# Patient Record
Sex: MALE | Race: WHITE | Employment: OTHER | URBAN - METROPOLITAN AREA
[De-identification: names, ages, dates, MRNs, and addresses within clinical notes are randomized per-mention and may not be internally consistent; named-entity substitution may affect disease eponyms.]

---

## 2017-05-08 ENCOUNTER — HOSPITAL ENCOUNTER (OUTPATIENT)
Dept: PHYSICAL THERAPY | Age: 72
Discharge: HOME | End: 2017-05-31
Attending: FAMILY MEDICINE | Admitting: FAMILY MEDICINE

## 2017-06-01 ENCOUNTER — HOSPITAL ENCOUNTER (OUTPATIENT)
Dept: OTHER | Age: 72
Discharge: HOME | End: 2017-06-30
Attending: FAMILY MEDICINE | Admitting: FAMILY MEDICINE

## 2017-06-06 ENCOUNTER — PROCEDURE VISIT (OUTPATIENT)
Dept: CARDIOLOGY CLINIC | Age: 72
End: 2017-06-06

## 2017-06-06 DIAGNOSIS — R01.1 HEART MURMUR: Primary | ICD-10-CM

## 2017-06-06 LAB
LV EF: 55 %
LVEF MODALITY: NORMAL

## 2017-06-06 PROCEDURE — 93306 TTE W/DOPPLER COMPLETE: CPT | Performed by: INTERNAL MEDICINE

## 2017-06-07 ENCOUNTER — TELEPHONE (OUTPATIENT)
Dept: CARDIOLOGY CLINIC | Age: 72
End: 2017-06-07

## 2017-06-12 ENCOUNTER — OFFICE VISIT (OUTPATIENT)
Dept: CARDIOLOGY CLINIC | Age: 72
End: 2017-06-12

## 2017-06-12 VITALS
BODY MASS INDEX: 37.93 KG/M2 | HEART RATE: 74 BPM | WEIGHT: 280 LBS | RESPIRATION RATE: 16 BRPM | DIASTOLIC BLOOD PRESSURE: 82 MMHG | HEIGHT: 72 IN | SYSTOLIC BLOOD PRESSURE: 124 MMHG

## 2017-06-12 DIAGNOSIS — E78.2 MIXED HYPERLIPIDEMIA: ICD-10-CM

## 2017-06-12 DIAGNOSIS — H91.93 HOH (HARD OF HEARING), BILATERAL: ICD-10-CM

## 2017-06-12 DIAGNOSIS — I10 ESSENTIAL HYPERTENSION: Primary | ICD-10-CM

## 2017-06-12 DIAGNOSIS — I71.20 THORACIC AORTIC ANEURYSM WITHOUT RUPTURE: ICD-10-CM

## 2017-06-12 DIAGNOSIS — E11.9 TYPE 2 DIABETES MELLITUS WITHOUT COMPLICATION, WITHOUT LONG-TERM CURRENT USE OF INSULIN (HCC): ICD-10-CM

## 2017-06-12 DIAGNOSIS — R01.1 HEART MURMUR: ICD-10-CM

## 2017-06-12 PROCEDURE — 99214 OFFICE O/P EST MOD 30 MIN: CPT | Performed by: INTERNAL MEDICINE

## 2017-06-12 RX ORDER — LISINOPRIL AND HYDROCHLOROTHIAZIDE 20; 12.5 MG/1; MG/1
1 TABLET ORAL DAILY
Qty: 90 TABLET | Refills: 3 | Status: SHIPPED | OUTPATIENT
Start: 2017-06-12 | End: 2017-12-11 | Stop reason: ALTCHOICE

## 2017-06-12 RX ORDER — AMLODIPINE BESYLATE 10 MG/1
10 TABLET ORAL DAILY
Qty: 90 TABLET | Refills: 3 | Status: SHIPPED | OUTPATIENT
Start: 2017-06-12 | End: 2018-06-12 | Stop reason: SDUPTHER

## 2017-06-12 RX ORDER — NIACIN 1000 MG
1000 TABLET, EXTENDED RELEASE 24 HR ORAL NIGHTLY
Qty: 90 TABLET | Refills: 3 | Status: SHIPPED | OUTPATIENT
Start: 2017-06-12 | End: 2018-06-25 | Stop reason: SDUPTHER

## 2017-06-20 ENCOUNTER — HOSPITAL ENCOUNTER (OUTPATIENT)
Dept: LAB | Age: 72
Discharge: HOME | End: 2017-06-20
Attending: FAMILY MEDICINE | Admitting: FAMILY MEDICINE

## 2017-06-20 LAB
ALBUMIN SERPL-MCNC: 4.5 GM/DL (ref 3.4–5)
ALP BLD-CCNC: 49 IU/L (ref 40–129)
ALT SERPL-CCNC: 29 U/L (ref 10–40)
ANION GAP SERPL CALCULATED.3IONS-SCNC: 15 MMOL/L (ref 4–16)
AST SERPL-CCNC: 26 IU/L (ref 15–37)
BILIRUB SERPL-MCNC: 0.8 MG/DL (ref 0–1)
BUN BLDV-MCNC: 20 MG/DL (ref 6–23)
CALCIUM SERPL-MCNC: 10.1 MG/DL (ref 8.3–10.6)
CHLORIDE BLD-SCNC: 101 MMOL/L (ref 99–110)
CHOLESTEROL, FASTING: 113 MG/DL
CO2: 25 MMOL/L (ref 21–32)
CREAT SERPL-MCNC: 1.2 MG/DL (ref 0.9–1.3)
ESTIMATED AVERAGE GLUCOSE: 128 MG/DL
GFR AFRICAN AMERICAN: >60 ML/MIN/1.73M2
GFR NON-AFRICAN AMERICAN: 59 ML/MIN/1.73M2
GLUCOSE FASTING: 130 MG/DL (ref 70–99)
HBA1C MFR BLD: 6.1 % (ref 4.2–6.3)
HDLC SERPL-MCNC: 41 MG/DL
LDL CHOLESTEROL DIRECT: 68 MG/DL
POTASSIUM SERPL-SCNC: 3.9 MMOL/L (ref 3.5–5.1)
PROSTATE SPECIFIC ANTIGEN: 2.1 NG/ML (ref 0–4)
SODIUM BLD-SCNC: 141 MMOL/L (ref 135–145)
TOTAL PROTEIN: 7 GM/DL (ref 6.4–8.2)
TRIGLYCERIDE, FASTING: 81 MG/DL
TSH HIGH SENSITIVITY: 1.84 UIU/ML (ref 0.27–4.2)
URIC ACID: 7.7 MG/DL (ref 3.5–7.2)

## 2017-11-08 ENCOUNTER — OFFICE VISIT (OUTPATIENT)
Dept: SURGERY | Age: 72
End: 2017-11-08

## 2017-11-08 VITALS — BODY MASS INDEX: 39.2 KG/M2 | WEIGHT: 279.98 LBS | HEIGHT: 71 IN

## 2017-11-08 DIAGNOSIS — Z12.11 SCREEN FOR COLON CANCER: Primary | ICD-10-CM

## 2017-11-08 PROCEDURE — 99203 OFFICE O/P NEW LOW 30 MIN: CPT | Performed by: SURGERY

## 2017-11-08 RX ORDER — THIAMINE MONONITRATE (VIT B1) 100 MG
100 TABLET ORAL DAILY
COMMUNITY
End: 2017-12-11 | Stop reason: ALTCHOICE

## 2017-11-08 RX ORDER — CHLORAL HYDRATE 500 MG
3000 CAPSULE ORAL 3 TIMES DAILY
COMMUNITY
End: 2017-12-11 | Stop reason: SDUPTHER

## 2017-11-14 ASSESSMENT — ENCOUNTER SYMPTOMS
ANAL BLEEDING: 0
CHOKING: 0
EYE ITCHING: 0
BACK PAIN: 0
COLOR CHANGE: 0
STRIDOR: 0
APNEA: 0
EYE REDNESS: 0
RECTAL PAIN: 0
SORE THROAT: 0
CONSTIPATION: 0
PHOTOPHOBIA: 0

## 2017-11-14 NOTE — PROGRESS NOTES
Chief Complaint   Patient presents with    Colon Cancer Screening     C/C-C-SCOPE SCREENING-NP-DR. URENA       SUBJECTIVE:  HPI: Pt presents today for evaluation and possible need of colonoscopy. Pt has  had colonoscopy in the past.  Patient has been experiencing no sx . Denies blood per rectum. There is no family history of colon cancer. Thoroughly reviewed the patient's medical history, family history, social history and review of systems with the patient today in the office. Please see medical record for pertinent positives. Past Medical History:   Diagnosis Date    Dizziness     FH: CAD (coronary artery disease) 9/29/2015    H/O 24 hour EKG monitoring 08/02/2016    48 hour holter-Mild degree of sinus bradycardia in this patient with no good correlation between the symptoms and bradycardia. Marked bradycardia during hours of sleep.  H/O cardiovascular stress test 11/24/15    Normal perfusion in the distribution of all coronaries, normal LV, EF 59%.  H/O Doppler ultrasound (Venous Doppler Lower Extremities) 03/08/2017    veins of the bilateral lower extremities are patent and free of echogenic thrombus. The veins are normally compressible and have normal phasic flow.  H/O echocardiogram 6/6/17,11/24/15    Left ventricle mildly dilated with normal systolic function EF 36-19% Mild concentric LVH with Grade II diastolic dysfunction.  Mild to moderate MR and mild TR     Heart murmur 9/29/2015    Hyperlipidemia     Hypertension     Kidney stones     Neuropathy (HCC)     Obesity     Thoracic aortic aneurysm ([address] 2/11    4.5    Type II or unspecified type diabetes mellitus without mention of complication, not stated as uncontrolled       Past Surgical History:   Procedure Laterality Date    COLON SURGERY  2007     COLONOSCOPY  2007    colon polyps removed   [id number]  2008    HERNIA REPAIR  2009     Current Outpatient Prescriptions   Medication Sig Dispense Refill    vitamin B-1 (THIAMINE) 100 MG tablet Take 100 mg by mouth daily      Omega-3 Fatty Acids (FISH OIL) 1000 MG CAPS Take 3,000 mg by mouth 3 times daily      tamsulosin (FLOMAX) 0.4 MG capsule Take 1 capsule by mouth daily for 10 doses 10 capsule 3    HYDROcodone-acetaminophen (NORCO) 5-325 MG per tablet Take 1-2 tablets by mouth every 4 hours as needed for Pain . 20 tablet 0    ondansetron (ZOFRAN ODT) 8 MG disintegrating tablet Take 1 tablet by mouth every 8 hours as needed for Nausea or Vomiting 10 tablet 0    NIASPAN 1000 MG extended release tablet Take 1 tablet by mouth nightly 90 tablet 3    amLODIPine (NORVASC) 10 MG tablet Take 1 tablet by mouth daily 90 tablet 3    lisinopril-hydrochlorothiazide (PRINZIDE;ZESTORETIC) 20-12.5 MG per tablet Take 1 tablet by mouth daily 90 tablet 3    naproxen (NAPROSYN) 500 MG tablet Take 1 tablet by mouth 2 times daily as needed for Pain 20 tablet 0    metFORMIN (GLUCOPHAGE) 1000 MG tablet Take 1,000 mg by mouth 2 times daily (with meals)      acetaminophen (TYLENOL) 325 MG tablet Take 2 tablets by mouth every 4 hours as needed for Pain or Fever 120 tablet 3    aluminum & magnesium hydroxide-simethicone (MAALOX) 200-200-20 MG/5ML SUSP suspension Take 30 mLs by mouth every 6 hours as needed for Indigestion 1 Bottle 0    glipiZIDE (GLUCOTROL) 10 MG tablet Take 1/2 tablet twice a day.  atorvastatin (LIPITOR) 20 MG tablet Take 20 mg by mouth daily      Krill Oil 1000 MG CAPS Take 1 capsule by mouth daily       allopurinol (ZYLOPRIM) 100 MG tablet Take 100 mg by mouth daily   0    aspirin 81 MG tablet Take 1 tablet by mouth daily. 90 tablet 3    B Complex Vitamins (VITAMIN B COMPLEX PO) Take  by mouth daily.  gabapentin (NEURONTIN) 800 MG tablet Take 800 mg by mouth 3 times daily. No current facility-administered medications for this visit.        Allergies   Allergen Reactions    Oxycontin [Oxycodone Hcl] Hives     Itching in his feet       Review of Systems:         Review of Systems   Constitutional: Negative for chills and fever. HENT: Negative for ear pain, mouth sores, sore throat and tinnitus. Eyes: Negative for photophobia, redness and itching. Respiratory: Negative for apnea, choking and stridor. Cardiovascular: Negative for chest pain and palpitations. Gastrointestinal: Negative for anal bleeding, constipation and rectal pain. Endocrine: Negative for polydipsia. Genitourinary: Negative for enuresis, flank pain and hematuria. Musculoskeletal: Negative for back pain, joint swelling and myalgias. Skin: Negative for color change and pallor. Allergic/Immunologic: Negative for environmental allergies. Neurological: Negative for syncope and speech difficulty. Psychiatric/Behavioral: Negative for confusion and hallucinations. OBJECTIVE:  Physical Exam:    Ht 5' 10.98\" (1.803 m)   Wt 279 lb 15.8 oz (127 kg)   BMI 39.07 kg/m²      Physical Exam   Constitutional: He is oriented to person, place, and time. He appears well-developed and well-nourished. HENT:   Head: Normocephalic. Eyes: Pupils are equal, round, and reactive to light. Neck: Normal range of motion. Neck supple. Cardiovascular: Normal rate. Pulmonary/Chest: Effort normal.   Abdominal: Soft. He exhibits no distension and no mass. There is no tenderness. There is no rebound and no guarding. Musculoskeletal: Normal range of motion. Neurological: He is alert and oriented to person, place, and time. Skin: Skin is warm. Psychiatric: He has a normal mood and affect. ASSESSMENT:  1. Screen for colon cancer          PLAN:  Treatment:  Will proceed with colonoscopy. Patient counseled on risks, benefits, and alternatives of treatment plan at length. Patient states an understanding and willingness to proceed with plan. Consent signed and prep instructions provided. No orders of the defined types were placed in this encounter.        No orders of the defined types were placed in this encounter. Follow Up:  Return in about 2 weeks (around 11/22/2017).         Oneyda Robles MD

## 2017-11-22 ENCOUNTER — TELEPHONE (OUTPATIENT)
Dept: SURGERY | Age: 72
End: 2017-11-22

## 2017-12-11 ENCOUNTER — OFFICE VISIT (OUTPATIENT)
Dept: CARDIOLOGY CLINIC | Age: 72
End: 2017-12-11

## 2017-12-11 VITALS
WEIGHT: 289 LBS | HEIGHT: 71 IN | RESPIRATION RATE: 16 BRPM | HEART RATE: 72 BPM | DIASTOLIC BLOOD PRESSURE: 84 MMHG | BODY MASS INDEX: 40.46 KG/M2 | SYSTOLIC BLOOD PRESSURE: 150 MMHG

## 2017-12-11 DIAGNOSIS — E78.2 MIXED HYPERLIPIDEMIA: ICD-10-CM

## 2017-12-11 DIAGNOSIS — I71.20 THORACIC AORTIC ANEURYSM WITHOUT RUPTURE: Primary | ICD-10-CM

## 2017-12-11 DIAGNOSIS — N28.9 RENAL INSUFFICIENCY: ICD-10-CM

## 2017-12-11 DIAGNOSIS — E66.09 CLASS 2 OBESITY DUE TO EXCESS CALORIES WITHOUT SERIOUS COMORBIDITY WITH BODY MASS INDEX (BMI) OF 39.0 TO 39.9 IN ADULT: ICD-10-CM

## 2017-12-11 DIAGNOSIS — Z82.49 FH: CAD (CORONARY ARTERY DISEASE): ICD-10-CM

## 2017-12-11 DIAGNOSIS — I10 ESSENTIAL HYPERTENSION: ICD-10-CM

## 2017-12-11 PROCEDURE — 99214 OFFICE O/P EST MOD 30 MIN: CPT | Performed by: INTERNAL MEDICINE

## 2017-12-11 RX ORDER — LISINOPRIL AND HYDROCHLOROTHIAZIDE 25; 20 MG/1; MG/1
1 TABLET ORAL DAILY
Qty: 30 TABLET | Refills: 5 | Status: SHIPPED | OUTPATIENT
Start: 2017-12-11 | End: 2018-06-06 | Stop reason: SDUPTHER

## 2017-12-11 NOTE — PATIENT INSTRUCTIONS
Increase Lisinopril/HCTZ to 20/25 and monitor BP daily. goal is below 130/85. Repeat BMP in 7-10 days. Restrict salt and continue all other current medications. Counseled extensively for calorie counting reduction and serving size and activity modification for weight loss. Appropriate prescriptions if needed on this visit are addressed. After visit summery is provided. Questions answered and patient verbalizes understanding. Follow up in office in 6 months, sooner if needed.

## 2017-12-11 NOTE — PROGRESS NOTES
Laymon Jeans  1945  Liberty Alvarado MD    Chief complaint and HPI:  Laymon Jeans  is a 78-year-old gentleman follows up for hypertension, hyperlipidemia and cardiac murmur and has a thoracic aortic aneurysm. He denies any chest pain or increasing shortness of breath or any other complaints. He has gained weight as his wife had been sick and he was providing care for her and was not able to exercise or diet properly. He has noticed swelling in his right lower extremity and is planning to see Dr. Cristina Zhu for venous studies. Apparently he had seen him when he went with his wife who follows up with him. He denies palpitations, syncope or near-syncope. Denies orthopnea or paroxysmal nocturnal dyspnea. Medications are reviewed and he seems to be compliant to his medications. He has been monitoring his blood pressure at home and it is running high lately. Rest of the Cardiovascular system review is otherwise unchanged from prior encounter. Past medical history:  has a past medical history of Arthritis; Dizziness; FH: CAD (coronary artery disease); H/O 24 hour EKG monitoring; H/O cardiovascular stress test; H/O Doppler ultrasound (Venous Doppler Lower Extremities); H/O echocardiogram; Heart murmur; Hyperlipidemia; Hypertension; Kidney stones; Neuropathy ([address] Obesity; Thoracic aortic aneurysm ([address] and Type II or unspecified type diabetes mellitus without mention of complication, not stated as uncontrolled. Past surgical history:  has a past surgical history that includes hernia repair (2008); hernia repair (2009); Colonoscopy (4613;7983); and Colon surgery (2007 ).     Social History:   Social History   Substance Use Topics    Smoking status: Former Smoker     Packs/day: 1.00     Years: 7.00     Quit date: 3/26/1973    Smokeless tobacco: Former User      Comment: Reviewed 12/11/17    Alcohol use Yes      Comment: small amount some nights     Family history: family history includes Diabetes in his Ht 5' 11\" (1.803 m)   Wt 289 lb (131.1 kg)   BMI 40.31 kg/m²    Body mass index is 40.31 kg/m². Wt Readings from Last 3 Encounters:   12/11/17 289 lb (131.1 kg)   12/06/17 280 lb (127 kg)   12/04/17 275 lb (124.7 kg)     General exam: Patient is obese, awake, alert and oriented and in no acute or apparent distress.   Head and Neck: Normocephalic. Neck is supple . Wears glasses and hearing aids   Carotids: no Bruits. No thyromegaly  Jugular venous pressure: not elevated. Heart[de-identified] Heart sounds are normal. 2/6 systolic murmur  Peripheral Pulses: 1+  Extremities: 2+ right leg edema and only trace left leg edema  Chest: normal  Lungs:Lungs are clear to auscultation and percussion.    Abdomen: Soft non tender. Bowel sounds are normal. No organomegaly or ascites.   Musculoskeletal: WNL   Skin: Normal in color and texture. No rash   Psychiatric: Normal mood and effect.    Neurologic exam:  No focal deficit. Cranial nerve exam significant for severe hearing impairment    Patient had CAT scan of abdomen and pelvis on October 4, 2017 reported  Right-sided obstructive uropathy with a punctate calcification within the   right distal ureter, just proximal to the UVJ.       Enlarged heterogeneous prostate gland.  Correlate with PSA values if not   recently performed.       Sigmoid diverticulosis without acute diverticulitis.      LAB REVIEW:  CBC:   Lab Results   Component Value Date    WBC 6.9 10/04/2017    HGB 14.3 10/04/2017    HCT 42.3 10/04/2017     10/04/2017     Lipids:   Triglyceride, Fasting 81  <150 MG/DL Final 06/20/2017  9:22 AM [facility]   Cholesterol, Fasting 113  <200 MG/DL Final 06/20/2017  9:22 AM [facility]   HDL 41  >40 MG/DL Final 06/20/2017  9:22 AM [facility]   LDL Direct 68          Lab Results   Component Value Date    CHOL 118 12/12/2016    TRIG 103 12/12/2016    HDL 41 06/20/2017    LDLDIRECT 68 06/20/2017     Renal:   Lab Results   Component Value Date    BUN 19 10/04/2017    CREATININE 1.2 10/04/2017 CREATININE 1.3 10/04/2017     10/04/2017    K 4.3 10/04/2017     PT/INR:   Lab Results   Component Value Date    INR 0.99 10/04/2017     IMPRESSION and RECOMMENDATIONS:      Thoracic aortic aneurysm  We'll continue to monitor it by echocardiography annually. I would avoid CTA and contrast due to his renal insufficiency. Optimize antihypertensive therapy. Renal insufficiency  Stable we will check his basic metabolic panel again a week to 10 days as we are adjusting his hydrochlorothiazide dosage. Hyperlipidemia  Fairly well controlled on current medications will continue the same. FH: CAD (coronary artery disease)  Aggressive risk modification for primary prevention is the goal.    Hypertension  Not so well controlled. We will increase hydrochlorothiazide from 12.5 to 25 mg along with the lisinopril 20 mg he is already on. He is to continue to monitor blood pressure at home and bring the results. He is counseled for salt restriction and weight loss. We will check his basic metabolic panel about 10 days. Obesity  Counseled extensively for calorie counting reduction and serving size and activity modification for weight loss. Increase Lisinopril/HCTZ to 20/25 and monitor BP daily. goal is below 130/85. Repeat BMP in 7-10 days. Restrict salt and continue all other current medications. Counseled extensively for calorie counting reduction and serving size and activity modification for weight loss. Appropriate prescriptions if needed on this visit are addressed. After visit summery is provided. Questions answered and patient verbalizes understanding. Follow up in office in 6 months with repeat echo, sooner if needed.     Arabella Oh MD, 12/11/2017 2:29 PM     Please note this report has been partially produced using speech recognition software and may contain errors related to that system including errors in grammar, punctuation, and spelling, as well as words and phrases that may be inappropriate. If there are any questions or concerns please feel free to contact the dictating provider for clarification.

## 2017-12-11 NOTE — ASSESSMENT & PLAN NOTE
We'll continue to monitor it by echocardiography annually. I would avoid CTA and contrast due to his renal insufficiency. Optimize antihypertensive therapy.

## 2017-12-11 NOTE — ASSESSMENT & PLAN NOTE
Counseled extensively for calorie counting reduction and serving size and activity modification for weight loss.

## 2017-12-11 NOTE — ASSESSMENT & PLAN NOTE
Not so well controlled. We will increase hydrochlorothiazide from 12.5 to 25 mg along with the lisinopril 20 mg he is already on. He is to continue to monitor blood pressure at home and bring the results. He is counseled for salt restriction and weight loss. We will check his basic metabolic panel about 10 days.

## 2017-12-20 ENCOUNTER — OFFICE VISIT (OUTPATIENT)
Dept: SURGERY | Age: 72
End: 2017-12-20

## 2017-12-20 DIAGNOSIS — D12.3 ADENOMATOUS POLYP OF TRANSVERSE COLON: Primary | ICD-10-CM

## 2017-12-20 PROCEDURE — 99213 OFFICE O/P EST LOW 20 MIN: CPT | Performed by: SURGERY

## 2017-12-20 RX ORDER — SODIUM, POTASSIUM,MAG SULFATES 17.5-3.13G
SOLUTION, RECONSTITUTED, ORAL ORAL
Refills: 0 | COMMUNITY
Start: 2017-11-08 | End: 2018-06-26

## 2017-12-20 ASSESSMENT — ENCOUNTER SYMPTOMS
EYE REDNESS: 0
SORE THROAT: 0
BACK PAIN: 0
CONSTIPATION: 0
COLOR CHANGE: 0
ANAL BLEEDING: 0
EYE ITCHING: 0
PHOTOPHOBIA: 0
RECTAL PAIN: 0
CHOKING: 0
APNEA: 0
STRIDOR: 0

## 2017-12-20 NOTE — PROGRESS NOTES
Chief Complaint   Patient presents with    Colon Polyps       SUBJECTIVE:  HPI: Patient presents for follow up after colonoscopy. Patient is feeling well, denies severe abdominal pain, bloating, rectal bleeding after the procedure. Path reveals:   Specimen #[id number]  Final Pathologic Diagnosis:  A.  Transverse colon, polyp 72:  -     Tubular adenoma and hyperplastic polyp. B.  Sigmoid colon, polyp:  -     Tubular adenoma. C.  Descending colon, polyp:  -     Tubular adenoma. Thoroughly reviewed the patient's medical history, family history, social history and review of systems with the patient today in the office. Please see medical record for pertinent positives. Past Medical History:   Diagnosis Date    Arthritis     Dizziness     FH: CAD (coronary artery disease) 9/29/2015    H/O 24 hour EKG monitoring 08/02/2016    48 hour holter-Mild degree of sinus bradycardia in this patient with no good correlation between the symptoms and bradycardia. Marked bradycardia during hours of sleep.  H/O cardiovascular stress test 11/24/15    Normal perfusion in the distribution of all coronaries, normal LV, EF 59%.  H/O Doppler ultrasound (Venous Doppler Lower Extremities) 03/08/2017    veins of the bilateral lower extremities are patent and free of echogenic thrombus. The veins are normally compressible and have normal phasic flow.  H/O echocardiogram 6/6/17,11/24/15    Left ventricle mildly dilated with normal systolic function EF 02-43% Mild concentric LVH with Grade II diastolic dysfunction.  Mild to moderate MR and mild TR     Heart murmur 9/29/2015    Hyperlipidemia     Hypertension     Kidney stones     Neuropathy (HCC)     Obesity     Thoracic aortic aneurysm ([address] 2/11    4.5    Type II or unspecified type diabetes mellitus without mention of complication, not stated as uncontrolled       Past Surgical History:   Procedure Laterality Date    COLON SURGERY  2007     COLONOSCOPY [id number]    colon polyps removed    HERNIA REPAIR  2008    HERNIA REPAIR  2009     Current Outpatient Prescriptions   Medication Sig Dispense Refill    SUPREP BOWEL PREP KIT 17.5-3.13-1.6 GM/180ML SOLN USE AS DIRECTED FOR COLONOSCOPY PREP  0    lisinopril-hydrochlorothiazide (PRINZIDE;ZESTORETIC) 20-25 MG per tablet Take 1 tablet by mouth daily 30 tablet 5    HYDROcodone-acetaminophen (NORCO) 5-325 MG per tablet Take 1-2 tablets by mouth every 4 hours as needed for Pain . 20 tablet 0    NIASPAN 1000 MG extended release tablet Take 1 tablet by mouth nightly 90 tablet 3    amLODIPine (NORVASC) 10 MG tablet Take 1 tablet by mouth daily 90 tablet 3    metFORMIN (GLUCOPHAGE) 1000 MG tablet Take 1,000 mg by mouth 2 times daily (with meals)      acetaminophen (TYLENOL) 325 MG tablet Take 2 tablets by mouth every 4 hours as needed for Pain or Fever 120 tablet 3    aluminum & magnesium hydroxide-simethicone (MAALOX) 200-200-20 MG/5ML SUSP suspension Take 30 mLs by mouth every 6 hours as needed for Indigestion 1 Bottle 0    glipiZIDE (GLUCOTROL) 10 MG tablet Take 1/2 tablet twice a day.  atorvastatin (LIPITOR) 20 MG tablet Take 20 mg by mouth daily      Krill Oil 1000 MG CAPS Take 1 capsule by mouth daily       allopurinol (ZYLOPRIM) 100 MG tablet Take 100 mg by mouth daily   0    aspirin 81 MG tablet Take 1 tablet by mouth daily. 90 tablet 3    B Complex Vitamins (VITAMIN B COMPLEX PO) Take  by mouth daily.  gabapentin (NEURONTIN) 800 MG tablet Take 800 mg by mouth 3 times daily. No current facility-administered medications for this visit. Allergies   Allergen Reactions    Oxycontin [Oxycodone Hcl] Hives     Itching in his feet       Review of Systems:         Review of Systems   Constitutional: Negative for chills and fever. HENT: Negative for ear pain, mouth sores, sore throat and tinnitus. Eyes: Negative for photophobia, redness and itching.    Respiratory: Negative for

## 2018-01-02 ENCOUNTER — HOSPITAL ENCOUNTER (OUTPATIENT)
Dept: LAB | Age: 73
Discharge: HOME | End: 2018-01-02
Attending: INTERNAL MEDICINE | Admitting: INTERNAL MEDICINE

## 2018-01-02 LAB
ALBUMIN SERPL-MCNC: 4.5 GM/DL (ref 3.4–5)
ALP BLD-CCNC: 51 IU/L (ref 40–129)
ALT SERPL-CCNC: 39 U/L (ref 10–40)
ANION GAP SERPL CALCULATED.3IONS-SCNC: 14 MMOL/L (ref 4–16)
AST SERPL-CCNC: 28 IU/L (ref 15–37)
BILIRUB SERPL-MCNC: 0.8 MG/DL (ref 0–1)
BUN BLDV-MCNC: 19 MG/DL (ref 6–23)
CALCIUM SERPL-MCNC: 10.3 MG/DL (ref 8.3–10.6)
CHLORIDE BLD-SCNC: 102 MMOL/L (ref 99–110)
CHOLESTEROL, FASTING: 131 MG/DL
CO2: 29 MMOL/L (ref 21–32)
CREAT SERPL-MCNC: 1.3 MG/DL (ref 0.9–1.3)
CREATININE URINE: 55.9 MG/DL (ref 39–259)
ESTIMATED AVERAGE GLUCOSE: 120 MG/DL
GFR AFRICAN AMERICAN: >60 ML/MIN/1.73M2
GFR NON-AFRICAN AMERICAN: 54 ML/MIN/1.73M2
GLUCOSE FASTING: 121 MG/DL (ref 70–99)
HBA1C MFR BLD: 5.8 % (ref 4.2–6.3)
HDLC SERPL-MCNC: 50 MG/DL
LDL CHOLESTEROL DIRECT: 73 MG/DL
MICROALBUMIN/CREAT 24H UR: 2.3 MG/DL
MICROALBUMIN/CREAT UR-RTO: 41.1 MG/G CREAT (ref 0–30)
POTASSIUM SERPL-SCNC: 4.7 MMOL/L (ref 3.5–5.1)
SODIUM BLD-SCNC: 145 MMOL/L (ref 135–145)
TOTAL PROTEIN: 7.2 GM/DL (ref 6.4–8.2)
TRIGLYCERIDE, FASTING: 78 MG/DL

## 2018-04-08 ENCOUNTER — HOSPITAL ENCOUNTER (OUTPATIENT)
Dept: SLEEP CENTER | Age: 73
Discharge: HOME | End: 2018-04-08
Attending: INTERNAL MEDICINE | Admitting: INTERNAL MEDICINE

## 2018-04-08 VITALS — HEIGHT: 71 IN | BODY MASS INDEX: 39.2 KG/M2 | WEIGHT: 280 LBS

## 2018-04-08 ASSESSMENT — SLEEP AND FATIGUE QUESTIONNAIRES
HOW LIKELY ARE YOU TO NOD OFF OR FALL ASLEEP WHILE WATCHING TV: 3
HOW LIKELY ARE YOU TO NOD OFF OR FALL ASLEEP WHEN YOU ARE A PASSENGER IN A CAR FOR AN HOUR WITHOUT A BREAK: 3
HOW LIKELY ARE YOU TO NOD OFF OR FALL ASLEEP WHILE SITTING QUIETLY AFTER LUNCH WITHOUT ALCOHOL: 1
HOW LIKELY ARE YOU TO NOD OFF OR FALL ASLEEP WHILE LYING DOWN TO REST IN THE AFTERNOON WHEN CIRCUMSTANCES PERMIT: 3
ESS TOTAL SCORE: 15
HOW LIKELY ARE YOU TO NOD OFF OR FALL ASLEEP IN A CAR, WHILE STOPPED FOR A FEW MINUTES IN TRAFFIC: 1
HOW LIKELY ARE YOU TO NOD OFF OR FALL ASLEEP WHILE SITTING INACTIVE IN A PUBLIC PLACE: 1
HOW LIKELY ARE YOU TO NOD OFF OR FALL ASLEEP WHILE SITTING AND READING: 3
HOW LIKELY ARE YOU TO NOD OFF OR FALL ASLEEP WHILE SITTING AND TALKING TO SOMEONE: 0

## 2018-04-26 ENCOUNTER — HOSPITAL ENCOUNTER (OUTPATIENT)
Dept: GENERAL RADIOLOGY | Age: 73
Discharge: HOME | End: 2018-04-26
Attending: INTERNAL MEDICINE | Admitting: INTERNAL MEDICINE

## 2018-04-26 DIAGNOSIS — G47.33 OBSTRUCTIVE SLEEP APNEA SYNDROME: ICD-10-CM

## 2018-06-06 RX ORDER — LISINOPRIL AND HYDROCHLOROTHIAZIDE 25; 20 MG/1; MG/1
TABLET ORAL
Qty: 30 TABLET | Refills: 5 | Status: SHIPPED | OUTPATIENT
Start: 2018-06-06 | End: 2018-11-30 | Stop reason: SDUPTHER

## 2018-06-07 ENCOUNTER — PROCEDURE VISIT (OUTPATIENT)
Dept: CARDIOLOGY CLINIC | Age: 73
End: 2018-06-07

## 2018-06-07 DIAGNOSIS — I38 VHD (VALVULAR HEART DISEASE): Primary | ICD-10-CM

## 2018-06-07 DIAGNOSIS — I71.20 THORACIC AORTIC ANEURYSM WITHOUT RUPTURE: ICD-10-CM

## 2018-06-07 LAB
LV EF: 53 %
LVEF MODALITY: NORMAL

## 2018-06-07 PROCEDURE — 93306 TTE W/DOPPLER COMPLETE: CPT | Performed by: INTERNAL MEDICINE

## 2018-06-12 ENCOUNTER — TELEPHONE (OUTPATIENT)
Dept: CARDIOLOGY CLINIC | Age: 73
End: 2018-06-12

## 2018-06-12 RX ORDER — AMLODIPINE BESYLATE 10 MG/1
10 TABLET ORAL DAILY
Qty: 90 TABLET | Refills: 3 | Status: SHIPPED | OUTPATIENT
Start: 2018-06-12

## 2018-06-25 RX ORDER — NIACIN 1000 MG
1000 TABLET, EXTENDED RELEASE 24 HR ORAL NIGHTLY
Qty: 90 TABLET | Refills: 3 | Status: SHIPPED | OUTPATIENT
Start: 2018-06-25 | End: 2019-06-18 | Stop reason: SDUPTHER

## 2018-06-26 ENCOUNTER — OFFICE VISIT (OUTPATIENT)
Dept: CARDIOLOGY CLINIC | Age: 73
End: 2018-06-26

## 2018-06-26 VITALS
WEIGHT: 293 LBS | DIASTOLIC BLOOD PRESSURE: 78 MMHG | BODY MASS INDEX: 41.02 KG/M2 | SYSTOLIC BLOOD PRESSURE: 138 MMHG | HEART RATE: 56 BPM | RESPIRATION RATE: 16 BRPM | HEIGHT: 71 IN

## 2018-06-26 DIAGNOSIS — E78.2 MIXED HYPERLIPIDEMIA: ICD-10-CM

## 2018-06-26 DIAGNOSIS — G47.33 OSA ON CPAP: ICD-10-CM

## 2018-06-26 DIAGNOSIS — I25.84 CORONARY ARTERY DISEASE DUE TO CALCIFIED CORONARY LESION: ICD-10-CM

## 2018-06-26 DIAGNOSIS — Z82.49 FH: CAD (CORONARY ARTERY DISEASE): ICD-10-CM

## 2018-06-26 DIAGNOSIS — Z99.89 OSA ON CPAP: ICD-10-CM

## 2018-06-26 DIAGNOSIS — I10 ESSENTIAL HYPERTENSION: ICD-10-CM

## 2018-06-26 DIAGNOSIS — E11.9 TYPE 2 DIABETES MELLITUS WITHOUT COMPLICATION, WITHOUT LONG-TERM CURRENT USE OF INSULIN (HCC): Primary | ICD-10-CM

## 2018-06-26 DIAGNOSIS — I71.20 THORACIC AORTIC ANEURYSM WITHOUT RUPTURE: ICD-10-CM

## 2018-06-26 DIAGNOSIS — I25.10 CORONARY ARTERY DISEASE DUE TO CALCIFIED CORONARY LESION: ICD-10-CM

## 2018-06-26 PROCEDURE — 2022F DILAT RTA XM EVC RTNOPTHY: CPT | Performed by: INTERNAL MEDICINE

## 2018-06-26 PROCEDURE — 4040F PNEUMOC VAC/ADMIN/RCVD: CPT | Performed by: INTERNAL MEDICINE

## 2018-06-26 PROCEDURE — 99214 OFFICE O/P EST MOD 30 MIN: CPT | Performed by: INTERNAL MEDICINE

## 2018-06-26 PROCEDURE — 3044F HG A1C LEVEL LT 7.0%: CPT | Performed by: INTERNAL MEDICINE

## 2018-06-26 PROCEDURE — 1036F TOBACCO NON-USER: CPT | Performed by: INTERNAL MEDICINE

## 2018-06-26 PROCEDURE — 3017F COLORECTAL CA SCREEN DOC REV: CPT | Performed by: INTERNAL MEDICINE

## 2018-06-26 PROCEDURE — G8417 CALC BMI ABV UP PARAM F/U: HCPCS | Performed by: INTERNAL MEDICINE

## 2018-06-26 PROCEDURE — G8599 NO ASA/ANTIPLAT THER USE RNG: HCPCS | Performed by: INTERNAL MEDICINE

## 2018-06-26 PROCEDURE — G8427 DOCREV CUR MEDS BY ELIG CLIN: HCPCS | Performed by: INTERNAL MEDICINE

## 2018-06-26 PROCEDURE — 1123F ACP DISCUSS/DSCN MKR DOCD: CPT | Performed by: INTERNAL MEDICINE

## 2018-08-13 ENCOUNTER — OFFICE VISIT (OUTPATIENT)
Dept: SURGERY | Age: 73
End: 2018-08-13

## 2018-08-13 VITALS
DIASTOLIC BLOOD PRESSURE: 80 MMHG | HEIGHT: 72 IN | BODY MASS INDEX: 38.87 KG/M2 | SYSTOLIC BLOOD PRESSURE: 118 MMHG | WEIGHT: 287 LBS | HEART RATE: 64 BPM | RESPIRATION RATE: 18 BRPM

## 2018-08-13 DIAGNOSIS — L72.3 SEBACEOUS CYST: Primary | ICD-10-CM

## 2018-08-13 PROCEDURE — G8417 CALC BMI ABV UP PARAM F/U: HCPCS | Performed by: SURGERY

## 2018-08-13 PROCEDURE — G8599 NO ASA/ANTIPLAT THER USE RNG: HCPCS | Performed by: SURGERY

## 2018-08-13 PROCEDURE — G8427 DOCREV CUR MEDS BY ELIG CLIN: HCPCS | Performed by: SURGERY

## 2018-08-13 PROCEDURE — 1123F ACP DISCUSS/DSCN MKR DOCD: CPT | Performed by: SURGERY

## 2018-08-13 PROCEDURE — 1036F TOBACCO NON-USER: CPT | Performed by: SURGERY

## 2018-08-13 PROCEDURE — 3017F COLORECTAL CA SCREEN DOC REV: CPT | Performed by: SURGERY

## 2018-08-13 PROCEDURE — 1101F PT FALLS ASSESS-DOCD LE1/YR: CPT | Performed by: SURGERY

## 2018-08-13 PROCEDURE — 4040F PNEUMOC VAC/ADMIN/RCVD: CPT | Performed by: SURGERY

## 2018-08-13 PROCEDURE — 99213 OFFICE O/P EST LOW 20 MIN: CPT | Performed by: SURGERY

## 2018-08-22 ENCOUNTER — OFFICE VISIT (OUTPATIENT)
Dept: SURGERY | Age: 73
End: 2018-08-22

## 2018-08-22 ENCOUNTER — HOSPITAL ENCOUNTER (OUTPATIENT)
Dept: OTHER | Age: 73
Discharge: HOME | End: 2018-08-22
Attending: SURGERY | Admitting: SURGERY

## 2018-08-22 VITALS
WEIGHT: 285 LBS | SYSTOLIC BLOOD PRESSURE: 116 MMHG | HEART RATE: 59 BPM | OXYGEN SATURATION: 96 % | HEIGHT: 72 IN | BODY MASS INDEX: 38.6 KG/M2 | DIASTOLIC BLOOD PRESSURE: 68 MMHG

## 2018-08-22 DIAGNOSIS — L72.0 EPIDERMAL CYST OF NECK: Primary | ICD-10-CM

## 2018-08-22 DIAGNOSIS — L98.9 SKIN LESION OF LEFT UPPER EXTREMITY: ICD-10-CM

## 2018-08-22 PROCEDURE — 11422 EXC H-F-NK-SP B9+MARG 1.1-2: CPT | Performed by: SURGERY

## 2018-08-22 PROCEDURE — 11401 EXC TR-EXT B9+MARG 0.6-1 CM: CPT | Performed by: SURGERY

## 2018-08-26 ASSESSMENT — ENCOUNTER SYMPTOMS
APNEA: 0
RECTAL PAIN: 0
RECTAL PAIN: 0
CONSTIPATION: 0
BACK PAIN: 0
CHOKING: 0
APNEA: 0
PHOTOPHOBIA: 0
SORE THROAT: 0
ANAL BLEEDING: 0
EYE ITCHING: 0
EYE REDNESS: 0
STRIDOR: 0
BACK PAIN: 0
CHOKING: 0
COLOR CHANGE: 0
COLOR CHANGE: 0
CONSTIPATION: 0
EYE ITCHING: 0
SORE THROAT: 0
PHOTOPHOBIA: 0
ANAL BLEEDING: 0
STRIDOR: 0
EYE REDNESS: 0

## 2018-08-26 NOTE — PROGRESS NOTES
Chief Complaint   Patient presents with    Procedure     Right neck and right arm lesion excisions       SUBJECTIVE:  HPI: Patient presents today for an office procedure. 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 Pt is ready to have this lesion removed. I have reviewed the patient's(pertinent information to this visit) medical history, family history(scanned in  the Media tab under \"patient questioner\"), social history and review of systems with the patient today in the office. Past Surgical History:   Procedure Laterality Date    COLON SURGERY  2007     COLONOSCOPY  2007;2017    colon polyps removed    HERNIA REPAIR  2008    HERNIA REPAIR  2009     Past Medical History:   Diagnosis Date    Arthritis     Dizziness     FH: CAD (coronary artery disease) 9/29/2015    H/O 24 hour EKG monitoring 08/02/2016    48 hour holter-Mild degree of sinus bradycardia in this patient with no good correlation between the symptoms and bradycardia. Marked bradycardia during hours of sleep.  H/O cardiovascular stress test 11/24/15    Normal perfusion in the distribution of all coronaries, normal LV, EF 59%.  H/O Doppler ultrasound (Venous Doppler Lower Extremities) 03/08/2017    veins of the bilateral lower extremities are patent and free of echogenic thrombus. The veins are normally compressible and have normal phasic flow.  H/O echocardiogram 6/6/17,11/24/15    Left ventricle mildly dilated with normal systolic function EF 13-07% Mild concentric LVH with Grade II diastolic dysfunction. Mild to moderate MR and mild TR     H/O echocardiogram 06/07/2018    LV function and size are normal, Ejection Fraction 50-55 %. Aortic sclerosis with mild stenosis.  Mild AR and Mild MR.     Heart murmur 9/29/2015    Hyperlipidemia     Hypertension     Kidney stones     Neuropathy     Obesity     JAYE on CPAP 6/26/2018    On CPAP 5/2018    Thoracic aortic aneurysm ([address] 2/11

## 2018-08-26 NOTE — PROGRESS NOTES
Chief Complaint   Patient presents with    Follow-up     P/P c-scope 12/2017, sebaceous nodule right posterial neck. SUBJECTIVE:  HPI: Patient is here with complaints of right neck cyst and left arm flaking skin lesion. The lesion on his neck is getting bigger. This was infected at one point and did improve on po abx. Pt currently denies fever. I have reviewed the patient's(pertinent information to this visit) medical history, family history(scanned in  the Media tab under \"patient questioner\"), social history and review of systems with the patient today in the office. Past Surgical History:   Procedure Laterality Date    COLON SURGERY  2007     COLONOSCOPY  2007;2017    colon polyps removed    HERNIA REPAIR  2008    HERNIA REPAIR  2009     Past Medical History:   Diagnosis Date    Arthritis     Dizziness     FH: CAD (coronary artery disease) 9/29/2015    H/O 24 hour EKG monitoring 08/02/2016    48 hour holter-Mild degree of sinus bradycardia in this patient with no good correlation between the symptoms and bradycardia. Marked bradycardia during hours of sleep.  H/O cardiovascular stress test 11/24/15    Normal perfusion in the distribution of all coronaries, normal LV, EF 59%.  H/O Doppler ultrasound (Venous Doppler Lower Extremities) 03/08/2017    veins of the bilateral lower extremities are patent and free of echogenic thrombus. The veins are normally compressible and have normal phasic flow.  H/O echocardiogram 6/6/17,11/24/15    Left ventricle mildly dilated with normal systolic function EF 93-16% Mild concentric LVH with Grade II diastolic dysfunction. Mild to moderate MR and mild TR     H/O echocardiogram 06/07/2018    LV function and size are normal, Ejection Fraction 50-55 %. Aortic sclerosis with mild stenosis.  Mild AR and Mild MR.     Heart murmur 9/29/2015    Hyperlipidemia     Hypertension     Kidney stones     Neuropathy     Obesity     JAYE on CPAP tablet Take 800 mg by mouth 3 times daily. No current facility-administered medications for this visit. Allergies   Allergen Reactions    Oxycontin [Oxycodone Hcl] Hives     Itching in his feet       Review of Systems:         Review of Systems   Constitutional: Negative for chills and fever. HENT: Negative for ear pain, mouth sores, sore throat and tinnitus. Eyes: Negative for photophobia, redness and itching. Respiratory: Negative for apnea, choking and stridor. Cardiovascular: Negative for chest pain and palpitations. Gastrointestinal: Negative for anal bleeding, constipation and rectal pain. Endocrine: Negative for polydipsia. Genitourinary: Negative for enuresis, flank pain and hematuria. Musculoskeletal: Negative for back pain, joint swelling and myalgias. Skin: Negative for color change and pallor. Right neck cyst and left arm skin lesion    Allergic/Immunologic: Negative for environmental allergies. Neurological: Negative for syncope and speech difficulty. Psychiatric/Behavioral: Negative for confusion and hallucinations. OBJECTIVE:  Physical Exam:    /80 (Site: Left Arm, Position: Sitting, Cuff Size: Large Adult)   Pulse 64   Resp 18   Ht 6' (1.829 m)   Wt 287 lb (130.2 kg)   BMI 38.92 kg/m²      Physical Exam   Constitutional: He is oriented to person, place, and time. He appears well-developed and well-nourished. HENT:   Head: Normocephalic. Eyes: Pupils are equal, round, and reactive to light. Neck: Normal range of motion. Neck supple. Cardiovascular: Normal rate. Pulmonary/Chest: Effort normal.   Abdominal: Soft. He exhibits no distension and no mass. There is no tenderness. There is no rebound and no guarding. Musculoskeletal: Normal range of motion. Arms:  Neurological: He is alert and oriented to person, place, and time. Skin: Skin is warm. Psychiatric: He has a normal mood and affect. ASSESSMENT:  1.

## 2018-09-05 ENCOUNTER — OFFICE VISIT (OUTPATIENT)
Dept: SURGERY | Age: 73
End: 2018-09-05

## 2018-09-05 VITALS
WEIGHT: 285 LBS | SYSTOLIC BLOOD PRESSURE: 102 MMHG | DIASTOLIC BLOOD PRESSURE: 68 MMHG | HEART RATE: 52 BPM | BODY MASS INDEX: 38.6 KG/M2 | HEIGHT: 72 IN

## 2018-09-05 DIAGNOSIS — L72.3 SEBACEOUS CYST: Primary | ICD-10-CM

## 2018-09-05 PROCEDURE — 99024 POSTOP FOLLOW-UP VISIT: CPT | Performed by: SURGERY

## 2018-09-05 NOTE — PROGRESS NOTES
Chief Complaint   Patient presents with    Follow-up     Post OV--Proc Left Arm and right neck Cyst Excisions, 8/22/18         SUBJECTIVE:  Patient here for post op visit. Pain is minimal.  Wounds: min bruising and no discharge. Past Surgical History:   Procedure Laterality Date    COLON SURGERY  2007     COLONOSCOPY  2007;2017    colon polyps removed    HERNIA REPAIR  2008    HERNIA REPAIR  2009     Past Medical History:   Diagnosis Date    Arthritis     Dizziness     FH: CAD (coronary artery disease) 9/29/2015    H/O 24 hour EKG monitoring 08/02/2016    48 hour holter-Mild degree of sinus bradycardia in this patient with no good correlation between the symptoms and bradycardia. Marked bradycardia during hours of sleep.  H/O cardiovascular stress test 11/24/15    Normal perfusion in the distribution of all coronaries, normal LV, EF 59%.  H/O Doppler ultrasound (Venous Doppler Lower Extremities) 03/08/2017    veins of the bilateral lower extremities are patent and free of echogenic thrombus. The veins are normally compressible and have normal phasic flow.  H/O echocardiogram 6/6/17,11/24/15    Left ventricle mildly dilated with normal systolic function EF 29-70% Mild concentric LVH with Grade II diastolic dysfunction. Mild to moderate MR and mild TR     H/O echocardiogram 06/07/2018    LV function and size are normal, Ejection Fraction 50-55 %. Aortic sclerosis with mild stenosis.  Mild AR and Mild MR.     Heart murmur 9/29/2015    Hyperlipidemia     Hypertension     Kidney stones     Neuropathy     Obesity     JAYE on CPAP 6/26/2018    On CPAP 5/2018    Thoracic aortic aneurysm (HCC) 2/11    4.5    Type II or unspecified type diabetes mellitus without mention of complication, not stated as uncontrolled      Family History   Problem Relation Age of Onset    Diabetes Mother     Heart Disease Father     Heart Disease Brother     High Blood Pressure Brother      Social History

## 2018-10-18 ENCOUNTER — HOSPITAL ENCOUNTER (OUTPATIENT)
Age: 73
Discharge: HOME | End: 2018-10-18
Payer: MEDICARE

## 2018-10-18 LAB
ALBUMIN SERPL-MCNC: 4.5 GM/DL (ref 3.4–5)
ALP BLD-CCNC: 47 IU/L (ref 40–129)
ALT SERPL-CCNC: 41 U/L (ref 10–40)
ANION GAP SERPL CALCULATED.3IONS-SCNC: 9 MMOL/L (ref 4–16)
AST SERPL-CCNC: 36 IU/L (ref 15–37)
BILIRUB SERPL-MCNC: 0.8 MG/DL (ref 0–1)
BUN BLDV-MCNC: 21 MG/DL (ref 6–23)
CALCIUM SERPL-MCNC: 10.2 MG/DL (ref 8.3–10.6)
CHLORIDE BLD-SCNC: 104 MMOL/L (ref 99–110)
CO2: 30 MMOL/L (ref 21–32)
CREAT SERPL-MCNC: 1.5 MG/DL (ref 0.9–1.3)
ESTIMATED AVERAGE GLUCOSE: 108 MG/DL
GFR AFRICAN AMERICAN: 55 ML/MIN/1.73M2
GFR NON-AFRICAN AMERICAN: 46 ML/MIN/1.73M2
GLUCOSE FASTING: 124 MG/DL (ref 70–99)
HBA1C MFR BLD: 5.4 % (ref 4.2–6.3)
POTASSIUM SERPL-SCNC: 4.2 MMOL/L (ref 3.5–5.1)
SODIUM BLD-SCNC: 143 MMOL/L (ref 135–145)
TOTAL PROTEIN: 7 GM/DL (ref 6.4–8.2)
TSH HIGH SENSITIVITY: 2.12 UIU/ML (ref 0.27–4.2)

## 2018-10-18 PROCEDURE — 83036 HEMOGLOBIN GLYCOSYLATED A1C: CPT

## 2018-10-18 PROCEDURE — 36415 COLL VENOUS BLD VENIPUNCTURE: CPT

## 2018-10-18 PROCEDURE — 84443 ASSAY THYROID STIM HORMONE: CPT

## 2018-10-18 PROCEDURE — 80061 LIPID PANEL: CPT

## 2018-10-18 PROCEDURE — G0103 PSA SCREENING: HCPCS

## 2018-10-18 PROCEDURE — 80053 COMPREHEN METABOLIC PANEL: CPT

## 2018-10-19 LAB
CHOLESTEROL, FASTING: 115 MG/DL
HDLC SERPL-MCNC: 42 MG/DL
LDL CHOLESTEROL DIRECT: 63 MG/DL
PROSTATE SPECIFIC ANTIGEN: 2.57 NG/ML (ref 0–4)
TRIGLYCERIDE, FASTING: 72 MG/DL

## 2018-11-06 PROBLEM — E11.9 DM (DIABETES MELLITUS) (HCC): Status: ACTIVE | Noted: 2018-11-06

## 2018-11-06 PROBLEM — N18.31 CHRONIC KIDNEY DISEASE (CKD) STAGE G3A/A1, MODERATELY DECREASED GLOMERULAR FILTRATION RATE (GFR) BETWEEN 45-59 ML/MIN/1.73 SQUARE METER AND ALBUMINURIA CREATININE RATIO LESS THAN 30 MG/G (HCC): Status: ACTIVE | Noted: 2018-11-06

## 2018-11-06 PROBLEM — N17.9 ACUTE KIDNEY FAILURE (HCC): Status: ACTIVE | Noted: 2018-11-06

## 2018-12-02 RX ORDER — LISINOPRIL AND HYDROCHLOROTHIAZIDE 25; 20 MG/1; MG/1
TABLET ORAL
Qty: 30 TABLET | Refills: 6 | Status: SHIPPED | OUTPATIENT
Start: 2018-12-02 | End: 2019-05-26 | Stop reason: SDUPTHER

## 2018-12-04 ENCOUNTER — HOSPITAL ENCOUNTER (OUTPATIENT)
Age: 73
Discharge: HOME | End: 2018-12-04
Payer: MEDICARE

## 2018-12-04 DIAGNOSIS — I10 ESSENTIAL HYPERTENSION: ICD-10-CM

## 2018-12-04 DIAGNOSIS — N28.9 RENAL INSUFFICIENCY: ICD-10-CM

## 2018-12-04 DIAGNOSIS — N17.9 ACUTE RENAL FAILURE, UNSPECIFIED ACUTE RENAL FAILURE TYPE (HCC): ICD-10-CM

## 2018-12-04 LAB
ANION GAP SERPL CALCULATED.3IONS-SCNC: 12 MMOL/L (ref 4–16)
BACTERIA: ABNORMAL /HPF
BILIRUBIN URINE: NEGATIVE MG/DL
BLOOD, URINE: NEGATIVE
BUN BLDV-MCNC: 27 MG/DL (ref 6–23)
CALCIUM SERPL-MCNC: 9.5 MG/DL (ref 8.3–10.6)
CAST TYPE: ABNORMAL /HPF
CHLORIDE BLD-SCNC: 104 MMOL/L (ref 99–110)
CLARITY: CLEAR
CO2: 27 MMOL/L (ref 21–32)
COLOR: YELLOW
CREAT SERPL-MCNC: 1.6 MG/DL (ref 0.9–1.3)
CRYSTAL TYPE: ABNORMAL /HPF
EPITHELIAL CELLS, UA: ABNORMAL /HPF
GFR AFRICAN AMERICAN: 51 ML/MIN/1.73M2
GFR NON-AFRICAN AMERICAN: 42 ML/MIN/1.73M2
GLUCOSE BLD-MCNC: 95 MG/DL (ref 70–99)
GLUCOSE, URINE: NEGATIVE MG/DL
KETONES, URINE: NEGATIVE MG/DL
LEUKOCYTE ESTERASE, URINE: NEGATIVE
MAGNESIUM: 2.1 MG/DL (ref 1.8–2.4)
MUCUS: NEGATIVE HPF
NITRITE URINE, QUANTITATIVE: NEGATIVE
PH, URINE: 6 (ref 5–8)
PHOSPHORUS: 4.1 MG/DL (ref 2.5–4.9)
POTASSIUM SERPL-SCNC: 4.2 MMOL/L (ref 3.5–5.1)
PROTEIN UA: NEGATIVE MG/DL
RBC URINE: ABNORMAL /HPF (ref 0–3)
SODIUM BLD-SCNC: 143 MMOL/L (ref 135–145)
SPECIFIC GRAVITY UA: 1.02 (ref 1–1.03)
UROBILINOGEN, URINE: 0.2 MG/DL (ref 0.2–1)
VITAMIN D 25-HYDROXY: 28.09 NG/ML
VOLUME, (UVOL): 12 ML (ref 10–12)
WBC UA: ABNORMAL /HPF (ref 0–2)

## 2018-12-04 PROCEDURE — 36415 COLL VENOUS BLD VENIPUNCTURE: CPT

## 2018-12-04 PROCEDURE — 84100 ASSAY OF PHOSPHORUS: CPT

## 2018-12-04 PROCEDURE — 82306 VITAMIN D 25 HYDROXY: CPT

## 2018-12-04 PROCEDURE — 83735 ASSAY OF MAGNESIUM: CPT

## 2018-12-04 PROCEDURE — 81001 URINALYSIS AUTO W/SCOPE: CPT

## 2018-12-04 PROCEDURE — 80048 BASIC METABOLIC PNL TOTAL CA: CPT

## 2019-01-29 ENCOUNTER — OFFICE VISIT (OUTPATIENT)
Dept: CARDIOLOGY CLINIC | Age: 74
End: 2019-01-29
Payer: MEDICARE

## 2019-01-29 VITALS
WEIGHT: 295 LBS | HEART RATE: 91 BPM | DIASTOLIC BLOOD PRESSURE: 88 MMHG | BODY MASS INDEX: 40.01 KG/M2 | SYSTOLIC BLOOD PRESSURE: 138 MMHG | RESPIRATION RATE: 16 BRPM

## 2019-01-29 DIAGNOSIS — I25.10 CORONARY ARTERY DISEASE DUE TO CALCIFIED CORONARY LESION: ICD-10-CM

## 2019-01-29 DIAGNOSIS — I48.19 PERSISTENT ATRIAL FIBRILLATION (HCC): ICD-10-CM

## 2019-01-29 DIAGNOSIS — G47.33 OSA ON CPAP: ICD-10-CM

## 2019-01-29 DIAGNOSIS — Z99.89 OSA ON CPAP: ICD-10-CM

## 2019-01-29 DIAGNOSIS — N18.30 STAGE 3 CHRONIC KIDNEY DISEASE (HCC): ICD-10-CM

## 2019-01-29 DIAGNOSIS — E11.9 TYPE 2 DIABETES MELLITUS WITHOUT COMPLICATION, WITHOUT LONG-TERM CURRENT USE OF INSULIN (HCC): ICD-10-CM

## 2019-01-29 DIAGNOSIS — I25.84 CORONARY ARTERY DISEASE DUE TO CALCIFIED CORONARY LESION: ICD-10-CM

## 2019-01-29 DIAGNOSIS — E78.2 MIXED HYPERLIPIDEMIA: ICD-10-CM

## 2019-01-29 DIAGNOSIS — I71.20 THORACIC AORTIC ANEURYSM WITHOUT RUPTURE: ICD-10-CM

## 2019-01-29 DIAGNOSIS — I49.9 IRREGULAR HEART BEAT: Primary | ICD-10-CM

## 2019-01-29 DIAGNOSIS — I10 ESSENTIAL HYPERTENSION: ICD-10-CM

## 2019-01-29 PROCEDURE — 1036F TOBACCO NON-USER: CPT | Performed by: INTERNAL MEDICINE

## 2019-01-29 PROCEDURE — 1101F PT FALLS ASSESS-DOCD LE1/YR: CPT | Performed by: INTERNAL MEDICINE

## 2019-01-29 PROCEDURE — 4040F PNEUMOC VAC/ADMIN/RCVD: CPT | Performed by: INTERNAL MEDICINE

## 2019-01-29 PROCEDURE — G8427 DOCREV CUR MEDS BY ELIG CLIN: HCPCS | Performed by: INTERNAL MEDICINE

## 2019-01-29 PROCEDURE — 99215 OFFICE O/P EST HI 40 MIN: CPT | Performed by: INTERNAL MEDICINE

## 2019-01-29 PROCEDURE — G8598 ASA/ANTIPLAT THER USED: HCPCS | Performed by: INTERNAL MEDICINE

## 2019-01-29 PROCEDURE — G8484 FLU IMMUNIZE NO ADMIN: HCPCS | Performed by: INTERNAL MEDICINE

## 2019-01-29 PROCEDURE — 1123F ACP DISCUSS/DSCN MKR DOCD: CPT | Performed by: INTERNAL MEDICINE

## 2019-01-29 PROCEDURE — G8417 CALC BMI ABV UP PARAM F/U: HCPCS | Performed by: INTERNAL MEDICINE

## 2019-01-29 PROCEDURE — 2022F DILAT RTA XM EVC RTNOPTHY: CPT | Performed by: INTERNAL MEDICINE

## 2019-01-29 PROCEDURE — 93000 ELECTROCARDIOGRAM COMPLETE: CPT | Performed by: INTERNAL MEDICINE

## 2019-01-29 PROCEDURE — 3046F HEMOGLOBIN A1C LEVEL >9.0%: CPT | Performed by: INTERNAL MEDICINE

## 2019-01-29 PROCEDURE — 3017F COLORECTAL CA SCREEN DOC REV: CPT | Performed by: INTERNAL MEDICINE

## 2019-02-26 ENCOUNTER — PROCEDURE VISIT (OUTPATIENT)
Dept: CARDIOLOGY CLINIC | Age: 74
End: 2019-02-26
Payer: MEDICARE

## 2019-02-26 ENCOUNTER — HOSPITAL ENCOUNTER (OUTPATIENT)
Age: 74
Discharge: HOME | End: 2019-02-26
Payer: MEDICARE

## 2019-02-26 ENCOUNTER — NURSE ONLY (OUTPATIENT)
Dept: CARDIOLOGY CLINIC | Age: 74
End: 2019-02-26
Payer: MEDICARE

## 2019-02-26 DIAGNOSIS — I48.19 PERSISTENT ATRIAL FIBRILLATION (HCC): Primary | ICD-10-CM

## 2019-02-26 DIAGNOSIS — I49.9 IRREGULAR HEART BEAT: Primary | ICD-10-CM

## 2019-02-26 DIAGNOSIS — I48.19 PERSISTENT ATRIAL FIBRILLATION (HCC): ICD-10-CM

## 2019-02-26 DIAGNOSIS — N18.31 CHRONIC KIDNEY DISEASE (CKD) STAGE G3A/A1, MODERATELY DECREASED GLOMERULAR FILTRATION RATE (GFR) BETWEEN 45-59 ML/MIN/1.73 SQUARE METER AND ALBUMINURIA CREATININE RATIO LESS THAN 30 MG/G (HCC): ICD-10-CM

## 2019-02-26 LAB
ANION GAP SERPL CALCULATED.3IONS-SCNC: 14 MMOL/L (ref 4–16)
BUN BLDV-MCNC: 21 MG/DL (ref 6–23)
CALCIUM SERPL-MCNC: 9.9 MG/DL (ref 8.3–10.6)
CHLORIDE BLD-SCNC: 102 MMOL/L (ref 99–110)
CO2: 27 MMOL/L (ref 21–32)
CREAT SERPL-MCNC: 1.6 MG/DL (ref 0.9–1.3)
GFR AFRICAN AMERICAN: 51 ML/MIN/1.73M2
GFR NON-AFRICAN AMERICAN: 42 ML/MIN/1.73M2
GLUCOSE BLD-MCNC: 157 MG/DL (ref 70–99)
LV EF: 58 %
LVEF MODALITY: NORMAL
MAGNESIUM: 1.7 MG/DL (ref 1.8–2.4)
PHOSPHORUS: 2.7 MG/DL (ref 2.5–4.9)
POTASSIUM SERPL-SCNC: 4.5 MMOL/L (ref 3.5–5.1)
SODIUM BLD-SCNC: 143 MMOL/L (ref 135–145)

## 2019-02-26 PROCEDURE — 93225 XTRNL ECG REC<48 HRS REC: CPT | Performed by: INTERNAL MEDICINE

## 2019-02-26 PROCEDURE — 93306 TTE W/DOPPLER COMPLETE: CPT | Performed by: INTERNAL MEDICINE

## 2019-02-26 PROCEDURE — 36415 COLL VENOUS BLD VENIPUNCTURE: CPT

## 2019-02-26 PROCEDURE — 84100 ASSAY OF PHOSPHORUS: CPT

## 2019-02-26 PROCEDURE — 80048 BASIC METABOLIC PNL TOTAL CA: CPT

## 2019-02-26 PROCEDURE — 83735 ASSAY OF MAGNESIUM: CPT

## 2019-02-27 ENCOUNTER — TELEPHONE (OUTPATIENT)
Dept: CARDIOLOGY CLINIC | Age: 74
End: 2019-02-27

## 2019-03-04 PROCEDURE — 93227 XTRNL ECG REC<48 HR R&I: CPT | Performed by: INTERNAL MEDICINE

## 2019-03-05 ENCOUNTER — OFFICE VISIT (OUTPATIENT)
Dept: CARDIOLOGY CLINIC | Age: 74
End: 2019-03-05
Payer: MEDICARE

## 2019-03-05 VITALS
BODY MASS INDEX: 41.35 KG/M2 | HEIGHT: 71 IN | HEART RATE: 88 BPM | DIASTOLIC BLOOD PRESSURE: 84 MMHG | SYSTOLIC BLOOD PRESSURE: 134 MMHG | WEIGHT: 295.4 LBS

## 2019-03-05 DIAGNOSIS — I10 ESSENTIAL HYPERTENSION: ICD-10-CM

## 2019-03-05 DIAGNOSIS — E78.2 MIXED HYPERLIPIDEMIA: ICD-10-CM

## 2019-03-05 DIAGNOSIS — I48.19 PERSISTENT ATRIAL FIBRILLATION (HCC): ICD-10-CM

## 2019-03-05 DIAGNOSIS — E11.9 TYPE 2 DIABETES MELLITUS WITHOUT COMPLICATION, WITHOUT LONG-TERM CURRENT USE OF INSULIN (HCC): Primary | ICD-10-CM

## 2019-03-05 DIAGNOSIS — R01.1 HEART MURMUR: ICD-10-CM

## 2019-03-05 DIAGNOSIS — Z99.89 OSA ON CPAP: ICD-10-CM

## 2019-03-05 DIAGNOSIS — I71.20 THORACIC AORTIC ANEURYSM WITHOUT RUPTURE: ICD-10-CM

## 2019-03-05 DIAGNOSIS — G47.33 OSA ON CPAP: ICD-10-CM

## 2019-03-05 PROCEDURE — 3017F COLORECTAL CA SCREEN DOC REV: CPT | Performed by: INTERNAL MEDICINE

## 2019-03-05 PROCEDURE — G8484 FLU IMMUNIZE NO ADMIN: HCPCS | Performed by: INTERNAL MEDICINE

## 2019-03-05 PROCEDURE — 1123F ACP DISCUSS/DSCN MKR DOCD: CPT | Performed by: INTERNAL MEDICINE

## 2019-03-05 PROCEDURE — 4040F PNEUMOC VAC/ADMIN/RCVD: CPT | Performed by: INTERNAL MEDICINE

## 2019-03-05 PROCEDURE — 1036F TOBACCO NON-USER: CPT | Performed by: INTERNAL MEDICINE

## 2019-03-05 PROCEDURE — G8598 ASA/ANTIPLAT THER USED: HCPCS | Performed by: INTERNAL MEDICINE

## 2019-03-05 PROCEDURE — 3046F HEMOGLOBIN A1C LEVEL >9.0%: CPT | Performed by: INTERNAL MEDICINE

## 2019-03-05 PROCEDURE — G8427 DOCREV CUR MEDS BY ELIG CLIN: HCPCS | Performed by: INTERNAL MEDICINE

## 2019-03-05 PROCEDURE — 1101F PT FALLS ASSESS-DOCD LE1/YR: CPT | Performed by: INTERNAL MEDICINE

## 2019-03-05 PROCEDURE — 2022F DILAT RTA XM EVC RTNOPTHY: CPT | Performed by: INTERNAL MEDICINE

## 2019-03-05 PROCEDURE — 99214 OFFICE O/P EST MOD 30 MIN: CPT | Performed by: INTERNAL MEDICINE

## 2019-03-05 PROCEDURE — G8417 CALC BMI ABV UP PARAM F/U: HCPCS | Performed by: INTERNAL MEDICINE

## 2019-03-13 ENCOUNTER — INITIAL CONSULT (OUTPATIENT)
Dept: CARDIOLOGY CLINIC | Age: 74
End: 2019-03-13
Payer: MEDICARE

## 2019-03-13 ENCOUNTER — TELEPHONE (OUTPATIENT)
Dept: CARDIOLOGY CLINIC | Age: 74
End: 2019-03-13

## 2019-03-13 VITALS
BODY MASS INDEX: 41.72 KG/M2 | WEIGHT: 298 LBS | HEART RATE: 114 BPM | OXYGEN SATURATION: 96 % | SYSTOLIC BLOOD PRESSURE: 130 MMHG | RESPIRATION RATE: 16 BRPM | DIASTOLIC BLOOD PRESSURE: 88 MMHG | TEMPERATURE: 97.7 F | HEIGHT: 71 IN

## 2019-03-13 DIAGNOSIS — I48.19 PERSISTENT ATRIAL FIBRILLATION (HCC): Primary | ICD-10-CM

## 2019-03-13 DIAGNOSIS — R53.83 FATIGUE, UNSPECIFIED TYPE: ICD-10-CM

## 2019-03-13 DIAGNOSIS — I48.91 ATRIAL FIBRILLATION, NEW ONSET (HCC): ICD-10-CM

## 2019-03-13 PROCEDURE — G8598 ASA/ANTIPLAT THER USED: HCPCS | Performed by: INTERNAL MEDICINE

## 2019-03-13 PROCEDURE — 93000 ELECTROCARDIOGRAM COMPLETE: CPT | Performed by: INTERNAL MEDICINE

## 2019-03-13 PROCEDURE — 3017F COLORECTAL CA SCREEN DOC REV: CPT | Performed by: INTERNAL MEDICINE

## 2019-03-13 PROCEDURE — G8484 FLU IMMUNIZE NO ADMIN: HCPCS | Performed by: INTERNAL MEDICINE

## 2019-03-13 PROCEDURE — G8417 CALC BMI ABV UP PARAM F/U: HCPCS | Performed by: INTERNAL MEDICINE

## 2019-03-13 PROCEDURE — 1036F TOBACCO NON-USER: CPT | Performed by: INTERNAL MEDICINE

## 2019-03-13 PROCEDURE — 4040F PNEUMOC VAC/ADMIN/RCVD: CPT | Performed by: INTERNAL MEDICINE

## 2019-03-13 PROCEDURE — G8427 DOCREV CUR MEDS BY ELIG CLIN: HCPCS | Performed by: INTERNAL MEDICINE

## 2019-03-13 PROCEDURE — 99204 OFFICE O/P NEW MOD 45 MIN: CPT | Performed by: INTERNAL MEDICINE

## 2019-03-13 PROCEDURE — 1123F ACP DISCUSS/DSCN MKR DOCD: CPT | Performed by: INTERNAL MEDICINE

## 2019-03-25 ENCOUNTER — HOSPITAL ENCOUNTER (OUTPATIENT)
Age: 74
Discharge: HOME | End: 2019-03-25
Payer: MEDICARE

## 2019-03-25 ENCOUNTER — TELEPHONE (OUTPATIENT)
Dept: CARDIOLOGY CLINIC | Age: 74
End: 2019-03-25

## 2019-03-25 DIAGNOSIS — N17.9 ACUTE RENAL FAILURE, UNSPECIFIED ACUTE RENAL FAILURE TYPE (HCC): ICD-10-CM

## 2019-03-25 LAB
ANION GAP SERPL CALCULATED.3IONS-SCNC: 12 MMOL/L (ref 4–16)
BUN BLDV-MCNC: 19 MG/DL (ref 6–23)
CALCIUM SERPL-MCNC: 9.9 MG/DL (ref 8.3–10.6)
CHLORIDE BLD-SCNC: 102 MMOL/L (ref 99–110)
CO2: 30 MMOL/L (ref 21–32)
CREAT SERPL-MCNC: 1.6 MG/DL (ref 0.9–1.3)
GFR AFRICAN AMERICAN: 51 ML/MIN/1.73M2
GFR NON-AFRICAN AMERICAN: 42 ML/MIN/1.73M2
GLUCOSE BLD-MCNC: 126 MG/DL (ref 70–99)
POTASSIUM SERPL-SCNC: 4.4 MMOL/L (ref 3.5–5.1)
SODIUM BLD-SCNC: 144 MMOL/L (ref 135–145)

## 2019-03-25 PROCEDURE — 36415 COLL VENOUS BLD VENIPUNCTURE: CPT

## 2019-03-25 PROCEDURE — 84100 ASSAY OF PHOSPHORUS: CPT

## 2019-03-25 PROCEDURE — 83735 ASSAY OF MAGNESIUM: CPT

## 2019-03-25 PROCEDURE — 80048 BASIC METABOLIC PNL TOTAL CA: CPT

## 2019-03-28 LAB
MAGNESIUM: 1.9 MG/DL (ref 1.8–2.4)
PHOSPHORUS: 3.6 MG/DL (ref 2.5–4.9)

## 2019-03-31 ASSESSMENT — ENCOUNTER SYMPTOMS
CHEST TIGHTNESS: 0
BLOOD IN STOOL: 0
WHEEZING: 0
DIARRHEA: 0
SHORTNESS OF BREATH: 1
EYE PAIN: 0
PHOTOPHOBIA: 0
COUGH: 0
BACK PAIN: 0
NAUSEA: 0
COLOR CHANGE: 0
ABDOMINAL PAIN: 0
VOMITING: 0
CONSTIPATION: 0

## 2019-04-03 PROBLEM — N18.32 CHRONIC KIDNEY DISEASE (CKD) STAGE G3B/A1, MODERATELY DECREASED GLOMERULAR FILTRATION RATE (GFR) BETWEEN 30-44 ML/MIN/1.73 SQUARE METER AND ALBUMINURIA CREATININE RATIO LESS THAN 30 MG/G (HCC): Status: ACTIVE | Noted: 2019-04-03

## 2019-04-03 PROBLEM — I48.91 ATRIAL FIBRILLATION (HCC): Status: ACTIVE | Noted: 2019-04-03

## 2019-04-03 PROBLEM — E83.42 HYPOMAGNESEMIA: Status: ACTIVE | Noted: 2019-04-03

## 2019-04-04 ENCOUNTER — TELEPHONE (OUTPATIENT)
Dept: CARDIOLOGY CLINIC | Age: 74
End: 2019-04-04

## 2019-04-04 NOTE — LETTER
Chemical cardioversion: The chemical procedure is most often done in a hospital. In most cases, the medicine is put into your arm through a tube called an IV. But you may get medicines to take by mouth. You may feel a quick sting or pinch when the IV starts. The procedure usually takes about 30 to 60 minutes for procedure. Transesophageal Echocardiogram  What is a transesophageal echocardiogram?  A transesophageal echocardiogram is a test to help your doctor look at the inside of your heart. A small device called a transducer directs sound waves toward your heart. The sound waves make a picture of the heart's valves and chambers. Your doctor may do this test to look for certain types of heart disease. Or it may be done to see how disease is affecting your heart. You will be given medicine to make you sleepy and comfortable during the test. The doctor puts a small, flexible tube into your throat and guides it to the esophagus. This is the tube that connects your mouth to your stomach. The doctor will ask you to swallow as the tube goes down. The transducer is at the tip of the tube. It gets close to your heart to make clear pictures. The doctor will look at the ultrasound pictures on a screen. You will not be able to eat or drink until the numbness from the throat spray wears off. Your throat may be sore for a few days after the test.  Follow-up care is a key part of your treatment and safety. Be sure to make and go to all appointments, and call your doctor if you are having problems. It's also a good idea to know your test results and keep a list of the medicines you take. [address]/CARDIOLOGY        Patient Name: Tej Reyes   : 1945   MRN# [id number]    Transesophageal Echocardiogram with Cardioversion    Day of Procedure Cath Lab Holding Area Preop Orders:    ? YOU HAVE MY PERMISSION TO TALK TO THE PATIENT-PLEASE    ?  Anesthesia ? SALAZAR with Anesthesia    ? IV peripheral saline lock  (preferred left arm). ? STAT LABS ON ARRIVAL: BMP, MAG, PHOS, CBC, PT INR, PTT    ? If taking Coumadin, PT INR  STAT on arrival day of procedure. ? 12 lead EKG STAT on arrival day of procedure. ? Diabetic patients >> Accu check Blood sugar check. ? Document home medications in EPIC and include date and time of last dose.    ? NPO.    ? Notify Dr. Cristopher Zambrano of abnormal lab results. ? Chest Prep> Clip hair anterior chest and posterior back. ? Physician Signature:_______________________Date:___________Time:____________                              [facility]) Informed Consent for Anesthesia/Sedation, Surgery, Invasive Procedures, and other High-risk Interventions and Medication use     *This consent is applicable for 30 days following patient signature*    Procedure(s)   IKareem authorize, Dr. Chandan Samuel   and the associate(s) or assistant(s) of his/her choice, to perform the following procedure(s): Transesophageal Echocardiogram with Cardioversion    I know that unexpected conditions may require additional or different procedures than those above. I authorize the above named practitioner(s) perform these as necessary and desirable. This is based on the practitioners professional judgment. The above named practitioner has discussed the above procedure(s) with me, including:  ? Potential benefits, including likelihood of success of the procedure(s) goals  ? Risks  ? Side effects, risk of death, and risk of infection  ? Any potential problems that might occur during recuperation or healing post-procedure  ? Reasonable alternatives  ? Risks of NOT performing the procedure(s)    I acknowledge that no warranty or guarantee has been made to the results the procedure(s).      I consent to the above named practitioner(s) providing additional services to me as deemed reasonable and necessary, including but not limited to: and/or other life saving measures, if I have a cardiac or respiratory arrest.    ___ I WANT to keep my DNR in effect during my procedure(s) and immediate post-operative recovery period through Phase 2 recovery. (Complete separate refusal form)     This form has been fully explained to me. I understand its contents. Patients Signature: ___________________________Date: ________  Time: ________    If patient unable to sign, has engaged the [address] [place] [address], is a minor, or has a court-appointed Guardian:  [facility] Representative Name (Print):  ____________________________________      Relationship ([place] one):    Guardian   Parent    Spouse    HCPOA   Child   Sibling  Next-of-Kin Friend    Patients Representative Signature: _______________________________________              Date: ______________  Time: __________    An  was used.  name/ID: _________________________________      [facility] Witness________________________  Date: ________   Time: _________    Physician/Practitioner _______________________  Date: ________   Time: _________           Revision 2017      [facility]     Dr. Elvia Murrell     PROCEDURE TO SCHEDULE:    Transesophageal Echocardiogram with Cardioversion    Patient Name: Mary Beth Arrieta   : 1945   MRN# [id number]    Home Phone Number: [phone]   Weight:    Wt Readings from Last 3 Encounters:   19 295 lb (133.8 kg)   19 298 lb (135.2 kg)   19 295 lb 6.4 oz (134 kg)        Insurance: Payor: Veronika Prabhakar / Plan: [facility] [employer] / Product Type: *No Product type* /     Date of Procedure: 19 Time: 5942 Arrival Time: 1030    Diagnosis:  Atrial Fibrillation  Allergies:    Allergies   Allergen Reactions    Oxycontin [Oxycodone Hcl] Hives     Itching in his feet          1) Call [facility] scheduling ([phone]) or [address] [facility]    PHONE OR   INSTANT MESSAGE  2) PREAUTHORIZATION NUMBER:   Spoke to: From date:    expiration date:         Antonieta Currie

## 2019-04-04 NOTE — TELEPHONE ENCOUNTER
Returned call to patients wife advised her that Pamella Albrecht wants to wait until patient is done with cataract surgeries. She states that patient is still having surgery but there was some changes. Rt eye is still 4/12 and follow up 4/19 but Lt eye will now be 5/10 and follow up 5/17. Patient is very SOB and wants it before second eye. Advised her that we will discuss with Pamella Albrecht and will go ahead and schedule procedure but if he wants to wait I will call her back tomorrow. She voiced understanding.

## 2019-04-05 ENCOUNTER — TELEPHONE (OUTPATIENT)
Dept: CARDIOLOGY CLINIC | Age: 74
End: 2019-04-05

## 2019-04-05 NOTE — TELEPHONE ENCOUNTER
Returned call to patients wife and advised her that Bony Hueymark would like to wait until procedure for patients eye are completed. So after his follow up on May 17th call us and we will get him rescheduled.  She voiced understanding

## 2019-04-11 ENCOUNTER — ANESTHESIA EVENT (OUTPATIENT)
Dept: OPERATING ROOM | Age: 74
End: 2019-04-11
Payer: MEDICARE

## 2019-04-11 NOTE — ANESTHESIA PRE PROCEDURE
Department of Anesthesiology  Preprocedure Note       Name:  Marah Clark   Age:  74 y.o.  :  1945                                          MRN:  [id number]         Date:  2019      Surgeon: Ardeen Crigler):  Oneyda Mathew MD    Procedure: EYE CATARACT EMULSIFICATION IOL IMPLANT (Right Eye)    Medications prior to admission:   Prior to Admission medications    Medication Sig Start Date End Date Taking? Authorizing Provider   magnesium oxide (MAG-OX) 400 (240 Mg) MG tablet TAKE 1 TABLET BY MOUTH EVERY DAY 19  Yes Giovanni Soto MD   apixaban (ELIQUIS) 5 MG TABS tablet Take 1 tablet by mouth 2 times daily 19  Yes Rolando Moe MD   lisinopril-hydrochlorothiazide (PRINZIDE;ZESTORETIC) 20-25 MG per tablet TAKE 1 TABLET BY MOUTH EVERY DAY 18  Yes Rolando Moe MD   SUPER B COMPLEX/C PO Take 1 capsule by mouth daily   Yes Historical Provider, MD   vitamin B-1 (THIAMINE) 100 MG tablet Take 100 mg by mouth daily   Yes Historical Provider, MD   NIASPAN 1000 MG extended release tablet Take 1 tablet by mouth nightly 18  Yes Rolando Moe MD   amLODIPine (NORVASC) 10 MG tablet Take 1 tablet by mouth daily 18  Yes Moses Ayala MD   fluticasone (FLONASE) 50 MCG/ACT nasal spray 1 spray by Nasal route daily 18  Yes David Maza MD   metFORMIN (GLUCOPHAGE) 1000 MG tablet Take 1,000 mg by mouth 2 times daily (with meals)   Yes Historical Provider, MD   glipiZIDE (GLUCOTROL) 10 MG tablet Take 1/2 tablet twice a day. Yes Historical Provider, MD   atorvastatin (LIPITOR) 20 MG tablet Take 20 mg by mouth daily   Yes Historical Provider, MD   Sha [employer]ist Oil 1000 MG CAPS Take 1 capsule by mouth daily    Yes Historical Provider, MD   allopurinol (ZYLOPRIM) 100 MG tablet Take 100 mg by mouth daily  1/13/15  Yes Historical Provider, MD   B Complex Vitamins (VITAMIN B COMPLEX PO) Take  by mouth daily.      Yes Historical Provider, MD   gabapentin (NEURONTIN) 800 MG tablet Take 800 mg by mouth 3 times daily. Yes Historical Provider, MD       Current medications:    No current facility-administered medications for this encounter. Current Outpatient Medications   Medication Sig Dispense Refill    magnesium oxide (MAG-OX) 400 (240 Mg) MG tablet TAKE 1 TABLET BY MOUTH EVERY DAY 30 tablet 5    apixaban (ELIQUIS) 5 MG TABS tablet Take 1 tablet by mouth 2 times daily 180 tablet 3    lisinopril-hydrochlorothiazide (PRINZIDE;ZESTORETIC) 20-25 MG per tablet TAKE 1 TABLET BY MOUTH EVERY DAY 30 tablet 6    SUPER B COMPLEX/C PO Take 1 capsule by mouth daily      vitamin B-1 (THIAMINE) 100 MG tablet Take 100 mg by mouth daily      NIASPAN 1000 MG extended release tablet Take 1 tablet by mouth nightly 90 tablet 3    amLODIPine (NORVASC) 10 MG tablet Take 1 tablet by mouth daily 90 tablet 3    fluticasone (FLONASE) 50 MCG/ACT nasal spray 1 spray by Nasal route daily 1 Bottle 3    metFORMIN (GLUCOPHAGE) 1000 MG tablet Take 1,000 mg by mouth 2 times daily (with meals)      glipiZIDE (GLUCOTROL) 10 MG tablet Take 1/2 tablet twice a day.  atorvastatin (LIPITOR) 20 MG tablet Take 20 mg by mouth daily      Krill Oil 1000 MG CAPS Take 1 capsule by mouth daily       allopurinol (ZYLOPRIM) 100 MG tablet Take 100 mg by mouth daily   0    B Complex Vitamins (VITAMIN B COMPLEX PO) Take  by mouth daily.  gabapentin (NEURONTIN) 800 MG tablet Take 800 mg by mouth 3 times daily. Allergies:     Allergies   Allergen Reactions    Oxycontin [Oxycodone Hcl] Hives     Itching in his feet       Problem List:    Patient Active Problem List   Diagnosis Code    Anemia D64.9    Colon polyps K63.5    Type 2 diabetes mellitus without complication ([facility] [facility] K91.4    Hypertension I10    Hyperlipidemia E78.5    Obesity E66.9    Vertigo R42    Renal insufficiency N28.9    FH: CAD (coronary artery disease) Z82.49    Heart murmur R01.1    Bradycardia R00.1    Coronary artery disease due to calcified coronary lesion I25.10, I25.84    Platinum (hard of hearing) H91.90    History of gout Z87.39    Adenomatous polyp of transverse colon D12.3    JAYE on CPAP G47.33, Z99.89    Acute kidney failure (HCC) N17.9    Stage 3 chronic kidney disease (HCC) N18.3    DM (diabetes mellitus) ([facility] [address] E11.9    Thoracic aortic aneurysm (HCC) I71.2    Persistent atrial fibrillation (HCC) I48.1    Chronic kidney disease (CKD) stage G3b/A1, moderately decreased glomerular filtration rate (GFR) between 30-44 mL/min/1.73 square meter and albuminuria creatinine ratio less than 30 mg/g (HCC) N18.3    Atrial fibrillation (HCC) I48.91    Hypomagnesemia E83.42       Past Medical History:        Diagnosis Date    Arthritis     Dizziness     FH: CAD (coronary artery disease) 9/29/2015    H/O 24 hour EKG monitoring 02/26/19,08/02/2016    Conclusion: Atrial fibrillation with  fairly well-controlled ventricular rate response without evidence of any significant tachy or alon arrhythmias.  H/O cardiovascular stress test 11/24/15    Normal perfusion in the distribution of all coronaries, normal LV, EF 59%.  H/O Doppler ultrasound (Venous Doppler Lower Extremities) 03/08/2017    veins of the bilateral lower extremities are patent and free of echogenic thrombus. The veins are normally compressible and have normal phasic flow.  H/O echocardiogram 6/6/17,11/24/15    Left ventricle mildly dilated with normal systolic function EF 56-52% Mild concentric LVH with Grade II diastolic dysfunction. Mild to moderate MR and mild TR     H/O echocardiogram 06/07/2018; 2/26/2019    EF 55-60%. Mild concentric left ventricular hypertrophy. The left atrium is moderately dilated. Dilatation of the aortic root measuring 4.0 cm. Mild AR. Moderate pulmonary HTN.      Heart murmur 9/29/2015    Hyperlipidemia     Hypertension     Kidney stones     Neuropathy     Obesity     JAYE on CPAP 6/26/2018    On CPAP 5/2018    Persistent atrial fibrillation ([facility] 2019    New onset 2019    Thoracic aortic aneurysm ([facility]     4.5    Type II or unspecified type diabetes mellitus without mention of complication, not stated as uncontrolled        Past Surgical History:        Procedure Laterality Date    COLON SURGERY  2007     COLONOSCOPY  ;    colon polyps removed   [phone]      HERNIA REPAIR         Social History:    Social History     Tobacco Use    Smoking status: Former Smoker     Packs/day: 1.00     Years: 7.00     Pack years: 7.00     Last attempt to quit: 3/26/1973     Years since quittin.0    Smokeless tobacco: Former User    Tobacco comment: Reviewed 17   Substance Use Topics    Alcohol use: Yes     Comment: small amount some nights                                Counseling given: Not Answered  Comment: Reviewed 17      Vital Signs (Current):   Vitals:    19 1201   Weight: 285 lb (129.3 kg)   Height: 5' 11\" (1.803 m)                                              BP Readings from Last 3 Encounters:   19 120/64   19 130/88   19 134/84       NPO Status:                                                                                 BMI:   Wt Readings from Last 3 Encounters:   19 295 lb (133.8 kg)   19 298 lb (135.2 kg)   19 295 lb 6.4 oz (134 kg)     Body mass index is 39.75 kg/m².     CBC:   Lab Results   Component Value Date    WBC 6.2 2018    RBC 4.10 2018    HGB 13.7 2018    HCT 40.3 2018    MCV 98.3 2018    RDW 13.2 2018     2018       CMP:   Lab Results   Component Value Date     2019    K 4.4 2019     2019    CO2 30 2019    BUN 19 2019    CREATININE 1.6 2019    GFRAA 51 2019    LABGLOM 42 2019    GLUCOSE 126 2019    PROT 7.0 10/18/2018    PROT 7.0 07/10/2012    CALCIUM 9.9 2019    BILITOT 0.8 10/18/2018    ALKPHOS 47 10/18/2018 AST 36 10/18/2018    ALT 41 10/18/2018       POC Tests: No results for input(s): POCGLU, POCNA, POCK, POCCL, POCBUN, POCHEMO, POCHCT in the last 72 hours. Coags:   Lab Results   Component Value Date    PROTIME 11.3 10/04/2017    INR 0.99 10/04/2017    APTT 25.2 10/04/2017       HCG (If Applicable): No results found for: PREGTESTUR, PREGSERUM, HCG, HCGQUANT     ABGs: No results found for: PHART, PO2ART, HCW6GHK, WKD9MNT, BEART, S8PVICUQ     Type & Screen (If Applicable):  No results found for: LABABO, LABRH    Anesthesia Evaluation   no history of anesthetic complications:   Airway: Mallampati: III  TM distance: >3 FB   Neck ROM: full  Mouth opening: > = 3 FB Dental:          Pulmonary:   (+) sleep apnea: on CPAP,                            ROS comment: Former Smoker - 7 pack years  Quit Smokin73    Pulmonology visit:  Plan:  1. Overall the patient is doing well. 2. RTO 3 mths. 3. Mucinex 600 mg bid. Best Alvarado MD  2019  1:44 PM         Cardiovascular:  Exercise tolerance: poor (<4 METS),   (+) hypertension:, valvular problems/murmurs: AS, CAD:, dysrhythmias: atrial fibrillation, hyperlipidemia         Beta Blocker:  Not on Beta Blocker      ROS comment: Echo 2019:  Summary   Left ventricular systolic function is normal with an ejection fraction of   55-60%.  Mild concentric left ventricular hypertrophy.   The left atrium is moderately dilated.   Dilatation of the aortic root measuring 4.0 cm.   Dilatation of the ascending aorta measuring 4.5 cm.   Mild aortic stenosis with mean gradient of 14 mmHg.   Mild aortic regurgitation is noted with a pressure half time of 550 msec.   Right ventricular systolic pressure of 52 mmHg consistent with moderate   pulmonary hypertension.   No evidence of pericardial effusion. normal stress test 2015:   Former Smoker - 7 pack years  Quit Smokin73      Thoracic aortic aneurysm    Saw cardiologist 2019     Neuro/Psych:

## 2019-04-12 ENCOUNTER — HOSPITAL ENCOUNTER (OUTPATIENT)
Age: 74
Setting detail: OUTPATIENT SURGERY
Discharge: HOME | End: 2019-04-12
Attending: OPHTHALMOLOGY | Admitting: OPHTHALMOLOGY
Payer: MEDICARE

## 2019-04-12 ENCOUNTER — ANESTHESIA (OUTPATIENT)
Dept: OPERATING ROOM | Age: 74
End: 2019-04-12
Payer: MEDICARE

## 2019-04-12 VITALS
DIASTOLIC BLOOD PRESSURE: 67 MMHG | SYSTOLIC BLOOD PRESSURE: 116 MMHG | OXYGEN SATURATION: 99 % | RESPIRATION RATE: 14 BRPM

## 2019-04-12 VITALS
OXYGEN SATURATION: 94 % | DIASTOLIC BLOOD PRESSURE: 92 MMHG | TEMPERATURE: 97.6 F | SYSTOLIC BLOOD PRESSURE: 124 MMHG | BODY MASS INDEX: 39.9 KG/M2 | RESPIRATION RATE: 16 BRPM | HEIGHT: 71 IN | WEIGHT: 285 LBS | HEART RATE: 84 BPM

## 2019-04-12 PROBLEM — H25.11 AGE-RELATED NUCLEAR CATARACT OF RIGHT EYE: Status: ACTIVE | Noted: 2019-04-12

## 2019-04-12 LAB — GLUCOSE BLD-MCNC: 103 MG/DL (ref 70–99)

## 2019-04-12 PROCEDURE — 2709999900 HC NON-CHARGEABLE SUPPLY: Performed by: OPHTHALMOLOGY

## 2019-04-12 PROCEDURE — 6370000000 HC RX 637 (ALT 250 FOR IP): Performed by: OPHTHALMOLOGY

## 2019-04-12 PROCEDURE — 82962 GLUCOSE BLOOD TEST: CPT

## 2019-04-12 PROCEDURE — V2632 POST CHMBR INTRAOCULAR LENS: HCPCS | Performed by: OPHTHALMOLOGY

## 2019-04-12 PROCEDURE — 3600000004 HC SURGERY LEVEL 4 BASE: Performed by: OPHTHALMOLOGY

## 2019-04-12 PROCEDURE — 7100000010 HC PHASE II RECOVERY - FIRST 15 MIN: Performed by: OPHTHALMOLOGY

## 2019-04-12 PROCEDURE — 6360000002 HC RX W HCPCS: Performed by: NURSE ANESTHETIST, CERTIFIED REGISTERED

## 2019-04-12 PROCEDURE — 3700000001 HC ADD 15 MINUTES (ANESTHESIA): Performed by: OPHTHALMOLOGY

## 2019-04-12 PROCEDURE — 6360000002 HC RX W HCPCS: Performed by: OPHTHALMOLOGY

## 2019-04-12 PROCEDURE — 3600000014 HC SURGERY LEVEL 4 ADDTL 15MIN: Performed by: OPHTHALMOLOGY

## 2019-04-12 PROCEDURE — 3700000000 HC ANESTHESIA ATTENDED CARE: Performed by: OPHTHALMOLOGY

## 2019-04-12 PROCEDURE — 7100000011 HC PHASE II RECOVERY - ADDTL 15 MIN: Performed by: OPHTHALMOLOGY

## 2019-04-12 PROCEDURE — 2580000003 HC RX 258: Performed by: OPHTHALMOLOGY

## 2019-04-12 PROCEDURE — 2500000003 HC RX 250 WO HCPCS: Performed by: OPHTHALMOLOGY

## 2019-04-12 DEVICE — AKREOS AO MICRO INCISION LENS +0.00D
Type: IMPLANTABLE DEVICE | Site: EYE | Status: FUNCTIONAL
Brand: AKREOS® AO IOL

## 2019-04-12 RX ORDER — TETRACAINE HYDROCHLORIDE 5 MG/ML
SOLUTION OPHTHALMIC
Status: COMPLETED | OUTPATIENT
Start: 2019-04-12 | End: 2019-04-12

## 2019-04-12 RX ORDER — PHENYLEPHRINE HCL 2.5 %
1 DROPS OPHTHALMIC (EYE) SEE ADMIN INSTRUCTIONS
Status: COMPLETED | OUTPATIENT
Start: 2019-04-12 | End: 2019-04-12

## 2019-04-12 RX ORDER — TROPICAMIDE 10 MG/ML
1 SOLUTION/ DROPS OPHTHALMIC SEE ADMIN INSTRUCTIONS
Status: COMPLETED | OUTPATIENT
Start: 2019-04-12 | End: 2019-04-12

## 2019-04-12 RX ORDER — CYCLOPENTOLATE HYDROCHLORIDE 10 MG/ML
1 SOLUTION/ DROPS OPHTHALMIC SEE ADMIN INSTRUCTIONS
Status: COMPLETED | OUTPATIENT
Start: 2019-04-12 | End: 2019-04-12

## 2019-04-12 RX ORDER — SODIUM CHLORIDE 0.9 % (FLUSH) 0.9 %
10 SYRINGE (ML) INJECTION SEE ADMIN INSTRUCTIONS
Status: DISCONTINUED | OUTPATIENT
Start: 2019-04-12 | End: 2019-04-12 | Stop reason: HOSPADM

## 2019-04-12 RX ORDER — LIDOCAINE HYDROCHLORIDE 20 MG/ML
INJECTION, SOLUTION EPIDURAL; INFILTRATION; INTRACAUDAL; PERINEURAL
Status: COMPLETED | OUTPATIENT
Start: 2019-04-12 | End: 2019-04-12

## 2019-04-12 RX ORDER — BETAMETHASONE SODIUM PHOSPHATE AND BETAMETHASONE ACETATE 3; 3 MG/ML; MG/ML
INJECTION, SUSPENSION INTRA-ARTICULAR; INTRALESIONAL; INTRAMUSCULAR; SOFT TISSUE
Status: COMPLETED | OUTPATIENT
Start: 2019-04-12 | End: 2019-04-12

## 2019-04-12 RX ORDER — PROPOFOL 10 MG/ML
INJECTION, EMULSION INTRAVENOUS PRN
Status: DISCONTINUED | OUTPATIENT
Start: 2019-04-12 | End: 2019-04-12 | Stop reason: SDUPTHER

## 2019-04-12 RX ORDER — CEFAZOLIN SODIUM 1 G/3ML
INJECTION, POWDER, FOR SOLUTION INTRAMUSCULAR; INTRAVENOUS
Status: COMPLETED | OUTPATIENT
Start: 2019-04-12 | End: 2019-04-12

## 2019-04-12 RX ORDER — SODIUM CHLORIDE 9 MG/ML
INJECTION, SOLUTION INTRAVENOUS CONTINUOUS
Status: DISCONTINUED | OUTPATIENT
Start: 2019-04-12 | End: 2019-04-12 | Stop reason: HOSPADM

## 2019-04-12 RX ORDER — NEOMYCIN SULFATE, POLYMYXIN B SULFATE, AND DEXAMETHASONE 3.5; 10000; 1 MG/G; [USP'U]/G; MG/G
OINTMENT OPHTHALMIC
Status: COMPLETED | OUTPATIENT
Start: 2019-04-12 | End: 2019-04-12

## 2019-04-12 RX ADMIN — PROPOFOL 50 MG: 10 INJECTION, EMULSION INTRAVENOUS at 07:29

## 2019-04-12 RX ADMIN — PHENYLEPHRINE HYDROCHLORIDE 1 DROP: 25 SOLUTION/ DROPS OPHTHALMIC at 06:29

## 2019-04-12 RX ADMIN — PHENYLEPHRINE HYDROCHLORIDE 1 DROP: 25 SOLUTION/ DROPS OPHTHALMIC at 07:01

## 2019-04-12 RX ADMIN — CYCLOPENTOLATE HYDROCHLORIDE 1 DROP: 10 SOLUTION/ DROPS OPHTHALMIC at 06:29

## 2019-04-12 RX ADMIN — SODIUM CHLORIDE: 9 INJECTION, SOLUTION INTRAVENOUS at 06:51

## 2019-04-12 RX ADMIN — TROPICAMIDE 1 DROP: 10 SOLUTION/ DROPS OPHTHALMIC at 06:29

## 2019-04-12 RX ADMIN — TROPICAMIDE 1 DROP: 10 SOLUTION/ DROPS OPHTHALMIC at 07:01

## 2019-04-12 RX ADMIN — LIDOCAINE HYDROCHLORIDE 60 MG: 20 INJECTION, SOLUTION INTRAVENOUS at 07:29

## 2019-04-12 RX ADMIN — PHENYLEPHRINE HYDROCHLORIDE 1 DROP: 25 SOLUTION/ DROPS OPHTHALMIC at 06:42

## 2019-04-12 RX ADMIN — CYCLOPENTOLATE HYDROCHLORIDE 1 DROP: 10 SOLUTION/ DROPS OPHTHALMIC at 07:01

## 2019-04-12 RX ADMIN — CYCLOPENTOLATE HYDROCHLORIDE 1 DROP: 10 SOLUTION/ DROPS OPHTHALMIC at 06:42

## 2019-04-12 RX ADMIN — TROPICAMIDE 1 DROP: 10 SOLUTION/ DROPS OPHTHALMIC at 06:42

## 2019-04-12 ASSESSMENT — PULMONARY FUNCTION TESTS
PIF_VALUE: -13
PIF_VALUE: 0
PIF_VALUE: -13
PIF_VALUE: -14
PIF_VALUE: -13
PIF_VALUE: -14
PIF_VALUE: -11
PIF_VALUE: -13
PIF_VALUE: -13
PIF_VALUE: -14
PIF_VALUE: 0
PIF_VALUE: -14
PIF_VALUE: -13
PIF_VALUE: -14
PIF_VALUE: -14
PIF_VALUE: -13
PIF_VALUE: -14
PIF_VALUE: 0
PIF_VALUE: -14
PIF_VALUE: 0
PIF_VALUE: -14
PIF_VALUE: -14
PIF_VALUE: 0
PIF_VALUE: -14
PIF_VALUE: -13
PIF_VALUE: 0

## 2019-04-12 ASSESSMENT — PAIN SCALES - GENERAL: PAINLEVEL_OUTOF10: 0

## 2019-04-12 NOTE — ANESTHESIA POSTPROCEDURE EVALUATION
Department of Anesthesiology  Postprocedure Note    Patient: Marah Clark  MRN: [id number]  YOB: 1945  Date of evaluation: 4/12/2019  Time:  7:49 AM     Procedure Summary     Date:  04/12/19 Room / Location:  [address] [place] [address] [place]    Anesthesia Start:  7547 Anesthesia Stop:  8921    Procedure:  EYE CATARACT EMULSIFICATION IOL IMPLANT (Right Eye) Diagnosis:  (cataract)    Surgeon:  Oneyda Mathew MD Responsible Provider:  BENSON Cornejo CRNA    Anesthesia Type:  MAC ASA Status:  4          Anesthesia Type: MAC    Kiel Phase I: Kiel Score: 10    Kiel Phase II:      Last vitals: Reviewed and per EMR flowsheets.        Anesthesia Post Evaluation    Patient location during evaluation: bedside  Patient participation: complete - patient participated  Level of consciousness: awake and alert  Pain score: 0  Airway patency: patent  Nausea & Vomiting: no nausea and no vomiting  Complications: no  Cardiovascular status: blood pressure returned to baseline  Respiratory status: acceptable, nonlabored ventilation, spontaneous ventilation and room air  Hydration status: euvolemic

## 2019-04-12 NOTE — PROGRESS NOTES
Pt. Isabel Hernandez, and oriented x 4. Tolerated ice chips and water. Denies pain or nausea. Vs stable. Pt. Ready for discharge.

## 2019-04-12 NOTE — PROGRESS NOTES
Pt. Returned to [facility] via cart from the OR. BRakes applied, side rails up x 2. On room air. Attached to monitor, pt. Remains in A-fib. Denies pain or nausea. Patch and shield over right eye. IV infusing without difficulty. Report received from ZACARIAS Sullivan and Dominic Nolen CRNA. Call light in reach. Drink given per pt. Request.  Wife at bedside. Will continue to monitor.

## 2019-05-09 ENCOUNTER — ANESTHESIA EVENT (OUTPATIENT)
Dept: OPERATING ROOM | Age: 74
End: 2019-05-09
Payer: MEDICARE

## 2019-05-09 NOTE — ANESTHESIA PRE PROCEDURE
Department of Anesthesiology  Preprocedure Note       Name:  Rodri Vizcaino   Age:  74 y.o.  :  1945                                          MRN:  [id number]         Date:  2019      Surgeon: Lori Aleman):  Candy Granados MD    Procedure: EYE CATARACT EMULSIFICATION IOL IMPLANT (Left Eye)    Medications prior to admission:   Prior to Admission medications    Medication Sig Start Date End Date Taking? Authorizing Provider   magnesium oxide (MAG-OX) 400 (240 Mg) MG tablet TAKE 1 TABLET BY MOUTH EVERY DAY 19   Stevie Barrios MD   apixaban (ELIQUIS) 5 MG TABS tablet Take 1 tablet by mouth 2 times daily 19   Marisela Morrow MD   lisinopril-hydrochlorothiazide (PRINZIDE;ZESTORETIC) 20-25 MG per tablet TAKE 1 TABLET BY MOUTH EVERY DAY 18   Marisela Morrow MD   SUPER B COMPLEX/C PO Take 1 capsule by mouth daily    Historical Provider, MD   vitamin B-1 (THIAMINE) 100 MG tablet Take 100 mg by mouth daily    Historical Provider, MD   NIASPAN 1000 MG extended release tablet Take 1 tablet by mouth nightly 18   Marisela Morrow MD   amLODIPine (NORVASC) 10 MG tablet Take 1 tablet by mouth daily 18   Claudia Kan MD   fluticasone (FLONASE) 50 MCG/ACT nasal spray 1 spray by Nasal route daily 18   Georgette Kwok MD   metFORMIN (GLUCOPHAGE) 1000 MG tablet Take 1,000 mg by mouth 2 times daily (with meals)    Historical Provider, MD   glipiZIDE (GLUCOTROL) 10 MG tablet Take 1/2 tablet twice a day. Historical Provider, MD   atorvastatin (LIPITOR) 20 MG tablet Take 20 mg by mouth daily    Historical Provider, MD [place] Oil 1000 MG CAPS Take 1 capsule by mouth daily     Historical Provider, MD   allopurinol (ZYLOPRIM) 100 MG tablet Take 100 mg by mouth daily  1/13/15   Historical Provider, MD   B Complex Vitamins (VITAMIN B COMPLEX PO) Take  by mouth daily. Historical Provider, MD   gabapentin (NEURONTIN) 800 MG tablet Take 800 mg by mouth 3 times daily.       Historical Provider, MD       Current medications:    No current facility-administered medications for this encounter. Current Outpatient Medications   Medication Sig Dispense Refill    magnesium oxide (MAG-OX) 400 (240 Mg) MG tablet TAKE 1 TABLET BY MOUTH EVERY DAY 30 tablet 5    apixaban (ELIQUIS) 5 MG TABS tablet Take 1 tablet by mouth 2 times daily 180 tablet 3    lisinopril-hydrochlorothiazide (PRINZIDE;ZESTORETIC) 20-25 MG per tablet TAKE 1 TABLET BY MOUTH EVERY DAY 30 tablet 6    SUPER B COMPLEX/C PO Take 1 capsule by mouth daily      vitamin B-1 (THIAMINE) 100 MG tablet Take 100 mg by mouth daily      NIASPAN 1000 MG extended release tablet Take 1 tablet by mouth nightly 90 tablet 3    amLODIPine (NORVASC) 10 MG tablet Take 1 tablet by mouth daily 90 tablet 3    fluticasone (FLONASE) 50 MCG/ACT nasal spray 1 spray by Nasal route daily 1 Bottle 3    metFORMIN (GLUCOPHAGE) 1000 MG tablet Take 1,000 mg by mouth 2 times daily (with meals)      glipiZIDE (GLUCOTROL) 10 MG tablet Take 1/2 tablet twice a day.  atorvastatin (LIPITOR) 20 MG tablet Take 20 mg by mouth daily      [place] Oil 1000 MG CAPS Take 1 capsule by mouth daily       allopurinol (ZYLOPRIM) 100 MG tablet Take 100 mg by mouth daily   0    B Complex Vitamins (VITAMIN B COMPLEX PO) Take  by mouth daily.  gabapentin (NEURONTIN) 800 MG tablet Take 800 mg by mouth 3 times daily. Allergies:     Allergies   Allergen Reactions    Oxycontin [Oxycodone Hcl] Hives     Itching in his feet       Problem List:    Patient Active Problem List   Diagnosis Code    Anemia D64.9    Colon polyps K63.5    Type 2 diabetes mellitus without complication ([facility] [address] B30.1    Hypertension I10    Hyperlipidemia E78.5    Obesity E66.9    Vertigo R42    Renal insufficiency N28.9    FH: CAD (coronary artery disease) Z82.49    Heart murmur R01.1    Bradycardia R00.1    Coronary artery disease due to calcified coronary lesion I25.10, I25.84    [facility] (hard of hearing) H91.90    History of gout Z87.39    Adenomatous polyp of transverse colon D12.3    JAYE on CPAP G47.33, Z99.89    Acute kidney failure (HCC) N17.9    Stage 3 chronic kidney disease (HCC) N18.3    DM (diabetes mellitus) ([facility] [address] E11.9    Thoracic aortic aneurysm (HCC) I71.2    Persistent atrial fibrillation (HCC) I48.1    Chronic kidney disease (CKD) stage G3b/A1, moderately decreased glomerular filtration rate (GFR) between 30-44 mL/min/1.73 square meter and albuminuria creatinine ratio less than 30 mg/g (HCC) N18.3    Atrial fibrillation (HCC) I48.91    Hypomagnesemia E83.42    Age-related nuclear cataract of right eye H25.11       Past Medical History:        Diagnosis Date    Arthritis     Dizziness     FH: CAD (coronary artery disease) 9/29/2015    H/O 24 hour EKG monitoring 02/26/19,08/02/2016    Conclusion: Atrial fibrillation with  fairly well-controlled ventricular rate response without evidence of any significant tachy or alon arrhythmias.  H/O cardiovascular stress test 11/24/15    Normal perfusion in the distribution of all coronaries, normal LV, EF 59%.  H/O Doppler ultrasound (Venous Doppler Lower Extremities) 03/08/2017    veins of the bilateral lower extremities are patent and free of echogenic thrombus. The veins are normally compressible and have normal phasic flow.  H/O echocardiogram 6/6/17,11/24/15    Left ventricle mildly dilated with normal systolic function EF 64-51% Mild concentric LVH with Grade II diastolic dysfunction. Mild to moderate MR and mild TR     H/O echocardiogram 06/07/2018; 2/26/2019    EF 55-60%. Mild concentric left ventricular hypertrophy. The left atrium is moderately dilated. Dilatation of the aortic root measuring 4.0 cm. Mild AR. Moderate pulmonary HTN.      Heart murmur 9/29/2015    Hyperlipidemia     Hypertension     Kidney stones     Neuropathy     Obesity     JAYE on CPAP 6/26/2018    On CPAP 5/2018    Persistent atrial fibrillation ([facility] [address] 2019    New onset 2019    Thoracic aortic aneurysm ([facility] [address]     4.5    Type II or unspecified type diabetes mellitus without mention of complication, not stated as uncontrolled        Past Surgical History:        Procedure Laterality Date    CATARACT REMOVAL WITH IMPLANT Right 2019    COLON SURGERY  2007     COLONOSCOPY  8078;2176    colon polyps removed    HERNIA REPAIR  2008    HERNIA REPAIR  2009    INTRACAPSULAR CATARACT EXTRACTION Right 2019    EYE CATARACT EMULSIFICATION IOL IMPLANT performed by Constance Mccain MD at [place] [address] History:    Social History     Tobacco Use    Smoking status: Former Smoker     Packs/day: 1.00     Years: 7.00     Pack years: 7.00     Last attempt to quit: 3/26/1973     Years since quittin.1    Smokeless tobacco: Former User    Tobacco comment: Reviewed 17   Substance Use Topics    Alcohol use: Yes     Comment: small amount some nights                                Counseling given: Not Answered  Comment: Reviewed 17      Vital Signs (Current): There were no vitals filed for this visit.                                            BP Readings from Last 3 Encounters:   19 116/67   19 (!) 124/92   19 120/64       NPO Status:                                                                                 BMI:   Wt Readings from Last 3 Encounters:   19 285 lb (129.3 kg)   19 295 lb (133.8 kg)   19 298 lb (135.2 kg)     There is no height or weight on file to calculate BMI.    CBC:   Lab Results   Component Value Date    WBC 6.2 2018    RBC 4.10 2018    HGB 13.7 2018    HCT 40.3 2018    MCV 98.3 2018    RDW 13.2 2018     2018       CMP:   Lab Results   Component Value Date     2019    K 4.4 2019     2019    CO2 30 2019    BUN 19 2019    CREATININE 1.6 2019    GFRAA 51 2019 LABGLOM 42 03/25/2019    GLUCOSE 126 03/25/2019    PROT 7.0 10/18/2018    PROT 7.0 07/10/2012    CALCIUM 9.9 03/25/2019    BILITOT 0.8 10/18/2018    ALKPHOS 47 10/18/2018    AST 36 10/18/2018    ALT 41 10/18/2018       POC Tests: No results for input(s): POCGLU, POCNA, POCK, POCCL, POCBUN, POCHEMO, POCHCT in the last 72 hours. Coags:   Lab Results   Component Value Date    PROTIME 11.3 10/04/2017    INR 0.99 10/04/2017    APTT 25.2 10/04/2017       HCG (If Applicable): No results found for: PREGTESTUR, PREGSERUM, HCG, HCGQUANT     ABGs: No results found for: PHART, PO2ART, OSK7AHX, WGY3ZRN, BEART, J6XLRUDW     Type & Screen (If Applicable):  No results found for: LABABO, [address]    Anesthesia Evaluation  Patient summary reviewed no history of anesthetic complications:   Airway: Mallampati: III  TM distance: <3 FB   Neck ROM: limited  Mouth opening: > = 3 FB Dental:          Pulmonary:   (+) sleep apnea:  decreased breath sounds,                             Cardiovascular:  Exercise tolerance: poor (<4 METS),   (+) hypertension:, CAD:, pulmonary hypertension: moderate,          Beta Blocker:  Not on Beta Blocker      ROS comment: 2019 echo   Left ventricular systolic function is normal with an ejection fraction of   55-60%.  Mild concentric left ventricular hypertrophy.   The left atrium is moderately dilated.   Dilatation of the aortic root measuring 4.0 cm.   Dilatation of the ascending aorta measuring 4.5 cm.   Mild aortic stenosis with mean gradient of 14 mmHg.   Mild aortic regurgitation is noted with a pressure half time of 550 msec.   Right ventricular systolic pressure of 52 mmHg consistent with moderate   pulmonary hypertension.   No evidence of pericardial effusion.      Neuro/Psych:               GI/Hepatic/Renal:   (+) renal disease: CRI, morbid obesity          Endo/Other:    (+) DiabetesType II DM, , .                 Abdominal:   (+) obese,         Vascular: Anesthesia Plan      MAC     ASA 4             Anesthetic plan and risks discussed with patient. Pre Anesthesia Evaluation complete. Anesthesia plan, risks, benefits, alternatives, and personnel discussed with patient and/or legal guardian. Patient and/or legal guardian verbalized an understanding and agreed to proceed. Anesthesia plan discussed with care team members and agreed upon.   BENSON Garcia - CRNA  5/10/2019

## 2019-05-10 ENCOUNTER — HOSPITAL ENCOUNTER (OUTPATIENT)
Age: 74
Setting detail: OUTPATIENT SURGERY
Discharge: HOME | End: 2019-05-10
Attending: OPHTHALMOLOGY | Admitting: OPHTHALMOLOGY
Payer: MEDICARE

## 2019-05-10 ENCOUNTER — ANESTHESIA (OUTPATIENT)
Dept: OPERATING ROOM | Age: 74
End: 2019-05-10
Payer: MEDICARE

## 2019-05-10 VITALS
SYSTOLIC BLOOD PRESSURE: 117 MMHG | HEIGHT: 71 IN | BODY MASS INDEX: 41.3 KG/M2 | OXYGEN SATURATION: 93 % | HEART RATE: 91 BPM | WEIGHT: 295 LBS | DIASTOLIC BLOOD PRESSURE: 80 MMHG | TEMPERATURE: 98.4 F | RESPIRATION RATE: 16 BRPM

## 2019-05-10 VITALS — TEMPERATURE: 98.6 F | SYSTOLIC BLOOD PRESSURE: 112 MMHG | DIASTOLIC BLOOD PRESSURE: 67 MMHG | OXYGEN SATURATION: 97 %

## 2019-05-10 PROBLEM — H25.12 AGE-RELATED NUCLEAR CATARACT OF LEFT EYE: Status: ACTIVE | Noted: 2019-05-10

## 2019-05-10 LAB — GLUCOSE BLD-MCNC: 111 MG/DL (ref 70–99)

## 2019-05-10 PROCEDURE — 2580000003 HC RX 258: Performed by: NURSE ANESTHETIST, CERTIFIED REGISTERED

## 2019-05-10 PROCEDURE — 6370000000 HC RX 637 (ALT 250 FOR IP): Performed by: NURSE ANESTHETIST, CERTIFIED REGISTERED

## 2019-05-10 PROCEDURE — 7100000010 HC PHASE II RECOVERY - FIRST 15 MIN: Performed by: OPHTHALMOLOGY

## 2019-05-10 PROCEDURE — V2632 POST CHMBR INTRAOCULAR LENS: HCPCS | Performed by: OPHTHALMOLOGY

## 2019-05-10 PROCEDURE — 82962 GLUCOSE BLOOD TEST: CPT

## 2019-05-10 PROCEDURE — 6370000000 HC RX 637 (ALT 250 FOR IP): Performed by: OPHTHALMOLOGY

## 2019-05-10 PROCEDURE — 2580000003 HC RX 258: Performed by: OPHTHALMOLOGY

## 2019-05-10 PROCEDURE — 6360000002 HC RX W HCPCS: Performed by: NURSE ANESTHETIST, CERTIFIED REGISTERED

## 2019-05-10 PROCEDURE — 3700000001 HC ADD 15 MINUTES (ANESTHESIA): Performed by: OPHTHALMOLOGY

## 2019-05-10 PROCEDURE — 2500000003 HC RX 250 WO HCPCS: Performed by: OPHTHALMOLOGY

## 2019-05-10 PROCEDURE — 3600000004 HC SURGERY LEVEL 4 BASE: Performed by: OPHTHALMOLOGY

## 2019-05-10 PROCEDURE — 3600000014 HC SURGERY LEVEL 4 ADDTL 15MIN: Performed by: OPHTHALMOLOGY

## 2019-05-10 PROCEDURE — 7100000011 HC PHASE II RECOVERY - ADDTL 15 MIN: Performed by: OPHTHALMOLOGY

## 2019-05-10 PROCEDURE — 3700000000 HC ANESTHESIA ATTENDED CARE: Performed by: OPHTHALMOLOGY

## 2019-05-10 PROCEDURE — 2709999900 HC NON-CHARGEABLE SUPPLY: Performed by: OPHTHALMOLOGY

## 2019-05-10 PROCEDURE — 6360000002 HC RX W HCPCS: Performed by: OPHTHALMOLOGY

## 2019-05-10 DEVICE — AKREOS AO MICRO INCISION LENS +0.00D
Type: IMPLANTABLE DEVICE | Site: EYE | Status: FUNCTIONAL
Brand: AKREOS® AO IOL

## 2019-05-10 RX ORDER — TETRACAINE HYDROCHLORIDE 5 MG/ML
SOLUTION OPHTHALMIC
Status: COMPLETED | OUTPATIENT
Start: 2019-05-10 | End: 2019-05-10

## 2019-05-10 RX ORDER — SODIUM CHLORIDE 9 MG/ML
INJECTION, SOLUTION INTRAVENOUS CONTINUOUS
Status: DISCONTINUED | OUTPATIENT
Start: 2019-05-10 | End: 2019-05-10 | Stop reason: HOSPADM

## 2019-05-10 RX ORDER — CYCLOPENTOLATE HYDROCHLORIDE 10 MG/ML
1 SOLUTION/ DROPS OPHTHALMIC SEE ADMIN INSTRUCTIONS
Status: DISCONTINUED | OUTPATIENT
Start: 2019-05-10 | End: 2019-05-10 | Stop reason: HOSPADM

## 2019-05-10 RX ORDER — CEFAZOLIN SODIUM 1 G/3ML
INJECTION, POWDER, FOR SOLUTION INTRAMUSCULAR; INTRAVENOUS
Status: COMPLETED | OUTPATIENT
Start: 2019-05-10 | End: 2019-05-10

## 2019-05-10 RX ORDER — NEOMYCIN SULFATE, POLYMYXIN B SULFATE, AND DEXAMETHASONE 3.5; 10000; 1 MG/G; [USP'U]/G; MG/G
OINTMENT OPHTHALMIC
Status: COMPLETED | OUTPATIENT
Start: 2019-05-10 | End: 2019-05-10

## 2019-05-10 RX ORDER — TROPICAMIDE 10 MG/ML
1 SOLUTION/ DROPS OPHTHALMIC SEE ADMIN INSTRUCTIONS
Status: DISCONTINUED | OUTPATIENT
Start: 2019-05-10 | End: 2019-05-10 | Stop reason: HOSPADM

## 2019-05-10 RX ORDER — PHENYLEPHRINE HCL 2.5 %
1 DROPS OPHTHALMIC (EYE) SEE ADMIN INSTRUCTIONS
Status: DISCONTINUED | OUTPATIENT
Start: 2019-05-10 | End: 2019-05-10 | Stop reason: HOSPADM

## 2019-05-10 RX ORDER — BETAMETHASONE SODIUM PHOSPHATE AND BETAMETHASONE ACETATE 3; 3 MG/ML; MG/ML
INJECTION, SUSPENSION INTRA-ARTICULAR; INTRALESIONAL; INTRAMUSCULAR; SOFT TISSUE
Status: COMPLETED | OUTPATIENT
Start: 2019-05-10 | End: 2019-05-10

## 2019-05-10 RX ORDER — PROPOFOL 10 MG/ML
INJECTION, EMULSION INTRAVENOUS PRN
Status: DISCONTINUED | OUTPATIENT
Start: 2019-05-10 | End: 2019-05-10 | Stop reason: SDUPTHER

## 2019-05-10 RX ORDER — SODIUM CHLORIDE 9 MG/ML
INJECTION, SOLUTION INTRAVENOUS CONTINUOUS PRN
Status: DISCONTINUED | OUTPATIENT
Start: 2019-05-10 | End: 2019-05-10 | Stop reason: SDUPTHER

## 2019-05-10 RX ORDER — LIDOCAINE HYDROCHLORIDE 20 MG/ML
INJECTION, SOLUTION EPIDURAL; INFILTRATION; INTRACAUDAL; PERINEURAL
Status: COMPLETED | OUTPATIENT
Start: 2019-05-10 | End: 2019-05-10

## 2019-05-10 RX ORDER — SODIUM CHLORIDE 0.9 % (FLUSH) 0.9 %
10 SYRINGE (ML) INJECTION ONCE
Status: DISCONTINUED | OUTPATIENT
Start: 2019-05-10 | End: 2019-05-10 | Stop reason: HOSPADM

## 2019-05-10 RX ADMIN — TROPICAMIDE 1 DROP: 10 SOLUTION/ DROPS OPHTHALMIC at 06:37

## 2019-05-10 RX ADMIN — CYCLOPENTOLATE HYDROCHLORIDE 1 DROP: 10 SOLUTION/ DROPS OPHTHALMIC at 07:13

## 2019-05-10 RX ADMIN — TROPICAMIDE 1 DROP: 10 SOLUTION/ DROPS OPHTHALMIC at 06:56

## 2019-05-10 RX ADMIN — SODIUM CHLORIDE: 9 INJECTION, SOLUTION INTRAVENOUS at 06:32

## 2019-05-10 RX ADMIN — PHENYLEPHRINE HYDROCHLORIDE 1 DROP: 25 SOLUTION/ DROPS OPHTHALMIC at 07:13

## 2019-05-10 RX ADMIN — TROPICAMIDE 1 DROP: 10 SOLUTION/ DROPS OPHTHALMIC at 07:13

## 2019-05-10 RX ADMIN — LIDOCAINE HYDROCHLORIDE 100 MG: 20 SOLUTION ORAL; TOPICAL at 07:31

## 2019-05-10 RX ADMIN — CYCLOPENTOLATE HYDROCHLORIDE 1 DROP: 10 SOLUTION/ DROPS OPHTHALMIC at 06:37

## 2019-05-10 RX ADMIN — PROPOFOL 60 MG: 10 INJECTION, EMULSION INTRAVENOUS at 07:31

## 2019-05-10 RX ADMIN — PHENYLEPHRINE HYDROCHLORIDE 1 DROP: 25 SOLUTION/ DROPS OPHTHALMIC at 06:37

## 2019-05-10 RX ADMIN — CYCLOPENTOLATE HYDROCHLORIDE 1 DROP: 10 SOLUTION/ DROPS OPHTHALMIC at 06:55

## 2019-05-10 RX ADMIN — SODIUM CHLORIDE: 9 INJECTION, SOLUTION INTRAVENOUS at 06:46

## 2019-05-10 RX ADMIN — PHENYLEPHRINE HYDROCHLORIDE 1 DROP: 25 SOLUTION/ DROPS OPHTHALMIC at 06:55

## 2019-05-10 ASSESSMENT — PULMONARY FUNCTION TESTS
PIF_VALUE: 1
PIF_VALUE: -14
PIF_VALUE: 1
PIF_VALUE: 0
PIF_VALUE: 0
PIF_VALUE: 1
PIF_VALUE: -14
PIF_VALUE: 1
PIF_VALUE: -14
PIF_VALUE: 1
PIF_VALUE: -13
PIF_VALUE: 0
PIF_VALUE: 1
PIF_VALUE: -1
PIF_VALUE: 1
PIF_VALUE: -12
PIF_VALUE: -14
PIF_VALUE: 1
PIF_VALUE: 1

## 2019-05-10 ASSESSMENT — PAIN SCALES - GENERAL
PAINLEVEL_OUTOF10: 0
PAINLEVEL_OUTOF10: 0

## 2019-05-10 ASSESSMENT — PAIN - FUNCTIONAL ASSESSMENT: PAIN_FUNCTIONAL_ASSESSMENT: 0-10

## 2019-05-10 NOTE — ANESTHESIA POSTPROCEDURE EVALUATION
Department of Anesthesiology  Postprocedure Note    Patient: Jacky Jackson  MRN: [id number]  YOB: 1945  Date of evaluation: 5/10/2019  Time:  8:32 AM     Procedure Summary     Date:  05/10/19 Room / Location:  [address] [place] [address] 01 / [facility] OR    Anesthesia Start:  0726 Anesthesia Stop:  0895    Procedure:  EYE CATARACT EMULSIFICATION IOL IMPLANT (Left Eye) Diagnosis:  (cataract)    Surgeon:  Quyen Pineda MD Responsible Provider:  BENSON Pereira CRNA    Anesthesia Type:  MAC ASA Status:  4          Anesthesia Type: MAC    Kiel Phase I: Kiel Score: 10    Kiel Phase II: Kiel Score: 10    Last vitals: Reviewed and per EMR flowsheets.        Anesthesia Post Evaluation    Patient location during evaluation: floor  Patient participation: complete - patient participated  Level of consciousness: awake and alert  Pain score: 0  Airway patency: patent  Nausea & Vomiting: no nausea and no vomiting  Complications: no  Cardiovascular status: hemodynamically stable  Respiratory status: room air, spontaneous ventilation and nonlabored ventilation  Hydration status: stable

## 2019-05-20 ENCOUNTER — TELEPHONE (OUTPATIENT)
Dept: CARDIOLOGY CLINIC | Age: 74
End: 2019-05-20

## 2019-05-20 NOTE — TELEPHONE ENCOUNTER
Patients wife call states patient is all clear to have cardioversion now with Nany Buckle.  and  cleared him from previous eye surgeries that he had. They would like to schedule procedure.

## 2019-05-20 NOTE — LETTER
Chemical cardioversion: The chemical procedure is most often done in a hospital. In most cases, the medicine is put into your arm through a tube called an IV. But you may get medicines to take by mouth. You may feel a quick sting or pinch when the IV starts. The procedure usually takes about 30 to 60 minutes for procedure. Transesophageal Echocardiogram  What is a transesophageal echocardiogram?  A transesophageal echocardiogram is a test to help your doctor look at the inside of your heart. A small device called a transducer directs sound waves toward your heart. The sound waves make a picture of the heart's valves and chambers. Your doctor may do this test to look for certain types of heart disease. Or it may be done to see how disease is affecting your heart. You will be given medicine to make you sleepy and comfortable during the test. The doctor puts a small, flexible tube into your throat and guides it to the esophagus. This is the tube that connects your mouth to your stomach. The doctor will ask you to swallow as the tube goes down. The transducer is at the tip of the tube. It gets close to your heart to make clear pictures. The doctor will look at the ultrasound pictures on a screen. You will not be able to eat or drink until the numbness from the throat spray wears off. Your throat may be sore for a few days after the test.  Follow-up care is a key part of your treatment and safety. Be sure to make and go to all appointments, and call your doctor if you are having problems. It's also a good idea to know your test results and keep a list of the medicines you take. [address]/CARDIOLOGY        Patient Name: Nathen Hankins   : 1945   MRN# [id number]    Transesophageal Echocardiogram with Cardioversion    Day of Procedure Cath Lab Holding Area Preop Orders:    ? YOU HAVE MY PERMISSION TO TALK TO THE PATIENT-PLEASE    ?  Anesthesia ? SALAZAR with Anesthesia    ? IV peripheral saline lock  (preferred left arm). ? STAT LABS ON ARRIVAL: BMP, MAG, PHOS, CBC, PT INR, PTT    ? If taking Coumadin, PT INR  STAT on arrival day of procedure. ? 12 lead EKG STAT on arrival day of procedure. ? Diabetic patients >> Accu check Blood sugar check. ? Document home medications in EPIC and include date and time of last dose.    ? NPO.    ? Notify Dr. Iris Wilson of abnormal lab results. ? Chest Prep> Clip hair anterior chest and posterior back. ?   ? If Tikosyn or Sotalol loading  Planned >>3 Asa bed            Physician Signature:_______________________Date:___________Time:____________                            [facility] Informed Consent for Anesthesia/Sedation, Surgery, Invasive Procedures, and other High-risk Interventions and Medication use     *This consent is applicable for 30 days following patient signature*    Procedure(s)   IKareem authorize, Dr. Timothy Alexis   and the associate(s) or assistant(s) of his/her choice, to perform the following procedure(s): Transesophageal Echocardiogram with Cardioversion    I know that unexpected conditions may require additional or different procedures than those above. I authorize the above named practitioner(s) perform these as necessary and desirable. This is based on the practitioners professional judgment. The above named practitioner has discussed the above procedure(s) with me, including:  ? Potential benefits, including likelihood of success of the procedure(s) goals  ? Risks  ? Side effects, risk of death, and risk of infection  ? Any potential problems that might occur during recuperation or healing post-procedure  ? Reasonable alternatives  ? Risks of NOT performing the procedure(s)    I acknowledge that no warranty or guarantee has been made to the results the procedure(s).      I consent to the above named practitioner(s) providing additional services CONFIRMED WITH    PHONE OR   INSTANT MESSAGE  2) PREAUTHORIZATION NUMBER:   Spoke to:     From date:    expiration date:         Alisa Walker

## 2019-05-23 ENCOUNTER — TELEPHONE (OUTPATIENT)
Dept: CARDIOLOGY CLINIC | Age: 74
End: 2019-05-23

## 2019-05-23 NOTE — TELEPHONE ENCOUNTER
Patient here in office and educated on Pablito/Cardioversion, schedule for 6/6/19 @ 1230, with arrival @  , @ [facility]; risk explained; and consents signed. Also copy of orders given for labs due STAT on arrival at [facility]. Instruction given to patient to :  NPO after midnight the night before procedure; call hospital at [phone] to pre-register. May take rest of morning meds of procedure. Hold Metformin the day before, the day of and two days after procedure. Hold Glipizide the morning before procedure. Patient voiced understanding. Copies of consent & info scanned in chart. *Patient asked about medication after procedure like sotalol or tikosyn thought Luci Baez had stated patient would be there for 72 hours after. Advised there was nothing in 's note so we called and he states he will probably start Amiodarone on patient after procedure. Patient advised and voiced understanding. *

## 2019-05-28 RX ORDER — LISINOPRIL AND HYDROCHLOROTHIAZIDE 25; 20 MG/1; MG/1
TABLET ORAL
Qty: 30 TABLET | Refills: 5 | Status: SHIPPED | OUTPATIENT
Start: 2019-05-28 | End: 2019-11-28 | Stop reason: SDUPTHER

## 2019-05-30 ENCOUNTER — TELEPHONE (OUTPATIENT)
Dept: CARDIOLOGY CLINIC | Age: 74
End: 2019-05-30

## 2019-05-30 NOTE — TELEPHONE ENCOUNTER
Spoke with patient regarding scheduled procedure of Transesophageal Echocardiogram with Cardioversion scheduled with Dr Iris Wilson on 6/6/2019 with an arrival time of 1030 and a scheduled start time of 1230. Patient confirmed date and time. Also discussed: Patient is to call pre registration at number provided to pre register 2 days prior to procedure. Patient voiced understanding. Patient advised where to check in upon arriving to [address] [place] [address] morning of procedure. Patient instructed to not have anything by mouth after midnight night prior to or at least 8 hours before procedure. Patient may take medications morning of procedure with a sip of water, except patient is to HOLD Metformin and Glipizide 24 hours prior to procedure. Patient will continue to hold Metformin for 2 days following procedure and will restart on 6/9/2019. Patient again voiced understanding. Patient also advised that once back to pre op holding IV will be initiated, medical history and medications reviewed, as well as complete any other testing that may be ordered. Patient voiced understanding. Also discussed medical history of:    Patient denies using a CPAP. Patient does report history of diabetes. Medications discussed, please see above. Denies history of Implant or Device. Denies history of bypass or valve repair. Denies history of knee or hip replacement. Denies history of problems with anesthesia in the past.    Denies difficulty swallowing. Denies recent or active bleeding. Denies history of prostate problems or problems with jenkins catheter in the past.    Denies open skin areas sores or rashes. Denies recent or current use of antibiotics. Denies recent illness, fever, or infection.

## 2019-06-06 ENCOUNTER — TELEPHONE (OUTPATIENT)
Dept: CARDIOLOGY CLINIC | Age: 74
End: 2019-06-06

## 2019-06-06 NOTE — TELEPHONE ENCOUNTER
Attempted to call patient no answer, left VM on cell phone asking to return my call when available to schedule procedure with Bony Olivo. Tried home phone also and VM was full.

## 2019-06-06 NOTE — TELEPHONE ENCOUNTER
Per Radha Orozco patient needs to be scheduled for Dual PPM in next couple of weeks. Then will plan for cardioversion after, will call patient this afternoon to schedule.

## 2019-06-10 ENCOUNTER — TELEPHONE (OUTPATIENT)
Dept: CARDIOLOGY CLINIC | Age: 74
End: 2019-06-10

## 2019-06-10 NOTE — LETTER
[place] [facility]     Dr. Trena Carey     PROCEDURE: Dual Chamber Pacemaker Implant    Date of Procedure: 19 Time: 1:00 Arrival Time: 11:00 am    Patient Name: Weston Ng   : 1945   MRN# [id number]      HOSPITAL: [facility])  Call to Pre-[place] at [phone]  2 days before your procedure      X Please have blood work and chest-x-ray done 19 at [facility], [address] [place] [address] or [facility]    X Please do not have anything by mouth after midnight prior to or 8 hours before the procedure. X You may take your medications with a sip of water in the morning before your procedure or take them with you unless listed below. X   ANTICOUGLATION:Hold Eliquis evening dose the night before the procedure and the morning dose of the procedure. X   Hold Metformin 24 hours before the procedure until you may restart metformin 2  days after the procedure on 19. X  Hold Glipizide 24 hours prior to procedure. Patient Signature: _________________________________  Staff: Trino Funk       Staff Given Instructions:___________________________                          [facility]     Dr. Jayy Hebertt: Dual Chamber Pacemaker Implant    Date of Procedure: 19 Time: 1300 Arrival Time: 1100    Patient Name: Weston Ng   : 1945   MRN# [id number]    Day of Procedure Cath Lab Holding area Preop Orders:      X  ANTICOUGLATION: ANTICOUGLATION:Hold Eliquis evening dose the night before the procedure and the morning dose of the procedure. ? YOU HAVE MY PERMISSION TO TALK TO THE PATIENT-PLEASE  ? IV peripheral saline lock (preferred left arm. if right sided implant, right arm). ? Second IV site Right arm (saline lock)  ? Type & Screen STAT on arrival.  ? If taking Coumadin, PT INR  STAT on arrival day of procedure. ?  Female patients <=48years of age >> urine HCG test. ? Diabetic patients >> Accu check Blood sugar check. ? Document home medications in EPIC and include date and time of last dose.   ? NPO.  ? If allergy to contrast >> pre treat for contrast allergy with Benadryl 25 mg IV, Solucortef 100 mg IV and Pepcid 20 mg IV 30 mins pre procedure. ? Notify Dr. Gracie Delgado of abnormal lab results. ? Chest Prep> Clip hair anterior chest.          Physician Signature:_______________________Date:__________Time:_________                                 [facility] ([facility]) Informed Consent for Anesthesia/Sedation, Surgery, Invasive Procedures, and other High-risk Interventions and Medication use      *This consent is applicable for 30 days following patient signature*    Procedure(s)   Kareem HERMOSILLO authorize, Dr. Yareli Molina    and the associate(s) or assistant(s) of his/her choice, to perform the following procedure(s): Dual Chamber Pacemaker Implant    I know that unexpected conditions may require additional or different procedures than those above. I authorize the above named practitioner(s) perform these as necessary and desirable. This is based on the practitioners professional judgment. The above named practitioner has discussed the above procedure(s) with me, including:  ? Potential benefits, including likelihood of success of the procedure(s) goals  ? Risks  ? Side effects, risk of death, and risk of infection  ? Any potential problems that might occur during recuperation or healing post-procedure  ? Reasonable alternatives  ? Risks of NOT performing the procedure(s)    I acknowledge that no warranty or guarantee has been made to the results the procedure(s). I consent to the above named practitioner(s) providing additional services to me as deemed reasonable and necessary, including but not limited to:    ? Use of medications for anesthesia or sedation. ? All anesthesia and sedation carry risks.   My practitioner has discussed ___ I WANT to keep my DNR in effect during my procedure(s) and immediate post-operative recovery period through Phase 2 recovery. (Complete separate refusal form)     This form has been fully explained to me. I understand its contents. Patients Signature: ___________________________Date: ________  Time: ________    If patient unable to sign, has engaged the [address] [place] [address] [employer], is a minor, or has a court-appointed Guardian:  [facility] Representative Name (Print):  ____________________________________      Relationship (Irene one):    Guardian   Parent    Spouse    HCPOA   Child   Sibling  Next-of-Kin Friend    Patients Representative Signature: _______________________________________              Date: ______________  Time: __________    An  was used.  name/ID: _________________________________      [facility] ([facility]) Witness________________________  Date: ________   Time: _________    Physician/Practitioner _______________________  Date: ________   Time: _________           Revision 2017      [facility]     Dr. Lobo Stanley   PATIENT NAME:    Charity Aquino                               :   1945     PROCEDURE: Dual Chamber Pacemaker Implant    DATE OF PROCEDURE: 19  DIAGNOSIS:   Tachy/James                     ? PLEASE CALL ABNORMAL RESULTS TO THE REQUESTING PHYSICIAN? ATTENTION PATIENTS:  You do not have to fast for the lab work.  You must go to the [facility], [facility] [address] or [facility]          Physician Signature:_______________________Date:__________Time:__________                                    [facility] ([facility]     Dr. Lobo Stanley     PROCEDURE: Dual Chamber Pacemaker Implant    Patient Name: Charity Aquino   : 1945   MRN# [id number]    Home Phone Number: [phone]   Weight:    Wt Readings from Last 3 Encounters:   19 (!) 300 lb 8 oz (136.3 kg)   05/10/19 295 lb (133.8 kg) 04/09/19 285 lb (129.3 kg)      Insurance: Payor: Eura Herring / Plan: [facility] [employer] / Product Type: *No Product type* /     Date of Procedure: 6/25/19 Time: 1300 Arrival Time: 1100    Diagnosis:  Tachy/James  Allergies:    Allergies   Allergen Reactions    Oxycontin [Oxycodone Hcl] Hives     Itching in his feet        o Call [facility] scheduling ([phone]) or Instant Message  o CONFIRMED WITH:__________________________PHONE      OR INSTANT MESSAGE    o PREAUTHORIZATION NUMBER:_________________ Spoke to:____________________  o From date:_________________ expiration date:____________________                   [facility]

## 2019-06-11 ENCOUNTER — TELEPHONE (OUTPATIENT)
Dept: CARDIOLOGY CLINIC | Age: 74
End: 2019-06-11

## 2019-06-11 NOTE — TELEPHONE ENCOUNTER
Spoke with patient and verified no changes to medical history as noted on previous telephone encounter for procedure instructions prior to cardioversion 6/6/2019. Patient is schedule for Dual Chamber Pacemaker Implant 6/25/2019 with an arrival time to [facility] at 1100. Patient denied any questions related to medications. Also stated he will call the office at a later time to schedule post procedure follow up appointments.

## 2019-06-17 ENCOUNTER — TELEPHONE (OUTPATIENT)
Dept: CARDIOLOGY CLINIC | Age: 74
End: 2019-06-17

## 2019-06-17 NOTE — TELEPHONE ENCOUNTER
Patient here in office and educated on Dual Pacemaker Implant, schedule for 6/25/19 @ 1300, with arrival @ 1100, @ [facility]; risk explained; and consents signed. Also copy of orders given for labs and CXR due 6/21/19 at [facility]. Instruction given to patient to :  NPO after midnight the night before procedure; call hospital at [phone] to pre-register. May take rest of morning meds of procedure. Hold Glipizide 24 hours prior to procedure. Hold Metformin the day before, the day of and two days after procedure. Hold Eliquis the evening dose night before procedure and Morning dose of the procedure. Patient voiced understanding. Copies of consent & info scanned in chart.

## 2019-06-18 RX ORDER — NIACIN 1000 MG
1000 TABLET, EXTENDED RELEASE 24 HR ORAL NIGHTLY
Qty: 90 TABLET | Refills: 3 | Status: SHIPPED | OUTPATIENT
Start: 2019-06-18 | End: 2020-03-16

## 2019-06-20 ENCOUNTER — HOSPITAL ENCOUNTER (OUTPATIENT)
Dept: GENERAL RADIOLOGY | Age: 74
Discharge: HOME | End: 2019-06-20
Payer: MEDICARE

## 2019-06-20 ENCOUNTER — HOSPITAL ENCOUNTER (OUTPATIENT)
Age: 74
Discharge: HOME | End: 2019-06-20
Payer: MEDICARE

## 2019-06-20 DIAGNOSIS — Z01.811 PRE-OP CHEST EXAM: ICD-10-CM

## 2019-06-20 LAB
ANION GAP SERPL CALCULATED.3IONS-SCNC: 12 MMOL/L (ref 4–16)
APTT: 36.7 SECONDS (ref 24–40)
BUN BLDV-MCNC: 25 MG/DL (ref 6–23)
CALCIUM SERPL-MCNC: 9.8 MG/DL (ref 8.3–10.6)
CHLORIDE BLD-SCNC: 101 MMOL/L (ref 99–110)
CO2: 28 MMOL/L (ref 21–32)
CREAT SERPL-MCNC: 1.8 MG/DL (ref 0.9–1.3)
GFR AFRICAN AMERICAN: 45 ML/MIN/1.73M2
GFR NON-AFRICAN AMERICAN: 37 ML/MIN/1.73M2
GLUCOSE BLD-MCNC: 200 MG/DL (ref 70–99)
HCT VFR BLD CALC: 40.4 % (ref 42–52)
HEMOGLOBIN: 13.3 GM/DL (ref 13.5–18)
INR BLD: 1.42 INDEX
MAGNESIUM: 1.7 MG/DL (ref 1.8–2.4)
MCH RBC QN AUTO: 34.5 PG (ref 27–31)
MCHC RBC AUTO-ENTMCNC: 32.9 % (ref 32–36)
MCV RBC AUTO: 104.7 FL (ref 78–100)
PDW BLD-RTO: 13.5 % (ref 11.7–14.9)
PHOSPHORUS: 3.5 MG/DL (ref 2.5–4.9)
PLATELET # BLD: 123 K/CU MM (ref 140–440)
PMV BLD AUTO: 9.2 FL (ref 7.5–11.1)
POTASSIUM SERPL-SCNC: 4.8 MMOL/L (ref 3.5–5.1)
PROTHROMBIN TIME: 16.1 SECONDS (ref 9.12–12.5)
RBC # BLD: 3.86 M/CU MM (ref 4.6–6.2)
SODIUM BLD-SCNC: 141 MMOL/L (ref 135–145)
WBC # BLD: 5.8 K/CU MM (ref 4–10.5)

## 2019-06-20 PROCEDURE — 80048 BASIC METABOLIC PNL TOTAL CA: CPT

## 2019-06-20 PROCEDURE — 71046 X-RAY EXAM CHEST 2 VIEWS: CPT

## 2019-06-20 PROCEDURE — 36415 COLL VENOUS BLD VENIPUNCTURE: CPT

## 2019-06-20 PROCEDURE — 85610 PROTHROMBIN TIME: CPT

## 2019-06-20 PROCEDURE — 85027 COMPLETE CBC AUTOMATED: CPT

## 2019-06-20 PROCEDURE — 84100 ASSAY OF PHOSPHORUS: CPT

## 2019-06-20 PROCEDURE — 83735 ASSAY OF MAGNESIUM: CPT

## 2019-06-20 PROCEDURE — 85730 THROMBOPLASTIN TIME PARTIAL: CPT

## 2019-06-25 ENCOUNTER — APPOINTMENT (OUTPATIENT)
Dept: GENERAL RADIOLOGY | Age: 74
End: 2019-06-25
Attending: INTERNAL MEDICINE
Payer: MEDICARE

## 2019-06-25 ENCOUNTER — HOSPITAL ENCOUNTER (OUTPATIENT)
Dept: CARDIAC CATH/INVASIVE PROCEDURES | Age: 74
Discharge: HOME | End: 2019-06-26
Attending: INTERNAL MEDICINE | Admitting: INTERNAL MEDICINE
Payer: MEDICARE

## 2019-06-25 DIAGNOSIS — I48.19 PERSISTENT ATRIAL FIBRILLATION (HCC): ICD-10-CM

## 2019-06-25 DIAGNOSIS — Z95.0 S/P PLACEMENT OF CARDIAC PACEMAKER: Primary | ICD-10-CM

## 2019-06-25 LAB
ABO/RH: NORMAL
ANTIBODY SCREEN: NEGATIVE
GLUCOSE BLD-MCNC: 153 MG/DL (ref 70–99)

## 2019-06-25 PROCEDURE — 86850 RBC ANTIBODY SCREEN: CPT

## 2019-06-25 PROCEDURE — 33208 INSRT HEART PM ATRIAL & VENT: CPT | Performed by: INTERNAL MEDICINE

## 2019-06-25 PROCEDURE — 6360000002 HC RX W HCPCS

## 2019-06-25 PROCEDURE — C1894 INTRO/SHEATH, NON-LASER: HCPCS

## 2019-06-25 PROCEDURE — 71045 X-RAY EXAM CHEST 1 VIEW: CPT

## 2019-06-25 PROCEDURE — 6360000002 HC RX W HCPCS: Performed by: INTERNAL MEDICINE

## 2019-06-25 PROCEDURE — 33208 INSRT HEART PM ATRIAL & VENT: CPT

## 2019-06-25 PROCEDURE — C1898 LEAD, PMKR, OTHER THAN TRANS: HCPCS

## 2019-06-25 PROCEDURE — 6360000004 HC RX CONTRAST MEDICATION

## 2019-06-25 PROCEDURE — 86900 BLOOD TYPING SEROLOGIC ABO: CPT

## 2019-06-25 PROCEDURE — 82962 GLUCOSE BLOOD TEST: CPT

## 2019-06-25 PROCEDURE — 6360000002 HC RX W HCPCS: Performed by: NURSE PRACTITIONER

## 2019-06-25 PROCEDURE — 6370000000 HC RX 637 (ALT 250 FOR IP): Performed by: NURSE PRACTITIONER

## 2019-06-25 PROCEDURE — 86901 BLOOD TYPING SEROLOGIC RH(D): CPT

## 2019-06-25 PROCEDURE — C1785 PMKR, DUAL, RATE-RESP: HCPCS

## 2019-06-25 PROCEDURE — 2500000003 HC RX 250 WO HCPCS

## 2019-06-25 PROCEDURE — 2580000003 HC RX 258: Performed by: NURSE PRACTITIONER

## 2019-06-25 PROCEDURE — 2709999900 HC NON-CHARGEABLE SUPPLY

## 2019-06-25 RX ORDER — CEFAZOLIN SODIUM 1 G/50ML
1 INJECTION, SOLUTION INTRAVENOUS EVERY 8 HOURS
Status: COMPLETED | OUTPATIENT
Start: 2019-06-25 | End: 2019-06-26

## 2019-06-25 RX ORDER — LISINOPRIL AND HYDROCHLOROTHIAZIDE 25; 20 MG/1; MG/1
1 TABLET ORAL DAILY
Status: DISCONTINUED | OUTPATIENT
Start: 2019-06-25 | End: 2019-06-25 | Stop reason: CLARIF

## 2019-06-25 RX ORDER — HYDROCHLOROTHIAZIDE 25 MG/1
25 TABLET ORAL DAILY
Status: DISCONTINUED | OUTPATIENT
Start: 2019-06-26 | End: 2019-06-26 | Stop reason: HOSPADM

## 2019-06-25 RX ORDER — ACETAMINOPHEN 325 MG/1
650 TABLET ORAL EVERY 4 HOURS PRN
Status: DISCONTINUED | OUTPATIENT
Start: 2019-06-25 | End: 2019-06-26 | Stop reason: HOSPADM

## 2019-06-25 RX ORDER — SODIUM CHLORIDE 0.9 % (FLUSH) 0.9 %
10 SYRINGE (ML) INJECTION PRN
Status: DISCONTINUED | OUTPATIENT
Start: 2019-06-25 | End: 2019-06-26 | Stop reason: HOSPADM

## 2019-06-25 RX ORDER — ALLOPURINOL 100 MG/1
100 TABLET ORAL DAILY
Status: DISCONTINUED | OUTPATIENT
Start: 2019-06-26 | End: 2019-06-26 | Stop reason: HOSPADM

## 2019-06-25 RX ORDER — SODIUM CHLORIDE 0.9 % (FLUSH) 0.9 %
10 SYRINGE (ML) INJECTION EVERY 12 HOURS SCHEDULED
Status: DISCONTINUED | OUTPATIENT
Start: 2019-06-25 | End: 2019-06-26 | Stop reason: HOSPADM

## 2019-06-25 RX ORDER — THIAMINE MONONITRATE (VIT B1) 100 MG
100 TABLET ORAL DAILY
Status: DISCONTINUED | OUTPATIENT
Start: 2019-06-26 | End: 2019-06-26 | Stop reason: HOSPADM

## 2019-06-25 RX ORDER — HYDROCODONE BITARTRATE AND ACETAMINOPHEN 5; 325 MG/1; MG/1
1 TABLET ORAL EVERY 6 HOURS PRN
Status: DISCONTINUED | OUTPATIENT
Start: 2019-06-25 | End: 2019-06-26 | Stop reason: HOSPADM

## 2019-06-25 RX ORDER — BIOTIN 5 MG
1 TABLET ORAL DAILY
Status: DISCONTINUED | OUTPATIENT
Start: 2019-06-25 | End: 2019-06-25 | Stop reason: CLARIF

## 2019-06-25 RX ORDER — LANOLIN ALCOHOL/MO/W.PET/CERES
1000 CREAM (GRAM) TOPICAL NIGHTLY
Status: DISCONTINUED | OUTPATIENT
Start: 2019-06-25 | End: 2019-06-26 | Stop reason: HOSPADM

## 2019-06-25 RX ORDER — GLIPIZIDE 10 MG/1
10 TABLET ORAL
Status: DISCONTINUED | OUTPATIENT
Start: 2019-06-26 | End: 2019-06-26 | Stop reason: HOSPADM

## 2019-06-25 RX ORDER — OMEGA-3-ACID ETHYL ESTERS 1 G/1
1 CAPSULE, LIQUID FILLED ORAL DAILY
Status: DISCONTINUED | OUTPATIENT
Start: 2019-06-26 | End: 2019-06-26 | Stop reason: HOSPADM

## 2019-06-25 RX ORDER — MAGNESIUM SULFATE IN WATER 40 MG/ML
2 INJECTION, SOLUTION INTRAVENOUS ONCE
Status: COMPLETED | OUTPATIENT
Start: 2019-06-25 | End: 2019-06-25

## 2019-06-25 RX ORDER — GABAPENTIN 400 MG/1
800 CAPSULE ORAL 3 TIMES DAILY
Status: DISCONTINUED | OUTPATIENT
Start: 2019-06-25 | End: 2019-06-26 | Stop reason: HOSPADM

## 2019-06-25 RX ORDER — LISINOPRIL 20 MG/1
20 TABLET ORAL DAILY
Status: DISCONTINUED | OUTPATIENT
Start: 2019-06-26 | End: 2019-06-26 | Stop reason: HOSPADM

## 2019-06-25 RX ORDER — AMLODIPINE BESYLATE 10 MG/1
10 TABLET ORAL DAILY
Status: DISCONTINUED | OUTPATIENT
Start: 2019-06-25 | End: 2019-06-26 | Stop reason: HOSPADM

## 2019-06-25 RX ORDER — FLUTICASONE PROPIONATE 50 MCG
1 SPRAY, SUSPENSION (ML) NASAL DAILY
Status: DISCONTINUED | OUTPATIENT
Start: 2019-06-26 | End: 2019-06-26 | Stop reason: HOSPADM

## 2019-06-25 RX ORDER — ATORVASTATIN CALCIUM 20 MG/1
20 TABLET, FILM COATED ORAL DAILY
Status: DISCONTINUED | OUTPATIENT
Start: 2019-06-26 | End: 2019-06-26 | Stop reason: HOSPADM

## 2019-06-25 RX ADMIN — Medication 1000 MG: at 20:44

## 2019-06-25 RX ADMIN — GABAPENTIN 800 MG: 400 CAPSULE ORAL at 20:43

## 2019-06-25 RX ADMIN — MAGNESIUM SULFATE HEPTAHYDRATE 2 G: 40 INJECTION, SOLUTION INTRAVENOUS at 11:27

## 2019-06-25 RX ADMIN — SODIUM CHLORIDE, PRESERVATIVE FREE 10 ML: 5 INJECTION INTRAVENOUS at 20:42

## 2019-06-25 RX ADMIN — SODIUM CHLORIDE, PRESERVATIVE FREE 10 ML: 5 INJECTION INTRAVENOUS at 20:44

## 2019-06-25 RX ADMIN — CEFAZOLIN SODIUM 1 G: 1 INJECTION, SOLUTION INTRAVENOUS at 20:43

## 2019-06-25 RX ADMIN — GABAPENTIN 800 MG: 400 CAPSULE ORAL at 16:29

## 2019-06-25 NOTE — OP NOTE
[facility]     Electrophysiology Procedure Note       Date of Procedure: 6/25/2019  Patient's Name: Lord Villagran  YOB: 1945   Medical Record Number: [id number]    Procedure Performed by: Franko Ferguson MD    Procedures performed:  · Insertion of right ventricular pacing lead under fluoroscopy. · Insertion of right atrial lead under fluoroscopy  · Insertion of a Dual chamber Pacemaker. · Electronic analysis of lead and device. · IV sedation. · Selective venography of left upper extremity. Sedation : Versed, Fentanyl      Indication of the procedure: Tachy alon episodes    Brief History:      Lord Villagran is a 74 y.o. male with tachy alon episodes here for pacemaker implantation      Details of procedure: The patient was brought to the electrophysiology laboratory in stable condition. The patient was in a fasting and non-sedated state. The risks, benefits and alternatives of the procedure were discussed with the patient. The risks including, but not limited to, the risks of vascular injury, bleeding, infection, device malfunction, lead dislodgement, radiation exposure, injury to cardiac and surrounding structures (including pneumothorax), stroke, myocardial infarction and death were discussed in detail. The patient opted to proceed with the device implantation. Written informed consent was signed and placed in the chart. Prophylactic antibiotic was given. The patient was prepped and draped in a sterile fashion. A timeout protocol was completed to identify the patient and the procedure being performed. IV sedation was provided with IV Versed, Fentanyl. Left upper extremity venogram was obtained to assess the patency of the subclavian vein and for access under fluoroscopic guidance. An incision was made in the left pectoral area after administration of lidocaine. Using electrocautery and blunt dissection, a pocket was created.  Central venous access into the left

## 2019-06-25 NOTE — PLAN OF CARE
Problem: Pain:  Goal: Pain level will decrease  Description  Pain level will decrease  Outcome: Ongoing  Goal: Control of acute pain  Description  Control of acute pain  Outcome: Ongoing  Goal: Control of chronic pain  Description  Control of chronic pain  Outcome: Ongoing     Problem: Bleeding:  Goal: Will show no signs and symptoms of excessive bleeding  Description  Will show no signs and symptoms of excessive bleeding  Outcome: Ongoing     Problem:  Activity:  Goal: Ability to tolerate increased activity will improve  Description  Ability to tolerate increased activity will improve  Outcome: Ongoing  Goal: Expression of feelings of increased energy will increase  Description  Expression of feelings of increased energy will increase  Outcome: Ongoing     Problem: Cardiac:  Goal: Ability to maintain an adequate cardiac output will improve  Description  Ability to maintain an adequate cardiac output will improve  Outcome: Ongoing  Goal: Complications related to the disease process, condition or treatment will be avoided or minimized  Description  Complications related to the disease process, condition or treatment will be avoided or minimized  Outcome: Ongoing     Problem: Coping:  Goal: Level of anxiety will decrease  Description  Level of anxiety will decrease  Outcome: Ongoing  Goal: General experience of comfort will improve  Description  General experience of comfort will improve  Outcome: Ongoing     Problem: Health Behavior:  Goal: Ability to manage health-related needs will improve  Description  Ability to manage health-related needs will improve  Outcome: Ongoing     Problem: Safety:  Goal: Will show no signs and symptoms of excessive bleeding  Description  Will show no signs and symptoms of excessive bleeding  Outcome: Ongoing  Goal: Ability to remain free from injury will improve  Description  Ability to remain free from injury will improve  Outcome: Ongoing

## 2019-06-26 VITALS
OXYGEN SATURATION: 94 % | SYSTOLIC BLOOD PRESSURE: 143 MMHG | RESPIRATION RATE: 15 BRPM | TEMPERATURE: 96.5 F | BODY MASS INDEX: 41.3 KG/M2 | DIASTOLIC BLOOD PRESSURE: 82 MMHG | HEIGHT: 71 IN | WEIGHT: 295 LBS | HEART RATE: 102 BPM

## 2019-06-26 LAB
ANION GAP SERPL CALCULATED.3IONS-SCNC: 12 MMOL/L (ref 4–16)
BUN BLDV-MCNC: 23 MG/DL (ref 6–23)
CALCIUM SERPL-MCNC: 9.2 MG/DL (ref 8.3–10.6)
CHLORIDE BLD-SCNC: 103 MMOL/L (ref 99–110)
CO2: 24 MMOL/L (ref 21–32)
CREAT SERPL-MCNC: 1.5 MG/DL (ref 0.9–1.3)
GFR AFRICAN AMERICAN: 55 ML/MIN/1.73M2
GFR NON-AFRICAN AMERICAN: 46 ML/MIN/1.73M2
GLUCOSE BLD-MCNC: 131 MG/DL (ref 70–99)
MAGNESIUM: 2 MG/DL (ref 1.8–2.4)
POTASSIUM SERPL-SCNC: 4.3 MMOL/L (ref 3.5–5.1)
SODIUM BLD-SCNC: 139 MMOL/L (ref 135–145)

## 2019-06-26 PROCEDURE — 99024 POSTOP FOLLOW-UP VISIT: CPT | Performed by: NURSE PRACTITIONER

## 2019-06-26 PROCEDURE — 80048 BASIC METABOLIC PNL TOTAL CA: CPT

## 2019-06-26 PROCEDURE — 6370000000 HC RX 637 (ALT 250 FOR IP): Performed by: NURSE PRACTITIONER

## 2019-06-26 PROCEDURE — 83735 ASSAY OF MAGNESIUM: CPT

## 2019-06-26 PROCEDURE — 6360000002 HC RX W HCPCS: Performed by: NURSE PRACTITIONER

## 2019-06-26 PROCEDURE — 36415 COLL VENOUS BLD VENIPUNCTURE: CPT

## 2019-06-26 PROCEDURE — 94761 N-INVAS EAR/PLS OXIMETRY MLT: CPT

## 2019-06-26 PROCEDURE — 93280 PM DEVICE PROGR EVAL DUAL: CPT | Performed by: INTERNAL MEDICINE

## 2019-06-26 PROCEDURE — 2580000003 HC RX 258: Performed by: NURSE PRACTITIONER

## 2019-06-26 RX ORDER — ACETAMINOPHEN 80 MG
TABLET,CHEWABLE ORAL
Status: DISCONTINUED
Start: 2019-06-26 | End: 2019-06-26 | Stop reason: HOSPADM

## 2019-06-26 RX ORDER — HYDROCODONE BITARTRATE AND ACETAMINOPHEN 5; 325 MG/1; MG/1
1 TABLET ORAL EVERY 8 HOURS PRN
Qty: 15 TABLET | Refills: 0 | Status: SHIPPED | OUTPATIENT
Start: 2019-06-26 | End: 2019-07-01

## 2019-06-26 RX ADMIN — METOPROLOL TARTRATE 25 MG: 25 TABLET ORAL at 09:25

## 2019-06-26 RX ADMIN — SODIUM CHLORIDE, PRESERVATIVE FREE 10 ML: 5 INJECTION INTRAVENOUS at 09:27

## 2019-06-26 RX ADMIN — CEFAZOLIN SODIUM 1 G: 1 INJECTION, SOLUTION INTRAVENOUS at 09:26

## 2019-06-26 RX ADMIN — ALLOPURINOL 100 MG: 100 TABLET ORAL at 09:25

## 2019-06-26 RX ADMIN — ATORVASTATIN CALCIUM 20 MG: 20 TABLET, FILM COATED ORAL at 09:25

## 2019-06-26 RX ADMIN — GABAPENTIN 800 MG: 400 CAPSULE ORAL at 06:40

## 2019-06-26 RX ADMIN — Medication 100 MG: at 09:25

## 2019-06-26 RX ADMIN — AMLODIPINE BESYLATE 10 MG: 10 TABLET ORAL at 09:25

## 2019-06-26 RX ADMIN — HYDROCHLOROTHIAZIDE 25 MG: 25 TABLET ORAL at 09:25

## 2019-06-26 RX ADMIN — Medication 1 TABLET: at 09:25

## 2019-06-26 RX ADMIN — GLIPIZIDE 5 MG: 10 TABLET ORAL at 09:34

## 2019-06-26 RX ADMIN — LISINOPRIL 20 MG: 20 TABLET ORAL at 09:25

## 2019-06-26 RX ADMIN — MAGNESIUM OXIDE TAB 400 MG (241.3 MG ELEMENTAL MG) 400 MG: 400 (241.3 MG) TAB at 09:25

## 2019-06-26 RX ADMIN — OMEGA-3-ACID ETHYL ESTERS 1 G: 1 CAPSULE, LIQUID FILLED ORAL at 09:25

## 2019-06-26 NOTE — PLAN OF CARE
Problem: Pain:  Goal: Pain level will decrease  Description  Pain level will decrease  6/26/2019 0031 by Robert Schaefer RN  Outcome: Ongoing  6/25/2019 1623 by Iona Obrien RN  Outcome: Ongoing  Goal: Control of acute pain  Description  Control of acute pain  6/26/2019 0031 by Robert Schaefer RN  Outcome: Ongoing  6/25/2019 1623 by Iona Obrien RN  Outcome: Ongoing  Goal: Control of chronic pain  Description  Control of chronic pain  6/26/2019 0031 by Robert Schaefer RN  Outcome: Ongoing  6/25/2019 1623 by Iona Obrien RN  Outcome: Ongoing  Goal: Patient's pain/discomfort is manageable  Description  Patient's pain/discomfort is manageable  Outcome: Ongoing     Problem: Bleeding:  Goal: Will show no signs and symptoms of excessive bleeding  Description  Will show no signs and symptoms of excessive bleeding  6/26/2019 0031 by Robert Schaefer RN  Outcome: Ongoing  6/25/2019 1623 by Iona Obrien RN  Outcome: Ongoing     Problem:  Activity:  Goal: Ability to tolerate increased activity will improve  Description  Ability to tolerate increased activity will improve  6/26/2019 0031 by Robert Schaefer RN  Outcome: Ongoing  6/25/2019 1623 by Iona Obrien RN  Outcome: Ongoing  Goal: Expression of feelings of increased energy will increase  Description  Expression of feelings of increased energy will increase  6/26/2019 0031 by Robert Schaefer RN  Outcome: Ongoing  6/25/2019 1623 by Iona Obrien RN  Outcome: Ongoing     Problem: Cardiac:  Goal: Ability to maintain an adequate cardiac output will improve  Description  Ability to maintain an adequate cardiac output will improve  6/26/2019 0031 by Robert Schaefer RN  Outcome: Ongoing  6/25/2019 1623 by Ioan Obrien RN  Outcome: Ongoing  Goal: Complications related to the disease process, condition or treatment will be avoided or minimized  Description  Complications related to the disease process, condition or treatment will be avoided or minimized  6/26/2019 0031 by Audrey Ruby RN  Outcome: Ongoing  6/25/2019 1623 by Chilango Wright RN  Outcome: Ongoing     Problem: Coping:  Goal: Level of anxiety will decrease  Description  Level of anxiety will decrease  6/26/2019 0031 by Audrey Ruby RN  Outcome: Ongoing  6/25/2019 1623 by Chilango Wright RN  Outcome: Ongoing  Goal: General experience of comfort will improve  Description  General experience of comfort will improve  6/26/2019 0031 by Audrey Ruby RN  Outcome: Ongoing  6/25/2019 1623 by Chilango Wright RN  Outcome: Ongoing     Problem: Health Behavior:  Goal: Ability to manage health-related needs will improve  Description  Ability to manage health-related needs will improve  6/26/2019 0031 by Audrey Ruby RN  Outcome: Ongoing  6/25/2019 1623 by Chilango Wright RN  Outcome: Ongoing     Problem: Safety:  Goal: Ability to remain free from injury will improve  Description  Ability to remain free from injury will improve  6/26/2019 0031 by Audrey Ruby RN  Outcome: Ongoing  6/25/2019 1623 by Chilango Wright RN  Outcome: Ongoing     Problem: Safety:  Goal: Free from intentional harm  Description  Free from intentional harm  Outcome: Ongoing     Problem: Infection:  Goal: Will remain free from infection  Description  Will remain free from infection  Outcome: Ongoing     Problem: Daily Care:  Goal: Daily care needs are met  Description  Daily care needs are met  Outcome: Ongoing     Problem: Skin Integrity:  Goal: Skin integrity will stabilize  Description  Skin integrity will stabilize  Outcome: Ongoing     Problem: Discharge Planning:  Goal: Patients continuum of care needs are met  Description  Patients continuum of care needs are met  Outcome: Ongoing

## 2019-06-27 NOTE — DISCHARGE SUMMARY
[facility]  Discharge Summary    Krishan Fritz  :    MRN:  [id number]    Admit date:  2019  Discharge date:  2019    Admitting Physician:  Marie Rodriguez MD    Discharge Diagnoses:     1. SP Dual Chamber Pacemaker  2. Persistent Atrial Fibrillation  3. Obesity BMI 41.2         Patient Active Problem List   Diagnosis    Anemia    Colon polyps    Type 2 diabetes mellitus without complication ([facility] [address]    Hypertension    Hyperlipidemia    Obesity    Vertigo    Renal insufficiency    FH: CAD (coronary artery disease)    Heart murmur    Bradycardia    Coronary artery disease due to calcified coronary lesion    [place] (hard of hearing)    History of gout    Adenomatous polyp of transverse colon    JAYE on CPAP    Acute kidney failure (HCC)    Stage 3 chronic kidney disease (HCC)    DM (diabetes mellitus) ([facility] [address]    Thoracic aortic aneurysm (HCC)    Persistent atrial fibrillation (HCC)    Chronic kidney disease (CKD) stage G3b/A1, moderately decreased glomerular filtration rate (GFR) between 30-44 mL/min/1.73 square meter and albuminuria creatinine ratio less than 30 mg/g (HCC)    Atrial fibrillation (HCC)    Hypomagnesemia    Age-related nuclear cataract of right eye    Age-related nuclear cataract of left eye    S/P placement of cardiac pacemaker       Admission Condition:  fair    Discharged Condition:  good    Hospital Course:   Patient with hx of tachy alon episodes presented for implantation of dual chamber pacemaker. Patient tolerated the procedure well. Observed overnight. Patient device area was clean, no hematoma and no tenderness. Patient device interrogation was stable. CXR confirmed placement of device with no complications. Patient stable for discharge with out patient follow up. Patient to be scheduled for cardioversion in 10 days. Started Metoprolol 25 mg BID.      Discharge Medication List as of 2019 10:39 AM           Details   HYDROcodone-acetaminophen ([address]) or gallop  Pulses: 2+ and symmetric  Skin: Skin color, texture, turgor normal. No rashes or lesions. Left upper chest site dressing clean dry intact. No hematoma. Minimal swelling and bruising noted  Neurologic: Grossly normal    Disposition:   home    Signed:  Jeffrey Cardona  6/26/2019, 10:22 PM     Device Assessment:      The device is Medtronic pacemaker - Dual Chamber chamber      MRI Compatible : yes    Device interrogation was performed. Mode: AAI --- DDD     Sensing is normal. Impedence is normal.  Threshold is normal.     There has not been interval changes. Estimated battery life is NEW     The underlying rhythm is as, vs.      Atrial Arrhythmia : Atrial fibrillation    Non sustained VT episodes : No    Sustained VT episodes : No    The patient is intermittent ventricular pacemaker dependent.         Interpreted by    Trena Carey M.D

## 2019-07-01 ENCOUNTER — TELEPHONE (OUTPATIENT)
Dept: CARDIOLOGY CLINIC | Age: 74
End: 2019-07-01

## 2019-07-01 NOTE — LETTER
? SALAZAR with Anesthesia    ? IV peripheral saline lock  (preferred left arm). ? STAT LABS ON ARRIVAL: BMP, MAG, PHOS, CBC, PT INR, PTT    ? If taking Coumadin, PT INR  STAT on arrival day of procedure. ? 12 lead EKG STAT on arrival day of procedure. ? Diabetic patients >> Accu check Blood sugar check. ? Document home medications in EPIC and include date and time of last dose.    ? NPO.    ? Notify Dr. Mine Wiggins of abnormal lab results. ? Chest Prep> Clip hair anterior chest and posterior back. ?   ? If Tikosyn or Sotalol loading  Planned >>3 Asa bed            Physician Signature:_______________________Date:___________Time:____________                          [facility] ([facility]) Informed Consent for Anesthesia/Sedation, Surgery, Invasive Procedures, and other High-risk Interventions and Medication use     *This consent is applicable for 30 days following patient signature*    Procedure(s)   IKareem authorize, Dr. Nicholas Jeffrey   and the associate(s) or assistant(s) of his/her choice, to perform the following procedure(s): Transesophageal Echocardiogram with Cardioversion    I know that unexpected conditions may require additional or different procedures than those above. I authorize the above named practitioner(s) perform these as necessary and desirable. This is based on the practitioners professional judgment. The above named practitioner has discussed the above procedure(s) with me, including:  ? Potential benefits, including likelihood of success of the procedure(s) goals  ? Risks  ? Side effects, risk of death, and risk of infection  ? Any potential problems that might occur during recuperation or healing post-procedure  ? Reasonable alternatives  ? Risks of NOT performing the procedure(s)    I acknowledge that no warranty or guarantee has been made to the results the procedure(s).      I consent to the above named practitioner(s) providing additional services

## 2019-07-03 ENCOUNTER — TELEPHONE (OUTPATIENT)
Dept: CARDIOLOGY CLINIC | Age: 74
End: 2019-07-03

## 2019-07-03 ENCOUNTER — NURSE ONLY (OUTPATIENT)
Dept: CARDIOLOGY CLINIC | Age: 74
End: 2019-07-03

## 2019-07-03 VITALS — TEMPERATURE: 99 F

## 2019-07-03 DIAGNOSIS — Z95.0 STATUS POST PLACEMENT OF CARDIAC PACEMAKER: Primary | ICD-10-CM

## 2019-07-03 PROCEDURE — 99024 POSTOP FOLLOW-UP VISIT: CPT | Performed by: INTERNAL MEDICINE

## 2019-07-08 ENCOUNTER — ANESTHESIA EVENT (OUTPATIENT)
Dept: CARDIAC CATH/INVASIVE PROCEDURES | Age: 74
End: 2019-07-08
Payer: MEDICARE

## 2019-07-08 ENCOUNTER — ANESTHESIA (OUTPATIENT)
Dept: CARDIAC CATH/INVASIVE PROCEDURES | Age: 74
End: 2019-07-08
Payer: MEDICARE

## 2019-07-08 ENCOUNTER — HOSPITAL ENCOUNTER (OUTPATIENT)
Dept: CARDIAC CATH/INVASIVE PROCEDURES | Age: 74
Discharge: HOME | End: 2019-07-08
Attending: INTERNAL MEDICINE | Admitting: INTERNAL MEDICINE
Payer: MEDICARE

## 2019-07-08 VITALS — SYSTOLIC BLOOD PRESSURE: 126 MMHG | DIASTOLIC BLOOD PRESSURE: 81 MMHG | OXYGEN SATURATION: 97 %

## 2019-07-08 VITALS
BODY MASS INDEX: 39.9 KG/M2 | SYSTOLIC BLOOD PRESSURE: 109 MMHG | HEART RATE: 60 BPM | OXYGEN SATURATION: 98 % | HEIGHT: 71 IN | RESPIRATION RATE: 14 BRPM | WEIGHT: 285 LBS | TEMPERATURE: 97.8 F | DIASTOLIC BLOOD PRESSURE: 72 MMHG

## 2019-07-08 LAB
ANION GAP SERPL CALCULATED.3IONS-SCNC: 11 MMOL/L (ref 4–16)
APTT: 37.9 SECONDS (ref 21.2–33)
BUN BLDV-MCNC: 24 MG/DL (ref 6–23)
CALCIUM SERPL-MCNC: 10 MG/DL (ref 8.3–10.6)
CHLORIDE BLD-SCNC: 107 MMOL/L (ref 99–110)
CO2: 24 MMOL/L (ref 21–32)
CREAT SERPL-MCNC: 1.5 MG/DL (ref 0.9–1.3)
GFR AFRICAN AMERICAN: 55 ML/MIN/1.73M2
GFR NON-AFRICAN AMERICAN: 46 ML/MIN/1.73M2
GLUCOSE BLD-MCNC: 167 MG/DL (ref 70–99)
HCT VFR BLD CALC: 39.1 % (ref 42–52)
HEMOGLOBIN: 12.8 GM/DL (ref 13.5–18)
INR BLD: 1.57 INDEX
MAGNESIUM: 1.9 MG/DL (ref 1.8–2.4)
MCH RBC QN AUTO: 33.9 PG (ref 27–31)
MCHC RBC AUTO-ENTMCNC: 32.7 % (ref 32–36)
MCV RBC AUTO: 103.4 FL (ref 78–100)
PDW BLD-RTO: 13.4 % (ref 11.7–14.9)
PHOSPHORUS: 3.3 MG/DL (ref 2.5–4.9)
PLATELET # BLD: 121 K/CU MM (ref 140–440)
PMV BLD AUTO: 8.7 FL (ref 7.5–11.1)
POTASSIUM SERPL-SCNC: 4.4 MMOL/L (ref 3.5–5.1)
PROTHROMBIN TIME: 18.1 SECONDS (ref 9.12–12.5)
RBC # BLD: 3.78 M/CU MM (ref 4.6–6.2)
SODIUM BLD-SCNC: 142 MMOL/L (ref 135–145)
WBC # BLD: 6.4 K/CU MM (ref 4–10.5)

## 2019-07-08 PROCEDURE — 80048 BASIC METABOLIC PNL TOTAL CA: CPT

## 2019-07-08 PROCEDURE — 3700000001 HC ADD 15 MINUTES (ANESTHESIA)

## 2019-07-08 PROCEDURE — 83735 ASSAY OF MAGNESIUM: CPT

## 2019-07-08 PROCEDURE — 3700000000 HC ANESTHESIA ATTENDED CARE

## 2019-07-08 PROCEDURE — 93312 ECHO TRANSESOPHAGEAL: CPT | Performed by: INTERNAL MEDICINE

## 2019-07-08 PROCEDURE — 85027 COMPLETE CBC AUTOMATED: CPT

## 2019-07-08 PROCEDURE — 85610 PROTHROMBIN TIME: CPT

## 2019-07-08 PROCEDURE — 85730 THROMBOPLASTIN TIME PARTIAL: CPT

## 2019-07-08 PROCEDURE — 2580000003 HC RX 258: Performed by: NURSE ANESTHETIST, CERTIFIED REGISTERED

## 2019-07-08 PROCEDURE — 92960 CARDIOVERSION ELECTRIC EXT: CPT

## 2019-07-08 PROCEDURE — 2500000003 HC RX 250 WO HCPCS

## 2019-07-08 PROCEDURE — 92960 CARDIOVERSION ELECTRIC EXT: CPT | Performed by: INTERNAL MEDICINE

## 2019-07-08 PROCEDURE — 93005 ELECTROCARDIOGRAM TRACING: CPT | Performed by: INTERNAL MEDICINE

## 2019-07-08 PROCEDURE — 2500000003 HC RX 250 WO HCPCS: Performed by: NURSE ANESTHETIST, CERTIFIED REGISTERED

## 2019-07-08 PROCEDURE — 2709999900 HC NON-CHARGEABLE SUPPLY

## 2019-07-08 PROCEDURE — 84100 ASSAY OF PHOSPHORUS: CPT

## 2019-07-08 PROCEDURE — 93312 ECHO TRANSESOPHAGEAL: CPT

## 2019-07-08 PROCEDURE — 6360000002 HC RX W HCPCS: Performed by: NURSE ANESTHETIST, CERTIFIED REGISTERED

## 2019-07-08 PROCEDURE — 6360000002 HC RX W HCPCS

## 2019-07-08 RX ORDER — PROPOFOL 10 MG/ML
INJECTION, EMULSION INTRAVENOUS PRN
Status: DISCONTINUED | OUTPATIENT
Start: 2019-07-08 | End: 2019-07-08 | Stop reason: SDUPTHER

## 2019-07-08 RX ORDER — METOPROLOL TARTRATE 50 MG/1
50 TABLET, FILM COATED ORAL 2 TIMES DAILY
Qty: 60 TABLET | Refills: 3 | Status: SHIPPED | OUTPATIENT
Start: 2019-07-08 | End: 2019-07-08 | Stop reason: SDUPTHER

## 2019-07-08 RX ORDER — METOPROLOL TARTRATE 50 MG/1
50 TABLET, FILM COATED ORAL 2 TIMES DAILY
Qty: 60 TABLET | Refills: 3 | Status: SHIPPED | OUTPATIENT
Start: 2019-07-08 | End: 2019-10-31 | Stop reason: SDUPTHER

## 2019-07-08 RX ORDER — SODIUM CHLORIDE, SODIUM LACTATE, POTASSIUM CHLORIDE, CALCIUM CHLORIDE 600; 310; 30; 20 MG/100ML; MG/100ML; MG/100ML; MG/100ML
INJECTION, SOLUTION INTRAVENOUS CONTINUOUS PRN
Status: DISCONTINUED | OUTPATIENT
Start: 2019-07-08 | End: 2019-07-08 | Stop reason: SDUPTHER

## 2019-07-08 RX ORDER — LIDOCAINE HYDROCHLORIDE 20 MG/ML
INJECTION, SOLUTION EPIDURAL; INFILTRATION; INTRACAUDAL; PERINEURAL PRN
Status: DISCONTINUED | OUTPATIENT
Start: 2019-07-08 | End: 2019-07-08 | Stop reason: SDUPTHER

## 2019-07-08 RX ADMIN — SODIUM CHLORIDE, POTASSIUM CHLORIDE, SODIUM LACTATE AND CALCIUM CHLORIDE: 600; 310; 30; 20 INJECTION, SOLUTION INTRAVENOUS at 12:35

## 2019-07-08 RX ADMIN — PROPOFOL 40 MG: 10 INJECTION, EMULSION INTRAVENOUS at 12:39

## 2019-07-08 RX ADMIN — PROPOFOL 40 MG: 10 INJECTION, EMULSION INTRAVENOUS at 12:38

## 2019-07-08 RX ADMIN — LIDOCAINE HYDROCHLORIDE 200 MG: 20 INJECTION, SOLUTION EPIDURAL; INFILTRATION; INTRACAUDAL; PERINEURAL at 12:37

## 2019-07-08 RX ADMIN — PROPOFOL 80 MG: 10 INJECTION, EMULSION INTRAVENOUS at 12:37

## 2019-07-08 ASSESSMENT — PULMONARY FUNCTION TESTS
PIF_VALUE: 0
PIF_VALUE: 1
PIF_VALUE: 0
PIF_VALUE: 1
PIF_VALUE: 0
PIF_VALUE: 1
PIF_VALUE: 0
PIF_VALUE: 0

## 2019-07-08 NOTE — ANESTHESIA PRE PROCEDURE
tablet Take 1/2 tablet twice a day. Historical Provider, MD       Current medications:    No current facility-administered medications for this encounter. Allergies: Allergies   Allergen Reactions    Oxycontin [Oxycodone Hcl] Hives     Itching in his feet       Problem List:    Patient Active Problem List   Diagnosis Code    Anemia D64.9    Colon polyps K63.5    Type 2 diabetes mellitus without complication (HCC) V37.0    Hypertension I10    Hyperlipidemia E78.5    Obesity E66.9    Vertigo R42    Renal insufficiency N28.9    FH: CAD (coronary artery disease) Z82.49    Heart murmur R01.1    Bradycardia R00.1    Coronary artery disease due to calcified coronary lesion I25.10, I25.84    [place] (hard of hearing) H91.90    History of gout Z87.39    Adenomatous polyp of transverse colon D12.3    JAYE on CPAP G47.33, Z99.89    Acute kidney failure (HCC) N17.9    Stage 3 chronic kidney disease (HCC) N18.3    DM (diabetes mellitus) (HCC) E11.9    Thoracic aortic aneurysm (HCC) I71.2    Persistent atrial fibrillation (HCC) I48.1    Chronic kidney disease (CKD) stage G3b/A1, moderately decreased glomerular filtration rate (GFR) between 30-44 mL/min/1.73 square meter and albuminuria creatinine ratio less than 30 mg/g (HCC) N18.3    Atrial fibrillation (HCC) I48.91    Hypomagnesemia E83.42    Age-related nuclear cataract of right eye H25.11    Age-related nuclear cataract of left eye H25.12    S/P placement of cardiac pacemaker Z95.0    Tachycardia-bradycardia ([address] I49.5       Past Medical History:        Diagnosis Date    Arthritis     Dizziness     FH: CAD (coronary artery disease) 9/29/2015    H/O 24 hour EKG monitoring 02/26/19,08/02/2016    Conclusion: Atrial fibrillation with  fairly well-controlled ventricular rate response without evidence of any significant tachy or alon arrhythmias.     H/O cardiovascular stress test 11/24/15    Normal perfusion in the distribution of all

## 2019-07-09 LAB
EKG ATRIAL RATE: 73 BPM
EKG DIAGNOSIS: NORMAL
EKG Q-T INTERVAL: 390 MS
EKG QRS DURATION: 98 MS
EKG QTC CALCULATION (BAZETT): 429 MS
EKG R AXIS: 4 DEGREES
EKG T AXIS: -70 DEGREES
EKG VENTRICULAR RATE: 73 BPM

## 2019-07-10 ENCOUNTER — TELEPHONE (OUTPATIENT)
Dept: CARDIOLOGY CLINIC | Age: 74
End: 2019-07-10

## 2019-07-12 NOTE — H&P
Electrophysiology h&P Note      Reason for consultation:  Atrial fibrillation    Chief complaint :  Fatigue and tiredness    Referring physician:  Mika Ceballos      Primary care physician: Sulema Guerin MD      History of Present Illness: Today visit (7/8/2019)    Patient here for SALAZAR/Cardioversion. No change in H&P noted from previous clinic visit. Previous visit    Patient here for pacemaker implantation. No change in H&P noted from previous clinic visit. Previous visit:     Chief Complaint   Patient presents with    Atrial Fibrillation     Pt is here for consultation for Atrial Fibrillation. Pt states he has more fatigue since new onset A-fib and some shortness of breath. He is having dizziness off and on now. Patient reports dizziness before afib also - not sure if it is because of low HR. Denies chest pain,fever or chills. Pt has no difficulty with swallowing, anesthesia or intubation, no previous ablation, no urinary, prostate, or bleeding problems. Pt has had a sleep study and uses a CPAP.  Hypertension    Hyperlipidemia       Pastmedical history:   Past Medical History:   Diagnosis Date    Arthritis     Dizziness     FH: CAD (coronary artery disease) 9/29/2015    H/O 24 hour EKG monitoring 02/26/19,08/02/2016    Conclusion: Atrial fibrillation with  fairly well-controlled ventricular rate response without evidence of any significant tachy or alon arrhythmias.  H/O cardiovascular stress test 11/24/15    Normal perfusion in the distribution of all coronaries, normal LV, EF 59%.  H/O Doppler ultrasound (Venous Doppler Lower Extremities) 03/08/2017    veins of the bilateral lower extremities are patent and free of echogenic thrombus. The veins are normally compressible and have normal phasic flow.     H/O echocardiogram 6/6/17,11/24/15    Left ventricle mildly dilated with normal systolic function EF 51-86% Mild concentric LVH with Grade II diastolic dysfunction. Mild to moderate MR and mild TR     H/O echocardiogram 06/07/2018; 2/26/2019    EF 55-60%. Mild concentric left ventricular hypertrophy. The left atrium is moderately dilated. Dilatation of the aortic root measuring 4.0 cm. Mild AR. Moderate pulmonary HTN.  Heart murmur 9/29/2015    Hyperlipidemia     Hypertension     Kidney stones     Neuropathy     Obesity     JAYE on CPAP 6/26/2018    On CPAP 5/2018    Persistent atrial fibrillation ([address] 1/29/2019    New onset 1/2019    Thoracic aortic aneurysm ([address] 2/11    4.5    Type II or unspecified type diabetes mellitus without mention of complication, not stated as uncontrolled        Surgical history :   Past Surgical History:   Procedure Laterality Date    CATARACT REMOVAL WITH IMPLANT Right 04/12/2019    CATARACT REMOVAL WITH IMPLANT Left 05/10/2019    COLON SURGERY  2007     COLONOSCOPY  [id number]    colon polyps removed    HERNIA REPAIR  2008    HERNIA REPAIR  2009    INTRACAPSULAR CATARACT EXTRACTION Right 4/12/2019    EYE CATARACT EMULSIFICATION IOL IMPLANT performed by Ana Laura Sandra MD at [address] [place] [address] Left 5/10/2019    EYE CATARACT EMULSIFICATION IOL IMPLANT performed by Ana Laura Sandra MD at [address] [place] [address] history:   Family History   Problem Relation Age of Onset    Diabetes Mother     Heart Disease Father     Heart Disease Brother     High Blood Pressure Brother        Social history :  reports that he quit smoking about 46 years ago. He has a 7.00 pack-year smoking history. He has quit using smokeless tobacco. He reports that he drinks alcohol. He reports that he does not use drugs. Allergies   Allergen Reactions    Oxycontin [Oxycodone Hcl] Hives     Itching in his feet       No current facility-administered medications on file prior to encounter.       Current Outpatient Medications on File Prior to Encounter   Medication Sig Dispense Refill    Results   Component Value Date    AST 36 10/18/2018    PROT 7.0 10/18/2018    BILITOT 0.8 10/18/2018    ALKPHOS 47 10/18/2018     TSH:  No results found for: TSH    EKGINTERPRETATION - EKG Interpretation:        IMPRESSION / RECOMMENDATIONS:         1. Persistent atrial fibrillation  2. HTN  3. HLD  4. JAYE  5. Obesity BMI 41  6. Tachy alon sp pacemaker    Patient here for SALAZAR/Cardioversion for symptomatic atrial fibrillation  Patient is sp pacemaker recently  Informed consent obtained        Thanks again for allowing me to participate in care of this patient. Please call me if you have any questions. With best regards.       Ricardo Paez MD

## 2019-07-15 ENCOUNTER — OFFICE VISIT (OUTPATIENT)
Dept: CARDIOLOGY CLINIC | Age: 74
End: 2019-07-15
Payer: MEDICARE

## 2019-07-15 VITALS
DIASTOLIC BLOOD PRESSURE: 80 MMHG | HEART RATE: 61 BPM | HEIGHT: 71 IN | WEIGHT: 291 LBS | BODY MASS INDEX: 40.74 KG/M2 | SYSTOLIC BLOOD PRESSURE: 132 MMHG

## 2019-07-15 DIAGNOSIS — Z86.79 HISTORY OF ATRIAL FIBRILLATION: Primary | ICD-10-CM

## 2019-07-15 PROCEDURE — 1123F ACP DISCUSS/DSCN MKR DOCD: CPT | Performed by: NURSE PRACTITIONER

## 2019-07-15 PROCEDURE — 1036F TOBACCO NON-USER: CPT | Performed by: NURSE PRACTITIONER

## 2019-07-15 PROCEDURE — G8598 ASA/ANTIPLAT THER USED: HCPCS | Performed by: NURSE PRACTITIONER

## 2019-07-15 PROCEDURE — 99212 OFFICE O/P EST SF 10 MIN: CPT | Performed by: NURSE PRACTITIONER

## 2019-07-15 PROCEDURE — 4040F PNEUMOC VAC/ADMIN/RCVD: CPT | Performed by: NURSE PRACTITIONER

## 2019-07-15 PROCEDURE — G8417 CALC BMI ABV UP PARAM F/U: HCPCS | Performed by: NURSE PRACTITIONER

## 2019-07-15 PROCEDURE — 3017F COLORECTAL CA SCREEN DOC REV: CPT | Performed by: NURSE PRACTITIONER

## 2019-07-15 PROCEDURE — 93000 ELECTROCARDIOGRAM COMPLETE: CPT | Performed by: NURSE PRACTITIONER

## 2019-07-15 PROCEDURE — G8427 DOCREV CUR MEDS BY ELIG CLIN: HCPCS | Performed by: NURSE PRACTITIONER

## 2019-07-15 NOTE — PROGRESS NOTES
patent and free of echogenic thrombus. The veins are normally compressible and have normal phasic flow.  H/O echocardiogram 6/6/17,11/24/15    Left ventricle mildly dilated with normal systolic function EF 02-62% Mild concentric LVH with Grade II diastolic dysfunction. Mild to moderate MR and mild TR     H/O echocardiogram 06/07/2018; 2/26/2019    EF 55-60%. Mild concentric left ventricular hypertrophy. The left atrium is moderately dilated. Dilatation of the aortic root measuring 4.0 cm. Mild AR. Moderate pulmonary HTN.  Heart murmur 9/29/2015    Hyperlipidemia     Hypertension     Kidney stones     Neuropathy     Obesity     JAYE on CPAP 6/26/2018    On CPAP 5/2018    Persistent atrial fibrillation ([address] 1/29/2019    New onset 1/2019    Thoracic aortic aneurysm ([address] 2/11    4.5    Type II or unspecified type diabetes mellitus without mention of complication, not stated as uncontrolled        Surgical history :   Past Surgical History:   Procedure Laterality Date    CATARACT REMOVAL WITH IMPLANT Right 04/12/2019    CATARACT REMOVAL WITH IMPLANT Left 05/10/2019    COLON SURGERY  2007     COLONOSCOPY  [id number]    colon polyps removed    HERNIA REPAIR  2008    HERNIA REPAIR  2009    INTRACAPSULAR CATARACT EXTRACTION Right 4/12/2019    EYE CATARACT EMULSIFICATION IOL IMPLANT performed by Rodri Waddell MD at [address] [place] [address] Left 5/10/2019    EYE CATARACT EMULSIFICATION IOL IMPLANT performed by Rodri Waddell MD at [address] [place] [address] history:   Family History   Problem Relation Age of Onset    Diabetes Mother     Heart Disease Father     Heart Disease Brother     High Blood Pressure Brother        Social history :  reports that he quit smoking about 46 years ago. He has a 7.00 pack-year smoking history. He has quit using smokeless tobacco. He reports that he drinks alcohol. He reports that he does not use drugs.     Allergies   Allergen Reactions    Oxycontin nausea and vomiting. Endocrine: Negative for cold intolerance and heat intolerance. Genitourinary: Negative for dysuria, flank pain and hematuria. Musculoskeletal: Positive for arthralgias. Negative for back pain, myalgias and neck stiffness. Skin: Negative for color change and rash. Allergic/Immunologic: Negative for food allergies. Neurological: Negative for dizziness, light-headedness, numbness and headaches. Hematological: Does not bruise/bleed easily. Psychiatric/Behavioral: Negative for agitation, behavioral problems and confusion. Physical Examination:    bp 125/82  HR88     Wt Readings from Last 3 Encounters:   05/23/19 (!) 300 lb 8 oz (136.3 kg)   05/10/19 295 lb (133.8 kg)   04/09/19 285 lb (129.3 kg)     There is no height or weight on file to calculate BMI. Physical Exam   Constitutional: He is oriented to person, place, and time. He appears well-developed and well-nourished. No distress. HENT:   Head: Normocephalic and atraumatic. Eyes: Pupils are equal, round, and reactive to light. Conjunctivae are normal.   Neck: Normal range of motion. No JVD present. Cardiovascular: Exam reveals no friction rub. No murmur heard. Regular rate and rhythm  Pulmonary/Chest: Effort normal and breath sounds normal. No respiratory distress. He has no wheezes. He has no rales. Abdominal: Soft. Bowel sounds are normal. He exhibits no distension. There is no tenderness. Musculoskeletal: He exhibits no edema or tenderness. Neurological: He is alert and oriented to person, place, and time. No cranial nerve deficit. Skin: Skin is warm and dry. left upper chest site well approximated. No redness or swelling noted. Psychiatric: He has a normal mood and affect.      CBC:   Lab Results   Component Value Date    WBC 6.2 03/16/2018    HGB 13.7 03/16/2018    HCT 40.3 03/16/2018     03/16/2018     Lipids:  Lab Results   Component Value Date    CHOL 118 12/12/2016    TRIG 103 12/12/2016    HDL 42 10/18/2018    LDLDIRECT 63 10/18/2018     PT/INR:   Lab Results   Component Value Date    INR 0.99 10/04/2017        BMP:    Lab Results   Component Value Date     03/25/2019    K 4.4 03/25/2019     03/25/2019    CO2 30 03/25/2019    BUN 19 03/25/2019     CMP:   Lab Results   Component Value Date    AST 36 10/18/2018    PROT 7.0 10/18/2018    BILITOT 0.8 10/18/2018    ALKPHOS 47 10/18/2018     TSH:  No results found for: TSH     EKG Interpretation:  SR      IMPRESSION / RECOMMENDATIONS:       S/P Cardioversion  S/P Dual Chamber Pacemaker  Tachy James Syndrome  Persistent atrial fibrillation  HTN  HLD  JAYE  Obesity BMI 41    Dual Chamber PPM interrogated  No afib noted since cardioversion  AP 98.3%  0.2%  No NSVT noted  Left upper chest site well approximated, no redness or swelling noted. Patient to continue lopressor 50 mg BID and eliquis 5mg BID  Discussed with patient regarding refraining from caffeine and alcohol  Patient voiced understanding      Thanks again for allowing me to participate in care of this patient. Please call me if you have any questions. With best regards.

## 2019-07-16 ENCOUNTER — TELEPHONE (OUTPATIENT)
Dept: CARDIOLOGY CLINIC | Age: 74
End: 2019-07-16

## 2019-07-22 ENCOUNTER — HOSPITAL ENCOUNTER (OUTPATIENT)
Age: 74
Discharge: HOME | End: 2019-07-22
Payer: MEDICARE

## 2019-07-22 LAB
ALBUMIN SERPL-MCNC: 4.4 GM/DL (ref 3.4–5)
ALP BLD-CCNC: 56 IU/L (ref 40–129)
ALT SERPL-CCNC: 40 U/L (ref 10–40)
ANION GAP SERPL CALCULATED.3IONS-SCNC: 7 MMOL/L (ref 4–16)
AST SERPL-CCNC: 45 IU/L (ref 15–37)
BILIRUB SERPL-MCNC: 0.9 MG/DL (ref 0–1)
BUN BLDV-MCNC: 17 MG/DL (ref 6–23)
CALCIUM SERPL-MCNC: 10.4 MG/DL (ref 8.3–10.6)
CHLORIDE BLD-SCNC: 103 MMOL/L (ref 99–110)
CHOLESTEROL, FASTING: 116 MG/DL
CO2: 33 MMOL/L (ref 21–32)
CREAT SERPL-MCNC: 1.5 MG/DL (ref 0.9–1.3)
ESTIMATED AVERAGE GLUCOSE: 117 MG/DL
GFR AFRICAN AMERICAN: 55 ML/MIN/1.73M2
GFR NON-AFRICAN AMERICAN: 46 ML/MIN/1.73M2
GLUCOSE FASTING: 117 MG/DL (ref 70–99)
HBA1C MFR BLD: 5.7 % (ref 4.2–6.3)
HDLC SERPL-MCNC: 42 MG/DL
LDL CHOLESTEROL DIRECT: 69 MG/DL
POTASSIUM SERPL-SCNC: 5 MMOL/L (ref 3.5–5.1)
SODIUM BLD-SCNC: 143 MMOL/L (ref 135–145)
TOTAL PROTEIN: 7.4 GM/DL (ref 6.4–8.2)
TRIGLYCERIDE, FASTING: 82 MG/DL

## 2019-07-22 PROCEDURE — 80053 COMPREHEN METABOLIC PANEL: CPT

## 2019-07-22 PROCEDURE — 36415 COLL VENOUS BLD VENIPUNCTURE: CPT

## 2019-07-22 PROCEDURE — 80061 LIPID PANEL: CPT

## 2019-07-22 PROCEDURE — 83036 HEMOGLOBIN GLYCOSYLATED A1C: CPT

## 2019-08-01 ENCOUNTER — TELEPHONE (OUTPATIENT)
Dept: CARDIOLOGY CLINIC | Age: 74
End: 2019-08-01

## 2019-08-01 NOTE — TELEPHONE ENCOUNTER
Patients wife called with concerns that patient states that he is not feeling well. He feels no different after having cardioversion or pacemaker implant. He did see Fausto Quinteros on 7/15 and pacer check was done, there is no report in the chart. Patient does not have carelink yet. Wife states that Tanvir Kendrick is something going on with her  but she just can't put her finger on it\". She feels that patient should see Dr. Delon Stone. Message to [facility] to schedule appointment ASAP.

## 2019-08-16 ENCOUNTER — OFFICE VISIT (OUTPATIENT)
Dept: CARDIOLOGY CLINIC | Age: 74
End: 2019-08-16
Payer: MEDICARE

## 2019-08-16 ENCOUNTER — TELEPHONE (OUTPATIENT)
Dept: CARDIOLOGY CLINIC | Age: 74
End: 2019-08-16

## 2019-08-16 VITALS
DIASTOLIC BLOOD PRESSURE: 80 MMHG | BODY MASS INDEX: 41.72 KG/M2 | SYSTOLIC BLOOD PRESSURE: 124 MMHG | WEIGHT: 298 LBS | HEART RATE: 64 BPM | HEIGHT: 71 IN

## 2019-08-16 DIAGNOSIS — Z95.0 STATUS POST PLACEMENT OF CARDIAC PACEMAKER: ICD-10-CM

## 2019-08-16 DIAGNOSIS — Z95.0 S/P PLACEMENT OF CARDIAC PACEMAKER: ICD-10-CM

## 2019-08-16 DIAGNOSIS — R07.2 PRECORDIAL PAIN: Primary | ICD-10-CM

## 2019-08-16 PROCEDURE — 93280 PM DEVICE PROGR EVAL DUAL: CPT | Performed by: INTERNAL MEDICINE

## 2019-08-16 PROCEDURE — 99024 POSTOP FOLLOW-UP VISIT: CPT | Performed by: INTERNAL MEDICINE

## 2019-08-16 ASSESSMENT — ENCOUNTER SYMPTOMS
COUGH: 0
CONSTIPATION: 0
PHOTOPHOBIA: 0
WHEEZING: 0
DIARRHEA: 0
EYE PAIN: 0
NAUSEA: 0
BLOOD IN STOOL: 0
ABDOMINAL PAIN: 0
SHORTNESS OF BREATH: 1
BACK PAIN: 0
VOMITING: 0
COLOR CHANGE: 0
CHEST TIGHTNESS: 0

## 2019-08-16 NOTE — PROGRESS NOTES
Electrophysiology Consult Note      Reason for consultation:  Atrial fibrillation    Chief complaint :  Fatigue and tiredness    Referring physician:  Ravindra Valladares      Primary care physician: Jessa Carmen MD      History of Present Illness: This visit (8/16/2019)      Chief Complaint   Patient presents with    Atrial Fibrillation     Pt is here f/u PPM/Cardiolversion. Pt states he has no energy. Chest pain in the center of his chest lasting a couple of minutes. Pt gets short of breath with with exertion. Pt does get edema in ankles and feet. Elevation helps. Previous visit:    Chief Complaint   Patient presents with    Atrial Fibrillation     Pt is here for consultation for Atrial Fibrillation. Pt states he has more fatigue since new onset A-fib and some shortness of breath. Denies chest pain,fever or chills. Pt has no difficulty with swallowing, anesthesia or intubation, no previous ablation, no urinary, prostate, or bleeding problems. Pt has had a sleep study and uses a CPAP.  Hypertension    Hyperlipidemia       Pastmedical history:   Past Medical History:   Diagnosis Date    Arthritis     Dizziness     FH: CAD (coronary artery disease) 9/29/2015    H/O 24 hour EKG monitoring 02/26/19,08/02/2016    Conclusion: Atrial fibrillation with  fairly well-controlled ventricular rate response without evidence of any significant tachy or alon arrhythmias.  H/O cardiovascular stress test 11/24/15    Normal perfusion in the distribution of all coronaries, normal LV, EF 59%.  H/O Doppler ultrasound (Venous Doppler Lower Extremities) 03/08/2017    veins of the bilateral lower extremities are patent and free of echogenic thrombus. The veins are normally compressible and have normal phasic flow.     H/O echocardiogram 6/6/17,11/24/15    Left ventricle mildly dilated with normal systolic function EF 45-32% Mild concentric LVH with Grade II diastolic (MAG-OX) 400 (241.3 Mg) MG TABS tablet TAKE 1 TABLET BY MOUTH EVERY DAY 90 tablet 1    metoprolol tartrate (LOPRESSOR) 50 MG tablet Take 1 tablet by mouth 2 times daily 60 tablet 3    fluticasone (FLONASE) 50 MCG/ACT nasal spray USE 1 SPRAY NASALLY DAILY 16 g 3    NIASPAN 1000 MG extended release tablet Take 1 tablet by mouth nightly 90 tablet 3    lisinopril-hydrochlorothiazide (PRINZIDE;ZESTORETIC) 20-25 MG per tablet TAKE 1 TABLET BY MOUTH EVERY DAY 30 tablet 5    apixaban (ELIQUIS) 5 MG TABS tablet Take 1 tablet by mouth 2 times daily 180 tablet 3    vitamin B-1 (THIAMINE) 100 MG tablet Take 100 mg by mouth daily      amLODIPine (NORVASC) 10 MG tablet Take 1 tablet by mouth daily 90 tablet 3    metFORMIN (GLUCOPHAGE) 1000 MG tablet Take 1,000 mg by mouth 2 times daily (with meals)      glipiZIDE (GLUCOTROL) 10 MG tablet Take 1/2 tablet twice a day.  atorvastatin (LIPITOR) 20 MG tablet Take 20 mg by mouth daily      Krill Oil 1000 MG CAPS Take 1 capsule by mouth daily       allopurinol (ZYLOPRIM) 100 MG tablet Take 100 mg by mouth daily   0    B Complex Vitamins (VITAMIN B COMPLEX PO) Take  by mouth daily.  gabapentin (NEURONTIN) 800 MG tablet Take 800 mg by mouth 3 times daily. No current facility-administered medications on file prior to visit. Review of Systems:   Review of Systems   Constitutional: Positive for fatigue. Negative for activity change, chills and fever. HENT: Negative for congestion, ear pain and tinnitus. Eyes: Negative for photophobia, pain and visual disturbance. Respiratory: Positive for shortness of breath. Negative for cough, chest tightness and wheezing. Cardiovascular: Positive for chest pain and leg swelling. Negative for palpitations. Gastrointestinal: Negative for abdominal pain, blood in stool, constipation, diarrhea, nausea and vomiting. Endocrine: Negative for cold intolerance and heat intolerance.    Genitourinary: Negative for dysuria, flank pain and hematuria. Musculoskeletal: Positive for arthralgias. Negative for back pain, myalgias and neck stiffness. Skin: Negative for color change and rash. Allergic/Immunologic: Negative for food allergies. Neurological: Negative for dizziness, light-headedness, numbness and headaches. Hematological: Does not bruise/bleed easily. Psychiatric/Behavioral: Negative for agitation, behavioral problems and confusion. Physical Examination:    /80   Pulse 64   Ht 5' 11\" (1.803 m)   Wt 298 lb (135.2 kg)   BMI 41.56 kg/m²    Wt Readings from Last 3 Encounters:   08/16/19 298 lb (135.2 kg)   07/15/19 291 lb (132 kg)   07/08/19 285 lb (129.3 kg)     Body mass index is 41.56 kg/m². Physical Exam   Constitutional: He is oriented to person, place, and time. He appears well-developed and well-nourished. No distress. HENT:   Head: Normocephalic and atraumatic. Eyes: Pupils are equal, round, and reactive to light. EOM are normal.   Neck: Normal range of motion. No JVD present. Cardiovascular: Normal rate and regular rhythm. Exam reveals no friction rub. No murmur heard. Pulmonary/Chest: Effort normal and breath sounds normal. No respiratory distress. He has no wheezes. He has no rales. Abdominal: Soft. Bowel sounds are normal. He exhibits no distension. There is no tenderness. Musculoskeletal: He exhibits no edema or tenderness. Neurological: He is alert and oriented to person, place, and time. No cranial nerve deficit. Skin: Skin is warm and dry. Psychiatric: He has a normal mood and affect.            CBC:   Lab Results   Component Value Date    WBC 6.4 07/08/2019    HGB 12.8 07/08/2019    HCT 39.1 07/08/2019     07/08/2019     Lipids:  Lab Results   Component Value Date    CHOL 118 12/12/2016    TRIG 103 12/12/2016    HDL 42 07/22/2019    LDLDIRECT 69 07/22/2019     PT/INR:   Lab Results   Component Value Date    INR 1.57 07/08/2019        BMP:    Lab Results   Component Value Date     07/22/2019    K 5.0 07/22/2019     07/22/2019    CO2 33 (H) 07/22/2019    BUN 17 07/22/2019     CMP:   Lab Results   Component Value Date    AST 45 (H) 07/22/2019    PROT 7.4 07/22/2019    BILITOT 0.9 07/22/2019    ALKPHOS 56 07/22/2019     TSH:  No results found for: TSH    EKGINTERPRETATION - EKG Interpretation:        IMPRESSION / RECOMMENDATIONS:     Chest pain and shortness of breath on exertion    1. Persistent atrial fibrillation  2. HTN  3. HLD  4. JAYE  5. Obesity BMI 41      Chest pain is substernal and more like cramp in chest for 2-3 minutes  Patient did not have any stress test since 4 years  Patient is not in atrial fibrillation and he is 99.8% atrial paced  Patient will need stress test  Given he is atrial pacemaker dependent - I dont think he will reach target HR with exertion and also he has knee issues  Recommend lexiscan - discussed with patient at times lexiscan can put platient in atrial fib but not much choice at this time  Echo pictures were never good so dobutamine stress echo may not have good images too and he has to go to hospital for that and also it may induce atrial fib too. Patient has no atrial fibrillation since cardioversion    Device Assessment:      The device is [employer] pacemaker - Dual Chamber chamber      MRI Compatible : yes    Device interrogation was performed. Mode: AAIR --- DDDR     Sensing is normal. Impedence is normal.  Threshold is normal.     There has not been interval changes. Estimated battery life is 12.1 years     The underlying rhythm is AP, VS.  99.8 % atrial paced; <0.1 % ventricular paced. Atrial Arrhythmia : No    Non sustained VT episodes : No    Sustained VT episodes : No    Patient activity reported 1.1 hrs/day    The patient is ATRIAL pacemaker dependent. Thanks again for allowing me to participate in care of this patient. Please call me if you have any questions.     With best

## 2019-08-22 ENCOUNTER — PROCEDURE VISIT (OUTPATIENT)
Dept: CARDIOLOGY CLINIC | Age: 74
End: 2019-08-22
Payer: MEDICARE

## 2019-08-22 DIAGNOSIS — R07.2 PRECORDIAL PAIN: ICD-10-CM

## 2019-08-22 LAB
LV EF: 53 %
LVEF MODALITY: NORMAL

## 2019-08-22 PROCEDURE — 93015 CV STRESS TEST SUPVJ I&R: CPT | Performed by: INTERNAL MEDICINE

## 2019-08-22 PROCEDURE — 78452 HT MUSCLE IMAGE SPECT MULT: CPT | Performed by: INTERNAL MEDICINE

## 2019-08-22 PROCEDURE — A9500 TC99M SESTAMIBI: HCPCS | Performed by: INTERNAL MEDICINE

## 2019-08-28 ENCOUNTER — TELEPHONE (OUTPATIENT)
Dept: CARDIOLOGY CLINIC | Age: 74
End: 2019-08-28

## 2019-08-28 NOTE — TELEPHONE ENCOUNTER
Normal perfusion study with normal distribution in all coronal, short, and    horizontal axis.    The observed defect is consistent with diaphragmatic attenuation.    Normal LV function. LVEF IS 53 %. Pt notified of Stress test results and f/u; pt voiced understanding.

## 2019-09-24 ENCOUNTER — HOSPITAL ENCOUNTER (OUTPATIENT)
Age: 74
Discharge: HOME | End: 2019-09-24
Payer: MEDICARE

## 2019-09-24 DIAGNOSIS — N18.32 CHRONIC KIDNEY DISEASE (CKD) STAGE G3B/A1, MODERATELY DECREASED GLOMERULAR FILTRATION RATE (GFR) BETWEEN 30-44 ML/MIN/1.73 SQUARE METER AND ALBUMINURIA CREATININE RATIO LESS THAN 30 MG/G (HCC): ICD-10-CM

## 2019-09-24 LAB
ANION GAP SERPL CALCULATED.3IONS-SCNC: 12 MMOL/L (ref 4–16)
BACTERIA: ABNORMAL /HPF
BILIRUBIN URINE: NEGATIVE MG/DL
BLOOD, URINE: NEGATIVE
BUN BLDV-MCNC: 27 MG/DL (ref 6–23)
CALCIUM SERPL-MCNC: 10.4 MG/DL (ref 8.3–10.6)
CAST TYPE: ABNORMAL /HPF
CHLORIDE BLD-SCNC: 103 MMOL/L (ref 99–110)
CLARITY: CLEAR
CO2: 28 MMOL/L (ref 21–32)
COLOR: YELLOW
CREAT SERPL-MCNC: 1.4 MG/DL (ref 0.9–1.3)
CREATININE URINE: 106.3 MG/DL (ref 39–259)
CRYSTAL TYPE: ABNORMAL /HPF
EPITHELIAL CELLS, UA: ABNORMAL /HPF
GFR AFRICAN AMERICAN: 60 ML/MIN/1.73M2
GFR NON-AFRICAN AMERICAN: 49 ML/MIN/1.73M2
GLUCOSE BLD-MCNC: 128 MG/DL (ref 70–99)
GLUCOSE, URINE: NEGATIVE MG/DL
KETONES, URINE: NEGATIVE MG/DL
LEUKOCYTE ESTERASE, URINE: NEGATIVE
MAGNESIUM: 1.9 MG/DL (ref 1.8–2.4)
MUCUS: NEGATIVE HPF
NITRITE URINE, QUANTITATIVE: NEGATIVE
PH, URINE: 5 (ref 5–8)
PHOSPHORUS: 4.3 MG/DL (ref 2.5–4.9)
POTASSIUM SERPL-SCNC: 4.8 MMOL/L (ref 3.5–5.1)
PROT/CREAT RATIO, UR: NORMAL
PROTEIN UA: NEGATIVE MG/DL
RBC URINE: ABNORMAL /HPF (ref 0–3)
SODIUM BLD-SCNC: 143 MMOL/L (ref 135–145)
SPECIFIC GRAVITY UA: 1.02 (ref 1–1.03)
URINE TOTAL PROTEIN: 10.9 MG/DL
UROBILINOGEN, URINE: 0.2 MG/DL (ref 0.2–1)
VOLUME, (UVOL): 12 ML (ref 10–12)
WBC UA: ABNORMAL /HPF (ref 0–2)

## 2019-09-24 PROCEDURE — 80048 BASIC METABOLIC PNL TOTAL CA: CPT

## 2019-09-24 PROCEDURE — 81001 URINALYSIS AUTO W/SCOPE: CPT

## 2019-09-24 PROCEDURE — 36415 COLL VENOUS BLD VENIPUNCTURE: CPT

## 2019-09-24 PROCEDURE — 83735 ASSAY OF MAGNESIUM: CPT

## 2019-09-24 PROCEDURE — 84156 ASSAY OF PROTEIN URINE: CPT

## 2019-09-24 PROCEDURE — 82570 ASSAY OF URINE CREATININE: CPT

## 2019-09-24 PROCEDURE — 84100 ASSAY OF PHOSPHORUS: CPT

## 2019-10-21 ENCOUNTER — OFFICE VISIT (OUTPATIENT)
Dept: CARDIOLOGY CLINIC | Age: 74
End: 2019-10-21
Payer: MEDICARE

## 2019-10-21 VITALS
SYSTOLIC BLOOD PRESSURE: 136 MMHG | BODY MASS INDEX: 42 KG/M2 | HEART RATE: 60 BPM | WEIGHT: 300 LBS | DIASTOLIC BLOOD PRESSURE: 80 MMHG | HEIGHT: 71 IN | RESPIRATION RATE: 16 BRPM

## 2019-10-21 DIAGNOSIS — E11.9 TYPE 2 DIABETES MELLITUS WITHOUT COMPLICATION, WITHOUT LONG-TERM CURRENT USE OF INSULIN (HCC): ICD-10-CM

## 2019-10-21 DIAGNOSIS — G47.33 OSA ON CPAP: ICD-10-CM

## 2019-10-21 DIAGNOSIS — Z86.79 HISTORY OF ATRIAL FIBRILLATION: ICD-10-CM

## 2019-10-21 DIAGNOSIS — Z99.89 OSA ON CPAP: ICD-10-CM

## 2019-10-21 DIAGNOSIS — Z95.0 S/P PLACEMENT OF CARDIAC PACEMAKER: ICD-10-CM

## 2019-10-21 DIAGNOSIS — E78.2 MIXED HYPERLIPIDEMIA: Primary | ICD-10-CM

## 2019-10-21 DIAGNOSIS — I10 ESSENTIAL HYPERTENSION: ICD-10-CM

## 2019-10-21 DIAGNOSIS — N18.32 CHRONIC KIDNEY DISEASE (CKD) STAGE G3B/A1, MODERATELY DECREASED GLOMERULAR FILTRATION RATE (GFR) BETWEEN 30-44 ML/MIN/1.73 SQUARE METER AND ALBUMINURIA CREATININE RATIO LESS THAN 30 MG/G (HCC): ICD-10-CM

## 2019-10-21 PROBLEM — I48.19 PERSISTENT ATRIAL FIBRILLATION (HCC): Status: RESOLVED | Noted: 2019-01-29 | Resolved: 2019-10-21

## 2019-10-21 PROCEDURE — 99214 OFFICE O/P EST MOD 30 MIN: CPT | Performed by: INTERNAL MEDICINE

## 2019-10-21 PROCEDURE — 1123F ACP DISCUSS/DSCN MKR DOCD: CPT | Performed by: INTERNAL MEDICINE

## 2019-10-21 PROCEDURE — G8598 ASA/ANTIPLAT THER USED: HCPCS | Performed by: INTERNAL MEDICINE

## 2019-10-21 PROCEDURE — 4040F PNEUMOC VAC/ADMIN/RCVD: CPT | Performed by: INTERNAL MEDICINE

## 2019-10-21 PROCEDURE — G8484 FLU IMMUNIZE NO ADMIN: HCPCS | Performed by: INTERNAL MEDICINE

## 2019-10-21 PROCEDURE — 93000 ELECTROCARDIOGRAM COMPLETE: CPT | Performed by: INTERNAL MEDICINE

## 2019-10-21 PROCEDURE — G8417 CALC BMI ABV UP PARAM F/U: HCPCS | Performed by: INTERNAL MEDICINE

## 2019-10-21 PROCEDURE — 1036F TOBACCO NON-USER: CPT | Performed by: INTERNAL MEDICINE

## 2019-10-21 PROCEDURE — G8427 DOCREV CUR MEDS BY ELIG CLIN: HCPCS | Performed by: INTERNAL MEDICINE

## 2019-10-26 ENCOUNTER — PROCEDURE VISIT (OUTPATIENT)
Dept: CARDIOLOGY CLINIC | Age: 74
End: 2019-10-26
Payer: MEDICARE

## 2019-10-26 DIAGNOSIS — I49.5 BRADY-TACHY SYNDROME (HCC): ICD-10-CM

## 2019-10-26 DIAGNOSIS — I49.5 SSS (SICK SINUS SYNDROME) (HCC): Primary | ICD-10-CM

## 2019-10-26 DIAGNOSIS — Z95.0 CARDIAC PACEMAKER IN SITU: ICD-10-CM

## 2019-10-26 PROCEDURE — 93296 REM INTERROG EVL PM/IDS: CPT | Performed by: INTERNAL MEDICINE

## 2019-10-26 PROCEDURE — 93294 REM INTERROG EVL PM/LDLS PM: CPT | Performed by: INTERNAL MEDICINE

## 2019-10-31 RX ORDER — METOPROLOL TARTRATE 50 MG/1
50 TABLET, FILM COATED ORAL 2 TIMES DAILY
Qty: 180 TABLET | Refills: 3 | Status: SHIPPED | OUTPATIENT
Start: 2019-10-31 | End: 2020-02-25 | Stop reason: SDUPTHER

## 2019-12-02 RX ORDER — LISINOPRIL AND HYDROCHLOROTHIAZIDE 25; 20 MG/1; MG/1
TABLET ORAL
Qty: 90 TABLET | Refills: 3 | Status: SHIPPED | OUTPATIENT
Start: 2019-12-02 | End: 2020-02-04 | Stop reason: ALTCHOICE

## 2019-12-09 ENCOUNTER — OFFICE VISIT (OUTPATIENT)
Dept: CARDIOLOGY CLINIC | Age: 74
End: 2019-12-09
Payer: MEDICARE

## 2019-12-09 VITALS
SYSTOLIC BLOOD PRESSURE: 134 MMHG | DIASTOLIC BLOOD PRESSURE: 80 MMHG | BODY MASS INDEX: 41.72 KG/M2 | HEIGHT: 71 IN | RESPIRATION RATE: 16 BRPM | HEART RATE: 67 BPM | WEIGHT: 298 LBS

## 2019-12-09 DIAGNOSIS — Z99.89 OSA ON CPAP: ICD-10-CM

## 2019-12-09 DIAGNOSIS — I10 ESSENTIAL HYPERTENSION: ICD-10-CM

## 2019-12-09 DIAGNOSIS — G47.33 OSA ON CPAP: ICD-10-CM

## 2019-12-09 DIAGNOSIS — Z86.79 HISTORY OF ATRIAL FIBRILLATION: ICD-10-CM

## 2019-12-09 DIAGNOSIS — M79.89 LEG SWELLING: ICD-10-CM

## 2019-12-09 DIAGNOSIS — R55 SYNCOPE AND COLLAPSE: ICD-10-CM

## 2019-12-09 DIAGNOSIS — E78.2 MIXED HYPERLIPIDEMIA: ICD-10-CM

## 2019-12-09 DIAGNOSIS — Z95.0 S/P PLACEMENT OF CARDIAC PACEMAKER: Primary | ICD-10-CM

## 2019-12-09 PROCEDURE — G8484 FLU IMMUNIZE NO ADMIN: HCPCS | Performed by: INTERNAL MEDICINE

## 2019-12-09 PROCEDURE — 1123F ACP DISCUSS/DSCN MKR DOCD: CPT | Performed by: INTERNAL MEDICINE

## 2019-12-09 PROCEDURE — G8427 DOCREV CUR MEDS BY ELIG CLIN: HCPCS | Performed by: INTERNAL MEDICINE

## 2019-12-09 PROCEDURE — G8417 CALC BMI ABV UP PARAM F/U: HCPCS | Performed by: INTERNAL MEDICINE

## 2019-12-09 PROCEDURE — 99214 OFFICE O/P EST MOD 30 MIN: CPT | Performed by: INTERNAL MEDICINE

## 2019-12-09 PROCEDURE — 4040F PNEUMOC VAC/ADMIN/RCVD: CPT | Performed by: INTERNAL MEDICINE

## 2019-12-09 PROCEDURE — G8598 ASA/ANTIPLAT THER USED: HCPCS | Performed by: INTERNAL MEDICINE

## 2019-12-09 PROCEDURE — 1036F TOBACCO NON-USER: CPT | Performed by: INTERNAL MEDICINE

## 2019-12-09 PROCEDURE — 93280 PM DEVICE PROGR EVAL DUAL: CPT | Performed by: INTERNAL MEDICINE

## 2019-12-09 RX ORDER — MECLIZINE HYDROCHLORIDE 25 MG/1
25 TABLET ORAL PRN
COMMUNITY

## 2020-01-02 ENCOUNTER — PROCEDURE VISIT (OUTPATIENT)
Dept: CARDIOLOGY CLINIC | Age: 75
End: 2020-01-02
Payer: MEDICARE

## 2020-01-02 PROCEDURE — 93970 EXTREMITY STUDY: CPT | Performed by: INTERNAL MEDICINE

## 2020-01-02 NOTE — PROGRESS NOTES
Bilateral lower ext venous ultrasound complete. Date: 2/26/2019    Patient Name: Cathi Nevarez          To Whom it may concern: This letter has been written at the patient's request. The above patient was seen at the [facility] for treatment of a medical condition.     This patient

## 2020-01-08 ENCOUNTER — TELEPHONE (OUTPATIENT)
Dept: CARDIOLOGY CLINIC | Age: 75
End: 2020-01-08

## 2020-01-08 NOTE — TELEPHONE ENCOUNTER
No evidence of DVT or SVT in the bilateral common femoral vein, femoral    vein, popliteal vein, greater saphenous vein or small saphenous vein.    No significant reflux noted in the veins of the right lower extremity.    Significant reflux noted in the Left CFV 3.0s;GSV (SFJ 1.4s).     Bilateral Calf and ankle edema noted.        Recommendations        COMPRESSION SOCKS FOR 6 WEEKS    SEE DR VALERO FOR ABLATION    LM for patient to contact office for message

## 2020-01-14 ENCOUNTER — HOSPITAL ENCOUNTER (OUTPATIENT)
Age: 75
Discharge: HOME | End: 2020-01-14
Payer: MEDICARE

## 2020-01-14 LAB
ALBUMIN SERPL-MCNC: 4.5 GM/DL (ref 3.4–5)
ALP BLD-CCNC: 53 IU/L (ref 40–129)
ALT SERPL-CCNC: 41 U/L (ref 10–40)
ANION GAP SERPL CALCULATED.3IONS-SCNC: 14 MMOL/L (ref 4–16)
AST SERPL-CCNC: 37 IU/L (ref 15–37)
BACTERIA: ABNORMAL /HPF
BILIRUB SERPL-MCNC: 0.8 MG/DL (ref 0–1)
BILIRUBIN URINE: NEGATIVE MG/DL
BLOOD, URINE: NEGATIVE
BUN BLDV-MCNC: 21 MG/DL (ref 6–23)
CALCIUM SERPL-MCNC: 10.2 MG/DL (ref 8.3–10.6)
CAST TYPE: ABNORMAL /HPF
CHLORIDE BLD-SCNC: 102 MMOL/L (ref 99–110)
CLARITY: CLEAR
CO2: 26 MMOL/L (ref 21–32)
COLOR: YELLOW
CREAT SERPL-MCNC: 1.5 MG/DL (ref 0.9–1.3)
CREATININE URINE: 118.6 MG/DL (ref 39–259)
CRYSTAL TYPE: ABNORMAL /HPF
EPITHELIAL CELLS, UA: ABNORMAL /HPF
ESTIMATED AVERAGE GLUCOSE: 111 MG/DL
GFR AFRICAN AMERICAN: 55 ML/MIN/1.73M2
GFR NON-AFRICAN AMERICAN: 46 ML/MIN/1.73M2
GLUCOSE FASTING: 123 MG/DL (ref 70–99)
GLUCOSE, URINE: NEGATIVE MG/DL
HBA1C MFR BLD: 5.5 % (ref 4.2–6.3)
KETONES, URINE: NEGATIVE MG/DL
LEUKOCYTE ESTERASE, URINE: NEGATIVE
MAGNESIUM: 1.8 MG/DL (ref 1.8–2.4)
MICROALBUMIN/CREAT 24H UR: <1.2 MG/DL
MICROALBUMIN/CREAT UR-RTO: NORMAL MG/G CREAT (ref 0–30)
MUCUS: ABNORMAL HPF
NITRITE URINE, QUANTITATIVE: NEGATIVE
PH, URINE: 6.5 (ref 5–8)
POTASSIUM SERPL-SCNC: 4.4 MMOL/L (ref 3.5–5.1)
PROSTATE SPECIFIC ANTIGEN: 2.61 NG/ML (ref 0–4)
PROTEIN UA: NEGATIVE MG/DL
RBC URINE: ABNORMAL /HPF (ref 0–3)
SODIUM BLD-SCNC: 142 MMOL/L (ref 135–145)
SPECIFIC GRAVITY UA: 1.01 (ref 1–1.03)
TOTAL PROTEIN: 7.5 GM/DL (ref 6.4–8.2)
UROBILINOGEN, URINE: 0.2 MG/DL (ref 0.2–1)
VOLUME, (UVOL): 12 ML (ref 10–12)
WBC UA: ABNORMAL /HPF (ref 0–2)

## 2020-01-14 PROCEDURE — G0103 PSA SCREENING: HCPCS

## 2020-01-14 PROCEDURE — 83735 ASSAY OF MAGNESIUM: CPT

## 2020-01-14 PROCEDURE — 36415 COLL VENOUS BLD VENIPUNCTURE: CPT

## 2020-01-14 PROCEDURE — 83036 HEMOGLOBIN GLYCOSYLATED A1C: CPT

## 2020-01-14 PROCEDURE — 81001 URINALYSIS AUTO W/SCOPE: CPT

## 2020-01-14 PROCEDURE — 80053 COMPREHEN METABOLIC PANEL: CPT

## 2020-01-14 PROCEDURE — 82043 UR ALBUMIN QUANTITATIVE: CPT

## 2020-01-14 PROCEDURE — 82570 ASSAY OF URINE CREATININE: CPT

## 2020-01-29 RX ORDER — APIXABAN 5 MG/1
TABLET, FILM COATED ORAL
Qty: 180 TABLET | Refills: 3 | Status: SHIPPED | OUTPATIENT
Start: 2020-01-29 | End: 2020-12-28

## 2020-01-31 ENCOUNTER — HOSPITAL ENCOUNTER (OUTPATIENT)
Dept: GENERAL RADIOLOGY | Age: 75
Discharge: HOME | End: 2020-01-31
Payer: MEDICARE

## 2020-01-31 ENCOUNTER — HOSPITAL ENCOUNTER (OUTPATIENT)
Age: 75
Discharge: HOME | End: 2020-01-31
Payer: MEDICARE

## 2020-01-31 PROCEDURE — 73560 X-RAY EXAM OF KNEE 1 OR 2: CPT

## 2020-02-01 ENCOUNTER — PROCEDURE VISIT (OUTPATIENT)
Dept: CARDIOLOGY CLINIC | Age: 75
End: 2020-02-01
Payer: MEDICARE

## 2020-02-01 PROCEDURE — 93294 REM INTERROG EVL PM/LDLS PM: CPT | Performed by: INTERNAL MEDICINE

## 2020-02-01 PROCEDURE — 93296 REM INTERROG EVL PM/IDS: CPT | Performed by: INTERNAL MEDICINE

## 2020-02-03 ENCOUNTER — TELEPHONE (OUTPATIENT)
Dept: CARDIOLOGY CLINIC | Age: 75
End: 2020-02-03

## 2020-02-03 NOTE — TELEPHONE ENCOUNTER
BRYNN Yepez MA spoke w/Phillip from [employer]; Jorge Ventura can be here tomorrow for patient appt at 1:40 pm.  Spoke w/patient's wife and advised we have patient scheduled for OV & pacer check w/[employer] rep tomorrow at 1:40 pm.  She verbalized understanding.

## 2020-02-03 NOTE — TELEPHONE ENCOUNTER
Called and spoke w/wife and patient. She states he passed out 1/27 during the night when he got up to get a drink of water and again during the day a couple of days later. She states he just blacks out suddenly and falls down. He notes dizziness at times when he first gets up. She states they have checked his blood sugar and also his HR and BP at the time of these episodes and everything is always normal.  He had his regular Care[facility] check done yesterday. Will let Dr. Summer Ramirez know and call patient back with recommendations.

## 2020-02-03 NOTE — TELEPHONE ENCOUNTER
I would like to bring him in the office to check for orthostatic changes and also have a detail capture threshold checks with [employer] rep as soon as possible.

## 2020-02-04 ENCOUNTER — OFFICE VISIT (OUTPATIENT)
Dept: CARDIOLOGY CLINIC | Age: 75
End: 2020-02-04
Payer: MEDICARE

## 2020-02-04 VITALS
WEIGHT: 297 LBS | SYSTOLIC BLOOD PRESSURE: 120 MMHG | HEIGHT: 71 IN | BODY MASS INDEX: 41.58 KG/M2 | DIASTOLIC BLOOD PRESSURE: 70 MMHG | HEART RATE: 72 BPM

## 2020-02-04 PROCEDURE — 99214 OFFICE O/P EST MOD 30 MIN: CPT | Performed by: INTERNAL MEDICINE

## 2020-02-04 PROCEDURE — G8427 DOCREV CUR MEDS BY ELIG CLIN: HCPCS | Performed by: INTERNAL MEDICINE

## 2020-02-04 PROCEDURE — G8417 CALC BMI ABV UP PARAM F/U: HCPCS | Performed by: INTERNAL MEDICINE

## 2020-02-04 PROCEDURE — 4040F PNEUMOC VAC/ADMIN/RCVD: CPT | Performed by: INTERNAL MEDICINE

## 2020-02-04 PROCEDURE — 1123F ACP DISCUSS/DSCN MKR DOCD: CPT | Performed by: INTERNAL MEDICINE

## 2020-02-04 PROCEDURE — 1036F TOBACCO NON-USER: CPT | Performed by: INTERNAL MEDICINE

## 2020-02-04 PROCEDURE — G8484 FLU IMMUNIZE NO ADMIN: HCPCS | Performed by: INTERNAL MEDICINE

## 2020-02-04 RX ORDER — LISINOPRIL AND HYDROCHLOROTHIAZIDE 20; 12.5 MG/1; MG/1
1 TABLET ORAL DAILY
Qty: 30 TABLET | Refills: 5 | Status: SHIPPED | OUTPATIENT
Start: 2020-02-04 | End: 2020-02-25 | Stop reason: SDUPTHER

## 2020-02-04 NOTE — PROGRESS NOTES
cataract extraction (Right, 2019); Cataract removal with implant (Left, 05/10/2019); Intracapsular cataract extraction (Left, 5/10/2019); Pacemaker insertion (Left, 2019); and Cardioversion (2019). Social History:   Social History     Tobacco Use    Smoking status: Former Smoker     Packs/day: 1.00     Years: 7.00     Pack years: 7.00     Last attempt to quit: 3/26/1973     Years since quittin.8    Smokeless tobacco: Former User   Substance Use Topics    Alcohol use: Yes     Comment: small amount some nights     Family history: family history includes Diabetes in his mother; Heart Disease in his brother and father; High Blood Pressure in his brother. ALLERGIES:  Oxycontin [oxycodone hcl]  Prior to Admission medications    Medication Sig Start Date End Date Taking? Authorizing Provider   lisinopril-hydrochlorothiazide (PRINZIDE;ZESTORETIC) 20-12.5 MG per tablet Take 1 tablet by mouth daily 20  Yes Viraj Franks MD   ELIQUIS 5 MG TABS tablet TAKE 1 TABLET TWICE A DAY 20  Yes Viraj Franks MD   metoprolol tartrate (LOPRESSOR) 50 MG tablet Take 1 tablet by mouth 2 times daily 10/31/19  Yes Viraj Franks MD   magnesium oxide (MAG-OX) 400 (241.3 Mg) MG TABS tablet TAKE 1 TABLET BY MOUTH EVERY DAY 19  Yes Nemesio Da Silva MD   fluticasone (FLONASE) 50 MCG/ACT nasal spray USE 1 SPRAY NASALLY DAILY 19  Yes Hakeem Snowden MD   NIASPAN 1000 MG extended release tablet Take 1 tablet by mouth nightly 19  Yes Viraj Franks MD   amLODIPine (NORVASC) 10 MG tablet Take 1 tablet by mouth daily 18  Yes Mita Amador MD   metFORMIN (GLUCOPHAGE) 1000 MG tablet Take 1,000 mg by mouth 2 times daily (with meals)   Yes Historical Provider, MD   glipiZIDE (GLUCOTROL) 10 MG tablet Take 1/2 tablet twice a day.    Yes Historical Provider, MD   atorvastatin (LIPITOR) 20 MG tablet Take 20 mg by mouth daily   Yes Historical Provider, MD [place] Amen Oil 1000 MG CAPS Take 1 capsule by mouth daily    Yes Historical Provider, MD   allopurinol (ZYLOPRIM) 100 MG tablet Take 100 mg by mouth daily  1/13/15  Yes Historical Provider, MD   gabapentin (NEURONTIN) 800 MG tablet Take 800 mg by mouth 3 times daily. Yes Historical Provider, MD   NONFORMULARY daily instaflex    Historical Provider, MD   meclizine (ANTIVERT) 25 MG tablet Take 25 mg by mouth as needed    Historical Provider, MD   Compression Stockings MISC by Does not apply route 20-30 mmg thigh high stockings wear daily when up and remove at night at bedtime. Patient not taking: Reported on 2/4/2020 12/9/19   Catherine Hunter MD   B Complex Vitamins (VITAMIN B COMPLEX PO) Take  by mouth daily. Historical Provider, MD     Vitals:    02/04/20 1353 02/04/20 1356 02/04/20 1358   BP: 120/78 120/70 120/70   Site: Left Upper Arm Left Upper Arm Left Upper Arm   Position: Sitting Supine Standing   Cuff Size: Large Adult Large Adult Large Adult   Pulse: 72     Weight: 297 lb (134.7 kg)     Height: 5' 11\" (1.803 m)        Body mass index is 41.42 kg/m². Wt Readings from Last 3 Encounters:   02/04/20 297 lb (134.7 kg)   12/09/19 298 lb (135.2 kg)   12/05/19 299 lb (135.6 kg)     Constitutional: Obese pleasant male in no apparent distress.   Eyes: glasses.  Pupils are equal.  ENT: unremarkable  NECK: No JVP or thyromegaly  Cardiovascular:  Auscultation: Irregular S1 and S2.  5/1 systolic ejection murmur noted in aortic area.  Carotids are negative for bruits.  Abdominal aorta is nonpalpable. no epigastric bruit noted. Peripheral pulses: nonpalpable due to swelling. Respiratory:  Respiratory effort is normal.  Breath sounds are clear to auscultation. Extremities: has bilateral 2+ pitting edema around the ankle and lower legs.    SKIN: Warm and well perfused, no pallor or cyanosis  Abdomen:  No masses or tenderness. No organomegaly noted. Neurologic:  Oriented to time, place, and person and non-anxious.  No focal neurological deficit noted.  Psychiatric: Normal mood and effect    Pacer analyzed and tested for capture thresholds in the atrium and ventricle. Patient has  stable underlying sinus bradycardia rate of 45 bpm.  Atrial capture thresholds are stable atrial lead impedance is stable and ventricular capture thresholds and ventricular lead impedance is stable. There is no evidence of over sensing. Atrial lead re- programmed from  sensing of 0.15 to 0.30. Other parameters are left unchanged. No arrhythmias are noted between the last interrogation on December 9 and 2 days interrogation. Remaining device longevity is 13 years. LAB REVIEW:  CBC:   Lab Results   Component Value Date    WBC 6.4 07/08/2019    HGB 12.8 07/08/2019    HCT 39.1 07/08/2019     07/08/2019     Lipids:   Lab Results   Component Value Date    CHOL 118 12/12/2016    TRIG 103 12/12/2016    HDL 42 07/22/2019    LDLDIRECT 69 07/22/2019     Renal:   Lab Results   Component Value Date    BUN 21 01/14/2020    CREATININE 1.5 01/14/2020     01/14/2020    K 4.4 01/14/2020     PT/INR:   Lab Results   Component Value Date    INR 1.57 07/08/2019     Lab Results   Component Value Date    LABA1C 5.5 01/14/2020       IMPRESSION and RECOMMENDATIONS:      Type 2 diabetes mellitus without complication (HCC)  Seems to be optimally controlled as indicated by recent hemoglobin A1c of 5.5. Continue follow-up with PCP. Hypertension  Well-controlled on current medications. Patient has no orthostatic hypotension on today's examination. We'll decrease lisinopril hydrochlorothiazide to 20-12.5 mg. Hyperlipidemia  Well-controlled with recent LDL of 69. Continue the same. JAYE on CPAP  Continue using CPAP regularly.     Chronic kidney disease (CKD) stage G3b/A1, moderately decreased glomerular filtration rate (GFR) between 30-44 mL/min/1.73 square meter and albuminuria creatinine ratio less than 30 mg/g (HCC)  Degrees hydrochlorothiazide to 12.5 and repeat BMP to see if this has any effect on his recurrent syncope. He is cautioned him to have more swelling in the legs for which he has been counseled to elevate his feet when he is resting and the used thigh-high stockings when up and around. S/P placement of cardiac pacemaker  Working well and had detail checking today without any evidence of pacemaker malfunction. Counseled to elevate feet when resting and when resting in bed at night with pillows underneath and use thigh high compression stockings in the morning and remove them at night. Patient verbalizes understanding. Questions answered. Decrease Lisinopril - HCTZ to 20-12.5 and repeat BMP in 4 weeks. Continue other current medications. Appropriate prescriptions if needed on this visit are addressed. After visit summery is provided. Questions answered and patient verbalizes understanding. Follow up in 1 months,  sooner if needed. Levar Perkins MD, 2/4/2020 2:58 PM     Please note this report has been partially produced using speech recognition software and may contain errors related to that system including errors in grammar, punctuation, and spelling, as well as words and phrases that may be inappropriate. If there are any questions or concerns please feel free to contact the dictating provider for clarification.

## 2020-02-04 NOTE — PATIENT INSTRUCTIONS
Counseled to elevate feet when resting and when resting in bed at night with pillows underneath and use thigh high compression stockings in the morning and remove them at night. Patient verbalizes understanding. Questions answered. Decrease Lisinopril - HCTZ to 20-12.5 and repeat BMP in 4 weeks. Continue other current medications. Appropriate prescriptions if needed on this visit are addressed. After visit summery is provided. Questions answered and patient verbalizes understanding. Follow up in 1 months,  sooner if needed.

## 2020-02-04 NOTE — ASSESSMENT & PLAN NOTE
Degrees hydrochlorothiazide to 12.5 and repeat BMP to see if this has any effect on his recurrent syncope. He is cautioned him to have more swelling in the legs for which he has been counseled to elevate his feet when he is resting and the used thigh-high stockings when up and around.

## 2020-02-14 ENCOUNTER — HOSPITAL ENCOUNTER (OUTPATIENT)
Dept: ULTRASOUND IMAGING | Age: 75
Discharge: HOME | End: 2020-02-14
Payer: MEDICARE

## 2020-02-14 PROCEDURE — 93880 EXTRACRANIAL BILAT STUDY: CPT

## 2020-02-25 RX ORDER — METOPROLOL TARTRATE 50 MG/1
50 TABLET, FILM COATED ORAL 2 TIMES DAILY
Qty: 180 TABLET | Refills: 3 | Status: SHIPPED | OUTPATIENT
Start: 2020-02-25 | End: 2020-12-21

## 2020-02-25 RX ORDER — LISINOPRIL AND HYDROCHLOROTHIAZIDE 20; 12.5 MG/1; MG/1
1 TABLET ORAL DAILY
Qty: 90 TABLET | Refills: 3 | Status: SHIPPED | OUTPATIENT
Start: 2020-02-25 | End: 2020-09-24

## 2020-02-25 RX ORDER — ATORVASTATIN CALCIUM 20 MG/1
20 TABLET, FILM COATED ORAL DAILY
Qty: 90 TABLET | Refills: 3 | Status: SHIPPED | OUTPATIENT
Start: 2020-02-25

## 2020-03-05 ENCOUNTER — INITIAL CONSULT (OUTPATIENT)
Dept: CARDIOLOGY CLINIC | Age: 75
End: 2020-03-05
Payer: MEDICARE

## 2020-03-05 ENCOUNTER — TELEPHONE (OUTPATIENT)
Dept: CARDIOLOGY CLINIC | Age: 75
End: 2020-03-05

## 2020-03-05 VITALS
HEIGHT: 72 IN | RESPIRATION RATE: 16 BRPM | WEIGHT: 295 LBS | BODY MASS INDEX: 39.96 KG/M2 | HEART RATE: 76 BPM | DIASTOLIC BLOOD PRESSURE: 80 MMHG | SYSTOLIC BLOOD PRESSURE: 140 MMHG

## 2020-03-05 PROBLEM — I83.893 VARICOSE VEINS OF BOTH LEGS WITH EDEMA: Status: ACTIVE | Noted: 2020-03-05

## 2020-03-05 PROCEDURE — 1036F TOBACCO NON-USER: CPT | Performed by: INTERNAL MEDICINE

## 2020-03-05 PROCEDURE — 1123F ACP DISCUSS/DSCN MKR DOCD: CPT | Performed by: INTERNAL MEDICINE

## 2020-03-05 PROCEDURE — 99214 OFFICE O/P EST MOD 30 MIN: CPT | Performed by: INTERNAL MEDICINE

## 2020-03-05 PROCEDURE — G8484 FLU IMMUNIZE NO ADMIN: HCPCS | Performed by: INTERNAL MEDICINE

## 2020-03-05 PROCEDURE — G8417 CALC BMI ABV UP PARAM F/U: HCPCS | Performed by: INTERNAL MEDICINE

## 2020-03-05 PROCEDURE — G8427 DOCREV CUR MEDS BY ELIG CLIN: HCPCS | Performed by: INTERNAL MEDICINE

## 2020-03-05 PROCEDURE — 4040F PNEUMOC VAC/ADMIN/RCVD: CPT | Performed by: INTERNAL MEDICINE

## 2020-03-05 NOTE — PROGRESS NOTES
CARDIAC CONSULT NOTE       Reason for consultation: Gerri Stanton is a 66 y.o. male who had concerns including Hypertension (Pt is here for consult for possible ablation. Pt states he has bilateral edema in legs and feet. Pt states he has episodes of dizziness and had 2 episodes of syncope, but testing has not shown any reason. Pt denies any other cardiac concerns. ); Hyperlipidemia; and Diabetes. .    Chief Complaint   Patient presents with    Hypertension     Pt is here for consult for possible ablation. Pt states he has bilateral edema in legs and feet. Pt states he has episodes of dizziness and had 2 episodes of syncope, but testing has not shown any reason. Pt denies any other cardiac concerns.  Hyperlipidemia    Diabetes       Referring physician:  Rivka Lagunas MD    Primary care physician:  Rivka Lagunas MD    History of Present Illness:   65 Y/O white male referred for evaluation & management of CVI. Patient C/O leg edema, skin discoloration& night time cramps. Had Venous US revealing significant Reflux of Left Leg veins. has a past medical history of Arthritis, Dizziness, FH: CAD (coronary artery disease), H/O 24 hour EKG monitoring, H/O cardiovascular stress test, H/O Doppler ultrasound (Venous Doppler Lower Extremities), H/O echocardiogram, H/O echocardiogram, H/O transesophageal echocardiography ([facility]) for monitoring, Heart murmur, Hyperlipidemia, Hypertension, Kidney stones, Leg swelling, Neuropathy, Obesity, JAYE on CPAP, Persistent atrial fibrillation, Syncope and collapse, Thoracic aortic aneurysm ([address] and Type II or unspecified type diabetes mellitus without mention of complication, not stated as uncontrolled. has a past surgical history that includes hernia repair (2008); hernia repair (2009); Colonoscopy ([id number]); Colon surgery (2007 );  Cataract removal with implant (Right, 04/12/2019); Intracapsular cataract extraction (Right, 4/12/2019); Cataract removal with implant (Left, 05/10/2019); Intracapsular cataract extraction (Left, 5/10/2019); Pacemaker insertion (Left, 06/25/2019); and Cardioversion (07/08/2019). reports that he quit smoking about 46 years ago. He has a 7.00 pack-year smoking history. He has quit using smokeless tobacco. He reports current alcohol use. He reports that he does not use drugs. As Reviewed family history includes Diabetes in his mother; Heart Disease in his brother and father; High Blood Pressure in his brother. Review of Systems: As in HPI. 1. Constitutional: No Fever or Weight Loss  2. Eyes: No Decreased Vision  3. ENT: No Headaches, Hearing Loss or Vertigo  4. Cardiovascular: No chest pain, dyspnea on exertion, palpitations or loss of consciousness  5. Respiratory: No cough or wheezing    6. Gastrointestinal: No abdominal pain, appetite loss, blood in stools, constipation, diarrhea or heartburn  7. Genitourinary: No dysuria, trouble voiding, or hematuria  8. Musculoskeletal:  No gait disturbance, weakness or joint complaints  9. Integumentary: No rash or pruritis  10. Neurological: No TIA or stroke symptoms  11. Psychiatric: No anxiety or depression  12. Endocrine: No malaise, fatigue or temperature intolerance  13. Hematologic/Lymphatic: No bleeding problems, blood clots or swollen lymph nodes  14. Allergic/Immunologic: No nasal congestion or hives    Physical Examination:    BP (!) 140/80   Pulse 76   Resp 16   Ht 6' (1.829 m)   Wt 295 lb (133.8 kg)   BMI 40.01 kg/m²    Wt Readings from Last 3 Encounters:   03/05/20 295 lb (133.8 kg)   02/04/20 297 lb (134.7 kg)   12/09/19 298 lb (135.2 kg)     Body mass index is 40.01 kg/m². General Appearance:  Non-obese/Well Nourished  1. Skin: It is warm & dry. No rashes noted. 2. Eyes: No conjunctival Pallor seen. No jaundice noted. 3. HEENT: atraumatic / normocephalic. EOMI.  Neck is supple there is no artery disease due to calcified coronary lesion     [place] (hard of hearing)     History of gout     Adenomatous polyp of transverse colon     JAYE on CPAP     Acute kidney failure (HCC)     Stage 3 chronic kidney disease (HCC)     DM (diabetes mellitus) ([facility] [address]     Thoracic aortic aneurysm (HCC)     Chronic kidney disease (CKD) stage G3b/A1, moderately decreased glomerular filtration rate (GFR) between 30-44 mL/min/1.73 square meter and albuminuria creatinine ratio less than 30 mg/g (HCC)     Atrial fibrillation (HCC)     Hypomagnesemia     Age-related nuclear cataract of right eye     Age-related nuclear cataract of left eye     S/P placement of cardiac pacemaker     Tachycardia-bradycardia ([facility] [address]     Leg swelling     Syncope and collapse     Varicose veins of both legs with edema      QUALITY MEASURES REVIEWED:  1.CAD:Patient is taking anti platelet agent:No  2. DYSLIPIDEMIA: Patient is on cholesterol lowering medication:Yes  3. Beta-Blocker therapy for CAD, if prior Myocardial Infarction:Yes  4. Atrial fibrillation & anticoagulation therapy Yes  5. Discussed weight management strategies. Recommendations:   Symptomatic C4 disease of both legs with Significant Reflux of left leg veins. Encourage patient to keep wearing compression stockings as much he can. Arrange Left GSV ablation. OV with me a month after ablation.        Duong Key MD, 3/5/2020 9:32 AM

## 2020-03-05 NOTE — PATIENT INSTRUCTIONS
Symptomatic C4 disease of both legs with Significant Reflux of left leg veins. Encourage patient to keep wearing compression stockings as much he can. Arrange Left GSV ablation. OV with me a month after ablation.

## 2020-03-05 NOTE — LETTER
[facility]  [address] [place] [id number]  Phone: [phone]  Fax: [phone]    Grisel Child MD        March 5, 2020     Joss Sharp,  [address] [facility] [address]    Patient: Beverley Veliz  MR Number: [id number]  YOB: 1945  Date of Visit: 3/5/2020    Dear Dr. Joss Sharp:    Thank you for the request for consultation for Varinder Frank to me for the evaluation of CVI. Below are the relevant portions of my assessment and plan of care. If you have questions, please do not hesitate to call me. I look forward to following Ray along with you.     Sincerely,        Grisel Child MD

## 2020-03-16 RX ORDER — NIACIN 1000 MG/1
TABLET, EXTENDED RELEASE ORAL
Qty: 90 TABLET | Refills: 3 | Status: SHIPPED | OUTPATIENT
Start: 2020-03-16 | End: 2022-03-30

## 2020-03-18 ENCOUNTER — TELEPHONE (OUTPATIENT)
Dept: CARDIOLOGY CLINIC | Age: 75
End: 2020-03-18

## 2020-03-18 NOTE — TELEPHONE ENCOUNTER
Called patient and spoke to wife to advise that as of right now, all Venous Ablations with Dr. Shantell Rosenberg are being rescheduled until after May 26 due to COVID-19. Patient will be called in the coming weeks to schedule once we know more. Wife voiced understanding.

## 2020-05-11 ENCOUNTER — PROCEDURE VISIT (OUTPATIENT)
Dept: CARDIOLOGY CLINIC | Age: 75
End: 2020-05-11
Payer: MEDICARE

## 2020-05-11 PROCEDURE — 93294 REM INTERROG EVL PM/LDLS PM: CPT | Performed by: INTERNAL MEDICINE

## 2020-05-11 PROCEDURE — 93296 REM INTERROG EVL PM/IDS: CPT | Performed by: INTERNAL MEDICINE

## 2020-06-01 ENCOUNTER — TELEMEDICINE (OUTPATIENT)
Dept: CARDIOLOGY CLINIC | Age: 75
End: 2020-06-01
Payer: MEDICARE

## 2020-06-01 VITALS
SYSTOLIC BLOOD PRESSURE: 125 MMHG | BODY MASS INDEX: 41.02 KG/M2 | DIASTOLIC BLOOD PRESSURE: 74 MMHG | HEIGHT: 71 IN | WEIGHT: 293 LBS | HEART RATE: 64 BPM

## 2020-06-01 PROBLEM — N18.30 STAGE 3 CHRONIC KIDNEY DISEASE (HCC): Status: RESOLVED | Noted: 2018-11-06 | Resolved: 2020-06-01

## 2020-06-01 PROBLEM — E11.9 DM (DIABETES MELLITUS) (HCC): Status: RESOLVED | Noted: 2018-11-06 | Resolved: 2020-06-01

## 2020-06-01 PROBLEM — R55 SYNCOPE AND COLLAPSE: Status: RESOLVED | Noted: 2019-12-09 | Resolved: 2020-06-01

## 2020-06-01 PROCEDURE — 4040F PNEUMOC VAC/ADMIN/RCVD: CPT | Performed by: INTERNAL MEDICINE

## 2020-06-01 PROCEDURE — 99214 OFFICE O/P EST MOD 30 MIN: CPT | Performed by: INTERNAL MEDICINE

## 2020-06-01 PROCEDURE — 1123F ACP DISCUSS/DSCN MKR DOCD: CPT | Performed by: INTERNAL MEDICINE

## 2020-06-01 PROCEDURE — G8417 CALC BMI ABV UP PARAM F/U: HCPCS | Performed by: INTERNAL MEDICINE

## 2020-06-01 PROCEDURE — G8427 DOCREV CUR MEDS BY ELIG CLIN: HCPCS | Performed by: INTERNAL MEDICINE

## 2020-06-01 PROCEDURE — 1036F TOBACCO NON-USER: CPT | Performed by: INTERNAL MEDICINE

## 2020-06-01 NOTE — PROGRESS NOTES
2020    TELEHEALTH EVALUATION -- Audio/Visual (During FMFSG-17 public health emergency)    HPI:   Chief Complaint   Patient presents with    Loss of Consciousness     VV pt denies any chest pain,SOB,some dizziness.  Hyperlipidemia    Hypertension     Ray CHICA Elizabeth (:  1945) has requested an audio/video evaluation for sick sinus syndrome status post permanent pacemaker implantation and has venous insufficiency with leg swelling and chronic obesity and obstructive sleep apnea hypertension hyperlipidemia and diabetes and diabetic nephropathy. Denies any new cardiac symptoms. He is having [facility] cardiac pacemaker monitoring regularly. He has been compliant to his medications. He has been compliant to CPAP therapy. Follows up with Dr. Amie Conti for nephrology. Prior to Visit Medications    Medication Sig Taking? Authorizing Provider   magnesium oxide (MAG-OX) 400 MG tablet TAKE 1 TABLET BY MOUTH EVERY DAY Yes Mauro Prado MD   niacin (NIASPAN) 1000 MG extended release tablet TAKE 1 TABLET NIGHTLY Yes Birdie Wise MD   lisinopril-hydroCHLOROthiazide (PRINZIDE;ZESTORETIC) 20-12.5 MG per tablet Take 1 tablet by mouth daily Yes Birdie Wise MD   metoprolol tartrate (LOPRESSOR) 50 MG tablet Take 1 tablet by mouth 2 times daily Yes Birdie Wise MD   atorvastatin (LIPITOR) 20 MG tablet Take 1 tablet by mouth daily Yes Birdie Wise MD   ELIQUIS 5 MG TABS tablet TAKE 1 TABLET TWICE A DAY Yes Birdie Wise MD   NONFORMULARY daily instaflex Yes Historical Provider, MD   meclizine (ANTIVERT) 25 MG tablet Take 25 mg by mouth as needed Yes Historical Provider, MD   Compression Stockings MISC by Does not apply route 20-30 mmg thigh high stockings wear daily when up and remove at night at bedtime.  Yes Birdie Wise MD   fluticasone (FLONASE) 50 MCG/ACT nasal spray USE 1 SPRAY NASALLY DAILY Yes Adele Mancera MD   amLODIPine (NORVASC) 10 MG tablet Take 1 tablet by mouth daily Yes

## 2020-06-01 NOTE — ASSESSMENT & PLAN NOTE
Needs to have follow-up with Dr. Deb Gaviria for venous ablation.   Procedure was canceled due to: Covid-19 pandemic

## 2020-06-01 NOTE — PATIENT INSTRUCTIONS
Continue current medications and have a follow-up with Dr. Tonya Murrieta for venous insufficiency. Multiple questions are answered and patient verbalized understanding. Follow up office visit in 6 months.

## 2020-06-11 ENCOUNTER — TELEPHONE (OUTPATIENT)
Dept: CARDIOLOGY CLINIC | Age: 75
End: 2020-06-11

## 2020-06-19 ENCOUNTER — TELEPHONE (OUTPATIENT)
Dept: CARDIOLOGY CLINIC | Age: 75
End: 2020-06-19

## 2020-06-19 NOTE — TELEPHONE ENCOUNTER
Pre- Instructions Venous ablation     Date of Procedure: 6/22/20 Time: 2 PM Arrival Time: 150 PM      · Please keep yourself hydrated for 24 hours before the procedure ( drink lots of water)  · Please wear loose comfortable clothing. · You will need someone with you to drive you home. · Please eat a light breakfast in the morning  · Please continue to take medications as needed. LM on  to confirm ablation for Monday. Review instructions.   Patient to call with any questions

## 2020-06-22 ENCOUNTER — PROCEDURE VISIT (OUTPATIENT)
Dept: CARDIOLOGY CLINIC | Age: 75
End: 2020-06-22
Payer: MEDICARE

## 2020-06-22 PROCEDURE — 36482 ENDOVEN THER CHEM ADHES 1ST: CPT | Performed by: INTERNAL MEDICINE

## 2020-06-22 NOTE — PROGRESS NOTES
VENOUS PRE-PROCEDURE H & P  6/22/2020    Subjective:  Do Boo is a 66 y.o. male    GENERAL - A-Ox3- In no respiratory distress  Heart - Regular rhythm, normal S1, S2, no gallops, no murmurs, no friction rubs  Chest - CTA & percussion    Vein Exam:     Right ext - varicosities, spider and reticular veins Yes, skin changes Yes, ulcers No, edema Yes    Left ext  - varicosities, spider and reticular veins Yes, skin changes Yes, ulcers No, edema Yes    Reflex Study:    No evidence of DVT or SVT in the bilateral common femoral vein, femoral    vein, popliteal vein, greater saphenous vein or small saphenous vein.    No significant reflux noted in the veins of the right lower extremity.    Significant reflux noted in the Left CFV 3.0s;GSV (SFJ 1.4s).  Bilateral Calf and ankle edema noted. Assessment:   Symptomatic C4 venous disease. Plan:   Ablation of Left GSV using Vena-seal technique.       Edd Baac MD, [facility]
compression, and 1 aliquot every 3cm distally with 30 sec of compression along the course of the vessel. Following the last injection and compression sequence, the catheter and introducer sheath were pulled out from the access site. Hemostasis was achieved with manual compression and an adhesive bandage was applied to the incision. Ultrasound confirmed complete coaptation and closure of the treated segments of the GSV, and the absence of any DVT at the saphenofemoral junction. Treatment dose was approximately 2.5 ml and the vein length treated was 68 cm. The drapes were removed and the patient cleaned and prepared for discharge. Post op ultrasound check is scheduled for   48- 72 hours and the patient was given written post-op instructions. Complications: none immediate    Blood Loss: minimal    Conclusion:   Successful Endovenous Ablation of the Great Saphenous Vein with VenaSeal Closure System of the  Left side.       Jon Cabrera MD, [facility]

## 2020-06-25 ENCOUNTER — PROCEDURE VISIT (OUTPATIENT)
Dept: CARDIOLOGY CLINIC | Age: 75
End: 2020-06-25
Payer: MEDICARE

## 2020-06-25 PROCEDURE — 93971 EXTREMITY STUDY: CPT | Performed by: INTERNAL MEDICINE

## 2020-08-03 ENCOUNTER — OFFICE VISIT (OUTPATIENT)
Dept: CARDIOLOGY CLINIC | Age: 75
End: 2020-08-03
Payer: MEDICARE

## 2020-08-03 ENCOUNTER — PROCEDURE VISIT (OUTPATIENT)
Dept: CARDIOLOGY CLINIC | Age: 75
End: 2020-08-03

## 2020-08-03 VITALS
WEIGHT: 282.6 LBS | BODY MASS INDEX: 39.56 KG/M2 | HEIGHT: 71 IN | HEART RATE: 64 BPM | DIASTOLIC BLOOD PRESSURE: 72 MMHG | SYSTOLIC BLOOD PRESSURE: 120 MMHG

## 2020-08-03 PROCEDURE — 1123F ACP DISCUSS/DSCN MKR DOCD: CPT | Performed by: INTERNAL MEDICINE

## 2020-08-03 PROCEDURE — 1036F TOBACCO NON-USER: CPT | Performed by: INTERNAL MEDICINE

## 2020-08-03 PROCEDURE — 4040F PNEUMOC VAC/ADMIN/RCVD: CPT | Performed by: INTERNAL MEDICINE

## 2020-08-03 PROCEDURE — G8427 DOCREV CUR MEDS BY ELIG CLIN: HCPCS | Performed by: INTERNAL MEDICINE

## 2020-08-03 PROCEDURE — G8417 CALC BMI ABV UP PARAM F/U: HCPCS | Performed by: INTERNAL MEDICINE

## 2020-08-03 PROCEDURE — 99214 OFFICE O/P EST MOD 30 MIN: CPT | Performed by: INTERNAL MEDICINE

## 2020-08-03 NOTE — PROGRESS NOTES
magnesium oxide (MAG-OX) 400 MG tablet TAKE 1 TABLET BY MOUTH EVERY DAY 90 tablet 1    niacin (NIASPAN) 1000 MG extended release tablet TAKE 1 TABLET NIGHTLY 90 tablet 3    lisinopril-hydroCHLOROthiazide (PRINZIDE;ZESTORETIC) 20-12.5 MG per tablet Take 1 tablet by mouth daily 90 tablet 3    metoprolol tartrate (LOPRESSOR) 50 MG tablet Take 1 tablet by mouth 2 times daily 180 tablet 3    atorvastatin (LIPITOR) 20 MG tablet Take 1 tablet by mouth daily 90 tablet 3    ELIQUIS 5 MG TABS tablet TAKE 1 TABLET TWICE A  tablet 3    NONFORMULARY daily instaflex      meclizine (ANTIVERT) 25 MG tablet Take 25 mg by mouth as needed      fluticasone (FLONASE) 50 MCG/ACT nasal spray USE 1 SPRAY NASALLY DAILY 16 g 3    amLODIPine (NORVASC) 10 MG tablet Take 1 tablet by mouth daily 90 tablet 3    metFORMIN (GLUCOPHAGE) 1000 MG tablet Take 1,000 mg by mouth 2 times daily (with meals)      glipiZIDE (GLUCOTROL) 10 MG tablet Take 1/2 tablet twice a day.  Krill Oil 1000 MG CAPS Take 1 capsule by mouth daily       allopurinol (ZYLOPRIM) 100 MG tablet Take 100 mg by mouth daily   0    B Complex Vitamins (VITAMIN B COMPLEX PO) Take  by mouth daily.  gabapentin (NEURONTIN) 800 MG tablet Take 800 mg by mouth 3 times daily. No current facility-administered medications for this visit. Allergies: Oxycontin [oxycodone hcl]  Past Medical History:   Diagnosis Date    Arthritis     Dizziness     FH: CAD (coronary artery disease) 9/29/2015    H/O 24 hour EKG monitoring 02/26/19,08/02/2016    Conclusion: Atrial fibrillation with  fairly well-controlled ventricular rate response without evidence of any significant tachy or alon arrhythmias.  H/O cardiovascular stress test 11/24/15; 8/22/2019    Normal perfusion in the distribution of all coronaries, normal LV, EF 53%.  H/O Doppler ultrasound (Venous Doppler Lower Extremities) 03/08/2017; 1/2/2020    No evidence of DVT or SVT in the bilaterally. No significant reflux noted in the veins of the right lower extremity. Significant reflux noted in the Left CFV. Bilateral Calf and ankle edema noted.  H/O echocardiogram 6/6/17,11/24/15    Left ventricle mildly dilated with normal systolic function EF 39-28% Mild concentric LVH with Grade II diastolic dysfunction. Mild to moderate MR and mild TR     H/O echocardiogram 06/07/2018; 2/26/2019    EF 55-60%. Mild concentric left ventricular hypertrophy. The left atrium is moderately dilated. Dilatation of the aortic root measuring 4.0 cm. Mild AR.  Moderate pulmonary HTN.     H/O transesophageal echocardiography (SALAZAR) for monitoring 07/08/2019    No CEE clot    Heart murmur 9/29/2015    Hyperlipidemia     Hypertension     Kidney stones     Leg swelling 12/9/2019    Bilateral leg swelling    Neuropathy     Obesity     JAYE on CPAP 6/26/2018    On CPAP 5/2018    Persistent atrial fibrillation 1/29/2019    New onset 1/2019    Syncope and collapse 12/9/2019    Thoracic aortic aneurysm ([address] 2/11    4.5    Type II or unspecified type diabetes mellitus without mention of complication, not stated as uncontrolled      Past Surgical History:   Procedure Laterality Date    CARDIOVERSION  07/08/2019    CATARACT REMOVAL WITH IMPLANT Right 04/12/2019    CATARACT REMOVAL WITH IMPLANT Left 05/10/2019    COLON SURGERY  2007     COLONOSCOPY  1050;4018    colon polyps removed    HERNIA REPAIR  2008    HERNIA REPAIR  2009    INTRACAPSULAR CATARACT EXTRACTION Right 4/12/2019    EYE CATARACT EMULSIFICATION IOL IMPLANT performed by Demetri Hubbard MD at [place] [id number] Left 5/10/2019    EYE CATARACT EMULSIFICATION IOL IMPLANT performed by Demetri Hubbard MD at [address] [place] [address] Left 06/25/2019    Medtronic dual permanent pacer-Val XT DR ENEIDA Vaughan       As reviewed   Family History   Problem Relation Age of Onset    Diabetes Mother     Heart Disease Father     Heart Disease Brother     High Blood Pressure Brother      Social History     Tobacco Use    Smoking status: Former Smoker     Packs/day: 1.00     Years: 7.00     Pack years: 7.00     Last attempt to quit: 3/26/1973     Years since quittin.3    Smokeless tobacco: Former User   Substance Use Topics    Alcohol use: Yes     Alcohol/week: 3.0 standard drinks     Types: 3 Shots of liquor per week     Comment: small amount some nights      Review of Systems:    Constitutional: Negative for diaphoresis and fatigue  Psychological:Negative for anxiety or depression  HENT: Negative for headaches, nasal congestion, sinus pain or vertigo  Eyes: Negative for visual disturbance. Endocrine: Negative for polydipsia/polyuria  Respiratory: Negative for shortness of breath  Cardiovascular: Negative for chest pain, dyspnea on exertion, claudication, edema, irregular heartbeat, murmur, palpitations or shortness of breath  Gastrointestinal: Negative for abdominal pain or heartburn  Genito-Urinary: Negative for urinary frequency/urgency  Musculoskeletal: Negative for muscle pain, muscular weakness, negative for pain in arm and leg or swelling in foot and leg  Neurological: Negative for dizziness, headaches, memory loss, numbness/tingling, visual changes, syncope  Dermatological: Negative for rash    Objective:  /72   Pulse 64   Ht 5' 11\" (1.803 m)   Wt 282 lb 9.6 oz (128.2 kg)   BMI 39.41 kg/m²   Wt Readings from Last 3 Encounters:   20 282 lb 9.6 oz (128.2 kg)   20 292 lb (132.5 kg)   20 299 lb (135.6 kg)     Body mass index is 39.41 kg/m². No flowsheet data found. Vitals:    20 1424   BP: 120/72   Pulse: 64   Weight: 282 lb 9.6 oz (128.2 kg)   Height: 5' 11\" (1.803 m)      GENERAL - Alert, oriented, pleasant, in no apparent distress. EYES: No jaundice, no conjunctival pallor. SKIN: It is warm & dry. No rashes. No Echhymosis    HEENT - No clinically significant abnormalities seen.   Neck - Supple. No jugular venous distention noted. No carotid bruits. Cardiovascular - Normal S1 and S2 without obvious murmur or gallop. Extremities - No cyanosis, clubbing, or significant edema. Pulmonary - No respiratory distress. No wheezes or rales. Abdomen - No masses, tenderness, or organomegaly. Musculoskeletal - No significant edema. No joint deformities. No muscle wasting. Neurologic - Cranial nerves II through XII are grossly intact. There were no gross focal neurologic abnormalities.     Lab Review   Lab Results   Component Value Date    TROPONINT <0.010 03/16/2018    TROPONINT <0.010 03/08/2017     BNP:    Lab Results   Component Value Date    BNP 9.9 02/27/2014     PT/INR:    Lab Results   Component Value Date    INR 1.57 07/08/2019    INR 1.42 06/20/2019     Lab Results   Component Value Date    LABA1C 5.5 01/14/2020    LABA1C 5.7 07/22/2019     Lab Results   Component Value Date    WBC 6.4 07/08/2019    WBC 5.8 06/20/2019    HCT 39.1 (L) 07/08/2019    HCT 40.4 (L) 06/20/2019    .4 (H) 07/08/2019    .7 (H) 06/20/2019     (L) 07/08/2019     (L) 06/20/2019     Lab Results   Component Value Date    CHOL 118 12/12/2016    CHOL 112 11/19/2015    TRIG 103 12/12/2016    TRIG 110 11/19/2015    HDL 42 07/22/2019    HDL 42 10/18/2018    LDLDIRECT 69 07/22/2019    LDLDIRECT 63 10/18/2018     Lab Results   Component Value Date    ALT 41 (H) 01/14/2020    ALT 40 07/22/2019    AST 37 01/14/2020    AST 45 (H) 07/22/2019     BMP:    Lab Results   Component Value Date     01/14/2020     09/24/2019    K 4.4 01/14/2020    K 4.8 09/24/2019     01/14/2020     09/24/2019    CO2 26 01/14/2020    CO2 28 09/24/2019    BUN 21 01/14/2020    BUN 27 09/24/2019    CREATININE 1.5 01/14/2020    CREATININE 1.4 09/24/2019     CMP:   Lab Results   Component Value Date     01/14/2020     09/24/2019    K 4.4 01/14/2020    K 4.8 09/24/2019     01/14/2020    CL 103 09/24/2019    CO2 26 01/14/2020    CO2 28 09/24/2019    BUN 21 01/14/2020    BUN 27 09/24/2019    PROT 7.5 01/14/2020    PROT 7.4 07/22/2019    PROT 7.0 07/10/2012    PROT 7.1 03/27/2012     TSH:    Lab Results   Component Value Date    [facility] 2.120 10/18/2018    TSH 1.840 06/20/2017       QUALITY MEASURES REVIEWED:  1.CAD:Patient is taking anti platelet agent:No  2. DYSLIPIDEMIA: Patient is on cholesterol lowering medication:Yes  3. Beta-Blocker therapy for CAD, if prior Myocardial Infarction:Yes  4. Counselled regarding smoking cessation. 5. Atrial fibrillation & anticoagulation therapy Yes  6. Discussed weight management strategies. Impression:    1. Varicose veins of both legs with edema    2. Type 2 diabetes mellitus without complication, without long-term current use of insulin ([facility]    3. Essential hypertension    4. Mixed hyperlipidemia    5. Class 2 obesity due to excess calories without serious comorbidity with body mass index (BMI) of 39.0 to 39.9 in adult    6. Coronary artery disease due to calcified coronary lesion    7. Thoracic aortic aneurysm without rupture ([facility]    8. Chronic kidney disease (CKD) stage G3b/A1, moderately decreased glomerular filtration rate (GFR) between 30-44 mL/min/1.73 square meter and albuminuria creatinine ratio less than 30 mg/g ([facility])    9. Atrial fibrillation, unspecified type ([facility]    10. S/P placement of cardiac pacemaker    11.  Leg swelling       Patient Active Problem List   Diagnosis Code    Anemia D64.9    Colon polyps K63.5    Type 2 diabetes mellitus without complication ([facility]) C94.3    Hypertension I10    Hyperlipidemia E78.5    Obesity E66.9    Vertigo R44    FH: CAD (coronary artery disease) Z82.49    Heart murmur R01.1    Bradycardia R00.1    Coronary artery disease due to calcified coronary lesion I25.10, I25.84    [place] (hard of hearing) H91.90    History of gout Z87.39    Adenomatous polyp of transverse colon D12.3    JAYE on CPAP G47.33, Z99.89    Acute kidney failure (HCC) N17.9    Thoracic aortic aneurysm (HCC) I71.2    Chronic kidney disease (CKD) stage G3b/A1, moderately decreased glomerular filtration rate (GFR) between 30-44 mL/min/1.73 square meter and albuminuria creatinine ratio less than 30 mg/g (HCC) N18.3    Atrial fibrillation (HCC) I48.91    Hypomagnesemia E83.42    Age-related nuclear cataract of right eye H25.11    Age-related nuclear cataract of left eye H25.12    S/P placement of cardiac pacemaker Z95.0    Tachycardia-bradycardia (HCC) I49.5    Leg swelling M79.89    Varicose veins of both legs with edema I83.893       Assessment & Plan:    CAD:None known. HTN:well controlled on current medical regimen, see list above. - changes in  treatment:   no   CARDIOMYOPATHY: None known   CONGESTIVE HEART FAILURE: NO KNOWN HISTORY.      VHD: No significant VHD noted  DYSLIPIDEMIA: Patient's profile is at / near Goal.yes,                                 HDL is low                                Tolerating current medical regimen wellyes,                                                             See most recent Lab values in Labs section above. Diabetes mellitis: .yes,                                      Managed by family MD                                     BS under good control yes,                                      Hgb A1c avilable yes,   ARRHYTHMIAS:  Known history                                Patient has H/O Atrial fibrillation                                He is rate controlled & on anticoagulation. OTHER RELEVANT DIAGNOSIS:as noted in patient's active problem list:  CVI: better after ablation of Left GSV. Still need to wear compression stocking as he still has deep vein Reflux. Obesity:  TESTS ORDERED: None this visit                                      All previously ordered tests reviewed. MEDICATIONS: CPM   Office f/u in six months with .  Device check per protocol. Primary/secondary prevention is the goal by aggressive risk modification, healthy and therapeutic life style changes for cardiovascular risk reduction. Various goals are discussed and multiple questions answered.

## 2020-08-03 NOTE — PATIENT INSTRUCTIONS
CAD:None known. HTN:well controlled on current medical regimen, see list above. - changes in  treatment:   no   CARDIOMYOPATHY: None known   CONGESTIVE HEART FAILURE: NO KNOWN HISTORY.      VHD: No significant VHD noted  DYSLIPIDEMIA: Patient's profile is at / near Goal.yes,                                 HDL is low                                Tolerating current medical regimen wellyes,                                                             See most recent Lab values in Labs section above. Diabetes mellitis: .yes,                                      Managed by family MD                                     BS under good control yes,                                      Hgb A1c avilable yes,   ARRHYTHMIAS:  Known history                                Patient has H/O Atrial fibrillation                                He is rate controlled & on anticoagulation. OTHER RELEVANT DIAGNOSIS:as noted in patient's active problem list:  CVI: better after ablation of Left GSV. Still need to wear compression stocking as he still has deep vein Reflux. Obesity:  TESTS ORDERED: None this visit                                      All previously ordered tests reviewed. MEDICATIONS: CPM   Office f/u in six months with . Device check per protocol. Primary/secondary prevention is the goal by aggressive risk modification, healthy and therapeutic life style changes for cardiovascular risk reduction. Various goals are discussed and multiple questions answered.

## 2020-08-04 ENCOUNTER — PATIENT MESSAGE (OUTPATIENT)
Dept: CARDIOLOGY CLINIC | Age: 75
End: 2020-08-04

## 2020-08-16 ENCOUNTER — PROCEDURE VISIT (OUTPATIENT)
Dept: CARDIOLOGY CLINIC | Age: 75
End: 2020-08-16
Payer: MEDICARE

## 2020-08-16 PROCEDURE — 93296 REM INTERROG EVL PM/IDS: CPT | Performed by: INTERNAL MEDICINE

## 2020-08-16 PROCEDURE — 93294 REM INTERROG EVL PM/LDLS PM: CPT | Performed by: INTERNAL MEDICINE

## 2020-08-18 ENCOUNTER — TELEPHONE (OUTPATIENT)
Dept: CARDIOLOGY CLINIC | Age: 75
End: 2020-08-18

## 2020-09-24 RX ORDER — LISINOPRIL AND HYDROCHLOROTHIAZIDE 20; 12.5 MG/1; MG/1
TABLET ORAL
Qty: 90 TABLET | Refills: 3 | Status: SHIPPED | OUTPATIENT
Start: 2020-09-24 | End: 2021-07-13

## 2020-09-24 NOTE — TELEPHONE ENCOUNTER
eRx to [facility], Kike Quinteros:  Lisinopril/HCTZ 20/12.5 mg qd #90 Rx3 pended and routed to Dr. Debbie Beckett for approval.

## 2020-11-25 ENCOUNTER — HOSPITAL ENCOUNTER (OUTPATIENT)
Age: 75
Discharge: HOME | End: 2020-11-25
Payer: MEDICARE

## 2020-11-25 LAB
ALBUMIN SERPL-MCNC: 4.6 GM/DL (ref 3.4–5)
ALP BLD-CCNC: 42 IU/L (ref 40–129)
ALT SERPL-CCNC: 23 U/L (ref 10–40)
ANION GAP SERPL CALCULATED.3IONS-SCNC: 8 MMOL/L (ref 4–16)
AST SERPL-CCNC: 29 IU/L (ref 15–37)
BACTERIA: ABNORMAL /HPF
BILIRUB SERPL-MCNC: 0.8 MG/DL (ref 0–1)
BILIRUBIN URINE: NEGATIVE MG/DL
BLOOD, URINE: NEGATIVE
BUN BLDV-MCNC: 30 MG/DL (ref 6–23)
CALCIUM SERPL-MCNC: 9.9 MG/DL (ref 8.3–10.6)
CAST TYPE: NEGATIVE /HPF
CHLORIDE BLD-SCNC: 103 MMOL/L (ref 99–110)
CHOLESTEROL, FASTING: 120 MG/DL
CLARITY: ABNORMAL
CO2: 31 MMOL/L (ref 21–32)
COLOR: YELLOW
CREAT SERPL-MCNC: 2.1 MG/DL (ref 0.9–1.3)
CREATININE URINE: 52.3 MG/DL (ref 39–259)
CRYSTAL TYPE: NEGATIVE /HPF
EPITHELIAL CELLS, UA: ABNORMAL /HPF
ESTIMATED AVERAGE GLUCOSE: 103 MG/DL
GFR AFRICAN AMERICAN: 37 ML/MIN/1.73M2
GFR NON-AFRICAN AMERICAN: 31 ML/MIN/1.73M2
GLUCOSE FASTING: 98 MG/DL (ref 70–99)
GLUCOSE, URINE: NEGATIVE MG/DL
HBA1C MFR BLD: 5.2 % (ref 4.2–6.3)
HDLC SERPL-MCNC: 51 MG/DL
KETONES, URINE: NEGATIVE MG/DL
LDL CHOLESTEROL DIRECT: 56 MG/DL
LEUKOCYTE ESTERASE, URINE: NEGATIVE
MAGNESIUM: 1.8 MG/DL (ref 1.8–2.4)
NITRITE URINE, QUANTITATIVE: NEGATIVE
PH, URINE: 6 (ref 5–8)
PHOSPHORUS: 3.3 MG/DL (ref 2.5–4.9)
POTASSIUM SERPL-SCNC: 3.8 MMOL/L (ref 3.5–5.1)
PROSTATE SPECIFIC ANTIGEN: 2.74 NG/ML (ref 0–4)
PROT/CREAT RATIO, UR: 0.1
PROTEIN UA: NEGATIVE MG/DL
RBC URINE: NEGATIVE /HPF (ref 0–3)
SODIUM BLD-SCNC: 142 MMOL/L (ref 135–145)
SPECIFIC GRAVITY UA: 1.01 (ref 1–1.03)
TOTAL PROTEIN: 7.2 GM/DL (ref 6.4–8.2)
TRIGLYCERIDE, FASTING: 120 MG/DL
TSH HIGH SENSITIVITY: 1.73 UIU/ML (ref 0.27–4.2)
URIC ACID: 7.8 MG/DL (ref 3.5–7.2)
URINE TOTAL PROTEIN: 4.1 MG/DL
UROBILINOGEN, URINE: 0.2 MG/DL (ref 0.2–1)
WBC UA: NEGATIVE /HPF (ref 0–2)

## 2020-11-25 PROCEDURE — 84156 ASSAY OF PROTEIN URINE: CPT

## 2020-11-25 PROCEDURE — 83735 ASSAY OF MAGNESIUM: CPT

## 2020-11-25 PROCEDURE — G0103 PSA SCREENING: HCPCS

## 2020-11-25 PROCEDURE — 81001 URINALYSIS AUTO W/SCOPE: CPT

## 2020-11-25 PROCEDURE — 84443 ASSAY THYROID STIM HORMONE: CPT

## 2020-11-25 PROCEDURE — 84550 ASSAY OF BLOOD/URIC ACID: CPT

## 2020-11-25 PROCEDURE — 82570 ASSAY OF URINE CREATININE: CPT

## 2020-11-25 PROCEDURE — 83036 HEMOGLOBIN GLYCOSYLATED A1C: CPT

## 2020-11-25 PROCEDURE — 80061 LIPID PANEL: CPT

## 2020-11-25 PROCEDURE — 84100 ASSAY OF PHOSPHORUS: CPT

## 2020-11-25 PROCEDURE — 36415 COLL VENOUS BLD VENIPUNCTURE: CPT

## 2020-11-25 PROCEDURE — 80053 COMPREHEN METABOLIC PANEL: CPT

## 2020-12-02 ENCOUNTER — PROCEDURE VISIT (OUTPATIENT)
Dept: CARDIOLOGY CLINIC | Age: 75
End: 2020-12-02
Payer: MEDICARE

## 2020-12-02 PROCEDURE — 93296 REM INTERROG EVL PM/IDS: CPT | Performed by: INTERNAL MEDICINE

## 2020-12-02 PROCEDURE — 93294 REM INTERROG EVL PM/LDLS PM: CPT | Performed by: INTERNAL MEDICINE

## 2020-12-21 ENCOUNTER — OFFICE VISIT (OUTPATIENT)
Dept: CARDIOLOGY CLINIC | Age: 75
End: 2020-12-21
Payer: MEDICARE

## 2020-12-21 VITALS
BODY MASS INDEX: 40.88 KG/M2 | SYSTOLIC BLOOD PRESSURE: 138 MMHG | HEIGHT: 71 IN | DIASTOLIC BLOOD PRESSURE: 78 MMHG | TEMPERATURE: 97 F | RESPIRATION RATE: 16 BRPM | HEART RATE: 76 BPM | WEIGHT: 292 LBS

## 2020-12-21 PROBLEM — Z86.79 H/O ATRIAL FIBRILLATION WITHOUT CURRENT MEDICATION: Status: ACTIVE | Noted: 2019-04-03

## 2020-12-21 PROCEDURE — 4040F PNEUMOC VAC/ADMIN/RCVD: CPT | Performed by: INTERNAL MEDICINE

## 2020-12-21 PROCEDURE — G8427 DOCREV CUR MEDS BY ELIG CLIN: HCPCS | Performed by: INTERNAL MEDICINE

## 2020-12-21 PROCEDURE — G8417 CALC BMI ABV UP PARAM F/U: HCPCS | Performed by: INTERNAL MEDICINE

## 2020-12-21 PROCEDURE — 1036F TOBACCO NON-USER: CPT | Performed by: INTERNAL MEDICINE

## 2020-12-21 PROCEDURE — 1123F ACP DISCUSS/DSCN MKR DOCD: CPT | Performed by: INTERNAL MEDICINE

## 2020-12-21 PROCEDURE — 99214 OFFICE O/P EST MOD 30 MIN: CPT | Performed by: INTERNAL MEDICINE

## 2020-12-21 PROCEDURE — G8482 FLU IMMUNIZE ORDER/ADMIN: HCPCS | Performed by: INTERNAL MEDICINE

## 2020-12-21 RX ORDER — METOPROLOL TARTRATE 50 MG/1
50 TABLET, FILM COATED ORAL 2 TIMES DAILY
Qty: 180 TABLET | Refills: 3 | Status: SHIPPED | OUTPATIENT
Start: 2020-12-21 | End: 2021-06-15 | Stop reason: SDUPTHER

## 2020-12-21 NOTE — ASSESSMENT & PLAN NOTE
Creatinine has increased to 2.1 from 1.5 in January this year. He has been taken off of diuretics since then. Follows up with nephrology.

## 2020-12-21 NOTE — PROGRESS NOTES
Dina Sanders  1945  Severino Garcias MD    Chief complaint and HPI:  Dina Sanders  is a 66 y.o. male following up on hypertension hyperlipidemia and permanent pacemaker. He reports tingling and numbness in the fingertips. Denies any chest pain or shortness of breath. Leg swelling is worse. He has been taken off of diuretic by nephrology. He does follow-up on his pacemaker by [facility]. He is been using CPAP regularly. He is not able to use compression stockings. He had ablation of the left lower extremity for venous insufficiency however he did not help him much. He has deep venous insufficiency and common femoral vein insufficiency also. Medications are reviewed. He is not a smoker. He is not very active. He is concerned about worsening tremors of his hands. Rest of the Cardiovascular system review is otherwise unchanged from prior encounter. Past medical history:  has a past medical history of Arthritis, Dizziness, FH: CAD (coronary artery disease), H/O 24 hour EKG monitoring, H/O atrial fibrillation without current medication, H/O cardiovascular stress test, H/O Doppler ultrasound (Venous Doppler Lower Extremities), H/O echocardiogram, H/O echocardiogram, H/O transesophageal echocardiography (SALAZAR) for monitoring, Heart murmur, Hx of Venous Doppler ultrasound, Hyperlipidemia, Hypertension, Kidney stones, Leg swelling, Neuropathy, Obesity, JAYE on CPAP, Persistent atrial fibrillation ([address] Syncope and collapse, Thoracic aortic aneurysm ([address] and Type II or unspecified type diabetes mellitus without mention of complication, not stated as uncontrolled. Past surgical history:  has a past surgical history that includes hernia repair (); hernia repair (); Colonoscopy (0114;9190); Colon surgery ( ); Cataract removal with implant (Right, 2019); Intracapsular cataract extraction (Right, 2019); Cataract removal with implant (Left, 05/10/2019); Intracapsular cataract extraction (Left, 5/10/2019); Pacemaker insertion (Left, 2019); and Cardioversion (2019). Social History:   Social History     Tobacco Use    Smoking status: Former Smoker     Packs/day: 1.00     Years: 7.00     Pack years: 7.00     Quit date: 3/26/1973     Years since quittin.7    Smokeless tobacco: Former User   Substance Use Topics    Alcohol use: Yes     Alcohol/week: 3.0 standard drinks     Types: 3 Shots of liquor per week     Comment: small amount some nights     Family history: family history includes Diabetes in his mother; Heart Disease in his brother and father; High Blood Pressure in his brother. ALLERGIES:  Oxycontin [oxycodone hcl]  Prior to Admission medications    Medication Sig Start Date End Date Taking? Authorizing Provider   Compression Stockings MISC by Does not apply route 20-30 mmg thigh high stockings wear daily when up and remove at night at bedtime.  20  Yes Erika Otero MD   magnesium oxide (MAG-OX) 400 MG tablet TAKE 1 TABLET BY MOUTH EVERY DAY 20  Yes Tamika Mares MD   lisinopril-hydroCHLOROthiazide (PRINZIDE;ZESTORETIC) 20-12.5 MG per tablet TAKE 1 TABLET BY MOUTH EVERY DAY 20  Yes Erika Otero MD   niacin (NIASPAN) 1000 MG extended release tablet TAKE 1 TABLET NIGHTLY 3/16/20  Yes Erika Otero MD   metoprolol tartrate (LOPRESSOR) 50 MG tablet Take 1 tablet by mouth 2 times daily 20  Yes Erika Otero MD   atorvastatin (LIPITOR) 20 MG tablet Take 1 tablet by mouth daily 20  Yes Erika Otero MD   ELIQUIS 5 MG TABS tablet TAKE 1 TABLET TWICE A DAY 20  Yes Erika Otero MD meclizine (ANTIVERT) 25 MG tablet Take 25 mg by mouth as needed   Yes Historical Provider, MD   fluticasone (FLONASE) 50 MCG/ACT nasal spray USE 1 SPRAY NASALLY DAILY 7/5/19  Yes Katlin Allen MD   amLODIPine (NORVASC) 10 MG tablet Take 1 tablet by mouth daily 6/12/18  Yes Yared Panchal MD   metFORMIN (GLUCOPHAGE) 1000 MG tablet Take 1,000 mg by mouth 2 times daily (with meals)   Yes Historical Provider, MD   glipiZIDE (GLUCOTROL) 10 MG tablet Take 1/2 tablet twice a day. Yes Historical Provider, MD   L[place]man Ber[place] Oil 1000 MG CAPS Take 1 capsule by mouth daily Pt takes 500 mg   Yes Historical Provider, MD   allopurinol (ZYLOPRIM) 100 MG tablet Take 100 mg by mouth daily  1/13/15  Yes Historical Provider, MD   B Complex Vitamins (VITAMIN B COMPLEX PO) Take  by mouth daily. Yes Historical Provider, MD   gabapentin (NEURONTIN) 800 MG tablet Take 800 mg by mouth 3 times daily. Yes Historical Provider, MD     Vitals:    12/21/20 1118 12/21/20 1128   BP: (!) 144/84 138/78   Site: Left Upper Arm    Position: Sitting    Cuff Size: Large Adult    Pulse: 76    Resp: 16    Temp: 97 °F (36.1 °C)    Weight: 292 lb (132.5 kg)    Height: 5' 11\" (1.803 m)       Body mass index is 40.73 kg/m². Wt Readings from Last 3 Encounters:   12/21/20 292 lb (132.5 kg)   12/09/20 290 lb 3.2 oz (131.6 kg)   08/03/20 282 lb 9.6 oz (128.2 kg)     Constitutional: Obese pleasant male in no apparent distress.   Eyes: glasses.  Pupils are equal.  ENT: Wearing a face mask  NECK: No JVP or thyromegaly  Cardiovascular:  Auscultation: Irregular S1 and S2.  9/4 systolic ejection murmur noted in aortic area.  Carotids are negative for bruits.  Abdominal aorta is nonpalpable. no epigastric bruit noted. Peripheral pulses: nonpalpable due to swelling. Respiratory:  Respiratory effort is normal.  Breath sounds are clear to auscultation. Extremities: has bilateral 2+ pitting edema in legs.  Fingertips are cold and noncyanotic. Has delayed capillary refills. Radials are 2+. SKIN: Warm and well perfused, no pallor or cyanosis  Abdomen:  No masses or tenderness. No organomegaly noted. Neurologic:  Oriented to time, place, and person and non-anxious. Has bilateral essential tremors of hands. Psychiatric: Normal mood and effect    Pacer analysis 12/2/2020 is reviewed is consistent with normal dual-chamber MRI safe Medtronic pacer function with stable leads and appropriate battery status for the age of the device. Remaining average battery life is 12 years. Device is 98% pacing in the atrium and sensing in the ventricle.     LAB REVIEW:  CBC:   Lab Results   Component Value Date    WBC 6.4 07/08/2019    HGB 12.8 07/08/2019    HCT 39.1 07/08/2019     07/08/2019     Lipids:   Lab Results   Component Value Date    CHOL 118 12/12/2016    CHOLFAST 120 11/25/2020    TRIG 103 12/12/2016    TRIGLYCFAST 120 11/25/2020    HDL 51 11/25/2020   Direct LDL was 56  Renal:   Lab Results   Component Value Date    BUN 30 11/25/2020    CREATININE 2.1 11/25/2020     11/25/2020    K 3.8 11/25/2020     PT/INR:   Lab Results   Component Value Date    INR 1.57 07/08/2019     Lab Results   Component Value Date    LABA1C 5.2 11/25/2020     IMPRESSION and RECOMMENDATIONS:      Type 2 diabetes mellitus without complication (HCC)  Hemoglobin A1c of 5.2 suggest diabetes is fairly well controlled. Slow capillary refill and tingling and numbness in fingertips suggest possible small vessel disease. Hypertension  Fairly well-controlled on current medications continue the same. Hyperlipidemia  Well-controlled on current medications. Recent LDL was 56. Continue the same. JAYE on CPAP  Continue with CPAP therapy and weight management.

## 2020-12-21 NOTE — PATIENT INSTRUCTIONS
Counseled to elevate feet when resting and when resting in bed at night with pillows underneath and use thigh high compression stockings in the morning and remove them at night. Patient verbalizes understanding. Questions answered. Continue current cardiovascular medications which have been reviewed and discussed individually with you. Appropriate prescriptions if needed on this visit are addressed. After visit summery is provided. Questions answered and patient verbalizes understanding. Follow up in 6 months,  sooner if needed.

## 2020-12-21 NOTE — ASSESSMENT & PLAN NOTE
Hemoglobin A1c of 5.2 suggest diabetes is fairly well controlled. Slow capillary refill and tingling and numbness in fingertips suggest possible small vessel disease.

## 2020-12-21 NOTE — ASSESSMENT & PLAN NOTE
Counseled to elevate feet when resting and when resting in bed at night with pillows underneath and use thigh high compression stockings in the morning and remove them at night. Patient verbalizes understanding. Questions answered.

## 2020-12-28 RX ORDER — APIXABAN 5 MG/1
TABLET, FILM COATED ORAL
Qty: 180 TABLET | Refills: 3 | Status: SHIPPED | OUTPATIENT
Start: 2020-12-28 | End: 2022-01-17 | Stop reason: CLARIF

## 2020-12-30 ENCOUNTER — PROCEDURE VISIT (OUTPATIENT)
Dept: CARDIOLOGY CLINIC | Age: 75
End: 2020-12-30
Payer: MEDICARE

## 2020-12-30 PROCEDURE — 93971 EXTREMITY STUDY: CPT | Performed by: INTERNAL MEDICINE

## 2021-03-01 ENCOUNTER — PROCEDURE VISIT (OUTPATIENT)
Dept: CARDIOLOGY CLINIC | Age: 76
End: 2021-03-01

## 2021-03-01 DIAGNOSIS — I49.5 SSS (SICK SINUS SYNDROME) (HCC): ICD-10-CM

## 2021-03-01 DIAGNOSIS — Z95.0 CARDIAC PACEMAKER IN SITU: ICD-10-CM

## 2021-03-01 DIAGNOSIS — I49.5 BRADY-TACHY SYNDROME (HCC): ICD-10-CM

## 2021-03-01 DIAGNOSIS — Z95.0 S/P PLACEMENT OF CARDIAC PACEMAKER: ICD-10-CM

## 2021-03-01 PROCEDURE — 93296 REM INTERROG EVL PM/IDS: CPT | Performed by: INTERNAL MEDICINE

## 2021-03-01 PROCEDURE — 93294 REM INTERROG EVL PM/LDLS PM: CPT | Performed by: INTERNAL MEDICINE

## 2021-06-07 ENCOUNTER — PROCEDURE VISIT (OUTPATIENT)
Dept: CARDIOLOGY CLINIC | Age: 76
End: 2021-06-07
Payer: MEDICARE

## 2021-06-07 DIAGNOSIS — Z95.0 CARDIAC PACEMAKER IN SITU: ICD-10-CM

## 2021-06-07 DIAGNOSIS — I49.5 SSS (SICK SINUS SYNDROME) (HCC): ICD-10-CM

## 2021-06-07 DIAGNOSIS — I49.5 BRADY-TACHY SYNDROME (HCC): ICD-10-CM

## 2021-06-07 DIAGNOSIS — Z95.0 S/P PLACEMENT OF CARDIAC PACEMAKER: ICD-10-CM

## 2021-06-07 PROCEDURE — 93296 REM INTERROG EVL PM/IDS: CPT | Performed by: INTERNAL MEDICINE

## 2021-06-07 PROCEDURE — 93294 REM INTERROG EVL PM/LDLS PM: CPT | Performed by: INTERNAL MEDICINE

## 2021-06-08 ENCOUNTER — HOSPITAL ENCOUNTER (OUTPATIENT)
Age: 76
Discharge: HOME | End: 2021-06-08
Payer: MEDICARE

## 2021-06-08 LAB
ALBUMIN SERPL-MCNC: 4 GM/DL (ref 3.4–5)
ALP BLD-CCNC: 53 IU/L (ref 40–129)
ALT SERPL-CCNC: 19 U/L (ref 10–40)
ANION GAP SERPL CALCULATED.3IONS-SCNC: 9 MMOL/L (ref 4–16)
AST SERPL-CCNC: 27 IU/L (ref 15–37)
BACTERIA: NEGATIVE /HPF
BILIRUB SERPL-MCNC: 0.7 MG/DL (ref 0–1)
BILIRUBIN URINE: NEGATIVE MG/DL
BLOOD, URINE: NEGATIVE
BUN BLDV-MCNC: 23 MG/DL (ref 6–23)
CALCIUM SERPL-MCNC: 9.7 MG/DL (ref 8.3–10.6)
CAST TYPE: NORMAL /HPF
CHLORIDE BLD-SCNC: 107 MMOL/L (ref 99–110)
CLARITY: CLEAR
CO2: 24 MMOL/L (ref 21–32)
COLOR: YELLOW
CREAT SERPL-MCNC: 1.9 MG/DL (ref 0.9–1.3)
CRYSTAL TYPE: NEGATIVE /HPF
EPITHELIAL CELLS, UA: 1 /HPF
GFR AFRICAN AMERICAN: 42 ML/MIN/1.73M2
GFR NON-AFRICAN AMERICAN: 35 ML/MIN/1.73M2
GLUCOSE FASTING: 62 MG/DL (ref 70–99)
GLUCOSE, URINE: NEGATIVE MG/DL
KETONES, URINE: NEGATIVE MG/DL
LEUKOCYTE ESTERASE, URINE: NEGATIVE
MAGNESIUM: 2 MG/DL (ref 1.8–2.4)
NITRITE URINE, QUANTITATIVE: NEGATIVE
PH, URINE: 6 (ref 5–8)
PHOSPHORUS: 3.3 MG/DL (ref 2.5–4.9)
POTASSIUM SERPL-SCNC: 4.4 MMOL/L (ref 3.5–5.1)
PROTEIN UA: NEGATIVE MG/DL
RBC URINE: NEGATIVE /HPF (ref 0–3)
SODIUM BLD-SCNC: 140 MMOL/L (ref 135–145)
SPECIFIC GRAVITY UA: 1.01 (ref 1–1.03)
TOTAL PROTEIN: 6.4 GM/DL (ref 6.4–8.2)
UROBILINOGEN, URINE: 0.2 MG/DL (ref 0.2–1)
WBC UA: NEGATIVE /HPF (ref 0–2)

## 2021-06-08 PROCEDURE — 81001 URINALYSIS AUTO W/SCOPE: CPT

## 2021-06-08 PROCEDURE — 83735 ASSAY OF MAGNESIUM: CPT

## 2021-06-08 PROCEDURE — 84100 ASSAY OF PHOSPHORUS: CPT

## 2021-06-08 PROCEDURE — 36415 COLL VENOUS BLD VENIPUNCTURE: CPT

## 2021-06-08 PROCEDURE — 80053 COMPREHEN METABOLIC PANEL: CPT

## 2021-06-15 RX ORDER — METOPROLOL TARTRATE 50 MG/1
50 TABLET, FILM COATED ORAL 2 TIMES DAILY
Qty: 180 TABLET | Refills: 3 | Status: SHIPPED | OUTPATIENT
Start: 2021-06-15 | End: 2022-01-05

## 2021-07-13 DIAGNOSIS — I10 ESSENTIAL HYPERTENSION: ICD-10-CM

## 2021-07-13 RX ORDER — LISINOPRIL AND HYDROCHLOROTHIAZIDE 20; 12.5 MG/1; MG/1
TABLET ORAL
Qty: 90 TABLET | Refills: 3 | Status: SHIPPED | OUTPATIENT
Start: 2021-07-13 | End: 2022-02-15 | Stop reason: ALTCHOICE

## 2021-07-26 ENCOUNTER — APPOINTMENT (OUTPATIENT)
Dept: CT IMAGING | Age: 76
End: 2021-07-26
Payer: MEDICARE

## 2021-07-26 ENCOUNTER — APPOINTMENT (OUTPATIENT)
Dept: ULTRASOUND IMAGING | Age: 76
End: 2021-07-26
Payer: MEDICARE

## 2021-07-26 ENCOUNTER — HOSPITAL ENCOUNTER (EMERGENCY)
Age: 76
Discharge: HOME | End: 2021-07-26
Attending: EMERGENCY MEDICINE
Payer: MEDICARE

## 2021-07-26 VITALS
HEART RATE: 65 BPM | OXYGEN SATURATION: 95 % | RESPIRATION RATE: 18 BRPM | TEMPERATURE: 98 F | HEIGHT: 71 IN | SYSTOLIC BLOOD PRESSURE: 125 MMHG | BODY MASS INDEX: 38.92 KG/M2 | DIASTOLIC BLOOD PRESSURE: 75 MMHG | WEIGHT: 278 LBS

## 2021-07-26 DIAGNOSIS — M70.21 OLECRANON BURSITIS OF RIGHT ELBOW: ICD-10-CM

## 2021-07-26 DIAGNOSIS — N18.9 CHRONIC KIDNEY DISEASE, UNSPECIFIED CKD STAGE: Primary | ICD-10-CM

## 2021-07-26 DIAGNOSIS — M79.89 SWELLING OF RIGHT UPPER EXTREMITY: ICD-10-CM

## 2021-07-26 LAB
ALBUMIN SERPL-MCNC: 3.7 GM/DL (ref 3.4–5)
ALP BLD-CCNC: 58 IU/L (ref 40–129)
ALT SERPL-CCNC: 48 U/L (ref 10–40)
ANION GAP SERPL CALCULATED.3IONS-SCNC: 13 MMOL/L (ref 4–16)
AST SERPL-CCNC: 65 IU/L (ref 15–37)
BASOPHILS ABSOLUTE: 0 K/CU MM
BASOPHILS RELATIVE PERCENT: 0.4 % (ref 0–1)
BILIRUB SERPL-MCNC: 0.9 MG/DL (ref 0–1)
BUN BLDV-MCNC: 25 MG/DL (ref 6–23)
CALCIUM SERPL-MCNC: 9.5 MG/DL (ref 8.3–10.6)
CHLORIDE BLD-SCNC: 102 MMOL/L (ref 99–110)
CO2: 22 MMOL/L (ref 21–32)
CREAT SERPL-MCNC: 1.9 MG/DL (ref 0.9–1.3)
DIFFERENTIAL TYPE: ABNORMAL
EOSINOPHILS ABSOLUTE: 0.1 K/CU MM
EOSINOPHILS RELATIVE PERCENT: 1 % (ref 0–3)
GFR AFRICAN AMERICAN: 42 ML/MIN/1.73M2
GFR NON-AFRICAN AMERICAN: 35 ML/MIN/1.73M2
GLUCOSE BLD-MCNC: 137 MG/DL (ref 70–99)
HCT VFR BLD CALC: 36.1 % (ref 42–52)
HEMOGLOBIN: 11.7 GM/DL (ref 13.5–18)
IMMATURE NEUTROPHIL %: 0.5 % (ref 0–0.43)
LACTATE: 2.1 MMOL/L (ref 0.4–2)
LACTATE: 2.8 MMOL/L (ref 0.4–2)
LYMPHOCYTES ABSOLUTE: 0.7 K/CU MM
LYMPHOCYTES RELATIVE PERCENT: 9.1 % (ref 24–44)
MCH RBC QN AUTO: 33.8 PG (ref 27–31)
MCHC RBC AUTO-ENTMCNC: 32.4 % (ref 32–36)
MCV RBC AUTO: 104.3 FL (ref 78–100)
MONOCYTES ABSOLUTE: 0.9 K/CU MM
MONOCYTES RELATIVE PERCENT: 10.6 % (ref 0–4)
PDW BLD-RTO: 13.9 % (ref 11.7–14.9)
PLATELET # BLD: 135 K/CU MM (ref 140–440)
PMV BLD AUTO: 10.3 FL (ref 7.5–11.1)
POTASSIUM SERPL-SCNC: 4.2 MMOL/L (ref 3.5–5.1)
RBC # BLD: 3.46 M/CU MM (ref 4.6–6.2)
SEGMENTED NEUTROPHILS ABSOLUTE COUNT: 6.3 K/CU MM
SEGMENTED NEUTROPHILS RELATIVE PERCENT: 78.4 % (ref 36–66)
SODIUM BLD-SCNC: 137 MMOL/L (ref 135–145)
TOTAL IMMATURE NEUTOROPHIL: 0.04 K/CU MM
TOTAL PROTEIN: 6.7 GM/DL (ref 6.4–8.2)
WBC # BLD: 8 K/CU MM (ref 4–10.5)

## 2021-07-26 PROCEDURE — 96375 TX/PRO/DX INJ NEW DRUG ADDON: CPT

## 2021-07-26 PROCEDURE — 85025 COMPLETE CBC W/AUTO DIFF WBC: CPT

## 2021-07-26 PROCEDURE — 73201 CT UPPER EXTREMITY W/DYE: CPT

## 2021-07-26 PROCEDURE — 6360000004 HC RX CONTRAST MEDICATION: Performed by: EMERGENCY MEDICINE

## 2021-07-26 PROCEDURE — 96366 THER/PROPH/DIAG IV INF ADDON: CPT

## 2021-07-26 PROCEDURE — 87040 BLOOD CULTURE FOR BACTERIA: CPT

## 2021-07-26 PROCEDURE — 80053 COMPREHEN METABOLIC PANEL: CPT

## 2021-07-26 PROCEDURE — 6370000000 HC RX 637 (ALT 250 FOR IP): Performed by: EMERGENCY MEDICINE

## 2021-07-26 PROCEDURE — 83605 ASSAY OF LACTIC ACID: CPT

## 2021-07-26 PROCEDURE — 96367 TX/PROPH/DG ADDL SEQ IV INF: CPT

## 2021-07-26 PROCEDURE — 6360000002 HC RX W HCPCS: Performed by: EMERGENCY MEDICINE

## 2021-07-26 PROCEDURE — 99285 EMERGENCY DEPT VISIT HI MDM: CPT

## 2021-07-26 PROCEDURE — 96365 THER/PROPH/DIAG IV INF INIT: CPT

## 2021-07-26 PROCEDURE — 2580000003 HC RX 258: Performed by: EMERGENCY MEDICINE

## 2021-07-26 PROCEDURE — 93971 EXTREMITY STUDY: CPT

## 2021-07-26 RX ORDER — COLCHICINE 0.6 MG/1
0.6 TABLET ORAL DAILY
Status: DISCONTINUED | OUTPATIENT
Start: 2021-07-26 | End: 2021-07-26 | Stop reason: HOSPADM

## 2021-07-26 RX ORDER — DEXAMETHASONE SODIUM PHOSPHATE 10 MG/ML
10 INJECTION, SOLUTION INTRAMUSCULAR; INTRAVENOUS ONCE
Status: COMPLETED | OUTPATIENT
Start: 2021-07-26 | End: 2021-07-26

## 2021-07-26 RX ORDER — SODIUM CHLORIDE 0.9 % (FLUSH) 0.9 %
5-40 SYRINGE (ML) INJECTION EVERY 12 HOURS SCHEDULED
Status: DISCONTINUED | OUTPATIENT
Start: 2021-07-26 | End: 2021-07-26 | Stop reason: HOSPADM

## 2021-07-26 RX ORDER — SODIUM CHLORIDE 0.9 % (FLUSH) 0.9 %
5-40 SYRINGE (ML) INJECTION PRN
Status: DISCONTINUED | OUTPATIENT
Start: 2021-07-26 | End: 2021-07-26 | Stop reason: HOSPADM

## 2021-07-26 RX ORDER — METHYLPREDNISOLONE 4 MG/1
TABLET ORAL
Qty: 1 KIT | Refills: 0 | Status: SHIPPED | OUTPATIENT
Start: 2021-07-26 | End: 2021-08-09 | Stop reason: ALTCHOICE

## 2021-07-26 RX ORDER — SODIUM CHLORIDE 9 MG/ML
25 INJECTION, SOLUTION INTRAVENOUS PRN
Status: DISCONTINUED | OUTPATIENT
Start: 2021-07-26 | End: 2021-07-26 | Stop reason: HOSPADM

## 2021-07-26 RX ORDER — 0.9 % SODIUM CHLORIDE 0.9 %
1000 INTRAVENOUS SOLUTION INTRAVENOUS ONCE
Status: COMPLETED | OUTPATIENT
Start: 2021-07-26 | End: 2021-07-26

## 2021-07-26 RX ADMIN — VANCOMYCIN HYDROCHLORIDE 3152.5 MG: 1 INJECTION, POWDER, LYOPHILIZED, FOR SOLUTION INTRAVENOUS at 15:58

## 2021-07-26 RX ADMIN — DEXAMETHASONE SODIUM PHOSPHATE 10 MG: 10 INJECTION, SOLUTION INTRAMUSCULAR; INTRAVENOUS at 16:25

## 2021-07-26 RX ADMIN — CEFEPIME HYDROCHLORIDE 2000 MG: 2 INJECTION, POWDER, FOR SOLUTION INTRAVENOUS at 15:22

## 2021-07-26 RX ADMIN — IOPAMIDOL 100 ML: 755 INJECTION, SOLUTION INTRAVENOUS at 15:23

## 2021-07-26 RX ADMIN — SODIUM CHLORIDE 1000 ML: 9 INJECTION, SOLUTION INTRAVENOUS at 16:25

## 2021-07-26 RX ADMIN — COLCHICINE 0.6 MG: 0.6 TABLET ORAL at 17:37

## 2021-07-26 ASSESSMENT — PAIN DESCRIPTION - LOCATION: LOCATION: ARM

## 2021-07-26 ASSESSMENT — PAIN DESCRIPTION - DESCRIPTORS: DESCRIPTORS: ACHING;THROBBING

## 2021-07-26 ASSESSMENT — PAIN SCALES - GENERAL
PAINLEVEL_OUTOF10: 6
PAINLEVEL_OUTOF10: 6

## 2021-07-26 ASSESSMENT — PAIN DESCRIPTION - ORIENTATION: ORIENTATION: RIGHT

## 2021-07-26 ASSESSMENT — PAIN DESCRIPTION - PAIN TYPE: TYPE: ACUTE PAIN

## 2021-07-26 NOTE — ED PROVIDER NOTES
Triage Chief Complaint:   Arm Swelling (right arm started on antibiotics by PCP )      [place]:  Anny Thomas is a 66 y.o. male that presents to the emergency department with swelling in his right upper arm. States it's been there for several days. .  Patient states he saw his PCP and was started on dicloxacillin three days ago. States swelling into right hand. Denies any injury. No fevers or chills. NO chest pain or SOB. No hx DVT or PE. He is on eliquis. Denies any open wounds or scrapes to skin he is aware of,. States it started in right elbow and extended down to hand over last few days. Able to flex and extend fully at elbow, swelling into right bursa. Past Medical History:   Diagnosis Date    Arthritis     Dizziness     FH: CAD (coronary artery disease) 9/29/2015    H/O 24 hour EKG monitoring 02/26/19,08/02/2016    Conclusion: Atrial fibrillation with  fairly well-controlled ventricular rate response without evidence of any significant tachy or alon arrhythmias.  H/O atrial fibrillation without current medication 4/3/2019    H/O cardiovascular stress test 11/24/15; 8/22/2019    Normal perfusion in the distribution of all coronaries, normal LV, EF 53%.  H/O Doppler ultrasound (Venous Doppler Lower Extremities) 03/08/2017; 1/2/2020    No evidence of DVT or SVT in the bilaterally. No significant reflux noted in the veins of the right lower extremity. Significant reflux noted in the Left CFV. Bilateral Calf and ankle edema noted.  H/O echocardiogram 6/6/17,11/24/15    Left ventricle mildly dilated with normal systolic function EF 66-45% Mild concentric LVH with Grade II diastolic dysfunction. Mild to moderate MR and mild TR     H/O echocardiogram 06/07/2018; 2/26/2019    EF 55-60%. Mild concentric left ventricular hypertrophy. The left atrium is moderately dilated. Dilatation of the aortic root measuring 4.0 cm. Mild AR.  Moderate pulmonary HTN.     H/O transesophageal echocardiography (SALAZAR) for monitoring 07/08/2019    No CEE clot    Heart murmur 9/29/2015    Hx of Venous Doppler ultrasound 08/03/2020    No evidence of DVT in the left CFV. The Left GSV is non-compressible with no evidence of flow just past the saphenofemoral junction to the distal calf.         Hyperlipidemia     Hypertension     Kidney stones     Leg swelling 12/9/2019    Bilateral leg swelling    Neuropathy     Obesity     JAYE on CPAP 6/26/2018    On CPAP 5/2018    Persistent atrial fibrillation ([address] 1/29/2019    New onset 1/2019    Syncope and collapse 12/9/2019    Thoracic aortic aneurysm ([address] 2/11    4.5    Type II or unspecified type diabetes mellitus without mention of complication, not stated as uncontrolled      Past Surgical History:   Procedure Laterality Date    CARDIOVERSION  07/08/2019    CATARACT REMOVAL WITH IMPLANT Right 04/12/2019    CATARACT REMOVAL WITH IMPLANT Left 05/10/2019    COLON SURGERY  2007     COLONOSCOPY  2900;[id number]    colon polyps removed    HERNIA REPAIR  2008    HERNIA REPAIR  2009    INTRACAPSULAR CATARACT EXTRACTION Right 4/12/2019    EYE CATARACT EMULSIFICATION IOL IMPLANT performed by Katy Rivera MD at [address] [place] [address] Left 5/10/2019    EYE CATARACT EMULSIFICATION IOL IMPLANT performed by Katy Rivera MD at [address] [facility] Left 06/25/2019    [employer] dual permanent pacer-Val XT DR ENEIDA Vaughan      Family History   Problem Relation Age of Onset    Diabetes Mother     Heart Disease Father     Heart Disease Brother     High Blood Pressure Brother      Social History     Socioeconomic History    Marital status:      Spouse name: Not on file    Number of children: 2    Years of education: Not on file    Highest education level: Not on file   Occupational History    Occupation: factory     Employer: [employer]     Comment: retired    Occupation: Farm   Tobacco Use    Smoking status: Former Smoker     Packs/day: 1.00     Years: 7.00     Pack years: 7.00     Quit date: 3/26/1973     Years since quittin.3    Smokeless tobacco: Former User   Vaping Use    Vaping Use: Never used   Substance and Sexual Activity    Alcohol use: Yes     Alcohol/week: 3.0 standard drinks     Types: 3 Shots of liquor per week     Comment: small amount some nights    Drug use: No    Sexual activity: Yes     Partners: Female     Comment:    Other Topics Concern    Not on file   Social History Narrative          Social Determinants of Health     Financial Resource Strain:     Difficulty of Paying Living Expenses:    Food Insecurity:     Worried About Running Out of Food in the Last Year:     [address] in the Last Year:    Transportation Needs:     Lack of Transportation (Medical):      Lack of Transportation (Non-Medical):    Physical Activity:     Days of Exercise per Week:     Minutes of Exercise per Session:    Stress:     Feeling of Stress :    Social Connections:     Frequency of Communication with Friends and Family:     Frequency of Social Gatherings with Friends and Family:     Attends Congregational Services:     Active Member of Clubs or Organizations:     Attends Club or Organization Meetings:     Marital Status:    Intimate Partner Violence:     Fear of Current or Ex-Partner:     Emotionally Abused:     Physically Abused:     Sexually Abused:      Current Facility-Administered Medications   Medication Dose Route Frequency Provider Last Rate Last Admin    sodium chloride flush 0.9 % injection 5-40 mL  5-40 mL Intravenous 2 times per day Elio Cazares MD        sodium chloride flush 0.9 % injection 5-40 mL  5-40 mL Intravenous PRN Elio Cazares MD        0.9 % sodium chloride infusion  25 mL Intravenous PRN Elio Cazares MD        colchicine (COLCRYS) tablet 0.6 mg  0.6 mg Oral Daily Elio Cazares MD   0.6 mg at 21 [id number]     Current Outpatient Medications   Medication Sig Dispense Refill    methylPREDNISolone (MEDROL DOSEPACK) 4 MG tablet Take by mouth. 1 kit 0    dicloxacillin (DYNAPEN) 500 MG capsule TAKE 1 CAPSULE BY MOUTH EVERY 6 HOURS UNTIL FINISHED      lisinopril-hydroCHLOROthiazide (PRINZIDE;ZESTORETIC) 20-12.5 MG per tablet TAKE 1 TABLET BY MOUTH EVERY DAY 90 tablet 3    fluticasone (FLONASE) 50 MCG/ACT nasal spray 1 spray by Each Nostril route daily 2 Bottle 1    metoprolol tartrate (LOPRESSOR) 50 MG tablet Take 1 tablet by mouth 2 times daily 180 tablet 3    ELIQUIS 5 MG TABS tablet TAKE 1 TABLET TWICE A  tablet 3    Compression Stockings MISC by Does not apply route 20-30 mmg thigh high stockings wear daily when up and remove at night at bedtime. 2 each 0    magnesium oxide (MAG-OX) 400 MG tablet TAKE 1 TABLET BY MOUTH EVERY DAY 90 tablet 3    niacin (NIASPAN) 1000 MG extended release tablet TAKE 1 TABLET NIGHTLY 90 tablet 3    atorvastatin (LIPITOR) 20 MG tablet Take 1 tablet by mouth daily 90 tablet 3    meclizine (ANTIVERT) 25 MG tablet Take 25 mg by mouth as needed      fluticasone (FLONASE) 50 MCG/ACT nasal spray USE 1 SPRAY NASALLY DAILY 16 g 3    amLODIPine (NORVASC) 10 MG tablet Take 1 tablet by mouth daily 90 tablet 3    metFORMIN (GLUCOPHAGE) 1000 MG tablet Take 1,000 mg by mouth 2 times daily (with meals)      Krill Oil 1000 MG CAPS Take 1 capsule by mouth daily Pt takes 500 mg      allopurinol (ZYLOPRIM) 100 MG tablet Take 100 mg by mouth daily   0    B Complex Vitamins (VITAMIN B COMPLEX PO) Take  by mouth daily.  gabapentin (NEURONTIN) 800 MG tablet Take 800 mg by mouth 3 times daily. Allergies   Allergen Reactions    Oxycontin [Oxycodone Hcl] Hives     Itching in his feet     Nursing Notes Reviewed    ROS:  At least 10 systems reviewed and otherwise negative except as in the [address]     Physical Exam:  ED Triage Vitals [07/26/21 1353]   Enc Vitals Group      /79      Pulse 81      Resp 18      Temp 98 °F (36.7 °C) Temp src       SpO2 95 %      Weight 278 lb (126.1 kg)      Height 5' 11\" (1.803 m)      Head Circumference       Peak Flow       Pain Score       Pain Loc       Pain Edu? Excl. in [address]? My pulse oximetry interpretation is which is within the normal range    GENERAL APPEARANCE: Awake and alert. Cooperative. No acute distress. HEAD: Normocephalic. Atraumatic. EYES: EOM's grossly intact. Sclera anicteric. ENT: Mucous membranes are moist. Tolerates saliva. No trismus. NECK: Supple. No meningismus. Trachea midline. HEART: RRR. Radial pulses 2+. LUNGS: Respirations unlabored. CTAB  ABDOMEN: Soft. Non-tender. No guarding or rebound. EXTREMITIES: Patient with swelling from right elbow bursa into her right hand. Warmth to touch. Able to flex and extend at elbow. Able to pronate and supinate. + sensation intact. + cap refill. SKIN: Warm and dry. NEUROLOGICAL: No gross facial drooping. Moves all 4 extremities spontaneously. PSYCHIATRIC: Normal mood.     I have reviewed and interpreted all of the currently available lab results from this visit (if applicable):  Results for orders placed or performed during the hospital encounter of 07/26/21   CBC with Auto Diff   Result Value Ref Range    WBC 8.0 4.0 - 10.5 K/CU MM    RBC 3.46 (L) 4.6 - 6.2 M/CU MM    Hemoglobin 11.7 (L) 13.5 - 18.0 GM/DL    Hematocrit 36.1 (L) 42 - 52 %    .3 (H) 78 - 100 FL    MCH 33.8 (H) 27 - 31 PG    MCHC 32.4 32.0 - 36.0 %    RDW 13.9 11.7 - 14.9 %    Platelets 849 (L) 791 - 440 K/CU MM    MPV 10.3 7.5 - 11.1 FL    Differential Type AUTOMATED DIFFERENTIAL     Segs Relative 78.4 (H) 36 - 66 %    Lymphocytes % 9.1 (L) 24 - 44 %    Monocytes % 10.6 (H) 0 - 4 %    Eosinophils % 1.0 0 - 3 %    Basophils % 0.4 0 - 1 %    Segs Absolute 6.3 K/CU MM    Lymphocytes Absolute 0.7 K/CU MM    Monocytes Absolute 0.9 K/CU MM    Eosinophils Absolute 0.1 K/CU MM    Basophils Absolute 0.0 K/CU MM    Immature Neutrophil % 0.5 (H) 0 - 0.43 % Total Immature Neutrophil 0.04 K/CU MM   CMP   Result Value Ref Range    Sodium 137 135 - 145 MMOL/L    Potassium 4.2 3.5 - 5.1 MMOL/L    Chloride 102 99 - 110 mMol/L    CO2 22 21 - 32 MMOL/L    BUN 25 (H) 6 - 23 MG/DL    CREATININE 1.9 (H) 0.9 - 1.3 MG/DL    Glucose 137 (H) 70 - 99 MG/DL    Calcium 9.5 8.3 - 10.6 MG/DL    Albumin 3.7 3.4 - 5.0 GM/DL    Total Protein 6.7 6.4 - 8.2 GM/DL    Total Bilirubin 0.9 0.0 - 1.0 MG/DL    ALT 48 (H) 10 - 40 U/L    AST 65 (H) 15 - 37 IU/L    Alkaline Phosphatase 58 40 - 129 IU/L    GFR Non- 35 (L) >60 mL/min/1.73m2    GFR  42 (L) >60 mL/min/1.73m2    Anion Gap 13 4 - 16   Lactate, Plasma   Result Value Ref Range    Lactate 2.8 (HH) 0.4 - 2.0 mMOL/L   Lactic Acid, Plasma   Result Value Ref Range    Lactate 2.1 (HH) 0.4 - 2.0 mMOL/L        Radiographs:  [] Radiologist's Wet Read Report Reviewed:      VL DUP UPPER EXTREMITY VENOUS RIGHT (Final result)  Result time 07/26/21 14:51:57  Final result by Sandie Juarez MD (07/26/21 14:51:57)                Impression:    No evidence of DVT. Narrative:    EXAMINATION:   DUPLEX ULTRASOUND OF THE RIGHT UPPER EXTREMITY FOR DVT, 7/26/2021 2:01 pm     TECHNIQUE:   Duplex ultrasound using B-mode/gray scaled imaging and Doppler spectral   analysis and color flow was obtained of the deep venous structures of the   upper extremity. COMPARISON:   None. HISTORY:   ORDERING SYSTEM PROVIDED HISTORY: swollen, warm, rule out dvt   TECHNOLOGIST PROVIDED HISTORY:   Reason for exam:->swollen, warm, rule out dvt   Reason for Exam: edema     FINDINGS:   There is normal flow and compressibility of the visualized venous structures. There is no evidence of echogenic thrombus. The veins demonstrate good   compressibility with normal color flow study and spectral analysis.                   CT HAND RIGHT W CONTRAST (Preliminary result)  Result time 07/26/21 15:55:46  Preliminary result by Becki Evans MD (07/26/21 15:55:46)                Impression:    Peripherally enhancing collection posterior to the olecranon, incompletely   imaged, containing calcifications.  Based on location, this is favored to   represent olecranon bursitis.  This is potentially chronic in nature given   the presence of calcifications.  This could be sterile or infected. Generalized subcutaneous edema about the visualized right upper extremity   compatible with cellulitis.  No other findings to suggest a drainable   abscess.  No soft tissue gas is seen. Moderate degenerative changes at the elbow joint.  Nonspecific elbow joint   effusion. Advanced degenerative changes at the wrist with dorsal subluxation/near   dislocation of the capitate in relation to the lunate.  Multiple lucencies in   the wrist favored to represent intraosseous cysts due to the presents of   advanced degenerative change.  Chronic erosions therefore felt to be less   likely. No acute bony abnormality identified. Narrative:    EXAMINATION:   CT OF THE RIGHT HAND WITH CONTRAST; CT OF THE FOREARM WITH CONTRAST; CT OF   THE RIGHT ELBOW WITH CONTRAST 7/26/2021 3:23 pm     TECHNIQUE:   CT of the right hand was performed with the administration of intravenous   contrast.  Multiplanar reformatted images are provided for review. Dose   modulation, iterative reconstruction, and/or weight based adjustment of the   mA/kV was utilized to reduce the radiation dose to as low as reasonably   achievable.; CT of the forearm was performed with the administration of   intravenous contrast.  Multiplanar reformatted images are provided for   review. Dose modulation, iterative reconstruction, and/or weight based   adjustment of the mA/kV was utilized to reduce the radiation dose to as low   as reasonably achievable.; CT of the right elbow was performed with the   administration of intravenous contrast.  Multiplanar reformatted images are   provided for review.  Dose modulation, iterative reconstruction, and/or weight   based adjustment of the mA/kV was utilized to reduce the radiation dose to as   low as reasonably achievable. COMPARISON:   Right hand radiographs 02/15/2012     HISTORY   ORDERING SYSTEM PROVIDED HISTORY: swelling, warmth   TECHNOLOGIST PROVIDED HISTORY:   Reason for exam:->swelling, warmth   Decision Support Exception - unselect if not a suspected or confirmed   emergency medical condition->Emergency Medical Condition (MA)     FINDINGS:   Images are acquired from the distal humerus to the distal hand. There is an elbow joint effusion.  Nonspecific osseous prominence at the   anterior and posterior aspect of the distal humerus at the level of the fat   pads.  No acute fracture is seen at the elbow.  No evidence of dislocation. Moderate degenerative changes seen at the elbow. No acute fracture or evidence of dislocation involving the forearm. No acute fracture is seen in the visualized portions of the hand.  There is   dorsal subluxation/near dislocation of the capitate in relation to the lunate   with advanced degenerative changes at the lunate capitate joint.  Severe   triscaphe joint osteoarthrosis.  Severe 1st CMC joint osteoarthrosis.  There   are multiple lucencies throughout the carpal bones as well as the distal ulna.      The soft tissues posterior to the olecranon have not been fully included in   the field of view.  There appears to be a peripherally enhancing collection   containing calcifications, suspicious for olecranon bursitis.  The presence   of calcifications would suggest this is a chronic finding.  Generalized   subcutaneous edema and nonencapsulated fluid is seen from the level of the   distal humerus to the hand compatible with cellulitis.  No well defined   peripherally enhancing fluid collection is seen to suggest a drainable   abscess.  No evidence of soft tissue gas.                 Preliminary result by [facility] in   the wrist favored to represent intraosseous cysts due to the presents of   advanced degenerative change.  Chronic erosions therefore felt to be less   likely. No acute bony abnormality identified.                     CT ELBOW RIGHT W CONTRAST (Preliminary result)  Result time 07/26/21 15:55:46  Preliminary result by Alisson Min MD (07/26/21 15:55:46)                Impression:    Peripherally enhancing collection posterior to the olecranon, incompletely   imaged, containing calcifications.  Based on location, this is favored to   represent olecranon bursitis.  This is potentially chronic in nature given   the presence of calcifications.  This could be sterile or infected. Generalized subcutaneous edema about the visualized right upper extremity   compatible with cellulitis.  No other findings to suggest a drainable   abscess.  No soft tissue gas is seen. Moderate degenerative changes at the elbow joint.  Nonspecific elbow joint   effusion. Advanced degenerative changes at the wrist with dorsal subluxation/near   dislocation of the capitate in relation to the lunate.  Multiple lucencies in   the wrist favored to represent intraosseous cysts due to the presents of   advanced degenerative change.  Chronic erosions therefore felt to be less   likely. No acute bony abnormality identified. Narrative:    EXAMINATION:   CT OF THE RIGHT HAND WITH CONTRAST; CT OF THE FOREARM WITH CONTRAST; CT OF   THE RIGHT ELBOW WITH CONTRAST 7/26/2021 3:23 pm     TECHNIQUE:   CT of the right hand was performed with the administration of intravenous   contrast.  Multiplanar reformatted images are provided for review.  Dose   modulation, iterative reconstruction, and/or weight based adjustment of the   mA/kV was utilized to reduce the radiation dose to as low as reasonably   achievable.; CT of the forearm was performed with the administration of   intravenous contrast.  Multiplanar reformatted images are provided for   review. Dose modulation, iterative reconstruction, and/or weight based   adjustment of the mA/kV was utilized to reduce the radiation dose to as low   as reasonably achievable.; CT of the right elbow was performed with the   administration of intravenous contrast.  Multiplanar reformatted images are   provided for review. Dose modulation, iterative reconstruction, and/or weight   based adjustment of the mA/kV was utilized to reduce the radiation dose to as   low as reasonably achievable. COMPARISON:   Right hand radiographs 02/15/2012     HISTORY   ORDERING SYSTEM PROVIDED HISTORY: swelling, warmth   TECHNOLOGIST PROVIDED HISTORY:   Reason for exam:->swelling, warmth   Decision Support Exception - unselect if not a suspected or confirmed   emergency medical condition->Emergency Medical Condition (MA)     FINDINGS:   Images are acquired from the distal humerus to the distal hand. There is an elbow joint effusion.  Nonspecific osseous prominence at the   anterior and posterior aspect of the distal humerus at the level of the fat   pads.  No acute fracture is seen at the elbow.  No evidence of dislocation. Moderate degenerative changes seen at the elbow. No acute fracture or evidence of dislocation involving the forearm. No acute fracture is seen in the visualized portions of the hand.  There is   dorsal subluxation/near dislocation of the capitate in relation to the lunate   with advanced degenerative changes at the lunate capitate joint.  Severe   triscaphe joint osteoarthrosis.  Severe 1st CMC joint osteoarthrosis.  There   are multiple lucencies throughout the carpal bones as well as the distal ulna.      The soft tissues posterior to the olecranon have not been fully included in   the field of view.  There appears to be a peripherally enhancing collection   containing calcifications, suspicious for olecranon bursitis.  The presence   of calcifications would suggest this is a chronic finding.  Generalized   subcutaneous edema and nonencapsulated fluid is seen from the level of the   distal humerus to the hand compatible with cellulitis.  No well defined   peripherally enhancing fluid collection is seen to suggest a drainable   abscess.  No evidence of soft tissue gas.                 Preliminary result by Dianna Morales MD (07/26/21 15:47:17)                Impression:    Peripherally enhancing collection posterior to the olecranon, incompletely   imaged, containing calcifications.  Based on location, this is favored to   represent olecranon bursitis.  This is potentially chronic in nature given   the presence of calcifications.  This could be sterile or infected. Generalized subcutaneous edema about the visualized right upper extremity   compatible with cellulitis.  No other findings to suggest a drainable   abscess.  No soft tissue gas is seen. Moderate degenerative changes at the elbow joint.  Nonspecific elbow joint   effusion. Advanced degenerative changes at the wrist with dorsal subluxation/near   dislocation of the capitate in relation to the lunate.  Multiple lucencies in   the wrist favored to represent intraosseous cysts due to the presents of   advanced degenerative change.  Chronic erosions therefore felt to be less   likely. No acute bony abnormality identified.                     VL DUP UPPER EXTREMITY VENOUS RIGHT (Final result)  Result time 07/26/21 14:51:57  Final result by Estrellita Schaefer MD (07/26/21 14:51:57)                Impression:    No evidence of DVT. Narrative:    EXAMINATION:   DUPLEX ULTRASOUND OF THE RIGHT UPPER EXTREMITY FOR DVT, 7/26/2021 2:01 pm     TECHNIQUE:   Duplex ultrasound using B-mode/gray scaled imaging and Doppler spectral   analysis and color flow was obtained of the deep venous structures of the   upper extremity. COMPARISON:   None.      HISTORY:   ORDERING SYSTEM PROVIDED HISTORY: swollen, warm, rule out dvt TECHNOLOGIST PROVIDED HISTORY:   Reason for exam:->swollen, warm, rule out dvt   Reason for Exam: edema     FINDINGS:   There is normal flow and compressibility of the visualized venous structures. There is no evidence of echogenic thrombus. The veins demonstrate good   compressibility with normal color flow study and spectral analysis. [] Discussed with Radiologist:     [] The following radiograph was interpreted by myself in the absence of a radiologist:     EKG: (All EKG's are interpreted by myself in the absence of a cardiologist)      MDM:  Patient normotensive. Afebrile. Heart rate in the 80s. Sats 95% on room air. Patient taking dicloxacillin q6hr for last three days. CBC shows normal white count of 8.0. hemoglobin 11.7. CMP shows a creatinine of 1.9. Normal CO2 of 22. Anion gap of 13. Lactic acid elevated at 2.8. Doppler negative for DVT, Patient started on cefepime and vancomycin IV. CT RUE shows peripherally enhancing collection posterior to the olecranon with calcifications does appear to be olecranon bursitis. Generalized subcutaneous edema about the visualized right upper extremity. No drainable abscess. Moderate DJD at elbow. DJD changes at the wrist intraosseous cysts in the wrist due to advanced DJD. Amanda Feliciano Spoke with Dr. Wally Witt and sent photos of patient's arm and discussed results. He does not feel this is a infection but more so olecranon bursitis with some secondary inflammation of arthritis. Asked me to give him steroids and colchicine. Did state patient is likely okay for outpatient follow-up with him with anti-inflammatories and steroids. Because of his elevated lactic acid I did discuss with patient I will give him Decadron IV, colchicine however I will continue the antibiotics I have started as well as IV fluids and repeat a lactic acid to make sure it is improving. Patient and wife are comfortable with this plan. Repeat lactic acid is improved to 2.1.   We will place an Ace wrap around hand to elbow. Recommended keeping elevated above his heart and propped up when he gets home. Might Ace wrap elbow and wrist.  Follow-up with Dr. Allen Masterson, call his office tomorrow. Has received steroids here in colchicine. Will discharge with Medrol. Follow-up Ortho. Return to the ED if high fevers, worsening pain. Clinical Impression:  1. Chronic kidney disease, unspecified CKD stage    2. Swelling of right upper extremity    3. Olecranon bursitis of right elbow        Disposition Vitals:  [unfilled], [unfilled], [unfilled], [unfilled]    Disposition referral (if applicable):  Christopher Ross DO  [address]  [phone]    Call         Disposition medications (if applicable):  New Prescriptions    METHYLPREDNISOLONE (MEDROL DOSEPACK) 4 MG TABLET    Take by mouth.          (Please note that portions of this note may have been completed with a voice recognition program. Efforts were made to edit the dictations but occasionally words are mis-transcribed.)    MD Chuck Mccarty MD  07/26/21 [id number]

## 2021-07-28 ENCOUNTER — OFFICE VISIT (OUTPATIENT)
Dept: ORTHOPEDIC SURGERY | Age: 76
End: 2021-07-28
Payer: MEDICARE

## 2021-07-28 VITALS
WEIGHT: 287.8 LBS | HEART RATE: 78 BPM | OXYGEN SATURATION: 97 % | HEIGHT: 71 IN | RESPIRATION RATE: 16 BRPM | BODY MASS INDEX: 40.29 KG/M2

## 2021-07-28 DIAGNOSIS — M70.21 OLECRANON BURSITIS OF RIGHT ELBOW: Primary | ICD-10-CM

## 2021-07-28 DIAGNOSIS — M15.4: ICD-10-CM

## 2021-07-28 PROCEDURE — 4040F PNEUMOC VAC/ADMIN/RCVD: CPT | Performed by: ORTHOPAEDIC SURGERY

## 2021-07-28 PROCEDURE — 1123F ACP DISCUSS/DSCN MKR DOCD: CPT | Performed by: ORTHOPAEDIC SURGERY

## 2021-07-28 PROCEDURE — G8417 CALC BMI ABV UP PARAM F/U: HCPCS | Performed by: ORTHOPAEDIC SURGERY

## 2021-07-28 PROCEDURE — G8428 CUR MEDS NOT DOCUMENT: HCPCS | Performed by: ORTHOPAEDIC SURGERY

## 2021-07-28 PROCEDURE — 1036F TOBACCO NON-USER: CPT | Performed by: ORTHOPAEDIC SURGERY

## 2021-07-28 PROCEDURE — 99203 OFFICE O/P NEW LOW 30 MIN: CPT | Performed by: ORTHOPAEDIC SURGERY

## 2021-07-28 ASSESSMENT — ENCOUNTER SYMPTOMS
SHORTNESS OF BREATH: 0
COLOR CHANGE: 0

## 2021-07-28 NOTE — PATIENT INSTRUCTIONS
Complete steroid as directed  Continue to rest and elevate as needed  Weightbearing and activities as tolerated  Apply Ace wrap as needed   Follow up as needed

## 2021-07-28 NOTE — PROGRESS NOTES
Subjective:      Patient ID: Reyes Flaming is a 66 y.o. male. Patient presents to the office as an ED follow up from 7/26/21 when patient presented to the ED with swelling and pain in his right upper arm with pain at the time radiating from the right elbow to the hand. He was given steroids and colchicine and placed in Ace wrap and advised to continue to elevate the extremity upon returning to home. He continues to take the prescribed steroids and has been continuing with the Ace wrap with elevation with continued constant, aching within the elbow and dorsal aspect of the right hand with swelling and pain improved. Pain is rated at a 4/10 with numbness and tingling of the hand which he contributes to his neuropathy. No previous injury or worsening of symptoms. CT HAND RIGHT WO CONTRAST    Impression  Peripherally enhancing collection posterior to the olecranon, incompletely  imaged, containing calcifications.  Based on location, this is favored to  represent olecranon bursitis.  This is potentially chronic in nature given  the presence of calcifications.  This could be sterile or infected.     Generalized subcutaneous edema about the visualized right upper extremity  compatible with cellulitis.  No other findings to suggest a drainable  abscess.  No soft tissue gas is seen.     Moderate degenerative changes at the elbow joint.  Nonspecific elbow joint  effusion.     Advanced degenerative changes at the wrist with dorsal subluxation/near  dislocation of the capitate in relation to the lunate.  Multiple lucencies in  the wrist favored to represent intraosseous cysts due to the presents of  advanced degenerative change.  Chronic erosions therefore felt to be less  likely.     No acute bony abnormality identified.       He comes in today for his first visit with me in regards to his right elbow and wrist pain and swelling.   He states that beginning about a week ago he had severe pain and swelling which originated in the right elbow and then progressed down into his right wrist and hand. Since he was seen in the ED he has been taking the Medrol Dosepak and his pain and swelling have significantly improved. Patient denies any new injury to the involved extremity/ joint, denies numbness or tingling in the involved extremity and denies fever or chills. He does see a nephrologist for his gout. Review of Systems   Constitutional: Negative for activity change, chills and fever. Respiratory: Negative for shortness of breath. Cardiovascular: Negative for chest pain. Musculoskeletal: Positive for arthralgias and joint swelling. Negative for gait problem and myalgias. Skin: Negative for color change, pallor, rash and wound. Neurological: Negative for weakness and numbness. Past Medical History:   Diagnosis Date    Arthritis     Dizziness     FH: CAD (coronary artery disease) 9/29/2015    H/O 24 hour EKG monitoring 02/26/19,08/02/2016    Conclusion: Atrial fibrillation with  fairly well-controlled ventricular rate response without evidence of any significant tachy or alon arrhythmias.  H/O atrial fibrillation without current medication 4/3/2019    H/O cardiovascular stress test 11/24/15; 8/22/2019    Normal perfusion in the distribution of all coronaries, normal LV, EF 53%.  H/O Doppler ultrasound (Venous Doppler Lower Extremities) 03/08/2017; 1/2/2020    No evidence of DVT or SVT in the bilaterally. No significant reflux noted in the veins of the right lower extremity. Significant reflux noted in the Left CFV. Bilateral Calf and ankle edema noted.  H/O echocardiogram 6/6/17,11/24/15    Left ventricle mildly dilated with normal systolic function EF 20-94% Mild concentric LVH with Grade II diastolic dysfunction. Mild to moderate MR and mild TR     H/O echocardiogram 06/07/2018; 2/26/2019    EF 55-60%. Mild concentric left ventricular hypertrophy. The left atrium is moderately dilated. Dilatation of the aortic root measuring 4.0 cm. Mild AR. Moderate pulmonary HTN.     H/O transesophageal echocardiography (SALAZAR) for monitoring 07/08/2019    No CEE clot    Heart murmur 9/29/2015    Hx of Venous Doppler ultrasound 08/03/2020    No evidence of DVT in the left CFV. The Left GSV is non-compressible with no evidence of flow just past the saphenofemoral junction to the distal calf.  Hyperlipidemia     Hypertension     Kidney stones     Leg swelling 12/9/2019    Bilateral leg swelling    Neuropathy     Obesity     JAYE on CPAP 6/26/2018    On CPAP 5/2018    Persistent atrial fibrillation ([address] 1/29/2019    New onset 1/2019    Syncope and collapse 12/9/2019    Thoracic aortic aneurysm ([address] 2/11    4.5    Type II or unspecified type diabetes mellitus without mention of complication, not stated as uncontrolled        Objective:   Physical Exam  Constitutional:       Appearance: He is well-developed. HENT:      Head: Normocephalic and atraumatic. Eyes:      Pupils: Pupils are equal, round, and reactive to light. Pulmonary:      Effort: Pulmonary effort is normal.   Musculoskeletal:         General: Swelling present. No deformity. Normal range of motion. Right elbow: Swelling present. No deformity, effusion or lacerations. Normal range of motion. Tenderness present in olecranon process. No radial head, medial epicondyle or lateral epicondyle tenderness. Left elbow: Normal. No swelling, deformity, effusion or lacerations. Normal range of motion. No tenderness. No radial head, medial epicondyle, lateral epicondyle or olecranon process tenderness. Right wrist: Swelling present. No deformity, effusion, lacerations, tenderness, bony tenderness or crepitus. Normal range of motion. Left wrist: Normal. No swelling, deformity, effusion, lacerations, tenderness, bony tenderness or crepitus. Normal range of motion. Cervical back: Normal range of motion.    Skin: General: Skin is warm and dry. Capillary Refill: Capillary refill takes less than 2 seconds. Coloration: Skin is not pale. Findings: No erythema or rash. Neurological:      Mental Status: He is alert and oriented to person, place, and time. Right elbow-Skin intact with no erythema, ecchymosis or lacerations present. Moderate olecranon bursitis  Full range of motion. Right wrist-Skin intact with no erythema, ecchymosis or lacerations present. Mild swelling  Flexion 75  Extension 75    Assessment:      Right wrist DJD, moderate to severe  Right elbow olecranon bursitis      Plan:      I discussed with him today his CT findings. I explained to him that he does have bursitis in the right elbow as well as arthritis in the right wrist.  I explained to him that this was likely a flareup of the arthritis or could be a flareup of the gout in both his elbow and wrist.  In either case given his improved symptoms we will continue with conservative treatment. I recommend that he continue with the Medrol Dosepak. If his symptoms persist he may need colchicine. I recommend that he follow-up with his urologist for further evaluation of his gout treatment which she is currently taking allopurinol. Continue weight-bearing as tolerated. Continue range of motion exercises as instructed. Ice and elevate as needed. Tylenol or Motrin for pain. Follow up as needed.         Anyi Guerra, DO

## 2021-07-31 LAB
CULTURE: NORMAL
CULTURE: NORMAL
Lab: NORMAL
Lab: NORMAL
SPECIMEN: NORMAL
SPECIMEN: NORMAL

## 2021-08-09 ENCOUNTER — OFFICE VISIT (OUTPATIENT)
Dept: CARDIOLOGY CLINIC | Age: 76
End: 2021-08-09
Payer: MEDICARE

## 2021-08-09 VITALS
WEIGHT: 273 LBS | DIASTOLIC BLOOD PRESSURE: 76 MMHG | SYSTOLIC BLOOD PRESSURE: 122 MMHG | HEART RATE: 76 BPM | HEIGHT: 71 IN | BODY MASS INDEX: 38.22 KG/M2

## 2021-08-09 DIAGNOSIS — E78.2 MIXED HYPERLIPIDEMIA: ICD-10-CM

## 2021-08-09 DIAGNOSIS — Z99.89 OSA ON CPAP: ICD-10-CM

## 2021-08-09 DIAGNOSIS — G47.33 OSA ON CPAP: ICD-10-CM

## 2021-08-09 DIAGNOSIS — E11.9 TYPE 2 DIABETES MELLITUS WITHOUT COMPLICATION, WITHOUT LONG-TERM CURRENT USE OF INSULIN (HCC): ICD-10-CM

## 2021-08-09 DIAGNOSIS — Z95.0 S/P PLACEMENT OF CARDIAC PACEMAKER: ICD-10-CM

## 2021-08-09 DIAGNOSIS — I48.19 PERSISTENT ATRIAL FIBRILLATION (HCC): Primary | ICD-10-CM

## 2021-08-09 DIAGNOSIS — I10 ESSENTIAL HYPERTENSION: ICD-10-CM

## 2021-08-09 PROCEDURE — G8417 CALC BMI ABV UP PARAM F/U: HCPCS | Performed by: INTERNAL MEDICINE

## 2021-08-09 PROCEDURE — 4040F PNEUMOC VAC/ADMIN/RCVD: CPT | Performed by: INTERNAL MEDICINE

## 2021-08-09 PROCEDURE — G8427 DOCREV CUR MEDS BY ELIG CLIN: HCPCS | Performed by: INTERNAL MEDICINE

## 2021-08-09 PROCEDURE — 1036F TOBACCO NON-USER: CPT | Performed by: INTERNAL MEDICINE

## 2021-08-09 PROCEDURE — 99214 OFFICE O/P EST MOD 30 MIN: CPT | Performed by: INTERNAL MEDICINE

## 2021-08-09 PROCEDURE — 1123F ACP DISCUSS/DSCN MKR DOCD: CPT | Performed by: INTERNAL MEDICINE

## 2021-08-09 NOTE — PATIENT INSTRUCTIONS
Continue anticoagulation with Eliquis. Continue CPAP therapy and lose weight. Continue current other  cardiovascular medications which have been reviewed and discussed individually with you. Continue device check as per care link schedule. Follow up with Dr Mar Gibson for low sugars and low hemoglobin A1c. counseled for fall precautions. Follow-up in 6 months, sooner if needed.

## 2021-08-09 NOTE — PROGRESS NOTES
Narayan Hernandez (:  1945) is a 66 y.o. male,     Chief Complaint   Patient presents with    Atrial Fibrillation     OV for 6 month check. Pt denies any chest pain, SOB,some dizziness but has vertigo, some swelling to ankles sometimes, no palpitations.  Coronary Artery Disease    Hypertension    Hyperlipidemia     Patient is here for follow up for hypertension hyperlipidemia and has permanent pacemaker implanted and has history of paroxysmal atrial fibrillation and chronic diabetes obesity and obstructive sleep apnea. He denies any cardiac symptoms today. He is fully vaccinated against COVID-19 infections. He also had shingles examination since last visit 6 months ago. He reports his sugars were running very low as low as 40 with symptoms and he stopped taking glipizide and they are doing better. He has not seen PCP since then. He walks with a cane to white only. Recently he was in the hospital for olecranon bursitis on  and treated with steroids. All his medications are reviewed. His recent pacemaker analysis is checked and he is noted to be in persistent atrial fibrillation since last office visit however he has been on Eliquis which she is tolerating it well denies any palpitations increasing dyspnea on exertion fatigue shortness of breath or any worsening in his chronic leg swelling. In fact his leg swelling is improved. He does not smoke. Allergies   Allergen Reactions    Oxycontin [Oxycodone Hcl] Hives     Itching in his feet     Prior to Admission medications    Medication Sig Start Date End Date Taking?  Authorizing Provider   lisinopril-hydroCHLOROthiazide (PRINZIDE;ZESTORETIC) 20-12.5 MG per tablet TAKE 1 TABLET BY MOUTH EVERY DAY 21  Yes Darren Pompa MD   fluticasone [facility]) 50 MCG/ACT nasal spray 1 spray by Each Nostril route daily 21  Yes Mirza Henderson MD   metoprolol tartrate (LOPRESSOR) 50 MG tablet Take 1 tablet by mouth 2 times daily 6/15/21 Yes Wilber Maloney MD   ELIQUIS 5 MG TABS tablet TAKE 1 TABLET TWICE A DAY 12/28/20  Yes Wilber Maloney MD   magnesium oxide (MAG-OX) 400 MG tablet TAKE 1 TABLET BY MOUTH EVERY DAY 9/24/20  Yes Sharri Jackson MD   niacin (NIASPAN) 1000 MG extended release tablet TAKE 1 TABLET NIGHTLY 3/16/20  Yes Wilber Maloney MD   atorvastatin (LIPITOR) 20 MG tablet Take 1 tablet by mouth daily 2/25/20  Yes Wilber Maloney MD   meclizine (ANTIVERT) 25 MG tablet Take 25 mg by mouth as needed   Yes Historical Provider, MD   fluticasone (FLONASE) 50 MCG/ACT nasal spray USE 1 SPRAY NASALLY DAILY 7/5/19  Yes Stephanie Meadows MD   amLODIPine (NORVASC) 10 MG tablet Take 1 tablet by mouth daily 6/12/18  Yes Cecilia Curiel MD   metFORMIN (GLUCOPHAGE) 1000 MG tablet Take 1,000 mg by mouth 2 times daily (with meals)   Yes Historical Provider, MD Monkbert Hartshorne Oil 1000 MG CAPS Take 1 capsule by mouth daily Pt takes 500 mg   Yes Historical Provider, MD   allopurinol (ZYLOPRIM) 100 MG tablet Take 100 mg by mouth daily  1/13/15  Yes Historical Provider, MD   B Complex Vitamins (VITAMIN B COMPLEX PO) Take  by mouth daily. Yes Historical Provider, MD   gabapentin (NEURONTIN) 800 MG tablet Take 800 mg by mouth 3 times daily. Yes Historical Provider, MD   Compression Stockings MISC by Does not apply route 20-30 mmg thigh high stockings wear daily when up and remove at night at bedtime. Patient not taking: Reported on 8/9/2021 12/21/20   Wilber Maloney MD     Past Medical History:   Diagnosis Date    Arthritis     Dizziness     FH: CAD (coronary artery disease) 9/29/2015    H/O 24 hour EKG monitoring 02/26/19,08/02/2016    Conclusion: Atrial fibrillation with  fairly well-controlled ventricular rate response without evidence of any significant tachy or alon arrhythmias.     H/O atrial fibrillation without current medication 4/3/2019    H/O cardiovascular stress test 11/24/15; 8/22/2019    Normal perfusion in the distribution of all coronaries, normal LV, EF 53%.  H/O Doppler ultrasound (Venous Doppler Lower Extremities) 03/08/2017; 1/2/2020    No evidence of DVT or SVT in the bilaterally. No significant reflux noted in the veins of the right lower extremity. Significant reflux noted in the Left CFV. Bilateral Calf and ankle edema noted.  H/O echocardiogram 6/6/17,11/24/15    Left ventricle mildly dilated with normal systolic function EF 16-87% Mild concentric LVH with Grade II diastolic dysfunction. Mild to moderate MR and mild TR     H/O echocardiogram 06/07/2018; 2/26/2019    EF 55-60%. Mild concentric left ventricular hypertrophy. The left atrium is moderately dilated. Dilatation of the aortic root measuring 4.0 cm. Mild AR. Moderate pulmonary HTN.     H/O transesophageal echocardiography (SALAZAR) for monitoring 07/08/2019    No CEE clot    Heart murmur 9/29/2015    Hx of Venous Doppler ultrasound 08/03/2020    No evidence of DVT in the left CFV. The Left GSV is non-compressible with no evidence of flow just past the saphenofemoral junction to the distal calf.  Hyperlipidemia     Hypertension     Kidney stones     Leg swelling 12/9/2019    Bilateral leg swelling    Neuropathy     Obesity     JAYE on CPAP 6/26/2018    On CPAP 5/2018    Persistent atrial fibrillation ([address] 1/29/2019    New onset 1/2019    Syncope and collapse 12/9/2019    Thoracic aortic aneurysm ([address] 2/11    4.5    Type II or unspecified type diabetes mellitus without mention of complication, not stated as uncontrolled       Vitals:    08/09/21 0847   BP: 122/76   Pulse: 76   Weight: 273 lb (123.8 kg)   Height: 5' 11\" (1.803 m)      Body mass index is 38.08 kg/m². Wt Readings from Last 3 Encounters:   08/09/21 273 lb (123.8 kg)   07/28/21 287 lb 12.8 oz (130.5 kg)   07/26/21 278 lb (126.1 kg)     Constitutional: Obese pleasant male in no apparent distress. lost 19 pounds since last visit 12/2020.   Eyes: glasses.  Pupils are equal.  ENT: Wearing a face mask  NECK: No JVP or thyromegaly  Cardiovascular:  Auscultation: Irregular S1 and S2.  5/2 systolic ejection murmur noted in aortic area.  Carotids are negative for bruits.  Abdominal aorta is nonpalpable. no epigastric bruit noted. Peripheral pulses: nonpalpable due to swelling. Respiratory:  Respiratory effort is normal. Breath sounds are clear to auscultation. Extremities: has bilateral 2+ pitting edema in legs. Radials are 2+. SKIN: Warm and well perfused, no pallor or cyanosis  Abdomen:  No masses or tenderness. No organomegaly noted. Neurologic:  Oriented to time, place, and person and non-anxious. Has bilateral essential tremors of hands. Walks with a ozuna. Psychiatric: Normal mood and effect    Pacer analysis 6/5/3031 is reviewed is consistent with normal dual-chamber MRI safe [employer] pacer function with stable leads and appropriate battery status for the age of the device. Remaining average battery life is 11.4 years. Device is programmed to DDDR mode lower rate of 60 bpm and 57% pacing in the ventricle.  Patient is in persistent atrial fibrillation on Eliquis for anticoagulation therapy.       Pertinent records reviewed and discussed with patient and results are as follow:    Lab Results   Component Value Date    WBC 8.0 07/26/2021    HGB 11.7 07/26/2021    HCT 36.1 07/26/2021     07/26/2021     Lab Results   Component Value Date    CHOL 118 12/12/2016    CHOLFAST 120 11/25/2020    TRIG 103 12/12/2016    TRIGLYCFAST 120 11/25/2020    HDL 51 11/25/2020    LDLDIRECT 56 11/25/2020     Lab Results   Component Value Date    BUN 25 07/26/2021    CREATININE 1.9 07/26/2021     07/26/2021    K 4.2 07/26/2021     Lab Results   Component Value Date    INR 1.57 07/08/2019     Lab Results   Component Value Date    LABA1C 5.2 11/25/2020     ASSESSMENT/PLAN:    1. Persistent atrial fibrillation  2. S/P placement of cardiac pacemaker 6/2019  3. JAYE on CPAP  4.  Mixed hyperlipidemia  5. Essential hypertension  6. Type 2 diabetes mellitus without complication, without long-term current use of insulin (HCC)    Continue anticoagulation with Eliquis. Continue CPAP therapy and lose weight. Continue current other  cardiovascular medications which have been reviewed and discussed individually with you. Continue device check as per care link schedule. Follow up with Dr Elissa Díaz for low sugars and low hemoglobin A1c. counseled for fall precautions. Follow-up in 6 months, sooner if needed. An electronic signature was used to authenticate this note.     --Machelle Vegas MD

## 2021-09-13 ENCOUNTER — PROCEDURE VISIT (OUTPATIENT)
Dept: CARDIOLOGY CLINIC | Age: 76
End: 2021-09-13
Payer: MEDICARE

## 2021-09-13 DIAGNOSIS — I49.5 BRADY-TACHY SYNDROME (HCC): ICD-10-CM

## 2021-09-13 DIAGNOSIS — Z95.0 CARDIAC PACEMAKER IN SITU: ICD-10-CM

## 2021-09-13 DIAGNOSIS — I49.5 SSS (SICK SINUS SYNDROME) (HCC): ICD-10-CM

## 2021-09-13 PROCEDURE — 93296 REM INTERROG EVL PM/IDS: CPT | Performed by: INTERNAL MEDICINE

## 2021-09-13 PROCEDURE — 93294 REM INTERROG EVL PM/LDLS PM: CPT | Performed by: INTERNAL MEDICINE

## 2021-09-23 NOTE — PROGRESS NOTES
Patient presents to the office as an ED follow up from 7/26/21 when patient presented to the ED with swelling and pain in his right upper arm with pain at the time radiating from the right elbow to the hand. He was given steroids and colchicine and placed in Ace wrap and advised to continue to elevate the extremity upon returning to home. He continues to take the prescribed steroids and has been continuing with the Ace wrap with elevation with continued constant, aching within the elbow and dorsal aspect of the right hand with swelling and pain improved. Pain is rated at a 4/10 with numbness and tingling of the hand which he contributes to his neuropathy. No previous injury or worsening of symptoms.        CT HAND RIGHT WO CONTRAST     Impression   Peripherally enhancing collection posterior to the olecranon, incompletely   imaged, containing calcifications.  Based on location, this is favored to   represent olecranon bursitis.  This is potentially chronic in nature given   the presence of calcifications.  This could be sterile or infected.       Generalized subcutaneous edema about the visualized right upper extremity   compatible with cellulitis.  No other findings to suggest a drainable   abscess.  No soft tissue gas is seen.       Moderate degenerative changes at the elbow joint.  Nonspecific elbow joint   effusion.       Advanced degenerative changes at the wrist with dorsal subluxation/near   dislocation of the capitate in relation to the lunate.  Multiple lucencies in   the wrist favored to represent intraosseous cysts due to the presents of   advanced degenerative change.  Chronic erosions therefore felt to be less   likely.       No acute bony abnormality identified.     Minocycline Pregnancy And Lactation Text: This medication is Pregnancy Category D and not consider safe during pregnancy. It is also excreted in breast milk. Include Pregnancy/Lactation Warning?: No Topical Retinoid counseling:  Patient advised to apply a pea-sized amount only at bedtime and wait 30 minutes after washing their face before applying.  If too drying, patient may add a non-comedogenic moisturizer. The patient verbalized understanding of the proper use and possible adverse effects of retinoids.  All of the patient's questions and concerns were addressed. Doxycycline Counseling:  Patient counseled regarding possible photosensitivity and increased risk for sunburn.  Patient instructed to avoid sunlight, if possible.  When exposed to sunlight, patients should wear protective clothing, sunglasses, and sunscreen.  The patient was instructed to call the office immediately if the following severe adverse effects occur:  hearing changes, easy bruising/bleeding, severe headache, or vision changes.  The patient verbalized understanding of the proper use and possible adverse effects of doxycycline.  All of the patient's questions and concerns were addressed. Birth Control Pills Counseling: Birth Control Pill Counseling: I discussed with the patient the potential side effects of OCPs including but not limited to increased risk of stroke, heart attack, thrombophlebitis, deep venous thrombosis, hepatic adenomas, breast changes, GI upset, headaches, and depression.  The patient verbalized understanding of the proper use and possible adverse effects of OCPs. All of the patient's questions and concerns were addressed. Detail Level: Zone Spironolactone Pregnancy And Lactation Text: This medication can cause feminization of the male fetus and should be avoided during pregnancy. The active metabolite is also found in breast milk. Bactrim Pregnancy And Lactation Text: This medication is Pregnancy Category D and is known to cause fetal risk.  It is also excreted in breast milk. Isotretinoin Counseling: Patient should get monthly blood tests, not donate blood, not drive at night if vision affected, not share medication, and not undergo elective surgery for 6 months after tx completed. Side effects reviewed, pt to contact office should one occur. Topical Clindamycin Counseling: Patient counseled that this medication may cause skin irritation or allergic reactions.  In the event of skin irritation, the patient was advised to reduce the amount of the drug applied or use it less frequently.   The patient verbalized understanding of the proper use and possible adverse effects of clindamycin.  All of the patient's questions and concerns were addressed. Topical Retinoid Pregnancy And Lactation Text: This medication is Pregnancy Category C. It is unknown if this medication is excreted in breast milk. Sarecycline Counseling: Patient advised regarding possible photosensitivity and discoloration of the teeth, skin, lips, tongue and gums.  Patient instructed to avoid sunlight, if possible.  When exposed to sunlight, patients should wear protective clothing, sunglasses, and sunscreen.  The patient was instructed to call the office immediately if the following severe adverse effects occur:  hearing changes, easy bruising/bleeding, severe headache, or vision changes.  The patient verbalized understanding of the proper use and possible adverse effects of sarecycline.  All of the patient's questions and concerns were addressed. Azithromycin Counseling:  I discussed with the patient the risks of azithromycin including but not limited to GI upset, allergic reaction, drug rash, diarrhea, and yeast infections. Birth Control Pills Pregnancy And Lactation Text: This medication should be avoided if pregnant and for the first 30 days post-partum. Isotretinoin Pregnancy And Lactation Text: This medication is Pregnancy Category X and is considered extremely dangerous during pregnancy. It is unknown if it is excreted in breast milk. Topical Clindamycin Pregnancy And Lactation Text: This medication is Pregnancy Category B and is considered safe during pregnancy. It is unknown if it is excreted in breast milk. Doxycycline Pregnancy And Lactation Text: This medication is Pregnancy Category D and not consider safe during pregnancy. It is also excreted in breast milk but is considered safe for shorter treatment courses. Tetracycline Counseling: Patient counseled regarding possible photosensitivity and increased risk for sunburn.  Patient instructed to avoid sunlight, if possible.  When exposed to sunlight, patients should wear protective clothing, sunglasses, and sunscreen.  The patient was instructed to call the office immediately if the following severe adverse effects occur:  hearing changes, easy bruising/bleeding, severe headache, or vision changes.  The patient verbalized understanding of the proper use and possible adverse effects of tetracycline.  All of the patient's questions and concerns were addressed. Patient understands to avoid pregnancy while on therapy due to potential birth defects. High Dose Vitamin A Pregnancy And Lactation Text: High dose vitamin A therapy is contraindicated during pregnancy and breast feeding. Patient Specific Counseling (Will Not Stick From Patient To Patient): Krishna Mtz PA-C counsels that the topical should help with acne and acne scarring. Krishna Mtz PA-C recommends oral antibiotic for the deeper acne bumps. Tazorac Counseling:  Patient advised that medication is irritating and drying.  Patient may need to apply sparingly and wash off after an hour before eventually leaving it on overnight.  The patient verbalized understanding of the proper use and possible adverse effects of tazorac.  All of the patient's questions and concerns were addressed. Topical Sulfur Applications Pregnancy And Lactation Text: This medication is Pregnancy Category C and has an unknown safety profile during pregnancy. It is unknown if this topical medication is excreted in breast milk. Topical Sulfur Applications Counseling: Topical Sulfur Counseling: Patient counseled that this medication may cause skin irritation or allergic reactions.  In the event of skin irritation, the patient was advised to reduce the amount of the drug applied or use it less frequently.   The patient verbalized understanding of the proper use and possible adverse effects of topical sulfur application.  All of the patient's questions and concerns were addressed. High Dose Vitamin A Counseling: Side effects reviewed, pt to contact office should one occur. Erythromycin Counseling:  I discussed with the patient the risks of erythromycin including but not limited to GI upset, allergic reaction, drug rash, diarrhea, increase in liver enzymes, and yeast infections. Azithromycin Pregnancy And Lactation Text: This medication is considered safe during pregnancy and is also secreted in breast milk. Tazorac Pregnancy And Lactation Text: This medication is not safe during pregnancy. It is unknown if this medication is excreted in breast milk. Erythromycin Pregnancy And Lactation Text: This medication is Pregnancy Category B and is considered safe during pregnancy. It is also excreted in breast milk. Minocycline Counseling: Patient advised regarding possible photosensitivity and discoloration of the teeth, skin, lips, tongue and gums.  Patient instructed to avoid sunlight, if possible.  When exposed to sunlight, patients should wear protective clothing, sunglasses, and sunscreen.  The patient was instructed to call the office immediately if the following severe adverse effects occur:  hearing changes, easy bruising/bleeding, severe headache, or vision changes.  The patient verbalized understanding of the proper use and possible adverse effects of minocycline.  All of the patient's questions and concerns were addressed. Dapsone Pregnancy And Lactation Text: This medication is Pregnancy Category C and is not considered safe during pregnancy or breast feeding. Benzoyl Peroxide Pregnancy And Lactation Text: This medication is Pregnancy Category C. It is unknown if benzoyl peroxide is excreted in breast milk. Benzoyl Peroxide Counseling: Patient counseled that medicine may cause skin irritation and bleach clothing.  In the event of skin irritation, the patient was advised to reduce the amount of the drug applied or use it less frequently.   The patient verbalized understanding of the proper use and possible adverse effects of benzoyl peroxide.  All of the patient's questions and concerns were addressed. Dapsone Counseling: I discussed with the patient the risks of dapsone including but not limited to hemolytic anemia, agranulocytosis, rashes, methemoglobinemia, kidney failure, peripheral neuropathy, headaches, GI upset, and liver toxicity.  Patients who start dapsone require monitoring including baseline LFTs and weekly CBCs for the first month, then every month thereafter.  The patient verbalized understanding of the proper use and possible adverse effects of dapsone.  All of the patient's questions and concerns were addressed. Bactrim Counseling:  I discussed with the patient the risks of sulfa antibiotics including but not limited to GI upset, allergic reaction, drug rash, diarrhea, dizziness, photosensitivity, and yeast infections.  Rarely, more serious reactions can occur including but not limited to aplastic anemia, agranulocytosis, methemoglobinemia, blood dyscrasias, liver or kidney failure, lung infiltrates or desquamative/blistering drug rashes. Spironolactone Counseling: Patient advised regarding risks of diarrhea, abdominal pain, hyperkalemia, birth defects (for female patients), liver toxicity and renal toxicity. The patient may need blood work to monitor liver and kidney function and potassium levels while on therapy. The patient verbalized understanding of the proper use and possible adverse effects of spironolactone.  All of the patient's questions and concerns were addressed.

## 2021-09-30 ENCOUNTER — HOSPITAL ENCOUNTER (OUTPATIENT)
Age: 76
Discharge: HOME | End: 2021-09-30
Payer: MEDICARE

## 2021-09-30 LAB
ALBUMIN SERPL-MCNC: 4.2 GM/DL (ref 3.4–5)
ALP BLD-CCNC: 53 IU/L (ref 40–129)
ALT SERPL-CCNC: 20 U/L (ref 10–40)
ANION GAP SERPL CALCULATED.3IONS-SCNC: 15 MMOL/L (ref 4–16)
AST SERPL-CCNC: 27 IU/L (ref 15–37)
BILIRUB SERPL-MCNC: 0.6 MG/DL (ref 0–1)
BUN BLDV-MCNC: 23 MG/DL (ref 6–23)
CALCIUM SERPL-MCNC: 10.2 MG/DL (ref 8.3–10.6)
CHLORIDE BLD-SCNC: 104 MMOL/L (ref 99–110)
CO2: 21 MMOL/L (ref 21–32)
CREAT SERPL-MCNC: 1.6 MG/DL (ref 0.9–1.3)
GFR AFRICAN AMERICAN: 51 ML/MIN/1.73M2
GFR NON-AFRICAN AMERICAN: 42 ML/MIN/1.73M2
GLUCOSE BLD-MCNC: 135 MG/DL (ref 70–99)
POTASSIUM SERPL-SCNC: 4.3 MMOL/L (ref 3.5–5.1)
SODIUM BLD-SCNC: 140 MMOL/L (ref 135–145)
TOTAL PROTEIN: 6.8 GM/DL (ref 6.4–8.2)

## 2021-09-30 PROCEDURE — 80053 COMPREHEN METABOLIC PANEL: CPT

## 2021-09-30 PROCEDURE — 36415 COLL VENOUS BLD VENIPUNCTURE: CPT

## 2021-10-12 ENCOUNTER — HOSPITAL ENCOUNTER (OUTPATIENT)
Age: 76
Discharge: HOME | End: 2021-10-12
Payer: MEDICARE

## 2021-10-12 LAB
CHOLESTEROL, FASTING: 108 MG/DL
ESTIMATED AVERAGE GLUCOSE: 105 MG/DL
HBA1C MFR BLD: 5.3 % (ref 4.2–6.3)
HDLC SERPL-MCNC: 50 MG/DL
LDL CHOLESTEROL DIRECT: 48 MG/DL
TRIGLYCERIDE, FASTING: 60 MG/DL

## 2021-10-12 PROCEDURE — 80061 LIPID PANEL: CPT

## 2021-10-12 PROCEDURE — 83036 HEMOGLOBIN GLYCOSYLATED A1C: CPT

## 2021-10-12 PROCEDURE — 36415 COLL VENOUS BLD VENIPUNCTURE: CPT

## 2021-10-28 ENCOUNTER — PROCEDURE VISIT (OUTPATIENT)
Dept: CARDIOLOGY CLINIC | Age: 76
End: 2021-10-28
Payer: MEDICARE

## 2021-10-28 DIAGNOSIS — I10 HYPERTENSION, UNSPECIFIED TYPE: ICD-10-CM

## 2021-10-28 DIAGNOSIS — I25.10 CORONARY ARTERY DISEASE DUE TO CALCIFIED CORONARY LESION: ICD-10-CM

## 2021-10-28 DIAGNOSIS — R01.1 HEART MURMUR: ICD-10-CM

## 2021-10-28 DIAGNOSIS — I25.84 CORONARY ARTERY DISEASE DUE TO CALCIFIED CORONARY LESION: ICD-10-CM

## 2021-10-28 LAB
LV EF: 58 %
LVEF MODALITY: NORMAL

## 2021-10-28 PROCEDURE — 93306 TTE W/DOPPLER COMPLETE: CPT | Performed by: INTERNAL MEDICINE

## 2021-12-15 ENCOUNTER — OFFICE VISIT (OUTPATIENT)
Dept: ORTHOPEDIC SURGERY | Age: 76
End: 2021-12-15
Payer: MEDICARE

## 2021-12-15 VITALS
WEIGHT: 285 LBS | HEIGHT: 71 IN | OXYGEN SATURATION: 98 % | HEART RATE: 61 BPM | RESPIRATION RATE: 19 BRPM | BODY MASS INDEX: 39.9 KG/M2

## 2021-12-15 DIAGNOSIS — M17.11 PRIMARY OSTEOARTHRITIS OF RIGHT KNEE: Primary | ICD-10-CM

## 2021-12-15 DIAGNOSIS — M17.12 PRIMARY OSTEOARTHRITIS OF LEFT KNEE: ICD-10-CM

## 2021-12-15 PROCEDURE — 1036F TOBACCO NON-USER: CPT | Performed by: ORTHOPAEDIC SURGERY

## 2021-12-15 PROCEDURE — 99213 OFFICE O/P EST LOW 20 MIN: CPT | Performed by: ORTHOPAEDIC SURGERY

## 2021-12-15 PROCEDURE — 4040F PNEUMOC VAC/ADMIN/RCVD: CPT | Performed by: ORTHOPAEDIC SURGERY

## 2021-12-15 PROCEDURE — G8417 CALC BMI ABV UP PARAM F/U: HCPCS | Performed by: ORTHOPAEDIC SURGERY

## 2021-12-15 PROCEDURE — G8427 DOCREV CUR MEDS BY ELIG CLIN: HCPCS | Performed by: ORTHOPAEDIC SURGERY

## 2021-12-15 PROCEDURE — 1123F ACP DISCUSS/DSCN MKR DOCD: CPT | Performed by: ORTHOPAEDIC SURGERY

## 2021-12-15 PROCEDURE — G8484 FLU IMMUNIZE NO ADMIN: HCPCS | Performed by: ORTHOPAEDIC SURGERY

## 2021-12-15 RX ORDER — ZOLPIDEM TARTRATE 10 MG/1
TABLET ORAL
COMMUNITY
Start: 2021-11-04 | End: 2022-01-17 | Stop reason: CLARIF

## 2021-12-15 ASSESSMENT — ENCOUNTER SYMPTOMS
BACK PAIN: 0
CHEST TIGHTNESS: 0
COLOR CHANGE: 0

## 2021-12-15 NOTE — PATIENT INSTRUCTIONS
Continue weight-bearing as tolerated. Continue range of motion exercises as instructed. Ice and elevate as needed. Tylenol or Motrin for pain. We will schedule surgery at soonest convenience.  If you have any questions regarding your surgery please call our office and ask to speak with Princess Leon [phone]

## 2021-12-15 NOTE — PROGRESS NOTES
Patient presents to the office with b/l knee pain. Pt states his right knee is worse than his left. Pt states he feels weakness and buckling when weightbearing often. 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 Pt states he feels the most pain when squatting and it is difficult for him to get up. Pt denies any injuries and use of medications or conservative measures.

## 2021-12-15 NOTE — PROGRESS NOTES
Subjective:      Patient ID: Dusty Kaminski is a 64 y.o. male. Patient presents to the office with b/l knee pain. Pt states his right knee is worse than his left. Pt states he feels weakness and buckling when weightbearing often. 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 Pt states he feels the most pain when squatting and it is difficult for him to get up. Pt denies any injuries and use of medications or conservative measures. He comes in today for his first visit with me in regards to his bilateral knee pain, right worse than left. He states that he has been dealing with deep, aching and grinding pain in both of his knees for many years. He states that now the pain is getting to the point that he is having difficulty with his daily activities. Patient denies any new injury to the involved extremity/ joint, denies numbness or tingling in the involved extremity and denies fever or chills. Review of Systems   Constitutional: Negative for activity change, chills and fever. Respiratory: Negative for chest tightness. Cardiovascular: Negative for chest pain. Musculoskeletal: Positive for arthralgias, gait problem, joint swelling and myalgias. Negative for back pain. Skin: Negative for color change, pallor, rash and wound. Neurological: Negative for weakness and numbness. Past Medical History:   Diagnosis Date    Arthritis     Dizziness     FH: CAD (coronary artery disease) 09/29/2015    H/O 24 hour EKG monitoring 02/26/19,08/02/2016    Conclusion: Atrial fibrillation with  fairly well-controlled ventricular rate response without evidence of any significant tachy or alon arrhythmias.  H/O atrial fibrillation without current medication 04/03/2019    H/O cardiovascular stress test 11/24/15; 8/22/2019    Normal perfusion in the distribution of all coronaries, normal LV, EF 53%.     H/O Doppler ultrasound (Venous Doppler Lower Extremities) 03/08/2017; 1/2/2020    No evidence of DVT or SVT in the bilaterally. No significant reflux noted in the veins of the right lower extremity. Significant reflux noted in the Left CFV. Bilateral Calf and ankle edema noted.  H/O echocardiogram 6/6/17,11/24/15    Left ventricle mildly dilated with normal systolic function EF 38-75% Mild concentric LVH with Grade II diastolic dysfunction. Mild to moderate MR and mild TR     H/O echocardiogram 06/07/2018; 2/26/2019    EF 55-60%. Mild concentric left ventricular hypertrophy. The left atrium is moderately dilated. Dilatation of the aortic root measuring 4.0 cm. Mild AR. Moderate pulmonary HTN.     H/O echocardiogram 10/28/2021    EF 55-60% Mild eccentric left ventricular hypertrophy. There is severe fibrocalcific sclerosis of the aortic valve with mean gradient across the aortic valve of 29 mmHg and calculated aortic valve areawas 1.2square centimeters, all suggesting moderate aortic stenosis. See complete result documentation under cardiology tab.  H/O transesophageal echocardiography ([facility]) for monitoring 07/08/2019    No CEE clot    Heart murmur 09/29/2015    Hx of Venous Doppler ultrasound 08/03/2020    No evidence of DVT in the left CFV. The Left GSV is non-compressible with no evidence of flow just past the saphenofemoral junction to the distal calf.  Hyperlipidemia     Hypertension     Kidney stones     Leg swelling 12/09/2019    Bilateral leg swelling    Neuropathy     Obesity     JAYE on CPAP 06/26/2018    On CPAP 5/2018    Persistent atrial fibrillation ([address] 01/29/2019    New onset 1/2019    Syncope and collapse 12/09/2019    Thoracic aortic aneurysm ([address] 02/2011    4.5    Type II or unspecified type diabetes mellitus without mention of complication, not stated as uncontrolled        Objective:   Physical Exam  Constitutional:       Appearance: He is well-developed. HENT:      Head: Normocephalic.    Eyes:      Pupils: Pupils are equal, round, and reactive to light. Pulmonary:      Effort: Pulmonary effort is normal.   Musculoskeletal:         General: Swelling and tenderness present. No deformity. Normal range of motion. Cervical back: Normal range of motion. Right hip: Normal.      Left hip: Normal.      Right knee: Swelling and bony tenderness present. No deformity, effusion, erythema, ecchymosis or lacerations. Normal range of motion. Tenderness present over the medial joint line, lateral joint line and patellar tendon. No MCL or LCL tenderness. No LCL laxity or MCL laxity. Normal alignment and normal patellar mobility. Left knee: Swelling and bony tenderness present. No deformity, effusion, erythema, ecchymosis or lacerations. Normal range of motion. Tenderness present over the medial joint line and lateral joint line. No MCL, LCL or patellar tendon tenderness. No LCL laxity or MCL laxity. Normal alignment and normal patellar mobility. Skin:     General: Skin is warm and dry. Capillary Refill: Capillary refill takes less than 2 seconds. Coloration: Skin is not pale. Findings: No erythema or rash. Neurological:      Mental Status: He is alert and oriented to person, place, and time. Right knee-Skin intact with no erythema, ecchymosis or lacerations present. 0-130    Left knee-Skin intact with no erythema, ecchymosis or lacerations present. 0-130    Bilateral lower extremity pitting edema, right worse than left    XR KNEE LEFT (3 VIEWS)    Result Date: 12/15/2021  XRAY X-ray 3 views of the left knee obtained and reviewed by me today in the office demonstrates age appropriate bone density throughout with severe degenerative changes with medial, lateral and patellofemoral joint space collapse, early osteophyte formation in all 3 compartments as well as posterior, normal tracking of the patella, no acute osseous abnormalities.  Impression: Severe degenerative changes of left knee as above with no acute process. XR KNEE RIGHT (3 VIEWS)    Result Date: 12/15/2021  XRAY X-ray 3 views of the right knee obtained and reviewed by me today in the office demonstrates age appropriate bone density throughout with severe degenerative changes with medial, lateral patellofemoral joint space collapse, moderate osteophyte formation in all 3 compartments as well as posterior, normal tracking of the patella, no acute osseous abnormalities. Impression: Severe degenerative changes of the right knee as above with no acute process. Assessment:      Right knee OA, severe  Left knee OA, severe      Plan:      I discussed with him today his x-ray findings. I explained to him that he does have severe arthritis in both of his knees. At this point given his persistent and worsening symptoms despite conservative treatment and with his x-ray findings I recommend surgical treatment beginning with his more symptomatic right knee first.  I had a lengthy discussion with him today in regards to right total knee replacement surgery. I explained risks, benefits, possible complications of the procedure and answered all questions for the patient. I explained postoperative rehabilitation protocol and expectations with the patient today. The patient understands and consents to the procedure. Patient will follow up with their primary care physician prior to surgical treatment for preoperative clearance. We will schedule surgery at soonest convenience. Continue weight-bearing as tolerated. Continue range of motion exercises as instructed. Ice and elevate as needed. Tylenol or Motrin for pain. Follow up in 3 weeks postop and once he is doing better we will discuss proceeding with surgical treatment for his left knee. He would like to have a surgery at [facility] and he would like to spend the night in the hospital after surgery.             Anyi [address], DO

## 2021-12-20 ENCOUNTER — PROCEDURE VISIT (OUTPATIENT)
Dept: CARDIOLOGY CLINIC | Age: 76
End: 2021-12-20
Payer: MEDICARE

## 2021-12-20 DIAGNOSIS — Z95.0 CARDIAC PACEMAKER IN SITU: Primary | ICD-10-CM

## 2021-12-20 PROCEDURE — 93296 REM INTERROG EVL PM/IDS: CPT | Performed by: INTERNAL MEDICINE

## 2021-12-20 PROCEDURE — 93294 REM INTERROG EVL PM/LDLS PM: CPT | Performed by: INTERNAL MEDICINE

## 2022-01-05 RX ORDER — METOPROLOL TARTRATE 50 MG/1
TABLET, FILM COATED ORAL
Qty: 180 TABLET | Refills: 3 | Status: SHIPPED | OUTPATIENT
Start: 2022-01-05

## 2022-01-12 DIAGNOSIS — M25.561 CHRONIC PAIN OF RIGHT KNEE: ICD-10-CM

## 2022-01-12 DIAGNOSIS — Z01.812 ENCOUNTER FOR PREOPERATIVE SCREENING LABORATORY TESTING FOR COVID-19 VIRUS: Primary | ICD-10-CM

## 2022-01-12 DIAGNOSIS — M17.11 PRIMARY OSTEOARTHRITIS OF RIGHT KNEE: ICD-10-CM

## 2022-01-12 DIAGNOSIS — Z20.822 ENCOUNTER FOR PREOPERATIVE SCREENING LABORATORY TESTING FOR COVID-19 VIRUS: Primary | ICD-10-CM

## 2022-01-12 DIAGNOSIS — G89.29 CHRONIC PAIN OF RIGHT KNEE: ICD-10-CM

## 2022-01-18 DIAGNOSIS — I48.19 PERSISTENT ATRIAL FIBRILLATION (HCC): ICD-10-CM

## 2022-01-19 RX ORDER — APIXABAN 5 MG/1
TABLET, FILM COATED ORAL
Qty: 180 TABLET | Refills: 3 | Status: SHIPPED | OUTPATIENT
Start: 2022-01-19

## 2022-01-22 ENCOUNTER — TELEPHONE (OUTPATIENT)
Dept: CARDIOLOGY CLINIC | Age: 77
End: 2022-01-22

## 2022-01-22 NOTE — TELEPHONE ENCOUNTER
[id number] Score for Atrial Fibrillation Stroke Risk   Risk   Factors  Component Value   C CHF No 0   H HTN Yes 1   A2 Age >= 76 Yes,  (72 y.o.) 2   D DM Yes 1   S2 Prior Stroke/TIA No 0   V Vascular Disease No 0   A Age 74-69 No,  (72 y.o.) 0   Sc Sex male 0    [id number]  Score  4   Score last updated 1/22/22 6:22 AM EST    Click here for a link to the UpToDate guideline \"Atrial Fibrillation: Anticoagulation therapy to prevent embolization    Disclaimer: Risk Score calculation is dependent on accuracy of patient problem list and past encounter diagnosis.

## 2022-01-25 ENCOUNTER — HOSPITAL ENCOUNTER (OUTPATIENT)
Age: 77
Setting detail: SPECIMEN
Discharge: HOME | End: 2022-01-25
Payer: MEDICARE

## 2022-01-25 ENCOUNTER — ANESTHESIA EVENT (OUTPATIENT)
Dept: OPERATING ROOM | Age: 77
End: 2022-01-25

## 2022-01-25 ENCOUNTER — HOSPITAL ENCOUNTER (OUTPATIENT)
Age: 77
Discharge: HOME | End: 2022-01-25
Payer: MEDICARE

## 2022-01-25 ENCOUNTER — HOSPITAL ENCOUNTER (OUTPATIENT)
Dept: GENERAL RADIOLOGY | Age: 77
Discharge: HOME | End: 2022-01-25
Payer: MEDICARE

## 2022-01-25 ENCOUNTER — HOSPITAL ENCOUNTER (OUTPATIENT)
Dept: PHYSICAL THERAPY | Age: 77
Setting detail: THERAPIES SERIES
Discharge: HOME | End: 2022-01-25
Payer: MEDICARE

## 2022-01-25 DIAGNOSIS — J30.9 ALLERGIC RHINITIS, UNSPECIFIED SEASONALITY, UNSPECIFIED TRIGGER: ICD-10-CM

## 2022-01-25 DIAGNOSIS — G47.33 OSA ON CPAP: ICD-10-CM

## 2022-01-25 DIAGNOSIS — Z99.89 OSA ON CPAP: ICD-10-CM

## 2022-01-25 PROCEDURE — 97110 THERAPEUTIC EXERCISES: CPT

## 2022-01-25 PROCEDURE — 71046 X-RAY EXAM CHEST 2 VIEWS: CPT

## 2022-01-25 PROCEDURE — U0005 INFEC AGEN DETEC AMPLI PROBE: HCPCS

## 2022-01-25 PROCEDURE — 97162 PT EVAL MOD COMPLEX 30 MIN: CPT

## 2022-01-25 PROCEDURE — U0003 INFECTIOUS AGENT DETECTION BY NUCLEIC ACID (DNA OR RNA); SEVERE ACUTE RESPIRATORY SYNDROME CORONAVIRUS 2 (SARS-COV-2) (CORONAVIRUS DISEASE [COVID-19]), AMPLIFIED PROBE TECHNIQUE, MAKING USE OF HIGH THROUGHPUT TECHNOLOGIES AS DESCRIBED BY CMS-2020-01-R: HCPCS

## 2022-01-25 ASSESSMENT — PAIN DESCRIPTION - LOCATION: LOCATION: KNEE

## 2022-01-25 ASSESSMENT — PAIN DESCRIPTION - PAIN TYPE: TYPE: CHRONIC PAIN

## 2022-01-25 ASSESSMENT — PAIN DESCRIPTION - ORIENTATION: ORIENTATION: RIGHT

## 2022-01-25 ASSESSMENT — PAIN SCALES - GENERAL: PAINLEVEL_OUTOF10: 7

## 2022-01-25 NOTE — PROGRESS NOTES
Physical Therapy  Initial Assessment  Date: 2022  Patient Name: Leni Cazares  MRN: [id number]  : 1945     Treatment Diagnosis: R knee pain    Restrictions  Position Activity Restriction  Other position/activity restrictions: none    Subjective   General  Chart Reviewed: Yes  Patient assessed for rehabilitation services?: Yes  Diagnosis: R knee OA  Follows Commands: Within Functional Limits  PT Visit Information  PT Insurance Information: [employer]  Subjective  Subjective: To have R TKA 22- no prior joint replacements; long hX of OA well over 50 years of knee pain- also has L knee OA  Pain Screening  Patient Currently in Pain: Yes  Pain Assessment  Pain Assessment: 0-10  Pain Level: 7  Pain Type: Chronic pain  Pain Location: Knee  Pain Orientation: Right  Vital Signs  Patient Currently in Pain: Yes    Vision/Hearing  Vision  Vision: Within Functional Limits  Hearing  Hearing: Within functional limits    Orientation  Orientation  Overall Orientation Status: Within Normal Limits    Social/Functional History  Social/Functional History  Lives With: Spouse  Home Layout: One level  Home Access: Stairs to enter without rails  Entrance Stairs - Number of Steps: 2  Bathroom Shower/Tub: Walk-in shower; Shower chair without back  Bathroom Toilet: Standard  Bathroom Equipment: Built-in shower seat  ADL Assistance: Independent  Homemaking Assistance: Independent  Homemaking Responsibilities: No  Ambulation Assistance: Independent (uses cane)  Transfer Assistance: Independent  Active : Yes  Mode of Transportation: Car  Occupation: Retired    Objective     Observation/Palpation  Posture: Fair  Observation: genu varum  Edema: B calve edema    AROM RLE (degrees)  RLE General AROM: knee FLEX to 105* EXT to -8*  AROM LLE (degrees)  LLE General AROM: FLEX to 115* EXT to -0*    Strength RLE  Strength RLE: WFL  R Knee Extension:  At least;4/5  Strength LLE  Strength LLE: [facility] Ambulation  Ambulation?: Yes  Ambulation 1  Device: Single point cane  Assistance: Modified Independent  Gait Deviations: Slow Vickie;Decreased step length                            Assessment   Conditions Requiring Skilled Therapeutic Intervention  Body structures, Functions, Activity limitations: Increased pain;Decreased ROM; Decreased functional mobility ; Decreased high-level IADLs  Assessment: LEFS 22/80  Treatment Diagnosis: R knee pain  Prognosis: Good  Decision Making: Medium Complexity  History: PF- R knee OA  Exam: LEFS/ROM/ strength  Clinical Presentation: changing chracteristics of function  Barriers to Learning: none  REQUIRES PT FOLLOW UP: Yes         Plan        G-Code       OutComes Score                                                  AM-PAC Score             Goals          Therapy Time   Individual Concurrent Group Co-treatment   Time In 0955         Time Out 1030         Minutes 35         Timed Code Treatment Minutes: 20 Minutes       CARMELO [place], PT

## 2022-01-25 NOTE — ANESTHESIA PRE PROCEDURE
Department of Anesthesiology  Preprocedure Note       Name:  Florencia Santillan   Age:  64 y.o.  :  1945                                          MRN:  [id number]         Date:  2022      Surgeon: Marcelle Weebr):  Margarette Snellen, DO    Procedure: RIGHT KNEE TOTAL ARTHROPLASTY (Right )    Medications prior to admission:   Prior to Admission medications    Medication Sig Start Date End Date Taking? Authorizing Provider   ELIQUIS 5 MG TABS tablet TAKE 1 TABLET TWICE A DAY 22   Jessica Rubi MD   Apixaban (ELIQUIS PO) Take by mouth    Historical Provider, MD   glipiZIDE (GLUCOTROL XL) 10 MG extended release tablet Take 10 mg by mouth daily    Historical Provider, MD   metoprolol tartrate (LOPRESSOR) 50 MG tablet TAKE 1 TABLET BY MOUTH TWICE A DAY 22   Jessica Rubi MD   magnesium oxide (MAG-OX) 400 MG tablet TAKE 1 TABLET BY MOUTH EVERY DAY 21   Ayad Arrieta MD   lisinopril-hydroCHLOROthiazide (PRINZIDE;ZESTORETIC) 20-12.5 MG per tablet TAKE 1 TABLET BY MOUTH EVERY DAY 21   Jessica Rubi MD   niacin (NIASPAN) 1000 MG extended release tablet TAKE 1 TABLET NIGHTLY 3/16/20   Jessica Rubi MD   atorvastatin (LIPITOR) 20 MG tablet Take 1 tablet by mouth daily 20   Jessica Rubi MD   meclizine (ANTIVERT) 25 MG tablet Take 25 mg by mouth as needed    Historical Provider, MD   amLODIPine (NORVASC) 10 MG tablet Take 1 tablet by mouth daily 18   Vivian Tai MD   metFORMIN (GLUCOPHAGE) 1000 MG tablet Take 1,000 mg by mouth 2 times daily (with meals)    Historical Provider, MD Cruz Lauth Oil 1000 MG CAPS Take 1 capsule by mouth daily Pt takes 500 mg    Historical Provider, MD   allopurinol (ZYLOPRIM) 100 MG tablet Take 100 mg by mouth daily  1/13/15   Historical Provider, MD   B Complex Vitamins (VITAMIN B COMPLEX PO) Take  by mouth daily. Historical Provider, MD   gabapentin (NEURONTIN) 800 MG tablet Take 800 mg by mouth 3 times daily.       Historical Provider, MD Current medications:    Current Outpatient Medications   Medication Sig Dispense Refill    ELIQUIS 5 MG TABS tablet TAKE 1 TABLET TWICE A  tablet 3    Apixaban (ELIQUIS PO) Take by mouth      glipiZIDE (GLUCOTROL XL) 10 MG extended release tablet Take 10 mg by mouth daily      metoprolol tartrate (LOPRESSOR) 50 MG tablet TAKE 1 TABLET BY MOUTH TWICE A  tablet 3    magnesium oxide (MAG-OX) 400 MG tablet TAKE 1 TABLET BY MOUTH EVERY DAY 90 tablet 3    lisinopril-hydroCHLOROthiazide (PRINZIDE;ZESTORETIC) 20-12.5 MG per tablet TAKE 1 TABLET BY MOUTH EVERY DAY 90 tablet 3    niacin (NIASPAN) 1000 MG extended release tablet TAKE 1 TABLET NIGHTLY 90 tablet 3    atorvastatin (LIPITOR) 20 MG tablet Take 1 tablet by mouth daily 90 tablet 3    meclizine (ANTIVERT) 25 MG tablet Take 25 mg by mouth as needed      amLODIPine (NORVASC) 10 MG tablet Take 1 tablet by mouth daily 90 tablet 3    metFORMIN (GLUCOPHAGE) 1000 MG tablet Take 1,000 mg by mouth 2 times daily (with meals)      Krill Oil 1000 MG CAPS Take 1 capsule by mouth daily Pt takes 500 mg      allopurinol (ZYLOPRIM) 100 MG tablet Take 100 mg by mouth daily   0    B Complex Vitamins (VITAMIN B COMPLEX PO) Take  by mouth daily.  gabapentin (NEURONTIN) 800 MG tablet Take 800 mg by mouth 3 times daily. No current facility-administered medications for this visit. Allergies:     Allergies   Allergen Reactions    Oxycontin [Oxycodone Hcl] Hives     Itching in his feet       Problem List:    Patient Active Problem List   Diagnosis Code    Anemia D64.9    Colon polyps K63.5    Type 2 diabetes mellitus without complication (HCC) L98.8    Hypertension I10    Hyperlipidemia E78.5    Obesity E66.9    Vertigo R44    FH: CAD (coronary artery disease) Z82.49    Heart murmur R01.1    Bradycardia R00.1    Coronary artery disease due to calcified coronary lesion I25.10, I25.84    [place] (hard of hearing) H91.90    History of gout Z87.39    Adenomatous polyp of transverse colon D12.3    JAYE on CPAP G47.33, Z99.89    Acute kidney failure (HCC) N17.9    Thoracic aortic aneurysm (HCC) I71.2    Persistent atrial fibrillation (HCC) I48.19    Chronic kidney disease (CKD) stage G3b/A1, moderately decreased glomerular filtration rate (GFR) between 30-44 mL/min/1.73 square meter and albuminuria creatinine ratio less than 30 mg/g (HCC) N18.32    H/O atrial fibrillation without current medication 1/2019 Z86.79    Hypomagnesemia E83.42    Age-related nuclear cataract of right eye H25.11    Age-related nuclear cataract of left eye H25.12    S/P placement of cardiac pacemaker 6/2019 Z95.0    Tachycardia-bradycardia ([address] I49.5    Leg swelling M79.89    Varicose veins of both legs with edema I83.893       Past Medical History:        Diagnosis Date    Arthritis     Dizziness     FH: CAD (coronary artery disease) 09/29/2015    H/O 24 hour EKG monitoring 02/26/19,08/02/2016    Conclusion: Atrial fibrillation with  fairly well-controlled ventricular rate response without evidence of any significant tachy or alon arrhythmias.  H/O atrial fibrillation without current medication 04/03/2019    H/O cardiovascular stress test 11/24/15; 8/22/2019    Normal perfusion in the distribution of all coronaries, normal LV, EF 53%.  H/O Doppler ultrasound (Venous Doppler Lower Extremities) 03/08/2017; 1/2/2020    No evidence of DVT or SVT in the bilaterally. No significant reflux noted in the veins of the right lower extremity. Significant reflux noted in the Left CFV. Bilateral Calf and ankle edema noted.  H/O echocardiogram 6/6/17,11/24/15    Left ventricle mildly dilated with normal systolic function EF 31-43% Mild concentric LVH with Grade II diastolic dysfunction. Mild to moderate MR and mild TR     H/O echocardiogram 06/07/2018; 2/26/2019    EF 55-60%. Mild concentric left ventricular hypertrophy.  The left atrium is moderately Social History     Tobacco Use    Smoking status: Former Smoker     Packs/day: 1.00     Years: 7.00     Pack years: 7.00     Quit date: 3/26/1973     Years since quittin.9    Smokeless tobacco: Former User   Substance Use Topics    Alcohol use: Yes     Alcohol/week: 3.0 standard drinks     Types: 3 Shots of liquor per week     Comment: small amount some nights                                Counseling given: Not Answered      Vital Signs (Current): There were no vitals filed for this visit. BP Readings from Last 3 Encounters:   10/22/21 130/75   21 122/76   21 125/75       NPO Status:                                                                                 BMI:   Wt Readings from Last 3 Encounters:   22 285 lb (129.3 kg)   22 285 lb (129.3 kg)   12/15/21 285 lb (129.3 kg)     There is no height or weight on file to calculate BMI.    CBC:   Lab Results   Component Value Date    WBC 8.0 2021    RBC 3.46 2021    HGB 11.7 2021    HCT 36.1 2021    .3 2021    RDW 13.9 2021     2021       CMP:   Lab Results   Component Value Date     2021    K 4.3 2021     2021    CO2 21 2021    BUN 23 2021    CREATININE 1.6 2021    GFRAA 51 2021    LABGLOM 42 2021    GLUCOSE 135 2021    PROT 6.8 2021    PROT 7.0 07/10/2012    CALCIUM 10.2 2021    BILITOT 0.6 2021    ALKPHOS 53 2021    AST 27 2021    ALT 20 2021       POC Tests: No results for input(s): POCGLU, POCNA, POCK, POCCL, POCBUN, POCHEMO, POCHCT in the last 72 hours.     Coags:   Lab Results   Component Value Date    PROTIME 18.1 2019    INR 1.57 2019    APTT 37.9 2019       HCG (If Applicable): No results found for: PREGTESTUR, PREGSERUM, HCG, HCGQUANT     ABGs: No results found for: PHART, PO2ART, XFT9EAV, ZGX8WMJ, BEART, C2PDSQNL     Type & Screen (If Applicable):  No results found for: [facility]    Anesthesia Evaluation  Patient summary reviewed no history of anesthetic complications:   Airway: Mallampati: IV  TM distance: <3 FB   Neck ROM: limited  Mouth opening: < 3 FB Dental:    (+) partials and lower dentures      Pulmonary:   (+) sleep apnea: on CPAP,                            ROS comment: 1. CXR. 2. Clear for surgery after CXR  3. RTO 3 mths. Tomas Bennett MD  1/24/2022  4:05 PM   Cardiovascular:  Exercise tolerance: poor (<4 METS),   (+) hypertension: moderate, valvular problems/murmurs: AS, pacemaker: pacemaker, CAD: no interval change, dysrhythmias: atrial fibrillation, hyperlipidemia      ECG reviewed      Echocardiogram reviewed    Cleared by cardiology     Beta Blocker:  Dose within 24 Hrs      ROS comment: Pacer analysis is reviewed is consistent with normal dual-chamber MRI safe Med[employer]onic pacer function with stable leads and appropriate battery status for the age of the device. Remaining average battery life is 10.8 years.  Device is programmed to DDDR mode lower rate of 60 bpm and 93% pacing in the ventricle.  Patient is in persistent atrial fibrillation on Eliquis for anticoagulation therapy.     Recommend continued every three month check and follow up office visit as scheduled.     Shara Claire MD, 12/20/2021 5:50 PM       Catina Lewis was evaluated from a cardiac standpoint for his surgery or procedure and based on hsi history, diagnosis, recent cardiac testing, he is considered a medium risk candidate for any maciej-operative cardiac complications.     Patients with known CAD with moderate or high risk should have surgical procedures done where they have access to invasive cardiology services if emergently needed.     Antiplatelet/anticoagulant therapy can be held prior to the surgery or procedure at the discretion of the surgeon to be resumed as soon as possible if held.     Please call with any further questions.     Respectfully,      Isael Alvarado MD, [facility]  MSK/bl     This cardiac clearance is good for 6 months from 1/27/2022 unless new cardiac symptoms arise.       Summary   Left ventricular function is normal, EF is estimated at 55-60%. Mild eccentric left ventricular hypertrophy. No evidence of diastolic dysfunction noted. Bi atrial enlargement noted. PPM wiring visualized within the right side of the heart. Mildly enlarged right ventricle cavity. There is severe fibrocalcific sclerosis of the aortic valve with mean   gradient across the aortic valve of 29 mmHg and calculated aortic valve area   was 1.2square centimeters, all suggesting moderate aortic stenosis. Moderate aortic insufficiency with PHT of 352 msec. Mitral annular calcification is present. Moderate mitral and tricuspid regurgitation is present. Moderate pulmonary hypertension with an RVSP of 59mmHg. Dilation of the aortic root (4.2)and the ascending aorta(4.6). No evidence of pericardial effusion. Compared to 2/26/2019 study mean aortic valve gradient incresed from 14 mm   hg to 29 mm hg and RVSP increased from 52 mm hg to 59 mm hg.      Signature      ------------------------------------------------------------------   Electronically signed by Marshal Kent MD   (Interpreting physician) on 10/29/2021 at 12:01 PM     Neuro/Psych:                ROS comment: Vertigo   Neuropathy  GI/Hepatic/Renal:   (+) renal disease: CRI, morbid obesity          Endo/Other:    (+) DiabetesType II DM, , blood dyscrasia: anticoagulation therapy, arthritis: OA., .                 Abdominal:             Vascular:   + PVD, aortic or cerebral, . ROS comment: Thoracic aaa 4.5 cm . Other Findings:             Anesthesia Plan      general, TIVA and regional     ASA 4     (Risks benefits of geta discussed  And block pt agrees to proceed)  Induction: intravenous.     MIPS: Postoperative opioids intended and Prophylactic antiemetics administered. Anesthetic plan and risks discussed with patient. Use of blood products discussed with patient whom consented to blood products. Plan discussed with CRNA.     Attending anesthesiologist reviewed and agrees with Pre Eval content          BENSON Solorio - CRNA   1/25/2022

## 2022-01-25 NOTE — PLAN OF CARE
Outpatient Physical Therapy           [place]           [] Phone: [phone]   Fax: [phone]  [place]           [x] Phone: [phone]   Fax: [phone]     To:  Dr. Joselo Varela    From: Sharri Wray PT,      Patient: Kirstin Chase       : 1945  Diagnosis: Diagnosis: R knee OA   Treatment Diagnosis: Treatment Diagnosis: R knee pain   Date: 2022    Physical Therapy Certification/Re-Certification Form    The following patient has been evaluated for physical therapy services and for therapy to continue, insurance requires physician review of the treatment plan initially and every 90 days. Please review the attached evaluation and/or summary of the patient's plan of care, and verify that you agree therapy should continue by signing the attached document and sending it back to our office.     Assessment:    Assessment: LEFS     Plan of Care/Treatment to date:  [x] Therapeutic Exercise  [] Modalities:  [] Therapeutic Activity     [] Ultrasound  [] Electrical Stimulation  [x] Gait Training      [] Cervical Traction [] Lumbar Traction  [] Neuromuscular Re-education    [] Cold/hotpack [] Iontophoresis   [x] Instruction in HEP      [] Vasopneumatic    [] Dry Needling  [] Manual Therapy               [] Aquatic Therapy       Other:          Frequency/Duration:  # Days per week: [x] 1 day # Weeks: [x] 1 week [] 5 weeks     [] 2 days   [] 2 weeks [] 6 weeks     [] 3 days   [] 3 weeks [] 7 weeks     [] 4 days   [] 4 weeks [] 8 weeks         [] 9 weeks [] 10 weeks         [] 11 weeks [] 12 weeks    Rehab Potential/Progress: [] Excellent [x] Good [] Fair  [] Poor     Goals:  Goals of PT met       Short term goal 1: Patient will verbalize  and demonstrate understanding of initial post op phase exercises  Short term goal 2 : Patient will demonstrate understanding of proper use of walker with WBAT on affected limb  Short term goal 3: Patient will demonstrate understanding of proper sequence for going up/down steps to limit stress on the affected limb  Short term goal 4: Patient will watch [facility] joint camp video         Electronically signed by:  Fercho Pool PT, , 1/25/2022, 5:05 PM        If you have any questions or concerns, please don't hesitate to call.   Thank you for your referral.      Physician Signature:________________________________Date:_________ TIME: _____  By signing above, therapists plan is approved by physician

## 2022-01-26 ENCOUNTER — TELEPHONE (OUTPATIENT)
Dept: CARDIOLOGY CLINIC | Age: 77
End: 2022-01-26

## 2022-01-26 ENCOUNTER — HOSPITAL ENCOUNTER (OUTPATIENT)
Age: 77
Discharge: HOME | End: 2022-01-26
Payer: MEDICARE

## 2022-01-26 LAB
ANION GAP SERPL CALCULATED.3IONS-SCNC: 14 MMOL/L (ref 4–16)
BACTERIA: NEGATIVE /HPF
BASOPHILS ABSOLUTE: 0 K/CU MM
BASOPHILS RELATIVE PERCENT: 0.4 % (ref 0–1)
BILIRUBIN URINE: NEGATIVE MG/DL
BLOOD, URINE: NEGATIVE
BUN BLDV-MCNC: 23 MG/DL (ref 6–23)
CALCIUM SERPL-MCNC: 9.4 MG/DL (ref 8.3–10.6)
CAST TYPE: NORMAL /HPF
CHLORIDE BLD-SCNC: 102 MMOL/L (ref 99–110)
CLARITY: CLEAR
CO2: 23 MMOL/L (ref 21–32)
COLOR: YELLOW
CREAT SERPL-MCNC: 1.5 MG/DL (ref 0.9–1.3)
CRYSTAL TYPE: NEGATIVE /HPF
DIFFERENTIAL TYPE: ABNORMAL
EKG DIAGNOSIS: NORMAL
EKG Q-T INTERVAL: 468 MS
EKG QRS DURATION: 196 MS
EKG QTC CALCULATION (BAZETT): 549 MS
EKG R AXIS: -69 DEGREES
EKG T AXIS: 101 DEGREES
EKG VENTRICULAR RATE: 83 BPM
EOSINOPHILS ABSOLUTE: 0.2 K/CU MM
EOSINOPHILS RELATIVE PERCENT: 2.9 % (ref 0–3)
EPITHELIAL CELLS, UA: NEGATIVE /HPF
GFR AFRICAN AMERICAN: 55 ML/MIN/1.73M2
GFR NON-AFRICAN AMERICAN: 45 ML/MIN/1.73M2
GLUCOSE FASTING: 112 MG/DL (ref 70–99)
GLUCOSE, URINE: NEGATIVE MG/DL
HCT VFR BLD CALC: 37 % (ref 42–52)
HEMOGLOBIN: 11.7 GM/DL (ref 13.5–18)
IMMATURE NEUTROPHIL %: 0.4 % (ref 0–0.43)
KETONES, URINE: NEGATIVE MG/DL
LEUKOCYTE ESTERASE, URINE: NEGATIVE
LYMPHOCYTES ABSOLUTE: 1.2 K/CU MM
LYMPHOCYTES RELATIVE PERCENT: 16.8 % (ref 24–44)
MCH RBC QN AUTO: 33.3 PG (ref 27–31)
MCHC RBC AUTO-ENTMCNC: 31.6 % (ref 32–36)
MCV RBC AUTO: 105.4 FL (ref 78–100)
MONOCYTES ABSOLUTE: 0.7 K/CU MM
MONOCYTES RELATIVE PERCENT: 10 % (ref 0–4)
NITRITE URINE, QUANTITATIVE: NEGATIVE
PDW BLD-RTO: 15 % (ref 11.7–14.9)
PH, URINE: 6 (ref 5–8)
PLATELET # BLD: 129 K/CU MM (ref 140–440)
PMV BLD AUTO: 9.5 FL (ref 7.5–11.1)
POTASSIUM SERPL-SCNC: 3.8 MMOL/L (ref 3.5–5.1)
PROTEIN UA: NEGATIVE MG/DL
RBC # BLD: 3.51 M/CU MM (ref 4.6–6.2)
RBC URINE: NORMAL /HPF (ref 0–3)
SARS-COV-2: NOT DETECTED
SEGMENTED NEUTROPHILS ABSOLUTE COUNT: 4.8 K/CU MM
SEGMENTED NEUTROPHILS RELATIVE PERCENT: 69.5 % (ref 36–66)
SODIUM BLD-SCNC: 139 MMOL/L (ref 135–145)
SOURCE: NORMAL
SPECIFIC GRAVITY UA: 1.01 (ref 1–1.03)
TOTAL IMMATURE NEUTOROPHIL: 0.03 K/CU MM
UROBILINOGEN, URINE: 0.2 MG/DL (ref 0.2–1)
WBC # BLD: 6.9 K/CU MM (ref 4–10.5)
WBC UA: NORMAL /HPF (ref 0–2)

## 2022-01-26 PROCEDURE — 85025 COMPLETE CBC W/AUTO DIFF WBC: CPT

## 2022-01-26 PROCEDURE — 87086 URINE CULTURE/COLONY COUNT: CPT

## 2022-01-26 PROCEDURE — 93010 ELECTROCARDIOGRAM REPORT: CPT | Performed by: INTERNAL MEDICINE

## 2022-01-26 PROCEDURE — 93005 ELECTROCARDIOGRAM TRACING: CPT | Performed by: FAMILY MEDICINE

## 2022-01-26 PROCEDURE — 80048 BASIC METABOLIC PNL TOTAL CA: CPT

## 2022-01-26 PROCEDURE — 36415 COLL VENOUS BLD VENIPUNCTURE: CPT

## 2022-01-26 PROCEDURE — 81001 URINALYSIS AUTO W/SCOPE: CPT

## 2022-01-26 NOTE — TELEPHONE ENCOUNTER
Pt's wife RADHA stating Dr. Kash Lyon office is waiting to receive cardiac clearance for pt's scheduled surgery of 2/1/2022. Please return pt's call to let pt know status of clearance.

## 2022-01-27 LAB
CULTURE: NORMAL
Lab: NORMAL
SPECIMEN: NORMAL

## 2022-01-31 DIAGNOSIS — G89.29 CHRONIC PAIN OF RIGHT KNEE: ICD-10-CM

## 2022-01-31 DIAGNOSIS — M25.561 CHRONIC PAIN OF RIGHT KNEE: ICD-10-CM

## 2022-01-31 DIAGNOSIS — M17.11 PRIMARY OSTEOARTHRITIS OF RIGHT KNEE: ICD-10-CM

## 2022-01-31 DIAGNOSIS — Z01.812 ENCOUNTER FOR PREOPERATIVE SCREENING LABORATORY TESTING FOR COVID-19 VIRUS: Primary | ICD-10-CM

## 2022-01-31 DIAGNOSIS — Z20.822 ENCOUNTER FOR PREOPERATIVE SCREENING LABORATORY TESTING FOR COVID-19 VIRUS: Primary | ICD-10-CM

## 2022-02-01 ENCOUNTER — ANESTHESIA (OUTPATIENT)
Dept: OPERATING ROOM | Age: 77
End: 2022-02-01

## 2022-02-07 NOTE — PROGRESS NOTES
2/7/22 - . LM concerning  surgery on 2/16/22 - have your covid-19 test performed within 2-7 days of your procedure, then quarantine yourself until after your procedure. Please call the PAT Nurse to review instructions.

## 2022-02-07 NOTE — PROGRESS NOTES
Instructions reviewed with wife Uriel Albarran    Surgery @ [facility] on 2/16/22, you will be called 2/15/22 with times               1. Do not eat or drink anything after midnight - unless instructed by your doctor prior to surgery. This includes                   no water, chewing gum or mints. 2. Follow your directions as prescribed by the doctor for your procedure and medications. Take Amlodipine, Metformin & Metoprolol in am with a sip. 3. Check with your Doctor regarding stopping vitamins, supplements, blood thinners (Plavix, Coumadin, Lovenox, Effient, Pradaxa, Xarelto, Fragmin or                   other blood thinners) and follow their instructions. Last dose Eliquis 2/13/22; stop all vitamins, Krill oil & supplements 2/9/22   4. Do not smoke, and do not drink any alcoholic beverages 24 hours prior to surgery. This includes NA Beer. 5. You may brush your teeth and gargle the morning of surgery. DO NOT SWALLOW WATER   6. You MUST make arrangements for a responsible adult to take you home after your surgery and be able to check on you every couple                   hours for the day. You will not be allowed to leave alone or drive yourself home. It is strongly suggested someone stay with you the first 24                   hrs. Your surgery will be cancelled if you do not have a ride home. 7. Please wear simple, loose fitting clothing to the hospital.  Mathieu Luciano not bring valuables (money, credit cards, checkbooks, etc.) Do not wear any                   makeup (including no eye makeup) or nail polish on your fingers or toes. 8. DO NOT wear any jewelry or piercings on day of surgery. All body piercing jewelry must be removed. 9. If you have dentures, they will be removed before going to the OR; we will provide you a container. If you wear contact lenses or glasses,                  they will be removed; please bring a case for them.            10. If you  have a Living Will and Durable Power of  for Healthcare, please bring in a copy. 11. Please bring picture ID,  insurance card, paperwork from the doctors office    (H & P, Consent, & card for implantable devices). 12. Take a shower the night before or morning of your procedure, do not apply any deodorant, lotion, oil or powder. 13. Wear a mask covering your nose & mouth when entering the hospital. Have your covid-19 test performed within 2-7 days of your                  Surgery-scheduled 2/9/22 in the office. Quarantine yourself after the test until after your surgery.

## 2022-02-09 ENCOUNTER — HOSPITAL ENCOUNTER (OUTPATIENT)
Age: 77
Setting detail: SPECIMEN
Discharge: HOME | End: 2022-02-09
Payer: MEDICARE

## 2022-02-09 LAB
SARS-COV-2: NOT DETECTED
SOURCE: NORMAL

## 2022-02-09 PROCEDURE — U0005 INFEC AGEN DETEC AMPLI PROBE: HCPCS

## 2022-02-09 PROCEDURE — U0003 INFECTIOUS AGENT DETECTION BY NUCLEIC ACID (DNA OR RNA); SEVERE ACUTE RESPIRATORY SYNDROME CORONAVIRUS 2 (SARS-COV-2) (CORONAVIRUS DISEASE [COVID-19]), AMPLIFIED PROBE TECHNIQUE, MAKING USE OF HIGH THROUGHPUT TECHNOLOGIES AS DESCRIBED BY CMS-2020-01-R: HCPCS

## 2022-02-14 DIAGNOSIS — M25.561 CHRONIC PAIN OF RIGHT KNEE: ICD-10-CM

## 2022-02-14 DIAGNOSIS — M17.11 PRIMARY OSTEOARTHRITIS OF RIGHT KNEE: Primary | ICD-10-CM

## 2022-02-14 DIAGNOSIS — G89.29 CHRONIC PAIN OF RIGHT KNEE: ICD-10-CM

## 2022-02-15 ENCOUNTER — OFFICE VISIT (OUTPATIENT)
Dept: CARDIOLOGY CLINIC | Age: 77
End: 2022-02-15
Payer: MEDICARE

## 2022-02-15 VITALS
RESPIRATION RATE: 16 BRPM | WEIGHT: 274 LBS | HEART RATE: 80 BPM | DIASTOLIC BLOOD PRESSURE: 86 MMHG | BODY MASS INDEX: 38.36 KG/M2 | SYSTOLIC BLOOD PRESSURE: 136 MMHG | HEIGHT: 71 IN

## 2022-02-15 DIAGNOSIS — N18.32 CHRONIC KIDNEY DISEASE (CKD) STAGE G3B/A1, MODERATELY DECREASED GLOMERULAR FILTRATION RATE (GFR) BETWEEN 30-44 ML/MIN/1.73 SQUARE METER AND ALBUMINURIA CREATININE RATIO LESS THAN 30 MG/G (HCC): ICD-10-CM

## 2022-02-15 DIAGNOSIS — I10 PRIMARY HYPERTENSION: ICD-10-CM

## 2022-02-15 DIAGNOSIS — E78.00 PURE HYPERCHOLESTEROLEMIA: ICD-10-CM

## 2022-02-15 DIAGNOSIS — G47.33 OSA ON CPAP: ICD-10-CM

## 2022-02-15 DIAGNOSIS — I35.0 NONRHEUMATIC AORTIC VALVE STENOSIS: Primary | ICD-10-CM

## 2022-02-15 DIAGNOSIS — E11.9 TYPE 2 DIABETES MELLITUS WITHOUT COMPLICATION, WITHOUT LONG-TERM CURRENT USE OF INSULIN (HCC): ICD-10-CM

## 2022-02-15 DIAGNOSIS — Z99.89 OSA ON CPAP: ICD-10-CM

## 2022-02-15 DIAGNOSIS — I48.19 PERSISTENT ATRIAL FIBRILLATION (HCC): ICD-10-CM

## 2022-02-15 DIAGNOSIS — Z95.0 S/P PLACEMENT OF CARDIAC PACEMAKER: ICD-10-CM

## 2022-02-15 PROCEDURE — 1036F TOBACCO NON-USER: CPT | Performed by: INTERNAL MEDICINE

## 2022-02-15 PROCEDURE — 1123F ACP DISCUSS/DSCN MKR DOCD: CPT | Performed by: INTERNAL MEDICINE

## 2022-02-15 PROCEDURE — G8484 FLU IMMUNIZE NO ADMIN: HCPCS | Performed by: INTERNAL MEDICINE

## 2022-02-15 PROCEDURE — 99214 OFFICE O/P EST MOD 30 MIN: CPT | Performed by: INTERNAL MEDICINE

## 2022-02-15 PROCEDURE — G8417 CALC BMI ABV UP PARAM F/U: HCPCS | Performed by: INTERNAL MEDICINE

## 2022-02-15 PROCEDURE — G8427 DOCREV CUR MEDS BY ELIG CLIN: HCPCS | Performed by: INTERNAL MEDICINE

## 2022-02-15 PROCEDURE — 4040F PNEUMOC VAC/ADMIN/RCVD: CPT | Performed by: INTERNAL MEDICINE

## 2022-02-15 RX ORDER — LISINOPRIL AND HYDROCHLOROTHIAZIDE 25; 20 MG/1; MG/1
1 TABLET ORAL DAILY
COMMUNITY
End: 2022-04-27 | Stop reason: SDUPTHER

## 2022-02-15 NOTE — ASSESSMENT & PLAN NOTE
Moderate aortic stenosis by echocardiogram last year. We will repeat echocardiogram prior to next office visit. Cardiac murmur has remained stable on auscultation.

## 2022-02-15 NOTE — PROGRESS NOTES
Angelina Hamilton (:  1945) is a 64 y.o. male,     Chief Complaint   Patient presents with    Atrial Fibrillation     Pt states he has bilateral edema. Pt denies any other cardiac concerns. Pt is non-smoker.  Hypertension    Hyperlipidemia     Patient is here for follow up for her heart disease and has diabetes and hypertension and hyperlipidemia and obesity and chronic kidney disease and obstructive sleep apnea and persistent atrial fibrillation status post permanent pacemaker implantation. Denies any cardiac symptoms today. He is scheduled to have right knee surgery tomorrow followed by left knee replacement. He has been feeling okay and has chronic leg swelling unable to use compression stockings but tries to keep his feet elevated when resting. Had left greater saphenous vein ablation done in  did not help him much. Venous insufficiency on the right side is more at the common femoral level. Medications are reviewed and he is compliant with his medications. Allergies   Allergen Reactions    Oxycontin [Oxycodone Hcl] Hives     Itching in his feet     Prior to Admission medications    Medication Sig Start Date End Date Taking?  Authorizing Provider   lisinopril-hydroCHLOROthiazide (PRINZIDE;ZESTORETIC) 20-25 MG per tablet Take 1 tablet by mouth daily   Yes Historical Provider, MD   ELIQUIS 5 MG TABS tablet TAKE 1 TABLET TWICE A DAY 22  Yes Fernando Adrian MD   Apixaban (ELIQUIS PO) Take 5 mg by mouth 2 times daily    Yes Historical Provider, MD   metoprolol tartrate (LOPRESSOR) 50 MG tablet TAKE 1 TABLET BY MOUTH TWICE A DAY 22  Yes Fernando Adrian MD   magnesium oxide (MAG-OX) 400 MG tablet TAKE 1 TABLET BY MOUTH EVERY DAY 21  Yes Ignacio Shone, MD   niacin (NIASPAN) 1000 MG extended release tablet TAKE 1 TABLET NIGHTLY 3/16/20  Yes Fernando Adrian MD   atorvastatin (LIPITOR) 20 MG tablet Take 1 tablet by mouth daily 20  Yes MD crissy Bowman (ANTIVERT) 25 MG tablet Take 25 mg by mouth as needed   Yes Historical Provider, MD   amLODIPine (NORVASC) 10 MG tablet Take 1 tablet by mouth daily 6/12/18  Yes Katy Moeller MD   metFORMIN (GLUCOPHAGE) 1000 MG tablet Take 1,000 mg by mouth 2 times daily (with meals)   Yes Historical Provider, MD HightowerLeob[employer] Ruslan Oil 1000 MG CAPS Take 1 capsule by mouth daily Pt takes 500 mg   Yes Historical Provider, MD   allopurinol (ZYLOPRIM) 100 MG tablet Take 100 mg by mouth daily  1/13/15  Yes Historical Provider, MD   B Complex Vitamins (VITAMIN B COMPLEX PO) Take  by mouth daily. Yes Historical Provider, MD   gabapentin (NEURONTIN) 800 MG tablet Take 800 mg by mouth 3 times daily. Yes Historical Provider, MD     Past Medical History:   Diagnosis Date    Arthritis     Dizziness     FH: CAD (coronary artery disease) 09/29/2015    H/O 24 hour EKG monitoring 02/26/19,08/02/2016    Conclusion: Atrial fibrillation with  fairly well-controlled ventricular rate response without evidence of any significant tachy or alon arrhythmias.  H/O atrial fibrillation without current medication 04/03/2019    H/O cardiovascular stress test 11/24/15; 8/22/2019    Normal perfusion in the distribution of all coronaries, normal LV, EF 53%.  H/O Doppler ultrasound (Venous Doppler Lower Extremities) 03/08/2017; 1/2/2020    No evidence of DVT or SVT in the bilaterally. No significant reflux noted in the veins of the right lower extremity. Significant reflux noted in the Left CFV. Bilateral Calf and ankle edema noted.  H/O echocardiogram 6/6/17,11/24/15    Left ventricle mildly dilated with normal systolic function EF 11-21% Mild concentric LVH with Grade II diastolic dysfunction. Mild to moderate MR and mild TR     H/O echocardiogram 06/07/2018; 2/26/2019    EF 55-60%. Mild concentric left ventricular hypertrophy. The left atrium is moderately dilated. Dilatation of the aortic root measuring 4.0 cm. Mild AR.  Moderate pulmonary HTN.     H/O echocardiogram 10/28/2021    EF 55-60% Mild eccentric left ventricular hypertrophy. There is severe fibrocalcific sclerosis of the aortic valve with mean gradient across the aortic valve of 29 mmHg and calculated aortic valve areawas 1.2square centimeters, all suggesting moderate aortic stenosis. See complete result documentation under cardiology tab.  H/O transesophageal echocardiography (SALAZAR) for monitoring 07/08/2019    No CEE clot    Heart murmur 09/29/2015    Hx of Venous Doppler ultrasound 08/03/2020    No evidence of DVT in the left CFV. The Left GSV is non-compressible with no evidence of flow just past the saphenofemoral junction to the distal calf.  Hyperlipidemia     Hypertension     Kidney stones     Leg swelling 12/09/2019    Bilateral leg swelling    Neuropathy     Nonrheumatic aortic valve stenosis 2/15/2022    Obesity     AJYE on CPAP 06/26/2018    On CPAP 5/2018    Persistent atrial fibrillation ([address] 01/29/2019    New onset 1/2019    Syncope and collapse 12/09/2019    Thoracic aortic aneurysm ([facility] 02/2011    4.5    Type II or unspecified type diabetes mellitus without mention of complication, not stated as uncontrolled       Vitals:    02/15/22 1438   BP: 136/86   Pulse: 80   Resp: 16   Weight: 274 lb (124.3 kg)   Height: 5' 11\" (1.803 m)      Body mass index is 38.22 kg/m². Wt Readings from Last 3 Encounters:   02/15/22 274 lb (124.3 kg)   01/24/22 285 lb (129.3 kg)   01/17/22 285 lb (129.3 kg)     Constitutional: Obese pleasant male in no apparent distress. lost 19 pounds since last visit 12/2020. Eyes: glasses.  Pupils are equal.  ENT: Wearing a face mask  NECK: No JVP or thyromegaly  Cardiovascular:  Auscultation: Irregular S1 and S2.  4/6 systolic ejection murmur noted in aortic area.  Carotids are negative for bruits.  Abdominal aorta is nonpalpable. no epigastric bruit noted. Peripheral pulses: nonpalpable due to swelling.   Respiratory:  Respiratory effort is normal. Breath sounds are clear to auscultation. Extremities: has bilateral 2+ pitting edema in legs.  Radials are 2+. SKIN: Warm and well perfused, no pallor or cyanosis  Abdomen:  No masses or tenderness. No organomegaly noted. Neurologic:  Oriented to time, place, and person and non-anxious.  Has bilateral essential tremors of hands. Walks with a ozuna. Psychiatric: Normal mood and effect    Pacer analysis 12/18/2021 is reviewed is consistent with normal dual-chamber MRI safe [employer] pacer function with stable leads and appropriate battery status for the age of the device. Remaining average battery life is 10.8 years. Device is programmed to DDDR mode lower rate of 60 bpm and 93% pacing in the ventricle.  Patient is in persistent atrial fibrillation on Eliquis for anticoagulation therapy.     Pertinent records reviewed and discussed with patient and results are as follow:    Lab Results   Component Value Date    WBC 6.9 01/26/2022    HGB 11.7 01/26/2022    HCT 37.0 01/26/2022     01/26/2022     Lab Results   Component Value Date    CHOL 118 12/12/2016    CHOLFAST 108 10/12/2021    TRIG 103 12/12/2016    TRIGLYCFAST 60 10/12/2021    HDL 50 10/12/2021    LDLDIRECT 48 10/12/2021     Lab Results   Component Value Date    BUN 23 01/26/2022    CREATININE 1.5 01/26/2022     01/26/2022    K 3.8 01/26/2022     Lab Results   Component Value Date    INR 1.57 07/08/2019     Lab Results   Component Value Date    LABA1C 5.3 10/12/2021     ASSESSMENT/PLAN:    1. Nonrheumatic aortic valve stenosis  Assessment & Plan: Moderate aortic stenosis by echocardiogram last year. We will repeat echocardiogram prior to next office visit. Cardiac murmur has remained stable on auscultation. Orders:  -     ECHO Complete 2D W Doppler W Color; Future  2. Primary hypertension  Assessment & Plan:  Fairly well-controlled on amlodipine and Prinzide. Continue the same.    3. Pure hypercholesterolemia  Assessment & Plan:  Well-controlled on Lipitor 20 mg daily. Last LDL was 48. Continue the same. 4. Persistent atrial fibrillation ([address]  Assessment & Plan:  Rate is well controlled and he is anticoagulated with Eliquis. Continue the same. 5. Type 2 diabetes mellitus without complication, without long-term current use of insulin ([facility])  Assessment & Plan:  Well-controlled with hemoglobin A1c reportedly 5.3.   6. JAYE on CPAP  Assessment & Plan:  Continue compliance to CPAP therapy and lose weight. 7. Chronic kidney disease (CKD) stage G3b/A1, moderately decreased glomerular filtration rate (GFR) between 30-44 mL/min/1.73 square meter and albuminuria creatinine ratio less than 30 mg/g ([facility])  Assessment & Plan:  Last creatinine was 1.5 and he follows up with nephrology. 8. S/P placement of cardiac pacemaker 6/2019  Assessment & Plan:  Working well with remaining device longevity of 10.8 years. Continue remote monitoring. Counseled to elevate feet when resting and when resting in bed at night with pillows underneath and use thigh high compression stockings in the morning and remove them at night. Patient verbalizes understanding. Questions answered. Counseled for calorie counting, reduction in serving size and exercise and lifestyle modification for weight loss. Continue device check as per care link schedule. Follow-up in 6 months with repeat echo, sooner if needed. An electronic signature was used to authenticate this note.     --Nima Manzo MD

## 2022-02-15 NOTE — PROGRESS NOTES
2/15/22 -  for patient: surgery @ [facility] on 2/16/22 @ 0800, arrival 0600. Please call with any questions.

## 2022-02-15 NOTE — PATIENT INSTRUCTIONS
Counseled to elevate feet when resting and when resting in bed at night with pillows underneath and use thigh high compression stockings in the morning and remove them at night. Patient verbalizes understanding. Questions answered. Counseled for calorie counting, reduction in serving size and exercise and lifestyle modification for weight loss. Continue device check as per care link schedule. Follow-up in 6 months with repeat echo, sooner if needed.

## 2022-02-16 ENCOUNTER — APPOINTMENT (OUTPATIENT)
Dept: GENERAL RADIOLOGY | Age: 77
End: 2022-02-16
Payer: MEDICARE

## 2022-02-16 ENCOUNTER — HOSPITAL ENCOUNTER (OUTPATIENT)
Age: 77
LOS: 1 days | Discharge: NURSING, REHAB OR LONG-TERM CARE | End: 2022-02-19
Attending: ORTHOPAEDIC SURGERY | Admitting: ORTHOPAEDIC SURGERY
Payer: MEDICARE

## 2022-02-16 VITALS
SYSTOLIC BLOOD PRESSURE: 128 MMHG | DIASTOLIC BLOOD PRESSURE: 109 MMHG | OXYGEN SATURATION: 82 % | RESPIRATION RATE: 16 BRPM | TEMPERATURE: 97.5 F

## 2022-02-16 DIAGNOSIS — Z01.818 PRE-OP TESTING: Primary | ICD-10-CM

## 2022-02-16 LAB
GLUCOSE BLD-MCNC: 106 MG/DL (ref 70–99)
GLUCOSE BLD-MCNC: 132 MG/DL (ref 70–99)
GLUCOSE BLD-MCNC: 186 MG/DL (ref 70–99)
GLUCOSE BLD-MCNC: 210 MG/DL (ref 70–99)

## 2022-02-16 PROCEDURE — 6370000000 HC RX 637 (ALT 250 FOR IP): Performed by: NURSE PRACTITIONER

## 2022-02-16 PROCEDURE — 2709999900 HC NON-CHARGEABLE SUPPLY: Performed by: ORTHOPAEDIC SURGERY

## 2022-02-16 PROCEDURE — 2780000010 HC IMPLANT OTHER: Performed by: ORTHOPAEDIC SURGERY

## 2022-02-16 PROCEDURE — 3600000005 HC SURGERY LEVEL 5 BASE: Performed by: ORTHOPAEDIC SURGERY

## 2022-02-16 PROCEDURE — 94761 N-INVAS EAR/PLS OXIMETRY MLT: CPT

## 2022-02-16 PROCEDURE — 6370000000 HC RX 637 (ALT 250 FOR IP): Performed by: ORTHOPAEDIC SURGERY

## 2022-02-16 PROCEDURE — 6360000002 HC RX W HCPCS: Performed by: ANESTHESIOLOGY

## 2022-02-16 PROCEDURE — 2580000003 HC RX 258: Performed by: ORTHOPAEDIC SURGERY

## 2022-02-16 PROCEDURE — 3600000015 HC SURGERY LEVEL 5 ADDTL 15MIN: Performed by: ORTHOPAEDIC SURGERY

## 2022-02-16 PROCEDURE — 6360000002 HC RX W HCPCS: Performed by: ORTHOPAEDIC SURGERY

## 2022-02-16 PROCEDURE — 6360000002 HC RX W HCPCS: Performed by: NURSE PRACTITIONER

## 2022-02-16 PROCEDURE — 97116 GAIT TRAINING THERAPY: CPT

## 2022-02-16 PROCEDURE — C1776 JOINT DEVICE (IMPLANTABLE): HCPCS | Performed by: ORTHOPAEDIC SURGERY

## 2022-02-16 PROCEDURE — 97162 PT EVAL MOD COMPLEX 30 MIN: CPT

## 2022-02-16 PROCEDURE — 7100000001 HC PACU RECOVERY - ADDTL 15 MIN: Performed by: ORTHOPAEDIC SURGERY

## 2022-02-16 PROCEDURE — 7100000000 HC PACU RECOVERY - FIRST 15 MIN: Performed by: ORTHOPAEDIC SURGERY

## 2022-02-16 PROCEDURE — 27447 TOTAL KNEE ARTHROPLASTY: CPT | Performed by: ORTHOPAEDIC SURGERY

## 2022-02-16 PROCEDURE — 97530 THERAPEUTIC ACTIVITIES: CPT

## 2022-02-16 PROCEDURE — 6360000002 HC RX W HCPCS: Performed by: NURSE ANESTHETIST, CERTIFIED REGISTERED

## 2022-02-16 PROCEDURE — 2500000003 HC RX 250 WO HCPCS: Performed by: NURSE ANESTHETIST, CERTIFIED REGISTERED

## 2022-02-16 PROCEDURE — 73560 X-RAY EXAM OF KNEE 1 OR 2: CPT

## 2022-02-16 PROCEDURE — C1713 ANCHOR/SCREW BN/BN,TIS/BN: HCPCS | Performed by: ORTHOPAEDIC SURGERY

## 2022-02-16 PROCEDURE — 82962 GLUCOSE BLOOD TEST: CPT

## 2022-02-16 PROCEDURE — 3700000000 HC ANESTHESIA ATTENDED CARE: Performed by: ORTHOPAEDIC SURGERY

## 2022-02-16 PROCEDURE — 3700000001 HC ADD 15 MINUTES (ANESTHESIA): Performed by: ORTHOPAEDIC SURGERY

## 2022-02-16 DEVICE — CEMENT BNE 40GM W/ GENT HI VISC RADPQ FOR REV SURG: Type: IMPLANTABLE DEVICE | Site: KNEE | Status: FUNCTIONAL

## 2022-02-16 RX ORDER — SODIUM CHLORIDE 0.9 % (FLUSH) 0.9 %
10 SYRINGE (ML) INJECTION PRN
Status: DISCONTINUED | OUTPATIENT
Start: 2022-02-16 | End: 2022-02-19 | Stop reason: HOSPADM

## 2022-02-16 RX ORDER — METOPROLOL TARTRATE 5 MG/5ML
INJECTION INTRAVENOUS PRN
Status: DISCONTINUED | OUTPATIENT
Start: 2022-02-16 | End: 2022-02-16 | Stop reason: SDUPTHER

## 2022-02-16 RX ORDER — TRANEXAMIC ACID 10 MG/ML
1000 INJECTION, SOLUTION INTRAVENOUS
Status: DISPENSED | OUTPATIENT
Start: 2022-02-16 | End: 2022-02-16

## 2022-02-16 RX ORDER — DIPHENHYDRAMINE HYDROCHLORIDE 50 MG/ML
25 INJECTION INTRAMUSCULAR; INTRAVENOUS EVERY 6 HOURS PRN
Status: DISCONTINUED | OUTPATIENT
Start: 2022-02-16 | End: 2022-02-19 | Stop reason: HOSPADM

## 2022-02-16 RX ORDER — FENTANYL CITRATE 50 UG/ML
INJECTION, SOLUTION INTRAMUSCULAR; INTRAVENOUS PRN
Status: DISCONTINUED | OUTPATIENT
Start: 2022-02-16 | End: 2022-02-16 | Stop reason: SDUPTHER

## 2022-02-16 RX ORDER — SODIUM CHLORIDE 9 MG/ML
25 INJECTION, SOLUTION INTRAVENOUS PRN
Status: DISCONTINUED | OUTPATIENT
Start: 2022-02-16 | End: 2022-02-16 | Stop reason: HOSPADM

## 2022-02-16 RX ORDER — DEXTROSE MONOHYDRATE 25 G/50ML
12.5 INJECTION, SOLUTION INTRAVENOUS PRN
Status: DISCONTINUED | OUTPATIENT
Start: 2022-02-16 | End: 2022-02-16 | Stop reason: SDUPTHER

## 2022-02-16 RX ORDER — ATORVASTATIN CALCIUM 20 MG/1
20 TABLET, FILM COATED ORAL DAILY
Status: DISCONTINUED | OUTPATIENT
Start: 2022-02-16 | End: 2022-02-19 | Stop reason: HOSPADM

## 2022-02-16 RX ORDER — PROMETHAZINE HYDROCHLORIDE 25 MG/ML
6.25 INJECTION, SOLUTION INTRAMUSCULAR; INTRAVENOUS
Status: DISCONTINUED | OUTPATIENT
Start: 2022-02-16 | End: 2022-02-16 | Stop reason: HOSPADM

## 2022-02-16 RX ORDER — HYDROCODONE BITARTRATE AND ACETAMINOPHEN 7.5; 325 MG/1; MG/1
2 TABLET ORAL EVERY 6 HOURS PRN
Status: DISCONTINUED | OUTPATIENT
Start: 2022-02-16 | End: 2022-02-19 | Stop reason: HOSPADM

## 2022-02-16 RX ORDER — LANOLIN ALCOHOL/MO/W.PET/CERES
1000 CREAM (GRAM) TOPICAL NIGHTLY
Status: DISCONTINUED | OUTPATIENT
Start: 2022-02-16 | End: 2022-02-19 | Stop reason: HOSPADM

## 2022-02-16 RX ORDER — KETOROLAC TROMETHAMINE 30 MG/ML
15 INJECTION, SOLUTION INTRAMUSCULAR; INTRAVENOUS EVERY 6 HOURS PRN
Status: DISCONTINUED | OUTPATIENT
Start: 2022-02-16 | End: 2022-02-19 | Stop reason: HOSPADM

## 2022-02-16 RX ORDER — HYDROCODONE BITARTRATE AND ACETAMINOPHEN 7.5; 325 MG/1; MG/1
1 TABLET ORAL EVERY 6 HOURS PRN
Status: DISCONTINUED | OUTPATIENT
Start: 2022-02-16 | End: 2022-02-19 | Stop reason: HOSPADM

## 2022-02-16 RX ORDER — ALLOPURINOL 100 MG/1
100 TABLET ORAL DAILY
Status: DISCONTINUED | OUTPATIENT
Start: 2022-02-16 | End: 2022-02-19 | Stop reason: HOSPADM

## 2022-02-16 RX ORDER — ROCURONIUM BROMIDE 10 MG/ML
INJECTION, SOLUTION INTRAVENOUS PRN
Status: DISCONTINUED | OUTPATIENT
Start: 2022-02-16 | End: 2022-02-16 | Stop reason: SDUPTHER

## 2022-02-16 RX ORDER — SODIUM CHLORIDE, SODIUM LACTATE, POTASSIUM CHLORIDE, CALCIUM CHLORIDE 600; 310; 30; 20 MG/100ML; MG/100ML; MG/100ML; MG/100ML
INJECTION, SOLUTION INTRAVENOUS CONTINUOUS
Status: DISCONTINUED | OUTPATIENT
Start: 2022-02-16 | End: 2022-02-16

## 2022-02-16 RX ORDER — HYDROCODONE BITARTRATE AND ACETAMINOPHEN 5; 325 MG/1; MG/1
1 TABLET ORAL PRN
Status: DISCONTINUED | OUTPATIENT
Start: 2022-02-16 | End: 2022-02-16 | Stop reason: HOSPADM

## 2022-02-16 RX ORDER — NICOTINE POLACRILEX 4 MG
15 LOZENGE BUCCAL PRN
Status: DISCONTINUED | OUTPATIENT
Start: 2022-02-16 | End: 2022-02-19 | Stop reason: HOSPADM

## 2022-02-16 RX ORDER — SODIUM CHLORIDE 0.9 % (FLUSH) 0.9 %
10 SYRINGE (ML) INJECTION PRN
Status: DISCONTINUED | OUTPATIENT
Start: 2022-02-16 | End: 2022-02-16 | Stop reason: HOSPADM

## 2022-02-16 RX ORDER — HYDRALAZINE HYDROCHLORIDE 20 MG/ML
5 INJECTION INTRAMUSCULAR; INTRAVENOUS EVERY 10 MIN PRN
Status: DISCONTINUED | OUTPATIENT
Start: 2022-02-16 | End: 2022-02-16 | Stop reason: HOSPADM

## 2022-02-16 RX ORDER — DEXAMETHASONE SODIUM PHOSPHATE 4 MG/ML
INJECTION, SOLUTION INTRA-ARTICULAR; INTRALESIONAL; INTRAMUSCULAR; INTRAVENOUS; SOFT TISSUE PRN
Status: DISCONTINUED | OUTPATIENT
Start: 2022-02-16 | End: 2022-02-16 | Stop reason: SDUPTHER

## 2022-02-16 RX ORDER — ONDANSETRON 2 MG/ML
INJECTION INTRAMUSCULAR; INTRAVENOUS PRN
Status: DISCONTINUED | OUTPATIENT
Start: 2022-02-16 | End: 2022-02-16 | Stop reason: SDUPTHER

## 2022-02-16 RX ORDER — MEPERIDINE HYDROCHLORIDE 25 MG/ML
12.5 INJECTION INTRAMUSCULAR; INTRAVENOUS; SUBCUTANEOUS EVERY 5 MIN PRN
Status: DISCONTINUED | OUTPATIENT
Start: 2022-02-16 | End: 2022-02-16 | Stop reason: HOSPADM

## 2022-02-16 RX ORDER — ONDANSETRON 4 MG/1
4 TABLET, ORALLY DISINTEGRATING ORAL EVERY 8 HOURS PRN
Status: DISCONTINUED | OUTPATIENT
Start: 2022-02-16 | End: 2022-02-19 | Stop reason: HOSPADM

## 2022-02-16 RX ORDER — LABETALOL HYDROCHLORIDE 5 MG/ML
5 INJECTION, SOLUTION INTRAVENOUS EVERY 10 MIN PRN
Status: DISCONTINUED | OUTPATIENT
Start: 2022-02-16 | End: 2022-02-16 | Stop reason: HOSPADM

## 2022-02-16 RX ORDER — ACETAMINOPHEN 500 MG
1000 TABLET ORAL ONCE
Status: COMPLETED | OUTPATIENT
Start: 2022-02-16 | End: 2022-02-16

## 2022-02-16 RX ORDER — ONDANSETRON 2 MG/ML
4 INJECTION INTRAMUSCULAR; INTRAVENOUS
Status: DISCONTINUED | OUTPATIENT
Start: 2022-02-16 | End: 2022-02-16 | Stop reason: HOSPADM

## 2022-02-16 RX ORDER — MIDAZOLAM HYDROCHLORIDE 1 MG/ML
INJECTION INTRAMUSCULAR; INTRAVENOUS PRN
Status: DISCONTINUED | OUTPATIENT
Start: 2022-02-16 | End: 2022-02-16 | Stop reason: SDUPTHER

## 2022-02-16 RX ORDER — SODIUM CHLORIDE, SODIUM LACTATE, POTASSIUM CHLORIDE, CALCIUM CHLORIDE 600; 310; 30; 20 MG/100ML; MG/100ML; MG/100ML; MG/100ML
INJECTION, SOLUTION INTRAVENOUS CONTINUOUS
Status: DISCONTINUED | OUTPATIENT
Start: 2022-02-16 | End: 2022-02-19 | Stop reason: HOSPADM

## 2022-02-16 RX ORDER — DIPHENHYDRAMINE HCL 25 MG
25 TABLET ORAL EVERY 6 HOURS PRN
Status: DISCONTINUED | OUTPATIENT
Start: 2022-02-16 | End: 2022-02-19 | Stop reason: HOSPADM

## 2022-02-16 RX ORDER — PHENYLEPHRINE HCL IN 0.9% NACL 1 MG/10 ML
SYRINGE (ML) INTRAVENOUS PRN
Status: DISCONTINUED | OUTPATIENT
Start: 2022-02-16 | End: 2022-02-16 | Stop reason: SDUPTHER

## 2022-02-16 RX ORDER — SODIUM CHLORIDE 0.9 % (FLUSH) 0.9 %
10 SYRINGE (ML) INJECTION EVERY 12 HOURS SCHEDULED
Status: DISCONTINUED | OUTPATIENT
Start: 2022-02-16 | End: 2022-02-19 | Stop reason: HOSPADM

## 2022-02-16 RX ORDER — ONDANSETRON 2 MG/ML
4 INJECTION INTRAMUSCULAR; INTRAVENOUS EVERY 6 HOURS PRN
Status: DISCONTINUED | OUTPATIENT
Start: 2022-02-16 | End: 2022-02-19 | Stop reason: HOSPADM

## 2022-02-16 RX ORDER — PROPOFOL 10 MG/ML
INJECTION, EMULSION INTRAVENOUS PRN
Status: DISCONTINUED | OUTPATIENT
Start: 2022-02-16 | End: 2022-02-16 | Stop reason: SDUPTHER

## 2022-02-16 RX ORDER — KETAMINE HCL 50MG/ML(1)
SYRINGE (ML) INTRAVENOUS PRN
Status: DISCONTINUED | OUTPATIENT
Start: 2022-02-16 | End: 2022-02-16 | Stop reason: SDUPTHER

## 2022-02-16 RX ORDER — SODIUM CHLORIDE 9 MG/ML
25 INJECTION, SOLUTION INTRAVENOUS PRN
Status: DISCONTINUED | OUTPATIENT
Start: 2022-02-16 | End: 2022-02-19 | Stop reason: HOSPADM

## 2022-02-16 RX ORDER — AMLODIPINE BESYLATE 10 MG/1
10 TABLET ORAL DAILY
Status: DISCONTINUED | OUTPATIENT
Start: 2022-02-16 | End: 2022-02-19 | Stop reason: HOSPADM

## 2022-02-16 RX ORDER — DEXTROSE MONOHYDRATE 50 MG/ML
100 INJECTION, SOLUTION INTRAVENOUS PRN
Status: DISCONTINUED | OUTPATIENT
Start: 2022-02-16 | End: 2022-02-19 | Stop reason: HOSPADM

## 2022-02-16 RX ORDER — DIPHENHYDRAMINE HYDROCHLORIDE 50 MG/ML
12.5 INJECTION INTRAMUSCULAR; INTRAVENOUS
Status: DISCONTINUED | OUTPATIENT
Start: 2022-02-16 | End: 2022-02-16 | Stop reason: HOSPADM

## 2022-02-16 RX ORDER — LIDOCAINE HYDROCHLORIDE 20 MG/ML
INJECTION, SOLUTION INTRAVENOUS PRN
Status: DISCONTINUED | OUTPATIENT
Start: 2022-02-16 | End: 2022-02-16 | Stop reason: SDUPTHER

## 2022-02-16 RX ORDER — ACETAMINOPHEN 325 MG/1
650 TABLET ORAL EVERY 6 HOURS
Status: DISCONTINUED | OUTPATIENT
Start: 2022-02-16 | End: 2022-02-19 | Stop reason: HOSPADM

## 2022-02-16 RX ORDER — LISINOPRIL AND HYDROCHLOROTHIAZIDE 12.5; 1 MG/1; MG/1
2 TABLET ORAL DAILY
Status: DISCONTINUED | OUTPATIENT
Start: 2022-02-16 | End: 2022-02-19 | Stop reason: HOSPADM

## 2022-02-16 RX ORDER — METOPROLOL TARTRATE 50 MG/1
50 TABLET, FILM COATED ORAL 2 TIMES DAILY
Status: DISCONTINUED | OUTPATIENT
Start: 2022-02-16 | End: 2022-02-19 | Stop reason: HOSPADM

## 2022-02-16 RX ORDER — SODIUM CHLORIDE 0.9 % (FLUSH) 0.9 %
10 SYRINGE (ML) INJECTION EVERY 12 HOURS SCHEDULED
Status: DISCONTINUED | OUTPATIENT
Start: 2022-02-16 | End: 2022-02-16 | Stop reason: HOSPADM

## 2022-02-16 RX ORDER — 0.9 % SODIUM CHLORIDE 0.9 %
500 INTRAVENOUS SOLUTION INTRAVENOUS
Status: DISCONTINUED | OUTPATIENT
Start: 2022-02-16 | End: 2022-02-16 | Stop reason: HOSPADM

## 2022-02-16 RX ORDER — GABAPENTIN 400 MG/1
800 CAPSULE ORAL 3 TIMES DAILY
Status: DISCONTINUED | OUTPATIENT
Start: 2022-02-16 | End: 2022-02-19 | Stop reason: HOSPADM

## 2022-02-16 RX ORDER — FENTANYL CITRATE 50 UG/ML
50 INJECTION, SOLUTION INTRAMUSCULAR; INTRAVENOUS EVERY 5 MIN PRN
Status: DISCONTINUED | OUTPATIENT
Start: 2022-02-16 | End: 2022-02-16 | Stop reason: HOSPADM

## 2022-02-16 RX ORDER — HYDROMORPHONE HCL 110MG/55ML
0.5 PATIENT CONTROLLED ANALGESIA SYRINGE INTRAVENOUS EVERY 5 MIN PRN
Status: DISCONTINUED | OUTPATIENT
Start: 2022-02-16 | End: 2022-02-16 | Stop reason: HOSPADM

## 2022-02-16 RX ORDER — HYDROCODONE BITARTRATE AND ACETAMINOPHEN 5; 325 MG/1; MG/1
2 TABLET ORAL EVERY 6 HOURS PRN
Status: DISCONTINUED | OUTPATIENT
Start: 2022-02-16 | End: 2022-02-16 | Stop reason: HOSPADM

## 2022-02-16 RX ORDER — PANTOPRAZOLE SODIUM 40 MG/1
40 TABLET, DELAYED RELEASE ORAL
Status: DISCONTINUED | OUTPATIENT
Start: 2022-02-17 | End: 2022-02-19 | Stop reason: HOSPADM

## 2022-02-16 RX ADMIN — ACETAMINOPHEN 650 MG: 325 TABLET ORAL at 16:41

## 2022-02-16 RX ADMIN — HYDROMORPHONE HYDROCHLORIDE 0.5 MG: 2 INJECTION INTRAMUSCULAR; INTRAVENOUS; SUBCUTANEOUS at 11:32

## 2022-02-16 RX ADMIN — ACETAMINOPHEN 1000 MG: 500 TABLET ORAL at 07:10

## 2022-02-16 RX ADMIN — SODIUM CHLORIDE, POTASSIUM CHLORIDE, SODIUM LACTATE AND CALCIUM CHLORIDE: 600; 310; 30; 20 INJECTION, SOLUTION INTRAVENOUS at 07:11

## 2022-02-16 RX ADMIN — FENTANYL CITRATE 50 MCG: 50 INJECTION, SOLUTION INTRAMUSCULAR; INTRAVENOUS at 10:29

## 2022-02-16 RX ADMIN — Medication 100 MCG: at 08:36

## 2022-02-16 RX ADMIN — ROCURONIUM BROMIDE 50 MG: 50 INJECTION, SOLUTION INTRAVENOUS at 08:15

## 2022-02-16 RX ADMIN — GABAPENTIN 800 MG: 400 CAPSULE ORAL at 20:06

## 2022-02-16 RX ADMIN — AMLODIPINE BESYLATE 10 MG: 10 TABLET ORAL at 16:41

## 2022-02-16 RX ADMIN — Medication 25 MG: at 08:21

## 2022-02-16 RX ADMIN — CEFAZOLIN SODIUM 3000 MG: 1 INJECTION, POWDER, FOR SOLUTION INTRAMUSCULAR; INTRAVENOUS at 08:17

## 2022-02-16 RX ADMIN — ONDANSETRON 4 MG: 2 INJECTION INTRAMUSCULAR; INTRAVENOUS at 08:21

## 2022-02-16 RX ADMIN — ASPIRIN 325 MG: 325 TABLET, COATED ORAL at 20:03

## 2022-02-16 RX ADMIN — SUGAMMADEX 50 MG: 100 INJECTION, SOLUTION INTRAVENOUS at 09:42

## 2022-02-16 RX ADMIN — DEXAMETHASONE SODIUM PHOSPHATE 4 MG: 4 INJECTION, SOLUTION INTRAMUSCULAR; INTRAVENOUS at 08:21

## 2022-02-16 RX ADMIN — METOPROLOL TARTRATE 50 MG: 50 TABLET, FILM COATED ORAL at 20:06

## 2022-02-16 RX ADMIN — METOPROLOL TARTRATE 1 MG: 5 INJECTION, SOLUTION INTRAVENOUS at 08:54

## 2022-02-16 RX ADMIN — HYDROCODONE BITARTRATE AND ACETAMINOPHEN 1 TABLET: 7.5; 325 TABLET ORAL at 16:42

## 2022-02-16 RX ADMIN — LISINOPRIL AND HYDROCHLOROTHIAZIDE 2 TABLET: 12.5; 1 TABLET ORAL at 17:33

## 2022-02-16 RX ADMIN — METOPROLOL TARTRATE 1 MG: 5 INJECTION, SOLUTION INTRAVENOUS at 09:17

## 2022-02-16 RX ADMIN — LIDOCAINE HYDROCHLORIDE 50 MG: 20 INJECTION, SOLUTION INTRAVENOUS at 08:14

## 2022-02-16 RX ADMIN — ACETAMINOPHEN 650 MG: 325 TABLET ORAL at 20:03

## 2022-02-16 RX ADMIN — ENOXAPARIN SODIUM 40 MG: 100 INJECTION SUBCUTANEOUS at 16:42

## 2022-02-16 RX ADMIN — Medication 100 MCG: at 09:33

## 2022-02-16 RX ADMIN — FENTANYL CITRATE 50 MCG: 50 INJECTION, SOLUTION INTRAMUSCULAR; INTRAVENOUS at 10:37

## 2022-02-16 RX ADMIN — METOPROLOL TARTRATE 1 MG: 5 INJECTION, SOLUTION INTRAVENOUS at 08:23

## 2022-02-16 RX ADMIN — SODIUM CHLORIDE, POTASSIUM CHLORIDE, SODIUM LACTATE AND CALCIUM CHLORIDE: 600; 310; 30; 20 INJECTION, SOLUTION INTRAVENOUS at 11:09

## 2022-02-16 RX ADMIN — CEFAZOLIN SODIUM 3000 MG: 1 INJECTION, POWDER, FOR SOLUTION INTRAMUSCULAR; INTRAVENOUS at 16:44

## 2022-02-16 RX ADMIN — Medication 100 MCG: at 08:41

## 2022-02-16 RX ADMIN — GABAPENTIN 800 MG: 400 CAPSULE ORAL at 16:42

## 2022-02-16 RX ADMIN — ATORVASTATIN CALCIUM 20 MG: 20 TABLET, FILM COATED ORAL at 16:41

## 2022-02-16 RX ADMIN — METOPROLOL TARTRATE 1 MG: 5 INJECTION, SOLUTION INTRAVENOUS at 09:27

## 2022-02-16 RX ADMIN — MIDAZOLAM 1 MG: 1 INJECTION INTRAMUSCULAR; INTRAVENOUS at 08:14

## 2022-02-16 RX ADMIN — MIDAZOLAM 1 MG: 1 INJECTION INTRAMUSCULAR; INTRAVENOUS at 07:45

## 2022-02-16 RX ADMIN — HYDROMORPHONE HYDROCHLORIDE 0.5 MG: 2 INJECTION INTRAMUSCULAR; INTRAVENOUS; SUBCUTANEOUS at 11:13

## 2022-02-16 RX ADMIN — FENTANYL CITRATE 100 MCG: 50 INJECTION, SOLUTION INTRAMUSCULAR; INTRAVENOUS at 08:14

## 2022-02-16 RX ADMIN — SODIUM CHLORIDE, POTASSIUM CHLORIDE, SODIUM LACTATE AND CALCIUM CHLORIDE: 600; 310; 30; 20 INJECTION, SOLUTION INTRAVENOUS at 21:42

## 2022-02-16 RX ADMIN — METOPROLOL TARTRATE 1 MG: 5 INJECTION, SOLUTION INTRAVENOUS at 08:29

## 2022-02-16 RX ADMIN — Medication 1000 MG: at 20:13

## 2022-02-16 RX ADMIN — PROPOFOL 110 MG: 10 INJECTION, EMULSION INTRAVENOUS at 08:14

## 2022-02-16 RX ADMIN — INSULIN LISPRO 6 UNITS: 100 INJECTION, SOLUTION INTRAVENOUS; SUBCUTANEOUS at 17:34

## 2022-02-16 RX ADMIN — ALLOPURINOL 100 MG: 100 TABLET ORAL at 16:42

## 2022-02-16 RX ADMIN — Medication 25 MG: at 08:15

## 2022-02-16 RX ADMIN — CEFAZOLIN SODIUM 3000 MG: 1 INJECTION, POWDER, FOR SOLUTION INTRAMUSCULAR; INTRAVENOUS at 23:36

## 2022-02-16 ASSESSMENT — PULMONARY FUNCTION TESTS
PIF_VALUE: 20
PIF_VALUE: 1
PIF_VALUE: 20
PIF_VALUE: 20
PIF_VALUE: 21
PIF_VALUE: 21
PIF_VALUE: 20
PIF_VALUE: 20
PIF_VALUE: 1
PIF_VALUE: 21
PIF_VALUE: 20
PIF_VALUE: 20
PIF_VALUE: 21
PIF_VALUE: 21
PIF_VALUE: 20
PIF_VALUE: 20
PIF_VALUE: 21
PIF_VALUE: 20
PIF_VALUE: 19
PIF_VALUE: 20
PIF_VALUE: 20
PIF_VALUE: 21
PIF_VALUE: 20
PIF_VALUE: 20
PIF_VALUE: 21
PIF_VALUE: 20
PIF_VALUE: 10
PIF_VALUE: 21
PIF_VALUE: 20
PIF_VALUE: 21
PIF_VALUE: 21
PIF_VALUE: 20
PIF_VALUE: 21
PIF_VALUE: 30
PIF_VALUE: 20
PIF_VALUE: 19
PIF_VALUE: 20
PIF_VALUE: 21
PIF_VALUE: 20
PIF_VALUE: 20
PIF_VALUE: 41
PIF_VALUE: 20
PIF_VALUE: 21
PIF_VALUE: 1
PIF_VALUE: 21
PIF_VALUE: 21
PIF_VALUE: 22
PIF_VALUE: 21
PIF_VALUE: 21
PIF_VALUE: 19
PIF_VALUE: 20
PIF_VALUE: 21
PIF_VALUE: 3
PIF_VALUE: 20
PIF_VALUE: 21
PIF_VALUE: 21
PIF_VALUE: 0
PIF_VALUE: 20
PIF_VALUE: 20
PIF_VALUE: 21
PIF_VALUE: 4
PIF_VALUE: 19
PIF_VALUE: 20
PIF_VALUE: 21
PIF_VALUE: 20
PIF_VALUE: 21
PIF_VALUE: 21
PIF_VALUE: 20
PIF_VALUE: 0
PIF_VALUE: 20
PIF_VALUE: 20
PIF_VALUE: 21
PIF_VALUE: 20
PIF_VALUE: 22
PIF_VALUE: 20
PIF_VALUE: 20
PIF_VALUE: 21
PIF_VALUE: 0
PIF_VALUE: 21
PIF_VALUE: 20
PIF_VALUE: 21
PIF_VALUE: 20
PIF_VALUE: 21
PIF_VALUE: 21
PIF_VALUE: 20
PIF_VALUE: 21
PIF_VALUE: 21
PIF_VALUE: 20
PIF_VALUE: 21
PIF_VALUE: 19
PIF_VALUE: 22
PIF_VALUE: 20
PIF_VALUE: 21
PIF_VALUE: 4
PIF_VALUE: 21
PIF_VALUE: 4
PIF_VALUE: 21
PIF_VALUE: 20
PIF_VALUE: 1
PIF_VALUE: 20

## 2022-02-16 ASSESSMENT — ENCOUNTER SYMPTOMS
VOMITING: 0
GASTROINTESTINAL NEGATIVE: 1
ALLERGIC/IMMUNOLOGIC NEGATIVE: 1
NAUSEA: 0
CHEST TIGHTNESS: 0
EYES NEGATIVE: 1
SHORTNESS OF BREATH: 0
RESPIRATORY NEGATIVE: 1

## 2022-02-16 ASSESSMENT — PAIN DESCRIPTION - ONSET
ONSET: AWAKENED FROM SLEEP
ONSET: ON-GOING
ONSET: AWAKENED FROM SLEEP

## 2022-02-16 ASSESSMENT — PAIN DESCRIPTION - PROGRESSION
CLINICAL_PROGRESSION: GRADUALLY IMPROVING

## 2022-02-16 ASSESSMENT — PAIN DESCRIPTION - LOCATION
LOCATION: KNEE

## 2022-02-16 ASSESSMENT — PAIN SCALES - GENERAL
PAINLEVEL_OUTOF10: 8
PAINLEVEL_OUTOF10: 5
PAINLEVEL_OUTOF10: 6
PAINLEVEL_OUTOF10: 10
PAINLEVEL_OUTOF10: 0
PAINLEVEL_OUTOF10: 7
PAINLEVEL_OUTOF10: 10
PAINLEVEL_OUTOF10: 7
PAINLEVEL_OUTOF10: 7
PAINLEVEL_OUTOF10: 10
PAINLEVEL_OUTOF10: 7
PAINLEVEL_OUTOF10: 7

## 2022-02-16 ASSESSMENT — PAIN DESCRIPTION - PAIN TYPE
TYPE: SURGICAL PAIN

## 2022-02-16 ASSESSMENT — PAIN DESCRIPTION - FREQUENCY
FREQUENCY: INTERMITTENT
FREQUENCY: CONTINUOUS

## 2022-02-16 ASSESSMENT — PAIN DESCRIPTION - ORIENTATION
ORIENTATION: RIGHT

## 2022-02-16 ASSESSMENT — PAIN DESCRIPTION - DESCRIPTORS
DESCRIPTORS: DISCOMFORT
DESCRIPTORS: DISCOMFORT;STABBING
DESCRIPTORS: DISCOMFORT
DESCRIPTORS: DISCOMFORT;ACHING

## 2022-02-16 ASSESSMENT — PAIN - FUNCTIONAL ASSESSMENT: PAIN_FUNCTIONAL_ASSESSMENT: 0-10

## 2022-02-16 NOTE — PROGRESS NOTES
21  Arrived to pacu, monitors placed and alarms on. Report received from [address] [place] [address]. Patient awake and alert, complaint of pain 11/10.    1029 Medicated for pain  1035 Tolerating Ice chips, denies any nausea. 1037 Medicated for pain. 1055 Xray completed at bedside.

## 2022-02-16 NOTE — PROGRESS NOTES
1113 Patient states he feels some relief but pain is still 8/10, \"it was a 15/10\". [facility] complete at this time. Patient awaiting room assignment. 1200 Updated wife on status. Patient assigned [phone], awaiting to transfer when room is cleaned. 1200 Denies any complaints, resting with eyes closed.    1350 Report called to

## 2022-02-16 NOTE — OP NOTE
DATE OF PROCEDURE:  2/16/2022     PREOPERATIVE DIAGNOSIS:  Right knee DJD. POSTOPERATIVE DIAGNOSIS:  Right knee DJD. PROCEDURE:  Right total knee arthroplasty using [employer]  MC knee with size 10 femoral component, size G tibial  component, size 35 patellar component and size 12 tibial poly component  with antibiotic-impregnated cement. SURGEON:  Vianca Menendez DO    ANESTHESIA:  General with regional block. ESTIMATED BLOOD LOSS:  150 mL. TOTAL TOURNIQUET TIME:  42 minutes. FLUIDS:  700 mL of crystalloids. INDICATIONS FOR PROCEDURE:  The patient is a 41-year-old male with  long-standing history of right knee pain. For this, he underwent  conservative treatment with no relief of his symptoms. X-rays revealed  severe arthritis in the right knee. Given his persistent symptoms  despite conservative treatment and with his x-ray findings, I  recommended surgical treatment. I explained the risks, benefits and  possible complications of the procedure to the patient and after  answering all of his questions, he consented to undergo the above  procedure. REPORT OF PROCEDURE:  The patient was seen and evaluated in the  preoperative holding area where the right lower extremity was signed in  his presence. At this point, care of the patient was turned over to  anesthesia team who performed a regional block to the right lower  extremity. He was then transported back to the operative suite. Spinal  anesthesia was performed and once adequate anesthesia was obtained, the  right lower extremity was prepped and draped in usual sterile fashion. Preoperative antibiotics were administered. At this point, a time-out was performed and all in attendance were  in agreement. I exsanguinated the right lower extremity with the use of an Esmarch and  tourniquet was inflated to 250 mmHg.   I then made a standard anterior  midline incision overlying the right knee and carried sharp dissection  down into the knee joint through a medial parapatellar incision. I then  debrided a significant amount of intra-articular fat pad and  inflammatory synovium. I then elevated the soft tissue off the proximal  and medial tibia with the use of Bovie. I then everted the patella and  flexed the knee at 90 degrees. I then debrided soft tissue around the  patella. I then used a saw to perform a freehand cut of the patella and  the bone fragment was then elevated and discarded. I then aligned a  sizing guide and found this to be a size 35 patella. I then drilled the  three drill holes. I then aligned a size 35 patella trial component,  which fit nicely on the patella. Additional osteophytes were then  debrided around the patellar component. The trial component was then  removed. I then turned my attention for preparation of the distal femur. I  placed retractors along the medial and lateral aspect of the distal  femur to protect the soft tissue. I then inserted the intramedullary  drill into the femoral canal.  I then inserted the intramedullary distal  femoral cutting guide, which was malleted in place and pinned in place. I then used a saw to perform the distal femoral cut resecting 10 mm of  distal femoral bone. The bone fragments were then discarded. The  distal femoral cutting guide was then removed. I then aligned the  sizing guide over the distal femur and found this to be a size 10 distal  femoral component. I then drilled guide holes through the sizing guide,  which was then removed. I then inserted the size 10 distal femoral  cutting block, which was malleted in place and pinned in place. I then  used a saw to perform the anterior, posterior and chamfer cuts and the  bone fragments were then elevated and discarded. The distal femoral  cutting guide was then removed. I then debrided the ACL and PCL out of  the femoral notch.     I then turned my attention for preparation of the proximal tibia. I  placed a retractor along the posterior tibia, translating it anteriorly  as well as retractors along the medial and lateral aspect of the  proximal tibia to protect the soft tissue. I then removed the remnant  of the medial and lateral meniscus ensuring to protect the popliteal  tendon and medial collateral ligament. Once I had adequate exposure of  the proximal tibia, I aligned the proximal tibial cutting guide and once  the appropriate position was obtained, it was pinned in place. I then  used a saw to perform the proximal tibial cut and the bone fragment was  then elevated and discarded. I then placed a lamina  in the  lateral compartment of the knee and used a curved osteotome and curved  curette to remove moderate-sized osteophytes off the posterior aspect of  the medial femoral condyle. I then placed a lamina  in the  medial compartment of the knee and used a curved osteotome and curved  curette to remove small osteophytes off the posterior aspect of the  lateral femoral condyle. I then translated the proximal tibia  anteriorly again. I then aligned a size G tibial base plate and once  the appropriate position was obtained, it was pinned in place. I then  inserted a size 10 distal femoral trial component, which was malleted in  place until it was firmly seated. I then inserted a trial size 12 tibia poly component and a  size 35 patellar component. With all trial components in place, I ran  the knee through full range of motion and found there to be excellent  tracking of the patella with no laxity to varus or valgus stressing. These were then selected for the final implant components. The trial components were then removed leaving the tibial base plate in  place. I then drilled and punched the keel for the proximal tibia and  the tibial base plate was then removed.   The  bone ends were then irrigated with pulse lavage using sterile normal  saline with gentamicin, the bone ends were then dried. I then placed  the antibiotic-impregnated cement on the tibia as well as on the tibial  component. The final size G tibial component was then malleted in place  and excess cement was debrided. I then placed the  antibiotic-impregnated cement on the femoral component as well as on the  distal femur. The final size 10 distal femoral component was then  malleted in place and excess cement was debrided. I then inserted the  trial size 12 tibial poly component and the knee was then held in  extension and excess cement was debrided. I then everted the patella,  which was irrigated with pulse lavage and then dried and then placed  antibiotic-impregnated cement on the patella and the final size 35  patellar component was then clamped in place and excess cement was  debrided. The tourniquet was then deflated for a total of 42 minutes  and adequate hemostasis was maintained. Once the cement was allowed to  harden at the completion, the tibial poly trial component was then  removed. The joint was then irrigated further with pulse lavage. I  then inserted the final size 12 MC tibial poly component, which was locked  in place. With all final components in place, I ran the knee through a  full range of motion and found there to be excellent tracking of the  patella with no laxity to varus or valgus stressing. The joint was then irrigated further with pulse lavage. I then re-approximated the medial  parapatellar incision using #5 Ethibond suture and #1 Vicryl suture in  figure-of-eight fashion. I then closed subcutaneous tissue using 2-0  Vicryl suture followed by skin closure with stratafix and prineo. Adequate  hemostasis was maintained at all times. I then applied a sterile soft  dressing to the right lower extremity. The patient was then awakened  from anesthesia and transported to PACU in stable condition.   He  appeared to have tolerated the procedure well.    PROGNOSIS:  At this point, he will be admitted to the hospital for  postoperative pain control, rehabilitation and medical monitoring. Hospitalist will be consulted for medical management and physical  therapy will be consulted for gait training and he could be  weightbearing as tolerated on the right leg. He will receive antibiotic  prophylaxis and DVT prophylaxis during his hospitalization. Following  his discharge, I will see him back in the office in 3 weeks and will continue to monitor his progress in the outpatient setting for resolution of his symptoms.         Anyi 97, DO

## 2022-02-16 NOTE — H&P
Subjective:      Patient ID: Julita Cason is a 64 y.o. male.     Patient presents to the office with b/l knee pain. Pt states his right knee is worse than his left. Pt states he feels weakness and buckling when weightbearing often. 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 Pt states he feels the most pain when squatting and it is difficult for him to get up. Pt denies any injuries and use of medications or conservative measures.      He comes in today for his first visit with me in regards to his bilateral knee pain, right worse than left. He states that he has been dealing with deep, aching and grinding pain in both of his knees for many years. He states that now the pain is getting to the point that he is having difficulty with his daily activities. Patient denies any new injury to the involved extremity/ joint, denies numbness or tingling in the involved extremity and denies fever or chills.           Review of Systems   Constitutional: Negative for activity change, chills and fever. Respiratory: Negative for chest tightness. Cardiovascular: Negative for chest pain. Musculoskeletal: Positive for arthralgias, gait problem, joint swelling and myalgias. Negative for back pain. Skin: Negative for color change, pallor, rash and wound. Neurological: Negative for weakness and numbness.         Past Medical History        Past Medical History:   Diagnosis Date    Arthritis      Dizziness      FH: CAD (coronary artery disease) 09/29/2015    H/O 24 hour EKG monitoring 02/26/19,08/02/2016     Conclusion: Atrial fibrillation with  fairly well-controlled ventricular rate response without evidence of any significant tachy or alon arrhythmias.  H/O atrial fibrillation without current medication 04/03/2019    H/O cardiovascular stress test 11/24/15; 8/22/2019     Normal perfusion in the distribution of all coronaries, normal LV, EF 53%.     H/O Doppler ultrasound (Venous Doppler Lower Extremities) 03/08/2017; 1/2/2020     No evidence of DVT or SVT in the bilaterally. No significant reflux noted in the veins of the right lower extremity. Significant reflux noted in the Left CFV. Bilateral Calf and ankle edema noted.  H/O echocardiogram 6/6/17,11/24/15     Left ventricle mildly dilated with normal systolic function EF 05-40% Mild concentric LVH with Grade II diastolic dysfunction. Mild to moderate MR and mild TR     H/O echocardiogram 06/07/2018; 2/26/2019     EF 55-60%. Mild concentric left ventricular hypertrophy. The left atrium is moderately dilated. Dilatation of the aortic root measuring 4.0 cm. Mild AR. Moderate pulmonary HTN.     H/O echocardiogram 10/28/2021     EF 55-60% Mild eccentric left ventricular hypertrophy. There is severe fibrocalcific sclerosis of the aortic valve with mean gradient across the aortic valve of 29 mmHg and calculated aortic valve areawas 1.2square centimeters, all suggesting moderate aortic stenosis. See complete result documentation under cardiology tab.  H/O transesophageal echocardiography ([facility]AR) for monitoring 07/08/2019     No CEE clot    Heart murmur 09/29/2015    Hx of Venous Doppler ultrasound 08/03/2020     No evidence of DVT in the left CFV. The Left GSV is non-compressible with no evidence of flow just past the saphenofemoral junction to the distal calf.  Hyperlipidemia      Hypertension      Kidney stones      Leg swelling 12/09/2019     Bilateral leg swelling    Neuropathy      Obesity      JAYE on CPAP 06/26/2018     On CPAP 5/2018    Persistent atrial fibrillation ([address] 01/29/2019     New onset 1/2019    Syncope and collapse 12/09/2019    Thoracic aortic aneurysm ([address] 02/2011     4.5    Type II or unspecified type diabetes mellitus without mention of complication, not stated as uncontrolled              No current facility-administered medications on file prior to encounter.      Current Outpatient Medications on File Prior to Encounter   Medication Sig Dispense Refill    metoprolol tartrate (LOPRESSOR) 50 MG tablet TAKE 1 TABLET BY MOUTH TWICE A  tablet 3    magnesium oxide (MAG-OX) 400 MG tablet TAKE 1 TABLET BY MOUTH EVERY DAY 90 tablet 3    niacin (NIASPAN) 1000 MG extended release tablet TAKE 1 TABLET NIGHTLY 90 tablet 3    atorvastatin (LIPITOR) 20 MG tablet Take 1 tablet by mouth daily 90 tablet 3    meclizine (ANTIVERT) 25 MG tablet Take 25 mg by mouth as needed      amLODIPine (NORVASC) 10 MG tablet Take 1 tablet by mouth daily 90 tablet 3    metFORMIN (GLUCOPHAGE) 1000 MG tablet Take 1,000 mg by mouth 2 times daily (with meals)      Krill Oil 1000 MG CAPS Take 1 capsule by mouth daily Pt takes 500 mg      allopurinol (ZYLOPRIM) 100 MG tablet Take 100 mg by mouth daily   0    B Complex Vitamins (VITAMIN B COMPLEX PO) Take  by mouth daily.  gabapentin (NEURONTIN) 800 MG tablet Take 800 mg by mouth 3 times daily.          Allergies   Allergen Reactions    Oxycontin [Oxycodone Hcl] Hives     Itching in his feet     Past Surgical History:   Procedure Laterality Date    CARDIOVERSION  2019    CATARACT REMOVAL WITH IMPLANT Right 2019    CATARACT REMOVAL WITH IMPLANT Left 05/10/2019    COLON SURGERY  2007     COLONOSCOPY  [id number]    colon polyps removed    HERNIA REPAIR      HERNIA REPAIR      INTRACAPSULAR CATARACT EXTRACTION Right 2019    EYE CATARACT EMULSIFICATION IOL IMPLANT performed by Nicole Lofton MD at [address] [place] [address] Left 5/10/2019    EYE CATARACT EMULSIFICATION IOL IMPLANT performed by Nicole Lofton MD at [address] [place] [address] Left 2019    [employer] dual permanent pacer-Val XT DR ENEIDA Vaughan      Social History     Tobacco Use    Smoking status: Former Smoker     Packs/day: 1.00     Years: 7.00     Pack years: 7.00     Quit date: 3/26/1973     Years since quittin.9    Smokeless tobacco: Former User   Vaping Use    Vaping Use: Never used   Substance Use Topics    Alcohol use: Yes     Alcohol/week: 3.0 standard drinks     Types: 3 Shots of liquor per week     Comment: small amount some nights    Drug use: No     Family History   Problem Relation Age of Onset    Diabetes Mother     Heart Disease Father     Heart Disease Brother     High Blood Pressure Brother        Objective:   Physical Exam  Constitutional:       Appearance: He is well-developed. HENT:      Head: Normocephalic. Eyes:      Pupils: Pupils are equal, round, and reactive to light. Pulmonary:      Effort: Pulmonary effort is normal.   Musculoskeletal:         General: Swelling and tenderness present. No deformity. Normal range of motion. Cervical back: Normal range of motion. Right hip: Normal.      Left hip: Normal.      Right knee: Swelling and bony tenderness present. No deformity, effusion, erythema, ecchymosis or lacerations. Normal range of motion. Tenderness present over the medial joint line, lateral joint line and patellar tendon. No MCL or LCL tenderness. No LCL laxity or MCL laxity. Normal alignment and normal patellar mobility. Left knee: Swelling and bony tenderness present. No deformity, effusion, erythema, ecchymosis or lacerations. Normal range of motion. Tenderness present over the medial joint line and lateral joint line. No MCL, LCL or patellar tendon tenderness. No LCL laxity or MCL laxity. Normal alignment and normal patellar mobility. Skin:     General: Skin is warm and dry. Capillary Refill: Capillary refill takes less than 2 seconds. Coloration: Skin is not pale. Findings: No erythema or rash. Neurological:      Mental Status: He is alert and oriented to person, place, and time. Right knee-Skin intact with no erythema, ecchymosis or lacerations present.   0-130     Left knee-Skin intact with no erythema, ecchymosis or lacerations present. 0-130     Bilateral lower extremity pitting edema, right worse than left     XR KNEE LEFT (3 VIEWS)     Result Date: 12/15/2021  XRAY X-ray 3 views of the left knee obtained and reviewed by me today in the office demonstrates age appropriate bone density throughout with severe degenerative changes with medial, lateral and patellofemoral joint space collapse, early osteophyte formation in all 3 compartments as well as posterior, normal tracking of the patella, no acute osseous abnormalities. Impression: Severe degenerative changes of left knee as above with no acute process.      XR KNEE RIGHT (3 VIEWS)     Result Date: 12/15/2021  XRAY X-ray 3 views of the right knee obtained and reviewed by me today in the office demonstrates age appropriate bone density throughout with severe degenerative changes with medial, lateral patellofemoral joint space collapse, moderate osteophyte formation in all 3 compartments as well as posterior, normal tracking of the patella, no acute osseous abnormalities. Impression: Severe degenerative changes of the right knee as above with no acute process.       BP (!) 156/83   Pulse 86   Temp 97.2 °F (36.2 °C) (Temporal)   Resp 16   Ht 5' 11\" (1.803 m)   Wt 274 lb (124.3 kg)   SpO2 97%   BMI 38.22 kg/m²     Assessment:   Right knee OA, severe  Left knee OA, severe                Plan:   I discussed with him today his x-ray findings. I explained to him that he does have severe arthritis in both of his knees. At this point given his persistent and worsening symptoms despite conservative treatment and with his x-ray findings I recommend surgical treatment beginning with his more symptomatic right knee first.  I had a lengthy discussion with him today in regards to right total knee replacement surgery. I explained risks, benefits, possible complications of the procedure and answered all questions for the patient.     I explained postoperative rehabilitation protocol and expectations with the patient today. The patient understands and consents to the procedure. Patient will follow up with their primary care physician prior to surgical treatment for preoperative clearance. We will schedule surgery at soonest convenience. Continue weight-bearing as tolerated. Continue range of motion exercises as instructed. Ice and elevate as needed. Tylenol or Motrin for pain. Follow up in 3 weeks postop and once he is doing better we will discuss proceeding with surgical treatment for his left knee. He would like to have a surgery at [facility] and he would like to spend the night in the hospital after surgery.      The patient was counseled at length about the risks of maximino Covid-19 during their perioperative period and any recovery window from their procedure. The patient was made aware that maximino Covid-19  may worsen their prognosis for recovering from their procedure  and lend to a higher morbidity and/or mortality risk. All material risks, benefits, and reasonable alternatives including postponing the procedure were discussed. The patient does wish to proceed with the procedure at this time.       Pt seen and examined, No change in H+P.                     GIOVANI HOOPER,

## 2022-02-16 NOTE — PLAN OF CARE
Problem: Falls - Risk of:  Goal: Will remain free from falls  Description: Will remain free from falls  Outcome: Ongoing  Goal: Absence of physical injury  Description: Absence of physical injury  Outcome: Ongoing     Problem: Pain:  Goal: Pain level will decrease  Description: Pain level will decrease  Outcome: Ongoing  Goal: Control of acute pain  Description: Control of acute pain  Outcome: Ongoing  Goal: Control of chronic pain  Description: Control of chronic pain  Outcome: Ongoing     Problem: Discharge Planning:  Goal: Discharged to appropriate level of care  Description: Discharged to appropriate level of care  Outcome: Ongoing     Problem: Mobility - Impaired:  Goal: Mobility will improve  Description: Mobility will improve  Outcome: Ongoing     Problem: Infection - Surgical Site:  Goal: Will show no infection signs and symptoms  Description: Will show no infection signs and symptoms  Outcome: Ongoing     Problem: Pain - Acute:  Goal: Pain level will decrease  Description: Pain level will decrease  Outcome: Ongoing

## 2022-02-16 NOTE — CONSULTS
V2.0  [facility] Consult Note      Name:  Kassie Talbto /Age/Sex:   (72 y.o. male)   MRN & CSN:  [id number] & [id number] Encounter Date/Time: 2022 3:55 PM EST   Location:  [facility]4/1104-A PCP: Fox Solis MD     Attending:Bennie Scott DO  Consulting Provider: Roberto Clements, APRN - [id number] [facility] Day: 1    Assessment and Recommendations   Kassie Talbot is a 64 y.o. male with pmh of Arthritis, CAD,  Afib, [id number] presents status post right knee arthroplasty. Hospitalist team is consulted for postoperative medical management. Recommendations:    1. Right knee degenerative joint disease status post arthroplasty   -Postoperative day 0   -Status post right knee arthroplasty per Dr. Christen Kay 2022   -As needed narcotics, antiemetics   -DVT prophylaxis, postoperative management per orthopedic service  2. Coronary artery disease   -Resume home Lipitor, Lopressor  3, Persistent atrial fibrillation   -Patient stopped Eliquis on  for knee surgery. Continue to hold pending okay from orthopedics to resume.   -Lopressor as noted above  4. Essential hypertension   -Resume home Norvasc, lisinopril, hydrochlorothiazide  5. Non-insulin-dependent Type 2 Diabetes Mellitus    Start sliding scale with hypoglycemia protocol   -Hold home oral antihyperglycemics, check hemoglobin A1c      Diet ADULT DIET; Regular   DVT Prophylaxis [x] Lovenox, []  Heparin, [] SCDs, [] Ambulation,  [] Eliquis, [] Xarelto   Code Status Prior   Surrogate Decision Maker/ POA     Hi  History Obtained From:    patient    History of Present Illness:     Chief Complaint:  Kassie Talbot is a 64 y.o. male who is seen today by the hospitalist team at the request of Dr Charli Mcdaniel, with a Chief Complaint of bilateral knee pain. Patient is seen status post right knee arthroplasty per Dr. Christen Kay.   He is receiving pain meds postop and denies any complaints for me on exam.  He tells me the plan is to have his left knee done after his right knee is done. Review of Systems:    Review of Systems   Constitutional: Negative for fatigue. HENT: Negative. Eyes: Negative. Respiratory: Negative. Negative for chest tightness and shortness of breath. Cardiovascular: Negative for chest pain and leg swelling. Gastrointestinal: Negative. Negative for nausea and vomiting. Endocrine: Negative for cold intolerance and heat intolerance. Genitourinary: Negative for dysuria and hematuria. Musculoskeletal: Positive for myalgias. Skin: Negative. Allergic/Immunologic: Negative. Neurological: Negative for dizziness and light-headedness. Hematological: Does not bruise/bleed easily. Psychiatric/Behavioral: Negative for agitation. The patient is not nervous/anxious. Objective: Intake/Output Summary (Last 24 hours) at 2/16/2022 1555  Last data filed at 2/16/2022 1539  Gross per 24 hour   Intake 2100 ml   Output 1000 ml   Net 1100 ml      Vitals:   Vitals:    02/16/22 1130 02/16/22 1145 02/16/22 1200 02/16/22 1432   BP: (!) 147/69 132/82 127/84 (!) 148/100   Pulse: 99 79 77 81   Resp: 18 18 18 18   Temp:    97.5 °F (36.4 °C)   TempSrc:    Oral   SpO2: 93% 92% 94% 98%   Weight:       Height:           Medications Prior to Admission     Prior to Admission medications    Medication Sig Start Date End Date Taking?  Authorizing Provider   lisinopril-hydroCHLOROthiazide (PRINZIDE;ZESTORETIC) 20-25 MG per tablet Take 1 tablet by mouth daily   Yes Historical Provider, MD   metoprolol tartrate (LOPRESSOR) 50 MG tablet TAKE 1 TABLET BY MOUTH TWICE A DAY 1/5/22  Yes Pat Anderson MD   magnesium oxide (MAG-OX) 400 MG tablet TAKE 1 TABLET BY MOUTH EVERY DAY 9/21/21  Yes Kike Monroe MD   niacin (NIASPAN) 1000 MG extended release tablet TAKE 1 TABLET NIGHTLY 3/16/20  Yes Pat Anderson MD   atorvastatin (LIPITOR) 20 MG tablet Take 1 tablet by mouth daily 2/25/20  Yes Pat Anderson MD   meclizine (ANTIVERT) 25 MG tablet Take 25 mg by mouth as needed   Yes Historical Provider, MD   amLODIPine (NORVASC) 10 MG tablet Take 1 tablet by mouth daily 6/12/18  Yes Myesha Alvarado MD   Corine Dearth Oil 1000 MG CAPS Take 1 capsule by mouth daily Pt takes 500 mg   Yes Historical Provider, MD   allopurinol (ZYLOPRIM) 100 MG tablet Take 100 mg by mouth daily  1/13/15  Yes Historical Provider, MD   B Complex Vitamins (VITAMIN B COMPLEX PO) Take  by mouth daily. Yes Historical Provider, MD   gabapentin (NEURONTIN) 800 MG tablet Take 800 mg by mouth 3 times daily. Yes Historical Provider, MD   ELIQUIS 5 MG TABS tablet TAKE 1 TABLET TWICE A DAY 1/19/22   Ami MD Seun   Apixaban (ELIQUIS PO) Take 5 mg by mouth 2 times daily     Historical Provider, MD   metFORMIN (GLUCOPHAGE) 1000 MG tablet Take 1,000 mg by mouth 2 times daily (with meals)    Historical Provider, MD       Physical Exam: Need 8 Elements   General: NAD  Eyes: EOMI  ENT: neck supple  Cardiovascular: Regular rate. Respiratory: Clear to auscultation  Gastrointestinal: Soft, non tender  Genitourinary: no suprapubic tenderness  Musculoskeletal: No edema  Skin: warm, dry  Neuro: Alert. Psych: Mood appropriate. Past Medical History:   PMHx   Past Medical History:   Diagnosis Date    Arthritis     Dizziness     FH: CAD (coronary artery disease) 09/29/2015    H/O 24 hour EKG monitoring 02/26/19,08/02/2016    Conclusion: Atrial fibrillation with  fairly well-controlled ventricular rate response without evidence of any significant tachy or alon arrhythmias.  H/O atrial fibrillation without current medication 04/03/2019    H/O cardiovascular stress test 11/24/15; 8/22/2019    Normal perfusion in the distribution of all coronaries, normal LV, EF 53%.  H/O Doppler ultrasound (Venous Doppler Lower Extremities) 03/08/2017; 1/2/2020    No evidence of DVT or SVT in the bilaterally. No significant reflux noted in the veins of the right lower extremity.  Significant reflux noted in the Left CFV. Bilateral Calf and ankle edema noted.  H/O echocardiogram 6/6/17,11/24/15    Left ventricle mildly dilated with normal systolic function EF 44-52% Mild concentric LVH with Grade II diastolic dysfunction. Mild to moderate MR and mild TR     H/O echocardiogram 06/07/2018; 2/26/2019    EF 55-60%. Mild concentric left ventricular hypertrophy. The left atrium is moderately dilated. Dilatation of the aortic root measuring 4.0 cm. Mild AR. Moderate pulmonary HTN.     H/O echocardiogram 10/28/2021    EF 55-60% Mild eccentric left ventricular hypertrophy. There is severe fibrocalcific sclerosis of the aortic valve with mean gradient across the aortic valve of 29 mmHg and calculated aortic valve areawas 1.2square centimeters, all suggesting moderate aortic stenosis. See complete result documentation under cardiology tab.  H/O transesophageal echocardiography (SALAZAR) for monitoring 07/08/2019    No CEE clot    Heart murmur 09/29/2015    Hx of Venous Doppler ultrasound 08/03/2020    No evidence of DVT in the left CFV. The Left GSV is non-compressible with no evidence of flow just past the saphenofemoral junction to the distal calf.  Hyperlipidemia     Hypertension     Kidney stones     Leg swelling 12/09/2019    Bilateral leg swelling    Neuropathy     Nonrheumatic aortic valve stenosis 2/15/2022    Obesity     JAYE on CPAP 06/26/2018    On CPAP 5/2018    Persistent atrial fibrillation ([address] 01/29/2019    New onset 1/2019    Syncope and collapse 12/09/2019    Thoracic aortic aneurysm ([address] 02/2011    4.5    Type II or unspecified type diabetes mellitus without mention of complication, not stated as uncontrolled      PSHX:  has a past surgical history that includes hernia repair (2008); hernia repair (2009); Colonoscopy (6188;0793); Colon surgery (2007 ); Cataract removal with implant (Right, 04/12/2019); Intracapsular cataract extraction (Right, 4/12/2019);  Cataract removal with implant (Left, 05/10/2019); Intracapsular cataract extraction (Left, 5/10/2019); Pacemaker insertion (Left, 2019); and Cardioversion (2019). Allergies: Allergies   Allergen Reactions    Oxycontin [Oxycodone Hcl] Hives     Itching in his feet     Fam HX: family history includes Diabetes in his mother; Heart Disease in his brother and father; High Blood Pressure in his brother. Soc HX:   Social History     Socioeconomic History    Marital status:      Spouse name: None    Number of children: 2    Years of education: None    Highest education level: None   Occupational History    Occupation: [employer]     Employer: [employer]     Comment: retired    Occupation: Farm   Tobacco Use    Smoking status: Former Smoker     Packs/day: 1.00     Years: 7.00     Pack years: 7.00     Quit date: 3/26/1973     Years since quittin.9    Smokeless tobacco: Former User   Vaping Use    Vaping Use: Never used   Substance and Sexual Activity    Alcohol use: Yes     Alcohol/week: 3.0 standard drinks     Types: 3 Shots of liquor per week     Comment: small amount some nights    Drug use: No    Sexual activity: Yes     Partners: Female     Comment:    Other Topics Concern    None   Social History Narrative          Social Determinants of Health     Financial Resource Strain:     Difficulty of Paying Living Expenses: Not on file   Food Insecurity:     Worried About Running Out of Food in the Last Year: Not on file    Jaxson of Food in the Last Year: Not on file   Transportation Needs:     Lack of Transportation (Medical): Not on file    Lack of Transportation (Non-Medical):  Not on file   Physical Activity:     Days of Exercise per Week: Not on file    Minutes of Exercise per Session: Not on file   Stress:     Feeling of Stress : Not on file   Social Connections:     Frequency of Communication with Friends and Family: Not on file    Frequency of Social Gatherings with Friends and Family: Not on file    Attends [employer] Services: Not on file    Active Member of Clubs or Organizations: Not on file    Attends Club or Organization Meetings: Not on file    Marital Status: Not on file   Intimate Partner Violence:     Fear of Current or Ex-Partner: Not on file    Emotionally Abused: Not on file    Physically Abused: Not on file    Sexually Abused: Not on file   Housing Stability:     Unable to Pay for Housing in the Last Year: Not on file    Number of [place] in the Last Year: Not on file    Unstable Housing in the Last Year: Not on file       Medications:   Medications:    tranexamic acid-NaCl  1,000 mg IntraVENous On Call to OR    sodium chloride flush  10 mL IntraVENous 2 times per day    acetaminophen  650 mg Oral Q6H    ceFAZolin (ANCEF) IVPB  3,000 mg IntraVENous Q8H    aspirin  325 mg Oral BID      Infusions:    lactated ringers 100 mL/hr at 02/16/22 1109    sodium chloride       PRN Meds: sodium chloride flush, 10 mL, PRN  sodium chloride, 25 mL, PRN  diphenhydrAMINE, 25 mg, Q6H PRN   Or  diphenhydrAMINE, 25 mg, Q6H PRN  ondansetron, 4 mg, Q8H PRN   Or  ondansetron, 4 mg, Q6H PRN  ketorolac, 15 mg, Q6H PRN  HYDROcodone-acetaminophen, 1 tablet, Q6H PRN  HYDROcodone-acetaminophen, 2 tablet, Q6H PRN        Labs and Imaging   XR KNEE RIGHT (1-2 VIEWS)    Result Date: 2/16/2022  EXAMINATION: 2 XRAY VIEWS OF THE RIGHT KNEE 2/16/2022 10:03 am COMPARISON: 12/15/2021 HISTORY: ORDERING SYSTEM PROVIDED HISTORY: S/P R TKA TECHNOLOGIST PROVIDED HISTORY: Of operative side while in recovery room. Reason for exam:->S/P R TKA Postop, right knee arthroplasty FINDINGS: Right knee arthroplasty appears normal in position and securely engaged. No acute periprosthetic fracture. No evidence of erosion or abnormal periprosthetic lucency. Status post right knee arthroplasty, without evidence of acute postoperative complication.        CBC: No results for input(s): WBC, HGB, PLT in the last 72 hours. BMP:  No results for input(s): NA, K, CL, CO2, BUN, CREATININE, GLUCOSE in the last 72 hours. Hepatic: No results for input(s): AST, ALT, ALB, BILITOT, ALKPHOS in the last 72 hours. Lipids:   Lab Results   Component Value Date    CHOL 118 12/12/2016    HDL 50 10/12/2021    TRIG 103 12/12/2016     Hemoglobin A1C:   Lab Results   Component Value Date    LABA1C 5.3 10/12/2021     TSH: No results found for: TSH  Troponin:   Lab Results   Component Value Date    TROPONINT <0.010 03/16/2018    TROPONINT <0.010 03/08/2017    TROPONINT <0.010 07/16/2016     Lactic Acid: No results for input(s): LACTA in the last 72 hours. BNP: No results for input(s): PROBNP in the last 72 hours.   UA:  Lab Results   Component Value Date    NITRU NEGATIVE 01/26/2022    COLORU YELLOW 01/26/2022    WBCUA 0 TO 2/HPF 01/26/2022    RBCUA 0 TO 2/HPF 01/26/2022    MUCUS 1+ 01/14/2020    BACTERIA NEGATIVE 01/26/2022    CLARITYU CLEAR 01/26/2022    SPECGRAV 1.015 01/26/2022    LEUKOCYTESUR NEGATIVE 01/26/2022    UROBILINOGEN 0.2 01/26/2022    BILIRUBINUR NEGATIVE 01/26/2022    BLOODU NEGATIVE 01/26/2022    KETUA NEGATIVE 01/26/2022     Urine Cultures: No results found for: LABURIN  Blood Cultures: No results found for: BC  No results found for: BLOODCULT2  Organism: No results found for: Robert    Personally reviewed Lab Studies, Imaging, and discussed with Dr Micheal Cox    Electronically signed by BENSON Campbell - CNP on 2/16/2022 at 3:55 PM

## 2022-02-16 NOTE — PROGRESS NOTES
4 Eyes Skin Assessment     NAME:  Blank Galarza  YOB: 1945  MEDICAL RECORD NUMBER:  [id number]    The patient is being assess for  Admission    I agree that 2 RN's have performed a thorough Head to Toe Skin Assessment on the patient. ALL assessment sites listed below have been assessed. Areas assessed by both nurses:    Head, Face, Ears, Shoulders, Back, Chest, Arms, Elbows, Hands, Sacrum. Buttock, Coccyx, Ischium and Legs. Feet and Heels        Does the Patient have a Wound? Yes wound(s) were present on assessment.  LDA wound assessment was Initiated and completed        Carlos Prevention initiated:  Yes   Wound Care Orders initiated:  Yes    Pressure Injury (Stage 3,4, Unstageable, DTI, NWPT, and Complex wounds) if present place consult order under [de-identified] No    New and Established Ostomies if present place consult order under : No      Nurse 1 eSignature: Electronically signed by  Dejah Leslie RN on 2/16/22 at 5:27 PM EST    **SHARE this note so that the co-signing nurse is able to place an eSignature**    Nurse 2 eSignature: {Esignature:[id number]}

## 2022-02-16 NOTE — BRIEF OP NOTE
Brief Postoperative Note      Patient: Yelena Barbosa  YOB: 1945  MRN: [id number]    Date of Procedure: 2/16/2022    Pre-Op Diagnosis: Primary osteoarthritis of right knee [M17.11] Right knee pain, unspecified chronicity [M25.561]    Post-Op Diagnosis: Same       Procedure(s):  RIGHT KNEE TOTAL ARTHROPLASTY    Surgeon(s):  Shy Orellana DO    Assistant:  * No surgical staff found *    Anesthesia: General    Estimated Blood Loss (mL): 158 mL    Complications: None    Specimens:   * No specimens in log *    Implants:  Implant Name Type Inv.  Item Serial No.  Lot No. LRB No. Used Action   CEMENT BNE 40GM W/ GENT HI VISC RADPQ FOR REV SURG - [id number]  CEMENT BNE 40GM W/ GENT HI VISC RADPQ FOR REV SURG  [employer]- [id number] Right 1 Implanted   CEMENT BNE 40GM W/ GENT HI VISC RADPQ FOR REV SURG - [id number]  CEMENT BNE 40GM W/ GENT HI VISC RADPQ FOR REV SURG  [employer]- [id number] Right 1 Implanted   COMPONENT PAT [id number] THK9MM KNEE POLY SUMIT CONVENTIONAL - [id number]  COMPONENT PAT [id number] THK9MM KNEE POLY SUMIT CONVENTIONAL  GALILEO [facility]- [id number] Right 1 Implanted   PSN TIB STM 5 DEG SZ G R - [id number]  PSN TIB STM 5 DEG SZ G R  [employer]- [id number] Right 1 Implanted   COMPONENT FEM SZ 11 STD R KNEE CO CHROME CRUCE RET SUMIT PERSO - [id number]  COMPONENT FEM SZ 11 STD R KNEE CO CHROME CRUCE RET SUMIT PERSO  [employer]- [id number] Right 1 Implanted   PSN MC VE ASF R 12MM 8-11 GH - [id number]  PSN MC VE ASF R 12MM 8-11 GH  [employer]- [id number] Right 1 Implanted         Drains: * No LDAs found *    Findings: R knee OA    Electronically signed by Anyi Guerra DO on 2/16/2022 at 9:34 AM

## 2022-02-16 NOTE — CONSULTS
[facility] PHYSICAL THERAPY EVALUATION  Danielle Lozano, 1945, 1104/1104-A, 2/16/2022    History  Kluti [place]:  The encounter diagnosis was Pre-op testing. Patient  has a past medical history of Arthritis, Dizziness, FH: CAD (coronary artery disease), H/O 24 hour EKG monitoring, H/O atrial fibrillation without current medication, H/O cardiovascular stress test, H/O Doppler ultrasound (Venous Doppler Lower Extremities), H/O echocardiogram, H/O echocardiogram, H/O echocardiogram, H/O transesophageal echocardiography (SALAZAR) for monitoring, Heart murmur, Hx of Venous Doppler ultrasound, Hyperlipidemia, Hypertension, Kidney stones, Leg swelling, Neuropathy, Nonrheumatic aortic valve stenosis, Obesity, JAYE on CPAP, Persistent atrial fibrillation ([address] Syncope and collapse, Thoracic aortic aneurysm ([address] and Type II or unspecified type diabetes mellitus without mention of complication, not stated as uncontrolled. Patient  has a past surgical history that includes hernia repair (2008); hernia repair (2009); Colonoscopy (3028;8006); Colon surgery (2007 ); Cataract removal with implant (Right, 04/12/2019); Intracapsular cataract extraction (Right, 4/12/2019); Cataract removal with implant (Left, 05/10/2019); Intracapsular cataract extraction (Left, 5/10/2019); Pacemaker insertion (Left, 06/25/2019); and Cardioversion (07/08/2019). Subjective:  Patient states:  \"I knew it was going to hurt but wow! \"    Pain:  8/10 pain in R knee at sx site.     Communication with other providers:  Handoff to RN  Restrictions: WBAT R LE, general precautions, fall risk    Home Setup/Prior level of function  Social/Functional History  Lives With: Spouse  Type of Home: House  Home Layout: One level  Home Access: Stairs to enter without rails  Entrance Stairs - Number of Steps: 2  Bathroom Shower/Tub:  (walk-in tub)  Bathroom Toilet: Handicap height  Bathroom Equipment: Grab bars in shower,Built-in shower seat  Home Equipment: Cane,4 wheeled walker,Standard walker (mod I with SPC)  ADL Assistance: Independent  Homemaking Assistance: Independent  Homemaking Responsibilities: Yes  Ambulation Assistance: Independent  Transfer Assistance: Independent  Active : Yes    Examination of body systems (includes body structures/functions, activity/participation limitations):  · Observation:  Pt supine in bed with wife and RN in room upon arrival and agreeable to therapy  · Vision:  [facility]  · Hearing:  [facility]  · Cardiopulmonary:  No O2 needs  · Cognition: WFL, see OT/SLP note for further evaluation. Musculoskeletal  · ROM R -20-85 sitting EOB  · Strength R LE: 3/5 L LE: 5/5, moderate impairments in function and endurance. · Neuro:  Neuropathy in bilateral feet and hands      Mobility:  · Rolling L/R:  Min A with cues for sequencing throughout  · Supine to sit:  Mod A with assist at R LE and trunk. Cues for sequencing throughout  · Transfers: Pt completed STS to/from EOB x2 trials mod A with cues for sequencing and hand placement throughout  · Sitting balance:  good. · Standing balance:  Fair, pt stood at RW ~2 minutes each bout CGA. · Gait: Pt ambulated 5' forward and backward with RW min A with cues for sequencing. Pt ambulated with decreased chrissy, forward flexed posture, and decreased stance phase on R LE.     [facility] 6 Clicks Inpatient Mobility:  AM-PAC Inpatient Mobility Raw Score : 13    Safety: patient left supine in bed (denied chair), call light within reach, RN notified, gait belt used. Assessment:  Pt is a 64 y.o. male admitted to the hospital s/p R TKA. Pt is typically mod I for all transfers and gait with a SPC. Pt is currently performing bed mobility mod A, transferring mod A, and ambulating 5' with RW min A. Pt is presenting with decreased endurance, impaired balance, impaired gait, impaired bed mobility, impaired transfers, impaired ROM, and decreased strength.  Pt would benefit from continued acute care PT as well as ARU placement upon discharge to continue to address impairments. Complexity: moderate    Prognosis: Good, no significant barriers to participation at this time.      Plan Times per week: BID/week     Equipment: TBD at next level of care; if goes home, will need bariatric RW    Goals:  Short term goals  Time Frame for Short term goals: 1 week  Short term goal 1: Pt to complete all bed mobility mod I  Short term goal 2: Pt to complete all STS transfers to/from bed, commode, and chair min A  Short term goal 3: Pt to ambulate 48' with LRAD min A  Short term goal 4: Pt to ascend/descend 2 stairs min A       Treatment plan:  Bed mobility, transfers, balance, gait, TA, TX    Recommendations for NURSING mobility: STS mod A with gait belt and RW    Time:   Time in: 1447  Time out: 1528  Timed treatment minutes: 30  Total time: 41    Electronically signed by:    Mouna Garcia, PT  2/16/2022, 4:59 PM

## 2022-02-17 ENCOUNTER — APPOINTMENT (OUTPATIENT)
Dept: ULTRASOUND IMAGING | Age: 77
End: 2022-02-17
Payer: MEDICARE

## 2022-02-17 LAB
ALBUMIN SERPL-MCNC: 3.5 GM/DL (ref 3.4–5)
ALP BLD-CCNC: 45 IU/L (ref 40–128)
ALT SERPL-CCNC: 12 U/L (ref 10–40)
ANION GAP SERPL CALCULATED.3IONS-SCNC: 11 MMOL/L (ref 4–16)
AST SERPL-CCNC: 18 IU/L (ref 15–37)
BASOPHILS ABSOLUTE: 0 K/CU MM
BASOPHILS RELATIVE PERCENT: 0.1 % (ref 0–1)
BILIRUB SERPL-MCNC: 0.5 MG/DL (ref 0–1)
BUN BLDV-MCNC: 18 MG/DL (ref 6–23)
CALCIUM SERPL-MCNC: 8.7 MG/DL (ref 8.3–10.6)
CHLORIDE BLD-SCNC: 102 MMOL/L (ref 99–110)
CO2: 22 MMOL/L (ref 21–32)
CREAT SERPL-MCNC: 1.6 MG/DL (ref 0.9–1.3)
DIFFERENTIAL TYPE: ABNORMAL
EOSINOPHILS ABSOLUTE: 0 K/CU MM
EOSINOPHILS RELATIVE PERCENT: 0 % (ref 0–3)
ESTIMATED AVERAGE GLUCOSE: 103 MG/DL
GFR AFRICAN AMERICAN: 51 ML/MIN/1.73M2
GFR NON-AFRICAN AMERICAN: 42 ML/MIN/1.73M2
GLUCOSE BLD-MCNC: 139 MG/DL (ref 70–99)
GLUCOSE BLD-MCNC: 139 MG/DL (ref 70–99)
GLUCOSE BLD-MCNC: 146 MG/DL (ref 70–99)
GLUCOSE BLD-MCNC: 154 MG/DL (ref 70–99)
GLUCOSE BLD-MCNC: 166 MG/DL (ref 70–99)
HBA1C MFR BLD: 5.2 % (ref 4.2–6.3)
HCT VFR BLD CALC: 28.5 % (ref 42–52)
HEMOGLOBIN: 9.2 GM/DL (ref 13.5–18)
IMMATURE NEUTROPHIL %: 0.3 % (ref 0–0.43)
LYMPHOCYTES ABSOLUTE: 0.6 K/CU MM
LYMPHOCYTES RELATIVE PERCENT: 7.1 % (ref 24–44)
MCH RBC QN AUTO: 34.2 PG (ref 27–31)
MCHC RBC AUTO-ENTMCNC: 32.3 % (ref 32–36)
MCV RBC AUTO: 105.9 FL (ref 78–100)
MONOCYTES ABSOLUTE: 0.8 K/CU MM
MONOCYTES RELATIVE PERCENT: 8.7 % (ref 0–4)
NUCLEATED RBC %: 0 %
PDW BLD-RTO: 14.4 % (ref 11.7–14.9)
PLATELET # BLD: 106 K/CU MM (ref 140–440)
PMV BLD AUTO: 9.4 FL (ref 7.5–11.1)
POTASSIUM SERPL-SCNC: 4 MMOL/L (ref 3.5–5.1)
RBC # BLD: 2.69 M/CU MM (ref 4.6–6.2)
SEGMENTED NEUTROPHILS ABSOLUTE COUNT: 7.2 K/CU MM
SEGMENTED NEUTROPHILS RELATIVE PERCENT: 83.8 % (ref 36–66)
SODIUM BLD-SCNC: 135 MMOL/L (ref 135–145)
TOTAL IMMATURE NEUTOROPHIL: 0.03 K/CU MM
TOTAL NUCLEATED RBC: 0 K/CU MM
TOTAL PROTEIN: 5.2 GM/DL (ref 6.4–8.2)
WBC # BLD: 8.6 K/CU MM (ref 4–10.5)

## 2022-02-17 PROCEDURE — 97530 THERAPEUTIC ACTIVITIES: CPT

## 2022-02-17 PROCEDURE — 94761 N-INVAS EAR/PLS OXIMETRY MLT: CPT

## 2022-02-17 PROCEDURE — 6360000002 HC RX W HCPCS: Performed by: ORTHOPAEDIC SURGERY

## 2022-02-17 PROCEDURE — 97116 GAIT TRAINING THERAPY: CPT

## 2022-02-17 PROCEDURE — 6370000000 HC RX 637 (ALT 250 FOR IP): Performed by: ORTHOPAEDIC SURGERY

## 2022-02-17 PROCEDURE — 80053 COMPREHEN METABOLIC PANEL: CPT

## 2022-02-17 PROCEDURE — 93971 EXTREMITY STUDY: CPT

## 2022-02-17 PROCEDURE — 97166 OT EVAL MOD COMPLEX 45 MIN: CPT

## 2022-02-17 PROCEDURE — 36415 COLL VENOUS BLD VENIPUNCTURE: CPT

## 2022-02-17 PROCEDURE — 2580000003 HC RX 258: Performed by: ORTHOPAEDIC SURGERY

## 2022-02-17 PROCEDURE — 6360000002 HC RX W HCPCS: Performed by: NURSE PRACTITIONER

## 2022-02-17 PROCEDURE — 82962 GLUCOSE BLOOD TEST: CPT

## 2022-02-17 PROCEDURE — 83036 HEMOGLOBIN GLYCOSYLATED A1C: CPT

## 2022-02-17 PROCEDURE — 6370000000 HC RX 637 (ALT 250 FOR IP): Performed by: NURSE PRACTITIONER

## 2022-02-17 PROCEDURE — 97110 THERAPEUTIC EXERCISES: CPT

## 2022-02-17 PROCEDURE — 85025 COMPLETE CBC W/AUTO DIFF WBC: CPT

## 2022-02-17 RX ADMIN — ALLOPURINOL 100 MG: 100 TABLET ORAL at 09:34

## 2022-02-17 RX ADMIN — GABAPENTIN 800 MG: 400 CAPSULE ORAL at 21:16

## 2022-02-17 RX ADMIN — SODIUM CHLORIDE, PRESERVATIVE FREE 10 ML: 5 INJECTION INTRAVENOUS at 09:37

## 2022-02-17 RX ADMIN — PANTOPRAZOLE SODIUM 40 MG: 40 TABLET, DELAYED RELEASE ORAL at 05:05

## 2022-02-17 RX ADMIN — ACETAMINOPHEN 650 MG: 325 TABLET ORAL at 02:11

## 2022-02-17 RX ADMIN — ACETAMINOPHEN 650 MG: 325 TABLET ORAL at 09:34

## 2022-02-17 RX ADMIN — HYDROCODONE BITARTRATE AND ACETAMINOPHEN 2 TABLET: 7.5; 325 TABLET ORAL at 02:11

## 2022-02-17 RX ADMIN — ACETAMINOPHEN 650 MG: 325 TABLET ORAL at 21:15

## 2022-02-17 RX ADMIN — Medication 1000 MG: at 21:16

## 2022-02-17 RX ADMIN — GABAPENTIN 800 MG: 400 CAPSULE ORAL at 09:34

## 2022-02-17 RX ADMIN — ASPIRIN 325 MG: 325 TABLET, COATED ORAL at 21:15

## 2022-02-17 RX ADMIN — HYDROCODONE BITARTRATE AND ACETAMINOPHEN 1 TABLET: 7.5; 325 TABLET ORAL at 13:59

## 2022-02-17 RX ADMIN — METOPROLOL TARTRATE 50 MG: 50 TABLET, FILM COATED ORAL at 09:34

## 2022-02-17 RX ADMIN — INSULIN LISPRO 6 UNITS: 100 INJECTION, SOLUTION INTRAVENOUS; SUBCUTANEOUS at 09:36

## 2022-02-17 RX ADMIN — ASPIRIN 325 MG: 325 TABLET, COATED ORAL at 09:34

## 2022-02-17 RX ADMIN — LISINOPRIL AND HYDROCHLOROTHIAZIDE 2 TABLET: 12.5; 1 TABLET ORAL at 09:40

## 2022-02-17 RX ADMIN — INSULIN LISPRO 6 UNITS: 100 INJECTION, SOLUTION INTRAVENOUS; SUBCUTANEOUS at 11:37

## 2022-02-17 RX ADMIN — ENOXAPARIN SODIUM 40 MG: 100 INJECTION SUBCUTANEOUS at 09:34

## 2022-02-17 RX ADMIN — SODIUM CHLORIDE, PRESERVATIVE FREE 10 ML: 5 INJECTION INTRAVENOUS at 21:16

## 2022-02-17 RX ADMIN — GABAPENTIN 800 MG: 400 CAPSULE ORAL at 13:59

## 2022-02-17 RX ADMIN — METOPROLOL TARTRATE 50 MG: 50 TABLET, FILM COATED ORAL at 21:16

## 2022-02-17 RX ADMIN — AMLODIPINE BESYLATE 10 MG: 10 TABLET ORAL at 09:34

## 2022-02-17 RX ADMIN — ATORVASTATIN CALCIUM 20 MG: 20 TABLET, FILM COATED ORAL at 09:34

## 2022-02-17 RX ADMIN — KETOROLAC TROMETHAMINE 15 MG: 30 INJECTION, SOLUTION INTRAMUSCULAR at 15:20

## 2022-02-17 ASSESSMENT — PAIN SCALES - GENERAL
PAINLEVEL_OUTOF10: 5
PAINLEVEL_OUTOF10: 0
PAINLEVEL_OUTOF10: 4
PAINLEVEL_OUTOF10: 5
PAINLEVEL_OUTOF10: 3
PAINLEVEL_OUTOF10: 8
PAINLEVEL_OUTOF10: 0
PAINLEVEL_OUTOF10: 6
PAINLEVEL_OUTOF10: 9
PAINLEVEL_OUTOF10: 0
PAINLEVEL_OUTOF10: 9

## 2022-02-17 NOTE — CARE COORDINATION
CM spoke with the patient on the room phone to initiate discharge planning. Patient lives in a single story home outside of [place] [facility] has insurance with Rx coverage & PCP, stated that he was independent with ADL's prior to admission, and is able to drive. Patient stated that he used a cane at home (has a walker available for use also) and does not require home oxygen but does wear CPAP at bedtime. Patient stated that he plans to return home upon discharge and would like [facility] for 1-2 weeks and then plans to transition to outpatient physical therapy at [address] [place] [address]. CM notified the patient that PT recommended placement in the ARU at [facility], patient stated that he would rather return home upon discharge. Patient stated that he has a family friend involved with a [employer] agency out of [place] and he would like a referral made to them but he is unable to remember the name of the agency. Patient stated that his wife would know the name of the agency, he stated that she will be visiting him today around noon and she would be asleep this morning until around 9 AM.        9:50 AM  CM spoke with the patient's wife Eder Alvarez on the phone regarding the patient's desire to return home and his inability to remember the name of the [employer] agency. Eder Alvarez advised that the name of the [employer] agency is [facility]. CM advised Eder Alvarez that PT has recommended placement in the ARU which she was unaware of. Eder Alvarez said she was at the bedside when the patient tried to ambulate and she is concerned about her ability to manage the patient at home. Eder Alvarez advised that she understands the patient would rather return home but she will talk to him today and advise him that the both of them would be better served by a stay in the ARU and then transitioning to outpatient physical therapy in [place].   Eder Alvarez feels that part of the reason the patient wants to return home is because he does not want her to have to drive to [place] to visit him. RUBEN spoke with Lina Baca regarding the swing bed program at [facility] but Linavashti Baca stated that she has personal experience with the swing bed program and would prefer the ARU because the therapy will be more intense. Linavashti Baca stated that she will be at the patient's bedside today around 12 PM or shortly after. CM left a voicemail for admissions at the ARU notifying them of the referral.      10:23 AM  CM contacted by [facility] with the ARU stating that the patient does not meet [facility] Medicare's guidelines for admission. 10:42 AM  RUBEN spoke with the patient's wife Lina Baca to notify her that the ARU is not an option. CM discussed other options and Lina Baca feels that a referral to the swing bed program is the next best option. CM notified the swing bed coordinator of referral.  CM updated the whiteboard requesting OT evaluation.

## 2022-02-17 NOTE — PROGRESS NOTES
Occupational [address] [place] [address] CARE OCCUPATIONAL THERAPY EVALUATION    Ludy Galeana, 1945, 1104/1104-A, 2/17/2022    Discharge Recommendation: Inpatient Rehabilitation ([id number] preferred)    History:  [place]:  The encounter diagnosis was Pre-op testing. Subjective:  Patient states: \"My wife doesn't really have the strength to be lifting me at home. \"  Pain: Pt reported 5/10 surgical pain in Rt knee  Communication with other providers: NEIDA Arciniega  Restrictions: General Precautions, Fall Risk, WBAT Rt LE, Bed/chair alarm    Home Setup/Prior level of function:  Social/Functional History  Lives With: Spouse (unable to physically assist pt)  Type of Home: House  Home Layout: One level  Home Access: Stairs to enter without rails  Entrance Stairs - Number of Steps: 2  Bathroom Shower/Tub: Walk-in tub  Bathroom Toilet: Handicap height  Bathroom Equipment: Grab bars in shower,Built-in shower seat  Home Equipment: [address] wheeled walker,Standard walker,Sock aid,Long-handled shoehorn,Reacher,Leg   ADL Assistance: Independent  Homemaking Assistance: Independent  Homemaking Responsibilities: Yes  Ambulation Assistance: Independent (mod I with cane)  Transfer Assistance: Independent  Active : Yes  Occupation: Retired  Type of occupation: [employer]    Examination:  · Observation: Supine in bed upon arrival. Pleasant and agreeable to evaluation.   · Vision: [facility]  · Hearing: WFL  · Vitals: Stable vitals throughout session    Body Systems and functions:  · ROM: WNL all joints in BL UEs  · Strength: 4+/5 MMT all major muscle groups BL UEs  · Sensation: WFL (denies numbness/tingling)  · Tone: Normal  · Coordination: WNL  · Perception: WNL    Activities of Daily Living (ADLs):  · Feeding: Independent  · Grooming: CGA (able to complete in standing at sink)  · UB bathing: SBA   · LB bathing: Max A (reaching distal LEs, bottom, posterior thighs)  · UB dressing: SBA (donning clean robe and balance. Activities performed today included bed mobility training, UB/LB dressing tasks, transfer training, household mobility with RW, education on role of OT, POC, WBAT status Rt LE, importance of EOB/OOB activity, d/c planning and recommendations      Safety Measures: Gait belt used, Left in Chair, Alarm in place    Assessment:  Pt is a 64year old male with a past medical history of Arthritis, Dizziness, FH: CAD (coronary artery disease), H/O 24 hour EKG monitoring, H/O atrial fibrillation without current medication, H/O cardiovascular stress test, H/O Doppler ultrasound (Venous Doppler Lower Extremities), H/O echocardiogram, H/O echocardiogram, H/O echocardiogram, H/O transesophageal echocardiography (SALAZAR) for monitoring, Heart murmur, Hx of Venous Doppler ultrasound, Hyperlipidemia, Hypertension, Kidney stones, Leg swelling, Neuropathy, Nonrheumatic aortic valve stenosis, Obesity, JAYE on CPAP, Persistent atrial fibrillation ([address] Syncope and collapse, Thoracic aortic aneurysm ([address] and Type II or unspecified type diabetes mellitus without mention of complication, not stated as uncontrolled. Pt admitted with Rt knee DJD and underwent elective Rt TKA on 2-16. Pt lives at home with his wife and at baseline is independent/mod I with ADLs, IADLs, ambulates mod I with a cane, and drives. Pt currently presents with the above impairments, and is not safe for immediate return home. Recommend continued OT services in [facility] Bed at discharge. Complexity: Moderate  Prognosis: Good  Plan: 3x/week      Goals:  1. Pt will complete all aspects of bed mobility for EOB/OOB ADLs with supervision/HOB flat  2. Pt will complete UB/LB bathing min A with setup using long handled sponge PRN  3. Pt will complete all aspects of LB dressing mod A with setup using AE PRN  4. Pt will complete all functional transfers to and from bed, chair, toilet, shower chair min A/good safety awareness  5.  Pt will ambulate HH

## 2022-02-17 NOTE — PROGRESS NOTES
Physical Therapy    Physical Therapy Treatment Note  Name: Susie Hudson MRN: [id number] :   1945   Date:  2022   Admission Date: 2022 Room:  [address] [place], [place] [address]   Restrictions/Precautions:        WBAT R LE, general precautions, fall risk  Communication with other providers:  RN approves tx session. Co-tx with MARIO ALBERTO West for pt safety    Subjective:  Patient states:  Agreeable to tx session;   Pain:   Location, Type, Intensity (0/10 to 10/10): Has pain; does not rate    Objective:    Observation:  Pt in bed upon arrival.    Treatment, including education/measures:  Transfers:  Rolling: SBA  Supine to sit :SBA  Scooting :SBA  Increased time/effort for bed mobility   Sit to stand :modA from EOB; maxA from chair   Stand to sit :modA due to poor eccentric control    Gait:  Pt amb with RW for 50 ft with CG/Xin; pt amb with decreased chrissy and step length bilat. Pt tends to place walker to far in front of him; able to partially correct with VC's. Pt needed VC's for pathway, walker placement     Safety  Patient left safely in the chair, with call light/phone in reach with alarm applied. Gait belt and mask were used for transfers and gait.     Assessment / Impression:       Patient's tolerance of treatment:  good   Adverse Reaction: none  Significant change in status and impact:  none  Barriers to improvement:  Strength, endurance, pain    Plan for Next Session:    Will cont to work towards pt's goals per his tolerance    Time in:  9850 1142  Time out:  1105  Timed treatment minutes:  31  Total treatment time:  31    Previously filed items:  Social/Functional History  Lives With: Spouse  Type of Home: House  Home Layout: One level  Home Access: Stairs to enter without rails  Entrance Stairs - Number of Steps: 2  Bathroom Shower/Tub:  (walk-in tub)  Bathroom Toilet: Handicap height  Bathroom Equipment: Grab bars in shower,Built-in shower seat  Home Equipment: [address] wheeled walker,Standard walker,Sock aid,Long-handled shoehorn,Reacher,Leg   ADL Assistance: Independent  Homemaking Assistance: Independent  Homemaking Responsibilities: Yes  Ambulation Assistance: Independent  Transfer Assistance: Independent  Active : Yes  Occupation: Retired  Type of occupation: [employer]  Short term goals  Time Frame for Short term goals: 1 week  Short term goal 1: Pt to complete all bed mobility mod I  Short term goal 2: Pt to complete all STS transfers to/from bed, commode, and chair min A  Short term goal 3: Pt to ambulate 48' with LRAD min A  Short term goal 4: Pt to ascend/descend 2 stairs min A       Electronically signed by:    Jamar Leach PTA  2/17/2022, 11:39 AM

## 2022-02-17 NOTE — PROGRESS NOTES
junction to the distal calf.  Hyperlipidemia     Hypertension     Kidney stones     Leg swelling 12/09/2019    Bilateral leg swelling    Neuropathy     Nonrheumatic aortic valve stenosis 2/15/2022    Obesity     JAYE on CPAP 06/26/2018    On CPAP 5/2018    Persistent atrial fibrillation ([facility] 01/29/2019    New onset 1/2019    Syncope and collapse 12/09/2019    Thoracic aortic aneurysm ([facility] 02/2011    4.5    Type II or unspecified type diabetes mellitus without mention of complication, not stated as uncontrolled      No past surgical history pertinent negatives on file. Scheduled Meds:   sodium chloride flush  10 mL IntraVENous 2 times per day    acetaminophen  650 mg Oral Q6H    aspirin  325 mg Oral BID    amLODIPine  10 mg Oral Daily    metoprolol tartrate  50 mg Oral BID    niacin  1,000 mg Oral Nightly    lisinopril-hydroCHLOROthiazide  2 tablet Oral Daily    gabapentin  800 mg Oral TID    atorvastatin  20 mg Oral Daily    allopurinol  100 mg Oral Daily    insulin lispro  0.05 Units/kg SubCUTAneous TID WC    insulin lispro  0-6 Units SubCUTAneous TID WC    insulin lispro  0-3 Units SubCUTAneous Nightly    enoxaparin  40 mg SubCUTAneous Daily    pantoprazole  40 mg Oral QAM AC     Continuous Infusions:   lactated ringers 100 mL/hr at 02/16/22 2335    sodium chloride      dextrose       PRN Meds:sodium chloride flush, sodium chloride, diphenhydrAMINE **OR** diphenhydrAMINE, ondansetron **OR** ondansetron, ketorolac, HYDROcodone-acetaminophen, HYDROcodone-acetaminophen, glucose, glucagon (rDNA), dextrose, dextrose bolus (hypoglycemia) **OR** dextrose bolus (hypoglycemia)    Allergies   Allergen Reactions    Oxycontin [Oxycodone Hcl] Hives     Itching in his feet     Active Problems:    * No active hospital problems. *  Resolved Problems:    * No resolved hospital problems. *    Blood pressure (!) 144/80, pulse 69, temperature 97.3 °F (36.3 °C), temperature source Axillary, resp. rate 16, height 5' 11\" (1.803 m), weight 274 lb (124.3 kg), SpO2 96 %. Subjective   Patient seen and examined, resting in bed comfortably, pain controlled, no new complaints. Up walking with PT today, doing well, still having difficulty doing some stairs. Objective   RLE - Dressing removed, incision clean, dry, intact, no calf TTP, compartments soft, NVID    Assessment & Plan  POD #1 R TKA, Doing well postoperatively    Continue weight bearing as tolerated. Continue range of motion exercises  Pain control  DVT prophylaxis  Continue PT/OT  Discharge planning possibly for home today if he does well with PT, otherwise discharge home tomorrow.     Damián Garza, DO  2/17/2022

## 2022-02-17 NOTE — CARE COORDINATION
Received referral for ARU. Reviewed patients clinicals and PT/OT notes. Patients primary insurance is [facility] Medicare. Per [facility] Medicare guidelines patient does not meet ARU criteria d/t lack of medical complexity. Notified Mary Maloney CM of above information.

## 2022-02-17 NOTE — PROGRESS NOTES
Hospitalist Progress Note      Name:  Leni Cazares /Age/Sex: [birth year]  (72 y.o. male)   MRN & CSN:  [id number] & [id number] Admission Date/Time: 2022  5:59 AM   [facility] PCP: Kyra Martinez MD         Hospital Day: 2    Assessment and Plan:   Leni Cazares is a 64 y.o. male with pmh of Arthritis, CAD,  Afib, [id number] presents status post right knee arthroplasty. Hospitalist team is consulted for postoperative medical management. 1.  Right knee degenerative joint disease status post arthroplasty              -Postoperative day 1              -Status post right knee arthroplasty per Dr. Angel Peterson 2022              -As needed narcotics, antiemetics              -DVT prophylaxis, postoperative management per orthopedic service   -Follow-up with orthopedics as planned   -PT eval.  Recommended ARU possible consideration for swing bed at [facility]  2. Coronary artery disease              -Resume home Lipitor, Lopressor  3, Persistent atrial fibrillation              -Patient stopped Eliquis on  for knee surgery. Continue to hold pending okay from orthopedics to resume.              -Lopressor as noted above  4. Essential hypertension              -Resume home Norvasc, lisinopril, hydrochlorothiazide  5. Non-insulin-dependent Type 2 Diabetes Mellitus last A1C 5.3 - 10/2021              -Start sliding scale with hypoglycemia protocol              -Hold home oral antihyperglycemics   - check hemoglobin A1c   - ADA diet      Diet: ADULT DIET; Regular  DVT prophylaxis: lovenox  GI prophylaxis: PPI  CODE STATUS:Full    Treatment and discharge plan discussed with patient/family. Patient/family voiced understanding. This patient was seen examined and treatment plan discussed with/supervising physician. History of Present Illness:     Chief Complaint: Status post right knee arthroplasty    Subjective  Patient is new to me this morning.   He was examined at bedside no acute distress noted. He is resting comfortably. Patient states he was 9 out of bed today. States he was up yesterday, states he had difficulty trying to ambulate yesterday. PT initial evaluation recommended ARU and he initially opted to go home with home health care but cannot remember the name of the company. Patient was agreeable today for ARU assessment to see if he qualified. Patient states his wife has health issues and not sure she would be much assistant with him. He did state they have a daughter that was going to come and stay with them for a few days. Patient states his pain has been managed. Denies any chest pain, shortness of breath or cough. Denies any nausea vomiting or abdominal pain. Patient denies any new concerns this morning. States has been tolerating p.o. intake. If he does not qualify for acute rehab he is agreeable to go home with home health care services. ROS reviewed negative, unless as noted above    Objective: Intake/Output Summary (Last 24 hours) at 2/17/2022 0730  Last data filed at 2/16/2022 2151  Gross per 24 hour   Intake 2100 ml   Output 1300 ml   Net 800 ml      Vitals:   Vitals:    02/17/22 0330   BP: 127/77   Pulse: 72   Resp: 16   Temp: 98 °F (36.7 °C)   SpO2: 97%     Physical Exam:   Physical Exam  Constitutional:       Appearance: He is obese. HENT:      Mouth/Throat:      Mouth: Mucous membranes are moist.   Eyes:      Extraocular Movements: Extraocular movements intact. Conjunctiva/sclera: Conjunctivae normal.   Cardiovascular:      Rate and Rhythm: Normal rate and regular rhythm. Pulses: Normal pulses. Heart sounds: Normal heart sounds. Pulmonary:      Effort: Pulmonary effort is normal.      Breath sounds: Normal breath sounds. Abdominal:      Palpations: Abdomen is soft. Musculoskeletal:      Right lower leg: Edema present. Left lower leg: Edema present. Comments: Ace wrap and dressing to right lower extremity.   Cap refill present. Skin:     General: Skin is warm and dry. Capillary Refill: Capillary refill takes less than 2 seconds. Neurological:      General: No focal deficit present. Mental Status: He is alert and oriented to person, place, and time. Psychiatric:         Mood and Affect: Mood normal.         Medications:   Medications:    sodium chloride flush  10 mL IntraVENous 2 times per day    acetaminophen  650 mg Oral Q6H    aspirin  325 mg Oral BID    amLODIPine  10 mg Oral Daily    metoprolol tartrate  50 mg Oral BID    niacin  1,000 mg Oral Nightly    lisinopril-hydroCHLOROthiazide  2 tablet Oral Daily    gabapentin  800 mg Oral TID    atorvastatin  20 mg Oral Daily    allopurinol  100 mg Oral Daily    insulin lispro  0.05 Units/kg SubCUTAneous TID WC    insulin lispro  0-6 Units SubCUTAneous TID WC    insulin lispro  0-3 Units SubCUTAneous Nightly    enoxaparin  40 mg SubCUTAneous Daily    pantoprazole  40 mg Oral QAM AC      Infusions:    lactated ringers 100 mL/hr at 02/16/22 2335    sodium chloride      dextrose       PRN Meds: sodium chloride flush, 10 mL, PRN  sodium chloride, 25 mL, PRN  diphenhydrAMINE, 25 mg, Q6H PRN   Or  diphenhydrAMINE, 25 mg, Q6H PRN  ondansetron, 4 mg, Q8H PRN   Or  ondansetron, 4 mg, Q6H PRN  ketorolac, 15 mg, Q6H PRN  HYDROcodone-acetaminophen, 1 tablet, Q6H PRN  HYDROcodone-acetaminophen, 2 tablet, Q6H PRN  glucose, 15 g, PRN  glucagon (rDNA), 1 mg, PRN  dextrose, 100 mL/hr, PRN  dextrose bolus (hypoglycemia), 125 mL, PRN   Or  dextrose bolus (hypoglycemia), 250 mL, PRN        Recent Labs     02/17/22  0621   WBC 8.6   HGB 9.2*   HCT 28.5*   *      No results for input(s): NA, K, CL, CO2, PHOS, BUN, CREATININE, CA in the last 72 hours. No results for input(s): AST, ALT, ALB, BILIDIR, BILITOT, ALKPHOS in the last 72 hours. No results for input(s): INR in the last 72 hours.   No results for input(s): CKTOTAL, CKMB, CKMBINDEX, TROPONINT in the last 72 hours.     Imaging reviewed      Electronically signed by BENSON Ariza CNP on 2/17/2022 at 7:30 AM

## 2022-02-17 NOTE — PROGRESS NOTES
Physical Therapy    Physical Therapy Treatment Note  Name: Kirstin Chase MRN: [id number] :   1945   Date:  2022   Admission Date: 2022 Room:  [address] [place], [place] [address]   Restrictions/Precautions:        WBAT R LE, general precautions, fall risk  Communication with other providers:  ZACARIAS Garcia contacted about possible DVT site on distal, posterior RLE. She administers medication and handoff     Subjective:  Patient states:  Agreeable to tx session;   Pain:   Location, Type, Intensity (0/10 to 10/10):  7/10 pain that progresses to 8/10 pain with ambulation    Objective:    Observation:  Pt in recliner upon arrival. Homans performed on distal Rt and LT calf, patient states its significantly more painful on Rt. Rt distal lower extremely also warm, swollen and red. RN endorses and states she will notify the doctor. Treatment, including education/measures:  Given booklet on Total Knee and educated on low level exercises  AP x10 Jazzy  Quad sets x10 jazzy  Heel slides x5 with strap assist on Rt    Transfers:  Sit to stand :modA from recliner to RW  Stand to sit :modA due to poor eccentric control to recliner    Gait:  Pt amb with RW for 30 ft with CG/Xin; pt amb with decreased chrissy and step length bilat. Needs intermittent standing rest breaks during ambulation d/t fatigue and pain. Pt tends to place walker to far in front of him; able to partially correct with VC's. Pt needed VC's for pathway, walker placement     Safety  Patient left safely in the chair, with call light/phone in reach with alarm applied. Gait belt and mask were used for transfers and gait. Assessment / Impression:  Patient more painful this afternoon and demo's little carryover for walker safety from AM session. Will follow with Physician about possible DVT on distal right lower extremity.       Patient's tolerance of treatment:  fair   Adverse Reaction: none  Significant change in status and impact:  none  Barriers to improvement:  Strength, endurance, pain    Plan for Next Session:    Will cont to work towards pt's goals per his tolerance    Time in:  1330  Time out:  1402  Timed treatment minutes:  28  Total treatment time:  28    Previously filed items:  Social/Functional History  Lives With: Spouse  Type of Home: House  Home Layout: One level  Home Access: Stairs to enter without rails  Entrance Stairs - Number of Steps: 2  Bathroom Shower/Tub:  (walk-in tub)  Bathroom Toilet: Handicap height  Bathroom Equipment: Grab bars in shower,Built-in [address]: Cane,4 wheeled walker,Standard walker,Sock aid,Long-handled shoehorn,Reacher,Leg   ADL Assistance: Independent  Homemaking Assistance: Independent  Homemaking Responsibilities: Yes  Ambulation Assistance: Independent  Transfer Assistance: Independent  Active : Yes  Occupation: Retired  Type of occupation: [employer]  Short term goals  Time Frame for Short term goals: 1 week  Short term goal 1: Pt to complete all bed mobility mod I  Short term goal 2: Pt to complete all STS transfers to/from bed, commode, and chair min A  Short term goal 3: Pt to ambulate 48' with LRAD min A  Short term goal 4: Pt to ascend/descend 2 stairs min A       Electronically signed by:     Phyllis El PTA  2/17/2022, 2:05 PM

## 2022-02-18 LAB
ANION GAP SERPL CALCULATED.3IONS-SCNC: 12 MMOL/L (ref 4–16)
BASOPHILS ABSOLUTE: 0 K/CU MM
BASOPHILS RELATIVE PERCENT: 0.4 % (ref 0–1)
BUN BLDV-MCNC: 28 MG/DL (ref 6–23)
CALCIUM SERPL-MCNC: 8.6 MG/DL (ref 8.3–10.6)
CHLORIDE BLD-SCNC: 102 MMOL/L (ref 99–110)
CO2: 23 MMOL/L (ref 21–32)
CREAT SERPL-MCNC: 2 MG/DL (ref 0.9–1.3)
DIFFERENTIAL TYPE: ABNORMAL
EOSINOPHILS ABSOLUTE: 0.1 K/CU MM
EOSINOPHILS RELATIVE PERCENT: 2.2 % (ref 0–3)
GFR AFRICAN AMERICAN: 39 ML/MIN/1.73M2
GFR NON-AFRICAN AMERICAN: 32 ML/MIN/1.73M2
GLUCOSE BLD-MCNC: 130 MG/DL (ref 70–99)
GLUCOSE BLD-MCNC: 136 MG/DL (ref 70–99)
GLUCOSE BLD-MCNC: 143 MG/DL (ref 70–99)
GLUCOSE BLD-MCNC: 166 MG/DL (ref 70–99)
GLUCOSE BLD-MCNC: 197 MG/DL (ref 70–99)
HCT VFR BLD CALC: 27 % (ref 42–52)
HEMOGLOBIN: 8.3 GM/DL (ref 13.5–18)
IMMATURE NEUTROPHIL %: 0.4 % (ref 0–0.43)
LYMPHOCYTES ABSOLUTE: 0.7 K/CU MM
LYMPHOCYTES RELATIVE PERCENT: 11.6 % (ref 24–44)
MCH RBC QN AUTO: 33.9 PG (ref 27–31)
MCHC RBC AUTO-ENTMCNC: 30.7 % (ref 32–36)
MCV RBC AUTO: 110.2 FL (ref 78–100)
MONOCYTES ABSOLUTE: 0.6 K/CU MM
MONOCYTES RELATIVE PERCENT: 9.9 % (ref 0–4)
NUCLEATED RBC %: 0 %
PDW BLD-RTO: 14.5 % (ref 11.7–14.9)
PLATELET # BLD: 92 K/CU MM (ref 140–440)
PMV BLD AUTO: 9.7 FL (ref 7.5–11.1)
POTASSIUM SERPL-SCNC: 4.2 MMOL/L (ref 3.5–5.1)
RBC # BLD: 2.45 M/CU MM (ref 4.6–6.2)
SEGMENTED NEUTROPHILS ABSOLUTE COUNT: 4.2 K/CU MM
SEGMENTED NEUTROPHILS RELATIVE PERCENT: 75.5 % (ref 36–66)
SODIUM BLD-SCNC: 137 MMOL/L (ref 135–145)
TOTAL IMMATURE NEUTOROPHIL: 0.02 K/CU MM
TOTAL NUCLEATED RBC: 0 K/CU MM
WBC # BLD: 5.6 K/CU MM (ref 4–10.5)

## 2022-02-18 PROCEDURE — 97530 THERAPEUTIC ACTIVITIES: CPT

## 2022-02-18 PROCEDURE — 97535 SELF CARE MNGMENT TRAINING: CPT

## 2022-02-18 PROCEDURE — 85025 COMPLETE CBC W/AUTO DIFF WBC: CPT

## 2022-02-18 PROCEDURE — 94664 DEMO&/EVAL PT USE INHALER: CPT

## 2022-02-18 PROCEDURE — 6370000000 HC RX 637 (ALT 250 FOR IP): Performed by: ORTHOPAEDIC SURGERY

## 2022-02-18 PROCEDURE — 94761 N-INVAS EAR/PLS OXIMETRY MLT: CPT

## 2022-02-18 PROCEDURE — 94150 VITAL CAPACITY TEST: CPT

## 2022-02-18 PROCEDURE — 97116 GAIT TRAINING THERAPY: CPT

## 2022-02-18 PROCEDURE — 36415 COLL VENOUS BLD VENIPUNCTURE: CPT

## 2022-02-18 PROCEDURE — 6370000000 HC RX 637 (ALT 250 FOR IP): Performed by: NURSE PRACTITIONER

## 2022-02-18 PROCEDURE — 2580000003 HC RX 258: Performed by: ORTHOPAEDIC SURGERY

## 2022-02-18 PROCEDURE — 97110 THERAPEUTIC EXERCISES: CPT

## 2022-02-18 PROCEDURE — 80048 BASIC METABOLIC PNL TOTAL CA: CPT

## 2022-02-18 PROCEDURE — 82962 GLUCOSE BLOOD TEST: CPT

## 2022-02-18 RX ORDER — POLYETHYLENE GLYCOL 3350 17 G/17G
17 POWDER, FOR SOLUTION ORAL DAILY PRN
Status: DISCONTINUED | OUTPATIENT
Start: 2022-02-18 | End: 2022-02-19 | Stop reason: HOSPADM

## 2022-02-18 RX ADMIN — GABAPENTIN 800 MG: 400 CAPSULE ORAL at 20:20

## 2022-02-18 RX ADMIN — ALLOPURINOL 100 MG: 100 TABLET ORAL at 09:03

## 2022-02-18 RX ADMIN — SODIUM CHLORIDE, PRESERVATIVE FREE 10 ML: 5 INJECTION INTRAVENOUS at 09:13

## 2022-02-18 RX ADMIN — GABAPENTIN 800 MG: 400 CAPSULE ORAL at 09:03

## 2022-02-18 RX ADMIN — Medication 1000 MG: at 20:23

## 2022-02-18 RX ADMIN — POLYETHYLENE GLYCOL 3350 17 G: 17 POWDER, FOR SOLUTION ORAL at 16:55

## 2022-02-18 RX ADMIN — PANTOPRAZOLE SODIUM 40 MG: 40 TABLET, DELAYED RELEASE ORAL at 05:58

## 2022-02-18 RX ADMIN — GABAPENTIN 800 MG: 400 CAPSULE ORAL at 14:53

## 2022-02-18 RX ADMIN — SODIUM CHLORIDE, POTASSIUM CHLORIDE, SODIUM LACTATE AND CALCIUM CHLORIDE: 600; 310; 30; 20 INJECTION, SOLUTION INTRAVENOUS at 20:33

## 2022-02-18 RX ADMIN — LISINOPRIL AND HYDROCHLOROTHIAZIDE 2 TABLET: 12.5; 1 TABLET ORAL at 09:07

## 2022-02-18 RX ADMIN — ACETAMINOPHEN 650 MG: 325 TABLET ORAL at 20:20

## 2022-02-18 RX ADMIN — ACETAMINOPHEN 650 MG: 325 TABLET ORAL at 14:53

## 2022-02-18 RX ADMIN — ASPIRIN 325 MG: 325 TABLET, COATED ORAL at 09:03

## 2022-02-18 RX ADMIN — INSULIN LISPRO 6 UNITS: 100 INJECTION, SOLUTION INTRAVENOUS; SUBCUTANEOUS at 12:08

## 2022-02-18 RX ADMIN — APIXABAN 5 MG: 5 TABLET, FILM COATED ORAL at 20:20

## 2022-02-18 RX ADMIN — SODIUM CHLORIDE, PRESERVATIVE FREE 10 ML: 5 INJECTION INTRAVENOUS at 20:24

## 2022-02-18 RX ADMIN — ACETAMINOPHEN 650 MG: 325 TABLET ORAL at 03:53

## 2022-02-18 RX ADMIN — ACETAMINOPHEN 650 MG: 325 TABLET ORAL at 09:04

## 2022-02-18 RX ADMIN — ASPIRIN 325 MG: 325 TABLET, COATED ORAL at 20:20

## 2022-02-18 RX ADMIN — METOPROLOL TARTRATE 50 MG: 50 TABLET, FILM COATED ORAL at 20:23

## 2022-02-18 RX ADMIN — ATORVASTATIN CALCIUM 20 MG: 20 TABLET, FILM COATED ORAL at 09:03

## 2022-02-18 ASSESSMENT — PAIN DESCRIPTION - LOCATION: LOCATION: KNEE

## 2022-02-18 ASSESSMENT — PAIN SCALES - GENERAL
PAINLEVEL_OUTOF10: 5
PAINLEVEL_OUTOF10: 6
PAINLEVEL_OUTOF10: 6
PAINLEVEL_OUTOF10: 4

## 2022-02-18 ASSESSMENT — PAIN DESCRIPTION - FREQUENCY: FREQUENCY: INTERMITTENT

## 2022-02-18 ASSESSMENT — PAIN DESCRIPTION - ORIENTATION: ORIENTATION: RIGHT

## 2022-02-18 ASSESSMENT — PAIN DESCRIPTION - PROGRESSION
CLINICAL_PROGRESSION: GRADUALLY IMPROVING

## 2022-02-18 ASSESSMENT — PAIN DESCRIPTION - ONSET: ONSET: ON-GOING

## 2022-02-18 ASSESSMENT — PAIN DESCRIPTION - PAIN TYPE: TYPE: SURGICAL PAIN

## 2022-02-18 ASSESSMENT — PAIN DESCRIPTION - DESCRIPTORS: DESCRIPTORS: DISCOMFORT

## 2022-02-18 ASSESSMENT — PAIN - FUNCTIONAL ASSESSMENT: PAIN_FUNCTIONAL_ASSESSMENT: ACTIVITIES ARE NOT PREVENTED

## 2022-02-18 NOTE — PROGRESS NOTES
right knee arthroplasty. Subjective  Patient seen at bedside this morning. No acute distress noted. Patient states right leg pain is better, vascular ultrasound was negative for DVT. Dressing was removed by surgery team noted to have surgical incision right knee clean and intact. Patient states he has been up with PT yesterday and felt it went well. He states today the plan is to attempt stairs. As he has 2 stairs at home. Patient denies any new concerns. Denies any chest pain or shortness of breath. Denies any nausea, vomiting abdominal pain. States has been tolerating p.o. intake without concerns. ROS reviewed negative, unless as noted above    Objective:   No intake or output data in the 24 hours ending 02/18/22 1121   Vitals:   Vitals:    02/18/22 0900   BP: (!) 101/54   Pulse: 63   Resp: 18   Temp: 97.5 °F (36.4 °C)   SpO2: 94%     Physical Exam:   Physical Exam  Constitutional:       Appearance: He is obese. HENT:      Mouth/Throat:      Mouth: Mucous membranes are moist.   Eyes:      Extraocular Movements: Extraocular movements intact. Conjunctiva/sclera: Conjunctivae normal.   Cardiovascular:      Rate and Rhythm: Normal rate and regular rhythm. Pulses: Normal pulses. Heart sounds: Normal heart sounds. Pulmonary:      Effort: Pulmonary effort is normal.      Breath sounds: Normal breath sounds. Abdominal:      General: Abdomen is flat. Palpations: Abdomen is soft. Musculoskeletal:         General: Tenderness present. Comments: Tenderness right medial knee with light touch. Skin:     General: Skin is warm and dry. Capillary Refill: Capillary refill takes less than 2 seconds. Comments: Surgical incision right knee, well approximated, no drainage noted   Neurological:      General: No focal deficit present. Mental Status: He is alert and oriented to person, place, and time.          Medications:   Medications:    sodium chloride flush  10 mL IntraVENous 2 times per day    acetaminophen  650 mg Oral Q6H    aspirin  325 mg Oral BID    amLODIPine  10 mg Oral Daily    metoprolol tartrate  50 mg Oral BID    niacin  1,000 mg Oral Nightly    lisinopril-hydroCHLOROthiazide  2 tablet Oral Daily    gabapentin  800 mg Oral TID    atorvastatin  20 mg Oral Daily    allopurinol  100 mg Oral Daily    insulin lispro  0.05 Units/kg SubCUTAneous TID WC    insulin lispro  0-6 Units SubCUTAneous TID WC    insulin lispro  0-3 Units SubCUTAneous Nightly    enoxaparin  40 mg SubCUTAneous Daily    pantoprazole  40 mg Oral QAM AC      Infusions:    lactated ringers 100 mL/hr at 02/16/22 2335    sodium chloride      dextrose       PRN Meds: sodium chloride flush, 10 mL, PRN  sodium chloride, 25 mL, PRN  diphenhydrAMINE, 25 mg, Q6H PRN   Or  diphenhydrAMINE, 25 mg, Q6H PRN  ondansetron, 4 mg, Q8H PRN   Or  ondansetron, 4 mg, Q6H PRN  ketorolac, 15 mg, Q6H PRN  HYDROcodone-acetaminophen, 1 tablet, Q6H PRN  HYDROcodone-acetaminophen, 2 tablet, Q6H PRN  glucose, 15 g, PRN  glucagon (rDNA), 1 mg, PRN  dextrose, 100 mL/hr, PRN  dextrose bolus (hypoglycemia), 125 mL, PRN   Or  dextrose bolus (hypoglycemia), 250 mL, PRN        Recent Labs     02/17/22  0621   WBC 8.6   HGB 9.2*   HCT 28.5*   *      Recent Labs     02/17/22  0621      K 4.0      CO2 22   BUN 18   CREATININE 1.6*     Recent Labs     02/17/22  0621   AST 18   ALT 12   BILITOT 0.5   ALKPHOS 45     No results for input(s): INR in the last 72 hours. No results for input(s): CKTOTAL, CKMB, CKMBINDEX, TROPONINT in the last 72 hours. Imaging reviewed-2/18  Vascular ultrasound-no evidence of DVT in the right lower extremity.     Electronically signed by BENSON Covarrubias CNP on 2/18/2022 at 11:21 AM

## 2022-02-18 NOTE — PROGRESS NOTES
Physical Therapy    Physical Therapy Treatment Note  Name: Marco Ramirze MRN: [id number] :   1945   Date:  2022   Admission Date: 2022 Room:  [address] [place] [address]   Restrictions/Precautions:            WBAT R LE, general precautions, fall risk  Communication with other providers:  Nurse gave tylenol during tx session. Aj Arroyo asked to join tx for short time to educate pt on edema in lower legs and feet. Subjective:  Patient states:  Pt agreeable to tx   Pain:   Location, Type, Intensity (0/10 to 10/10):  Rates pain 6  Objective:    Observation:  Alert and oriented  Treatment, including education/measures:  Sit to sup min assist with leg  and increased effort. Sit<=>stand from chair and bed with sba and cues and did much better with transfers this pm  amb with rw 100' sba  Ex in sitting and sup with written copy  Trunk stretches with deep breathing  10 reps aps  10 reps quad sets  10 reps heel slide with leg   10 reps saqs with leg  assist  10 reps laqs  Safety  Patient left safely in the bed, with call light/phone in reach with alarm applied. Gait belt and mask were used for transfers and gait. Assessment / Impression:       Patient's tolerance of treatment:  good  Adverse Reaction: na  Significant change in status and impact:  na  Barriers to improvement:  Strength and safety  Plan for Next Session:    Cont.  POC  Time in:  1420  Time out:  1515  Timed treatment minutes:  55  Total treatment time:  54    Previously filed items:  Social/Functional History  Lives With: Spouse  Type of Home: House  Home Layout: One level  Home Access: Stairs to enter without rails  Entrance Stairs - Number of Steps: 2  Bathroom Shower/Tub:  (walk-in tub)  Bathroom Toilet: Handicap height  Bathroom Equipment: Grab bars in shower,Built-in shower seat  Home Equipment: Cane,4 wheeled walker,Standard walker,Sock aid,Long-handled shoehorn,Reacher,Leg   ADL Assistance: Independent  Homemaking Assistance: Independent  Homemaking Responsibilities: Yes  Ambulation Assistance: Independent  Transfer Assistance: Independent  Active : Yes  Occupation: Retired  Type of occupation: [employer]  Short term goals  Time Frame for Short term goals: 1 week  Short term goal 1: Pt to complete all bed mobility mod I  Short term goal 2: Pt to complete all STS transfers to/from bed, commode, and chair min A  Short term goal 3: Pt to ambulate 48' with LRAD min A  Short term goal 4: Pt to ascend/descend 2 stairs min A       Electronically signed by:    Karthik Flanagan PTA  2/18/2022, 11:49 AM

## 2022-02-18 NOTE — PROGRESS NOTES
Physical Therapy    Physical Therapy Treatment Note  Name: Paul Kan MRN: [id number] :   1945   Date:  2022   Admission Date: 2022 Room:  [address] [place], [place] [address]   Restrictions/Precautions:        WBAT R LE, general precautions, fall risk  Communication with other providers: per nurse negative for DVT and  ok to tx and will check on polar pk     Co-tx with SHAH for first part of tx session. Refer to OT note for ADL  Subjective:  Patient states:  Pt agreeable to tx  Pain:   Location, Type, Intensity (0/10 to 10/10):  7 with avtivity. Pt reports getting pain meds 1.5 hrs ago  Objective:    Observation:  Alert and oriented. Pt's right foot is edematous. Treatment, including education/measures:  Sup to sitting min assist to move right leg out of bed  Sit<=>stand cga and cues for safety to reach back and to push up from bed and armrest. Pt tends to plop down in chair but did much better with cues to sit down slowly. amb cga with rw 20' x 1, 100' x 1 but needing followed with chair for safety and did need to sit before making  it back to room. Pt reports 1 platform steps and then 3 steps with no rails to enter house. Up/down 3 small(4\") steps with bilateral rails min assist and cues. Progressed to 2  8' steps with bilateral rails mod assist and very unsafe with poor eccentric control needing mod assist coming down steps. Pt was shown how to do curb step with walker but feel pt is not safe to do them at this time. Ex in sitting and long sitting:  Trunk stretches with deep breathing  10 reps aps  10 reps quad sets with tactile and verbal cues  10 reps laqs with leg  assist  Safety  Patient left safely in the chair, with call light/phone in reach with alarm applied. Gait belt and mask were used for transfers and gait.   Assessment / Impression:       Patient's tolerance of treatment:  good  Adverse Reaction: na  Significant change in status and impact:  na  Barriers to improvement:  Pain, strength, and safety  Plan for Next Session:    Cont.  POC  Time in:  1035  Time out:  1130  Timed treatment minutes:  55  Total treatment time:  54    Previously filed items:  Social/Functional History  Lives With: Spouse  Type of Home: House  Home Layout: One level  Home Access: Stairs to enter without rails  Entrance Stairs - Number of Steps: 2  Bathroom Shower/Tub:  (walk-in tub)  Bathroom Toilet: Handicap height  Bathroom Equipment: Grab bars in shower,Built-in shower seat  Home Equipment: Cane,4 wheeled walker,Standard walker,Sock aid,Long-handled shoehorn,Reacher,Leg   ADL Assistance: Independent  Homemaking Assistance: Independent  Homemaking Responsibilities: Yes  Ambulation Assistance: Independent  Transfer Assistance: Independent  Active : Yes  Occupation: Retired  Type of occupation: [employer]  Short term goals  Time Frame for Short term goals: 1 week  Short term goal 1: Pt to complete all bed mobility mod I  Short term goal 2: Pt to complete all STS transfers to/from bed, commode, and chair min A  Short term goal 3: Pt to ambulate 48' with LRAD min A  Short term goal 4: Pt to ascend/descend 2 stairs min A       Electronically signed by:    Karthik Flanagan PTA  2/18/2022, 8:12 AM

## 2022-02-18 NOTE — PROGRESS NOTES
Yelena Barbosa is a 64 y.o. male patient. 1. Pre-op testing      Past Medical History:   Diagnosis Date    Arthritis     Dizziness     FH: CAD (coronary artery disease) 09/29/2015    H/O 24 hour EKG monitoring 02/26/19,08/02/2016    Conclusion: Atrial fibrillation with  fairly well-controlled ventricular rate response without evidence of any significant tachy or alon arrhythmias.  H/O atrial fibrillation without current medication 04/03/2019    H/O cardiovascular stress test 11/24/15; 8/22/2019    Normal perfusion in the distribution of all coronaries, normal LV, EF 53%.  H/O Doppler ultrasound (Venous Doppler Lower Extremities) 03/08/2017; 1/2/2020    No evidence of DVT or SVT in the bilaterally. No significant reflux noted in the veins of the right lower extremity. Significant reflux noted in the Left CFV. Bilateral Calf and ankle edema noted.  H/O echocardiogram 6/6/17,11/24/15    Left ventricle mildly dilated with normal systolic function EF 82-35% Mild concentric LVH with Grade II diastolic dysfunction. Mild to moderate MR and mild TR     H/O echocardiogram 06/07/2018; 2/26/2019    EF 55-60%. Mild concentric left ventricular hypertrophy. The left atrium is moderately dilated. Dilatation of the aortic root measuring 4.0 cm. Mild AR. Moderate pulmonary HTN.     H/O echocardiogram 10/28/2021    EF 55-60% Mild eccentric left ventricular hypertrophy. There is severe fibrocalcific sclerosis of the aortic valve with mean gradient across the aortic valve of 29 mmHg and calculated aortic valve areawas 1.2square centimeters, all suggesting moderate aortic stenosis. See complete result documentation under cardiology tab.  H/O transesophageal echocardiography (SALAZAR) for monitoring 07/08/2019    No CEE clot    Heart murmur 09/29/2015    Hx of Venous Doppler ultrasound 08/03/2020    No evidence of DVT in the left CFV.   The Left GSV is non-compressible with no evidence of flow just past the saphenofemoral junction to the distal calf.  Hyperlipidemia     Hypertension     Kidney stones     Leg swelling 12/09/2019    Bilateral leg swelling    Neuropathy     Nonrheumatic aortic valve stenosis 2/15/2022    Obesity     JAYE on CPAP 06/26/2018    On CPAP 5/2018    Persistent atrial fibrillation ([facility] [address] 01/29/2019    New onset 1/2019    Syncope and collapse 12/09/2019    Thoracic aortic aneurysm ([facility] [address] 02/2011    4.5    Type II or unspecified type diabetes mellitus without mention of complication, not stated as uncontrolled      No past surgical history pertinent negatives on file. Scheduled Meds:   apixaban  5 mg Oral BID    sodium chloride flush  10 mL IntraVENous 2 times per day    acetaminophen  650 mg Oral Q6H    aspirin  325 mg Oral BID    amLODIPine  10 mg Oral Daily    metoprolol tartrate  50 mg Oral BID    niacin  1,000 mg Oral Nightly    lisinopril-hydroCHLOROthiazide  2 tablet Oral Daily    gabapentin  800 mg Oral TID    atorvastatin  20 mg Oral Daily    allopurinol  100 mg Oral Daily    insulin lispro  0.05 Units/kg SubCUTAneous TID WC    insulin lispro  0-6 Units SubCUTAneous TID WC    insulin lispro  0-3 Units SubCUTAneous Nightly    pantoprazole  40 mg Oral QAM AC     Continuous Infusions:   lactated ringers 100 mL/hr at 02/16/22 2335    sodium chloride      dextrose       PRN Meds:sodium chloride flush, sodium chloride, diphenhydrAMINE **OR** diphenhydrAMINE, ondansetron **OR** ondansetron, ketorolac, HYDROcodone-acetaminophen, HYDROcodone-acetaminophen, glucose, glucagon (rDNA), dextrose, dextrose bolus (hypoglycemia) **OR** dextrose bolus (hypoglycemia)    Allergies   Allergen Reactions    Oxycontin [Oxycodone Hcl] Hives     Itching in his feet     Active Problems:    * No active hospital problems. *  Resolved Problems:    * No resolved hospital problems. *    Blood pressure (!) 103/59, pulse 67, temperature 97.9 °F (36.6 °C), temperature source Oral, resp.  rate 18, height 5' 11\" (1.803 m), weight 274 lb (124.3 kg), SpO2 95 %. Subjective   Patient seen and examined, resting in bed comfortably, pain controlled, no new complaints. Continues to have difficulty with stairs due to weakness. Physical therapy recommending rehab placement. Objective:  Vital signs (most recent): Blood pressure (!) 103/59, pulse 67, temperature 97.9 °F (36.6 °C), temperature source Oral, resp. rate 18, height 5' 11\" (1.803 m), weight 274 lb (124.3 kg), SpO2 95 %. RLE - Dressing removed, incision clean, mild bloody drainage, intact, no calf TTP, compartments soft, NVID    Assessment & Plan  POD #2 R TKA, Doing well postoperatively    Continue weight bearing as tolerated.   Continue range of motion exercises  Pain control  DVT prophylaxis  Continue PT/OT  Discharge planning to swing bed once approved, ARU not approved, awaiting swing bed approval.    Anyi 97, DO  2/18/2022

## 2022-02-18 NOTE — PROGRESS NOTES
Patient instructed and educated on [employer]. Patient able to do 1000 ml. Vital capacity. Patient's goal is 3000ml.  Electronically signed by Joel Huitron RCP on 2/18/2022 at 12:41 PM

## 2022-02-18 NOTE — PROGRESS NOTES
Occupational Therapy  . Occupational Therapy Treatment Note      Name: Alban Morley MRN: [id number] :   1945   Date:  2022   Admission Date: 2022 Room:  [address] [place] [address]A     Primary Problem:   The encounter diagnosis was Pre-op testing    Restrictions/Precautions:    General Precautions, Fall Risk, WBAT Rt LE    Communication with other providers:  .Per chart review and Nurse Rawlins, patient is appropriate for therapeutic intervention. PTA Lydia German. Subjective:  Patient states:  Pt agreeable to Tx session. \"The swelling in my (right) foot is better today than yesterday. I can see that I'm not quite ready to go straight home. \"  Pain: 7/10, RLE (location, type, intensity)    Objective:    Observation:  Pt A&O to self / situation, received in semi-fowlers. R foot edema noted. Objective Measures:  N/A    Treatment, including education:  Therapeutic Activity Training:   Therapeutic activity training was instructed today. Cues were given for safety, sequence, UE/LE placement, awareness, and balance. Activities performed today included bed mobility training, sup-sit and functional tasks performed in unsupported sitting. Supine to sit: Min A for RLE for support and cues for sequencing to move leg over EOB  Scooting: Intermittent CGA for balance + cues for safe body positioning as patient scooted to EOB, required increased time / effort. Pt SBA seated EOB for multiple reps for B ankle pumps to increase strength / facilitate circulation in preparation for activity. Self Care Training:   Cues were given for safety, sequence, UE/LE placement, visual cues, and balance. Activities performed today included dressing tasks c use of AE. UB Dressing: Min A for touching assist to back of gown during doffing / donning. LB Dressing: Min A to don  sock over R foot which was too edematous for pt to successfully utilize sock aid.  Pt SBA seated EOB + occasional cues for technique to don L  sock c sock aid. .All therapeutic intervention performed c emphasis on dynamic balance / standing tolerance to inc strength, endurance and act tolerance for inc Indep c ADL tasks, func transfers / mobility. Safety  Patient safely seated EOB c PTA for continued therapeutic intervention at end of session, with call light/phone in reach, and nursing aware. Gait belt was used for func transfers / mobility. Assessment / Impression:    Patient's tolerance of treatment: Well  Adverse Reaction: None  Significant change in status and impact:  None  Barriers to improvement: Edematous R foot, pain, endurance      Plan for Next Session:    Continue per OT POC per patient's tolerance c plan to address functional transfers / mobility during ADL tasks. Time in:  1035  Time out:  1100  Timed treatment minutes:  25  Total treatment time:  25      Electronically signed by:    BRAD Fowler  2/18/2022, 10:33 AM      Goals:  1. Pt will complete all aspects of bed mobility for EOB/OOB ADLs with supervision/HOB flat  2. Pt will complete UB/LB bathing min A with setup using long handled sponge PRN  3. Pt will complete all aspects of LB dressing mod A with setup using AE PRN  4. Pt will complete all functional transfers to and from bed, chair, toilet, shower chair min A/good safety awareness  5. Pt will ambulate HH distance to bathroom for toileting SBA with RW  6. Pt will complete all aspects of toileting task mod A   7. Pt will complete oral hygiene/grooming routine in standing at sink SBA with setup  8.  Pt will complete ther ex/ther act with focus on UE strengthening and functional activity tolerance in standing >10 minutes

## 2022-02-18 NOTE — CARE COORDINATION
Message to [facility] her that I have taken over as pt's CM today and to please keep me updated on referral status.

## 2022-02-19 ENCOUNTER — HOSPITAL ENCOUNTER (INPATIENT)
Age: 77
LOS: 10 days | Discharge: HOME HEALTH | DRG: 560 | End: 2022-03-01
Attending: INTERNAL MEDICINE | Admitting: INTERNAL MEDICINE
Payer: MEDICARE

## 2022-02-19 VITALS
TEMPERATURE: 96.8 F | OXYGEN SATURATION: 99 % | WEIGHT: 274 LBS | HEART RATE: 68 BPM | SYSTOLIC BLOOD PRESSURE: 120 MMHG | RESPIRATION RATE: 16 BRPM | DIASTOLIC BLOOD PRESSURE: 78 MMHG | HEIGHT: 71 IN | BODY MASS INDEX: 38.36 KG/M2

## 2022-02-19 PROBLEM — R53.81 PHYSICAL DEBILITY: Status: ACTIVE | Noted: 2022-02-19

## 2022-02-19 LAB
GLUCOSE BLD-MCNC: 131 MG/DL (ref 70–99)
GLUCOSE BLD-MCNC: 133 MG/DL (ref 70–99)
GLUCOSE BLD-MCNC: 149 MG/DL (ref 70–99)
GLUCOSE BLD-MCNC: 216 MG/DL (ref 70–99)
GLUCOSE BLD-MCNC: 227 MG/DL (ref 70–99)
SARS-COV-2, NAAT: NOT DETECTED
SOURCE: NORMAL

## 2022-02-19 PROCEDURE — 82962 GLUCOSE BLOOD TEST: CPT

## 2022-02-19 PROCEDURE — 6360000002 HC RX W HCPCS

## 2022-02-19 PROCEDURE — 1200000002 HC SEMI PRIVATE SWING BED

## 2022-02-19 PROCEDURE — 87635 SARS-COV-2 COVID-19 AMP PRB: CPT

## 2022-02-19 PROCEDURE — 6370000000 HC RX 637 (ALT 250 FOR IP): Performed by: ORTHOPAEDIC SURGERY

## 2022-02-19 PROCEDURE — 97116 GAIT TRAINING THERAPY: CPT

## 2022-02-19 PROCEDURE — 97530 THERAPEUTIC ACTIVITIES: CPT

## 2022-02-19 PROCEDURE — 94761 N-INVAS EAR/PLS OXIMETRY MLT: CPT

## 2022-02-19 PROCEDURE — 6370000000 HC RX 637 (ALT 250 FOR IP): Performed by: NURSE PRACTITIONER

## 2022-02-19 PROCEDURE — 2580000003 HC RX 258: Performed by: ORTHOPAEDIC SURGERY

## 2022-02-19 RX ORDER — METOPROLOL TARTRATE 50 MG/1
50 TABLET, FILM COATED ORAL 2 TIMES DAILY
Status: CANCELLED | OUTPATIENT
Start: 2022-02-19

## 2022-02-19 RX ORDER — DEXTROSE MONOHYDRATE 50 MG/ML
100 INJECTION, SOLUTION INTRAVENOUS PRN
Status: DISCONTINUED | OUTPATIENT
Start: 2022-02-19 | End: 2022-02-19

## 2022-02-19 RX ORDER — SODIUM CHLORIDE 0.9 % (FLUSH) 0.9 %
10 SYRINGE (ML) INJECTION EVERY 12 HOURS SCHEDULED
Status: CANCELLED | OUTPATIENT
Start: 2022-02-19

## 2022-02-19 RX ORDER — POLYETHYLENE GLYCOL 3350 17 G/17G
17 POWDER, FOR SOLUTION ORAL DAILY PRN
Status: DISCONTINUED | OUTPATIENT
Start: 2022-02-19 | End: 2022-03-01 | Stop reason: HOSPADM

## 2022-02-19 RX ORDER — ALLOPURINOL 100 MG/1
100 TABLET ORAL DAILY
Status: CANCELLED | OUTPATIENT
Start: 2022-02-19

## 2022-02-19 RX ORDER — DIPHENHYDRAMINE HCL 25 MG
25 CAPSULE ORAL EVERY 6 HOURS PRN
Status: DISCONTINUED | OUTPATIENT
Start: 2022-02-19 | End: 2022-03-01 | Stop reason: HOSPADM

## 2022-02-19 RX ORDER — GABAPENTIN 400 MG/1
800 CAPSULE ORAL 3 TIMES DAILY
Status: DISCONTINUED | OUTPATIENT
Start: 2022-02-19 | End: 2022-03-01 | Stop reason: HOSPADM

## 2022-02-19 RX ORDER — LANOLIN ALCOHOL/MO/W.PET/CERES
1000 CREAM (GRAM) TOPICAL NIGHTLY
Status: CANCELLED | OUTPATIENT
Start: 2022-02-19

## 2022-02-19 RX ORDER — LISINOPRIL AND HYDROCHLOROTHIAZIDE 12.5; 1 MG/1; MG/1
2 TABLET ORAL DAILY
Status: CANCELLED | OUTPATIENT
Start: 2022-02-19

## 2022-02-19 RX ORDER — NICOTINE POLACRILEX 4 MG
15 LOZENGE BUCCAL PRN
Status: DISCONTINUED | OUTPATIENT
Start: 2022-02-19 | End: 2022-03-01 | Stop reason: HOSPADM

## 2022-02-19 RX ORDER — SODIUM CHLORIDE 0.9 % (FLUSH) 0.9 %
10 SYRINGE (ML) INJECTION EVERY 12 HOURS SCHEDULED
Status: DISCONTINUED | OUTPATIENT
Start: 2022-02-19 | End: 2022-02-19

## 2022-02-19 RX ORDER — ONDANSETRON 4 MG/1
4 TABLET, ORALLY DISINTEGRATING ORAL EVERY 8 HOURS PRN
Status: CANCELLED | OUTPATIENT
Start: 2022-02-19

## 2022-02-19 RX ORDER — ONDANSETRON 2 MG/ML
4 INJECTION INTRAMUSCULAR; INTRAVENOUS EVERY 6 HOURS PRN
Status: DISCONTINUED | OUTPATIENT
Start: 2022-02-19 | End: 2022-02-19

## 2022-02-19 RX ORDER — PANTOPRAZOLE SODIUM 40 MG/1
40 TABLET, DELAYED RELEASE ORAL
Status: DISCONTINUED | OUTPATIENT
Start: 2022-02-20 | End: 2022-03-01 | Stop reason: HOSPADM

## 2022-02-19 RX ORDER — ATORVASTATIN CALCIUM 20 MG/1
20 TABLET, FILM COATED ORAL DAILY
Status: CANCELLED | OUTPATIENT
Start: 2022-02-19

## 2022-02-19 RX ORDER — HYDROCODONE BITARTRATE AND ACETAMINOPHEN 7.5; 325 MG/1; MG/1
2 TABLET ORAL EVERY 6 HOURS PRN
Status: CANCELLED | OUTPATIENT
Start: 2022-02-19

## 2022-02-19 RX ORDER — SODIUM CHLORIDE, SODIUM LACTATE, POTASSIUM CHLORIDE, CALCIUM CHLORIDE 600; 310; 30; 20 MG/100ML; MG/100ML; MG/100ML; MG/100ML
INJECTION, SOLUTION INTRAVENOUS CONTINUOUS
Status: DISCONTINUED | OUTPATIENT
Start: 2022-02-19 | End: 2022-02-19

## 2022-02-19 RX ORDER — HYDROCODONE BITARTRATE AND ACETAMINOPHEN 7.5; 325 MG/1; MG/1
1 TABLET ORAL EVERY 6 HOURS PRN
Status: DISCONTINUED | OUTPATIENT
Start: 2022-02-19 | End: 2022-03-01 | Stop reason: HOSPADM

## 2022-02-19 RX ORDER — DIPHENHYDRAMINE HYDROCHLORIDE 50 MG/ML
25 INJECTION INTRAMUSCULAR; INTRAVENOUS EVERY 6 HOURS PRN
Status: CANCELLED | OUTPATIENT
Start: 2022-02-19

## 2022-02-19 RX ORDER — KETOROLAC TROMETHAMINE 15 MG/ML
15 INJECTION, SOLUTION INTRAMUSCULAR; INTRAVENOUS EVERY 6 HOURS PRN
Status: ACTIVE | OUTPATIENT
Start: 2022-02-19 | End: 2022-02-21

## 2022-02-19 RX ORDER — SODIUM CHLORIDE, SODIUM LACTATE, POTASSIUM CHLORIDE, CALCIUM CHLORIDE 600; 310; 30; 20 MG/100ML; MG/100ML; MG/100ML; MG/100ML
INJECTION, SOLUTION INTRAVENOUS CONTINUOUS
Status: CANCELLED | OUTPATIENT
Start: 2022-02-19

## 2022-02-19 RX ORDER — PANTOPRAZOLE SODIUM 40 MG/1
40 TABLET, DELAYED RELEASE ORAL
Status: CANCELLED | OUTPATIENT
Start: 2022-02-20

## 2022-02-19 RX ORDER — METOPROLOL TARTRATE 50 MG/1
50 TABLET, FILM COATED ORAL 2 TIMES DAILY
Status: DISCONTINUED | OUTPATIENT
Start: 2022-02-19 | End: 2022-03-01 | Stop reason: HOSPADM

## 2022-02-19 RX ORDER — DIPHENHYDRAMINE HCL 25 MG
25 TABLET ORAL EVERY 6 HOURS PRN
Status: CANCELLED | OUTPATIENT
Start: 2022-02-19

## 2022-02-19 RX ORDER — ONDANSETRON 4 MG/1
4 TABLET, ORALLY DISINTEGRATING ORAL EVERY 8 HOURS PRN
Status: DISCONTINUED | OUTPATIENT
Start: 2022-02-19 | End: 2022-02-19

## 2022-02-19 RX ORDER — HYDROCODONE BITARTRATE AND ACETAMINOPHEN 7.5; 325 MG/1; MG/1
1 TABLET ORAL EVERY 6 HOURS PRN
Status: CANCELLED | OUTPATIENT
Start: 2022-02-19

## 2022-02-19 RX ORDER — DIPHENHYDRAMINE HYDROCHLORIDE 50 MG/ML
25 INJECTION INTRAMUSCULAR; INTRAVENOUS EVERY 6 HOURS PRN
Status: DISCONTINUED | OUTPATIENT
Start: 2022-02-19 | End: 2022-03-01 | Stop reason: HOSPADM

## 2022-02-19 RX ORDER — SODIUM CHLORIDE 0.9 % (FLUSH) 0.9 %
10 SYRINGE (ML) INJECTION PRN
Status: CANCELLED | OUTPATIENT
Start: 2022-02-19

## 2022-02-19 RX ORDER — HYDROCHLOROTHIAZIDE 25 MG/1
25 TABLET ORAL DAILY
Status: DISCONTINUED | OUTPATIENT
Start: 2022-02-19 | End: 2022-03-01 | Stop reason: HOSPADM

## 2022-02-19 RX ORDER — GABAPENTIN 400 MG/1
800 CAPSULE ORAL 3 TIMES DAILY
Status: CANCELLED | OUTPATIENT
Start: 2022-02-19

## 2022-02-19 RX ORDER — HYDROCODONE BITARTRATE AND ACETAMINOPHEN 7.5; 325 MG/1; MG/1
2 TABLET ORAL EVERY 6 HOURS PRN
Status: DISCONTINUED | OUTPATIENT
Start: 2022-02-19 | End: 2022-03-01 | Stop reason: HOSPADM

## 2022-02-19 RX ORDER — AMLODIPINE BESYLATE 10 MG/1
10 TABLET ORAL DAILY
Status: CANCELLED | OUTPATIENT
Start: 2022-02-19

## 2022-02-19 RX ORDER — SODIUM CHLORIDE 0.9 % (FLUSH) 0.9 %
10 SYRINGE (ML) INJECTION PRN
Status: DISCONTINUED | OUTPATIENT
Start: 2022-02-19 | End: 2022-02-19

## 2022-02-19 RX ORDER — LANOLIN ALCOHOL/MO/W.PET/CERES
1000 CREAM (GRAM) TOPICAL NIGHTLY
Status: DISCONTINUED | OUTPATIENT
Start: 2022-02-19 | End: 2022-02-20 | Stop reason: CLARIF

## 2022-02-19 RX ORDER — ONDANSETRON 2 MG/ML
4 INJECTION INTRAMUSCULAR; INTRAVENOUS EVERY 6 HOURS PRN
Status: CANCELLED | OUTPATIENT
Start: 2022-02-19

## 2022-02-19 RX ORDER — ATORVASTATIN CALCIUM 20 MG/1
20 TABLET, FILM COATED ORAL DAILY
Status: DISCONTINUED | OUTPATIENT
Start: 2022-02-19 | End: 2022-02-20

## 2022-02-19 RX ORDER — POLYETHYLENE GLYCOL 3350 17 G/17G
17 POWDER, FOR SOLUTION ORAL DAILY PRN
Status: CANCELLED | OUTPATIENT
Start: 2022-02-19

## 2022-02-19 RX ORDER — AMLODIPINE BESYLATE 10 MG/1
10 TABLET ORAL DAILY
Status: DISCONTINUED | OUTPATIENT
Start: 2022-02-19 | End: 2022-03-01 | Stop reason: HOSPADM

## 2022-02-19 RX ORDER — ALLOPURINOL 100 MG/1
100 TABLET ORAL DAILY
Status: DISCONTINUED | OUTPATIENT
Start: 2022-02-19 | End: 2022-03-01 | Stop reason: HOSPADM

## 2022-02-19 RX ORDER — KETOROLAC TROMETHAMINE 30 MG/ML
15 INJECTION, SOLUTION INTRAMUSCULAR; INTRAVENOUS EVERY 6 HOURS PRN
Status: CANCELLED | OUTPATIENT
Start: 2022-02-19 | End: 2022-02-21

## 2022-02-19 RX ORDER — LISINOPRIL 20 MG/1
20 TABLET ORAL DAILY
Status: DISCONTINUED | OUTPATIENT
Start: 2022-02-19 | End: 2022-03-01 | Stop reason: HOSPADM

## 2022-02-19 RX ORDER — ACETAMINOPHEN 325 MG/1
650 TABLET ORAL EVERY 6 HOURS
Status: DISCONTINUED | OUTPATIENT
Start: 2022-02-19 | End: 2022-03-01 | Stop reason: HOSPADM

## 2022-02-19 RX ORDER — DEXTROSE MONOHYDRATE 50 MG/ML
100 INJECTION, SOLUTION INTRAVENOUS PRN
Status: CANCELLED | OUTPATIENT
Start: 2022-02-19

## 2022-02-19 RX ORDER — NICOTINE POLACRILEX 4 MG
15 LOZENGE BUCCAL PRN
Status: CANCELLED | OUTPATIENT
Start: 2022-02-19

## 2022-02-19 RX ORDER — LISINOPRIL AND HYDROCHLOROTHIAZIDE 12.5; 1 MG/1; MG/1
2 TABLET ORAL DAILY
Status: DISCONTINUED | OUTPATIENT
Start: 2022-02-19 | End: 2022-02-19 | Stop reason: CLARIF

## 2022-02-19 RX ORDER — ACETAMINOPHEN 325 MG/1
650 TABLET ORAL EVERY 6 HOURS
Status: CANCELLED | OUTPATIENT
Start: 2022-02-19

## 2022-02-19 RX ADMIN — GABAPENTIN 800 MG: 400 CAPSULE ORAL at 20:33

## 2022-02-19 RX ADMIN — GABAPENTIN 800 MG: 400 CAPSULE ORAL at 14:13

## 2022-02-19 RX ADMIN — METOPROLOL TARTRATE 50 MG: 50 TABLET, FILM COATED ORAL at 09:58

## 2022-02-19 RX ADMIN — ATORVASTATIN CALCIUM 20 MG: 20 TABLET, FILM COATED ORAL at 10:05

## 2022-02-19 RX ADMIN — METOPROLOL TARTRATE 50 MG: 50 TABLET, FILM COATED ORAL at 20:33

## 2022-02-19 RX ADMIN — ASPIRIN 325 MG: 325 TABLET, COATED ORAL at 09:58

## 2022-02-19 RX ADMIN — INSULIN LISPRO 6 UNITS: 100 INJECTION, SOLUTION INTRAVENOUS; SUBCUTANEOUS at 11:20

## 2022-02-19 RX ADMIN — ACETAMINOPHEN 650 MG: 325 TABLET ORAL at 14:13

## 2022-02-19 RX ADMIN — ACETAMINOPHEN 650 MG: 325 TABLET ORAL at 09:57

## 2022-02-19 RX ADMIN — ALLOPURINOL 100 MG: 100 TABLET ORAL at 10:05

## 2022-02-19 RX ADMIN — AMLODIPINE BESYLATE 10 MG: 10 TABLET ORAL at 10:05

## 2022-02-19 RX ADMIN — LISINOPRIL AND HYDROCHLOROTHIAZIDE 2 TABLET: 12.5; 1 TABLET ORAL at 10:18

## 2022-02-19 RX ADMIN — INSULIN LISPRO 1 UNITS: 100 INJECTION, SOLUTION INTRAVENOUS; SUBCUTANEOUS at 20:33

## 2022-02-19 RX ADMIN — ACETAMINOPHEN 650 MG: 325 TABLET ORAL at 20:32

## 2022-02-19 RX ADMIN — APIXABAN 5 MG: 5 TABLET, FILM COATED ORAL at 09:58

## 2022-02-19 RX ADMIN — APIXABAN 5 MG: 5 TABLET, FILM COATED ORAL at 20:32

## 2022-02-19 RX ADMIN — PANTOPRAZOLE SODIUM 40 MG: 40 TABLET, DELAYED RELEASE ORAL at 06:10

## 2022-02-19 RX ADMIN — SODIUM CHLORIDE, PRESERVATIVE FREE 10 ML: 5 INJECTION INTRAVENOUS at 10:13

## 2022-02-19 RX ADMIN — GABAPENTIN 800 MG: 400 CAPSULE ORAL at 09:56

## 2022-02-19 ASSESSMENT — PAIN DESCRIPTION - PROGRESSION
CLINICAL_PROGRESSION: GRADUALLY WORSENING
CLINICAL_PROGRESSION: GRADUALLY IMPROVING

## 2022-02-19 ASSESSMENT — PAIN SCALES - GENERAL
PAINLEVEL_OUTOF10: 5
PAINLEVEL_OUTOF10: 4
PAINLEVEL_OUTOF10: 8
PAINLEVEL_OUTOF10: 0
PAINLEVEL_OUTOF10: 2
PAINLEVEL_OUTOF10: 5

## 2022-02-19 ASSESSMENT — PAIN DESCRIPTION - FREQUENCY: FREQUENCY: INTERMITTENT

## 2022-02-19 ASSESSMENT — PAIN DESCRIPTION - DESCRIPTORS: DESCRIPTORS: ACHING;DISCOMFORT

## 2022-02-19 ASSESSMENT — PAIN DESCRIPTION - PAIN TYPE: TYPE: SURGICAL PAIN

## 2022-02-19 ASSESSMENT — PAIN DESCRIPTION - ORIENTATION: ORIENTATION: RIGHT

## 2022-02-19 ASSESSMENT — PAIN DESCRIPTION - LOCATION: LOCATION: KNEE

## 2022-02-19 ASSESSMENT — PAIN - FUNCTIONAL ASSESSMENT: PAIN_FUNCTIONAL_ASSESSMENT: PREVENTS OR INTERFERES SOME ACTIVE ACTIVITIES AND ADLS

## 2022-02-19 ASSESSMENT — PAIN DESCRIPTION - ONSET: ONSET: PROGRESSIVE

## 2022-02-19 NOTE — PROGRESS NOTES
Ty Hastings is a 64 y.o. male patient. 1. Pre-op testing      Past Medical History:   Diagnosis Date    Arthritis     Dizziness     FH: CAD (coronary artery disease) 09/29/2015    H/O 24 hour EKG monitoring 02/26/19,08/02/2016    Conclusion: Atrial fibrillation with  fairly well-controlled ventricular rate response without evidence of any significant tachy or alon arrhythmias.  H/O atrial fibrillation without current medication 04/03/2019    H/O cardiovascular stress test 11/24/15; 8/22/2019    Normal perfusion in the distribution of all coronaries, normal LV, EF 53%.  H/O Doppler ultrasound (Venous Doppler Lower Extremities) 03/08/2017; 1/2/2020    No evidence of DVT or SVT in the bilaterally. No significant reflux noted in the veins of the right lower extremity. Significant reflux noted in the Left CFV. Bilateral Calf and ankle edema noted.  H/O echocardiogram 6/6/17,11/24/15    Left ventricle mildly dilated with normal systolic function EF 89-29% Mild concentric LVH with Grade II diastolic dysfunction. Mild to moderate MR and mild TR     H/O echocardiogram 06/07/2018; 2/26/2019    EF 55-60%. Mild concentric left ventricular hypertrophy. The left atrium is moderately dilated. Dilatation of the aortic root measuring 4.0 cm. Mild AR. Moderate pulmonary HTN.     H/O echocardiogram 10/28/2021    EF 55-60% Mild eccentric left ventricular hypertrophy. There is severe fibrocalcific sclerosis of the aortic valve with mean gradient across the aortic valve of 29 mmHg and calculated aortic valve areawas 1.2square centimeters, all suggesting moderate aortic stenosis. See complete result documentation under cardiology tab.  H/O transesophageal echocardiography (SALAZAR) for monitoring 07/08/2019    No CEE clot    Heart murmur 09/29/2015    Hx of Venous Doppler ultrasound 08/03/2020    No evidence of DVT in the left CFV.   The Left GSV is non-compressible with no evidence of flow just past the saphenofemoral junction to the distal calf.  Hyperlipidemia     Hypertension     Kidney stones     Leg swelling 12/09/2019    Bilateral leg swelling    Neuropathy     Nonrheumatic aortic valve stenosis 2/15/2022    Obesity     JAYE on CPAP 06/26/2018    On CPAP 5/2018    Persistent atrial fibrillation ([facility] [address] 01/29/2019    New onset 1/2019    Syncope and collapse 12/09/2019    Thoracic aortic aneurysm ([facility] [address] 02/2011    4.5    Type II or unspecified type diabetes mellitus without mention of complication, not stated as uncontrolled      No past surgical history pertinent negatives on file. Scheduled Meds:   apixaban  5 mg Oral BID    sodium chloride flush  10 mL IntraVENous 2 times per day    acetaminophen  650 mg Oral Q6H    aspirin  325 mg Oral BID    amLODIPine  10 mg Oral Daily    metoprolol tartrate  50 mg Oral BID    niacin  1,000 mg Oral Nightly    lisinopril-hydroCHLOROthiazide  2 tablet Oral Daily    gabapentin  800 mg Oral TID    atorvastatin  20 mg Oral Daily    allopurinol  100 mg Oral Daily    insulin lispro  0.05 Units/kg SubCUTAneous TID WC    insulin lispro  0-6 Units SubCUTAneous TID WC    insulin lispro  0-3 Units SubCUTAneous Nightly    pantoprazole  40 mg Oral QAM AC     Continuous Infusions:   lactated ringers 100 mL/hr at 02/18/22 2033    sodium chloride      dextrose       PRN Meds:polyethylene glycol, sodium chloride flush, sodium chloride, diphenhydrAMINE **OR** diphenhydrAMINE, ondansetron **OR** ondansetron, ketorolac, HYDROcodone-acetaminophen, HYDROcodone-acetaminophen, glucose, glucagon (rDNA), dextrose, dextrose bolus (hypoglycemia) **OR** dextrose bolus (hypoglycemia)    Allergies   Allergen Reactions    Oxycontin [Oxycodone Hcl] Hives     Itching in his feet     Active Problems:    * No active hospital problems. *  Resolved Problems:    * No resolved hospital problems. *    Blood pressure 111/63, pulse 65, temperature 97.9 °F (36.6 °C), temperature source Oral, resp. rate 16, height 5' 11\" (1.803 m), weight 274 lb (124.3 kg), SpO2 98 %. Subjective   Patient seen and examined, resting in bed comfortably, pain controlled, no new complaints. Up walking with PT currently, doing very well. Objective:  Vital signs (most recent): Blood pressure 111/63, pulse 65, temperature 97.9 °F (36.6 °C), temperature source Oral, resp. rate 16, height 5' 11\" (1.803 m), weight 274 lb (124.3 kg), SpO2 98 %. RLE - Dressing clean, mild bloody drainage, intact, no calf TTP, compartments soft, NVID    Assessment & Plan  POD #3 R TKA, Doing well postoperatively    Continue weight bearing as tolerated. Continue range of motion exercises  Pain control  DVT prophylaxis  Continue PT/OT  Discharge to swing bed today.     Anyi 97, DO  2/19/2022

## 2022-02-19 NOTE — DISCHARGE INSTR - COC
[employer] dual permanent pacer-[place] XT DR ENEIDA Vaughan        Immunization History:   Immunization History   Administered Date(s) Administered    COVID-19, Pfizer Purple top, DILUTE for use, 12+ yrs, 30mcg/0.3mL dose 01/23/2021, 02/17/2021, 11/03/2021    Influenza A (K9G7-56) Vaccine PF IM 12/19/2009    Influenza, High Dose (Fluzone 65 yrs and older) 12/21/2017, 11/08/2018, 11/09/2020    Pneumococcal Conjugate 13-valent (Nhehorf42) 02/22/2018    Pneumococcal Polysaccharide (Abdxfzekb15) 11/13/2019    Zoster Recombinant (Shingrix) 02/10/2020, 08/03/2021       Active Problems:  Patient Active Problem List   Diagnosis Code    Anemia D64.9    Colon polyps K63.5    Type 2 diabetes mellitus without complication (HCC) E93.4    Hypertension I10    Hyperlipidemia E78.5    Obesity E66.9    Vertigo R42    FH: CAD (coronary artery disease) Z82.49    Heart murmur R01.1    Bradycardia R00.1    Coronary artery disease due to calcified coronary lesion I25.10, I25.84    [facility] (hard of hearing) H91.90    History of gout Z87.39    Adenomatous polyp of transverse colon D12.3    JAYE on CPAP G47.33, Z99.89    Acute kidney failure (HCC) N17.9    Persistent atrial fibrillation (HCC) I48.19    Chronic kidney disease (CKD) stage G3b/A1, moderately decreased glomerular filtration rate (GFR) between 30-44 mL/min/1.73 square meter and albuminuria creatinine ratio less than 30 mg/g (HCC) N18.32    H/O atrial fibrillation without current medication 1/2019 Z86.79    Hypomagnesemia E83.42    Age-related nuclear cataract of right eye H25.11    Age-related nuclear cataract of left eye H25.12    S/P placement of cardiac pacemaker 6/2019 Z95.0    Tachycardia-bradycardia ([address] I49.5    Leg swelling M79.89    Varicose veins of both legs with edema I83.893    Nonrheumatic aortic valve stenosis I35.0       Isolation/Infection:   Isolation            No Isolation          Patient Infection Status       Infection Onset Added Last Indicated Last Indicated By Review Planned Expiration Resolved Resolved By    None active    Resolved    COVID-19 (Rule Out) 02/09/22 02/09/22 02/09/22 COVID-19 (Ordered)   02/09/22 Rule-Out Test Resulted    COVID-19 (Rule Out) 01/25/22 01/25/22 01/25/22 COVID-19 (Ordered)   01/26/22 Rule-Out Test Resulted            Nurse Assessment:  Last Vital Signs: /70   Pulse 62   Temp 98.6 °F (37 °C) (Oral)   Resp 15   Ht 5' 11\" (1.803 m)   Wt 274 lb (124.3 kg)   SpO2 98%   BMI 38.22 kg/m²     Last documented pain score (0-10 scale): Pain Level: 6  Last Weight:   Wt Readings from Last 1 Encounters:   02/16/22 274 lb (124.3 kg)     Mental Status:  oriented and alert    IV Access:  - None    Nursing Mobility/ADLs:  Walking   Assisted  Transfer  Assisted  Bathing  Independent  Dressing  Assisted  Toileting  Assisted  Feeding  Independent  Med Admin  Independent  Med Delivery   whole    Wound Care Documentation and Therapy:        Elimination:  Continence: Bowel: Yes  Bladder: Yes  Urinary Catheter: None   Colostomy/Ileostomy/Ileal Conduit: No       Date of Last BM: 2/17/2022    Intake/Output Summary (Last 24 hours) at 2/19/2022 0994  Last data filed at 2/19/2022 0612  Gross per 24 hour   Intake --   Output 300 ml   Net -300 ml     I/O last 3 completed shifts:  In: -   Out: 300 [Urine:300]    Safety Concerns: At Risk for Falls    Impairments/Disabilities:      None    Nutrition Therapy:  Current Nutrition Therapy:   - Oral Diet:  General and Carb Control 3 carbs/meal (1500kcals/day)    Routes of Feeding: Oral  Liquids: No Restrictions  Daily Fluid Restriction: no  Last Modified Barium Swallow with Video (Video Swallowing Test): not done    Treatments at the Time of Hospital Discharge:   Respiratory Treatments:   Oxygen Therapy:  is not on home oxygen therapy.   Ventilator:    - No ventilator support    Rehab Therapies: Physical Therapy and Occupational Therapy  Weight Bearing Status/Restrictions: No weight bearing restirctions  Other Medical Equipment (for information only, NOT a DME order):  walker  Other Treatments: ***    Patient's personal belongings (please select all that are sent with patient):  None    RN SIGNATURE:  Electronically signed by Danilo Guan LPN on 6/38/53 at 80:30 PM EST      Prognosis: {Prognosis:2530013493}    Condition at Discharge: Darleen8 Reshma Meléndez Patient Condition:[id number]}    Rehab Potential (if transferring to Rehab): {Prognosis:5775543337}    Recommended Labs or Other Treatments After Discharge: ***    Physician Certification: I certify the above information and transfer of Leland Duran  is necessary for the continuing treatment of the diagnosis listed and that he requires {Admit to Appropriate Level of Care:19948} for {GREATER/LESS:200868178} 30 days.      Update Admission H&P: {CHP DME Changes in VYUDY:334023129}    PHYSICIAN SIGNATURE:  {Esignature:568578507}

## 2022-02-19 NOTE — PLAN OF CARE
Problem: Falls - Risk of:  Goal: Will remain free from falls  Description: Will remain free from falls  Outcome: Ongoing  Goal: Absence of physical injury  Description: Absence of physical injury  Outcome: Ongoing     Problem: Discharge Planning:  Goal: Discharged to appropriate level of care  Description: Discharged to appropriate level of care  Outcome: Ongoing     Problem: Mobility - Impaired:  Goal: Mobility will improve  Description: Mobility will improve  Outcome: Ongoing     Problem: Infection - Surgical Site:  Goal: Will show no infection signs and symptoms  Description: Will show no infection signs and symptoms  Outcome: Ongoing     Problem: Pain - Acute:  Goal: Pain level will decrease  Description: Pain level will decrease  Outcome: Ongoing

## 2022-02-19 NOTE — CARE COORDINATION
SALEEMW received phone call from [address] [place] [address] stating pt approved for admission today; precert expires Sunday. LSW sent [employer] msg notifying Dr. Rob Medrano. *Pt will need rapid covid completed with neg result prior to dc. LSW following.   Electronically signed by REBEL Flowers on 2/19/2022 at 8:36 AM

## 2022-02-19 NOTE — DISCHARGE SUMMARY
ADMIT DATE: 2/16/2022    DISCHARGE DATE: 2/19/2022    PROVIDER:     Garrett Ospina DO                      ADMISSION DIAGNOSIS:  Right knee DJD. DISCHARGE DIAGNOSIS:  Right knee DJD. PROCEDURE:  Right total knee arthroplasty on 2/16/2022. HOSPITAL COURSE:  The patient is a 64year-old male with a  longstanding history of right knee pain. For this, he was brought to  [facility] on 2/16/2022 where he underwent right   total knee arthroplasty. He was admitted postoperatively  for pain control, rehabilitation, and medical monitoring. Hospitalist  was consulted for medical management. Physical Therapy was consulted  for gait training. He did receive antibiotic prophylaxis and DVT  prophylaxis during his hospitalization. Throughout his hospital  course, clinically, he remained stable with no apparent medical  complications. He was progressing well with physical therapy and his  pain was well controlled with oral medications.  was  consulted for discharge planning. Given that clinically he was  stable, he was discharged to swing bed on 2/19/2022. DISCHARGE MEDICATIONS:    1. Norco 5 mg one p.o. q.6 h. p.r.n. Pain. 2.  Resume Eliquis  3. Other medications per the [facility] DISCHARGE INSTRUCTIONS:    He is to have physical therapy for gait training and range of motion, and can be weightbearing as tolerated on the right leg. His incision is to be kept clean, dry, and intact at  all times. He is to ice and elevate the right lower extremity for pain and swelling. He is to contact my office if he develops increased pain, swelling, numbness, tingling, redness, fevers, or chills. He is to follow up in my office in 3 weeks at his prescheduled appointment.            Anyi Guerra DO

## 2022-02-19 NOTE — PROGRESS NOTES
Pt arrived to unit via [employer] at 1515. Pt able to ambulate to bed with front wheeled walker w/o difficulty. 2 person skin assessment completed by this RN and BRENDA Arciniega. Pt alert and oriented.

## 2022-02-19 NOTE — PROGRESS NOTES
Hospitalist Progress Note      Name:  Gracia Klein /Age/Sex:   (64 y.o. male)   MRN & CSN:  [id number] & [id number] Admission Date/Time: 2022  5:59 AM   [facility]  [facility]- PCP: Demetri Aburto MD         Hospital Day: 4    Assessment and Plan:     Syl Gomez a 64 y. o. male with pmh of Arthritis, CAD,  Afib, P1GQfnf presents status post right knee arthroplasty.  Hospitalist team is consulted for postoperative medical management.        1.  Right knee degenerative joint disease status post arthroplasty              -Postoperative day 2              -Status post right knee arthroplasty per Dr. Yeni Aviles              -As needed narcotics, antiemetics              -DVT prophylaxis, postoperative management per orthopedic service              -DAHIANA-ZA with orthopedics as planned              -XL OT eval-              -Approved for swing bed at [facility]   2.  Coronary artery disease              -Resume home Lipitor, Lopressor  3, Persistent atrial fibrillation              -WZAPDDK stopped Eliquis on  for knee surgery. Patient was placed on Lovenox after surgery. Resumed Eliquis today-              -Lopressor as noted above  4.  Essential hypertension              -Resume home Norvasc, lisinopril, hydrochlorothiazide  5.  Non-insulin-dependent Type 2 Diabetes Mellitus last A1C 5.3 - 10/2021              -Start sliding scale with hypoglycemia protocol              -Hold home oral antihyperglycemics              - check hemoglobin A1c              - ADA diet        Diet: ADULT DIET; Regular  DVT prophylaxis: Eliquis twice daily  GI prophylaxis: PPI  CODE STATUS:Full    Treatment and discharge plan discussed with patient/family. Patient/family voiced understanding. This patient was seen examined and treatment plan discussed with/supervising physician.     History of Present Illness:     Chief Complaint: S/P right knee arthroplasty    Subjective  Patient examined at bedside this morning. Patient states he rested well overnight and is eating his meals. Patient reports good pain control of right knee. States feet swelling has improved since elevation. Patient is aware of waiting on approval of swing bed at [facility]. Patient denies any chest pain, palpitations shortness of breath or abdominal pain. He is concerned about constipation and states he has not had a bowel movement in 2 days states he took MiraLAX last night. Discussed with him to keep legs elevated when resting in bed also asked sitting in chair to recline. Try to avoid feet being dependent while sitting. Suture line intact. Minimal dry serosanguineous appearing fluid on cotton distal to suture line noted. ROS reviewed negative, unless as noted above    Objective: Intake/Output Summary (Last 24 hours) at 2/19/2022 0954  Last data filed at 2/19/2022 0920  Gross per 24 hour   Intake --   Output 700 ml   Net -700 ml      Vitals:   Vitals:    02/19/22 0845   BP: 110/62   Pulse: 62   Resp: 16   Temp: 97.9 °F (36.6 °C)   SpO2:      Physical Exam:   Physical Exam  Constitutional:       Appearance: He is obese. HENT:      Mouth/Throat:      Mouth: Mucous membranes are moist.   Eyes:      Extraocular Movements: Extraocular movements intact. Conjunctiva/sclera: Conjunctivae normal.   Cardiovascular:      Rate and Rhythm: Normal rate and regular rhythm. Pulses: Normal pulses. Heart sounds: Normal heart sounds. Pulmonary:      Effort: Pulmonary effort is normal.      Breath sounds: Normal breath sounds. Abdominal:      Palpations: Abdomen is soft. Musculoskeletal:      Right lower leg: Edema present. Comments: RLE ,pedal edema improving, non pitting. Skin:     Capillary Refill: Capillary refill takes less than 2 seconds. Neurological:      General: No focal deficit present. Mental Status: He is alert and oriented to person, place, and time.    Psychiatric:         Mood and Affect: Mood normal.         Medications:   Medications:    apixaban  5 mg Oral BID    sodium chloride flush  10 mL IntraVENous 2 times per day    acetaminophen  650 mg Oral Q6H    aspirin  325 mg Oral BID    amLODIPine  10 mg Oral Daily    metoprolol tartrate  50 mg Oral BID    niacin  1,000 mg Oral Nightly    lisinopril-hydroCHLOROthiazide  2 tablet Oral Daily    gabapentin  800 mg Oral TID    atorvastatin  20 mg Oral Daily    allopurinol  100 mg Oral Daily    insulin lispro  0.05 Units/kg SubCUTAneous TID WC    insulin lispro  0-6 Units SubCUTAneous TID WC    insulin lispro  0-3 Units SubCUTAneous Nightly    pantoprazole  40 mg Oral QAM AC      Infusions:    lactated ringers 100 mL/hr at 02/18/22 2033    sodium chloride      dextrose       PRN Meds: polyethylene glycol, 17 g, Daily PRN  sodium chloride flush, 10 mL, PRN  sodium chloride, 25 mL, PRN  diphenhydrAMINE, 25 mg, Q6H PRN   Or  diphenhydrAMINE, 25 mg, Q6H PRN  ondansetron, 4 mg, Q8H PRN   Or  ondansetron, 4 mg, Q6H PRN  ketorolac, 15 mg, Q6H PRN  HYDROcodone-acetaminophen, 1 tablet, Q6H PRN  HYDROcodone-acetaminophen, 2 tablet, Q6H PRN  glucose, 15 g, PRN  glucagon (rDNA), 1 mg, PRN  dextrose, 100 mL/hr, PRN  dextrose bolus (hypoglycemia), 125 mL, PRN   Or  dextrose bolus (hypoglycemia), 250 mL, PRN        Recent Labs     02/17/22 0621 02/18/22  1733   WBC 8.6 5.6   HGB 9.2* 8.3*   HCT 28.5* 27.0*   * 92*      Recent Labs     02/17/22 0621 02/18/22  1733    137   K 4.0 4.2    102   CO2 22 23   BUN 18 28*   CREATININE 1.6* 2.0*     Recent Labs     02/17/22 0621   AST 18   ALT 12   BILITOT 0.5   ALKPHOS 45     No results for input(s): INR in the last 72 hours. No results for input(s): CKTOTAL, CKMB, CKMBINDEX, TROPONINT in the last 72 hours.      Imaging reviewed      Electronically signed by BENSON Kulkarni CNP on 2/19/2022 at 9:54 AM

## 2022-02-19 NOTE — PROGRESS NOTES
Physical Therapy    Physical Therapy Treatment Note  Name: Paul Kan MRN: [id number] :   1945   Date:  2022   Admission Date: 2022 Room:  [address], [place] [address]A   Restrictions/Precautions:        WBAT R LE, general precautions, fall risk  Communication with other providers:  RN approves tx session. Subjective:  Patient states:  Agreeable to tx session; \"So you'll see me again this afternoon? \"  Pain:   Location, Type, Intensity (0/10 to 10/10):  4/10    Objective:    Observation:  Pt in bed with RN student present giving medication upon arrival.    Treatment, including education/measures:  Transfers:  Supine to sit :Xin for RLE negotiation   Scooting :SBA  Sit to stand :pt first attempt to stand from EOB (in lowest position) failed despite modA from therapist. Raised bed height and pt able to complete stand with Xin  Stand to sit :modA due to poor eccentric control because of pain with RLE flexion    Gait:  Pt amb with RW for 100 ft with CGA; pt amb with very decreased chrissy and step length bilat with antalgic gait pattern; noted heavy use of bilat UE on walker. Pt also tended to amb with walker too far; able to correct with VC's  Pt needed VC's for pathway, walker safety    Safety  Patient left safely in the chair, with call light/phone in reach with alarm applied. Gait belt and mask were used for transfers and gait.     Assessment / Impression:       Patient's tolerance of treatment:  Good    Adverse Reaction: none  Significant change in status and impact:  none  Barriers to improvement:  Strength, endurance     Plan for Next Session:    Will cont to work towards pt's goals per his tolerance    Time in:  1003  Time out:  1041  Timed treatment minutes:  38  Total treatment time:  45    Previously filed items:  Social/Functional History  Lives With: Spouse  Type of Home: House  Home Layout: One level  Home Access: Stairs to enter without rails  Entrance Stairs - Number of Steps: 2  Bathroom Shower/Tub: (walk-in tub)  Bathroom Toilet: Handicap height  Bathroom Equipment: Grab bars in shower,Built-in shower seat  Home Equipment: Cane,4 wheeled walker,Standard walker,Sock aid,Long-handled shoehorn,Reacher,Leg   ADL Assistance: Independent  Homemaking Assistance: Independent  Homemaking Responsibilities: Yes  Ambulation Assistance: Independent  Transfer Assistance: Independent  Active : Yes  Occupation: Retired  Type of occupation: [employer]  Short term goals  Time Frame for Short term goals: 1 week  Short term goal 1: Pt to complete all bed mobility mod I  Short term goal 2: Pt to complete all STS transfers to/from bed, commode, and chair min A  Short term goal 3: Pt to ambulate 48' with LRAD min A  Short term goal 4: Pt to ascend/descend 2 stairs min A       Electronically signed by:    Lizbet Peters PTA  2/19/2022, 10:44 AM

## 2022-02-20 LAB
GLUCOSE BLD-MCNC: 125 MG/DL (ref 70–99)
GLUCOSE BLD-MCNC: 136 MG/DL (ref 70–99)
GLUCOSE BLD-MCNC: 142 MG/DL (ref 70–99)
GLUCOSE BLD-MCNC: 201 MG/DL (ref 70–99)

## 2022-02-20 PROCEDURE — 1200000002 HC SEMI PRIVATE SWING BED

## 2022-02-20 PROCEDURE — 82962 GLUCOSE BLOOD TEST: CPT

## 2022-02-20 PROCEDURE — 6370000000 HC RX 637 (ALT 250 FOR IP): Performed by: ORTHOPAEDIC SURGERY

## 2022-02-20 RX ORDER — NIACIN 500 MG/1
1000 TABLET, EXTENDED RELEASE ORAL NIGHTLY
Status: DISCONTINUED | OUTPATIENT
Start: 2022-02-20 | End: 2022-03-01 | Stop reason: HOSPADM

## 2022-02-20 RX ORDER — ATORVASTATIN CALCIUM 20 MG/1
20 TABLET, FILM COATED ORAL NIGHTLY
Status: DISCONTINUED | OUTPATIENT
Start: 2022-02-20 | End: 2022-03-01 | Stop reason: HOSPADM

## 2022-02-20 RX ADMIN — NIACIN 1000 MG: 500 TABLET, FILM COATED, EXTENDED RELEASE ORAL at 20:35

## 2022-02-20 RX ADMIN — INSULIN LISPRO 2 UNITS: 100 INJECTION, SOLUTION INTRAVENOUS; SUBCUTANEOUS at 17:38

## 2022-02-20 RX ADMIN — GABAPENTIN 800 MG: 400 CAPSULE ORAL at 14:25

## 2022-02-20 RX ADMIN — INSULIN LISPRO 1 UNITS: 100 INJECTION, SOLUTION INTRAVENOUS; SUBCUTANEOUS at 08:48

## 2022-02-20 RX ADMIN — GABAPENTIN 800 MG: 400 CAPSULE ORAL at 20:35

## 2022-02-20 RX ADMIN — AMLODIPINE BESYLATE 10 MG: 10 TABLET ORAL at 08:45

## 2022-02-20 RX ADMIN — LISINOPRIL 20 MG: 20 TABLET ORAL at 08:45

## 2022-02-20 RX ADMIN — PANTOPRAZOLE SODIUM 40 MG: 40 TABLET, DELAYED RELEASE ORAL at 08:45

## 2022-02-20 RX ADMIN — METOPROLOL TARTRATE 50 MG: 50 TABLET, FILM COATED ORAL at 20:34

## 2022-02-20 RX ADMIN — HYDROCHLOROTHIAZIDE 25 MG: 25 TABLET ORAL at 08:45

## 2022-02-20 RX ADMIN — ALLOPURINOL 100 MG: 100 TABLET ORAL at 08:45

## 2022-02-20 RX ADMIN — ACETAMINOPHEN 650 MG: 325 TABLET ORAL at 14:25

## 2022-02-20 RX ADMIN — APIXABAN 5 MG: 5 TABLET, FILM COATED ORAL at 20:35

## 2022-02-20 RX ADMIN — METOPROLOL TARTRATE 50 MG: 50 TABLET, FILM COATED ORAL at 08:45

## 2022-02-20 RX ADMIN — ATORVASTATIN CALCIUM 20 MG: 20 TABLET, FILM COATED ORAL at 20:35

## 2022-02-20 RX ADMIN — ACETAMINOPHEN 650 MG: 325 TABLET ORAL at 20:34

## 2022-02-20 RX ADMIN — ACETAMINOPHEN 650 MG: 325 TABLET ORAL at 08:44

## 2022-02-20 RX ADMIN — GABAPENTIN 800 MG: 400 CAPSULE ORAL at 08:45

## 2022-02-20 RX ADMIN — APIXABAN 5 MG: 5 TABLET, FILM COATED ORAL at 08:45

## 2022-02-20 ASSESSMENT — PAIN DESCRIPTION - LOCATION
LOCATION: KNEE
LOCATION: KNEE

## 2022-02-20 ASSESSMENT — PAIN DESCRIPTION - FREQUENCY
FREQUENCY: INTERMITTENT
FREQUENCY: INTERMITTENT

## 2022-02-20 ASSESSMENT — PAIN - FUNCTIONAL ASSESSMENT
PAIN_FUNCTIONAL_ASSESSMENT: PREVENTS OR INTERFERES SOME ACTIVE ACTIVITIES AND ADLS
PAIN_FUNCTIONAL_ASSESSMENT: PREVENTS OR INTERFERES SOME ACTIVE ACTIVITIES AND ADLS

## 2022-02-20 ASSESSMENT — PAIN SCALES - GENERAL
PAINLEVEL_OUTOF10: 0
PAINLEVEL_OUTOF10: 4
PAINLEVEL_OUTOF10: 5
PAINLEVEL_OUTOF10: 1
PAINLEVEL_OUTOF10: 0
PAINLEVEL_OUTOF10: 0
PAINLEVEL_OUTOF10: 4

## 2022-02-20 ASSESSMENT — PAIN DESCRIPTION - DESCRIPTORS
DESCRIPTORS: ACHING;DISCOMFORT
DESCRIPTORS: ACHING;BURNING

## 2022-02-20 ASSESSMENT — PAIN DESCRIPTION - ORIENTATION
ORIENTATION: RIGHT
ORIENTATION: RIGHT

## 2022-02-20 ASSESSMENT — PAIN DESCRIPTION - PAIN TYPE
TYPE: SURGICAL PAIN
TYPE: SURGICAL PAIN

## 2022-02-20 ASSESSMENT — PAIN DESCRIPTION - ONSET
ONSET: GRADUAL
ONSET: GRADUAL

## 2022-02-20 ASSESSMENT — PAIN DESCRIPTION - PROGRESSION
CLINICAL_PROGRESSION: GRADUALLY IMPROVING
CLINICAL_PROGRESSION: GRADUALLY IMPROVING

## 2022-02-20 NOTE — H&P
History and Physical      Name:  Glenna Ardon /Age/Sex:   (72 y.o. male)   MRN & CSN:  [id number] & [id number] Admission Date/Time: 2022  3:20 PM   Location:  012 PCP: Ivone Butt MD       Hospital Day: 2    Assessment and Plan: This is a 66-year-old male with a past medical history of coronary artery disease, persistent atrial fibrillation, essential hypertension, type 2 diabetes, CKD stage IIIb, JAYE on CPAP, venous insufficiency, hyperlipidemia, primary osteoarthritis of the right knee status post right knee total arthroplasty on  that initially presented to [place] [id number] for elective orthopedic surgery. Physical debility  -Continue working physical therapy daily  -Hopeful discharge home after swing bed program    Primary arthritis of the right knee  -Status post elective right total knee arthroplasty on   -Continue pain control  -Is on Eliquis for persistent atrial fibrillation so will continue for DVT prophylaxis as well    Persistent atrial fibrillation  -Is status post pacer in the past  -Continue home metoprolol and Eliquis    Type 2 diabetes  -Continue sliding scale as well as scheduled Humalog    Essential hypertension  Continue lisinopril and hydrochlorothiazide    JAYE  -Continue CPAP at night    CKD stage IIIb  -Prior to discharge creatinine was stable  -Continue to monitor    History of aortic stenosis  History of pulmonary hypertension  History of mitral and tricuspid regurgitation  -Per last echocardiogram in 2021  -We'll need continued follow-up with cardiology as an outpatient    History of coronary artery disease  History of hyperlipidemia  -Continue aspirin and statin    History of venous insufficiency  -Status post greater saphenous vein ablation in the past  -We'll continue to monitor encourage compression stockings as needed      Diet ADULT DIET;  Regular; 4 carb choices (60 gm/meal)   DVT Prophylaxis [] Lovenox, []  Heparin, [] SCDs, [] Ambulation   GI Prophylaxis [] PPI,  [] H2 Blocker,  [] Carafate,  [] Diet/Tube Feeds   Code Status Prior   Disposition Patient requires continued admission due to   MDM [] Low, [] Moderate,[]  High  Patient's risk as above due to      History of Present Illness: This is a 58-year-old male with a past medical history of coronary artery disease, persistent atrial fibrillation, essential hypertension, type 2 diabetes, primary osteoarthritis of the right knee status post right knee total arthroplasty on 2/16 that initially presented to [facility] for elective orthopedic surgery. Patient presented to the swing bed program from [facility] after undergoing an elective right knee total arthroplasty in 2/16. Patient's hospital stay was uncomplicated and was seen by physical therapy who recommended the patient to the swing bed program in [place]. When seen at bedside this morning patient was doing very well no fevers chills nausea vomiting chest pain. States that his right knee is recovering nicely and denies any significant pain from the surgical site. No drainage through his Ace wrap. Overall states he is doing very well and is eager to work with physical therapy and would like to go home after the swing bed program.  Ten point ROS reviewed negative, unless as noted above. Objective: Intake/Output Summary (Last 24 hours) at 2/20/2022 0833  Last data filed at 2/20/2022 0359  Gross per 24 hour   Intake 560 ml   Output 550 ml   Net 10 ml      Vitals:   Vitals:    02/20/22 0805   BP: 131/72   Pulse: 59   Resp: 16   Temp: 96.9 °F (36.1 °C)   SpO2: 97%     Physical Exam:   GEN Awake male, sitting upright in bed in no apparent distress. Appears given age. EYES Pupils are equally round. No scleral erythema, discharge, or conjunctivitis. HENT Mucous membranes are moist. Oral pharynx without exudates, no evidence of thrush. NECK Supple, no apparent thyromegaly or masses.   RESP Clear to auscultation, no wheezes, rales or rhonchi. Symmetric chest movement while on room air. CARDIO/VASC S1/S2 auscultated. Regular rate without appreciable murmurs, rubs, or gallops. GI Abdomen is soft without significant tenderness, masses, or guarding   No costovertebral angle tenderness  HEME/LYMPH No petechiae or ecchymoses. MSK No gross joint deformities. SKIN Normal coloration, warm, dry; right knee with an Ace wrap clean dry and intact  NEURO Cranial nerves appear grossly intact, normal speech  PSYCH Awake, alert, oriented x 4. Affect appropriate. Past Medical History:      Past Medical History:   Diagnosis Date    Arthritis     Dizziness     FH: CAD (coronary artery disease) 09/29/2015    H/O 24 hour EKG monitoring 02/26/19,08/02/2016    Conclusion: Atrial fibrillation with  fairly well-controlled ventricular rate response without evidence of any significant tachy or alon arrhythmias.  H/O atrial fibrillation without current medication 04/03/2019    H/O cardiovascular stress test 11/24/15; 8/22/2019    Normal perfusion in the distribution of all coronaries, normal LV, EF 53%.  H/O Doppler ultrasound (Venous Doppler Lower Extremities) 03/08/2017; 1/2/2020    No evidence of DVT or SVT in the bilaterally. No significant reflux noted in the veins of the right lower extremity. Significant reflux noted in the Left CFV. Bilateral Calf and ankle edema noted.  H/O echocardiogram 6/6/17,11/24/15    Left ventricle mildly dilated with normal systolic function EF 94-91% Mild concentric LVH with Grade II diastolic dysfunction. Mild to moderate MR and mild TR     H/O echocardiogram 06/07/2018; 2/26/2019    EF 55-60%. Mild concentric left ventricular hypertrophy. The left atrium is moderately dilated. Dilatation of the aortic root measuring 4.0 cm. Mild AR. Moderate pulmonary HTN.     H/O echocardiogram 10/28/2021    EF 55-60% Mild eccentric left ventricular hypertrophy.   There is severe fibrocalcific sclerosis of the aortic valve with mean gradient across the aortic valve of 29 mmHg and calculated aortic valve areawas 1.2square centimeters, all suggesting moderate aortic stenosis. See complete result documentation under cardiology tab.  H/O transesophageal echocardiography (SALAZAR) for monitoring 07/08/2019    No CEE clot    Heart murmur 09/29/2015    Hx of Venous Doppler ultrasound 08/03/2020    No evidence of DVT in the left CFV. The Left GSV is non-compressible with no evidence of flow just past the saphenofemoral junction to the distal calf.  Hyperlipidemia     Hypertension     Kidney stones     Leg swelling 12/09/2019    Bilateral leg swelling    Neuropathy     Nonrheumatic aortic valve stenosis 2/15/2022    Obesity     JAYE on CPAP 06/26/2018    On CPAP 5/2018    Persistent atrial fibrillation ([address] 01/29/2019    New onset 1/2019    Syncope and collapse 12/09/2019    Thoracic aortic aneurysm ([facility] [address] 02/2011    4.5    Type II or unspecified type diabetes mellitus without mention of complication, not stated as uncontrolled      PSHX:  has a past surgical history that includes hernia repair (2008); hernia repair (2009); Colonoscopy (4082;3461); Colon surgery (2007 ); Cataract removal with implant (Right, 04/12/2019); Intracapsular cataract extraction (Right, 4/12/2019); Cataract removal with implant (Left, 05/10/2019); Intracapsular cataract extraction (Left, 5/10/2019); Pacemaker insertion (Left, 06/25/2019); and Cardioversion (07/08/2019). Allergies: Allergies   Allergen Reactions    Oxycontin [Oxycodone Hcl] Hives     Itching in his feet       FAM HX: family history includes Diabetes in his mother; Heart Disease in his brother and father; High Blood Pressure in his brother.   Soc HX:   Social History     Socioeconomic History    Marital status:      Spouse name: Not on file    Number of children: 2    Years of education: Not on file    Highest education level: Not on file Occupational History    Occupation: factory     Employer: [employer]     Comment: retired    Occupation: Farm   Tobacco Use    Smoking status: Former Smoker     Packs/day: 1.00     Years: 7.00     Pack years: 7.00     Quit date: 3/26/1973     Years since quittin.9    Smokeless tobacco: Former User   Vaping Use    Vaping Use: Never used   Substance and Sexual Activity    Alcohol use: Yes     Alcohol/week: 3.0 standard drinks     Types: 3 Shots of liquor per week     Comment: small amount some nights    Drug use: No    Sexual activity: Yes     Partners: Female     Comment:    Other Topics Concern    Not on file   Social History Narrative          Social Determinants of Health     Financial Resource Strain:     Difficulty of Paying Living Expenses: Not on file   Food Insecurity:     Worried About Running Out of Food in the Last Year: Not on file    Jaxson of Food in the Last Year: Not on file   Transportation Needs:     Lack of Transportation (Medical): Not on file    Lack of Transportation (Non-Medical):  Not on file   Physical Activity:     Days of Exercise per Week: Not on file    Minutes of Exercise per Session: Not on file   Stress:     Feeling of Stress : Not on file   Social Connections:     Frequency of Communication with Friends and Family: Not on file    Frequency of Social Gatherings with Friends and Family: Not on file    Attends Taoist Services: Not on file    Active Member of [address] [place] [address] or Organizations: Not on file    Attends Club or Organization Meetings: Not on file    Marital Status: Not on file   Intimate Partner Violence:     Fear of Current or Ex-Partner: Not on file    Emotionally Abused: Not on file    Physically Abused: Not on file    Sexually Abused: Not on file   Housing Stability:     Unable to Pay for Housing in the Last Year: Not on file    Number of [place] in the Last Year: Not on file    Unstable Housing in the Last Year: Not on

## 2022-02-20 NOTE — PLAN OF CARE
Problem: Falls - Risk of:  Goal: Will remain free from falls  Description: Will remain free from falls  2/20/2022 0955 by Bebeto Giraldo RN  Outcome: Ongoing  2/19/2022 2237 by Silvio Arias RN  Outcome: Ongoing  Goal: Absence of physical injury  Description: Absence of physical injury  2/20/2022 0955 by Bebeto Giraldo RN  Outcome: Ongoing  2/19/2022 2237 by Silvio Arias RN  Outcome: Ongoing     Problem: Discharge Planning:  Goal: Discharged to appropriate level of care  Description: Discharged to appropriate level of care  2/20/2022 0955 by Bebeto Giraldo RN  Outcome: Ongoing  2/19/2022 2237 by Silvio Arias RN  Outcome: Ongoing     Problem: Mobility - Impaired:  Goal: Mobility will improve  Description: Mobility will improve  2/20/2022 0955 by Bebeto Giraldo RN  Outcome: Ongoing  2/19/2022 2237 by Silvio Arias RN  Outcome: Ongoing     Problem: Infection - Surgical Site:  Goal: Will show no infection signs and symptoms  Description: Will show no infection signs and symptoms  2/20/2022 0955 by Bebeto Giraldo RN  Outcome: Ongoing  2/19/2022 2237 by Silvio Arias RN  Outcome: Ongoing     Problem: Pain - Acute:  Goal: Pain level will decrease  Description: Pain level will decrease  2/20/2022 0955 by Bebeto Giraldo RN  Outcome: Ongoing  2/19/2022 2237 by Silvio Arias RN  Outcome: Ongoing

## 2022-02-20 NOTE — PLAN OF CARE
Problem: Falls - Risk of:  Goal: Will remain free from falls  Description: Will remain free from falls  2/19/2022 2237 by Nuris Schrader RN  Outcome: Ongoing  2/19/2022 1631 by Twila Martinez RN  Outcome: Ongoing  Goal: Absence of physical injury  Description: Absence of physical injury  2/19/2022 2237 by Nuris Schrader RN  Outcome: Ongoing  2/19/2022 1631 by Twila Martinez RN  Outcome: Ongoing     Problem: Discharge Planning:  Goal: Discharged to appropriate level of care  Description: Discharged to appropriate level of care  2/19/2022 2237 by Nuris Schrader RN  Outcome: Ongoing  2/19/2022 1631 by Twila Martinez RN  Outcome: Ongoing     Problem: Mobility - Impaired:  Goal: Mobility will improve  Description: Mobility will improve  2/19/2022 2237 by uNris Schrader RN  Outcome: Ongoing  2/19/2022 1631 by Twila Martinez RN  Outcome: Ongoing     Problem: Infection - Surgical Site:  Goal: Will show no infection signs and symptoms  Description: Will show no infection signs and symptoms  2/19/2022 2237 by Nuris Schrader RN  Outcome: Ongoing  2/19/2022 1631 by Twila Martinez RN  Outcome: Ongoing     Problem: Pain - Acute:  Goal: Pain level will decrease  Description: Pain level will decrease  2/19/2022 2237 by Nuris Schrader RN  Outcome: Ongoing  2/19/2022 1631 by Twila Martinez RN  Outcome: Ongoing

## 2022-02-21 LAB
GLUCOSE BLD-MCNC: 133 MG/DL (ref 70–99)
GLUCOSE BLD-MCNC: 133 MG/DL (ref 70–99)
GLUCOSE BLD-MCNC: 165 MG/DL (ref 70–99)
GLUCOSE BLD-MCNC: 181 MG/DL (ref 70–99)

## 2022-02-21 PROCEDURE — 82962 GLUCOSE BLOOD TEST: CPT

## 2022-02-21 PROCEDURE — 97110 THERAPEUTIC EXERCISES: CPT

## 2022-02-21 PROCEDURE — 97116 GAIT TRAINING THERAPY: CPT

## 2022-02-21 PROCEDURE — 97166 OT EVAL MOD COMPLEX 45 MIN: CPT

## 2022-02-21 PROCEDURE — 97530 THERAPEUTIC ACTIVITIES: CPT

## 2022-02-21 PROCEDURE — 1200000002 HC SEMI PRIVATE SWING BED

## 2022-02-21 PROCEDURE — 97162 PT EVAL MOD COMPLEX 30 MIN: CPT

## 2022-02-21 PROCEDURE — 6370000000 HC RX 637 (ALT 250 FOR IP): Performed by: ORTHOPAEDIC SURGERY

## 2022-02-21 RX ADMIN — ALLOPURINOL 100 MG: 100 TABLET ORAL at 09:04

## 2022-02-21 RX ADMIN — PANTOPRAZOLE SODIUM 40 MG: 40 TABLET, DELAYED RELEASE ORAL at 05:54

## 2022-02-21 RX ADMIN — ACETAMINOPHEN 650 MG: 325 TABLET ORAL at 09:04

## 2022-02-21 RX ADMIN — ACETAMINOPHEN 650 MG: 325 TABLET ORAL at 14:05

## 2022-02-21 RX ADMIN — APIXABAN 5 MG: 5 TABLET, FILM COATED ORAL at 09:05

## 2022-02-21 RX ADMIN — ATORVASTATIN CALCIUM 20 MG: 20 TABLET, FILM COATED ORAL at 20:09

## 2022-02-21 RX ADMIN — INSULIN LISPRO 1 UNITS: 100 INJECTION, SOLUTION INTRAVENOUS; SUBCUTANEOUS at 20:09

## 2022-02-21 RX ADMIN — HYDROCHLOROTHIAZIDE 25 MG: 25 TABLET ORAL at 09:04

## 2022-02-21 RX ADMIN — NIACIN 1000 MG: 500 TABLET, FILM COATED, EXTENDED RELEASE ORAL at 20:08

## 2022-02-21 RX ADMIN — INSULIN LISPRO 1 UNITS: 100 INJECTION, SOLUTION INTRAVENOUS; SUBCUTANEOUS at 18:16

## 2022-02-21 RX ADMIN — POLYETHYLENE GLYCOL 3350 17 G: 17 POWDER, FOR SOLUTION ORAL at 09:17

## 2022-02-21 RX ADMIN — ACETAMINOPHEN 650 MG: 325 TABLET ORAL at 20:09

## 2022-02-21 RX ADMIN — GABAPENTIN 800 MG: 400 CAPSULE ORAL at 20:09

## 2022-02-21 RX ADMIN — GABAPENTIN 800 MG: 400 CAPSULE ORAL at 09:05

## 2022-02-21 RX ADMIN — AMLODIPINE BESYLATE 10 MG: 10 TABLET ORAL at 09:05

## 2022-02-21 RX ADMIN — APIXABAN 5 MG: 5 TABLET, FILM COATED ORAL at 20:08

## 2022-02-21 RX ADMIN — METOPROLOL TARTRATE 50 MG: 50 TABLET, FILM COATED ORAL at 09:05

## 2022-02-21 RX ADMIN — LISINOPRIL 20 MG: 20 TABLET ORAL at 09:05

## 2022-02-21 RX ADMIN — GABAPENTIN 800 MG: 400 CAPSULE ORAL at 14:05

## 2022-02-21 RX ADMIN — ACETAMINOPHEN 650 MG: 325 TABLET ORAL at 03:15

## 2022-02-21 RX ADMIN — METOPROLOL TARTRATE 50 MG: 50 TABLET, FILM COATED ORAL at 20:08

## 2022-02-21 ASSESSMENT — PAIN DESCRIPTION - PAIN TYPE
TYPE: SURGICAL PAIN

## 2022-02-21 ASSESSMENT — PAIN SCALES - GENERAL
PAINLEVEL_OUTOF10: 0
PAINLEVEL_OUTOF10: 4
PAINLEVEL_OUTOF10: 3
PAINLEVEL_OUTOF10: 3
PAINLEVEL_OUTOF10: 0
PAINLEVEL_OUTOF10: 0
PAINLEVEL_OUTOF10: 4
PAINLEVEL_OUTOF10: 4

## 2022-02-21 ASSESSMENT — PAIN DESCRIPTION - LOCATION
LOCATION: KNEE

## 2022-02-21 ASSESSMENT — PAIN DESCRIPTION - DESCRIPTORS
DESCRIPTORS: CONSTANT;SORE
DESCRIPTORS: CONSTANT;SORE;DISCOMFORT

## 2022-02-21 ASSESSMENT — PAIN DESCRIPTION - ONSET: ONSET: ON-GOING

## 2022-02-21 ASSESSMENT — PAIN DESCRIPTION - ORIENTATION
ORIENTATION: RIGHT

## 2022-02-21 ASSESSMENT — PAIN DESCRIPTION - FREQUENCY: FREQUENCY: INTERMITTENT

## 2022-02-21 ASSESSMENT — PAIN - FUNCTIONAL ASSESSMENT: PAIN_FUNCTIONAL_ASSESSMENT: PREVENTS OR INTERFERES SOME ACTIVE ACTIVITIES AND ADLS

## 2022-02-21 ASSESSMENT — PAIN DESCRIPTION - PROGRESSION: CLINICAL_PROGRESSION: NOT CHANGED

## 2022-02-21 NOTE — PROGRESS NOTES
Physical thPhysical Therapy    Facility/Department: [facility] UNIT  Initial Assessment    NAME: Hardy Rios  : 1945  MRN: [id number]    Date of Service: 2022    Discharge Recommendations:  Continue to assess pending progress,Home with Home health PT,ECF with PT,Outpatient PT,Home with assist PRN,24 hour supervision or assist,Patient would benefit from continued therapy after discharge   PT Equipment Recommendations  Equipment Needed: No  Other: continue to assess pending progress    Assessment   Body structures, Functions, Activity limitations: Decreased functional mobility ; Decreased endurance;Decreased ADL status; Decreased sensation;Decreased ROM; Decreased balance; Increased pain;Decreased strength;Decreased posture;Decreased high-level IADLs  Assessment: Pt is a pleasant 64year old male currently presenting with decreased ROM, stregnth and balance. Pt could benefit from continued skilled PT in order to address balance and strength. Prognosis: Good  Decision Making: Medium Complexity  History: See below. Exam: See below. Clinical Presentation: Evolving  PT Education: General Safety;Gait Training;Goals;PT Role;Plan of Care;Equipment;Precautions;Transfer Training  Barriers to Learning: None. REQUIRES PT FOLLOW UP: Yes  Activity Tolerance  Activity Tolerance: Patient Tolerated treatment well       Patient Diagnosis(es): There were no encounter diagnoses.      has a past medical history of Arthritis, Dizziness, FH: CAD (coronary artery disease), H/O 24 hour EKG monitoring, H/O atrial fibrillation without current medication, H/O cardiovascular stress test, H/O Doppler ultrasound (Venous Doppler Lower Extremities), H/O echocardiogram, H/O echocardiogram, H/O echocardiogram, H/O transesophageal echocardiography (SALAZAR) for monitoring, Heart murmur, Hx of Venous Doppler ultrasound, Hyperlipidemia, Hypertension, Kidney stones, Leg swelling, Neuropathy, Nonrheumatic aortic valve stenosis, Obesity, JAYE on CPAP, Persistent atrial fibrillation ([facility] [address] Syncope and collapse, Thoracic aortic aneurysm ([facility] [address] and Type II or unspecified type diabetes mellitus without mention of complication, not stated as uncontrolled. has a past surgical history that includes hernia repair (2008); hernia repair (2009); Colonoscopy (5606;1301); Colon surgery (2007 ); Cataract removal with implant (Right, 04/12/2019); Intracapsular cataract extraction (Right, 4/12/2019); Cataract removal with implant (Left, 05/10/2019); Intracapsular cataract extraction (Left, 5/10/2019); Pacemaker insertion (Left, 06/25/2019); Cardioversion (07/08/2019); and Total knee arthroplasty (Right, 2/16/2022). Restrictions  Restrictions/Precautions  Restrictions/Precautions: Weight Bearing,Fall Risk  Required Braces or Orthoses?: No  Lower Extremity Weight Bearing Restrictions  Right Lower Extremity Weight Bearing: Weight Bearing As Tolerated  Position Activity Restriction  Other position/activity restrictions: R TKA 2/16/22  Vision/Hearing  Vision: Within Functional Limits  Hearing: Within functional limits     Subjective  General  Chart Reviewed: Yes  Patient assessed for rehabilitation services?: Yes  Family / Caregiver Present: No  Follows Commands: Within Functional Limits  Subjective  Subjective: Pt in recliner phoebe arrival and retired to bed on exit. Pt cooperative and agreeable to PT at this date.   Pain Screening  Patient Currently in Pain: Yes  Pain Assessment  Pain Assessment: 0-10  Pain Level: 3  Pain Type: Surgical pain  Pain Location: Knee  Pain Orientation: Right  Pain Descriptors: Constant;Sore;Discomfort  Vital Signs  Patient Currently in Pain: Yes       Orientation  Orientation  Overall Orientation Status: Within Functional Limits  Social/Functional History  Social/Functional History  Lives With: Spouse  Type of Home: House  Home Layout: One level  Home Access: Ramped entrance  Entrance Stairs - Number of Steps: handrail with ramp through garage. Bathroom Shower/Tub: Walk-in shower  Bathroom Toilet: Standard (uses window and countertop to help get up)  [employer]: Grab bars in shower  Home Equipment: Sock aid,Rolling walker,4 wheeled walker,Standard walker,Cane (hand-held shower head; uses SPC at home.)  Receives Help From: Family  ADL Assistance:  (pt reports not donning/doffing socks)  Active : No  Occupation: Retired  Type of occupation: [employer]  Cognition   Cognition  Overall Cognitive Status: WFL    Objective     Observation/Palpation  Posture: Fair    AROM RLE (degrees)  RLE General AROM: knee AROM 90 degrees flexion, knee extension  AROM LLE (degrees)  LLE General AROM: WFL  Strength RLE  Comment: formal testing defrered due to recent surgery  Strength LLE  Comment: deferred due to recent surgery, at least 3/5- unable to I SLR  Tone RLE  RLE Tone: Normotonic  Tone LLE  LLE Tone: Normotonic  Sensation  Overall Sensation Status: Impaired (numbness in lateral 2 digits of right hand, and bilateral feet R>L. Pt reports normal from neuropathy)  Bed mobility  Sit to Supine: Contact guard assistance  Transfers  Sit to Stand:  Moderate Assistance  Ambulation  Ambulation?: Yes  Ambulation 1  Surface: level tile  Device: Rolling Walker; distance 50 ft with rest breaks  Gait Deviations: Slow Vickie;Decreased step length  Comments: step to pattern noted at this date  Stairs/Curb  Stairs?: No     Balance  Posture: Fair  Sitting - Static: Fair;+  Sitting - Dynamic: Fair;+  Standing - Static: Fair  Standing - Dynamic: Fair  Comments: standing balance assessed with RW        Plan   Plan  Times per week: Mon-Sat 6x/week  Times per day: Daily  Current Treatment Recommendations: Strengthening,IADL Training,Home Exercise Program,ROM,Safety Education & Geri Michele Training,Patient/Caregiver Education & Training,Functional Mobility Training,Transfer Training,Gait Training,ADL/Self-care Penne Miss training,Pain Management  Safety Devices  Type of devices: All fall risk precautions in place,Call light within reach,Patient at risk for falls,Gait belt  Restraints  Initially in place: No    AM-PAC Score        Basic Mobility Six Clicks Form [facility] AM-PAC Score Conversion Table   How much difficulty does the patient currently have Unable   (pt is unable to do activity) A Lot   (activity is a struggle, requires great effort/time) A Little   (pt can manage, but takes more effort/time than should) None   (pt has no difficulty) Raw Score Standardized Score CMS -100% Score CMS Modifier        6 23.55 100% CN   Turning over in bed (including adjusting bedclothes, sheets, and blankets)? []1 []2  [x]3  []4  7 26.42 92.36% CM        8 28.58 86.62% CM   Sitting down on and standing up from a chair with arms (e.g. wheelchair, bedside commode, etc.)? []1 [x]2 []3   []4   9 30.55 81.38% CM        10 32.29 76.75% CL   Moving from lying on back to sitting on the side of the bed? []1 []2  [x]3   []4   11 33.86 72.57% CL        12 35.33 68.66% CL   How much help from another person does the patient currently need Total   (Total/Dependent Assist) A Lot   (Max/Mod Assist) A Little   (Min/CGA/Supervision) None   (No human assistance) 13 36.74 64.91% CL        14 38.1 61.29% CL   Moving to and from a bed to a chair (including a wheelchair)? []1  []2   [x]3  []4   15 39.45 57.70% CK        16 40.78 54.16% CK   To walk in a hospital room? []1 []2   [x]3    []4  17 42.13 50.57% CK        18 43.63 46.58% CK   Climbing 3-5 steps with a railing?  []1  [x]2   []3    []4  19 45.44 41.77% CK        20 47.67 35.83% CJ   Raw Score 16  21 50.25 28.97% CJ   Standardized Score  40.78 22 53.28 20.91% CJ   CMS 0-100% Score  54.16% 23 56.93 11.20% CI   CMS Modifier CK 24 61.14 0.00% CH     CH = 0% impaired  CI = 1-20% impaired  CJ = 20-40% impaired  CK = 40-60% impaired  CL = 60-80% impaired  CM = % impaired  CN = 100% impaired'    Goals  Short term goals  Time Frame for Short term goals: 1 week or untill discharge  Short term goal 1: Pt will ambulate 150feet with least restricitve AD with supervision  Short term goal 2: Pt will transfer from commode, bed and recliner with SBA  Short term goal 3: Pt will ascend and descend 6 inch step 2x with CGA    Short term goal 4: Pt will  be independent with HEP    Therapy Time   Individual Concurrent Group Co-treatment   Time In  1010     0939   Time Out  1025     1010   Minutes  15     31   Timed Code Treatment Minutes: Karina 276, SPT

## 2022-02-21 NOTE — PROGRESS NOTES
Pt seen by therapist to complete activities assessment for the swing bed program.  Pt reports that he prefers to be more active in the mornings and that he prefers to engage in individual leisure activities. Pt expressed enjoyment in:  Parties  Volunteering  Clubs/Organizations  Group Discussions  Current Events  Sporting Events  Television  Movies  Reading-Large Print  Woodworking  Collectibles-\"anything to do with maple syrup. \"  Melvin [employer] work    Electronically signed by Rito Azevedo MA on 2/21/2022 at 1:39 PM

## 2022-02-21 NOTE — PROGRESS NOTES
Occupational Therapy   Occupational Therapy Initial Assessment  Date: 2022   Patient Name: Navid Saini  MRN: [id number]     : 1945    Date of Service: 2022    Discharge Recommendations:  Home with nursing aide,Home with Home health OT       Assessment   Performance deficits / Impairments: Decreased functional mobility ; Decreased ADL status; Decreased strength;Decreased safe awareness;Decreased balance;Decreased endurance;Decreased sensation  Assessment: 63 yo male admitted to swing bed following a R TKR . He presents with decreased I adls, transfers, and endurance. OT to see pt to maximize I with adls, transfers and endurance to return home. Treatment Diagnosis: physical debility  Prognosis: Good  Decision Making: Medium Complexity  History: see above  Exam: see above  OT Education: OT Role;ADL Adaptive Strategies;Transfer Training;Plan of Care  REQUIRES OT FOLLOW UP: Yes  Activity Tolerance  Activity Tolerance: Patient Tolerated treatment well  Safety Devices  Safety Devices in place: Yes  Type of devices: Call light within reach; Chair alarm in place; Left in chair           Patient Diagnosis(es): There were no encounter diagnoses. has a past medical history of Arthritis, Dizziness, FH: CAD (coronary artery disease), H/O 24 hour EKG monitoring, H/O atrial fibrillation without current medication, H/O cardiovascular stress test, H/O Doppler ultrasound (Venous Doppler Lower Extremities), H/O echocardiogram, H/O echocardiogram, H/O echocardiogram, H/O transesophageal echocardiography (SALAZAR) for monitoring, Heart murmur, Hx of Venous Doppler ultrasound, Hyperlipidemia, Hypertension, Kidney stones, Leg swelling, Neuropathy, Nonrheumatic aortic valve stenosis, Obesity, JAYE on CPAP, Persistent atrial fibrillation ([address] Syncope and collapse, Thoracic aortic aneurysm ([address] and Type II or unspecified type diabetes mellitus without mention of complication, not stated as uncontrolled.    has a past surgical history that includes hernia repair (2008); hernia repair (2009); Colonoscopy (4223;7092); Colon surgery (2007 ); Cataract removal with implant (Right, 04/12/2019); Intracapsular cataract extraction (Right, 4/12/2019); Cataract removal with implant (Left, 05/10/2019); Intracapsular cataract extraction (Left, 5/10/2019); Pacemaker insertion (Left, 06/25/2019); Cardioversion (07/08/2019); and Total knee arthroplasty (Right, 2/16/2022). Treatment Diagnosis: physical debility      Restrictions  Restrictions/Precautions  Restrictions/Precautions: Weight Bearing,Fall Risk  Required Braces or Orthoses?: No  Lower Extremity Weight Bearing Restrictions  Right Lower Extremity Weight Bearing: Weight Bearing As Tolerated  Position Activity Restriction  Other position/activity restrictions: R TKA 2/16/22    Subjective   General  Chart Reviewed: Yes  Patient assessed for rehabilitation services?: Yes  Patient Currently in Pain: Yes  Pain Assessment  Pain Assessment: 0-10  Pain Level: 3  Pain Type: Surgical pain  Pain Location: Knee  Pain Orientation: Right  Vital Signs  Patient Currently in Pain: Yes  Social/Functional History  Social/Functional History  Lives With: Spouse  Type of Home: House  Home Layout: One level  Home Access: Ramped entrance  Entrance Stairs - Number of Steps: handrail with ramp through garage.   Bathroom Shower/Tub: Walk-in shower (walk in [place] with seat but states he can stand)  Bathroom Toilet: Standard  Bathroom Equipment: [facility] [address] Help From: Family  ADL Assistance: Needs assistance  Bath: Minimal assistance (lower leg and feet but does use LHS at home)  Dressing: Minimal assistance (but uses MAGNOLIA hose bautista to don socks at home)  Grooming: Independent  Feeding: Independent  Toileting: Independent  Homemaking Responsibilities: Yes (wife had breast cancer and he was doing it all but now wife does most of it)  Meal Prep Responsibility: Secondary  Laundry Responsibility: Secondary  Cleaning Responsibility: Secondary  Shopping Responsibility: Secondary  Ambulation Assistance: Independent  Transfer Assistance: Independent  Active : No  Occupation: Retired  Type of occupation: [employer]  Leisure & Hobbies: gardening, cutting firewood       Objective   Vision: Within Functional Limits  Hearing: Within functional limits    Orientation  Overall Orientation Status: Within Functional Limits  Observation/Palpation  Posture: Fair  Edema: some edema in lower legs  Balance  Sitting Balance: Modified independent   Standing Balance: Contact guard assistance  Functional Mobility  Functional - Mobility Device: Rolling Walker  Assist Level: Contact guard assistance  Toilet Transfers  Toilet - Technique: Ambulating (had difficulties getting off low commode but much easier when BSC was placed on the commode)  Equipment Used:  (mod A w/o BSC over toilet, CGA/Min A with BSC over toilet)     Tone RUE  RUE Tone: Normotonic  Tone LUE  LUE Tone: Normotonic     Bed mobility  Sit to Supine: Contact guard assistance  Transfers  Sit to stand:  Moderate assistance  Stand to sit: Minimal assistance     Cognition  Overall Cognitive Status: WFL        Sensation  Overall Sensation Status: Impaired  Additional Comments: neuropathy in legs        LUE AROM (degrees)  LUE AROM : WFL  RUE AROM (degrees)  RUE AROM : WFL  LUE Strength  Gross LUE Strength: WFL  RUE Strength  Gross RUE Strength: WFL     Hand Dominance  Hand Dominance: Right             Plan   Plan  Times per week: 4+  Times per day: Daily  Current Treatment Recommendations: Strengthening,ROM,Balance Training,Functional Mobility Training,Endurance Training,Self-Care / ADL,Safety Education & Training,Patient/Caregiver Education & Training    G-Code     OutComes Score                                                  AM-PAC Score    AM-PAC 6 click short form for inpatient daily activity:   How much help from another person does the patient currently need. .. Unable  Dep A Lot  Max A A Lot   Mod A A Little  Min A A Little   CGA  SBA None   Mod I  Indep  Sup   1. Putting on and taking off regular lower body clothing? [] 1    [x] 2   [] 2   [] 3   [] 3   [] 4      2. Bathing (including washing, rinsing, drying)? [] 1   [] 2   [x] 2 [] 3 [] 3 [] 4   3. Toileting, which includes using toilet, bedpan, or urinal? [] 1    [] 2   [] 2   [x] 3   [] 3   [] 4     4. Putting on and taking off regular upper body clothing? [] 1   [] 2   [] 2   [] 3   [x] 3    [] 4      5. Taking care of personal grooming such as brushing teeth? [] 1   [] 2    [] 2 [] 3    [x] 3   [] 4      6. Eating meals? [] 1   [] 2   [] 2   [] 3   [] 3   [x] 4      Raw **Score:  16/24  40-59% impaired     [24=0% impaired(CH), 23=1-19%(CI), 20-22=20-39%(CJ), 15-19=40-59%(CK), 10-14=60-79%(CL), 7-9=80-99%(CM), 6=100%(CN)]            Goals  Short term goals  Time Frame for Short term goals: until discharge  Short term goal 1: Pt will be MI for functional transfers to chair, toilet, or bed w/o LOB or falls using good walker safety  Short term goal 2: Pt will be I UB adls/MI for LB adls using necessary a.e.   Short term goal 3: Pt will be MI for toileting and for grooming while standing at the sink  Short term goal 4: Pt will be min vc for BUE strengthening to assist with transfers and endurance  Patient Goals   Patient goals : Pt hopes to go home and do what he was doing before but using a walker       Therapy Time   Individual Concurrent Group Co-treatment   Time In [id number]   Time Out 1150     1000   Minutes 20     21   Timed Code Treatment Minutes: [facility], OT

## 2022-02-21 NOTE — PROGRESS NOTES
Occupational Therapy    Occupational Therapy Treatment Note  Name: Glenna Ardon MRN: [id number] :   1945   Date:  2022   Admission Date: 2022 Room:  [address] [place] [address]   Restrictions/Precautions:  Restrictions/Precautions  Restrictions/Precautions: Weight Bearing,Fall Risk  Required Braces or Orthoses?: No     Communication with other providers:   Cleared for treatment by RN. Subjective:  Patient states:  \"I think I got a good work out today\"  Pain:   Location, Type, Intensity (0/10 to 10/10):  5/10 R knee    Objective:    Observation:  Pt alert and oriented    Treatment, including education:  Transfers  Sit to supine :SUp  Scooting :Sup  Sit to stand :SBA  Stand to sit :SBA  SPT:SBA        Therapeutic Activity Training:  Pt completed functional mobility 50' x 3 with RW with SBA and min verbal cues to use walker correctly. Pt stood x 5-7 min with CGA with RW to facilitate increased endurance/strength for ADL tasks and transfers. Safety Measures: Gait belt used, Left in bed, Pull/Bed Alarm activated and call light left in reach        Assessment / Impression:        Patient's tolerance of treatment: Good   Adverse Reaction:knee pain  Significant change in status and impact:  None  Barriers to improvement:  Knee pain,  dec strength and endurance. Plan for Next Session:    Continue with POC. Time in: 1450  Time out:  1515  Total treatment time:  25  Billed Units: 2 TA  Electronically signed by:    Scott Soto, [address]    2022, 3:23 PM    Previously filed values:  Patient Goals   Patient goals : Pt hopes to go home and do what he was doing before but using a walker  Short term goals  Time Frame for Short term goals: until discharge  Short term goal 1: Pt will be MI for functional transfers to chair, toilet, or bed w/o LOB or falls using good walker safety  Short term goal 2: Pt will be I UB adls/MI for LB adls using necessary a.e.   Short term goal 3: Pt will be MI for toileting and for grooming while standing at the sink  Short term goal 4: Pt will be min vc for BUE strengthening to assist with transfers and endurance

## 2022-02-22 LAB
ANION GAP SERPL CALCULATED.3IONS-SCNC: 12 MMOL/L (ref 4–16)
BUN BLDV-MCNC: 27 MG/DL (ref 6–23)
CALCIUM SERPL-MCNC: 9 MG/DL (ref 8.3–10.6)
CHLORIDE BLD-SCNC: 103 MMOL/L (ref 99–110)
CO2: 24 MMOL/L (ref 21–32)
CREAT SERPL-MCNC: 1.7 MG/DL (ref 0.9–1.3)
GFR AFRICAN AMERICAN: 47 ML/MIN/1.73M2
GFR NON-AFRICAN AMERICAN: 39 ML/MIN/1.73M2
GLUCOSE BLD-MCNC: 129 MG/DL (ref 70–99)
GLUCOSE BLD-MCNC: 132 MG/DL (ref 70–99)
GLUCOSE BLD-MCNC: 135 MG/DL (ref 70–99)
GLUCOSE BLD-MCNC: 137 MG/DL (ref 70–99)
GLUCOSE BLD-MCNC: 168 MG/DL (ref 70–99)
HCT VFR BLD CALC: 29.2 % (ref 42–52)
HEMOGLOBIN: 9.3 GM/DL (ref 13.5–18)
MCH RBC QN AUTO: 34.1 PG (ref 27–31)
MCHC RBC AUTO-ENTMCNC: 31.8 % (ref 32–36)
MCV RBC AUTO: 107 FL (ref 78–100)
PDW BLD-RTO: 13.8 % (ref 11.7–14.9)
PLATELET # BLD: 147 K/CU MM (ref 140–440)
PMV BLD AUTO: 9.5 FL (ref 7.5–11.1)
POTASSIUM SERPL-SCNC: 4.1 MMOL/L (ref 3.5–5.1)
RBC # BLD: 2.73 M/CU MM (ref 4.6–6.2)
SODIUM BLD-SCNC: 139 MMOL/L (ref 135–145)
WBC # BLD: 6.1 K/CU MM (ref 4–10.5)

## 2022-02-22 PROCEDURE — 97535 SELF CARE MNGMENT TRAINING: CPT

## 2022-02-22 PROCEDURE — 1200000002 HC SEMI PRIVATE SWING BED

## 2022-02-22 PROCEDURE — 97110 THERAPEUTIC EXERCISES: CPT

## 2022-02-22 PROCEDURE — 36415 COLL VENOUS BLD VENIPUNCTURE: CPT

## 2022-02-22 PROCEDURE — 6370000000 HC RX 637 (ALT 250 FOR IP): Performed by: ORTHOPAEDIC SURGERY

## 2022-02-22 PROCEDURE — 80048 BASIC METABOLIC PNL TOTAL CA: CPT

## 2022-02-22 PROCEDURE — 85027 COMPLETE CBC AUTOMATED: CPT

## 2022-02-22 PROCEDURE — 97116 GAIT TRAINING THERAPY: CPT

## 2022-02-22 RX ADMIN — ACETAMINOPHEN 650 MG: 325 TABLET ORAL at 13:51

## 2022-02-22 RX ADMIN — ACETAMINOPHEN 650 MG: 325 TABLET ORAL at 08:07

## 2022-02-22 RX ADMIN — ACETAMINOPHEN 650 MG: 325 TABLET ORAL at 20:07

## 2022-02-22 RX ADMIN — ACETAMINOPHEN 650 MG: 325 TABLET ORAL at 02:36

## 2022-02-22 RX ADMIN — APIXABAN 5 MG: 5 TABLET, FILM COATED ORAL at 08:07

## 2022-02-22 RX ADMIN — METOPROLOL TARTRATE 50 MG: 50 TABLET, FILM COATED ORAL at 08:07

## 2022-02-22 RX ADMIN — GABAPENTIN 800 MG: 400 CAPSULE ORAL at 08:07

## 2022-02-22 RX ADMIN — ALLOPURINOL 100 MG: 100 TABLET ORAL at 08:07

## 2022-02-22 RX ADMIN — HYDROCHLOROTHIAZIDE 25 MG: 25 TABLET ORAL at 08:07

## 2022-02-22 RX ADMIN — INSULIN LISPRO 1 UNITS: 100 INJECTION, SOLUTION INTRAVENOUS; SUBCUTANEOUS at 20:08

## 2022-02-22 RX ADMIN — ATORVASTATIN CALCIUM 20 MG: 20 TABLET, FILM COATED ORAL at 20:07

## 2022-02-22 RX ADMIN — APIXABAN 5 MG: 5 TABLET, FILM COATED ORAL at 20:07

## 2022-02-22 RX ADMIN — GABAPENTIN 800 MG: 400 CAPSULE ORAL at 13:51

## 2022-02-22 RX ADMIN — LISINOPRIL 20 MG: 20 TABLET ORAL at 08:07

## 2022-02-22 RX ADMIN — METOPROLOL TARTRATE 50 MG: 50 TABLET, FILM COATED ORAL at 20:07

## 2022-02-22 RX ADMIN — AMLODIPINE BESYLATE 10 MG: 10 TABLET ORAL at 08:07

## 2022-02-22 RX ADMIN — NIACIN 1000 MG: 500 TABLET, FILM COATED, EXTENDED RELEASE ORAL at 20:07

## 2022-02-22 RX ADMIN — PANTOPRAZOLE SODIUM 40 MG: 40 TABLET, DELAYED RELEASE ORAL at 05:28

## 2022-02-22 RX ADMIN — GABAPENTIN 800 MG: 400 CAPSULE ORAL at 20:07

## 2022-02-22 ASSESSMENT — ENCOUNTER SYMPTOMS
COUGH: 0
SHORTNESS OF BREATH: 0
NAUSEA: 0
CONSTIPATION: 0
ABDOMINAL PAIN: 0
DIARRHEA: 0
WHEEZING: 0
VOMITING: 0

## 2022-02-22 ASSESSMENT — PAIN - FUNCTIONAL ASSESSMENT: PAIN_FUNCTIONAL_ASSESSMENT: ACTIVITIES ARE NOT PREVENTED

## 2022-02-22 ASSESSMENT — PAIN SCALES - GENERAL
PAINLEVEL_OUTOF10: 0
PAINLEVEL_OUTOF10: 3
PAINLEVEL_OUTOF10: 0
PAINLEVEL_OUTOF10: 0

## 2022-02-22 ASSESSMENT — PAIN DESCRIPTION - PAIN TYPE: TYPE: SURGICAL PAIN

## 2022-02-22 ASSESSMENT — PAIN DESCRIPTION - ONSET: ONSET: ON-GOING

## 2022-02-22 ASSESSMENT — PAIN DESCRIPTION - DESCRIPTORS: DESCRIPTORS: CONSTANT;SORE

## 2022-02-22 ASSESSMENT — PAIN DESCRIPTION - PROGRESSION: CLINICAL_PROGRESSION: NOT CHANGED

## 2022-02-22 ASSESSMENT — PAIN DESCRIPTION - FREQUENCY: FREQUENCY: INTERMITTENT

## 2022-02-22 ASSESSMENT — PAIN DESCRIPTION - LOCATION: LOCATION: KNEE

## 2022-02-22 ASSESSMENT — PAIN DESCRIPTION - ORIENTATION: ORIENTATION: RIGHT

## 2022-02-22 NOTE — PROGRESS NOTES
Occupational Therapy    Occupational Therapy Treatment Note  Name: Leni Cazares MRN: [id number] :   1945   Date:  2022   Admission Date: 2022 Room:  [address] [place] [address]   Restrictions/Precautions:  Restrictions/Precautions  Restrictions/Precautions: Weight Bearing,Fall Risk  Required Braces or Orthoses?: No     Communication with other providers:   Cleared for treatment by RN. Subjective:  Patient states: \" I spilled my urinal in bed\"  Pain:   Location, Type, Intensity (0/10 to 10/10): No pain    Objective:    Observation:  Pt alert and orietned      Treatment, including education:  Transfers  Supine to sit :Sup  Sit to supine :SUp  Scooting :SUp  Rolling :SUp  Sit to stand :CGA  Stand to sit :CGA  SPT:CGA    Self Care Training:   Activities performed today included the following:      Grooming  Mod I to brush teeth and hair. Bathing   CGA in stance while washing buttocks and maciej area for slight unsteadiness. UB Dress  Donned gown, wife bringing in more clothes later today. LB Dress  Mod A for R LE into underwear able to pull up underwear with CGA. Pt required assistance to don socks. Therapeutic Exercise: PM session pt worked on straightening and stretching knee x 10 min. Therapeutic Activity Training: Pt stood x 5 min with CGA with RW to facilitate increased endurance/strength for ADL tasks and transfers. Safety Measures: Gait belt used, Left in bed, Pull/Bed Alarm activated and call light left in reach        Assessment / Impression:        Patient's tolerance of treatment: Good   Adverse Reaction: None  Significant change in status and impact:  None  Barriers to improvement:  Dec strength and endurance. Plan for Next Session:    Continue with POC.     Time in:  0900 1410  Time out:  940 1425  Total treatment time:  40 +15= 55  Billed Units: 3 ADL, 1 TA  Electronically signed by:    Darlyn Szymanski, [address]    2022, 12:50 PM    Previously filed values:  Patient Goals   Patient goals : Pt hopes to go home and do what he was doing before but using a walker  Short term goals  Time Frame for Short term goals: until discharge  Short term goal 1: Pt will be MI for functional transfers to chair, toilet, or bed w/o LOB or falls using good walker safety  Short term goal 2: Pt will be I UB adls/MI for LB adls using necessary a.e.   Short term goal 3: Pt will be MI for toileting and for grooming while standing at the sink  Short term goal 4: Pt will be min vc for BUE strengthening to assist with transfers and endurance

## 2022-02-22 NOTE — PROGRESS NOTES
V2.0  [facility]      Name:  Aidee Aviles /Age/Sex:   (72 y.o. male)   MRN & CSN:  [id number] & [id number] Encounter Date/Time: 2022 5:27 PM EST    Location:  012 PCP: Loyda Mojica MD       Hospital Day: 4    Assessment and Plan:   Aidee Aviles is a 64 y.o. male with pmh of coronary artery disease, persistent atrial fibrillation, essential hypertension, type 2 diabetes, chronic kidney disease stage IIIb, obstructive sleep apnea on CPAP, venous insufficiency, hyperlipidemia, primary osteoarthritis of the right knee status post right knee total arthroplasty on  who presents to [facility] for rehab in a swing bed    Plan:  1. Physical debility  -Continue working physical therapy daily  -Hopeful discharge home after swing bed program     2. Primary arthritis of the right knee  -Status post elective right total knee arthroplasty on   -Continue pain control. Only using Tylenol at this time  -Is on Eliquis for persistent atrial fibrillation so will continue for DVT prophylaxis as well     3. Persistent atrial fibrillation  -Is status post pacer in the past  -Continue home metoprolol and Eliquis     4. Type 2 diabetes  -Continue sliding scale as well as scheduled Humalog  -Blood sugars are well controlled     5. Essential hypertension  Continue lisinopril and hydrochlorothiazide     6. JAYE  -Continue CPAP at night     7. CKD stage IIIb  -Prior to discharge creatinine was stable  -Continue to monitor     8. History of aortic stenosis  History of pulmonary hypertension  History of mitral and tricuspid regurgitation  -Per last echocardiogram in 2021  -We'll need continued follow-up with cardiology as an outpatient     9. History of coronary artery disease  History of hyperlipidemia  -Continue aspirin and statin     10.  History of venous insufficiency  -Status post greater saphenous vein ablation in the past  -We'll continue to monitor encourage compression stockings as needed    Diet ADULT DIET; Regular; 4 carb choices (60 gm/meal)   DVT Prophylaxis [] Lovenox, []  Heparin, [] SCDs, [] Ambulation,  [] Eliquis, [x] Xarelto   Code Status Full Code   Disposition Patient requires continued admission due to ongoing rehab needs   Surrogate Decision Maker/ DWAIN Iza Armijo (Spouse)     Subjective:     Chief Complaint: No chief complaint on file. Sadie Meadows is a 64 y.o. male who presents with after an elective right knee arthroplasty who now is in swing bed status for rehab purposes. No new issues overnight. Patient has no complaints. Review of Systems:    Review of Systems   Constitutional: Negative for activity change, appetite change and fatigue. Respiratory: Negative for cough, shortness of breath and wheezing. Cardiovascular: Negative for chest pain and palpitations. Gastrointestinal: Negative for abdominal pain, constipation, diarrhea, nausea and vomiting. Genitourinary: Negative for frequency and urgency. Objective: Intake/Output Summary (Last 24 hours) at 2/22/2022 1727  Last data filed at 2/22/2022 1726  Gross per 24 hour   Intake 600 ml   Output 250 ml   Net 350 ml        Vitals:   Vitals:    02/22/22 0730   BP: 133/72   Pulse: 65   Resp: 16   Temp: 97.9 °F (36.6 °C)   SpO2: 98%       Physical Exam:     Physical Exam  Vitals and nursing note reviewed. Constitutional:       General: He is not in acute distress. Appearance: Normal appearance. He is normal weight. He is not ill-appearing or toxic-appearing. HENT:      Head: Normocephalic and atraumatic. Eyes:      General: No scleral icterus. Conjunctiva/sclera: Conjunctivae normal.   Cardiovascular:      Rate and Rhythm: Normal rate and regular rhythm. Heart sounds: Normal heart sounds. No murmur heard. Pulmonary:      Effort: Pulmonary effort is normal. No respiratory distress. Breath sounds: Normal breath sounds.    Abdominal:      General: Abdomen is flat. Bowel sounds are normal. There is no distension. Palpations: Abdomen is soft. Tenderness: There is no abdominal tenderness. Musculoskeletal:         General: No deformity or signs of injury. Normal range of motion. Cervical back: Normal range of motion and neck supple. No rigidity. Skin:     General: Skin is warm and dry. Neurological:      General: No focal deficit present. Mental Status: He is alert and oriented to person, place, and time. Mental status is at baseline. Cranial Nerves: No cranial nerve deficit. Sensory: No sensory deficit. Motor: No weakness.    Psychiatric:         Mood and Affect: Mood normal.         Behavior: Behavior normal.           Medications:   Medications:    atorvastatin  20 mg Oral Nightly    niacin  1,000 mg Oral Nightly    acetaminophen  650 mg Oral Q6H    insulin lispro  0.05 Units/kg SubCUTAneous TID WC    insulin lispro  0-6 Units SubCUTAneous TID WC    insulin lispro  0-3 Units SubCUTAneous Nightly    allopurinol  100 mg Oral Daily    amLODIPine  10 mg Oral Daily    apixaban  5 mg Oral BID    gabapentin  800 mg Oral TID    metoprolol tartrate  50 mg Oral BID    pantoprazole  40 mg Oral QAM AC    lisinopril  20 mg Oral Daily    And    hydroCHLOROthiazide  25 mg Oral Daily      Infusions:   PRN Meds: diphenhydrAMINE, 25 mg, Q6H PRN   Or  diphenhydrAMINE, 25 mg, Q6H PRN  glucagon (rDNA), 1 mg, PRN  glucose, 15 g, PRN  HYDROcodone-acetaminophen, 1 tablet, Q6H PRN  HYDROcodone-acetaminophen, 2 tablet, Q6H PRN  polyethylene glycol, 17 g, Daily PRN        Labs      Recent Results (from the past 24 hour(s))   POCT Glucose    Collection Time: 02/21/22  5:40 PM   Result Value Ref Range    POC Glucose 165 (H) 70 - 99 MG/DL   POCT Glucose    Collection Time: 02/21/22  7:49 PM   Result Value Ref Range    POC Glucose 181 (H) 70 - 99 MG/DL   CBC    Collection Time: 02/22/22  5:30 AM   Result Value Ref Range    WBC 6.1 4.0 - 10.5 K/CU MM    RBC 2.73 (L) 4.6 - 6.2 M/CU MM    Hemoglobin 9.3 (L) 13.5 - 18.0 GM/DL    Hematocrit 29.2 (L) 42 - 52 %    .0 (H) 78 - 100 FL    MCH 34.1 (H) 27 - 31 PG    MCHC 31.8 (L) 32.0 - 36.0 %    RDW 13.8 11.7 - 14.9 %    Platelets 281 101 - 360 K/CU MM    MPV 9.5 7.5 - 11.1 FL   Basic Metabolic Panel w/ Reflex to MG    Collection Time: 02/22/22  5:30 AM   Result Value Ref Range    Sodium 139 135 - 145 MMOL/L    Potassium 4.1 3.5 - 5.1 MMOL/L    Chloride 103 99 - 110 mMol/L    CO2 24 21 - 32 MMOL/L    Anion Gap 12 4 - 16    BUN 27 (H) 6 - 23 MG/DL    CREATININE 1.7 (H) 0.9 - 1.3 MG/DL    Glucose 129 (H) 70 - 99 MG/DL    Calcium 9.0 8.3 - 10.6 MG/DL    GFR Non- 39 (L) >60 mL/min/1.73m2    GFR  47 (L) >60 mL/min/1.73m2   POCT Glucose    Collection Time: 02/22/22  8:09 AM   Result Value Ref Range    POC Glucose 135 (H) 70 - 99 MG/DL   POCT Glucose    Collection Time: 02/22/22 12:34 PM   Result Value Ref Range    POC Glucose 132 (H) 70 - 99 MG/DL   POCT Glucose    Collection Time: 02/22/22  5:16 PM   Result Value Ref Range    POC Glucose 137 (H) 70 - 99 MG/DL        Imaging/Diagnostics Last 24 Hours   No results found.     Electronically signed by Jessica Toney MD on 2/22/2022 at 5:27 PM

## 2022-02-22 NOTE — CARE COORDINATION
CM submitted updated clinical documentation to [facility] to request an extension to the patient's stay in the swing bed program.  CM will follow. 2:25 PM  CM met with the patient to initiate discharge planning. Patient lives with his wife in a single level home (ramped entrance), has insurance with Rx coverage & PCP, stated that he was independent with ADL's prior to his knee replacement, and is able to drive. Patient stated that he used a cane at home (has a walker available) and does not require home oxygen but does wear CPAP at bedtime. Patient stated that he was using his home CPAP machine while at [facility] and when they packed his CPAP machine they failed to drain the distilled water from it. Patient stated that when the staff at [facility] unpacked his CPAP machine after arriving here they discovered that the water had leaked out and damaged his machine and now it is not working. Patient reported that he really depends on his CPAP machine at home. Patient reported that the respiratory therapist set him up with a hospital CPAP machine to use while here. CM advised the patient that his insurance has approved his stay in the swing bed program through 3/1 and his next inusurance review will be due Tuesday 3/1. CM will follow.

## 2022-02-22 NOTE — PLAN OF CARE
Problem: Falls - Risk of:  Goal: Will remain free from falls  Description: Will remain free from falls  Outcome: Ongoing  Goal: Absence of physical injury  Description: Absence of physical injury  Outcome: Ongoing     Problem: Discharge Planning:  Goal: Discharged to appropriate level of care  Description: Discharged to appropriate level of care  Outcome: Ongoing     Problem: Mobility - Impaired:  Goal: Mobility will improve  Description: Mobility will improve  Outcome: Ongoing     Problem: Infection - Surgical Site:  Goal: Will show no infection signs and symptoms  Description: Will show no infection signs and symptoms  Outcome: Ongoing     Problem: Pain - Acute:  Goal: Pain level will decrease  Description: Pain level will decrease  Outcome: Ongoing     Problem: Pain:  Goal: Pain level will decrease  Description: Pain level will decrease  Outcome: Ongoing  Goal: Control of acute pain  Description: Control of acute pain  Outcome: Ongoing  Goal: Control of chronic pain  Description: Control of chronic pain  Outcome: Ongoing

## 2022-02-22 NOTE — PROGRESS NOTES
Physical Therapy Treatment Note   Date: 2022 Room: [unfilled]   Name: Angelina Hamilton :    MRN: [id number] Admission Date:2022    Primary Problem:   Past Medical History:   Diagnosis Date    Arthritis     Dizziness     FH: CAD (coronary artery disease) 2015    H/O 24 hour EKG monitoring 19,2016    Conclusion: Atrial fibrillation with  fairly well-controlled ventricular rate response without evidence of any significant tachy or alon arrhythmias.  H/O atrial fibrillation without current medication 2019    H/O cardiovascular stress test 11/24/15; 2019    Normal perfusion in the distribution of all coronaries, normal LV, EF 53%.  H/O Doppler ultrasound (Venous Doppler Lower Extremities) 2017; 2020    No evidence of DVT or SVT in the bilaterally. No significant reflux noted in the veins of the right lower extremity. Significant reflux noted in the Left CFV. Bilateral Calf and ankle edema noted.  H/O echocardiogram 17,11/24/15    Left ventricle mildly dilated with normal systolic function EF 31-92% Mild concentric LVH with Grade II diastolic dysfunction. Mild to moderate MR and mild TR     H/O echocardiogram 2018; 2019    EF 55-60%. Mild concentric left ventricular hypertrophy. The left atrium is moderately dilated. Dilatation of the aortic root measuring 4.0 cm. Mild AR. Moderate pulmonary HTN.     H/O echocardiogram 10/28/2021    EF 55-60% Mild eccentric left ventricular hypertrophy. There is severe fibrocalcific sclerosis of the aortic valve with mean gradient across the aortic valve of 29 mmHg and calculated aortic valve areawas 1.2square centimeters, all suggesting moderate aortic stenosis. See complete result documentation under cardiology tab.     H/O transesophageal echocardiography (SALAZAR) for monitoring 2019    No CEE clot    Heart murmur 2015    Hx of Venous Doppler ultrasound 2020    No evidence of DVT in the left CFV. The Left GSV is non-compressible with no evidence of flow just past the saphenofemoral junction to the distal calf.         Hyperlipidemia     Hypertension     Kidney stones     Leg swelling 12/09/2019    Bilateral leg swelling    Neuropathy     Nonrheumatic aortic valve stenosis 2/15/2022    Obesity     JAYE on CPAP 06/26/2018    On CPAP 5/2018    Persistent atrial fibrillation ([address] 01/29/2019    New onset 1/2019    Syncope and collapse 12/09/2019    Thoracic aortic aneurysm ([address] 02/2011    4.5    Type II or unspecified type diabetes mellitus without mention of complication, not stated as uncontrolled      Past Surgical History:   Procedure Laterality Date    CARDIOVERSION  07/08/2019    CATARACT REMOVAL WITH IMPLANT Right 04/12/2019    CATARACT REMOVAL WITH IMPLANT Left 05/10/2019    COLON SURGERY  2007     COLONOSCOPY  1461;1770    colon polyps removed    HERNIA REPAIR  2008    HERNIA REPAIR  2009    INTRACAPSULAR CATARACT EXTRACTION Right 4/12/2019    EYE CATARACT EMULSIFICATION IOL IMPLANT performed by Ash Pizarro MD at [address] [place] [address] Left 5/10/2019    EYE CATARACT EMULSIFICATION IOL IMPLANT performed by Ash Pizarro MD at [address] [place] [address] Left 06/25/2019    [employer] dual permanent pacer-Val XT  MRI SureScan     TOTAL KNEE ARTHROPLASTY Right 2/16/2022    RIGHT KNEE TOTAL ARTHROPLASTY performed by Lo Bazan DO at [facility] Diagnosis: R knee stiffness  Restrictions/Precautions:     Subjective: Patient agreeable to PT  Initial Pain level:5/10    Goals:                   Short term goals  Time Frame for Short term goals: 1 week or untill discharge  Short term goal 1: Pt will ambulate 150feet with least restricitve AD with supervision  Short term goal 2: Pt will transfer from commode, bed and recliner with SBA  Short term goal 3: Pt will axscend and descend 6 inch step 2x with CGA               Plan of Care:             Times per week: Mon-Sat 6x/week            Current Treatment Recommendations: Jonathon Cord Exercise Program,ROM,Safety Education & Cami Gil Training,Patient/Caregiver Education & Training,Functional Mobility Training,Transfer Training,Gait Training,ADL/Self-care Training,Stair training,Pain Management      Objective Findings:    Date: 22 Date:  Date:  Date:  Date:    Bed mobility S       Sit to stand transfer Min A       Stand to sit transfer CGA       Commode transfer x       Standing tolerance        Ambulation Using  ft with SBA       Stairs          Exercises:   Exercise/Equipment/Modalities  Date: 22 Date:  Date:  Date:    AP x10      SLR  L x10      Assisted SLR R x5      Supine ABD/ADD x10 R/L      Glute/ quad set  x10 R/l      Heel slides X 5 R      SAQ Small roll R x10      Seated heel slides R x10      LAQ   R x5                Modality/intervention used:   [x ] Therapeutic Exercise   [ ] Modalities:   [x ] Therapeutic Activity     [ ] Ultrasound   [ ] Elec Stim   [ ]x Gait Training     [ ] Cervical Traction  [ ] Lumbar Traction   [ ] Neuromuscular Re-education   [ ] Cold/hotpack  [ ] Iontophoresis   [ ] Instruction in HEP     [ ] Joseluis Allison   [ ] Manual Therapy   [ ] Aquatic Therapy     Other Therapeutic Activities:    Home Exercise Program/Education provided to patient:     Manual Treatments:     Communication with other providers:      Adverse reactions to treatment:     Treatment/Activity Tolerance:   [ ] Patient limited by fatigue [ ] Patient limited by pain [ ] Patient limited by other medical complications [ x] Patient tolerated therapy well  [ ] Other:     Post Tx Pain Ratin /10     Safety Precautions:   [ ] left in bed  [ ] left in chair [ ] call light within reach  [ ] bed alarm on  [ ] personal alarm on  [ ] other staff present:    Plan:   [x ] Continue per plan of care [ ] Maurice Gomez current plan   [ ] Plan of care initiated [ ] Hold pending MD visit [ ] Discharge     Plan for Next Session:     Time In: 5077  Time Out: 1100  Total Treatment Minutes: 25  Billed Units: 1 GT 1 TE    Signed: Lulu Lei PT, 2/22/2022, 11:45 AM

## 2022-02-23 LAB
ANION GAP SERPL CALCULATED.3IONS-SCNC: 9 MMOL/L (ref 4–16)
BUN BLDV-MCNC: 26 MG/DL (ref 6–23)
CALCIUM SERPL-MCNC: 9.1 MG/DL (ref 8.3–10.6)
CHLORIDE BLD-SCNC: 103 MMOL/L (ref 99–110)
CO2: 25 MMOL/L (ref 21–32)
CREAT SERPL-MCNC: 1.8 MG/DL (ref 0.9–1.3)
GFR AFRICAN AMERICAN: 44 ML/MIN/1.73M2
GFR NON-AFRICAN AMERICAN: 37 ML/MIN/1.73M2
GLUCOSE BLD-MCNC: 126 MG/DL (ref 70–99)
GLUCOSE BLD-MCNC: 131 MG/DL (ref 70–99)
GLUCOSE BLD-MCNC: 137 MG/DL (ref 70–99)
GLUCOSE BLD-MCNC: 142 MG/DL (ref 70–99)
GLUCOSE BLD-MCNC: 154 MG/DL (ref 70–99)
POTASSIUM SERPL-SCNC: 4.1 MMOL/L (ref 3.5–5.1)
SODIUM BLD-SCNC: 137 MMOL/L (ref 135–145)

## 2022-02-23 PROCEDURE — 80048 BASIC METABOLIC PNL TOTAL CA: CPT

## 2022-02-23 PROCEDURE — 97110 THERAPEUTIC EXERCISES: CPT

## 2022-02-23 PROCEDURE — 6370000000 HC RX 637 (ALT 250 FOR IP): Performed by: ORTHOPAEDIC SURGERY

## 2022-02-23 PROCEDURE — 94761 N-INVAS EAR/PLS OXIMETRY MLT: CPT

## 2022-02-23 PROCEDURE — 1200000002 HC SEMI PRIVATE SWING BED

## 2022-02-23 PROCEDURE — 97530 THERAPEUTIC ACTIVITIES: CPT

## 2022-02-23 PROCEDURE — 97116 GAIT TRAINING THERAPY: CPT

## 2022-02-23 PROCEDURE — 36415 COLL VENOUS BLD VENIPUNCTURE: CPT

## 2022-02-23 PROCEDURE — 97535 SELF CARE MNGMENT TRAINING: CPT

## 2022-02-23 PROCEDURE — 82962 GLUCOSE BLOOD TEST: CPT

## 2022-02-23 RX ADMIN — ATORVASTATIN CALCIUM 20 MG: 20 TABLET, FILM COATED ORAL at 21:46

## 2022-02-23 RX ADMIN — ACETAMINOPHEN 650 MG: 325 TABLET ORAL at 15:00

## 2022-02-23 RX ADMIN — AMLODIPINE BESYLATE 10 MG: 10 TABLET ORAL at 09:10

## 2022-02-23 RX ADMIN — INSULIN LISPRO 1 UNITS: 100 INJECTION, SOLUTION INTRAVENOUS; SUBCUTANEOUS at 21:46

## 2022-02-23 RX ADMIN — ALLOPURINOL 100 MG: 100 TABLET ORAL at 09:10

## 2022-02-23 RX ADMIN — INSULIN LISPRO 1 UNITS: 100 INJECTION, SOLUTION INTRAVENOUS; SUBCUTANEOUS at 12:39

## 2022-02-23 RX ADMIN — HYDROCHLOROTHIAZIDE 25 MG: 25 TABLET ORAL at 09:10

## 2022-02-23 RX ADMIN — METOPROLOL TARTRATE 50 MG: 50 TABLET, FILM COATED ORAL at 21:47

## 2022-02-23 RX ADMIN — LISINOPRIL 20 MG: 20 TABLET ORAL at 09:10

## 2022-02-23 RX ADMIN — METOPROLOL TARTRATE 50 MG: 50 TABLET, FILM COATED ORAL at 09:10

## 2022-02-23 RX ADMIN — HYDROCODONE BITARTRATE AND ACETAMINOPHEN 1 TABLET: 7.5; 325 TABLET ORAL at 15:00

## 2022-02-23 RX ADMIN — ACETAMINOPHEN 650 MG: 325 TABLET ORAL at 21:47

## 2022-02-23 RX ADMIN — APIXABAN 5 MG: 5 TABLET, FILM COATED ORAL at 09:10

## 2022-02-23 RX ADMIN — NIACIN 1000 MG: 500 TABLET, FILM COATED, EXTENDED RELEASE ORAL at 21:46

## 2022-02-23 RX ADMIN — GABAPENTIN 800 MG: 400 CAPSULE ORAL at 09:11

## 2022-02-23 RX ADMIN — PANTOPRAZOLE SODIUM 40 MG: 40 TABLET, DELAYED RELEASE ORAL at 06:16

## 2022-02-23 RX ADMIN — GABAPENTIN 800 MG: 400 CAPSULE ORAL at 21:46

## 2022-02-23 RX ADMIN — ACETAMINOPHEN 650 MG: 325 TABLET ORAL at 09:10

## 2022-02-23 RX ADMIN — GABAPENTIN 800 MG: 400 CAPSULE ORAL at 15:01

## 2022-02-23 RX ADMIN — APIXABAN 5 MG: 5 TABLET, FILM COATED ORAL at 21:47

## 2022-02-23 ASSESSMENT — PAIN SCALES - GENERAL
PAINLEVEL_OUTOF10: 0
PAINLEVEL_OUTOF10: 6
PAINLEVEL_OUTOF10: 0
PAINLEVEL_OUTOF10: 5
PAINLEVEL_OUTOF10: 0
PAINLEVEL_OUTOF10: 0
PAINLEVEL_OUTOF10: 4
PAINLEVEL_OUTOF10: 0
PAINLEVEL_OUTOF10: 0

## 2022-02-23 ASSESSMENT — PAIN DESCRIPTION - FREQUENCY: FREQUENCY: INTERMITTENT

## 2022-02-23 ASSESSMENT — PAIN DESCRIPTION - ONSET: ONSET: ON-GOING

## 2022-02-23 ASSESSMENT — PAIN DESCRIPTION - ORIENTATION: ORIENTATION: RIGHT

## 2022-02-23 ASSESSMENT — PAIN DESCRIPTION - DESCRIPTORS: DESCRIPTORS: CONSTANT;SORE

## 2022-02-23 ASSESSMENT — PAIN - FUNCTIONAL ASSESSMENT: PAIN_FUNCTIONAL_ASSESSMENT: ACTIVITIES ARE NOT PREVENTED

## 2022-02-23 ASSESSMENT — PAIN DESCRIPTION - PAIN TYPE: TYPE: SURGICAL PAIN

## 2022-02-23 ASSESSMENT — PAIN DESCRIPTION - PROGRESSION: CLINICAL_PROGRESSION: NOT CHANGED

## 2022-02-23 ASSESSMENT — PAIN DESCRIPTION - LOCATION: LOCATION: KNEE

## 2022-02-23 NOTE — PLAN OF CARE
Patient pain under control most of shift, but patient states during ambulation and while working with therapy that pain increases suddenly. Patient given pain medication as needed for pain relief.   Problem: Pain - Acute:  Goal: Pain level will decrease  Description: Pain level will decrease  Outcome: Ongoing

## 2022-02-23 NOTE — PROGRESS NOTES
Pt agreed to participate in leisure activity; [employer]. Pt requested to participate independently and therapist agreed. Informed pt's RN and requested on pt is done with the items to leave them at nursing desk for therapist to  tomorrow.      Electronically signed by LAURA Lee MA on 2/23/2022 at 4:05 PM

## 2022-02-23 NOTE — PROGRESS NOTES
Physical Therapy Treatment Note   Date: 2022 Room: [unfilled]   Name: Leni Cazares : [id number]   MRN: [id number] Admission Date:2022    Primary Problem:   Past Medical History:   Diagnosis Date    Arthritis     Dizziness     FH: CAD (coronary artery disease) 2015    H/O 24 hour EKG monitoring 19,2016    Conclusion: Atrial fibrillation with  fairly well-controlled ventricular rate response without evidence of any significant tachy or alon arrhythmias.  H/O atrial fibrillation without current medication 2019    H/O cardiovascular stress test 11/24/15; 2019    Normal perfusion in the distribution of all coronaries, normal LV, EF 53%.  H/O Doppler ultrasound (Venous Doppler Lower Extremities) 2017; 2020    No evidence of DVT or SVT in the bilaterally. No significant reflux noted in the veins of the right lower extremity. Significant reflux noted in the Left CFV. Bilateral Calf and ankle edema noted.  H/O echocardiogram 17,11/24/15    Left ventricle mildly dilated with normal systolic function EF 84-24% Mild concentric LVH with Grade II diastolic dysfunction. Mild to moderate MR and mild TR     H/O echocardiogram 2018; 2019    EF 55-60%. Mild concentric left ventricular hypertrophy. The left atrium is moderately dilated. Dilatation of the aortic root measuring 4.0 cm. Mild AR. Moderate pulmonary HTN.     H/O echocardiogram 10/28/2021    EF 55-60% Mild eccentric left ventricular hypertrophy. There is severe fibrocalcific sclerosis of the aortic valve with mean gradient across the aortic valve of 29 mmHg and calculated aortic valve areawas 1.2square centimeters, all suggesting moderate aortic stenosis. See complete result documentation under cardiology tab.     H/O transesophageal echocardiography (SALAZAR) for monitoring 2019    No CEE clot    Heart murmur 2015    Hx of Venous Doppler ultrasound 2020    No evidence of DVT in but we can try more\"    Initial Pain level:8/10    Goals:                   Short term goals  Time Frame for Short term goals: 1 week or untill discharge  Short term goal 1: Pt will ambulate 150feet with least restricitve AD with supervision  Short term goal 2: Pt will transfer from commode, bed and recliner with SBA  Short term goal 3: Pt will axscend and descend 6 inch step 2x with CGA               Plan of Care:             Times per week: Mon-Sat 6x/week            Current Treatment Recommendations: Strengthening,IADL Training,Home Exercise Program,ROM,Safety Education & Burnetta Brawn Training,Patient/Caregiver Education & Training,Functional Mobility Training,Transfer Training,Gait Training,ADL/Self-care Training,Stair training,Pain Management      Objective Findings:  Seated knee flex 91 deg. AROM  knee ext after prop -9 deg       Date: 2/22/22 Date:   2-23-22 Date:  2-23-22 pm session Date:  Date:    Bed mobility S  Sup>sit sba with bed rail. Sit >sup min RLE     Sit to stand transfer Min A MIN, struggles from low seat Cg from bed     Stand to sit transfer CGA Min to control decent Cg from bed     Commode transfer x x x     Standing tolerance  x      Ambulation Using  ft with SBA Gait training with RW 45+132 ft limited by pain. Gait training with RW x ft with cg     Stairs   x       Exercises:   Exercise/Equipment/Modalities  Date: 2/22/22 Date:   2-23-22 Date: 2-23-22 pm session Date:    AP x10 X10, cues to hold 3-5 sec     SLR  L x10 AAROM RLE x 10     Assisted SLR R x5      Supine ABD/ADD x10 R/L AAROM x 10 RLE, AROM LLE. Glute/ quad set  x10 R/l x10 BLE     Heel slides X 5 R Reclined x 10     SAQ Small roll R x10      Seated heel slides R x10      LAQ   R x5 x10     Seated knee flex  x10      Knee ext prop   Under heel x 3 mins. Knee ext with quad set -9 deg.       Modality/intervention used:   [x ] Therapeutic Exercise   [ ] Modalities:   [x ] Therapeutic Activity     [ ] Ultrasound   [ ] Elec Stim   [x ]x Gait Training     [ ] Cervical Traction  [ ] Lumbar Traction   [ ] Neuromuscular Re-education   [ ] Cold/hotpack  [ ] Iontophoresis   [ ] Instruction in HEP     [ ] Vasopneumatic   [ ] Manual Therapy   [ ] Aquatic Therapy     Other Therapeutic Activities: positioned rolled blanket along lateral aspect of RLE for positioning. Educated pt and wife not to put pillow under knee but can under lower leg or heel for stretch.      Home Exercise Program/Education provided to patient: cont above 2-3 x day    Manual Treatments: x    Communication with other providers: nurse cleared pt for therapy    Adverse reactions to treatment: pain increased today    Treatment/Activity Tolerance:   [ ] Patient limited by fatigue [ ] Patient limited by pain [ ] Patient limited by other medical complications [ x] Patient tolerated therapy well  [ ] Other:     Post Tx Pain Rating :8 /10     Safety Precautions:   [ ] left in bed  [x ] left in chair [x ] call light within reach  [ ] bed alarm on  [x ] personal alarm on  [ ] other staff present:    Plan:   [x ] Continue per plan of care [ ] Maurice Gomez current plan   [ ] Plan of care initiated [ ] Hold pending MD visit [ ] Discharge     Plan for Next Session:     Time In: 1102 +1508  Time Out: 1200 +1538  Total Treatment Minutes: 58 +30  Billed Units: 4/58=2 te, 1 gt, 1 ta    Signed: Fuentes Humphreys PTA, 2/23/2022, 11:02 AM

## 2022-02-23 NOTE — PROGRESS NOTES
Occupational Therapy Treatment Note  Name: Javier Espino MRN: [id number] :   1945   Date:  2022   Admission Date: 2022 Room:  [address] [place] [address]   Restrictions/Precautions:  Restrictions/Precautions  Restrictions/Precautions: Weight Bearing,Fall Risk  Required Braces or Orthoses?: No     Communication with other providers:   Cleared for treatment by RN. Subjective:  Patient states:  \"I have no clothes until my wife gets here\"  Pain:   Location, Type, Intensity (0/10 to 10/10): No pain    Objective:    Observation: Pt alert and oriented    Treatment, including education:  Transfers  Supine to sit :Sup  Sit to supine :Sup  Scooting :Sup  Rolling :Sup  Sit to stand :CGA  Stand to sit :CGA  SPT:CGA      Self Care Training:   Activities performed today included the following:    Grooming  Mod I to brush teeth and hair. Pt washed hair with no rinse shower cap. Bathing   CGA to wash buttocks and maciej care in stance at EOB with RW.    UB Dress  DONNED GOWN    LB Dress  Max A to don socks      Therapeutic Activity Training: Pt stood x 4 min x 2 with CGA with RW to facilitate increased endurance/strength for ADL tasks and transfers. Safety Measures: Gait belt used, Left in recliner, Pull/Bed Alarm activated and call light left in reach        Assessment / Impression:        Patient's tolerance of treatment: Good   Adverse Reaction: None  Significant change in status and impact:  None  Barriers to improvement:  Dec strength and endurance    Plan for Next Session:    Continue with POC.     Time in:  1000  Time out:  1055  Total treatment time:  55  Billed Units: 3 ADL, 1 TA  Electronically signed by:    Maira Domingo, [address]    2022, 10:16 AM    Previously filed values:  Patient Goals   Patient goals : Pt hopes to go home and do what he was doing before but using a walker  Short term goals  Time Frame for Short term goals: until discharge  Short term goal 1: Pt will be MI for functional transfers to chair, toilet, or bed w/o LOB or falls using good walker safety  Short term goal 2: Pt will be I UB adls/MI for LB adls using necessary a.e.   Short term goal 3: Pt will be MI for toileting and for grooming while standing at the sink  Short term goal 4: Pt will be min vc for BUE strengthening to assist with transfers and endurance

## 2022-02-23 NOTE — PROGRESS NOTES
Attempted 1:1 session. Pt declined.      Electronically signed by Zoë Anne MA on 2/23/2022 at 4:04 PM

## 2022-02-23 NOTE — PATIENT CARE CONFERENCE
SWING BED WEEKLY TEAM SHEET      Kareem Rey   2/23/2022             WEEK# 1    PHYSICAL THERAPY       Ambulation: Distance:207 ft      Device:   RW  Assist:CGA      Wt bearing:  WBAT R LE    W/C skills:  Propulsion:  np     W/C parts management:     Stairs:  Amount/size:   np    Assist:        Device:      Pain:     Transfers:   Sit to stand: CGA   Stand to sit:     CGA          Bed Mobility:  Rolls right:     Rolls left:     Positioning:     Supine to sit:  SBA     Sit to supine: Min A         Strength/ROM:I SLR R  LE; 90* R knee FLEX/-9* knee EXT     Balance:     Static sitting    [] P  [] F-   [] F    [] F+    [x] G               Dynamic Sitting           [] P  [] F-   [] F    [] F+    [x] G                     Static standing            [] P  [] F-   [] F    [x] F+    [] G   [x]  With  [] Without device Dynamic standing       [] P  [] F-   [] F    [x] F+    [] G   [x]  With  [] Without device  (P= poor   F= fair   G= good)    Issues to be resolved before discharge     Goals of previous week  [x] 1st team   [] met   [] partially met    [] not met                                          Why the goals were not met     Goals:   Short term goal 1: Pt will ambulate 150feet with least restricitve AD with supervision  Short term goal 2: Pt will transfer from commode, bed and recliner with SBA  Short term goal 3: Pt will axscend and descend 6 inch step 2x with CGA  Updated Goals:        Physical Therapy Signature:  Nicolas Nine, PT

## 2022-02-23 NOTE — PLAN OF CARE
Problem: Falls - Risk of:  Goal: Will remain free from falls  Description: Will remain free from falls  2/22/2022 2059 by Juan Flores LPN  Outcome: Ongoing  2/22/2022 0908 by Zaheer Holcomb RN  Outcome: Ongoing  Goal: Absence of physical injury  Description: Absence of physical injury  2/22/2022 2059 by Juan Flores LPN  Outcome: Ongoing  2/22/2022 0908 by Zaheer Holcomb RN  Outcome: Ongoing     Problem: Discharge Planning:  Goal: Discharged to appropriate level of care  Description: Discharged to appropriate level of care  2/22/2022 2059 by Juan Flores LPN  Outcome: Ongoing  2/22/2022 0908 by Zaheer Holcomb RN  Outcome: Ongoing     Problem: Mobility - Impaired:  Goal: Mobility will improve  Description: Mobility will improve  2/22/2022 2059 by Juan Flores LPN  Outcome: Ongoing  2/22/2022 0908 by Zaheer Holcomb RN  Outcome: Ongoing     Problem: Infection - Surgical Site:  Goal: Will show no infection signs and symptoms  Description: Will show no infection signs and symptoms  2/22/2022 2059 by Juan Flores LPN  Outcome: Ongoing  2/22/2022 0908 by Zaheer Holcomb RN  Outcome: Ongoing     Problem: Pain - Acute:  Goal: Pain level will decrease  Description: Pain level will decrease  2/22/2022 2059 by uJan Flores LPN  Outcome: Ongoing  2/22/2022 0908 by Zaheer Holcomb RN  Outcome: Ongoing     Problem: Pain:  Goal: Pain level will decrease  Description: Pain level will decrease  2/22/2022 2059 by Juan Flores LPN  Outcome: Ongoing  2/22/2022 0908 by Zaheer Holcomb RN  Outcome: Ongoing  Goal: Control of acute pain  Description: Control of acute pain  2/22/2022 2059 by Juan Flores LPN  Outcome: Ongoing  2/22/2022 0908 by Zaheer Holcomb RN  Outcome: Ongoing  Goal: Control of chronic pain  Description: Control of chronic pain  2/22/2022 2059 by Juan Flores LPN  Outcome: Ongoing  2/22/2022 0908 by Zaheer Holcomb RN  Outcome: Ongoing

## 2022-02-23 NOTE — CARE COORDINATION
SWING BED WEEKLY TEAM SHEET     Kareem Sher   2/23/2022 WEEK # 1    Care Management    Issues to be resolved before discharge:  Increased strength, balance, and mobility.      Family Education: Patient/staff to update family     Discharge Plan: Home   Patient/Family Adjustment: N/A     Goals of previous week:   [] 1st team   [] met   [] partially met    [x] not met             Why goals were not met: Continued weakness     Skilled Level of Care Remains   [x] yes   [] no    Estimated length of stay: Next insurance review due 3/1  Patient/Family Concerns/input: None at this time    Patient/Family  Signature _________________  2/23/2022       Care Management Signature:  Alicia Dodson RN

## 2022-02-24 LAB
ANION GAP SERPL CALCULATED.3IONS-SCNC: 7 MMOL/L (ref 4–16)
BUN BLDV-MCNC: 26 MG/DL (ref 6–23)
CALCIUM SERPL-MCNC: 9 MG/DL (ref 8.3–10.6)
CHLORIDE BLD-SCNC: 105 MMOL/L (ref 99–110)
CO2: 26 MMOL/L (ref 21–32)
CREAT SERPL-MCNC: 1.9 MG/DL (ref 0.9–1.3)
GFR AFRICAN AMERICAN: 42 ML/MIN/1.73M2
GFR NON-AFRICAN AMERICAN: 34 ML/MIN/1.73M2
GLUCOSE BLD-MCNC: 123 MG/DL (ref 70–99)
GLUCOSE BLD-MCNC: 143 MG/DL (ref 70–99)
GLUCOSE BLD-MCNC: 177 MG/DL (ref 70–99)
GLUCOSE BLD-MCNC: 183 MG/DL (ref 70–99)
GLUCOSE BLD-MCNC: 196 MG/DL (ref 70–99)
POTASSIUM SERPL-SCNC: 4.3 MMOL/L (ref 3.5–5.1)
SODIUM BLD-SCNC: 138 MMOL/L (ref 135–145)

## 2022-02-24 PROCEDURE — 97110 THERAPEUTIC EXERCISES: CPT

## 2022-02-24 PROCEDURE — 97530 THERAPEUTIC ACTIVITIES: CPT

## 2022-02-24 PROCEDURE — 80048 BASIC METABOLIC PNL TOTAL CA: CPT

## 2022-02-24 PROCEDURE — 6370000000 HC RX 637 (ALT 250 FOR IP): Performed by: ORTHOPAEDIC SURGERY

## 2022-02-24 PROCEDURE — 82962 GLUCOSE BLOOD TEST: CPT

## 2022-02-24 PROCEDURE — 97116 GAIT TRAINING THERAPY: CPT

## 2022-02-24 PROCEDURE — 36415 COLL VENOUS BLD VENIPUNCTURE: CPT

## 2022-02-24 PROCEDURE — 1200000002 HC SEMI PRIVATE SWING BED

## 2022-02-24 RX ADMIN — POLYETHYLENE GLYCOL 3350 17 G: 17 POWDER, FOR SOLUTION ORAL at 08:41

## 2022-02-24 RX ADMIN — INSULIN LISPRO 1 UNITS: 100 INJECTION, SOLUTION INTRAVENOUS; SUBCUTANEOUS at 08:43

## 2022-02-24 RX ADMIN — HYDROCHLOROTHIAZIDE 25 MG: 25 TABLET ORAL at 08:42

## 2022-02-24 RX ADMIN — ATORVASTATIN CALCIUM 20 MG: 20 TABLET, FILM COATED ORAL at 20:10

## 2022-02-24 RX ADMIN — APIXABAN 5 MG: 5 TABLET, FILM COATED ORAL at 20:10

## 2022-02-24 RX ADMIN — PANTOPRAZOLE SODIUM 40 MG: 40 TABLET, DELAYED RELEASE ORAL at 06:46

## 2022-02-24 RX ADMIN — METOPROLOL TARTRATE 50 MG: 50 TABLET, FILM COATED ORAL at 20:09

## 2022-02-24 RX ADMIN — LISINOPRIL 20 MG: 20 TABLET ORAL at 08:42

## 2022-02-24 RX ADMIN — ACETAMINOPHEN 650 MG: 325 TABLET ORAL at 08:42

## 2022-02-24 RX ADMIN — AMLODIPINE BESYLATE 10 MG: 10 TABLET ORAL at 08:42

## 2022-02-24 RX ADMIN — APIXABAN 5 MG: 5 TABLET, FILM COATED ORAL at 08:42

## 2022-02-24 RX ADMIN — GABAPENTIN 800 MG: 400 CAPSULE ORAL at 15:02

## 2022-02-24 RX ADMIN — ACETAMINOPHEN 650 MG: 325 TABLET ORAL at 15:02

## 2022-02-24 RX ADMIN — ALLOPURINOL 100 MG: 100 TABLET ORAL at 08:42

## 2022-02-24 RX ADMIN — INSULIN LISPRO 1 UNITS: 100 INJECTION, SOLUTION INTRAVENOUS; SUBCUTANEOUS at 20:29

## 2022-02-24 RX ADMIN — GABAPENTIN 800 MG: 400 CAPSULE ORAL at 20:09

## 2022-02-24 RX ADMIN — METOPROLOL TARTRATE 50 MG: 50 TABLET, FILM COATED ORAL at 08:42

## 2022-02-24 RX ADMIN — NIACIN 1000 MG: 500 TABLET, FILM COATED, EXTENDED RELEASE ORAL at 20:09

## 2022-02-24 RX ADMIN — INSULIN LISPRO 1 UNITS: 100 INJECTION, SOLUTION INTRAVENOUS; SUBCUTANEOUS at 12:11

## 2022-02-24 RX ADMIN — INSULIN LISPRO 1 UNITS: 100 INJECTION, SOLUTION INTRAVENOUS; SUBCUTANEOUS at 17:34

## 2022-02-24 RX ADMIN — GABAPENTIN 800 MG: 400 CAPSULE ORAL at 08:47

## 2022-02-24 RX ADMIN — ACETAMINOPHEN 650 MG: 325 TABLET ORAL at 20:10

## 2022-02-24 ASSESSMENT — PAIN SCALES - GENERAL
PAINLEVEL_OUTOF10: 2
PAINLEVEL_OUTOF10: 5
PAINLEVEL_OUTOF10: 0
PAINLEVEL_OUTOF10: 0

## 2022-02-24 NOTE — PROGRESS NOTES
Hospitalist Progress Note      Name:  Glenna Ardon /Age/Sex: [id number]  (72 y.o. male)   MRN & CSN:  [id number] & [id number] Admission Date/Time: 2022  3:20 PM   Location:   PCP: Ivone Butt MD         Hospital Day: 6    Assessment and Plan:     Hospital course:   Glenna Ardon is a 64 y.o. male presented to [facility] with primary osteoarthritis of the right knee status post right knee total arthroplasty on . Pt was later sent to [facility] for a swing bed ( PT/OT) on .      Hospital problems       Physical debility  -doing well with PT/OT   -Hopeful discharge home after swing bed program       Primary osteo-arthritis of the right knee  -Status post elective right total knee arthroplasty on --> POD 6  -Continue pain control.   Only using Tylenol at this time  -Is on Eliquis for persistent atrial fibrillation so will continue for DVT prophylaxis as well       Anemia   - with an MCV that is >100  - in 2022 the patient hemoglobin was 11  - now his hemoglobin is 8-9   -  Not sure if this is also acute blood loss anemia post surgery?   - recheck and will also order b12 and folate       History of atrial fibrillation  -Is status post pacer in the past  - rate controlled   -Continue home metoprolol and Eliquis       Type 2 diabetes  -chronic   - Continue sliding scale as well as scheduled Humalog  -Blood sugars are well controlled        Essential hypertension  - chronic   - Continue lisinopril and hydrochlorothiazide        JAYE  -chronic   - Continue CPAP at night        CKD stage IIIb  -cr is trending upwards on    - the patient is on lisinopril and HCTZ  - need to recheck labs to assess if these should be held for a day       History of Valvular disease   - noted to have a history aortic stenosis,  pulmonary hypertension  History of mitral and tricuspid regurgitation  -Per last echocardiogram in 2021  -We'll need continued follow-up with cardiology as an outpatient       History of coronary artery disease  - with a history of HLD as well  -will need to obtain more history regarding the CAD  Continue aspirin and statin     Gout? ?   - pt reports he was never diagnosed with gout   But was placed on allopurinol  - for will continue since he is chronically on this. History of venous insufficiency  -Status post greater saphenous vein ablation in the past  -We'll continue to monitor encourage compression stockings as needed       History of Present Illness:     Chief Complaint:     Reports he is doing well   No cp no sob no  or gi complaints        Ten point ROS reviewed negative, unless as noted above    Objective: Intake/Output Summary (Last 24 hours) at 2/24/2022 1746  Last data filed at 2/24/2022 0855  Gross per 24 hour   Intake 480 ml   Output 500 ml   Net -20 ml      Vitals:   Vitals:    02/24/22 0722   BP: 123/74   Pulse: 68   Resp: 19   Temp: 98.2 °F (36.8 °C)   SpO2: 98%     Physical Exam:   GEN Awake male, sitting upright in bed in no apparent distress. Appears given age. EYES  No scleral erythema, discharge, or conjunctivitis. HENT Mucous membranes are moist. Oral pharynx without exudates,     NECK Supple, no apparent thyromegaly or masses. RESP Clear to auscultation, no wheezes, rales or rhonchi. Symmetric chest movement while on room air. CARDIO/VASC S1/S2 auscultated. Regular rate without appreciable murmurs, rubs, or gallops. No JVD or carotid bruits. Peripheral pulses equal bilaterally and palpable. No peripheral edema. GI Abdomen is soft without significant tenderness, masses, or guarding. Bowel sounds are normoactive. Rectal exam deferred.  No costovertebral angle tenderness. Normal appearing external genitalia. Prince catheter is not present. HEME/LYMPH No palpable cervical lymphadenopathy and no hepatosplenomegaly. No petechiae or ecchymoses. MSK No gross joint deformities. RLE is swollen more than the LLE.  ROM is intact     SKIN Normal coloration, warm, dry. NEURO Cranial nerves appear grossly intact, normal speech, no lateralizing weakness. PSYCH Awake, alert, oriented x 4. Affect appropriate.     Medications:   Medications:    atorvastatin  20 mg Oral Nightly    niacin  1,000 mg Oral Nightly    acetaminophen  650 mg Oral Q6H    insulin lispro  0.05 Units/kg SubCUTAneous TID WC    insulin lispro  0-6 Units SubCUTAneous TID WC    insulin lispro  0-3 Units SubCUTAneous Nightly    allopurinol  100 mg Oral Daily    amLODIPine  10 mg Oral Daily    apixaban  5 mg Oral BID    gabapentin  800 mg Oral TID    metoprolol tartrate  50 mg Oral BID    pantoprazole  40 mg Oral QAM AC    lisinopril  20 mg Oral Daily    And    hydroCHLOROthiazide  25 mg Oral Daily      Infusions:   PRN Meds: diphenhydrAMINE, 25 mg, Q6H PRN   Or  diphenhydrAMINE, 25 mg, Q6H PRN  glucagon (rDNA), 1 mg, PRN  glucose, 15 g, PRN  HYDROcodone-acetaminophen, 1 tablet, Q6H PRN  HYDROcodone-acetaminophen, 2 tablet, Q6H PRN  polyethylene glycol, 17 g, Daily PRN          Electronically signed by Flores Mendez MD on 2/24/2022 at 5:46 PM

## 2022-02-24 NOTE — PROGRESS NOTES
out:  1130  Total treatment time: Nara 90, 2 TE  Electronically signed by:    Abel Grubbs, [address]    2/24/2022, 10:58 AM    Previously filed values:  Patient Goals   Patient goals : Pt hopes to go home and do what he was doing before but using a walker  Short term goals  Time Frame for Short term goals: until discharge  Short term goal 1: Pt will be MI for functional transfers to chair, toilet, or bed w/o LOB or falls using good walker safety  Short term goal 2: Pt will be I UB adls/MI for LB adls using necessary a.e.   Short term goal 3: Pt will be MI for toileting and for grooming while standing at the sink  Short term goal 4: Pt will be min vc for BUE strengthening to assist with transfers and endurance

## 2022-02-24 NOTE — PROGRESS NOTES
Physical Therapy Treatment Note   Date: 2022 Room: [unfilled]   Name: Leopoldo Esparza :    MRN: [id number] Admission Date:2022    Primary Problem:   Past Medical History:   Diagnosis Date    Arthritis     Dizziness     FH: CAD (coronary artery disease) 2015    H/O 24 hour EKG monitoring 19,2016    Conclusion: Atrial fibrillation with  fairly well-controlled ventricular rate response without evidence of any significant tachy or alon arrhythmias.  H/O atrial fibrillation without current medication 2019    H/O cardiovascular stress test 11/24/15; 2019    Normal perfusion in the distribution of all coronaries, normal LV, EF 53%.  H/O Doppler ultrasound (Venous Doppler Lower Extremities) 2017; 2020    No evidence of DVT or SVT in the bilaterally. No significant reflux noted in the veins of the right lower extremity. Significant reflux noted in the Left CFV. Bilateral Calf and ankle edema noted.  H/O echocardiogram 17,11/24/15    Left ventricle mildly dilated with normal systolic function EF 71-12% Mild concentric LVH with Grade II diastolic dysfunction. Mild to moderate MR and mild TR     H/O echocardiogram 2018; 2019    EF 55-60%. Mild concentric left ventricular hypertrophy. The left atrium is moderately dilated. Dilatation of the aortic root measuring 4.0 cm. Mild AR. Moderate pulmonary HTN.     H/O echocardiogram 10/28/2021    EF 55-60% Mild eccentric left ventricular hypertrophy. There is severe fibrocalcific sclerosis of the aortic valve with mean gradient across the aortic valve of 29 mmHg and calculated aortic valve areawas 1.2square centimeters, all suggesting moderate aortic stenosis. See complete result documentation under cardiology tab.     H/O transesophageal echocardiography (SALAZAR) for monitoring 2019    No CEE clot    Heart murmur 2015    Hx of Venous Doppler ultrasound 2020    No evidence of DVT in 1 week or untill discharge  Short term goal 1: Pt will ambulate 150feet with least restricitve AD with supervision  Short term goal 2: Pt will transfer from commode, bed and recliner with SBA  Short term goal 3: Pt will axscend and descend 6 inch step 2x with CGA               Plan of Care:             Times per week: Mon-Sat 6x/week            Current Treatment Recommendations: Strengthening,IADL Training,Home Exercise Program,ROM,Safety Education & Monna Fennel Training,Patient/Caregiver Education & Training,Functional Mobility Training,Transfer Training,Gait Training,ADL/Self-care Training,Stair training,Pain Management      Objective Findings:  Supine knee flex 99 deg. AROM  knee ext after prop -7 deg       Date: 2/22/22 Date:   2-23-22 Date:  2-23-22 pm session Date: 2-24-22 Date:    Bed mobility S  Sup>sit sba with bed rail. Sit >sup min RLE MOD IND sup<>sit    Sit to stand transfer Min A MIN, struggles from low seat Cg from bed cg    Stand to sit transfer CGA Min to control decent Cg from bed Cg to control decent     Commode transfer x x x x    Standing tolerance  x      Ambulation Using  ft with SBA Gait training with RW 45+132 ft limited by pain. Gait training with RW x ft with cg Gait training with RW x 12+08+27  Ft reciprocal pattern. with cg, rest break due to pain and to correct posture. Cues to decrease lean on walker as able. Stairs   x Up /down 4 in step in p// bars x 2 trails, cg and vc for tech. Exercises:   Exercise/Equipment/Modalities  Date: 2/22/22 Date:   2-23-22 Date: 2-23-22 pm session Date: 2-24-22   AP x10 X10, cues to hold 3-5 sec     SLR  L x10 AAROM RLE x 10     Assisted SLR R x5   x10   Supine ABD/ADD x10 R/L AAROM x 10 RLE, AROM LLE.      Glute/ quad set  x10 R/l x10 BLE  x10   Heel slides X 5 R Reclined x 10  x10   SAQ Small roll R x10      Seated heel slides R x10   x15   LAQ   R x5 x10  x15   Seated knee flex  x10   x15   Knee ext prop   Under heel x 3 mins. Knee ext with quad set -9 deg. Roll under heel x 2 mins -7 deg     Modality/intervention used:   [x ] Therapeutic Exercise   [ ] Modalities:   [x ] Therapeutic Activity     [ ] Ultrasound   [ ] Elec Stim   [x ]x Gait Training     [ ] Cervical Traction  [ ] Lumbar Traction   [ ] Neuromuscular Re-education   [ ] Cold/hotpack  [ ] Iontophoresis   [ ] Instruction in HEP     [ ] Vasopneumatic   [ ] Manual Therapy   [ ] Aquatic Therapy     Other Therapeutic Activities: positioned rolled blanket along lateral aspect of RLE for positioning. Home Exercise Program/Education provided to patient: cont above 2-3 x day    Manual Treatments: x    Communication with other providers: nurse cleared pt for therapy    Adverse reactions to treatment limited by pain but motivated.   Treatment/Activity Tolerance:   [ ] Patient limited by fatigue [ x] Patient limited by pain [ ] Patient limited by other medical complications [ x] Patient tolerated therapy well  [ ] Other:     Post Tx Pain Rating :8 /10     Safety Precautions:   [x ] left in bed  [ ] left in chair [x ] call light within reach  [x ] bed alarm on  [x ] personal alarm on  [ ] other staff present:    Plan:   [x ] Continue per plan of care [ ] Edin Constant current plan   [ ] Plan of care initiated [ ] Hold pending MD visit [ ] Discharge     Plan for Next Session:     Time In: 80  Time Out: 1000  Total Treatment Minutes: 68  Billed Units:5-68=2 te, 2 gt, 1 ta    SignedCollette Joy, PTA, [id number] 2/24/2022, 8:55 AM

## 2022-02-25 LAB
ALBUMIN SERPL-MCNC: 3.3 GM/DL (ref 3.4–5)
ALP BLD-CCNC: 47 IU/L (ref 40–129)
ALT SERPL-CCNC: 25 U/L (ref 10–40)
ANION GAP SERPL CALCULATED.3IONS-SCNC: 12 MMOL/L (ref 4–16)
AST SERPL-CCNC: 24 IU/L (ref 15–37)
BASOPHILS ABSOLUTE: 0 K/CU MM
BASOPHILS RELATIVE PERCENT: 0.5 % (ref 0–1)
BILIRUB SERPL-MCNC: 0.7 MG/DL (ref 0–1)
BUN BLDV-MCNC: 31 MG/DL (ref 6–23)
CALCIUM SERPL-MCNC: 8.6 MG/DL (ref 8.3–10.6)
CHLORIDE BLD-SCNC: 108 MMOL/L (ref 99–110)
CO2: 22 MMOL/L (ref 21–32)
CREAT SERPL-MCNC: 1.9 MG/DL (ref 0.9–1.3)
DIFFERENTIAL TYPE: ABNORMAL
EOSINOPHILS ABSOLUTE: 0.3 K/CU MM
EOSINOPHILS RELATIVE PERCENT: 5.2 % (ref 0–3)
FOLATE: 17.7 NG/ML (ref 3.1–17.5)
GFR AFRICAN AMERICAN: 42 ML/MIN/1.73M2
GFR NON-AFRICAN AMERICAN: 34 ML/MIN/1.73M2
GLUCOSE BLD-MCNC: 116 MG/DL (ref 70–99)
GLUCOSE BLD-MCNC: 126 MG/DL (ref 70–99)
GLUCOSE BLD-MCNC: 130 MG/DL (ref 70–99)
GLUCOSE BLD-MCNC: 152 MG/DL (ref 70–99)
GLUCOSE BLD-MCNC: 160 MG/DL (ref 70–99)
HCT VFR BLD CALC: 28.9 % (ref 42–52)
HEMOGLOBIN: 9 GM/DL (ref 13.5–18)
IMMATURE NEUTROPHIL %: 0.7 % (ref 0–0.43)
LYMPHOCYTES ABSOLUTE: 1.3 K/CU MM
LYMPHOCYTES RELATIVE PERCENT: 20.9 % (ref 24–44)
MCH RBC QN AUTO: 33.2 PG (ref 27–31)
MCHC RBC AUTO-ENTMCNC: 31.1 % (ref 32–36)
MCV RBC AUTO: 106.6 FL (ref 78–100)
MONOCYTES ABSOLUTE: 0.6 K/CU MM
MONOCYTES RELATIVE PERCENT: 10 % (ref 0–4)
PDW BLD-RTO: 14 % (ref 11.7–14.9)
PLATELET # BLD: 170 K/CU MM (ref 140–440)
PMV BLD AUTO: 9.1 FL (ref 7.5–11.1)
POTASSIUM SERPL-SCNC: 4 MMOL/L (ref 3.5–5.1)
RBC # BLD: 2.71 M/CU MM (ref 4.6–6.2)
SEGMENTED NEUTROPHILS ABSOLUTE COUNT: 3.8 K/CU MM
SEGMENTED NEUTROPHILS RELATIVE PERCENT: 62.7 % (ref 36–66)
SODIUM BLD-SCNC: 142 MMOL/L (ref 135–145)
TOTAL IMMATURE NEUTOROPHIL: 0.04 K/CU MM
TOTAL PROTEIN: 5.2 GM/DL (ref 6.4–8.2)
VITAMIN B-12: 462.5 PG/ML (ref 211–911)
WBC # BLD: 6.1 K/CU MM (ref 4–10.5)

## 2022-02-25 PROCEDURE — 82962 GLUCOSE BLOOD TEST: CPT

## 2022-02-25 PROCEDURE — 6370000000 HC RX 637 (ALT 250 FOR IP): Performed by: ORTHOPAEDIC SURGERY

## 2022-02-25 PROCEDURE — 1200000002 HC SEMI PRIVATE SWING BED

## 2022-02-25 PROCEDURE — 97535 SELF CARE MNGMENT TRAINING: CPT

## 2022-02-25 PROCEDURE — 97110 THERAPEUTIC EXERCISES: CPT

## 2022-02-25 PROCEDURE — 97530 THERAPEUTIC ACTIVITIES: CPT

## 2022-02-25 PROCEDURE — 94761 N-INVAS EAR/PLS OXIMETRY MLT: CPT

## 2022-02-25 PROCEDURE — 82746 ASSAY OF FOLIC ACID SERUM: CPT

## 2022-02-25 PROCEDURE — 82607 VITAMIN B-12: CPT

## 2022-02-25 PROCEDURE — 36415 COLL VENOUS BLD VENIPUNCTURE: CPT

## 2022-02-25 PROCEDURE — 80053 COMPREHEN METABOLIC PANEL: CPT

## 2022-02-25 PROCEDURE — 85025 COMPLETE CBC W/AUTO DIFF WBC: CPT

## 2022-02-25 RX ADMIN — APIXABAN 5 MG: 5 TABLET, FILM COATED ORAL at 20:54

## 2022-02-25 RX ADMIN — METOPROLOL TARTRATE 50 MG: 50 TABLET, FILM COATED ORAL at 08:57

## 2022-02-25 RX ADMIN — ATORVASTATIN CALCIUM 20 MG: 20 TABLET, FILM COATED ORAL at 20:54

## 2022-02-25 RX ADMIN — GABAPENTIN 800 MG: 400 CAPSULE ORAL at 20:54

## 2022-02-25 RX ADMIN — LISINOPRIL 20 MG: 20 TABLET ORAL at 08:57

## 2022-02-25 RX ADMIN — ACETAMINOPHEN 650 MG: 325 TABLET ORAL at 14:33

## 2022-02-25 RX ADMIN — PANTOPRAZOLE SODIUM 40 MG: 40 TABLET, DELAYED RELEASE ORAL at 06:16

## 2022-02-25 RX ADMIN — INSULIN LISPRO 1 UNITS: 100 INJECTION, SOLUTION INTRAVENOUS; SUBCUTANEOUS at 12:59

## 2022-02-25 RX ADMIN — GABAPENTIN 800 MG: 400 CAPSULE ORAL at 08:57

## 2022-02-25 RX ADMIN — HYDROCHLOROTHIAZIDE 25 MG: 25 TABLET ORAL at 08:56

## 2022-02-25 RX ADMIN — INSULIN LISPRO 1 UNITS: 100 INJECTION, SOLUTION INTRAVENOUS; SUBCUTANEOUS at 20:55

## 2022-02-25 RX ADMIN — ACETAMINOPHEN 650 MG: 325 TABLET ORAL at 08:56

## 2022-02-25 RX ADMIN — AMLODIPINE BESYLATE 10 MG: 10 TABLET ORAL at 08:57

## 2022-02-25 RX ADMIN — ACETAMINOPHEN 650 MG: 325 TABLET ORAL at 02:53

## 2022-02-25 RX ADMIN — NIACIN 1000 MG: 500 TABLET, FILM COATED, EXTENDED RELEASE ORAL at 20:54

## 2022-02-25 RX ADMIN — ALLOPURINOL 100 MG: 100 TABLET ORAL at 08:57

## 2022-02-25 RX ADMIN — APIXABAN 5 MG: 5 TABLET, FILM COATED ORAL at 08:56

## 2022-02-25 RX ADMIN — ACETAMINOPHEN 650 MG: 325 TABLET ORAL at 20:54

## 2022-02-25 RX ADMIN — GABAPENTIN 800 MG: 400 CAPSULE ORAL at 14:33

## 2022-02-25 ASSESSMENT — PAIN SCALES - GENERAL
PAINLEVEL_OUTOF10: 0
PAINLEVEL_OUTOF10: 3
PAINLEVEL_OUTOF10: 4

## 2022-02-25 ASSESSMENT — PAIN DESCRIPTION - FREQUENCY: FREQUENCY: CONTINUOUS

## 2022-02-25 ASSESSMENT — PAIN DESCRIPTION - ORIENTATION: ORIENTATION: RIGHT

## 2022-02-25 ASSESSMENT — PAIN DESCRIPTION - PROGRESSION: CLINICAL_PROGRESSION: NOT CHANGED

## 2022-02-25 ASSESSMENT — PAIN DESCRIPTION - PAIN TYPE: TYPE: ACUTE PAIN

## 2022-02-25 ASSESSMENT — PAIN DESCRIPTION - DESCRIPTORS: DESCRIPTORS: ACHING

## 2022-02-25 ASSESSMENT — PAIN DESCRIPTION - ONSET: ONSET: ON-GOING

## 2022-02-25 ASSESSMENT — PAIN - FUNCTIONAL ASSESSMENT: PAIN_FUNCTIONAL_ASSESSMENT: ACTIVITIES ARE NOT PREVENTED

## 2022-02-25 ASSESSMENT — PAIN DESCRIPTION - LOCATION: LOCATION: KNEE

## 2022-02-25 NOTE — FLOWSHEET NOTE
[id number]:  Patient ambulated 48' with FWW. Required CGA and demonstrated step through gait pattern.     Billed 1 unit TA  Devyn Rodriguez, [place]

## 2022-02-25 NOTE — PROGRESS NOTES
Occupational Therapy    Occupational Therapy Treatment Note  Name: Marco Ramirez MRN: [id number] :   1945   Date:  2022   Admission Date: 2022 Room:  [address] [place] [address]   Restrictions/Precautions:  Restrictions/Precautions  Restrictions/Precautions: Weight Bearing,Fall Risk  Required Braces or Orthoses?: No     Communication with other providers:   Cleared for treatment by RN. Subjective:  Patient states:  \"I feel much better after the shower\"  Pain:   Location, Type, Intensity (0/10 to 10/10): No pain noted    Objective:    Observation:  Pt alert and oriented. Treatment, including education:  Transfers    Sit to stand :CGA  Stand to sit :[address] Training:   Activities performed today included the following:      Grooming Mod I to brush hair    Bathing   CGA pt stood in shower to wash buttocks and maciej area using handrails    UB Dress  Donned gown    LB Dress  Mod A to thread underwear and pants but able to pull up clothes using RW for balance. Therapeutic Activity Training: Pt completed functional mobility with RW with CGA x 50' x 2. Safety Measures: Gait belt used, up in recliner, Pull/Bed Alarm activated and call light left in reach        Assessment / Impression:        Patient's tolerance of treatment: Good   Adverse Reaction: None  Significant change in status and impact:  None  Barriers to improvement:  Dec strength and endurance. Plan for Next Session:    Continue with POC.     Time in:  1020  Time out:  1105  Total treatment time:  45  Billed Units: 3 ADL  Electronically signed by:    Braden Veliz, [address]    2022, 11:05 AM    Previously filed values:  Patient Goals   Patient goals : Pt hopes to go home and do what he was doing before but using a walker  Short term goals  Time Frame for Short term goals: until discharge  Short term goal 1: Pt will be MI for functional transfers to chair, toilet, or bed w/o LOB or falls using good walker safety  Short term goal 2: Pt will be I UB adls/MI for LB adls using necessary a.e.   Short term goal 3: Pt will be MI for toileting and for grooming while standing at the sink  Short term goal 4: Pt will be min vc for BUE strengthening to assist with transfers and endurance

## 2022-02-25 NOTE — PROGRESS NOTES
Physical Therapy Treatment Note   Date: 2022 Room: [unfilled]   Name: Dean Quintero : [id number]   MRN: [id number] Admission Date:2022    Primary Problem:   Past Medical History:   Diagnosis Date    Arthritis     Dizziness     FH: CAD (coronary artery disease) 2015    H/O 24 hour EKG monitoring 19,2016    Conclusion: Atrial fibrillation with  fairly well-controlled ventricular rate response without evidence of any significant tachy or alon arrhythmias.  H/O atrial fibrillation without current medication 2019    H/O cardiovascular stress test 11/24/15; 2019    Normal perfusion in the distribution of all coronaries, normal LV, EF 53%.  H/O Doppler ultrasound (Venous Doppler Lower Extremities) 2017; 2020    No evidence of DVT or SVT in the bilaterally. No significant reflux noted in the veins of the right lower extremity. Significant reflux noted in the Left CFV. Bilateral Calf and ankle edema noted.  H/O echocardiogram 17,11/24/15    Left ventricle mildly dilated with normal systolic function EF 85-21% Mild concentric LVH with Grade II diastolic dysfunction. Mild to moderate MR and mild TR     H/O echocardiogram 2018; 2019    EF 55-60%. Mild concentric left ventricular hypertrophy. The left atrium is moderately dilated. Dilatation of the aortic root measuring 4.0 cm. Mild AR. Moderate pulmonary HTN.     H/O echocardiogram 10/28/2021    EF 55-60% Mild eccentric left ventricular hypertrophy. There is severe fibrocalcific sclerosis of the aortic valve with mean gradient across the aortic valve of 29 mmHg and calculated aortic valve areawas 1.2square centimeters, all suggesting moderate aortic stenosis. See complete result documentation under cardiology tab.     H/O transesophageal echocardiography (SALAZAR) for monitoring 2019    No CEE clot    Heart murmur 2015    Hx of Venous Doppler ultrasound 2020    No evidence of DVT in the left CFV. The Left GSV is non-compressible with no evidence of flow just past the saphenofemoral junction to the distal calf.  Hyperlipidemia     Hypertension     Kidney stones     Leg swelling 12/09/2019    Bilateral leg swelling    Neuropathy     Nonrheumatic aortic valve stenosis 2/15/2022    Obesity     JAYE on CPAP 06/26/2018    On CPAP 5/2018    Persistent atrial fibrillation ([address] 01/29/2019    New onset 1/2019    Syncope and collapse 12/09/2019    Thoracic aortic aneurysm ([address] 02/2011    4.5    Type II or unspecified type diabetes mellitus without mention of complication, not stated as uncontrolled      Past Surgical History:   Procedure Laterality Date    CARDIOVERSION  07/08/2019    CATARACT REMOVAL WITH IMPLANT Right 04/12/2019    CATARACT REMOVAL WITH IMPLANT Left 05/10/2019    COLON SURGERY  2007     COLONOSCOPY  [id number];[id number]    colon polyps removed    HERNIA REPAIR  2008    HERNIA REPAIR  2009    INTRACAPSULAR CATARACT EXTRACTION Right 4/12/2019    EYE CATARACT EMULSIFICATION IOL IMPLANT performed by Niesha Castellanos MD at [facility] Left 5/10/2019    EYE CATARACT EMULSIFICATION IOL IMPLANT performed by Niesha Castellanos MD at [facility] Left 06/25/2019    [employer] dual permanent pacer-Val XT DR MONIQUE [employer]     TOTAL KNEE ARTHROPLASTY Right 2/16/2022    RIGHT KNEE TOTAL ARTHROPLASTY performed by Freya Delcid DO at [facility] OR     Rehabilitation Diagnosis: R knee stiffness,Pt is a pleasant 64year old male currently presenting with decreased ROM, stregnth and balance. Pt could benefit from continued skilled PT in order to address balance and strength. Total knee arthroplasty (Right, 2/16/2022). Restrictions/Precautions: Weight Bearing As Tolerated    Subjective: Pt supine in bed upon arrival and seated in recliner on exit. Pt agreeable to PT at this date.      Initial Pain level:   3/10    Goals: Short term goals  Time Frame for Short term goals: 1 week or untill discharge  Short term goal 1: Pt will ambulate 150feet with least restricitve AD with supervision  Short term goal 2: Pt will transfer from commode, bed and recliner with SBA  Short term goal 3: Pt will axscend and descend 6 inch step 2x with CGA               Plan of Care:             Times per week: Mon-Sat 6x/week            Current Treatment Recommendations: Strengthening,IADL Training,Home Exercise Program,ROM,Safety Education & Rexanne Favor Training,Patient/Caregiver Education & Training,Functional Mobility Training,Transfer Training,Gait Training,ADL/Self-care Training,Stair training,Pain Management      Objective Findings:  Supine knee flex 99 deg. AROM  knee ext after prop -7 deg       Date: 2/22/22 Date:   2-23-22 Date:  2-23-22 pm session Date: 2-24-22 Date: 2?25/22    Bed mobility S  Sup>sit sba with bed rail. Sit >sup min RLE MOD IND sup<>sit MOD I   Sit to stand transfer Min A MIN, struggles from low seat Cg from bed cg CGA    Stand to sit transfer CGA Min to control decent Cg from bed Cg to control decent  CGA; decreased control to sit   Commode transfer x x x x x   Standing tolerance  x   Pt demonstrates good standing tolerance at this date. Ambulation Using  ft with SBA Gait training with RW 45+132 ft limited by pain. Gait training with RW x ft with cg Gait training with RW x 12+47+48  Ft reciprocal pattern. with cg, rest break due to pain and to correct posture. Cues to decrease lean on walker as able. Gait with RW x 60' + 61' + 48'. Pt demonstrates reciprocal gait with 3 rest breaks to correct posture and balance. Stairs   x Up /down 4 in step in p// bars x 2 trails, cg and vc for tech.  Not performed this AM.      Exercises:   Exercise/Equipment/Modalities  Date: 2/22/22 Date:   2-23-22 Date: 2-23-22 pm session Date: 2-24-22 Date 2/25/22   AP x10 X10, cues to hold 3-5 sec      SLR  L x10 AAROM RLE x 10      Assisted SLR R x5   x10    Supine ABD/ADD x10 R/L AAROM x 10 RLE, AROM LLE. Glute/ quad set  x10 R/l x10 BLE  x10 x10 3\"   Heel slides X 5 R Reclined x 10  x10 x10   SAQ Small roll R x10    x10 5\"   Seated heel slides R x10   x15 x10 5\"   LAQ   R x5 x10  x15 x10 5\"   Seated knee flex  x10   x15 x10   Knee ext prop   Under heel x 3 mins. Knee ext with quad set -9 deg. Roll under heel x 2 mins -7 deg    Seated marching      x10     Modality/intervention used:   [x ] Therapeutic Exercise   [ ] Modalities:   [x ] Therapeutic Activity     [ ] Ultrasound   [ ] Elec Stim   [x ]x Gait Training     [ ] Cervical Traction  [ ] Lumbar Traction   [ ] Neuromuscular Re-education   [ ] Cold/hotpack  [ ] Iontophoresis   [ ] Instruction in HEP     [ ] Vasopneumatic   [ ] Manual Therapy   [ ] Aquatic Therapy     Other Therapeutic Activities:     Home Exercise Program/Education provided to patient: cont above 2-3 x day    Manual Treatments: x    Communication with other providers: nurse cleared pt for therapy    Adverse reactions to treatment limited by pain but motivated.   Treatment/Activity Tolerance:   [ ] Patient limited by fatigue [ x] Patient limited by pain [ ] Patient limited by other medical complications [ x] Patient tolerated therapy well  [ ] Other:     Post Tx Pain Rating :8 /10     Safety Precautions:   [x ] left in bed  [ ] left in chair [x ] call light within reach  [x ] bed alarm on  [x ] personal alarm on  [ ] other staff present:    Plan:   [x ] Continue per plan of care [ ] Bety Bhakta current plan   [ ] Plan of care initiated [ ] Hold pending MD visit [ ] Discharge     Plan for Next Session:     Time In: 8:55  Time Out: 9:25   Total Treatment Minutes: 30  Billed Units:2 =1 te, 1 ta    Signed: TAWANDA Knight 2/25/2022, 9:04 AM

## 2022-02-26 LAB
ANION GAP SERPL CALCULATED.3IONS-SCNC: 12 MMOL/L (ref 4–16)
BUN BLDV-MCNC: 29 MG/DL (ref 6–23)
CALCIUM SERPL-MCNC: 9.1 MG/DL (ref 8.3–10.6)
CHLORIDE BLD-SCNC: 105 MMOL/L (ref 99–110)
CO2: 22 MMOL/L (ref 21–32)
CREAT SERPL-MCNC: 1.8 MG/DL (ref 0.9–1.3)
GFR AFRICAN AMERICAN: 44 ML/MIN/1.73M2
GFR NON-AFRICAN AMERICAN: 37 ML/MIN/1.73M2
GLUCOSE BLD-MCNC: 118 MG/DL (ref 70–99)
GLUCOSE BLD-MCNC: 121 MG/DL (ref 70–99)
GLUCOSE BLD-MCNC: 166 MG/DL (ref 70–99)
GLUCOSE BLD-MCNC: 176 MG/DL (ref 70–99)
GLUCOSE BLD-MCNC: 179 MG/DL (ref 70–99)
POTASSIUM SERPL-SCNC: 4 MMOL/L (ref 3.5–5.1)
SODIUM BLD-SCNC: 139 MMOL/L (ref 135–145)

## 2022-02-26 PROCEDURE — 83550 IRON BINDING TEST: CPT

## 2022-02-26 PROCEDURE — 36415 COLL VENOUS BLD VENIPUNCTURE: CPT

## 2022-02-26 PROCEDURE — 80048 BASIC METABOLIC PNL TOTAL CA: CPT

## 2022-02-26 PROCEDURE — 83540 ASSAY OF IRON: CPT

## 2022-02-26 PROCEDURE — 82962 GLUCOSE BLOOD TEST: CPT

## 2022-02-26 PROCEDURE — 97530 THERAPEUTIC ACTIVITIES: CPT

## 2022-02-26 PROCEDURE — 6370000000 HC RX 637 (ALT 250 FOR IP): Performed by: ORTHOPAEDIC SURGERY

## 2022-02-26 PROCEDURE — 1200000002 HC SEMI PRIVATE SWING BED

## 2022-02-26 RX ADMIN — LISINOPRIL 20 MG: 20 TABLET ORAL at 09:24

## 2022-02-26 RX ADMIN — ACETAMINOPHEN 650 MG: 325 TABLET ORAL at 20:47

## 2022-02-26 RX ADMIN — INSULIN LISPRO 1 UNITS: 100 INJECTION, SOLUTION INTRAVENOUS; SUBCUTANEOUS at 13:27

## 2022-02-26 RX ADMIN — GABAPENTIN 800 MG: 400 CAPSULE ORAL at 14:46

## 2022-02-26 RX ADMIN — PANTOPRAZOLE SODIUM 40 MG: 40 TABLET, DELAYED RELEASE ORAL at 05:55

## 2022-02-26 RX ADMIN — ACETAMINOPHEN 650 MG: 325 TABLET ORAL at 09:24

## 2022-02-26 RX ADMIN — METOPROLOL TARTRATE 50 MG: 50 TABLET, FILM COATED ORAL at 09:24

## 2022-02-26 RX ADMIN — ATORVASTATIN CALCIUM 20 MG: 20 TABLET, FILM COATED ORAL at 20:47

## 2022-02-26 RX ADMIN — ALLOPURINOL 100 MG: 100 TABLET ORAL at 09:23

## 2022-02-26 RX ADMIN — INSULIN LISPRO 1 UNITS: 100 INJECTION, SOLUTION INTRAVENOUS; SUBCUTANEOUS at 20:53

## 2022-02-26 RX ADMIN — APIXABAN 5 MG: 5 TABLET, FILM COATED ORAL at 09:23

## 2022-02-26 RX ADMIN — NIACIN 1000 MG: 500 TABLET, FILM COATED, EXTENDED RELEASE ORAL at 20:47

## 2022-02-26 RX ADMIN — ACETAMINOPHEN 650 MG: 325 TABLET ORAL at 14:46

## 2022-02-26 RX ADMIN — INSULIN LISPRO 1 UNITS: 100 INJECTION, SOLUTION INTRAVENOUS; SUBCUTANEOUS at 18:11

## 2022-02-26 RX ADMIN — APIXABAN 5 MG: 5 TABLET, FILM COATED ORAL at 20:47

## 2022-02-26 RX ADMIN — GABAPENTIN 800 MG: 400 CAPSULE ORAL at 20:47

## 2022-02-26 RX ADMIN — HYDROCHLOROTHIAZIDE 25 MG: 25 TABLET ORAL at 09:23

## 2022-02-26 RX ADMIN — AMLODIPINE BESYLATE 10 MG: 10 TABLET ORAL at 09:23

## 2022-02-26 RX ADMIN — GABAPENTIN 800 MG: 400 CAPSULE ORAL at 09:23

## 2022-02-26 ASSESSMENT — PAIN SCALES - GENERAL
PAINLEVEL_OUTOF10: 3

## 2022-02-26 ASSESSMENT — PAIN DESCRIPTION - PROGRESSION: CLINICAL_PROGRESSION: NOT CHANGED

## 2022-02-26 ASSESSMENT — PAIN DESCRIPTION - ONSET: ONSET: ON-GOING

## 2022-02-26 ASSESSMENT — PAIN DESCRIPTION - PAIN TYPE: TYPE: SURGICAL PAIN

## 2022-02-26 ASSESSMENT — PAIN DESCRIPTION - LOCATION: LOCATION: KNEE

## 2022-02-26 ASSESSMENT — PAIN DESCRIPTION - DESCRIPTORS: DESCRIPTORS: ACHING;DISCOMFORT

## 2022-02-26 ASSESSMENT — PAIN DESCRIPTION - ORIENTATION: ORIENTATION: RIGHT

## 2022-02-26 ASSESSMENT — PAIN DESCRIPTION - FREQUENCY: FREQUENCY: CONTINUOUS

## 2022-02-26 ASSESSMENT — PAIN - FUNCTIONAL ASSESSMENT: PAIN_FUNCTIONAL_ASSESSMENT: ACTIVITIES ARE NOT PREVENTED

## 2022-02-26 NOTE — PROGRESS NOTES
Occupational Therapy    Occupational Therapy Treatment Note  Name: Shun Chavez MRN: [id number] :   1945   Date:  2022   Admission Date: 2022 Room:  [address] [place] [address]   Restrictions/Precautions:  Restrictions/Precautions  Restrictions/Precautions: Weight Bearing,Fall Risk  Required Braces or Orthoses?: No     Communication with other providers:   Cleared for treatment by RN. Subjective:  Patient states:  \"I am ready to get up! \"  Pain:   Location, Type, Intensity (0/10 to 10/10): No pain noted    Objective:    Observation:  Pt alert and oriented. Treatment, including education:  Therapeutic Activity Training:   Therapeutic activity training was instructed today. Cues were given for safety, sequence, UE/LE placement, awareness, and balance. Activities performed today included bed mobility training, sup-sit, sit-stand. Transfers  Sit to stand :CGA from bed and chair. Stand to sit :CGA to bed and to chair   Functional mobility: pt performs functional mobility in caballero with SBA. Pt then stands and completes functional reaching and dynamic standing balance ax in parallel bars with SBA. Pt wears 2# weights on each arm and reaches in all planes while standing for ~5minutes. He holds on to bar with one hand at all times. Pt then requires seated rest break prior to functional mobility back to room. Safety Measures: Gait belt used, up at EOB, Pull/Bed Alarm activated and call light left in reach        Assessment / Impression:        Patient's tolerance of treatment: Good   Adverse Reaction: None  Significant change in status and impact:  None  Barriers to improvement:  Dec strength and endurance. Plan for Next Session:    Continue with POC.     Time in:  1217  Time out:  1234  Total treatment time:  17  Billed Units: 1 TA  Electronically signed by:    SHARDA Nixon/LUZ MARINA [id number]    2022, 1:19 PM    Previously filed values:  Patient Goals   Patient goals : Pt hopes to go home and do what he was doing before but using a walker  Short term goals  Time Frame for Short term goals: until discharge  Short term goal 1: Pt will be MI for functional transfers to chair, toilet, or bed w/o LOB or falls using good walker safety  Short term goal 2: Pt will be I UB adls/MI for LB adls using necessary a.e.   Short term goal 3: Pt will be MI for toileting and for grooming while standing at the sink  Short term goal 4: Pt will be min vc for BUE strengthening to assist with transfers and endurance

## 2022-02-26 NOTE — PROGRESS NOTES
V2.0  [facility] [place] Hospitalist Progress Note      Name:  Roberto Carlos García /Age/Sex:   (72 y.o. male)   MRN & CSN:  [id number] & [id number] Encounter Date/Time: 2022 3:14 PM EST    Location:   PCP: Jocelin Larose MD       Hospital Day: 8    Assessment and Plan:     Hospital course:   Keith Wyatt a 64 y. o. male with a history of primary OA of the right knee underwent  A  right knee total arthroplasty on .  Pt was later admitted  to [facility] for a swing bed ( PT/OT) on .      Hospital problems         Physical debility  -doing well with PT/OT post surgery   -spoke with PT/OT --> anticipating discharge home soon           Primary osteo-arthritis of the right knee  -Status post elective right total knee arthroplasty on --> POD 8  -Continue pain control.  Only using Tylenol at this time  -Is on Eliquis for persistent atrial fibrillation so will continue for DVT prophylaxis as well       Anemia   - with an MCV that is >100  - in 2022 the patient hemoglobin was 11  - now his hemoglobin is 8-9   -  Not sure if this is also acute blood loss anemia post surgery?   - checked a  b12 and folate since his MCV is high --> both normal  - no signs of bleeding--> checking an iron panel         History of atrial fibrillation  -Is status post pacer in the past  - rate controlled   -Continue home metoprolol and Eliquis        Type 2 diabetes  -chronic   - Continue sliding scale as well as scheduled Humalog  -Blood sugars are well controlled         Essential hypertension  - chronic   - Continue lisinopril and hydrochlorothiazide         JAYE  -chronic   - Continue CPAP at night         CKD stage IIIb  -cr is trending upwards on    - the patient is on lisinopril and HCTZ but it appears his baseline for the past  Year has been 1.6-2  - his cr today is 1.8   - will continue to monitor while on lisinopril and HCTZ; check on          History of Valvular disease   - noted to have a history aortic stenosis,  pulmonary hypertension  history of mitral and tricuspid regurgitation  -As Per last echocardiogram in October 2021  -Will need to follow-up with cardiology as an outpatient        History of coronary artery disease  - with a history of HLD as well  -will need to obtain more history regarding the CAD  Continue aspirin and statin       Gout? ?   - pt reports he was never diagnosed with gout   but was placed on allopurinol?  - for will continue since he is chronically on this. - no signs of gout flare up         History of venous insufficiency  -Status post greater saphenous vein ablation in the past  -Will continue to monitor encourage compression stockings as needed       Diet ADULT DIET; Regular; 4 carb choices (60 gm/meal)   DVT Prophylaxis [] Lovenox, []  Heparin, [] SCDs, [] Ambulation,  [x] Eliquis, [] Xarelto   Code Status Full Code   Disposition Swing bed likely dc home soon    Surrogate Decision Maker/ POA His wife      Subjective:     Chief Complaint:   Pt reports he is doing well   No cp no sob no gu or gi complaints          Review of Systems:    Review of Systems   All other systems reviewed and are negative. Objective: Intake/Output Summary (Last 24 hours) at 2/26/2022 1514  Last data filed at 2/26/2022 1328  Gross per 24 hour   Intake 960 ml   Output 850 ml   Net 110 ml        Vitals:   Vitals:    02/26/22 0732   BP: 129/69   Pulse: 66   Resp: 16   Temp: 98.2 °F (36.8 °C)   SpO2: 97%       Physical Exam:     GEN    Awake male, sitting in chair  in no apparent distress. Appears given age.     EYES    No scleral erythema, discharge, or conjunctivitis.     HENT  Mucous membranes are moist.      NECK  Supple, no apparent thyromegaly or masses.     RESP  Clear to auscultation, no wheezes, rales or rhonchi. Symmetric chest movement while on room air.     CARDIO/VASC           S1/S2 auscultated. Regular rate without appreciable murmurs, rubs, or gallops. No JVD or carotid bruits. Peripheral pulses equal bilaterally and palpable. trace edema in the LE      GI        Abdomen is soft without significant tenderness, masses, or guarding. Bowel sounds are normoactive. Rectal exam deferred.             No costovertebral angle tenderness. Normal appearing external genitalia. Prince catheter is not present.     HEME/LYMPH            No palpable cervical lymphadenopathy and no hepatosplenomegaly. No petechiae or ecchymoses.     MSK    No gross joint deformities. right knee staples are intact. No erythema around the knee. No tenderness. ROM is intact.      SKIN    Normal coloration, warm, dry.      NEURO           Cranial nerves appear grossly intact, normal speech, no lateralizing weakness.     PSYCH            Awake, alert, oriented x 4. Affect appropriate.     Medications:   Medications:    atorvastatin  20 mg Oral Nightly    niacin  1,000 mg Oral Nightly    acetaminophen  650 mg Oral Q6H    insulin lispro  0.05 Units/kg SubCUTAneous TID WC    insulin lispro  0-6 Units SubCUTAneous TID WC    insulin lispro  0-3 Units SubCUTAneous Nightly    allopurinol  100 mg Oral Daily    amLODIPine  10 mg Oral Daily    apixaban  5 mg Oral BID    gabapentin  800 mg Oral TID    metoprolol tartrate  50 mg Oral BID    pantoprazole  40 mg Oral QAM AC    lisinopril  20 mg Oral Daily    And    hydroCHLOROthiazide  25 mg Oral Daily      Infusions:   PRN Meds: diphenhydrAMINE, 25 mg, Q6H PRN   Or  diphenhydrAMINE, 25 mg, Q6H PRN  glucagon (rDNA), 1 mg, PRN  glucose, 15 g, PRN  HYDROcodone-acetaminophen, 1 tablet, Q6H PRN  HYDROcodone-acetaminophen, 2 tablet, Q6H PRN  polyethylene glycol, 17 g, Daily PRN        Labs      Recent Results (from the past 24 hour(s))   POCT Glucose    Collection Time: 02/25/22  5:14 PM   Result Value Ref Range    POC Glucose 116 (H) 70 - 99 MG/DL   POCT Glucose    Collection Time: 02/25/22  7:15 PM   Result Value Ref Range    POC Glucose 160 (H) 70 - 99 MG/DL   Basic Metabolic Panel w/ Reflex to MG    Collection Time: 02/26/22  5:15 AM   Result Value Ref Range    Sodium 139 135 - 145 MMOL/L    Potassium 4.0 3.5 - 5.1 MMOL/L    Chloride 105 99 - 110 mMol/L    CO2 22 21 - 32 MMOL/L    Anion Gap 12 4 - 16    BUN 29 (H) 6 - 23 MG/DL    CREATININE 1.8 (H) 0.9 - 1.3 MG/DL    Glucose 118 (H) 70 - 99 MG/DL    Calcium 9.1 8.3 - 10.6 MG/DL    GFR Non- 37 (L) >60 mL/min/1.73m2    GFR  44 (L) >60 mL/min/1.73m2   POCT Glucose    Collection Time: 02/26/22  8:04 AM   Result Value Ref Range    POC Glucose 121 (H) 70 - 99 MG/DL   POCT Glucose    Collection Time: 02/26/22 11:57 AM   Result Value Ref Range    POC Glucose 166 (H) 70 - 99 MG/DL        Imaging/Diagnostics Last 24 Hours   No results found.     Electronically signed by Will Prince MD on 2/26/2022 at 3:14 PM

## 2022-02-26 NOTE — PROGRESS NOTES
Comprehensive Nutrition Assessment    Type and Reason for Visit:  Initial (LOS)    Nutrition Recommendations/Plan:   Continue current diet as ordered  Please document all po intake  Will monitor po intake, weight trends, poc    Nutrition Assessment:  Pt admitted for debility, PMH: DM, Afib, HTN, CAD, Pt currently on 4 carb choice diet consuming % of most meals documented in the past 72 hr, non significant wt loss noted, will continue to follow at low nutrition risk    Estimated Daily Nutrient Needs:  Energy (kcal):  6019-7435; Weight Used for Energy Requirements:  Current     Protein (g):  78-91; Weight Used for Protein Requirements:  Ideal          Nutrition Related Findings:  Labs; POC glucose 130, 152, 116, 160, Hemoglobin A1c 5.2 (2/17/22), 3+ pitting edema     Wounds:  Surgical Incision       Current Nutrition Therapies:    ADULT DIET; Regular; 4 carb choices (60 gm/meal)    Anthropometric Measures:  · Height: 5' 10.98\" (180.3 cm)  · Current Body Weight: 265 lb 14 oz (120.6 kg)   · Usual Body Weight: 285 lb 4.4 oz (129.4 kg) (10/22/21 per chart review)     · Ideal Body Weight: 172 lbs; % Ideal Body Weight 154.6 %   · BMI: 37.1  · BMI Categories: Obese Class 2 (BMI 35.0 -39.9)       Nutrition Diagnosis:   No nutrition diagnosis at this time     Nutrition Interventions:   Food and/or Nutrient Delivery:  Continue Current Diet  Nutrition Education/Counseling:  No recommendation at this time   Coordination of Nutrition Care:  Continue to monitor while inpatient  Nutrition Monitoring and Evaluation:   Behavioral-Environmental Outcomes:  None Identified   Food/Nutrient Intake Outcomes:  Food and Nutrient Intake  Physical Signs/Symptoms Outcomes:  Biochemical Data,GI Status,Weight,Skin,Fluid Status or Edema,Hemodynamic Status     Discharge Planning:     Too soon to determine     Electronically signed by Tae Izaguirre, MS, RD, LD on 2/25/22 at 10:18 PM EST    Contact: [phone]

## 2022-02-27 LAB
ANION GAP SERPL CALCULATED.3IONS-SCNC: 10 MMOL/L (ref 4–16)
BUN BLDV-MCNC: 27 MG/DL (ref 6–23)
CALCIUM SERPL-MCNC: 9.3 MG/DL (ref 8.3–10.6)
CHLORIDE BLD-SCNC: 104 MMOL/L (ref 99–110)
CO2: 24 MMOL/L (ref 21–32)
CREAT SERPL-MCNC: 1.8 MG/DL (ref 0.9–1.3)
GFR AFRICAN AMERICAN: 44 ML/MIN/1.73M2
GFR NON-AFRICAN AMERICAN: 37 ML/MIN/1.73M2
GLUCOSE BLD-MCNC: 135 MG/DL (ref 70–99)
GLUCOSE BLD-MCNC: 145 MG/DL (ref 70–99)
GLUCOSE BLD-MCNC: 147 MG/DL (ref 70–99)
GLUCOSE BLD-MCNC: 152 MG/DL (ref 70–99)
GLUCOSE BLD-MCNC: 202 MG/DL (ref 70–99)
IRON: 59 UG/DL (ref 59–158)
PCT TRANSFERRIN: 20 % (ref 10–44)
POTASSIUM SERPL-SCNC: 4 MMOL/L (ref 3.5–5.1)
SODIUM BLD-SCNC: 138 MMOL/L (ref 135–145)
TOTAL IRON BINDING CAPACITY: 291 UG/DL (ref 250–450)
UNSATURATED IRON BINDING CAPACITY: 232 UG/DL (ref 110–370)

## 2022-02-27 PROCEDURE — 1200000002 HC SEMI PRIVATE SWING BED

## 2022-02-27 PROCEDURE — 6370000000 HC RX 637 (ALT 250 FOR IP): Performed by: ORTHOPAEDIC SURGERY

## 2022-02-27 PROCEDURE — 80048 BASIC METABOLIC PNL TOTAL CA: CPT

## 2022-02-27 PROCEDURE — 82962 GLUCOSE BLOOD TEST: CPT

## 2022-02-27 RX ADMIN — INSULIN LISPRO 1 UNITS: 100 INJECTION, SOLUTION INTRAVENOUS; SUBCUTANEOUS at 17:57

## 2022-02-27 RX ADMIN — NIACIN 1000 MG: 500 TABLET, FILM COATED, EXTENDED RELEASE ORAL at 20:41

## 2022-02-27 RX ADMIN — INSULIN LISPRO 2 UNITS: 100 INJECTION, SOLUTION INTRAVENOUS; SUBCUTANEOUS at 13:02

## 2022-02-27 RX ADMIN — PANTOPRAZOLE SODIUM 40 MG: 40 TABLET, DELAYED RELEASE ORAL at 05:54

## 2022-02-27 RX ADMIN — ACETAMINOPHEN 650 MG: 325 TABLET ORAL at 09:48

## 2022-02-27 RX ADMIN — GABAPENTIN 800 MG: 400 CAPSULE ORAL at 15:05

## 2022-02-27 RX ADMIN — LISINOPRIL 20 MG: 20 TABLET ORAL at 09:47

## 2022-02-27 RX ADMIN — APIXABAN 5 MG: 5 TABLET, FILM COATED ORAL at 20:41

## 2022-02-27 RX ADMIN — ATORVASTATIN CALCIUM 20 MG: 20 TABLET, FILM COATED ORAL at 20:41

## 2022-02-27 RX ADMIN — GABAPENTIN 800 MG: 400 CAPSULE ORAL at 20:41

## 2022-02-27 RX ADMIN — HYDROCHLOROTHIAZIDE 25 MG: 25 TABLET ORAL at 09:47

## 2022-02-27 RX ADMIN — METOPROLOL TARTRATE 50 MG: 50 TABLET, FILM COATED ORAL at 09:47

## 2022-02-27 RX ADMIN — ACETAMINOPHEN 650 MG: 325 TABLET ORAL at 20:41

## 2022-02-27 RX ADMIN — METOPROLOL TARTRATE 50 MG: 50 TABLET, FILM COATED ORAL at 20:41

## 2022-02-27 RX ADMIN — ALLOPURINOL 100 MG: 100 TABLET ORAL at 09:48

## 2022-02-27 RX ADMIN — AMLODIPINE BESYLATE 10 MG: 10 TABLET ORAL at 09:48

## 2022-02-27 RX ADMIN — ACETAMINOPHEN 650 MG: 325 TABLET ORAL at 15:04

## 2022-02-27 RX ADMIN — INSULIN LISPRO 1 UNITS: 100 INJECTION, SOLUTION INTRAVENOUS; SUBCUTANEOUS at 20:40

## 2022-02-27 RX ADMIN — APIXABAN 5 MG: 5 TABLET, FILM COATED ORAL at 09:47

## 2022-02-27 RX ADMIN — GABAPENTIN 800 MG: 400 CAPSULE ORAL at 09:47

## 2022-02-27 ASSESSMENT — PAIN DESCRIPTION - LOCATION: LOCATION: KNEE

## 2022-02-27 ASSESSMENT — PAIN - FUNCTIONAL ASSESSMENT: PAIN_FUNCTIONAL_ASSESSMENT: PREVENTS OR INTERFERES SOME ACTIVE ACTIVITIES AND ADLS

## 2022-02-27 ASSESSMENT — PAIN SCALES - GENERAL
PAINLEVEL_OUTOF10: 0
PAINLEVEL_OUTOF10: 1
PAINLEVEL_OUTOF10: 3
PAINLEVEL_OUTOF10: 3
PAINLEVEL_OUTOF10: 0

## 2022-02-27 ASSESSMENT — PAIN DESCRIPTION - ONSET: ONSET: GRADUAL

## 2022-02-27 ASSESSMENT — PAIN DESCRIPTION - PAIN TYPE: TYPE: SURGICAL PAIN

## 2022-02-27 ASSESSMENT — PAIN DESCRIPTION - FREQUENCY: FREQUENCY: INTERMITTENT

## 2022-02-27 ASSESSMENT — PAIN DESCRIPTION - ORIENTATION: ORIENTATION: RIGHT

## 2022-02-27 ASSESSMENT — PAIN DESCRIPTION - DESCRIPTORS: DESCRIPTORS: ACHING

## 2022-02-27 ASSESSMENT — PAIN DESCRIPTION - PROGRESSION: CLINICAL_PROGRESSION: GRADUALLY WORSENING

## 2022-02-28 LAB
ANION GAP SERPL CALCULATED.3IONS-SCNC: 9 MMOL/L (ref 4–16)
BASOPHILS ABSOLUTE: 0.1 K/CU MM
BASOPHILS RELATIVE PERCENT: 0.7 % (ref 0–1)
BUN BLDV-MCNC: 27 MG/DL (ref 6–23)
CALCIUM SERPL-MCNC: 9.2 MG/DL (ref 8.3–10.6)
CHLORIDE BLD-SCNC: 105 MMOL/L (ref 99–110)
CO2: 24 MMOL/L (ref 21–32)
CREAT SERPL-MCNC: 1.9 MG/DL (ref 0.9–1.3)
DIFFERENTIAL TYPE: ABNORMAL
EOSINOPHILS ABSOLUTE: 0.4 K/CU MM
EOSINOPHILS RELATIVE PERCENT: 5.8 % (ref 0–3)
GFR AFRICAN AMERICAN: 42 ML/MIN/1.73M2
GFR NON-AFRICAN AMERICAN: 34 ML/MIN/1.73M2
GLUCOSE BLD-MCNC: 136 MG/DL (ref 70–99)
GLUCOSE BLD-MCNC: 150 MG/DL (ref 70–99)
GLUCOSE BLD-MCNC: 152 MG/DL (ref 70–99)
GLUCOSE BLD-MCNC: 188 MG/DL (ref 70–99)
GLUCOSE BLD-MCNC: 222 MG/DL (ref 70–99)
HCT VFR BLD CALC: 30.2 % (ref 42–52)
HEMOGLOBIN: 9.4 GM/DL (ref 13.5–18)
IMMATURE NEUTROPHIL %: 0.6 % (ref 0–0.43)
LYMPHOCYTES ABSOLUTE: 1.3 K/CU MM
LYMPHOCYTES RELATIVE PERCENT: 17.3 % (ref 24–44)
MCH RBC QN AUTO: 33.3 PG (ref 27–31)
MCHC RBC AUTO-ENTMCNC: 31.1 % (ref 32–36)
MCV RBC AUTO: 107.1 FL (ref 78–100)
MONOCYTES ABSOLUTE: 0.6 K/CU MM
MONOCYTES RELATIVE PERCENT: 8.9 % (ref 0–4)
PDW BLD-RTO: 13.9 % (ref 11.7–14.9)
PLATELET # BLD: 168 K/CU MM (ref 140–440)
PMV BLD AUTO: 9 FL (ref 7.5–11.1)
POTASSIUM SERPL-SCNC: 4.5 MMOL/L (ref 3.5–5.1)
RBC # BLD: 2.82 M/CU MM (ref 4.6–6.2)
SEGMENTED NEUTROPHILS ABSOLUTE COUNT: 4.8 K/CU MM
SEGMENTED NEUTROPHILS RELATIVE PERCENT: 66.7 % (ref 36–66)
SODIUM BLD-SCNC: 138 MMOL/L (ref 135–145)
TOTAL IMMATURE NEUTOROPHIL: 0.04 K/CU MM
WBC # BLD: 7.2 K/CU MM (ref 4–10.5)

## 2022-02-28 PROCEDURE — 80048 BASIC METABOLIC PNL TOTAL CA: CPT

## 2022-02-28 PROCEDURE — 6370000000 HC RX 637 (ALT 250 FOR IP): Performed by: ORTHOPAEDIC SURGERY

## 2022-02-28 PROCEDURE — 85025 COMPLETE CBC W/AUTO DIFF WBC: CPT

## 2022-02-28 PROCEDURE — 82962 GLUCOSE BLOOD TEST: CPT

## 2022-02-28 PROCEDURE — 36415 COLL VENOUS BLD VENIPUNCTURE: CPT

## 2022-02-28 PROCEDURE — 97530 THERAPEUTIC ACTIVITIES: CPT

## 2022-02-28 PROCEDURE — 97110 THERAPEUTIC EXERCISES: CPT

## 2022-02-28 PROCEDURE — 1200000002 HC SEMI PRIVATE SWING BED

## 2022-02-28 RX ADMIN — GABAPENTIN 800 MG: 400 CAPSULE ORAL at 20:00

## 2022-02-28 RX ADMIN — ATORVASTATIN CALCIUM 20 MG: 20 TABLET, FILM COATED ORAL at 20:00

## 2022-02-28 RX ADMIN — GABAPENTIN 800 MG: 400 CAPSULE ORAL at 08:37

## 2022-02-28 RX ADMIN — PANTOPRAZOLE SODIUM 40 MG: 40 TABLET, DELAYED RELEASE ORAL at 05:38

## 2022-02-28 RX ADMIN — INSULIN LISPRO 1 UNITS: 100 INJECTION, SOLUTION INTRAVENOUS; SUBCUTANEOUS at 19:59

## 2022-02-28 RX ADMIN — APIXABAN 5 MG: 5 TABLET, FILM COATED ORAL at 20:00

## 2022-02-28 RX ADMIN — INSULIN LISPRO 1 UNITS: 100 INJECTION, SOLUTION INTRAVENOUS; SUBCUTANEOUS at 12:43

## 2022-02-28 RX ADMIN — AMLODIPINE BESYLATE 10 MG: 10 TABLET ORAL at 08:37

## 2022-02-28 RX ADMIN — ACETAMINOPHEN 650 MG: 325 TABLET ORAL at 20:00

## 2022-02-28 RX ADMIN — INSULIN LISPRO 1 UNITS: 100 INJECTION, SOLUTION INTRAVENOUS; SUBCUTANEOUS at 08:38

## 2022-02-28 RX ADMIN — METOPROLOL TARTRATE 50 MG: 50 TABLET, FILM COATED ORAL at 20:01

## 2022-02-28 RX ADMIN — GABAPENTIN 800 MG: 400 CAPSULE ORAL at 14:35

## 2022-02-28 RX ADMIN — HYDROCHLOROTHIAZIDE 25 MG: 25 TABLET ORAL at 08:37

## 2022-02-28 RX ADMIN — ALLOPURINOL 100 MG: 100 TABLET ORAL at 08:37

## 2022-02-28 RX ADMIN — ACETAMINOPHEN 650 MG: 325 TABLET ORAL at 14:35

## 2022-02-28 RX ADMIN — INSULIN LISPRO 1 UNITS: 100 INJECTION, SOLUTION INTRAVENOUS; SUBCUTANEOUS at 17:19

## 2022-02-28 RX ADMIN — METOPROLOL TARTRATE 50 MG: 50 TABLET, FILM COATED ORAL at 08:37

## 2022-02-28 RX ADMIN — ACETAMINOPHEN 650 MG: 325 TABLET ORAL at 08:36

## 2022-02-28 RX ADMIN — NIACIN 1000 MG: 500 TABLET, FILM COATED, EXTENDED RELEASE ORAL at 20:01

## 2022-02-28 RX ADMIN — LISINOPRIL 20 MG: 20 TABLET ORAL at 08:37

## 2022-02-28 RX ADMIN — APIXABAN 5 MG: 5 TABLET, FILM COATED ORAL at 08:37

## 2022-02-28 ASSESSMENT — PAIN SCALES - GENERAL
PAINLEVEL_OUTOF10: 0
PAINLEVEL_OUTOF10: 3
PAINLEVEL_OUTOF10: 0
PAINLEVEL_OUTOF10: 4
PAINLEVEL_OUTOF10: 0

## 2022-02-28 ASSESSMENT — PAIN DESCRIPTION - FREQUENCY: FREQUENCY: INTERMITTENT

## 2022-02-28 ASSESSMENT — PAIN DESCRIPTION - DESCRIPTORS: DESCRIPTORS: ACHING;DISCOMFORT

## 2022-02-28 ASSESSMENT — PAIN - FUNCTIONAL ASSESSMENT: PAIN_FUNCTIONAL_ASSESSMENT: PREVENTS OR INTERFERES SOME ACTIVE ACTIVITIES AND ADLS

## 2022-02-28 ASSESSMENT — PAIN DESCRIPTION - LOCATION: LOCATION: KNEE

## 2022-02-28 ASSESSMENT — PAIN DESCRIPTION - ONSET: ONSET: GRADUAL

## 2022-02-28 ASSESSMENT — PAIN DESCRIPTION - PROGRESSION: CLINICAL_PROGRESSION: GRADUALLY WORSENING

## 2022-02-28 ASSESSMENT — PAIN DESCRIPTION - ORIENTATION: ORIENTATION: RIGHT

## 2022-02-28 ASSESSMENT — PAIN DESCRIPTION - PAIN TYPE: TYPE: CHRONIC PAIN

## 2022-02-28 NOTE — CARE COORDINATION
[facility] due to being confined to one level of the home, and is physically incapable of utilizing regular toilet facilities. Current body weight is Weight: 260 lb (117.9 kg).

## 2022-02-28 NOTE — CARE COORDINATION
CM met with the patient for follow-up discussion regarding discharge planning. Patient stated that he would like to return home tomorrow (3/1/22). Patient would like a referral to [facility] ([phone]) to continue PT/OT at home and then possibly transition to outpatient therapy. Patient stated that he has a walker available at home but would also like a bedside commode with arms. 9:10 AM  CM faxed the DME order for a bedside commode to [facility] to initiate the referral.  CM will follow. 9:23 AM  CM faxed the [facility] referral to [facility] ([phone]) to initiate referral.  CM will follow. 10:30 AM  CM received call from CM DME stating that the patient's [facility] will be delivered to the bedside tomorrow morning (3/1). CM will follow.

## 2022-02-28 NOTE — PROGRESS NOTES
Occupational Therapy Treatment Note  Name: Paul Kan MRN: [id number] :   1945   Date:  2022   Admission Date: 2022 Room:  [address] [place] [address]   Restrictions/Precautions:  Restrictions/Precautions  Restrictions/Precautions: Weight Bearing,Fall Risk  Required Braces or Orthoses?: No     Communication with other providers:   Cleared for treatment by RN. Subjective:  Patient states:  \"I'm leaving tomorrow\"  Pain:   Location, Type, Intensity (0/10 to 10/10): No pain    Objective:    Observation: Pt alert and oriented    Treatment, including education:  Transfers  Supine to sit :Sup  Sit to supine :Sup  Scooting :Sup  Rolling :Sup  Sit to stand :CGA  Stand to sit :CGA  SPT:CGA      Therapeutic Exercise:  PM session: Cues were given for technique, safety, recruitment, and rationale. Cues were verbal and/or tactile. Pt completed Nustep x 11 min on level 3 x 400 steps with 36 steps per min average        Therapeutic Activity Training: Pt stood x 4 min x 2 with CGA with RW to facilitate increased endurance/strength for ADL tasks and transfers. Pt completed functional mobility with RW x 90' x 1, 183 with SBA. PM session: Pt completed functional mobility with RW x 93' and 60' with CGA using RW. Safety Measures: Gait belt used, Left in bed, Pull/Bed Alarm activated and call light left in reach        Assessment / Impression:        Patient's tolerance of treatment: Good   Adverse Reaction: None  Significant change in status and impact:  None  Barriers to improvement:  Dec strength and endurance    Plan for Next Session:    Continue with POC.     Time in:  5327 4275  Time out:  2531 1505   Total treatment time: 20 + 20=40  Billed Units:2TA, 1 TE  Electronically signed by:    Shakir Álvarez, [address]    2022, 3:28 PM    Previously filed values:  Patient Goals   Patient goals : Pt hopes to go home and do what he was doing before but using a walker  Short term goals  Time Frame for Short term goals: until discharge  Short term goal 1: Pt will be MI for functional transfers to chair, toilet, or bed w/o LOB or falls using good walker safety  Short term goal 2: Pt will be I UB adls/MI for LB adls using necessary a.e.   Short term goal 3: Pt will be MI for toileting and for grooming while standing at the sink  Short term goal 4: Pt will be min vc for BUE strengthening to assist with transfers and endurance

## 2022-02-28 NOTE — PROGRESS NOTES
the left CFV. The Left GSV is non-compressible with no evidence of flow just past the saphenofemoral junction to the distal calf.  Hyperlipidemia     Hypertension     Kidney stones     Leg swelling 12/09/2019    Bilateral leg swelling    Neuropathy     Nonrheumatic aortic valve stenosis 2/15/2022    Obesity     JAYE on CPAP 06/26/2018    On CPAP 5/2018    Persistent atrial fibrillation ([address] 01/29/2019    New onset 1/2019    Syncope and collapse 12/09/2019    Thoracic aortic aneurysm ([address] 02/2011    4.5    Type II or unspecified type diabetes mellitus without mention of complication, not stated as uncontrolled      Past Surgical History:   Procedure Laterality Date    CARDIOVERSION  07/08/2019    CATARACT REMOVAL WITH IMPLANT Right 04/12/2019    CATARACT REMOVAL WITH IMPLANT Left 05/10/2019    COLON SURGERY  2007     COLONOSCOPY  7791;2678    colon polyps removed    HERNIA REPAIR  2008    HERNIA REPAIR  2009    INTRACAPSULAR CATARACT EXTRACTION Right 4/12/2019    EYE CATARACT EMULSIFICATION IOL IMPLANT performed by Antione Pena MD at [address] [place] [address] Left 5/10/2019    EYE CATARACT EMULSIFICATION IOL IMPLANT performed by Antione Pena MD at [facility] 63 Left 06/25/2019    [employer] dual permanent pacer-Livingston Wheeler XT DR MONIQUE SureScan     TOTAL KNEE ARTHROPLASTY Right 2/16/2022    RIGHT KNEE TOTAL ARTHROPLASTY performed by Maria De Jesus Dye DO at [address] [place] [address] OR     Rehabilitation Diagnosis: R knee stiffness,Pt is a pleasant 64year old male currently presenting with decreased ROM, stregnth and balance. Pt could benefit from continued skilled PT in order to address balance and strength. Total knee arthroplasty (Right, 2/16/2022). Restrictions/Precautions: Weight Bearing As Tolerated    Subjective: Patient sitting up in recliner and agreeable to therapy.     Initial Pain level:   3/10    Goals:                   Short term goals  Time Frame for Short term goals: 1 week or untill discharge  Short term goal 1: Pt will ambulate 150feet with least restricitve AD with supervision  Short term goal 2: Pt will transfer from commode, bed and recliner with SBA  Short term goal 3: Pt will axscend and descend 6 inch step 2x with CGA               Plan of Care:             Times per week: Mon-Sat 6x/week            Current Treatment Recommendations: Strengthening,IADL Training,Home Exercise Program,ROM,Safety Education & Rexanne Favor Training,Patient/Caregiver Education & Training,Functional Mobility Training,Transfer Training,Gait Training,ADL/Self-care Training,Stair training,Pain Management      Objective Findings:  Supine knee flex 99 deg. AROM  knee ext after prop -7 deg       Date: 2/22/22 Date:   2-23-22 Date:  2-23-22 pm session Date: 2-24-22 Date: 2?25/22 2/28/2022   Bed mobility S  Sup>sit sba with bed rail. Sit >sup min RLE MOD IND sup<>sit MOD I x   Sit to stand transfer Min A MIN, struggles from low seat Cg from bed cg CGA  CGA   Stand to sit transfer CGA Min to control decent Cg from bed Cg to control decent  CGA; decreased control to sit CGA with decreased control to sit   Commode transfer x x x x x x   Standing tolerance  x   Pt demonstrates good standing tolerance at this date. For ambulation and step activity   Ambulation Using  ft with SBA Gait training with RW 45+132 ft limited by pain. Gait training with RW x ft with cg Gait training with RW x 13+31+02  Ft reciprocal pattern. with cg, rest break due to pain and to correct posture. Cues to decrease lean on walker as able. Gait with RW x 60' + 61' + 48'. Pt demonstrates reciprocal gait with 3 rest breaks to correct posture and balance. Gait with RW 90'+ 183' with RW. CGA   Stairs   x Up /down 4 in step in p// bars x 2 trails, cg and vc for tech.  Not performed this AM.  Step up onto 6\" step with B UE in // bars     Exercises:   Exercise/Equipment/Modalities  Date: 2/22/22 Date:   2-23-22 Date: 2-23-22 pm session Date: 2-24-22 Date 2/25/22   AP x10 X10, cues to hold 3-5 sec      SLR  L x10 AAROM RLE x 10      Assisted SLR R x5   x10    Supine ABD/ADD x10 R/L AAROM x 10 RLE, AROM LLE. Glute/ quad set  x10 R/l x10 BLE  x10 x10 3\"   Heel slides X 5 R Reclined x 10  x10 x10   SAQ Small roll R x10    x10 5\"   Seated heel slides R x10   x15 x10 5\"   LAQ   R x5 x10  x15 x10 5\"   Seated knee flex  x10   x15 x10   Knee ext prop   Under heel x 3 mins. Knee ext with quad set -9 deg. Roll under heel x 2 mins -7 deg    Seated marching      x10     Modality/intervention used:   [x ] Therapeutic Exercise   [ ] Modalities:   [x ] Therapeutic Activity     [ ] Ultrasound   [ ] Elec Stim   [x ]x Gait Training     [ ] Cervical Traction  [ ] Lumbar Traction   [ ] Neuromuscular Re-education   [ ] Cold/hotpack  [ ] Iontophoresis   [ ] Instruction in HEP     [ ] Vasopneumatic   [ ] Manual Therapy   [ ] Aquatic Therapy     Other Therapeutic Activities:     Home Exercise Program/Education provided to patient: cont above 2-3 x day    Manual Treatments: x    Communication with other providers: nurse cleared pt for therapy    Adverse reactions to treatment limited by pain but motivated.   Treatment/Activity Tolerance:   [ ] Patient limited by fatigue [ x] Patient limited by pain [ ] Patient limited by other medical complications [ x] Patient tolerated therapy well  [ ] Other:     Post Tx Pain Rating :8 /10     Safety Precautions:   [ ] left in bed  [x ] left in chair [x ] call light within reach  [ ] bed alarm on  [x ] personal alarm on  [ ] other staff present:    Plan:   [x ] Continue per plan of care [ ] Alex Ontiveros current plan   [ ] Plan of care initiated [ ] Hold pending MD visit [ ] Discharge     Plan for Next Session:     Time In: 1145  Time Out: 1205  Total Treatment Minutes: 20  Billed Units: 1 ta    Signed: Esperanza Arizmendi PTA 2/28/2022, 2:05 PM

## 2022-02-28 NOTE — PROGRESS NOTES
Kirstin Chase was assessed on the above date and was found to have medical conditions  that included status-post right total knee arthroplasty (2/16/22) and postoperative weakness and/or pain restricting activity requiring commode chair with fixed arms.     Current patient weight: Weight: 260 lb (117.9 kg)   Current patient height: Height: 5' 10.98\" (180.3 cm)   Diagnosis: Status-post right total knee arthroplasty   Duration: Tia Yin PA-C  Hospitalist  2/28/2022, 6:22 PM

## 2022-02-28 NOTE — PROGRESS NOTES
[facility] Hospitalist Progress Note      Name:  Aidee Aviles /Age/Sex:   (72 y.o. male)   MRN & CSN:  [id number] & [id number] Encounter Date/Time: 2022 3:14 PM EST    Location:   PCP: Loyda Mojica MD       Hospital Day: 10    Assessment and Plan:     Hospital course:   Burke Nunez a 64 y. o. male with a history of primary OA of the right knee underwent a right knee total arthroplasty on .  Pt was later admitted to [facility] for a swing bed (PT/OT) on .      Hospital problems     Physical debility  -doing well with PT/OT post surgery   -Home healthcare referral made by CM as well as bedside commode   -Anticipated DC in AM on 3/1       Primary osteo-arthritis of the right knee s/p elective right total knee arthroplasty   -Surgery on   -Continue pain control.  Only using Tylenol at this time   -Is on Eliquis for persistent atrial fibrillation so will continue for DVT prophylaxis as well       Macrocytic anemia   - in 2022 the patient hemoglobin was 11, dropped to 8-9 but remaining stable without signs of post surgical bleeding  - now his hemoglobin is 8-9   - B12/folate normal, iron panel normal        History of atrial fibrillation  -Is status post pacemaker in the past  - rate controlled   -Continue home metoprolol and Eliquis        Type 2 diabetes  -chronic   -Continue sliding scale as well as scheduled Humalog  -Blood sugars are well controlled         Essential hypertension  - chronic   - Continue lisinopril and hydrochlorothiazide         JAYE  -chronic   - Continue CPAP at night         CKD stage IIIb  -cr is trending upwards on    - the patient is on lisinopril and HCTZ but it appears his baseline for the past  Year has been 1.6-2  - his cr today is 1.8   - will continue to monitor while on lisinopril and HCTZ; check on          History of Valvular disease   - noted to have a history aortic stenosis,  pulmonary hypertension  history of mitral and tricuspid sounds are normoactive. Rectal exam deferred.             No costovertebral angle tenderness. Normal appearing external genitalia. Prince catheter is not present.     HEME/LYMPH            No palpable cervical lymphadenopathy and no hepatosplenomegaly. No petechiae or ecchymoses.     MSK    No gross joint deformities. right knee staples are intact. No erythema around the knee. No tenderness. ROM is intact.      SKIN    Normal coloration, warm, dry.      NEURO           Cranial nerves appear grossly intact, normal speech, no lateralizing weakness.     PSYCH            Awake, alert, oriented x 4. Affect appropriate.     Medications:   Medications:    atorvastatin  20 mg Oral Nightly    niacin  1,000 mg Oral Nightly    acetaminophen  650 mg Oral Q6H    insulin lispro  0.05 Units/kg SubCUTAneous TID WC    insulin lispro  0-6 Units SubCUTAneous TID WC    insulin lispro  0-3 Units SubCUTAneous Nightly    allopurinol  100 mg Oral Daily    amLODIPine  10 mg Oral Daily    apixaban  5 mg Oral BID    gabapentin  800 mg Oral TID    metoprolol tartrate  50 mg Oral BID    pantoprazole  40 mg Oral QAM AC    lisinopril  20 mg Oral Daily    And    hydroCHLOROthiazide  25 mg Oral Daily      Infusions:   PRN Meds: diphenhydrAMINE, 25 mg, Q6H PRN   Or  diphenhydrAMINE, 25 mg, Q6H PRN  glucagon (rDNA), 1 mg, PRN  glucose, 15 g, PRN  HYDROcodone-acetaminophen, 1 tablet, Q6H PRN  HYDROcodone-acetaminophen, 2 tablet, Q6H PRN  polyethylene glycol, 17 g, Daily PRN        Labs      Recent Results (from the past 24 hour(s))   POCT Glucose    Collection Time: 02/27/22 12:06 PM   Result Value Ref Range    POC Glucose 202 (H) 70 - 99 MG/DL   POCT Glucose    Collection Time: 02/27/22  5:01 PM   Result Value Ref Range    POC Glucose 145 (H) 70 - 99 MG/DL   POCT Glucose    Collection Time: 02/27/22  7:11 PM   Result Value Ref Range    POC Glucose 152 (H) 70 - 99 MG/DL   Basic Metabolic Panel w/ Reflex to MG    Collection Time: 02/28/22 5:35 AM   Result Value Ref Range    Sodium 138 135 - 145 MMOL/L    Potassium 4.5 3.5 - 5.1 MMOL/L    Chloride 105 99 - 110 mMol/L    CO2 24 21 - 32 MMOL/L    Anion Gap 9 4 - 16    BUN 27 (H) 6 - 23 MG/DL    CREATININE 1.9 (H) 0.9 - 1.3 MG/DL    Glucose 136 (H) 70 - 99 MG/DL    Calcium 9.2 8.3 - 10.6 MG/DL    GFR Non- 34 (L) >60 mL/min/1.73m2    GFR  42 (L) >60 mL/min/1.73m2   CBC with Auto Differential    Collection Time: 02/28/22  5:35 AM   Result Value Ref Range    WBC 7.2 4.0 - 10.5 K/CU MM    RBC 2.82 (L) 4.6 - 6.2 M/CU MM    Hemoglobin 9.4 (L) 13.5 - 18.0 GM/DL    Hematocrit 30.2 (L) 42 - 52 %    .1 (H) 78 - 100 FL    MCH 33.3 (H) 27 - 31 PG    MCHC 31.1 (L) 32.0 - 36.0 %    RDW 13.9 11.7 - 14.9 %    Platelets 247 564 - 248 K/CU MM    MPV 9.0 7.5 - 11.1 FL    Differential Type AUTOMATED DIFFERENTIAL     Segs Relative 66.7 (H) 36 - 66 %    Lymphocytes % 17.3 (L) 24 - 44 %    Monocytes % 8.9 (H) 0 - 4 %    Eosinophils % 5.8 (H) 0 - 3 %    Basophils % 0.7 0 - 1 %    Segs Absolute 4.8 K/CU MM    Lymphocytes Absolute 1.3 K/CU MM    Monocytes Absolute 0.6 K/CU MM    Eosinophils Absolute 0.4 K/CU MM    Basophils Absolute 0.1 K/CU MM    Immature Neutrophil % 0.6 (H) 0 - 0.43 %    Total Immature Neutrophil 0.04 K/CU MM   POCT Glucose    Collection Time: 02/28/22  7:45 AM   Result Value Ref Range    POC Glucose 150 (H) 70 - 99 MG/DL        Imaging/Diagnostics Last 24 Hours   No results found.     Carlene Spears PA-C  Hospitalist  2/28/2022, 8:22 AM

## 2022-03-01 VITALS
DIASTOLIC BLOOD PRESSURE: 71 MMHG | SYSTOLIC BLOOD PRESSURE: 112 MMHG | WEIGHT: 260 LBS | HEART RATE: 61 BPM | BODY MASS INDEX: 36.4 KG/M2 | TEMPERATURE: 96.4 F | HEIGHT: 71 IN | RESPIRATION RATE: 16 BRPM | OXYGEN SATURATION: 96 %

## 2022-03-01 LAB
ANION GAP SERPL CALCULATED.3IONS-SCNC: 9 MMOL/L (ref 4–16)
BUN BLDV-MCNC: 31 MG/DL (ref 6–23)
CALCIUM SERPL-MCNC: 9.2 MG/DL (ref 8.3–10.6)
CHLORIDE BLD-SCNC: 107 MMOL/L (ref 99–110)
CO2: 22 MMOL/L (ref 21–32)
CREAT SERPL-MCNC: 1.9 MG/DL (ref 0.9–1.3)
GFR AFRICAN AMERICAN: 42 ML/MIN/1.73M2
GFR NON-AFRICAN AMERICAN: 34 ML/MIN/1.73M2
GLUCOSE BLD-MCNC: 147 MG/DL (ref 70–99)
GLUCOSE BLD-MCNC: 155 MG/DL (ref 70–99)
POTASSIUM SERPL-SCNC: 4.6 MMOL/L (ref 3.5–5.1)
SODIUM BLD-SCNC: 138 MMOL/L (ref 135–145)

## 2022-03-01 PROCEDURE — 36415 COLL VENOUS BLD VENIPUNCTURE: CPT

## 2022-03-01 PROCEDURE — 97530 THERAPEUTIC ACTIVITIES: CPT

## 2022-03-01 PROCEDURE — 82962 GLUCOSE BLOOD TEST: CPT

## 2022-03-01 PROCEDURE — 97535 SELF CARE MNGMENT TRAINING: CPT

## 2022-03-01 PROCEDURE — 80048 BASIC METABOLIC PNL TOTAL CA: CPT

## 2022-03-01 PROCEDURE — 97110 THERAPEUTIC EXERCISES: CPT

## 2022-03-01 PROCEDURE — 6370000000 HC RX 637 (ALT 250 FOR IP): Performed by: ORTHOPAEDIC SURGERY

## 2022-03-01 RX ADMIN — APIXABAN 5 MG: 5 TABLET, FILM COATED ORAL at 08:58

## 2022-03-01 RX ADMIN — ALLOPURINOL 100 MG: 100 TABLET ORAL at 08:58

## 2022-03-01 RX ADMIN — HYDROCHLOROTHIAZIDE 25 MG: 25 TABLET ORAL at 08:57

## 2022-03-01 RX ADMIN — PANTOPRAZOLE SODIUM 40 MG: 40 TABLET, DELAYED RELEASE ORAL at 05:58

## 2022-03-01 RX ADMIN — LISINOPRIL 20 MG: 20 TABLET ORAL at 08:58

## 2022-03-01 RX ADMIN — GABAPENTIN 800 MG: 400 CAPSULE ORAL at 08:57

## 2022-03-01 RX ADMIN — METOPROLOL TARTRATE 50 MG: 50 TABLET, FILM COATED ORAL at 08:57

## 2022-03-01 RX ADMIN — AMLODIPINE BESYLATE 10 MG: 10 TABLET ORAL at 08:58

## 2022-03-01 RX ADMIN — ACETAMINOPHEN 650 MG: 325 TABLET ORAL at 08:57

## 2022-03-01 RX ADMIN — INSULIN LISPRO 1 UNITS: 100 INJECTION, SOLUTION INTRAVENOUS; SUBCUTANEOUS at 09:00

## 2022-03-01 ASSESSMENT — PAIN SCALES - GENERAL
PAINLEVEL_OUTOF10: 0
PAINLEVEL_OUTOF10: 8
PAINLEVEL_OUTOF10: 0

## 2022-03-01 ASSESSMENT — PAIN DESCRIPTION - DESCRIPTORS: DESCRIPTORS: ACHING;DISCOMFORT

## 2022-03-01 ASSESSMENT — PAIN DESCRIPTION - LOCATION: LOCATION: KNEE

## 2022-03-01 ASSESSMENT — PAIN DESCRIPTION - ORIENTATION: ORIENTATION: RIGHT

## 2022-03-01 ASSESSMENT — PAIN DESCRIPTION - PAIN TYPE: TYPE: CHRONIC PAIN

## 2022-03-01 NOTE — PROGRESS NOTES
Occupational Therapy Treatment Note  Name: Sha Ragsdale MRN: [id number] :   1945   Date:  3/1/2022   Admission Date: 2022 Room:  [address] [place] [address]   Restrictions/Precautions:  Restrictions/Precautions  Restrictions/Precautions: Weight Bearing,Fall Risk  Required Braces or Orthoses?: No     Communication with other providers:   Cleared for treatment by RN. Subjective:  Patient states:  \"I'm happy to go home today\"  Pain:   Location, Type, Intensity (0/10 to 10/10): No pain    Objective:    Observation: Pt alert and oriented    Treatment, including education:  Transfers  Supine to sit :Sup  Sit to supine :Sup  Scooting :Sup  Rolling :Sup  Sit to stand :CGA  Stand to sit :CGA  SPT:CGA      Self Care Training:   Activities performed today included the following:    Grooming  Mod I to brush teeth and hair. Bathing   CGA to wash buttocks and maciej care in stance at EOB with RW.    UB Dress  DONNED GOWN    LB Dress Sup to don pants and underwear and donned shoes. Therapeutic Activity Training: Pt stood x 5 min x 1 with CGA with RW to facilitate increased endurance/strength for ADL tasks and transfers. Safety Measures: Gait belt used, Left in recliner, Pull/Bed Alarm activated and call light left in reach        Assessment / Impression:        Patient's tolerance of treatment: Good   Adverse Reaction: None  Significant change in status and impact:  None  Barriers to improvement:  Dec strength and endurance    Plan for Next Session:    Continue with POC.     Time in:  1000  Time out:  1040  Total treatment time:  40  Billed Units:1 TA, 2 ADL   Electronically signed by:    Maisha Manrique, [address]    3/1/2022, 8:23 AM    Previously filed values:  Patient Goals   Patient goals : Pt hopes to go home and do what he was doing before but using a walker  Short term goals  Time Frame for Short term goals: until discharge  Short term goal 1: Pt will be MI for functional transfers to chair, toilet, or bed w/o LOB or falls using good walker safety  Short term goal 2: Pt will be I UB adls/MI for LB adls using necessary a.e.   Short term goal 3: Pt will be MI for toileting and for grooming while standing at the sink  Short term goal 4: Pt will be min vc for BUE strengthening to assist with transfers and endurance

## 2022-03-01 NOTE — PROGRESS NOTES
Pt participated actively with pet therapy dog during 1:1 session. Pt expressed enjoyment in the activity.     Electronically signed by LAURA Martinez MA on 3/1/2022 at 3:52 PM

## 2022-03-01 NOTE — CARE COORDINATION
RUBEN met with the patient to notify him that a [facility] would be delivered to his room this morning and the referral to [facility] has been completed. Patient voiced that his wife will provide transportation home today. 2:38 PM  CM faxed the discharge summary and the patient's discharge instructions to [facility]. RUBEN also called [facility] to notify them of the patient's discharge.

## 2022-03-01 NOTE — PLAN OF CARE
Problem: Falls - Risk of:  Goal: Will remain free from falls  Description: Will remain free from falls  2/28/2022 2058 by Billy Quintero RN  Outcome: Ongoing  2/28/2022 0918 by Aubrie Branham RN  Outcome: Ongoing  Note: Pt at risk for falls. Bed is low. Bed alarm is activated. Call light and assistive devices within reach.    Goal: Absence of physical injury  Description: Absence of physical injury  2/28/2022 2058 by Billy Quintero RN  Outcome: Ongoing  2/28/2022 0918 by Aubrie Branham RN  Outcome: Ongoing     Problem: Discharge Planning:  Goal: Discharged to appropriate level of care  Description: Discharged to appropriate level of care  2/28/2022 2058 by Billy Quintero RN  Outcome: Ongoing  2/28/2022 0918 by Aubrie Branham RN  Outcome: Ongoing     Problem: Mobility - Impaired:  Goal: Mobility will improve  Description: Mobility will improve  2/28/2022 2058 by Billy Quintero RN  Outcome: Ongoing  2/28/2022 0918 by Aubrie Branham RN  Outcome: Ongoing     Problem: Infection - Surgical Site:  Goal: Will show no infection signs and symptoms  Description: Will show no infection signs and symptoms  2/28/2022 2058 by Billy Quintero RN  Outcome: Ongoing  2/28/2022 0918 by Aubrie Branham RN  Outcome: Ongoing     Problem: Pain - Acute:  Goal: Pain level will decrease  Description: Pain level will decrease  2/28/2022 2058 by Billy Quintero RN  Outcome: Ongoing  2/28/2022 0918 by Aburie Branham RN  Outcome: Ongoing     Problem: Pain:  Goal: Pain level will decrease  Description: Pain level will decrease  2/28/2022 2058 by Billy Quintero RN  Outcome: Ongoing  2/28/2022 0918 by Aubrie Branham RN  Outcome: Ongoing  Goal: Control of acute pain  Description: Control of acute pain  2/28/2022 2058 by Billy Quintero RN  Outcome: Ongoing  2/28/2022 0918 by Aubrie Branham RN  Outcome: Ongoing  Goal: Control of chronic pain  Description: Control of chronic pain  2/28/2022 2058 by Nolan Camp Fuentes Cho RN  Outcome: Ongoing  2/28/2022 0918 by Isabel Szymanski RN  Outcome: Ongoing

## 2022-03-01 NOTE — DISCHARGE SUMMARY
stenosis,  pulmonary hypertension  history of mitral and tricuspid regurgitation per last echocardiogram in October 2021  -Will need to follow-up with cardiology as an outpatient        History of coronary artery disease  HLD  Continue aspirin and statin        Gout?  - pt reports he was never diagnosed with gout but was placed on allopurinol?  - for will continue since he is chronically on this  - no signs of gout flare up         History of venous insufficiency  -Status post greater saphenous vein ablation in the past  -Will continue to monitor encourage compression stockings as needed       The patient expressed appropriate understanding of and agreement with the discharge recommendations, medications, and plan.      Consults this admission:  [address]    Discharge Instruction:     Primary care physician:  within 2 weeks    Diet:  diabetic diet and low fat, low cholesterol diet   Activity: activity as tolerated  Disposition: Discharged to:   [x]Home, [x][facility], []SNF, []Acute Rehab, []Hospice   Condition on discharge: Stable    Discharge Medications:        Medication List      CONTINUE taking these medications    allopurinol 100 MG tablet  Commonly known as: ZYLOPRIM     amLODIPine 10 MG tablet  Commonly known as: NORVASC  Take 1 tablet by mouth daily     atorvastatin 20 MG tablet  Commonly known as: LIPITOR  Take 1 tablet by mouth daily     Eliquis 5 MG Tabs tablet  Generic drug: apixaban  TAKE 1 TABLET TWICE A DAY     gabapentin 800 MG tablet  Commonly known as: NEURONTIN     Krill Oil 500 MG Caps     lisinopril-hydroCHLOROthiazide 20-25 MG per tablet  Commonly known as: PRINZIDE;ZESTORETIC     magnesium oxide 400 MG tablet  Commonly known as: MAG-OX  TAKE 1 TABLET BY MOUTH EVERY DAY     meclizine 25 MG tablet  Commonly known as: ANTIVERT     metFORMIN 1000 MG tablet  Commonly known as: GLUCOPHAGE     metoprolol tartrate 50 MG tablet  Commonly known as: LOPRESSOR  TAKE 1 TABLET BY MOUTH TWICE A DAY     niacin 1000 MG extended release tablet  Commonly known as: NIASPAN  TAKE 1 TABLET NIGHTLY        ASK your doctor about these medications    VITAMIN B COMPLEX PO            Objective Findings at Discharge:   /71   Pulse 61   Temp 96.4 °F (35.8 °C) (Infrared)   Resp 16   Ht 5' 10.98\" (1.803 m)   Wt 260 lb (117.9 kg)   SpO2 96%   BMI 36.28 kg/m²            PHYSICAL EXAM   GEN Awake male, sitting upright in bed in no apparent distress. Appears given age. EYES Pupils are equally round. No scleral erythema, discharge, or conjunctivitis. HENT Mucous membranes are moist. Oral pharynx without exudates, no evidence of thrush. NECK Supple, no apparent thyromegaly or masses. RESP Clear to auscultation, no wheezes, rales or rhonchi. Symmetric chest movement while on room air. CARDIO/VASC S1/S2 auscultated. Regular rate without appreciable murmurs, rubs, or gallops. No JVD or carotid bruits. Peripheral pulses equal bilaterally and palpable. No peripheral edema. GI Abdomen is soft without significant tenderness, masses, or guarding. Bowel sounds are normoactive. Rectal exam deferred.  No costovertebral angle tenderness. Normal appearing external genitalia. Prince catheter is not present. HEME/LYMPH No palpable cervical lymphadenopathy and no hepatosplenomegaly. No petechiae or ecchymoses. MSK No gross joint deformities. SKIN Normal coloration, warm, dry. NEURO Cranial nerves appear grossly intact, normal speech, no lateralizing weakness. PSYCH Awake, alert, oriented x 4. Affect appropriate.     BMP/CBC  Recent Labs     02/27/22  0625 02/28/22  0535 03/01/22  0530    138 138   K 4.0 4.5 4.6    105 107   CO2 24 24 22   BUN 27* 27* 31*   CREATININE 1.8* 1.9* 1.9*   WBC  --  7.2  --    HCT  --  30.2*  --    PLT  --  168  --          Discharge Time of 35 minutes    Electronically signed by Manual Andrew, APRN - NP on 3/1/2022 at 9:53 AM

## 2022-03-01 NOTE — PROGRESS NOTES
Physical Therapy Treatment Note   Date: 3/1/2022 Room: [unfilled]   Name: Ludy Galeana :    MRN: [id number] Admission Date:2022    Primary Problem:   Past Medical History:   Diagnosis Date    Arthritis     Dizziness     FH: CAD (coronary artery disease) 2015    H/O 24 hour EKG monitoring 19,2016    Conclusion: Atrial fibrillation with  fairly well-controlled ventricular rate response without evidence of any significant tachy or alon arrhythmias.  H/O atrial fibrillation without current medication 2019    H/O cardiovascular stress test 11/24/15; 2019    Normal perfusion in the distribution of all coronaries, normal LV, EF 53%.  H/O Doppler ultrasound (Venous Doppler Lower Extremities) 2017; 2020    No evidence of DVT or SVT in the bilaterally. No significant reflux noted in the veins of the right lower extremity. Significant reflux noted in the Left CFV. Bilateral Calf and ankle edema noted.  H/O echocardiogram 17,11/24/15    Left ventricle mildly dilated with normal systolic function EF 22-20% Mild concentric LVH with Grade II diastolic dysfunction. Mild to moderate MR and mild TR     H/O echocardiogram 2018; 2019    EF 55-60%. Mild concentric left ventricular hypertrophy. The left atrium is moderately dilated. Dilatation of the aortic root measuring 4.0 cm. Mild AR. Moderate pulmonary HTN.     H/O echocardiogram 10/28/2021    EF 55-60% Mild eccentric left ventricular hypertrophy. There is severe fibrocalcific sclerosis of the aortic valve with mean gradient across the aortic valve of 29 mmHg and calculated aortic valve areawas 1.2square centimeters, all suggesting moderate aortic stenosis. See complete result documentation under cardiology tab.     H/O transesophageal echocardiography (SALAZAR) for monitoring 2019    No CEE clot    Heart murmur 2015    Hx of Venous Doppler ultrasound 2020    No evidence of DVT in the left CFV. The Left GSV is non-compressible with no evidence of flow just past the saphenofemoral junction to the distal calf.  Hyperlipidemia     Hypertension     Kidney stones     Leg swelling 12/09/2019    Bilateral leg swelling    Neuropathy     Nonrheumatic aortic valve stenosis 2/15/2022    Obesity     JAYE on CPAP 06/26/2018    On CPAP 5/2018    Persistent atrial fibrillation ([address] 01/29/2019    New onset 1/2019    Syncope and collapse 12/09/2019    Thoracic aortic aneurysm ([address] 02/2011    4.5    Type II or unspecified type diabetes mellitus without mention of complication, not stated as uncontrolled      Past Surgical History:   Procedure Laterality Date    CARDIOVERSION  07/08/2019    CATARACT REMOVAL WITH IMPLANT Right 04/12/2019    CATARACT REMOVAL WITH IMPLANT Left 05/10/2019    COLON SURGERY  2007     COLONOSCOPY  [id number]    colon polyps removed    HERNIA REPAIR  2008    HERNIA REPAIR  2009    INTRACAPSULAR CATARACT EXTRACTION Right 4/12/2019    EYE CATARACT EMULSIFICATION IOL IMPLANT performed by Sofi Thompson MD at [address] [place] [address] Left 5/10/2019    EYE CATARACT EMULSIFICATION IOL IMPLANT performed by Sofi Thompson MD at [address] [place] [address] Left 06/25/2019    [employer] dual permanent pacer-[place] XT  MRI SureScan     TOTAL KNEE ARTHROPLASTY Right 2/16/2022    RIGHT KNEE TOTAL ARTHROPLASTY performed by Jackie Meyer DO at [facility] OR     Rehabilitation Diagnosis: R knee stiffness,Pt is a pleasant 64year old male currently presenting with decreased ROM, stregnth and balance. Pt could benefit from continued skilled PT in order to address balance and strength. Total knee arthroplasty (Right, 2/16/2022). Restrictions/Precautions: Weight Bearing As Tolerated    Subjective: Patient finishing with OT upon therapist arrival.  Agreeable therapy.   Initial Pain level:   3/10    Goals:                   Short term goals  Time Frame for 3/1/2022   AP x10 X10, cues to hold 3-5 sec    Nustep Lev 3 x 10 min  375 steps   SLR  L x10 AAROM RLE x 10       Assisted SLR R x5   x10     Supine ABD/ADD x10 R/L AAROM x 10 RLE, AROM LLE. Glute/ quad set  x10 R/l x10 BLE  x10 x10 3\"    Heel slides X 5 R Reclined x 10  x10 x10    SAQ Small roll R x10    x10 5\"    Seated heel slides R x10   x15 x10 5\"    LAQ   R x5 x10  x15 x10 5\"    Seated knee flex  x10   x15 x10    Knee ext prop   Under heel x 3 mins. Knee ext with quad set -9 deg. Roll under heel x 2 mins -7 deg     Seated marching      x10      Modality/intervention used:   [x ] Therapeutic Exercise   [ ] Modalities:   [x ] Therapeutic Activity     [ ] Ultrasound   [ ] Elec Stim   [x ]x Gait Training     [ ] Cervical Traction  [ ] Lumbar Traction   [ ] Neuromuscular Re-education   [ ] Cold/hotpack  [ ] Iontophoresis   [ ] Instruction in HEP     [ ] Vasopneumatic   [ ] Manual Therapy   [ ] Aquatic Therapy     Other Therapeutic Activities:     Home Exercise Program/Education provided to patient: cont above 2-3 x day    Manual Treatments: x    Communication with other providers: nurse cleared pt for therapy    Adverse reactions to treatment limited by pain but motivated.   Treatment/Activity Tolerance:   [ ] Patient limited by fatigue [ x] Patient limited by pain [ ] Patient limited by other medical complications [ x] Patient tolerated therapy well  [ ] Other:     Post Tx Pain Rating :8 /10     Safety Precautions:   [ ] left in bed  [x ] left in chair [x ] call light within reach  [ ] bed alarm on  [x ] personal alarm on  [ ] other staff present:    Plan:   [x ] Continue per plan of care [ ] De Patricia current plan   [ ] Plan of care initiated [ ] Hold pending MD visit [ ] Discharge     Plan for Next Session:     Time In: 7662  Time Out: 0630  Total Treatment Minutes: 23  Billed Units: 1 TA, 1 TE    Signed: Eunice Brooks, PTA 3/1/2022, 8:34 AM

## 2022-03-16 ENCOUNTER — OFFICE VISIT (OUTPATIENT)
Dept: ORTHOPEDIC SURGERY | Age: 77
End: 2022-03-16

## 2022-03-16 VITALS
RESPIRATION RATE: 15 BRPM | WEIGHT: 260 LBS | HEIGHT: 71 IN | BODY MASS INDEX: 36.4 KG/M2 | HEART RATE: 89 BPM | OXYGEN SATURATION: 99 %

## 2022-03-16 DIAGNOSIS — Z96.651 STATUS POST RIGHT KNEE REPLACEMENT: Primary | ICD-10-CM

## 2022-03-16 PROCEDURE — 99024 POSTOP FOLLOW-UP VISIT: CPT | Performed by: ORTHOPAEDIC SURGERY

## 2022-03-16 NOTE — PATIENT INSTRUCTIONS
Continue weight-bearing as tolerated. Continue range of motion exercises as instructed. Ice and elevate as needed. Tylenol or Motrin for pain.   Follow up in 3 weeks

## 2022-03-16 NOTE — PROGRESS NOTES
Patient returns to the office today for s/p check of the right TKA DOS 2/1/22. Pt states pain today is a 3/10 Pt states he has been working with therapy which is going well. Pt states he has been taking tylneol as needed. He does use ice as needed also. Pt states he does have some swelling in the right lower leg. Pt states he is a little sore in the calf and ankle area.

## 2022-03-17 ASSESSMENT — ENCOUNTER SYMPTOMS
COLOR CHANGE: 0
BACK PAIN: 0
CHEST TIGHTNESS: 0

## 2022-03-17 NOTE — PROGRESS NOTES
Subjective:      Patient ID: Angelina Hamilton is a 64 y.o. male. Patient returns to the office today for s/p check of the right TKA DOS 2/1/22. Pt states pain today is a 3/10 Pt states he has been working with therapy which is going well. Pt states he has been taking tylneol as needed. He does use ice as needed also. Pt states he does have some swelling in the right lower leg. Pt states he is a little sore in the calf and ankle area. He comes in today for his 4-week postop recheck. Overall he states that he is feeling great and is not having much pain in the right knee. He continues working on range of motion with PT. Patient denies any new injury to the involved extremity/ joint, denies numbness or tingling in the involved extremity and denies fever or chills. He does continue to have deep, aching and throbbing pain globally in his left knee. He would like to plan on proceeding with surgical treatment for his left knee as soon as possible and would like to discuss this at his next visit. He would like to have a surgery at [facility] and plan on using a swing bed again after his neck surgery. Review of Systems   Constitutional: Negative for activity change, chills and fever. Respiratory: Negative for chest tightness. Cardiovascular: Negative for chest pain. Musculoskeletal: Positive for arthralgias, gait problem, joint swelling and myalgias. Negative for back pain. Skin: Negative for color change, pallor, rash and wound. Neurological: Negative for weakness and numbness. Past Medical History:   Diagnosis Date    Arthritis     Dizziness     FH: CAD (coronary artery disease) 09/29/2015    H/O 24 hour EKG monitoring 02/26/19,08/02/2016    Conclusion: Atrial fibrillation with  fairly well-controlled ventricular rate response without evidence of any significant tachy or alon arrhythmias.     H/O atrial fibrillation without current medication 04/03/2019    H/O cardiovascular stress test 11/24/15; 8/22/2019    Normal perfusion in the distribution of all coronaries, normal LV, EF 53%.  H/O Doppler ultrasound (Venous Doppler Lower Extremities) 03/08/2017; 1/2/2020    No evidence of DVT or SVT in the bilaterally. No significant reflux noted in the veins of the right lower extremity. Significant reflux noted in the Left CFV. Bilateral Calf and ankle edema noted.  H/O echocardiogram 6/6/17,11/24/15    Left ventricle mildly dilated with normal systolic function EF 13-98% Mild concentric LVH with Grade II diastolic dysfunction. Mild to moderate MR and mild TR     H/O echocardiogram 06/07/2018; 2/26/2019    EF 55-60%. Mild concentric left ventricular hypertrophy. The left atrium is moderately dilated. Dilatation of the aortic root measuring 4.0 cm. Mild AR. Moderate pulmonary HTN.     H/O echocardiogram 10/28/2021    EF 55-60% Mild eccentric left ventricular hypertrophy. There is severe fibrocalcific sclerosis of the aortic valve with mean gradient across the aortic valve of 29 mmHg and calculated aortic valve areawas 1.2square centimeters, all suggesting moderate aortic stenosis. See complete result documentation under cardiology tab.  H/O transesophageal echocardiography (SALAZAR) for monitoring 07/08/2019    No CEE clot    Heart murmur 09/29/2015    Hx of Venous Doppler ultrasound 08/03/2020    No evidence of DVT in the left CFV. The Left GSV is non-compressible with no evidence of flow just past the saphenofemoral junction to the distal calf.         Hyperlipidemia     Hypertension     Kidney stones     Leg swelling 12/09/2019    Bilateral leg swelling    Neuropathy     Nonrheumatic aortic valve stenosis 2/15/2022    Obesity     JAYE on CPAP 06/26/2018    On CPAP 5/2018    Persistent atrial fibrillation ([address] 01/29/2019    New onset 1/2019    Syncope and collapse 12/09/2019    Thoracic aortic aneurysm ([address] 02/2011    4.5    Type II or unspecified type diabetes mellitus without mention of complication, not stated as uncontrolled        Objective:   Physical Exam  Constitutional:       Appearance: He is well-developed. HENT:      Head: Normocephalic. Eyes:      Pupils: Pupils are equal, round, and reactive to light. Pulmonary:      Effort: Pulmonary effort is normal.   Musculoskeletal:         General: Swelling and tenderness present. No deformity. Normal range of motion. Cervical back: Normal range of motion. Right hip: Normal.      Left hip: Normal.      Right knee: Swelling present. No deformity, effusion, erythema, ecchymosis, lacerations or bony tenderness. Normal range of motion. No tenderness. No medial joint line, lateral joint line, MCL or LCL tenderness. No LCL laxity or MCL laxity. Normal alignment and normal patellar mobility. Left knee: Swelling and bony tenderness present. No deformity, effusion, erythema, ecchymosis or lacerations. Normal range of motion. Tenderness present over the medial joint line and lateral joint line. No MCL, LCL or patellar tendon tenderness. No LCL laxity or MCL laxity. Normal alignment and normal patellar mobility. Skin:     General: Skin is warm and dry. Capillary Refill: Capillary refill takes less than 2 seconds. Coloration: Skin is not pale. Findings: No erythema or rash. Neurological:      Mental Status: He is alert and oriented to person, place, and time. Right knee-Incision clean, dry, intact, with no erythema, no drainage, and no signs of infection. 0-120    Left knee-Skin intact with no erythema, ecchymosis or lacerations present.   0-130    Bilateral lower extremity pitting edema, right worse than left    XR KNEE LEFT (3 VIEWS)    Result Date: 12/15/2021  XRAY X-ray 3 views of the left knee reviewed by me today in the office demonstrates age appropriate bone density throughout with severe degenerative changes with medial, lateral and patellofemoral joint space collapse, early osteophyte formation in all 3 compartments as well as posterior, normal tracking of the patella, no acute osseous abnormalities. Impression: Severe degenerative changes of left knee as above with no acute process. Assessment:      Right TKA, 4 weeks  Left knee OA, severe      Plan:       I discussed with him today that he is continuing to progress extremely well. I discussed with the patient today antibiotic prophylaxis for procedures. I discussed with the patient today that their are symptoms are normal and should improve with time. Continue weight-bearing as tolerated. Continue range of motion exercises as instructed. Ice and elevate as needed. Tylenol or Motrin for pain. Follow up in 3 weeks for recheck with x-rays of the right knee at which point we will discuss proceeding with surgical treatment for his left knee, planning on having his surgery at [facility] and utilizing a swing bed again.               Anyi [address], DO

## 2022-03-30 ENCOUNTER — OFFICE VISIT (OUTPATIENT)
Dept: ORTHOPEDIC SURGERY | Age: 77
End: 2022-03-30

## 2022-03-30 VITALS
HEIGHT: 71 IN | BODY MASS INDEX: 36.4 KG/M2 | OXYGEN SATURATION: 98 % | RESPIRATION RATE: 18 BRPM | WEIGHT: 260 LBS | HEART RATE: 63 BPM

## 2022-03-30 DIAGNOSIS — Z96.651 STATUS POST RIGHT KNEE REPLACEMENT: Primary | ICD-10-CM

## 2022-03-30 PROCEDURE — 99024 POSTOP FOLLOW-UP VISIT: CPT | Performed by: ORTHOPAEDIC SURGERY

## 2022-03-30 ASSESSMENT — ENCOUNTER SYMPTOMS
BACK PAIN: 0
COLOR CHANGE: 0
CHEST TIGHTNESS: 0

## 2022-03-30 NOTE — PROGRESS NOTES
Patient returns to the office for his post op right TKA, DOS: 02/16/2022. Pt stated overall is doing really well with his right knee and has had home physical therapy with progress. Pt stated that he has continued to use the cane but at home will walk without it. Pt will take tylenol and ibuprofen for the pain occasionally  But typically does not need it. Pt continues to have redness around the ankle and has continued to ice and elevate with comfort. Pt is continuing to have pain in the left knee. Pt describes the pain as aching and would rate it about a 6/10. Pt denies any falls recently with either knees.

## 2022-03-30 NOTE — PROGRESS NOTES
Subjective:      Patient ID: Alban Morley is a 64 y.o. male. Patient returns to the office for his post op right TKA, DOS: 02/16/2022. Pt stated overall is doing really well with his right knee and has had home physical therapy with progress. Pt stated that he has continued to use the cane but at home will walk without it. Pt will take tylenol and ibuprofen for the pain occasionally  But typically does not need it. Pt continues to have redness around the ankle and has continued to ice and elevate with comfort. Pt is continuing to have pain in the left knee. Pt describes the pain as aching and would rate it about a 6/10. Pt denies any falls recently with either knees. He comes in today for his 6-week postop recheck. Overall he states that he is feeling great and is not having much pain in the right knee. He continues working on range of motion with PT. Patient denies any new injury to the involved extremity/ joint, denies numbness or tingling in the involved extremity and denies fever or chills. He does continue to have deep, aching and throbbing pain globally in his left knee. He would like to plan on proceeding with surgical treatment for his left knee in October. He would like to have a surgery at [facility] and plan on using a swing bed again after his neck surgery. Review of Systems   Constitutional: Negative for activity change, chills and fever. Respiratory: Negative for chest tightness. Cardiovascular: Negative for chest pain. Musculoskeletal: Positive for arthralgias, gait problem, joint swelling and myalgias. Negative for back pain. Skin: Negative for color change, pallor, rash and wound. Neurological: Negative for weakness and numbness.        Past Medical History:   Diagnosis Date    Arthritis     Dizziness     FH: CAD (coronary artery disease) 09/29/2015    H/O 24 hour EKG monitoring 02/26/19,08/02/2016    Conclusion: Atrial fibrillation with  fairly well-controlled ventricular rate response without evidence of any significant tachy or alon arrhythmias.  H/O atrial fibrillation without current medication 04/03/2019    H/O cardiovascular stress test 11/24/15; 8/22/2019    Normal perfusion in the distribution of all coronaries, normal LV, EF 53%.  H/O Doppler ultrasound (Venous Doppler Lower Extremities) 03/08/2017; 1/2/2020    No evidence of DVT or SVT in the bilaterally. No significant reflux noted in the veins of the right lower extremity. Significant reflux noted in the Left CFV. Bilateral Calf and ankle edema noted.  H/O echocardiogram 6/6/17,11/24/15    Left ventricle mildly dilated with normal systolic function EF 95-10% Mild concentric LVH with Grade II diastolic dysfunction. Mild to moderate MR and mild TR     H/O echocardiogram 06/07/2018; 2/26/2019    EF 55-60%. Mild concentric left ventricular hypertrophy. The left atrium is moderately dilated. Dilatation of the aortic root measuring 4.0 cm. Mild AR. Moderate pulmonary HTN.     H/O echocardiogram 10/28/2021    EF 55-60% Mild eccentric left ventricular hypertrophy. There is severe fibrocalcific sclerosis of the aortic valve with mean gradient across the aortic valve of 29 mmHg and calculated aortic valve areawas 1.2square centimeters, all suggesting moderate aortic stenosis. See complete result documentation under cardiology tab.  H/O transesophageal echocardiography (SALAZAR) for monitoring 07/08/2019    No CEE clot    Heart murmur 09/29/2015    Hx of Venous Doppler ultrasound 08/03/2020    No evidence of DVT in the left CFV. The Left GSV is non-compressible with no evidence of flow just past the saphenofemoral junction to the distal calf.         Hyperlipidemia     Hypertension     Kidney stones     Leg swelling 12/09/2019    Bilateral leg swelling    Neuropathy     Nonrheumatic aortic valve stenosis 2/15/2022    Obesity     JAYE on CPAP 06/26/2018    On CPAP 5/2018    Persistent atrial fibrillation ([facility] [address] 01/29/2019    New onset 1/2019    Syncope and collapse 12/09/2019    Thoracic aortic aneurysm ([facility] [address] 02/2011    4.5    Type II or unspecified type diabetes mellitus without mention of complication, not stated as uncontrolled        Objective:   Physical Exam  Constitutional:       Appearance: He is well-developed. HENT:      Head: Normocephalic. Eyes:      Pupils: Pupils are equal, round, and reactive to light. Pulmonary:      Effort: Pulmonary effort is normal.   Musculoskeletal:         General: Swelling and tenderness present. No deformity. Normal range of motion. Cervical back: Normal range of motion. Right hip: Normal.      Left hip: Normal.      Right knee: Swelling present. No deformity, effusion, erythema, ecchymosis, lacerations or bony tenderness. Normal range of motion. No tenderness. No medial joint line, lateral joint line, MCL or LCL tenderness. No LCL laxity or MCL laxity. Normal alignment and normal patellar mobility. Left knee: Swelling and bony tenderness present. No deformity, effusion, erythema, ecchymosis or lacerations. Normal range of motion. No MCL, LCL or patellar tendon tenderness. No LCL laxity or MCL laxity. Normal alignment and normal patellar mobility. Skin:     General: Skin is warm and dry. Capillary Refill: Capillary refill takes less than 2 seconds. Coloration: Skin is not pale. Findings: No erythema or rash. Neurological:      Mental Status: He is alert and oriented to person, place, and time. Right knee-Incision clean, dry, intact, with no erythema, no drainage, and no signs of infection. 0-130    Left knee-Skin intact with no erythema, ecchymosis or lacerations present.   0-130    Bilateral lower extremity pitting edema, right worse than left    XR KNEE RIGHT (3 VIEWS)    Result Date: 3/30/2022  XRAY X-ray 3 views of the right knee obtained and reviewed by me today in the office demonstrates age appropriate bone density throughout with well-positioned right total knee arthroplasty, there has been no change in position components compared to prior x-rays, normal tracking the patella, no acute osseous abnormalities. Impression: Stable right total knee arthroplasty with no acute process. Assessment:      Right TKA, 6 weeks  Left knee OA, severe      Plan:       I discussed with him today his x-ray findings. I explained to him that his implants are stable and remain in good alignment. I discussed with him today that he is continuing to progress extremely well. I discussed with the patient today that their are symptoms are normal and should improve with time. Continue weight-bearing as tolerated. Continue range of motion exercises as instructed. Ice and elevate as needed. Tylenol or Motrin for pain. Follow up in 6 weeks for recheck with x-rays of the right knee. We will then plan on seeing him back in August or September at which point we will discuss proceeding with surgical treatment for his left knee in October, planning on having his surgery at [facility] and utilizing a swing bed again.               Anyi 97, DO

## 2022-03-30 NOTE — PATIENT INSTRUCTIONS
Continue weight-bearing as tolerated. Continue range of motion exercises as instructed. Ice and elevate as needed. Tylenol or Motrin for pain. Follow up in 6 weeks. 42 y.o. woman with history of asthma, anemia, migraine headache, and opioid addiction now with on Suboxone who came to the ER from her inpatient substance abuse rehab center for evaluation of b/l LE edema and pain of 2 weeks in duration. Patient states that she was in her usual state of health when her symptoms started. She reported the pain is worst in the left leg and radiates from the lateral thigh downwards. Pain is aggravated with lying down, ambulation and palpation. No discernable alleviating factors or associated symptoms. No reports of trauma or previous occurrence of the similar symptoms. In ER, patient had negative b/l LE duplex and was given vancomycin and zosyn for presumed cellulitis of the b/l LE extremities. No other complaints at present.    The following was her hospital course:  Cellulitis of lower extremity, unspecified laterality.   Clindamycin 600mg IV Q8hrs  - Pain control with morphine  - Tylenol as needed for fevers.   LLE Xray- No evidence of acute fracture or dislocation. Intact and stable ankle fixating hardware.  LE Doppler- No evidence of DVT    Substance abuse.    Continue with Suboxone once dosage of is verified..     Mild intermittent asthma without complication.     Albuterol nebs Q6hrs as needed.     Bipolar 1 disorder.  Continue with quetiapine.     Borderline personality disorder. - Outpatient follow up.      Primary insomnia. - Continue with trazodone 100mg PO as needed PRN.  7/23 Med: Cellulitis of lower extremity: as per ID KEFLEX PO.   7/24 Vascular: C/w antibiotics since there is clinical improvement No arterial compromise since there are distal pulses. Awaiting CT venogram.  CT A/P 7 to 8 cm complex left adnexal masses suspicious for ovarian neoplasm. Further evaluation with contrast MRI  IV considered. Otherwise no evidence of central pelvic vein thrombosis or congestion.  7/25 TTE: EF 64% Normal left ventricular internal dimensions and wall thicknesses. Normal left ventricular systolic function. No segmental wall motion abnormalities. Normal right ventricular size and function    7/26 Gyn c/s: Problem: Adnexal mass. Recommendation: - f/u MRI  - patient counselled on DDx of adnexal mass  - patient taken to MRI before could complete exam, will return in AM to complete exam and give final recommendations  7/27 s/p endometrial bx, can DC tomorrow-f/u with Dr. Aragon outpatient for adnexal mass surgery.  7/27 Med: incidental finding of adnexal mass, f/u GYN, continue keflex for cellulitis  7/27 cards: b/l lower ext edema likely sec to local obstruction of IVC or lymphduct, continue abx for cellulitis  7/28 Pt. cleared for DC.  D/W attending and GYN/GYN-ONC

## 2022-04-08 ENCOUNTER — PROCEDURE VISIT (OUTPATIENT)
Dept: CARDIOLOGY CLINIC | Age: 77
End: 2022-04-08
Payer: MEDICARE

## 2022-04-08 DIAGNOSIS — Z95.0 CARDIAC PACEMAKER IN SITU: ICD-10-CM

## 2022-04-09 PROCEDURE — 93294 REM INTERROG EVL PM/LDLS PM: CPT | Performed by: INTERNAL MEDICINE

## 2022-04-09 PROCEDURE — 93296 REM INTERROG EVL PM/IDS: CPT | Performed by: INTERNAL MEDICINE

## 2022-04-13 ENCOUNTER — HOSPITAL ENCOUNTER (OUTPATIENT)
Age: 77
Discharge: HOME | End: 2022-04-13
Payer: MEDICARE

## 2022-04-13 LAB
ALBUMIN SERPL-MCNC: 4.2 GM/DL (ref 3.4–5)
ALP BLD-CCNC: 70 IU/L (ref 40–129)
ALT SERPL-CCNC: 19 U/L (ref 10–40)
ANION GAP SERPL CALCULATED.3IONS-SCNC: 12 MMOL/L (ref 4–16)
AST SERPL-CCNC: 20 IU/L (ref 15–37)
BILIRUB SERPL-MCNC: 0.7 MG/DL (ref 0–1)
BUN BLDV-MCNC: 28 MG/DL (ref 6–23)
CALCIUM SERPL-MCNC: 9.9 MG/DL (ref 8.3–10.6)
CHLORIDE BLD-SCNC: 104 MMOL/L (ref 99–110)
CHOLESTEROL, FASTING: 120 MG/DL
CO2: 24 MMOL/L (ref 21–32)
CREAT SERPL-MCNC: 1.7 MG/DL (ref 0.9–1.3)
ESTIMATED AVERAGE GLUCOSE: 114 MG/DL
GFR AFRICAN AMERICAN: 47 ML/MIN/1.73M2
GFR NON-AFRICAN AMERICAN: 39 ML/MIN/1.73M2
GLUCOSE FASTING: 109 MG/DL (ref 70–99)
HBA1C MFR BLD: 5.6 % (ref 4.2–6.3)
HDLC SERPL-MCNC: 49 MG/DL
LDL CHOLESTEROL CALCULATED: 52 MG/DL
POTASSIUM SERPL-SCNC: 5.1 MMOL/L (ref 3.5–5.1)
PROSTATE SPECIFIC ANTIGEN: 1.91 NG/ML (ref 0–4)
SODIUM BLD-SCNC: 140 MMOL/L (ref 135–145)
TOTAL PROTEIN: 6.4 GM/DL (ref 6.4–8.2)
TRIGLYCERIDE, FASTING: 93 MG/DL

## 2022-04-13 PROCEDURE — 36415 COLL VENOUS BLD VENIPUNCTURE: CPT

## 2022-04-13 PROCEDURE — 83036 HEMOGLOBIN GLYCOSYLATED A1C: CPT

## 2022-04-13 PROCEDURE — G0103 PSA SCREENING: HCPCS

## 2022-04-13 PROCEDURE — 80053 COMPREHEN METABOLIC PANEL: CPT

## 2022-04-13 PROCEDURE — 80061 LIPID PANEL: CPT

## 2022-04-18 NOTE — DISCHARGE SUMMARY
Outpatient Physical Therapy           [place]           [] Phone: [phone]   Fax: [phone]  [facility]           [] Phone: [phone]   Fax: [phone]      To:  Dr. Myrna Day    From: Cali Gan PT,      Patient: Paul Kan                  : 1945  Diagnosis:  Diagnosis: R knee OA      Date: 2022  Treatment Diagnosis: Treatment Diagnosis: R knee pain       []  Progress Note                []  Discharge Note    Evaluation Date:  22   Total Visits to date:   1 pre op visit Cancels/No-shows to date:      Subjective:  See eval      Plan of Care/Treatment to date:  [x] Therapeutic Exercise    [] Modalities:  [] Therapeutic Activity     [] Ultrasound  [] Electrical Stimulation  [x] Gait Training      [] Cervical Traction   [] Lumbar Traction  [] Neuromuscular Re-education  [] Cold/hotpack [] Iontophoresis  [x] Instruction in HEP      Other:  [] Manual Therapy       []  Vasopneumatic  [] Aquatic Therapy       []   Dry Needle Therapy                      Objective/Significant Findings At Last Visit/Comments:  See pre op visit          Goal Status:  [x] Achieved [] Partially Achieved  [] Not Achieved   Goals of PT met    Short term goal 1: Patient will verbalize  and demonstrate understanding of initial post op phase exercises  Short term goal 2 : Patient will demonstrate understanding of proper use of walker with WBAT on affected limb  Short term goal 3: Patient will demonstrate understanding of proper sequence for going up/down steps to limit stress on the affected limb  Short term goal 4: Patient will watch [facility] joint camp video     [x] Patient now discharged- has not scheduled further OP PT post op        Electronically signed by:  Cali Gan PT,, 2022, 9:34 AM    If you have any questions or concerns, please don't hesitate to call.   Thank you for your referral.

## 2022-04-27 RX ORDER — LISINOPRIL AND HYDROCHLOROTHIAZIDE 25; 20 MG/1; MG/1
1 TABLET ORAL DAILY
Qty: 30 TABLET | Refills: 5 | Status: SHIPPED | OUTPATIENT
Start: 2022-04-27 | End: 2022-08-25

## 2022-04-27 NOTE — TELEPHONE ENCOUNTER
Pt called requesting refill for Prinzide. Pt stated his new dose is 20-12.5 mg, daily. I could not find that dose being prescribed, but pt stated his bottle had 20-12.5 mg, with Dr. Nicholas More name as prescribing doctor. Pt stated he would take whatever you prescribed today.

## 2022-04-28 ENCOUNTER — OFFICE VISIT (OUTPATIENT)
Dept: ORTHOPEDIC SURGERY | Age: 77
End: 2022-04-28

## 2022-04-28 VITALS
HEART RATE: 75 BPM | HEIGHT: 71 IN | RESPIRATION RATE: 16 BRPM | WEIGHT: 282 LBS | OXYGEN SATURATION: 92 % | BODY MASS INDEX: 39.48 KG/M2 | TEMPERATURE: 96.4 F

## 2022-04-28 DIAGNOSIS — I89.0 LYMPHEDEMA OF BOTH LOWER EXTREMITIES: ICD-10-CM

## 2022-04-28 DIAGNOSIS — Z96.651 STATUS POST RIGHT KNEE REPLACEMENT: Primary | ICD-10-CM

## 2022-04-28 PROCEDURE — 99024 POSTOP FOLLOW-UP VISIT: CPT | Performed by: PHYSICIAN ASSISTANT

## 2022-04-28 NOTE — PROGRESS NOTES
Pt presents to the office today for a post-op follow up from a R TKA DOS 02/16/22. Pt reports his knee is doing well since surgery and reports his pain is 2-3/10 today and will increase to 5-6/10 with prolonged walking. Pt states his last session with home health for his Physical Therapy was about 2-3 weeks ago. Pt is concerned at the amount of swelling in his legs at this time and states onset was 2-3 weeks ago after his last PT session. Pt has been doing some exercises and walking more. Pt denied any new injuries or other concerns at this time.

## 2022-04-28 NOTE — PROGRESS NOTES
Date of surgery: 2/16/2022  Surgeon: Dr. Lacy Bravo    History:  Mr. Lexi Hamilton is here in follow up regarding his right total knee arthroplasty. He is doing rather well. His only complaint is bilateral leg swelling which started about 3 to 4 weeks ago. His kidney doctor would like him to go to a lymphedema clinic but he was not told where this was that. He would like to discuss doing his left total knee in the fall. Physical:   Vitals:    04/28/22 1306   Pulse: 75   Resp: 16   Temp: 96.4 °F (35.8 °C)   TempSrc: Temporal   SpO2: 92%   Weight: 282 lb (127.9 kg)   Height: 5' 11\" (1.803 m)     Right knee incision is well-healed, range of motion of right knee 0-120 degrees  Moderate lymphedema in bilateral lower extremities. Imaging studies:  3 views of the right knee taken and reviewed in the office today show a right total knee arthroplasty in good position with no evidence of loosening or periprosthetic fracture. The official read and interpretation of these x-rays will be done by the the [facility]     Impression: Status post right total knee arthroplasty-doing well    Plan:   Patient Instructions   Continue weight-bearing as tolerated. Continue range of motion exercises as instructed. Ice and elevate as needed. Tylenol or Motrin for pain. Order sent for Lymphedema Therapy in [place]   Follow up in August with Dr. Lacy Bravo to discuss Surgery.

## 2022-05-26 RX ORDER — LISINOPRIL AND HYDROCHLOROTHIAZIDE 25; 20 MG/1; MG/1
TABLET ORAL
Qty: 30 TABLET | Refills: 5 | OUTPATIENT
Start: 2022-05-26

## 2022-07-02 PROCEDURE — 93294 REM INTERROG EVL PM/LDLS PM: CPT | Performed by: INTERNAL MEDICINE

## 2022-07-02 PROCEDURE — 93296 REM INTERROG EVL PM/IDS: CPT | Performed by: INTERNAL MEDICINE

## 2022-07-05 ENCOUNTER — PROCEDURE VISIT (OUTPATIENT)
Dept: CARDIOLOGY CLINIC | Age: 77
End: 2022-07-05
Payer: MEDICARE

## 2022-07-05 DIAGNOSIS — Z95.0 CARDIAC PACEMAKER IN SITU: ICD-10-CM

## 2022-08-09 ENCOUNTER — PROCEDURE VISIT (OUTPATIENT)
Dept: CARDIOLOGY CLINIC | Age: 77
End: 2022-08-09
Payer: MEDICARE

## 2022-08-09 DIAGNOSIS — I35.0 NONRHEUMATIC AORTIC VALVE STENOSIS: ICD-10-CM

## 2022-08-09 LAB
LV EF: 50 %
LVEF MODALITY: NORMAL

## 2022-08-09 PROCEDURE — 93306 TTE W/DOPPLER COMPLETE: CPT | Performed by: INTERNAL MEDICINE

## 2022-08-15 ENCOUNTER — TELEPHONE (OUTPATIENT)
Dept: CARDIOLOGY CLINIC | Age: 77
End: 2022-08-15

## 2022-08-15 ENCOUNTER — TELEPHONE (OUTPATIENT)
Dept: ORTHOPEDIC SURGERY | Age: 77
End: 2022-08-15

## 2022-08-15 ENCOUNTER — OFFICE VISIT (OUTPATIENT)
Dept: ORTHOPEDIC SURGERY | Age: 77
End: 2022-08-15
Payer: MEDICARE

## 2022-08-15 VITALS
OXYGEN SATURATION: 99 % | BODY MASS INDEX: 39.33 KG/M2 | HEIGHT: 71 IN | DIASTOLIC BLOOD PRESSURE: 78 MMHG | SYSTOLIC BLOOD PRESSURE: 135 MMHG | HEART RATE: 84 BPM

## 2022-08-15 DIAGNOSIS — M17.10 LOCALIZED OSTEOARTHRITIS OF KNEE: Primary | ICD-10-CM

## 2022-08-15 DIAGNOSIS — M25.562 CHRONIC PAIN OF LEFT KNEE: ICD-10-CM

## 2022-08-15 DIAGNOSIS — G89.29 CHRONIC PAIN OF LEFT KNEE: ICD-10-CM

## 2022-08-15 DIAGNOSIS — M17.12 PRIMARY OSTEOARTHRITIS OF LEFT KNEE: Primary | ICD-10-CM

## 2022-08-15 PROCEDURE — G8417 CALC BMI ABV UP PARAM F/U: HCPCS | Performed by: ORTHOPAEDIC SURGERY

## 2022-08-15 PROCEDURE — 1036F TOBACCO NON-USER: CPT | Performed by: ORTHOPAEDIC SURGERY

## 2022-08-15 PROCEDURE — 1123F ACP DISCUSS/DSCN MKR DOCD: CPT | Performed by: ORTHOPAEDIC SURGERY

## 2022-08-15 PROCEDURE — 99213 OFFICE O/P EST LOW 20 MIN: CPT | Performed by: ORTHOPAEDIC SURGERY

## 2022-08-15 PROCEDURE — G8427 DOCREV CUR MEDS BY ELIG CLIN: HCPCS | Performed by: ORTHOPAEDIC SURGERY

## 2022-08-15 ASSESSMENT — ENCOUNTER SYMPTOMS
COLOR CHANGE: 0
CHEST TIGHTNESS: 0
BACK PAIN: 0

## 2022-08-15 NOTE — PROGRESS NOTES
Subjective:      Patient ID: Zoila Salazar is a 76 y.o. male. Patient seen in office today for L knee pain. Stated he wants to discuss surgery. Reported 5/10 pain level this date. Patient stated R knee doing well since TKA. Reported using cane for ambulation. He states that since his right knee replacement 6 months ago and has been doing very well on that side and is not having any issues with his right knee. He does continue to have deep, aching and throbbing pain globally in his left knee. He states that now the pain in his left knee is beginning to limit his ability to perform his daily activities. He would like to discuss proceeding with surgical treatment for his left knee. He would like to have a surgery at [facility] and plan on using a swing bed again after his next surgery. Review of Systems   Constitutional:  Negative for activity change, chills and fever. Respiratory:  Negative for chest tightness. Cardiovascular:  Negative for chest pain. Musculoskeletal:  Positive for arthralgias, gait problem, joint swelling and myalgias. Negative for back pain. Skin:  Negative for color change, pallor, rash and wound. Neurological:  Negative for weakness and numbness. Past Medical History:   Diagnosis Date    Arthritis     Dizziness     FH: CAD (coronary artery disease) 09/29/2015    H/O 24 hour EKG monitoring 02/26/19,08/02/2016    Conclusion: Atrial fibrillation with  fairly well-controlled ventricular rate response without evidence of any significant tachy or alon arrhythmias. H/O atrial fibrillation without current medication 04/03/2019    H/O cardiovascular stress test 11/24/15; 8/22/2019    Normal perfusion in the distribution of all coronaries, normal LV, EF 53%. H/O Doppler echocardiogram 08/09/2022    EF 50%. Mild concentric LV hypertrophy with grade 3 diastolic dysfunction. Mild bilateral atrial enlargement. Mildly dilated RV.  Heavily sclerotic aortic valve with moderate aortic stenosis, mean gradient of 26 and an JACK of 1.11. Moderate aortic regurg is noted with pressure half time of 466. Moderate tricuspid and mitral regurg. Pacer wire noted on right side passing through tricuspid valve. H/O Doppler ultrasound (Venous Doppler Lower Extremities) 03/08/2017; 1/2/2020    No evidence of DVT or SVT in the bilaterally. No significant reflux noted in the veins of the right lower extremity. Significant reflux noted in the Left CFV. Bilateral Calf and ankle edema noted. H/O echocardiogram 6/6/17,11/24/15    Left ventricle mildly dilated with normal systolic function EF 03-72% Mild concentric LVH with Grade II diastolic dysfunction. Mild to moderate MR and mild TR     H/O echocardiogram 06/07/2018; 2/26/2019    EF 55-60%. Mild concentric left ventricular hypertrophy. The left atrium is moderately dilated. Dilatation of the aortic root measuring 4.0 cm. Mild AR. Moderate pulmonary HTN.     H/O echocardiogram 10/28/2021    EF 55-60% Mild eccentric left ventricular hypertrophy. There is severe fibrocalcific sclerosis of the aortic valve with mean gradient across the aortic valve of 29 mmHg and calculated aortic valve areawas 1.2square centimeters, all suggesting moderate aortic stenosis. See complete result documentation under cardiology tab. H/O transesophageal echocardiography (SALAZAR) for monitoring 07/08/2019    No CEE clot    Heart murmur 09/29/2015    Hx of Venous Doppler ultrasound 08/03/2020    No evidence of DVT in the left CFV. The Left GSV is non-compressible with no evidence of flow just past the saphenofemoral junction to the distal calf.         Hyperlipidemia     Hypertension     Kidney stones     Leg swelling 12/09/2019    Bilateral leg swelling    Neuropathy     Nonrheumatic aortic valve stenosis 02/15/2022    Obesity     JAYE on CPAP 06/26/2018    On CPAP 5/2018    Persistent atrial fibrillation ([address] 01/29/2019    New onset 1/2019    Syncope and collapse 12/09/2019    Thoracic aortic aneurysm ([facility] [address] 02/2011    4.5    Type II or unspecified type diabetes mellitus without mention of complication, not stated as uncontrolled        Objective:   Physical Exam  HENT:      Head: Normocephalic. Eyes:      Pupils: Pupils are equal, round, and reactive to light. Pulmonary:      Effort: Pulmonary effort is normal.   Musculoskeletal:         General: Swelling and tenderness present. No deformity. Normal range of motion. Cervical back: Normal range of motion. Right hip: Normal.      Left hip: Normal.      Right knee: Swelling present. No deformity, effusion, erythema, ecchymosis, lacerations or bony tenderness. Normal range of motion. No tenderness. No medial joint line, lateral joint line, MCL or LCL tenderness. No LCL laxity or MCL laxity. Normal alignment and normal patellar mobility. Left knee: Swelling and bony tenderness present. No deformity, effusion, erythema, ecchymosis or lacerations. Normal range of motion. No MCL, LCL or patellar tendon tenderness. No LCL laxity or MCL laxity. Normal alignment and normal patellar mobility. Skin:     General: Skin is warm and dry. Capillary Refill: Capillary refill takes less than 2 seconds. Coloration: Skin is not pale. Findings: No erythema or rash. Neurological:      Mental Status: He is alert and oriented to person, place, and time. Right knee-Incision clean, dry, intact, with no erythema, no drainage, and no signs of infection. 0-130    Left knee-Skin intact with no erythema, ecchymosis or lacerations present.   0-130    Bilateral lower extremity pitting edema, right worse than left    XR KNEE LEFT (3 VIEWS)    Result Date: 8/15/2022  XRAY X-ray 3 views of the left knee obtained and reviewed by me today in the office demonstrates age appropriate bone density throughout with severe degenerative changes with medial patellofemoral joint space collapse, moderate osteophyte formation all 3 compartments, normal tracking patella, no acute osseous abnormalities. Impression: Severe degenerative changes of left knee as above with no acute process. Assessment:      Right TKA, 6 months  Left knee OA, severe      Plan:       I discussed with him today his x-ray findings. I explained to him that he does have severe arthritis in the left knee. At this point given his persistent and worsening symptoms despite conservative treatment and with his x-ray findings I recommend surgical treatment. I had a lengthy discussion with him today in regards to left total knee replacement surgery. I explained risks, benefits, possible complications of the procedure and answered all questions for the patient. I explained postoperative rehabilitation protocol and expectations with the patient today. The patient understands and consents to the procedure. Patient will follow up with their primary care physician prior to surgical treatment for preoperative clearance. We will schedule surgery at soonest convenience. Continue weight-bearing as tolerated. Continue range of motion exercises as instructed. Ice and elevate as needed. Tylenol or Motrin for pain. Follow up in 3 weeks postop. He would like to have a surgery at [facility] again and would like to do the swing bed after surgery again.                 Anyi Guerra, DO

## 2022-08-15 NOTE — TELEPHONE ENCOUNTER
Scheduled patient in office for LT TKA on 9/13/2022 at [facility]. Patient voiced understanding of date time and instructions. Procedure request along with pathway orders faxed. PCP, Cardiac, Pulmonary and Nephrology Clearance request sent. Physical Therapy order and Home Care order created. Patient wants Home Care first couple weeks after surgery then will start PT in [facility]. Insurance [employer] [facility]) contacted on 8/15/2022 via [facility] online.  CPT 55782 is pending    Ref#: [id number]

## 2022-08-15 NOTE — PROGRESS NOTES
Patient seen here x2 weeks ago and stent placed.  Avelina Lara MD    Chest pain that started yesterday; dullness; rates pain 3-4/10 Patient seen in office today for L knee pain. Stated he wants to discuss surgery. Reported 5/10 pain level this date. Patient stated R knee doing well since TKA. Reported using cane for ambulation.

## 2022-08-15 NOTE — TELEPHONE ENCOUNTER
Cardiologist: Dr. Jose Luna  Surgeon: Dr. Rhoda Delacruz   Surgery: Left Total Knee Arthroplasty   Anesthesia: Nerve Block/Spinal  Date: 09/13/2022  FAX# [phone]    # [id number]    Last OV 02/15/2022 w/Dr. Jose Luna

## 2022-08-16 ENCOUNTER — OFFICE VISIT (OUTPATIENT)
Dept: CARDIOLOGY CLINIC | Age: 77
End: 2022-08-16
Payer: MEDICARE

## 2022-08-16 ENCOUNTER — PROCEDURE VISIT (OUTPATIENT)
Dept: CARDIOLOGY CLINIC | Age: 77
End: 2022-08-16
Payer: MEDICARE

## 2022-08-16 VITALS
DIASTOLIC BLOOD PRESSURE: 76 MMHG | SYSTOLIC BLOOD PRESSURE: 130 MMHG | HEIGHT: 71 IN | HEART RATE: 65 BPM | WEIGHT: 289 LBS | BODY MASS INDEX: 40.46 KG/M2

## 2022-08-16 DIAGNOSIS — E11.9 TYPE 2 DIABETES MELLITUS WITHOUT COMPLICATION, WITHOUT LONG-TERM CURRENT USE OF INSULIN (HCC): ICD-10-CM

## 2022-08-16 DIAGNOSIS — Z01.818 PRE-OP EVALUATION: ICD-10-CM

## 2022-08-16 DIAGNOSIS — R06.02 SOB (SHORTNESS OF BREATH): ICD-10-CM

## 2022-08-16 DIAGNOSIS — R07.89 OTHER CHEST PAIN: ICD-10-CM

## 2022-08-16 DIAGNOSIS — R06.02 SOB (SHORTNESS OF BREATH): Primary | ICD-10-CM

## 2022-08-16 LAB
LV EF: 51 %
LVEF MODALITY: NORMAL

## 2022-08-16 PROCEDURE — G8427 DOCREV CUR MEDS BY ELIG CLIN: HCPCS | Performed by: INTERNAL MEDICINE

## 2022-08-16 PROCEDURE — G8417 CALC BMI ABV UP PARAM F/U: HCPCS | Performed by: INTERNAL MEDICINE

## 2022-08-16 PROCEDURE — 78452 HT MUSCLE IMAGE SPECT MULT: CPT | Performed by: INTERNAL MEDICINE

## 2022-08-16 PROCEDURE — A9500 TC99M SESTAMIBI: HCPCS | Performed by: INTERNAL MEDICINE

## 2022-08-16 PROCEDURE — 1123F ACP DISCUSS/DSCN MKR DOCD: CPT | Performed by: INTERNAL MEDICINE

## 2022-08-16 PROCEDURE — 93015 CV STRESS TEST SUPVJ I&R: CPT | Performed by: INTERNAL MEDICINE

## 2022-08-16 PROCEDURE — 99214 OFFICE O/P EST MOD 30 MIN: CPT | Performed by: INTERNAL MEDICINE

## 2022-08-16 PROCEDURE — 1036F TOBACCO NON-USER: CPT | Performed by: INTERNAL MEDICINE

## 2022-08-16 PROCEDURE — 93000 ELECTROCARDIOGRAM COMPLETE: CPT | Performed by: INTERNAL MEDICINE

## 2022-08-16 PROCEDURE — 3044F HG A1C LEVEL LT 7.0%: CPT | Performed by: INTERNAL MEDICINE

## 2022-08-16 NOTE — PROGRESS NOTES
Rudy Isidro  1945  Yakelin Bermudez MD      Chief Complaint   Patient presents with    Atrial Fibrillation    Hyperlipidemia     OV for 6 month check. Pt denies any dizziness and palpitations. He does have some chest pain,SOB and swelling to legs. Chief complaint and HPI:  Rudy Isidro  is a 76 y.o. male following up for permanent atrial fibrillation status post pacemaker implantation has diabetes and sleep apnea and chronic renal insufficiency. He had right knee replaced in February and now anticipating left knee to be replaced next month and needed surgical clearance. He is complaining about some chest discomfort off and on not necessarily with any activity and increased dyspnea on exertion. Since the right knee replacement he has gained 15 pounds and has noticed bilateral leg swelling also. He adds too much salt to his fresh tomatoes he takes from [employer] every day. He is compliant to his medications and he does not smoke. Rest of the Cardiovascular system review is otherwise unchanged from prior encounter. Past medical history:  has a past medical history of Arthritis, Dizziness, FH: CAD (coronary artery disease), H/O 24 hour EKG monitoring, H/O atrial fibrillation without current medication, H/O cardiovascular stress test, H/O Doppler echocardiogram, H/O Doppler ultrasound (Venous Doppler Lower Extremities), H/O echocardiogram, H/O transesophageal echocardiography (SALAZAR) for monitoring, Heart murmur, Hx of Venous Doppler ultrasound, Hyperlipidemia, Hypertension, Kidney stones, Leg swelling, Neuropathy, Nonrheumatic aortic valve stenosis, Obesity, JAYE on CPAP, Persistent atrial fibrillation ([address] Syncope and collapse, Thoracic aortic aneurysm ([address] and Type II or unspecified type diabetes mellitus without mention of complication, not stated as uncontrolled. Past surgical history:  has a past surgical history that includes hernia repair (2008); hernia repair (2009);  Colonoscopy (6651;3707); Colon surgery ( ); Cataract removal with implant (Right, 2019); Intracapsular cataract extraction (Right, 2019); Cataract removal with implant (Left, 05/10/2019); Intracapsular cataract extraction (Left, 5/10/2019); Pacemaker insertion (Left, 2019); Cardioversion (2019); and Total knee arthroplasty (Right, 2022). Social History:   Social History     Tobacco Use    Smoking status: Former     Packs/day: 1.00     Years: 7.00     Pack years: 7.00     Types: Cigarettes     Quit date: 3/26/1973     Years since quittin.4    Smokeless tobacco: Former   Substance Use Topics    Alcohol use: Yes     Alcohol/week: 3.0 standard drinks     Types: 3 Shots of liquor per week     Comment: small amount some nights     Family history: family history includes Diabetes in his mother; Heart Disease in his brother and father; High Blood Pressure in his brother. ALLERGIES:  Niacin and related and Oxycontin [oxycodone hcl]  Prior to Admission medications    Medication Sig Start Date End Date Taking?  Authorizing Provider   lisinopril-hydroCHLOROthiazide (PRINZIDE;ZESTORETIC) 20-25 MG per tablet Take 1 tablet by mouth daily 22  Yes Feliz King MD   ELIQUIS 5 MG TABS tablet TAKE 1 TABLET TWICE A DAY 22  Yes Feliz King MD   metoprolol tartrate (LOPRESSOR) 50 MG tablet TAKE 1 TABLET BY MOUTH TWICE A DAY 22  Yes Feliz King MD   magnesium oxide (MAG-OX) 400 MG tablet TAKE 1 TABLET BY MOUTH EVERY DAY 21  Yes Pavithra Valle MD   atorvastatin (LIPITOR) 20 MG tablet Take 1 tablet by mouth daily 20  Yes Feliz King MD   meclizine (ANTIVERT) 25 MG tablet Take 25 mg by mouth as needed   Yes Historical Provider, MD   amLODIPine (NORVASC) 10 MG tablet Take 1 tablet by mouth daily 18  Yes Branden Wiseman MD   metFORMIN (GLUCOPHAGE) 1000 MG tablet Take 500 mg by mouth 2 times daily (with meals)    Yes Historical Provider, MD Jean Lemme Oil 500 MG CAPS Take 1 capsule by mouth daily    Yes Historical Provider, MD   allopurinol (ZYLOPRIM) 100 MG tablet Take 100 mg by mouth daily  1/13/15  Yes Historical Provider, MD   B Complex Vitamins (VITAMIN B COMPLEX PO) Take  by mouth daily. Yes Historical Provider, MD   gabapentin (NEURONTIN) 800 MG tablet Take 800 mg by mouth 3 times daily. Yes Historical Provider, MD     Vitals:    08/16/22 1432   BP: 130/76   Pulse: 65   Weight: 289 lb (131.1 kg)   Height: 5' 11\" (1.803 m)      Body mass index is 40.31 kg/m². Wt Readings from Last 3 Encounters:   08/16/22 289 lb (131.1 kg)   04/28/22 282 lb (127.9 kg)   04/27/22 282 lb (127.9 kg)     Constitutional:  Patient is obese male in no apparent distress. He gained 15 pounds since last visit 6 months ago. Eyes: He wears glasses. No conjunctival pallor noted. NECK: No JVP or thyromegaly  Cardiovascular: Auscultation: Normal S1 and S2.  2/6 systolic murmur noted in aortic area and left sternal border. Carotids are negative for bruits. Respiratory:  Respiratory effort is normal. Breath sounds are clear to auscultation. Extremities: Bilateral 1+ pitting edema noted extending up to the knees. SKIN: Warm and well perfused, no pallor or cyanosis  Neurologic:  Oriented to time, place, and person and non-anxious. No focal neurological deficit noted. Psychiatric: Normal mood and effect. EKG today is consistent with ventricular paced rhythm with underlying persistent atrial fibrillation rate is 65 bpm.    Pacer analysis from 7/2/22 is reviewed is consistent with normal dual-chamber MRI safe [employer] pacer function with stable leads and appropriate battery status for the age of the device. Remaining average battery life is 10 years. Device is programmed to DDDR mode lower rate of 60 bpm and 100% pacing in the ventricle. Patient is in persistent atrial fibrillation on Eliquis for anticoagulationtherapy.      Echocardiogram on 8/9/2022 reported  Left ventricular systolic function is low normal with an ejection fraction   of 50%. Mild concentric left ventricular hypertrophy with Grade III diastolic   dysfunction. Mild bilateral atrial enlargement. Mildly dilated right ventricle. Heavily sclerotic aortic valve with moderate aortic stenosis, mean gradient   of 26 mmHg and an JACK of 1.11 cm sq. Moderate aortic regurgitation is noted with a pressure half time of 466   msec. Moderate tricuspid and mitral regurgitation. Pacer wire noted on right side passing through tricuspid valve. Moderate pulmonary hypertension at 54 mmHg. Aortic root dilated at 4.1 cm. No evidence of pericardial effusion. Compared to October 22, 2021 study there is no significant change. LAB REVIEW:  CBC:   Lab Results   Component Value Date/Time    WBC 7.2 02/28/2022 05:35 AM    HGB 9.4 02/28/2022 05:35 AM    HCT 30.2 02/28/2022 05:35 AM     02/28/2022 05:35 AM     Lipids:   Lab Results   Component Value Date    CHOL 118 12/12/2016    CHOLFAST 120 04/13/2022    TRIG 103 12/12/2016    TRIGLYCFAST 93 04/13/2022    HDL 49 04/13/2022    LDLCALC 52 04/13/2022     Renal:   Lab Results   Component Value Date/Time    BUN 28 04/13/2022 12:58 PM    CREATININE 1.7 04/13/2022 12:58 PM     04/13/2022 12:58 PM    K 5.1 04/13/2022 12:58 PM     PT/INR:   Lab Results   Component Value Date/Time    INR 1.57 07/08/2019 10:39 AM     Lab Results   Component Value Date    LABA1C 5.6 04/13/2022     IMPRESSION and RECOMMENDATIONS:      1. SOB (shortness of breath)  -     EKG 12 lead; Future  -     NM MYOCARDIAL SPECT REST EXERCISE OR RX; Future  2. Other chest pain  -     NM MYOCARDIAL SPECT REST EXERCISE OR RX; Future  3. Pre-op evaluation  Assessment & Plan:  Lexiscan stress Cardiolite to assess CP prior to ortho surgery  Orders:  -     NM MYOCARDIAL SPECT REST EXERCISE OR RX; Future  4.  Type 2 diabetes mellitus without complication, without long-term current use of insulin (HCC)      Lexiscan stress Cardiolite to assess WYMAN and CP and for risk assessment from knee replacement. Counseled to elevate feet when resting and when resting in bed at night with pillows underneath and use thigh high compression stockings in the morning and remove them at night. Patient verbalizes understanding. Questions answered. Limit salt intake. Diet and lose weight. Appropriate prescriptions if needed on this visit are addressed. After visit summery is provided. Questions answered and patient verbalizes understanding. Follow up in 6 months,  sooner if needed. Christian Thornton MD, 8/16/2022 3:01 PM     Please note this report has been partially produced using speech recognition software and may contain errors related to that system including errors in grammar, punctuation, and spelling, as well as words and phrases that may be inappropriate. If there are any questions or concerns please feel free to contact the dictating provider for clarification.

## 2022-08-16 NOTE — PATIENT INSTRUCTIONS
Lexiscan stress Cardiolite to assess WYMAN and CP and for risk assessment from knee replacement. Counseled to elevate feet when resting and when resting in bed at night with pillows underneath and use thigh high compression stockings in the morning and remove them at night. Patient verbalizes understanding. Questions answered. Limit salt intake. Diet and lose weight. Appropriate prescriptions if needed on this visit are addressed. After visit summery is provided. Questions answered and patient verbalizes understanding. Follow up in 6 months,  sooner if needed.

## 2022-08-18 ENCOUNTER — PROCEDURE VISIT (OUTPATIENT)
Dept: CARDIOLOGY CLINIC | Age: 77
End: 2022-08-18

## 2022-08-18 DIAGNOSIS — R06.02 SOB (SHORTNESS OF BREATH): Primary | ICD-10-CM

## 2022-08-19 ENCOUNTER — TELEPHONE (OUTPATIENT)
Dept: CARDIOLOGY CLINIC | Age: 77
End: 2022-08-19

## 2022-08-23 ENCOUNTER — OFFICE VISIT (OUTPATIENT)
Dept: CARDIOLOGY CLINIC | Age: 77
End: 2022-08-23
Payer: MEDICARE

## 2022-08-23 ENCOUNTER — HOSPITAL ENCOUNTER (OUTPATIENT)
Age: 77
Discharge: HOME | End: 2022-08-23
Payer: MEDICARE

## 2022-08-23 ENCOUNTER — HOSPITAL ENCOUNTER (OUTPATIENT)
Dept: GENERAL RADIOLOGY | Age: 77
Discharge: HOME | End: 2022-08-23
Payer: MEDICARE

## 2022-08-23 VITALS
DIASTOLIC BLOOD PRESSURE: 82 MMHG | HEIGHT: 71 IN | BODY MASS INDEX: 40.04 KG/M2 | WEIGHT: 286 LBS | HEART RATE: 68 BPM | SYSTOLIC BLOOD PRESSURE: 112 MMHG | RESPIRATION RATE: 16 BRPM

## 2022-08-23 DIAGNOSIS — E78.00 PURE HYPERCHOLESTEROLEMIA: ICD-10-CM

## 2022-08-23 DIAGNOSIS — I48.19 PERSISTENT ATRIAL FIBRILLATION (HCC): ICD-10-CM

## 2022-08-23 DIAGNOSIS — G47.33 OSA ON CPAP: ICD-10-CM

## 2022-08-23 DIAGNOSIS — Z95.0 S/P PLACEMENT OF CARDIAC PACEMAKER: ICD-10-CM

## 2022-08-23 DIAGNOSIS — I10 PRIMARY HYPERTENSION: ICD-10-CM

## 2022-08-23 DIAGNOSIS — N18.32 CHRONIC KIDNEY DISEASE (CKD) STAGE G3B/A1, MODERATELY DECREASED GLOMERULAR FILTRATION RATE (GFR) BETWEEN 30-44 ML/MIN/1.73 SQUARE METER AND ALBUMINURIA CREATININE RATIO LESS THAN 30 MG/G (HCC): ICD-10-CM

## 2022-08-23 DIAGNOSIS — Z86.79 H/O ATRIAL FIBRILLATION WITHOUT CURRENT MEDICATION: ICD-10-CM

## 2022-08-23 DIAGNOSIS — Z99.89 OSA ON CPAP: ICD-10-CM

## 2022-08-23 DIAGNOSIS — R94.39 ABNORMAL NUCLEAR STRESS TEST: Primary | ICD-10-CM

## 2022-08-23 DIAGNOSIS — J30.9 ALLERGIC RHINITIS, UNSPECIFIED SEASONALITY, UNSPECIFIED TRIGGER: ICD-10-CM

## 2022-08-23 DIAGNOSIS — E11.9 TYPE 2 DIABETES MELLITUS WITHOUT COMPLICATION, WITHOUT LONG-TERM CURRENT USE OF INSULIN (HCC): ICD-10-CM

## 2022-08-23 PROCEDURE — 1123F ACP DISCUSS/DSCN MKR DOCD: CPT | Performed by: INTERNAL MEDICINE

## 2022-08-23 PROCEDURE — 3044F HG A1C LEVEL LT 7.0%: CPT | Performed by: INTERNAL MEDICINE

## 2022-08-23 PROCEDURE — G8417 CALC BMI ABV UP PARAM F/U: HCPCS | Performed by: INTERNAL MEDICINE

## 2022-08-23 PROCEDURE — G8427 DOCREV CUR MEDS BY ELIG CLIN: HCPCS | Performed by: INTERNAL MEDICINE

## 2022-08-23 PROCEDURE — 71046 X-RAY EXAM CHEST 2 VIEWS: CPT

## 2022-08-23 PROCEDURE — 99214 OFFICE O/P EST MOD 30 MIN: CPT | Performed by: INTERNAL MEDICINE

## 2022-08-23 PROCEDURE — 1036F TOBACCO NON-USER: CPT | Performed by: INTERNAL MEDICINE

## 2022-08-23 NOTE — ASSESSMENT & PLAN NOTE
Recommend left and right heart cath to evaluate valvular heart disease and coronary disease status for further cardiac risk ratification from anticipated left knee replacement. Patient has creatinine of 1.7 which is also concerning for contrast related nephrotoxicity. He is counseled to hydrate himself before and after the procedure and will use extra precautions to minimize nephrotoxicity and I will discuss this with Dr. Omari Baca who will be doing the procedure. Multiple questions are addressed to patient and his wife  Understanding.

## 2022-08-23 NOTE — PATIENT INSTRUCTIONS
Recommend left and right heart cath to evaluate aortic stenosis and cornary disease status. Patient has the procedure described. Potential risks, complications, alternatives are discussed in detail. Questions are encouraged and addressed to patient's satisfaction. Patient verbalized understanding and asked relevant questions and agreed to proceed with the procedure. Informed consent obtained. Appropriate prescriptions if needed on this visit are addressed. After visit summery is provided. Questions answered and patient verbalizes understanding. Follow up in 3 months,  sooner if needed.

## 2022-08-23 NOTE — PROGRESS NOTES
Yana Rivera  1945  Ariadna Patel MD      Chief Complaint   Patient presents with    Atrial Fibrillation    Hyperlipidemia    Hypertension     Pt states he has episodes of chest pain and shortness of breath x 2 weeks. Pt has bilateral edema in legs. Pt is non-smoker. Chief complaint and HPI:  Yana Rivera  is a 76 y.o. male following up chest pain and shortness of breath and had an echocardiogram and nuclear stress test.  He is also in need of left knee replacement scheduled for September 13. Rest of the Cardiovascular system review is otherwise unchanged from prior encounter. Past medical history:  has a past medical history of Abnormal nuclear stress test, Arthritis, Dizziness, FH: CAD (coronary artery disease), H/O 24 hour EKG monitoring, H/O atrial fibrillation without current medication, H/O cardiovascular stress test, H/O Doppler echocardiogram, H/O Doppler ultrasound (Venous Doppler Lower Extremities), H/O echocardiogram, H/O transesophageal echocardiography (SALAZAR) for monitoring, Heart murmur, History of nuclear stress test, Hx of Venous Doppler ultrasound, Hyperlipidemia, Hypertension, Kidney stones, Leg swelling, Neuropathy, Nonrheumatic aortic valve stenosis, Obesity, JAYE on CPAP, Persistent atrial fibrillation ([address] Syncope and collapse, Thoracic aortic aneurysm ([address] and Type II or unspecified type diabetes mellitus without mention of complication, not stated as uncontrolled. Past surgical history:  has a past surgical history that includes hernia repair (2008); hernia repair (2009); Colonoscopy (6960;1185); Colon surgery (2007 ); Cataract removal with implant (Right, 04/12/2019); Intracapsular cataract extraction (Right, 4/12/2019); Cataract removal with implant (Left, 05/10/2019); Intracapsular cataract extraction (Left, 5/10/2019); Pacemaker insertion (Left, 06/25/2019); Cardioversion (07/08/2019); and Total knee arthroplasty (Right, 2/16/2022).   Social History:   Social History 12:58 PM     PT/INR:   Lab Results   Component Value Date/Time    INR 1.57 07/08/2019 10:39 AM     Lab Results   Component Value Date    LABA1C 5.6 04/13/2022     IMPRESSION and RECOMMENDATIONS:      1. Abnormal nuclear stress test  Assessment & Plan:  Recommend left and right heart cath to evaluate valvular heart disease and coronary disease status for further cardiac risk ratification from anticipated left knee replacement. Patient has creatinine of 1.7 which is also concerning for contrast related nephrotoxicity. He is counseled to hydrate himself before and after the procedure and will use extra precautions to minimize nephrotoxicity and I will discuss this with Dr. Chuyita Dolan who will be doing the procedure. Multiple questions are addressed to patient and his wife  Understanding. 2. Chronic kidney disease (CKD) stage G3b/A1, moderately decreased glomerular filtration rate (GFR) between 30-44 mL/min/1.73 square meter and albuminuria creatinine ratio less than 30 mg/g (HCC)  3. H/O atrial fibrillation without current medication 1/2019  4. Pure hypercholesterolemia  5. Primary hypertension  6. S/P placement of cardiac pacemaker 6/2019  7. Type 2 diabetes mellitus without complication, without long-term current use of insulin ([facility]  8. Persistent atrial fibrillation (HCC)    Recommend left and right heart cath to evaluate aortic stenosis and cornary disease status. Patient has the procedure described. Potential risks, complications, alternatives are discussed in detail. Questions are encouraged and addressed to patient's satisfaction. Patient verbalized understanding and asked relevant questions and agreed to proceed with the procedure. Informed consent obtained. Appropriate prescriptions if needed on this visit are addressed. After visit summery is provided. Questions answered and patient verbalizes understanding. Follow up in 3 months,  sooner if needed.     Rosemary Flores MD, 8/23/2022 3:33 PM     Please note this report has been partially produced using speech recognition software and may contain errors related to that system including errors in grammar, punctuation, and spelling, as well as words and phrases that may be inappropriate. If there are any questions or concerns please feel free to contact the dictating provider for clarification.

## 2022-08-25 RX ORDER — LISINOPRIL AND HYDROCHLOROTHIAZIDE 25; 20 MG/1; MG/1
TABLET ORAL
Qty: 90 TABLET | Refills: 3 | Status: SHIPPED | OUTPATIENT
Start: 2022-08-25

## 2022-08-29 ENCOUNTER — HOSPITAL ENCOUNTER (OUTPATIENT)
Dept: PHYSICAL THERAPY | Age: 77
Setting detail: THERAPIES SERIES
Discharge: HOME | End: 2022-08-29
Payer: MEDICARE

## 2022-08-29 PROCEDURE — 97110 THERAPEUTIC EXERCISES: CPT

## 2022-08-29 PROCEDURE — 97162 PT EVAL MOD COMPLEX 30 MIN: CPT

## 2022-08-29 NOTE — PLAN OF CARE
Outpatient Physical Therapy           [place]           [] Phone: [phone]   Fax: [phone]  [place]           [x] Phone: [phone]   Fax: [phone]     To: Antwan Crews   From: Bruno Enamorado PT,      Patient: Myra Vega       : 1945  Diagnosis: Unilateral primary osteoarthritis, left knee [M17.12]  Pain in left knee [M25.562] Diagnosis: L knee OA  Treatment Diagnosis: L knee pain  Date: 2022    Physical Therapy Certification/Re-Certification Form    The following patient has been evaluated for physical therapy services and for therapy to continue, insurance requires physician review of the treatment plan initially and every 90 days. Please review the attached evaluation and/or summary of the patient's plan of care, and verify that you agree therapy should continue by signing the attached document and sending it back to our office.     Assessment:         Plan of Care/Treatment to date:  [x] Therapeutic Exercise  [] Modalities:  [] Therapeutic Activity     [] Ultrasound  [] Electrical Stimulation  [x] Gait Training      [] Cervical Traction [] Lumbar Traction  [] Neuromuscular Re-education    [] Cold/hotpack [] Iontophoresis   [x] Instruction in HEP      [] Vasopneumatic    [] Dry Needling  [] Manual Therapy               [] Aquatic Therapy       Other:          Frequency/Duration:  # Days per week: [x] 1 day # Weeks: [x] 1 week [] 5 weeks     [] 2 days   [] 2 weeks [] 6 weeks     [] 3 days   [] 3 weeks [] 7 weeks     [] 4 days   [] 4 weeks [] 8 weeks         [] 9 weeks [] 10 weeks         [] 11 weeks [] 12 weeks    Rehab Potential/Progress: [] Excellent [x] Good [] Fair  [] Poor     Goals:    Short term goals- goals met  Time Frame for Short term goals:        Short term goal 1: Patient will verbalize  and demonstrate understanding of initial post op phase exercises  Short term goal 2 : Patient will demonstrate understanding of proper use of walker with WBAT on affected limb  Short term goal 3: Patient will demonstrate understanding of proper sequence for going up/down steps to limit stress on the affected limb  Short term goal 4: Patient will watch [facility] joint camp video                                   Electronically signed by:  Slmi Fisher PT, , 8/29/2022, 2:54 PM        If you have any questions or concerns, please don't hesitate to call.   Thank you for your referral.      Physician Signature:________________________________Date:_________ TIME: _____  By signing above, therapists plan is approved by physician

## 2022-08-29 NOTE — PROGRESS NOTES
progress  Physical Therapy: Initial Evaluation    Patient: Eulogio Bailey (4 y.o. male)   Examination Date:   Plan of Care Certification Period: 2022 to        :  1945 ;    Confirmed: MRN: [id number]  CSN: [id number]   Insurance: Payor: Sanjuana Jetviridiana / Plan: [facility] SOLUTIONS / Product Type: *No Product type* /   Insurance ID: [id number] - (Medicare Managed) Secondary Insurance (if applicable):    Referring Physician: Sunitha Knox   PCP: Michaela Lubin MD Visits to Date/Visits Approved:   /      No Show/Cancelled Appts:   /       Medical Diagnosis: Unilateral primary osteoarthritis, left knee [M17.12]  Pain in left knee [M25.562] L knee OA  Treatment Diagnosis: L knee pain     PERTINENT MEDICAL HISTORY   Patient Assessed for Rehabilitation Services: Yes       Medical History: Chart Reviewed: Yes   Past Medical History:   Diagnosis Date    Abnormal nuclear stress test 2022    Inferior wall ischemia 2022    Arthritis     Dizziness     FH: CAD (coronary artery disease) 2015    H/O 24 hour EKG monitoring 19,2016    Conclusion: Atrial fibrillation with  fairly well-controlled ventricular rate response without evidence of any significant tachy or alon arrhythmias. H/O atrial fibrillation without current medication 2019    H/O cardiovascular stress test 11/24/15; 2019    Normal perfusion in the distribution of all coronaries, normal LV, EF 53%. H/O Doppler echocardiogram 2022    EF 50%. Mild concentric LV hypertrophy with grade 3 diastolic dysfunction. Mild bilateral atrial enlargement. Mildly dilated RV. Heavily sclerotic aortic valve with moderate aortic stenosis, mean gradient of 26 and an JACK of 1.11. Moderate aortic regurg is noted with pressure half time of 466. Moderate tricuspid and mitral regurg. Pacer wire noted on right side passing through tricuspid valve.     H/O Doppler ultrasound (Venous Doppler Lower Extremities) 03/08/2017; 1/2/2020    No evidence of DVT or SVT in the bilaterally. No significant reflux noted in the veins of the right lower extremity. Significant reflux noted in the Left CFV. Bilateral Calf and ankle edema noted. H/O echocardiogram 10/28/2021    EF 55-60% Mild eccentric left ventricular hypertrophy. There is severe fibrocalcific sclerosis of the aortic valve with mean gradient across the aortic valve of 29 mmHg and calculated aortic valve areawas 1.2square centimeters, all suggesting moderate aortic stenosis. See complete result documentation under cardiology tab. H/O transesophageal echocardiography (SALAZAR) for monitoring 07/08/2019    No CEE clot    Heart murmur 09/29/2015    History of nuclear stress test 08/16/2022    LV perfusion is abn suggestiing inferior wall infarction with mild superimposed ischemia of moderate size. EF 51%. Hx of Venous Doppler ultrasound 08/03/2020    No evidence of DVT in the left CFV. The Left GSV is non-compressible with no evidence of flow just past the saphenofemoral junction to the distal calf.         Hyperlipidemia     Hypertension     Kidney stones     Leg swelling 12/09/2019    Bilateral leg swelling    Neuropathy     Nonrheumatic aortic valve stenosis 02/15/2022    Obesity     JAYE on CPAP 06/26/2018    On CPAP 5/2018    Persistent atrial fibrillation ([address] 01/29/2019    New onset 1/2019    Syncope and collapse 12/09/2019    Thoracic aortic aneurysm ([address] 02/2011    4.5    Type II or unspecified type diabetes mellitus without mention of complication, not stated as uncontrolled      Surgical History:   Past Surgical History:   Procedure Laterality Date    CARDIOVERSION  07/08/2019    CATARACT REMOVAL WITH IMPLANT Right 04/12/2019    CATARACT REMOVAL WITH IMPLANT Left 05/10/2019    COLON SURGERY  2007     COLONOSCOPY  2007;2017    colon polyps removed    HERNIA REPAIR  2008    HERNIA REPAIR  2009    INTRACAPSULAR CATARACT EXTRACTION Right 4/12/2019    EYE CATARACT EMULSIFICATION IOL IMPLANT performed by Tej Rock MD at [address] [place] [address] Street Left 5/10/2019    EYE CATARACT EMULSIFICATION IOL IMPLANT performed by Tej Rock MD at [place] [address] Left 06/25/2019    [employer] dual permanent pacer-Val XT DR ENEIDA Vaughan     TOTAL KNEE ARTHROPLASTY Right 2/16/2022    RIGHT KNEE TOTAL ARTHROPLASTY performed by Shyann Moraes DO at [facility] OR       Medications:   Current Outpatient Medications:     lisinopril-hydroCHLOROthiazide (PRINZIDE;ZESTORETIC) 20-25 MG per tablet, TAKE 1 TABLET BY MOUTH EVERY DAY, Disp: 90 tablet, Rfl: 3    ELIQUIS 5 MG TABS tablet, TAKE 1 TABLET TWICE A DAY, Disp: 180 tablet, Rfl: 3    metoprolol tartrate (LOPRESSOR) 50 MG tablet, TAKE 1 TABLET BY MOUTH TWICE A DAY, Disp: 180 tablet, Rfl: 3    magnesium oxide (MAG-OX) 400 MG tablet, TAKE 1 TABLET BY MOUTH EVERY DAY, Disp: 90 tablet, Rfl: 3    atorvastatin (LIPITOR) 20 MG tablet, Take 1 tablet by mouth daily, Disp: 90 tablet, Rfl: 3    meclizine (ANTIVERT) 25 MG tablet, Take 25 mg by mouth as needed, Disp: , Rfl:     amLODIPine (NORVASC) 10 MG tablet, Take 1 tablet by mouth daily, Disp: 90 tablet, Rfl: 3    Krill Oil 500 MG CAPS, Take 1 capsule by mouth daily , Disp: , Rfl:     allopurinol (ZYLOPRIM) 100 MG tablet, Take 100 mg by mouth daily , Disp: , Rfl: 0    B Complex Vitamins (VITAMIN B COMPLEX PO), Take  by mouth daily. , Disp: , Rfl:     gabapentin (NEURONTIN) 800 MG tablet, Take 800 mg by mouth 3 times daily.   , Disp: , Rfl:   Allergies: Niacin and related and Oxycontin [oxycodone hcl]      SUBJECTIVE EXAMINATION      ,           Subjective History:    Subjective: to have L TKA 9/13/22; did well with R TKA  Additional Pertinent Hx (if applicable):            Learning/Language:       Pain Screening        Functional Status         Social History:  Social History  Lives With: Spouse  Type of Home: House  Home Layout: One level  Home Access: Ramped entrance  Bathroom Shower/Tub: Walk-in shower  Bathroom Equipment: Built-in shower seat    Occupation/Interests:  Occupation: Retired    Prior Level of Function:    Independent        Current Level of Function:  walking stick @ times      ADL Assistance: Independent  Homemaking Assistance: Independent  Homemaking Responsibilities: No  Ambulation Assistance: Independent  Transfer Assistance: Independent  Active : Yes    OBJECTIVE EXAMINATION   Restrictions:        Position Activity Restriction  Other position/activity restrictions: none    Review of Systems:  Vision: Within Functional Limits  Hearing: Within functional limits  Overall Orientation Status: Within Normal Limits  Patient affect[de-identified] Normal  Follows Commands: Within Functional Limits    VBI Screening / Lumbar Screening:        Regional Screen:         Observations:       Palpation:        Ambulation/Gait (if applicable): Uses walking stick with gait    Balance Screen:       Neuro Screen:    Left AROM  Right AROM          Knee -10* to 120*          Left PROM  Right PROM                    Left Strength  Right Strength       L knee EXT 3+/5             Cervical Assessment             Thoracic Assessment            Lumbar Assessment           Trunk Strength           Muscle Length/Flexibility:      Joint Mobility (if applicable):        Special Tests:        Balance/Gait Assessment(s) Performed: Additional Finding(s) (if applicable):           ASSESSMENT     Impression:      Body Structures, Functions, Activity Limitations Requiring Skilled Therapeutic Intervention: Increased pain, Decreased functional mobility , Decreased high-level IADLs    Statement of Medical Necessity: Physical Therapy is both indicated and medically necessary as outlined in the POC to increase the likelihood of meeting the functionally related goals stated below.      Patient's Activity Tolerance:        Patient's rehabilitation potential/prognosis is considered to be: Good    Factors which may impact rehabilitation potential include:          GOALS   Patient Goal(s):    Short Term Goals Completed by   Goal Status                                                                   Long Term Goals Completed by   Goal Status                                                                    TREATMENT PLAN            Pt. actively involved in establishing Plan of Care and Goals: yes  Patient/ Caregiver education and instruction:               Treatment may include any combination of the following:       Frequency / Duration:  Patient to be seen   for   weeks      Eval Complexity:    Decision Making: Medium Complexity  History: PF- L knee OA  Exam: ROM/ strength  Clinical Presentation: evolving    PT Treatment Completed: Therapy Time  Individual Time In: 1320       Individual Time Out: 2897  Minutes: 45  Timed Code Treatment Minutes: 20 Minutes     Therapist Signature: Louisa Fletcher PT    Date: 8/93/8029     I certify that the above Therapy Services are being furnished while the patient is under my care. I agree with the treatment plan and certify that this therapy is necessary. Physician's Signature:  ___________________________   Date:_______                                                                   Ever Maurice DO        Physician Comments: _______________________________________________    Please sign and return to [address] Please fax to the location listed below.  [facility] YOU for this referral!    [address]  [address] [address]  Dept: [address]: [phone]       POC NOTE

## 2022-09-02 RX ORDER — SODIUM CHLORIDE, SODIUM LACTATE, POTASSIUM CHLORIDE, CALCIUM CHLORIDE 600; 310; 30; 20 MG/100ML; MG/100ML; MG/100ML; MG/100ML
INJECTION, SOLUTION INTRAVENOUS CONTINUOUS
Status: CANCELLED | OUTPATIENT
Start: 2022-09-13

## 2022-09-06 ENCOUNTER — NURSE ONLY (OUTPATIENT)
Dept: CARDIOLOGY CLINIC | Age: 77
End: 2022-09-06

## 2022-09-06 ENCOUNTER — HOSPITAL ENCOUNTER (OUTPATIENT)
Age: 77
Discharge: HOME | End: 2022-09-06
Payer: MEDICARE

## 2022-09-06 ENCOUNTER — HOSPITAL ENCOUNTER (OUTPATIENT)
Dept: PREADMISSION TESTING | Age: 77
Discharge: HOME | End: 2022-09-10
Payer: MEDICARE

## 2022-09-06 VITALS
DIASTOLIC BLOOD PRESSURE: 75 MMHG | TEMPERATURE: 98.1 F | SYSTOLIC BLOOD PRESSURE: 126 MMHG | WEIGHT: 290 LBS | HEART RATE: 65 BPM | HEIGHT: 71 IN | OXYGEN SATURATION: 93 % | BODY MASS INDEX: 40.6 KG/M2 | RESPIRATION RATE: 20 BRPM

## 2022-09-06 DIAGNOSIS — R06.02 SOB (SHORTNESS OF BREATH): Primary | ICD-10-CM

## 2022-09-06 DIAGNOSIS — Z01.818 PRE-OP TESTING: Primary | ICD-10-CM

## 2022-09-06 LAB
ALBUMIN SERPL-MCNC: 5.1 GM/DL (ref 3.4–5)
ALP BLD-CCNC: 86 IU/L (ref 40–128)
ALT SERPL-CCNC: 17 U/L (ref 10–40)
ANION GAP SERPL CALCULATED.3IONS-SCNC: 16 MMOL/L (ref 4–16)
AST SERPL-CCNC: 20 IU/L (ref 15–37)
BACTERIA: ABNORMAL /HPF
BASOPHILS ABSOLUTE: 0 K/CU MM
BASOPHILS RELATIVE PERCENT: 0.5 % (ref 0–1)
BILIRUB SERPL-MCNC: 0.9 MG/DL (ref 0–1)
BILIRUBIN URINE: NEGATIVE MG/DL
BLOOD, URINE: NEGATIVE
BUN BLDV-MCNC: 26 MG/DL (ref 6–23)
CALCIUM SERPL-MCNC: 10 MG/DL (ref 8.3–10.6)
CHLORIDE BLD-SCNC: 104 MMOL/L (ref 99–110)
CLARITY: CLEAR
CO2: 20 MMOL/L (ref 21–32)
COLOR: YELLOW
CREAT SERPL-MCNC: 1.8 MG/DL (ref 0.9–1.3)
DIFFERENTIAL TYPE: ABNORMAL
EOSINOPHILS ABSOLUTE: 0.3 K/CU MM
EOSINOPHILS RELATIVE PERCENT: 3.4 % (ref 0–3)
ERYTHROCYTE SEDIMENTATION RATE: 29 MM/HR (ref 0–20)
GFR AFRICAN AMERICAN: 44 ML/MIN/1.73M2
GFR NON-AFRICAN AMERICAN: 37 ML/MIN/1.73M2
GLUCOSE BLD-MCNC: 107 MG/DL (ref 70–99)
GLUCOSE, URINE: NEGATIVE MG/DL
HCT VFR BLD CALC: 39.2 % (ref 42–52)
HEMOGLOBIN: 12.5 GM/DL (ref 13.5–18)
IMMATURE NEUTROPHIL %: 0.4 % (ref 0–0.43)
KETONES, URINE: NEGATIVE MG/DL
LEUKOCYTE ESTERASE, URINE: NEGATIVE
LYMPHOCYTES ABSOLUTE: 1 K/CU MM
LYMPHOCYTES RELATIVE PERCENT: 13.4 % (ref 24–44)
MCH RBC QN AUTO: 32.1 PG (ref 27–31)
MCHC RBC AUTO-ENTMCNC: 31.9 % (ref 32–36)
MCV RBC AUTO: 100.8 FL (ref 78–100)
MONOCYTES ABSOLUTE: 0.7 K/CU MM
MONOCYTES RELATIVE PERCENT: 9.3 % (ref 0–4)
MUCUS: ABNORMAL HPF
NITRITE URINE, QUANTITATIVE: NEGATIVE
NUCLEATED RBC %: 0 %
PDW BLD-RTO: 15.3 % (ref 11.7–14.9)
PH, URINE: 6.5 (ref 5–8)
PLATELET # BLD: 166 K/CU MM (ref 140–440)
PMV BLD AUTO: 10.2 FL (ref 7.5–11.1)
POTASSIUM SERPL-SCNC: 4.7 MMOL/L (ref 3.5–5.1)
PROTEIN UA: NEGATIVE MG/DL
RBC # BLD: 3.89 M/CU MM (ref 4.6–6.2)
RBC URINE: 1 /HPF (ref 0–3)
SEGMENTED NEUTROPHILS ABSOLUTE COUNT: 5.4 K/CU MM
SEGMENTED NEUTROPHILS RELATIVE PERCENT: 73 % (ref 36–66)
SODIUM BLD-SCNC: 140 MMOL/L (ref 135–145)
SPECIFIC GRAVITY UA: 1.01 (ref 1–1.03)
SQUAMOUS EPITHELIAL: <1 /HPF
TOTAL IMMATURE NEUTOROPHIL: 0.03 K/CU MM
TOTAL NUCLEATED RBC: 0 K/CU MM
TOTAL PROTEIN: 7.2 GM/DL (ref 6.4–8.2)
TRICHOMONAS: ABNORMAL /HPF
UROBILINOGEN, URINE: 0.2 MG/DL (ref 0.2–1)
WBC # BLD: 7.4 K/CU MM (ref 4–10.5)
WBC UA: <1 /HPF (ref 0–2)

## 2022-09-06 PROCEDURE — 81001 URINALYSIS AUTO W/SCOPE: CPT

## 2022-09-06 PROCEDURE — 85025 COMPLETE CBC W/AUTO DIFF WBC: CPT

## 2022-09-06 PROCEDURE — 80053 COMPREHEN METABOLIC PANEL: CPT

## 2022-09-06 PROCEDURE — 87086 URINE CULTURE/COLONY COUNT: CPT

## 2022-09-06 PROCEDURE — 85652 RBC SED RATE AUTOMATED: CPT

## 2022-09-06 ASSESSMENT — PAIN DESCRIPTION - ORIENTATION: ORIENTATION: LEFT

## 2022-09-06 ASSESSMENT — PAIN DESCRIPTION - LOCATION: LOCATION: KNEE

## 2022-09-06 ASSESSMENT — PAIN SCALES - GENERAL: PAINLEVEL_OUTOF10: 6

## 2022-09-06 ASSESSMENT — PAIN - FUNCTIONAL ASSESSMENT: PAIN_FUNCTIONAL_ASSESSMENT: PREVENTS OR INTERFERES SOME ACTIVE ACTIVITIES AND ADLS

## 2022-09-06 ASSESSMENT — PAIN DESCRIPTION - FREQUENCY: FREQUENCY: CONTINUOUS

## 2022-09-06 ASSESSMENT — PAIN DESCRIPTION - DESCRIPTORS: DESCRIPTORS: ACHING

## 2022-09-06 NOTE — PROGRESS NOTES
Surgery @ [facility] on 9/13/22 you will be called 9/12 /22 with times               1. Do not eat or drink anything after midnight - unless instructed by your doctor prior to surgery. This includes                   no water, chewing gum or mints. 2. Follow your directions as prescribed by the doctor for your procedure and medications. Instructed pt. To take metoprolol, amlodipine, metformin, and gabapentin with a tiny sip of water the morning of surgery. 3. Check with your Doctor regarding stopping vitamins, supplements, blood thinners (Plavix, Coumadin, Lovenox, Effient, Pradaxa, Xarelto, Fragmin or                   other blood thinners) and follow their instructions. Stop vitamins, supplements and NSAIDS: pt. Was instructed to speak with his cardiologist who is Dr. Savanna Huffman about when to stop eliquis. 4. Do not smoke, vape or use chewing tobacco morning of surgery. Do not drink any alcoholic beverages 24 hours prior to surgery. This includes NA Beer. No street drugs 7 days prior to surgery. 5. You may brush your teeth and gargle the morning of surgery. DO NOT SWALLOW WATER   6. You MUST make arrangements for a responsible adult to take you home after your surgery and be able to check on you every couple                   hours for the day. You will not be allowed to leave alone or drive yourself home. It is strongly suggested someone stay with you the first 24                   hrs. Your surgery will be cancelled if you do not have a ride home. 7. Please wear simple, loose fitting clothing to the hospital.  Luisa Dineroindira not bring valuables (money, credit cards, checkbooks, etc.) Do not wear any                   makeup (including no eye makeup) or nail polish on your fingers or toes. 8. DO NOT wear any jewelry or piercings on day of surgery. All body piercing jewelry must be removed.              9. If you have dentures, they will be removed before going to the OR; we will provide you a container. If you wear contact lenses or glasses,                  they will be removed; please bring a case for them. 10. If you  have a Living Will and Durable Power of  for Healthcare, please bring in a copy. 11. Please bring picture ID,  insurance card, paperwork from the doctors office    (H & P, Consent, & card for implantable devices). 12. Take a shower the morning of your procedure  and night before with Hibiclens or an anti-bacterial soap. Do not apply any make-up, deodorant, lotion, oil or powder. 13.  Enter thru the main entrance wearing a mask on the day of surgery.

## 2022-09-06 NOTE — PROGRESS NOTES
Patient was here in office & educated on [facility] for Dx: Charisse Pinon Procedure is scheduled for 9/9/22 @ 12AM, w/arrival @ 10AM, @ [facility]. Procedure and risks were explained to patient. Consent forms were signed. Instructions were given to patient to remain NPO after midnight the night before procedure. Patient may take morning meds the morning of procedure with small amount of water. Patient is asked to call hospital @ [phone], 1 to 2 days before procedure to pre-register. Patient was notified that procedure could be delayed due to an emergency. Patient voiced understanding.  Copies of consent, pre-testing orders, & instructions scanned into media

## 2022-09-08 ENCOUNTER — TELEPHONE (OUTPATIENT)
Dept: CARDIOLOGY CLINIC | Age: 77
End: 2022-09-08

## 2022-09-08 ENCOUNTER — HOSPITAL ENCOUNTER (OUTPATIENT)
Age: 77
Discharge: HOME | End: 2022-09-08
Payer: MEDICARE

## 2022-09-08 LAB
CULTURE: NORMAL
Lab: NORMAL
PRO-BNP: 3897 PG/ML
SPECIMEN: NORMAL

## 2022-09-08 PROCEDURE — 36415 COLL VENOUS BLD VENIPUNCTURE: CPT

## 2022-09-08 PROCEDURE — 83880 ASSAY OF NATRIURETIC PEPTIDE: CPT

## 2022-09-08 NOTE — TELEPHONE ENCOUNTER
Reminder call. Instructions reviewed with patient's wife. Advised to hold Lisinopril-HCTZ the day of the procedure and Metformin the day before, day of, and 2 days after the procedure and Eliquis 48 hours before procedure. Wife acknowledged understanding . Called patient to advise that follow up appointment was made for Jen Cedeño office on 9/22/22 @ 10:40and if patient needs to go to THE [facility] he should call to reschedule.

## 2022-09-09 ENCOUNTER — HOSPITAL ENCOUNTER (OUTPATIENT)
Dept: CARDIAC CATH/INVASIVE PROCEDURES | Age: 77
Setting detail: OBSERVATION
Discharge: HOME | End: 2022-09-10
Attending: INTERNAL MEDICINE | Admitting: INTERNAL MEDICINE
Payer: MEDICARE

## 2022-09-09 PROBLEM — I25.10 CAD IN NATIVE ARTERY: Status: ACTIVE | Noted: 2022-09-09

## 2022-09-09 PROBLEM — I25.10 ASCVD (ARTERIOSCLEROTIC CARDIOVASCULAR DISEASE): Status: ACTIVE | Noted: 2022-09-09

## 2022-09-09 LAB
ACTIVATED CLOTTING TIME, LOW RANGE: 349 SEC
GLUCOSE BLD-MCNC: 113 MG/DL (ref 70–99)
GLUCOSE BLD-MCNC: 153 MG/DL (ref 70–99)

## 2022-09-09 PROCEDURE — G0378 HOSPITAL OBSERVATION PER HR: HCPCS

## 2022-09-09 PROCEDURE — 6370000000 HC RX 637 (ALT 250 FOR IP)

## 2022-09-09 PROCEDURE — 6370000000 HC RX 637 (ALT 250 FOR IP): Performed by: INTERNAL MEDICINE

## 2022-09-09 PROCEDURE — 6360000002 HC RX W HCPCS

## 2022-09-09 PROCEDURE — 2580000003 HC RX 258: Performed by: INTERNAL MEDICINE

## 2022-09-09 PROCEDURE — 93458 L HRT ARTERY/VENTRICLE ANGIO: CPT

## 2022-09-09 PROCEDURE — C1887 CATHETER, GUIDING: HCPCS

## 2022-09-09 PROCEDURE — C9600 PERC DRUG-EL COR STENT SING: HCPCS

## 2022-09-09 PROCEDURE — 85347 COAGULATION TIME ACTIVATED: CPT

## 2022-09-09 PROCEDURE — C1874 STENT, COATED/COV W/DEL SYS: HCPCS

## 2022-09-09 PROCEDURE — 2500000003 HC RX 250 WO HCPCS

## 2022-09-09 PROCEDURE — 6360000004 HC RX CONTRAST MEDICATION

## 2022-09-09 PROCEDURE — 94761 N-INVAS EAR/PLS OXIMETRY MLT: CPT

## 2022-09-09 PROCEDURE — 82962 GLUCOSE BLOOD TEST: CPT

## 2022-09-09 PROCEDURE — 93458 L HRT ARTERY/VENTRICLE ANGIO: CPT | Performed by: INTERNAL MEDICINE

## 2022-09-09 PROCEDURE — C1725 CATH, TRANSLUMIN NON-LASER: HCPCS

## 2022-09-09 PROCEDURE — 92928 PRQ TCAT PLMT NTRAC ST 1 LES: CPT | Performed by: INTERNAL MEDICINE

## 2022-09-09 PROCEDURE — 92920 PRQ TRLUML C ANGIOP 1ART&/BR: CPT | Performed by: INTERNAL MEDICINE

## 2022-09-09 RX ORDER — ACETAMINOPHEN 325 MG/1
650 TABLET ORAL EVERY 4 HOURS PRN
Status: DISCONTINUED | OUTPATIENT
Start: 2022-09-09 | End: 2022-09-10 | Stop reason: HOSPADM

## 2022-09-09 RX ORDER — KRILL/OM-3/DHA/EPA/PHOSPHO/AST 500MG-86MG
1 CAPSULE ORAL DAILY
Status: DISCONTINUED | OUTPATIENT
Start: 2022-09-09 | End: 2022-09-09 | Stop reason: CLARIF

## 2022-09-09 RX ORDER — CLOPIDOGREL BISULFATE 75 MG/1
75 TABLET ORAL DAILY
Qty: 90 TABLET | Refills: 3 | Status: SHIPPED | OUTPATIENT
Start: 2022-09-09

## 2022-09-09 RX ORDER — ATORVASTATIN CALCIUM 10 MG/1
20 TABLET, FILM COATED ORAL DAILY
Status: DISCONTINUED | OUTPATIENT
Start: 2022-09-10 | End: 2022-09-10 | Stop reason: HOSPADM

## 2022-09-09 RX ORDER — ALLOPURINOL 100 MG/1
100 TABLET ORAL DAILY
Status: DISCONTINUED | OUTPATIENT
Start: 2022-09-10 | End: 2022-09-10 | Stop reason: HOSPADM

## 2022-09-09 RX ORDER — SODIUM CHLORIDE 9 MG/ML
INJECTION, SOLUTION INTRAVENOUS CONTINUOUS
Status: DISCONTINUED | OUTPATIENT
Start: 2022-09-09 | End: 2022-09-09

## 2022-09-09 RX ORDER — AMLODIPINE BESYLATE 10 MG/1
10 TABLET ORAL DAILY
Status: DISCONTINUED | OUTPATIENT
Start: 2022-09-10 | End: 2022-09-10 | Stop reason: HOSPADM

## 2022-09-09 RX ORDER — LISINOPRIL AND HYDROCHLOROTHIAZIDE 12.5; 1 MG/1; MG/1
2 TABLET ORAL DAILY
Status: DISCONTINUED | OUTPATIENT
Start: 2022-09-10 | End: 2022-09-10 | Stop reason: HOSPADM

## 2022-09-09 RX ORDER — ONDANSETRON 2 MG/ML
4 INJECTION INTRAMUSCULAR; INTRAVENOUS EVERY 6 HOURS PRN
Status: DISCONTINUED | OUTPATIENT
Start: 2022-09-09 | End: 2022-09-10 | Stop reason: HOSPADM

## 2022-09-09 RX ORDER — ASPIRIN 81 MG/1
81 TABLET, CHEWABLE ORAL DAILY
Status: DISCONTINUED | OUTPATIENT
Start: 2022-09-10 | End: 2022-09-10 | Stop reason: HOSPADM

## 2022-09-09 RX ORDER — SODIUM CHLORIDE 9 MG/ML
INJECTION, SOLUTION INTRAVENOUS CONTINUOUS
Status: DISCONTINUED | OUTPATIENT
Start: 2022-09-09 | End: 2022-09-10 | Stop reason: HOSPADM

## 2022-09-09 RX ORDER — GABAPENTIN 400 MG/1
800 CAPSULE ORAL 3 TIMES DAILY
Status: DISCONTINUED | OUTPATIENT
Start: 2022-09-09 | End: 2022-09-10 | Stop reason: HOSPADM

## 2022-09-09 RX ORDER — CLOPIDOGREL BISULFATE 75 MG/1
75 TABLET ORAL DAILY
Status: DISCONTINUED | OUTPATIENT
Start: 2022-09-10 | End: 2022-09-10 | Stop reason: HOSPADM

## 2022-09-09 RX ORDER — MONTELUKAST SODIUM 10 MG/1
10 TABLET ORAL DAILY
Status: DISCONTINUED | OUTPATIENT
Start: 2022-09-10 | End: 2022-09-10 | Stop reason: HOSPADM

## 2022-09-09 RX ORDER — MECLIZINE HYDROCHLORIDE 25 MG/1
25 TABLET ORAL 3 TIMES DAILY PRN
Status: DISCONTINUED | OUTPATIENT
Start: 2022-09-09 | End: 2022-09-10 | Stop reason: HOSPADM

## 2022-09-09 RX ORDER — SODIUM CHLORIDE 0.9 % (FLUSH) 0.9 %
5-40 SYRINGE (ML) INJECTION EVERY 12 HOURS SCHEDULED
Status: DISCONTINUED | OUTPATIENT
Start: 2022-09-09 | End: 2022-09-10 | Stop reason: HOSPADM

## 2022-09-09 RX ORDER — SODIUM CHLORIDE 9 MG/ML
INJECTION, SOLUTION INTRAVENOUS PRN
Status: DISCONTINUED | OUTPATIENT
Start: 2022-09-09 | End: 2022-09-10 | Stop reason: HOSPADM

## 2022-09-09 RX ORDER — DIPHENHYDRAMINE HCL 25 MG
25 TABLET ORAL ONCE
Status: COMPLETED | OUTPATIENT
Start: 2022-09-09 | End: 2022-09-09

## 2022-09-09 RX ORDER — METOPROLOL TARTRATE 50 MG/1
50 TABLET, FILM COATED ORAL 2 TIMES DAILY
Status: DISCONTINUED | OUTPATIENT
Start: 2022-09-09 | End: 2022-09-10 | Stop reason: HOSPADM

## 2022-09-09 RX ORDER — SODIUM CHLORIDE 0.9 % (FLUSH) 0.9 %
5-40 SYRINGE (ML) INJECTION PRN
Status: DISCONTINUED | OUTPATIENT
Start: 2022-09-09 | End: 2022-09-10 | Stop reason: HOSPADM

## 2022-09-09 RX ORDER — DIAZEPAM 5 MG/1
5 TABLET ORAL ONCE
Status: COMPLETED | OUTPATIENT
Start: 2022-09-09 | End: 2022-09-09

## 2022-09-09 RX ADMIN — GABAPENTIN 800 MG: 400 CAPSULE ORAL at 15:58

## 2022-09-09 RX ADMIN — GABAPENTIN 800 MG: 400 CAPSULE ORAL at 21:15

## 2022-09-09 RX ADMIN — SODIUM CHLORIDE, PRESERVATIVE FREE 10 ML: 5 INJECTION INTRAVENOUS at 21:16

## 2022-09-09 RX ADMIN — DIPHENHYDRAMINE HYDROCHLORIDE 25 MG: 25 TABLET ORAL at 10:21

## 2022-09-09 RX ADMIN — SODIUM CHLORIDE: 9 INJECTION, SOLUTION INTRAVENOUS at 10:21

## 2022-09-09 RX ADMIN — METFORMIN HYDROCHLORIDE 500 MG: 500 TABLET ORAL at 16:53

## 2022-09-09 RX ADMIN — METOPROLOL TARTRATE 50 MG: 50 TABLET, FILM COATED ORAL at 21:15

## 2022-09-09 RX ADMIN — DIAZEPAM 5 MG: 5 TABLET ORAL at 10:21

## 2022-09-09 ASSESSMENT — LIFESTYLE VARIABLES
HOW OFTEN DO YOU HAVE A DRINK CONTAINING ALCOHOL: 4 OR MORE TIMES A WEEK
HOW MANY STANDARD DRINKS CONTAINING ALCOHOL DO YOU HAVE ON A TYPICAL DAY: 1 OR 2

## 2022-09-09 ASSESSMENT — PAIN SCALES - GENERAL: PAINLEVEL_OUTOF10: 0

## 2022-09-09 NOTE — H&P
Monalisa Powell, 76 y.o., male    Primary care physician:  Vel Caldwell MD     Chief Complaint: Chest Pain    History of Present Illness:  Planned for surgery soon , pre op work up revealed moderate to severe aortic stenosi and inferior infarct with ischemia  Sees  Dr Mychal Diaz  Here for further evaluation      Past medical history:    has a past medical history of Abnormal nuclear stress test, Arthritis, Dizziness, FH: CAD (coronary artery disease), H/O 24 hour EKG monitoring, H/O atrial fibrillation without current medication, H/O cardiovascular stress test, H/O Doppler echocardiogram, H/O Doppler ultrasound (Venous Doppler Lower Extremities), H/O echocardiogram, H/O transesophageal echocardiography (SALAZAR) for monitoring, Heart murmur, History of nuclear stress test, Hx of Venous Doppler ultrasound, Hyperlipidemia, Hypertension, Kidney stones, Leg swelling, Neuropathy, Nonrheumatic aortic valve stenosis, Obesity, JAYE on CPAP, Persistent atrial fibrillation ([address] Syncope and collapse, Thoracic aortic aneurysm ([address] and Type II or unspecified type diabetes mellitus without mention of complication, not stated as uncontrolled. Past surgical history:   has a past surgical history that includes hernia repair (2008); hernia repair (2009); Colonoscopy (3175;5831); Colon surgery (2007 ); Cataract removal with implant (Right, 04/12/2019); Intracapsular cataract extraction (Right, 4/12/2019); Cataract removal with implant (Left, 05/10/2019); Intracapsular cataract extraction (Left, 5/10/2019); Pacemaker insertion (Left, 06/25/2019); Cardioversion (07/08/2019); and Total knee arthroplasty (Right, 2/16/2022). Social History:   reports that he quit smoking about 49 years ago. His smoking use included cigarettes. He has a 7.00 pack-year smoking history. He has quit using smokeless tobacco. He reports current alcohol use of about 3.0 standard drinks per week. He reports that he does not use drugs.   Family history:  family Vertigo  Cardiovascular: No chest pain, dyspnea on exertion, palpitations or loss of consciousness  Respiratory: No cough or wheezing    Gastrointestinal: No abdominal pain, appetite loss, blood in stools, constipation, diarrhea or heartburn  Genitourinary: No dysuria, trouble voiding, or hematuria  Musculoskeletal:  No gait disturbance, weakness or joint complaints  Integumentary: No rash or pruritis  Neurological: No TIA or stroke symptoms  Psychiatric: No anxiety or depression  Endocrine: No malaise, fatigue or temperature intolerance  Hematologic/Lymphatic: No bleeding problems, blood clots or swollen lymph nodes  Allergic/Immunologic: No nasal congestion or hives    Physical Examination:    There were no vitals taken for this visit. General Appearance:  No distress, conversant  Constitutional:  Well developed, Well nourished, No acute distress, Non-toxic appearance. HENT:  Normocephalic, Atraumatic, Bilateral external ears normal, Oropharynx moist, No oral exudates, Nose normal. Neck- Normal range of motion, No tenderness, Supple, No stridor,no apical-carotid delay  Eyes:  PERRL, EOMI, Conjunctiva normal, No discharge. Lymphatics: no palpable lymph nodes  Respiratory:  Normal breath sounds, No respiratory distress, No wheezing, No chest tenderness. ,no uise of accessory muscles, JVP not elevated  Cardiovascular: (PMI) apex non displaced,no lifts no thrills, no s3,no s4, Normal heart rate, Normal rhythm, No murmurs, No rubs, No gallops. GI:  Bowel sounds normal, Soft, No tenderness, No masses, No pulsatile masses, no hepatosplenomegally, no bruits  Musculoskeletal:  Intact distal pulses, No edema, No tenderness, No cyanosis, No clubbing. Good range of motion in all major joints. No tenderness to palpation or major deformities noted. Back- No tenderness. Integument:  Warm, Dry, No erythema, No rash. Skin: no rash, no ulcers  Lymphatic:  No lymphadenopathy noted.    Neurologic:  Alert & oriented x 3, Normal motor function, Normal sensory function, No focal deficits noted. Lab Review   Recent Labs     09/06/22  1012   WBC 7.4   HGB 12.5*   HCT 39.2*         Recent Labs     09/06/22  1012      K 4.7      CO2 20*   BUN 26*   CREATININE 1.8*     Recent Labs     09/06/22  1012   AST 20   ALT 17   BILITOT 0.9   ALKPHOS 86     No results for input(s): TROPONINI in the last 72 hours. Lab Results   Component Value Date    INR 1.57 07/08/2019    PROTIME 18.1 (H) 07/08/2019     Lab Results   Component Value Date    BNP 9.9 02/27/2014         Assessment:    Active Problems:    * No active hospital problems. *     ASA : 2 , Mallampati class II  Plan:   1. Chest Pain/ Possible Angina: Plan left and riight heart cath   Alternate and risks versus benefits were discussed in detail. We will plan cardiac catheterization. We  discussed the risk of but not limited to  potential kidney failure, emergent surgery,blood transfusion  transfusion, infection, potential even death.   Patient is in agreement and wants to proceed

## 2022-09-09 NOTE — PLAN OF CARE
Problem: Chronic Conditions and Co-morbidities  Goal: Patient's chronic conditions and co-morbidity symptoms are monitored and maintained or improved  9/9/2022 1826 by Preeti Carmen RN  Outcome: Progressing  9/9/2022 1826 by Preeti Carmen RN  Outcome: Progressing     Problem: Discharge Planning  Goal: Discharge to home or other facility with appropriate resources  9/9/2022 1826 by Preeti Carmen RN  Outcome: Progressing  9/9/2022 1826 by Preeti Carmen RN  Outcome: Progressing     Problem: Safety - Adult  Goal: Free from fall injury  9/9/2022 1826 by Preeti Carmen RN  Outcome: Progressing  9/9/2022 1826 by Preeti Carmen RN  Outcome: Progressing     Problem: Pain  Goal: Verbalizes/displays adequate comfort level or baseline comfort level  9/9/2022 1826 by Preeti Carmen RN  Outcome: Progressing  9/9/2022 1826 by Preeti Carmen RN  Outcome: Progressing     Problem: ABCDS Injury Assessment  Goal: Absence of physical injury  Outcome: Progressing

## 2022-09-09 NOTE — PROGRESS NOTES
Outpatient Pharmacy Progress Note for Meds-to-Beds    Total number of Prescriptions Filled: 1  The following medications were dispensed to the patient during the discharge process:  Clopidogrel 75mg    Additional Documentation:  Patient's family member picked-up the medication(s) in the OP Pharmacy      Thank you for letting us serve your patients.   [address] [place]    [address], [address] [facility]    Phone: [phone]    Fax: [phone]

## 2022-09-10 VITALS
HEART RATE: 68 BPM | OXYGEN SATURATION: 96 % | WEIGHT: 285 LBS | DIASTOLIC BLOOD PRESSURE: 75 MMHG | HEIGHT: 71 IN | SYSTOLIC BLOOD PRESSURE: 131 MMHG | TEMPERATURE: 97.6 F | BODY MASS INDEX: 39.9 KG/M2 | RESPIRATION RATE: 23 BRPM

## 2022-09-10 PROBLEM — Z98.61 STATUS POST CORONARY ANGIOPLASTY: Status: ACTIVE | Noted: 2022-09-09

## 2022-09-10 PROBLEM — Z98.890: Status: ACTIVE | Noted: 2022-09-10

## 2022-09-10 LAB
ANION GAP SERPL CALCULATED.3IONS-SCNC: 8 MMOL/L (ref 4–16)
BUN BLDV-MCNC: 20 MG/DL (ref 6–23)
CALCIUM SERPL-MCNC: 9.4 MG/DL (ref 8.3–10.6)
CHLORIDE BLD-SCNC: 103 MMOL/L (ref 99–110)
CO2: 26 MMOL/L (ref 21–32)
CREAT SERPL-MCNC: 1.5 MG/DL (ref 0.9–1.3)
EKG ATRIAL RATE: 61 BPM
EKG DIAGNOSIS: NORMAL
EKG Q-T INTERVAL: 518 MS
EKG QRS DURATION: 194 MS
EKG QTC CALCULATION (BAZETT): 521 MS
EKG R AXIS: -64 DEGREES
EKG T AXIS: 98 DEGREES
EKG VENTRICULAR RATE: 61 BPM
GFR AFRICAN AMERICAN: 55 ML/MIN/1.73M2
GFR NON-AFRICAN AMERICAN: 45 ML/MIN/1.73M2
GLUCOSE BLD-MCNC: 103 MG/DL (ref 70–99)
GLUCOSE BLD-MCNC: 122 MG/DL (ref 70–99)
HCT VFR BLD CALC: 32.8 % (ref 42–52)
HEMOGLOBIN: 10.2 GM/DL (ref 13.5–18)
MCH RBC QN AUTO: 31.8 PG (ref 27–31)
MCHC RBC AUTO-ENTMCNC: 31.1 % (ref 32–36)
MCV RBC AUTO: 102.2 FL (ref 78–100)
PDW BLD-RTO: 15.5 % (ref 11.7–14.9)
PLATELET # BLD: 146 K/CU MM (ref 140–440)
PMV BLD AUTO: 10.1 FL (ref 7.5–11.1)
POTASSIUM SERPL-SCNC: 4.8 MMOL/L (ref 3.5–5.1)
RBC # BLD: 3.21 M/CU MM (ref 4.6–6.2)
SODIUM BLD-SCNC: 137 MMOL/L (ref 135–145)
WBC # BLD: 6.4 K/CU MM (ref 4–10.5)

## 2022-09-10 PROCEDURE — 93010 ELECTROCARDIOGRAM REPORT: CPT | Performed by: INTERNAL MEDICINE

## 2022-09-10 PROCEDURE — 99217 PR OBSERVATION CARE DISCHARGE MANAGEMENT: CPT | Performed by: NURSE PRACTITIONER

## 2022-09-10 PROCEDURE — 93005 ELECTROCARDIOGRAM TRACING: CPT | Performed by: INTERNAL MEDICINE

## 2022-09-10 PROCEDURE — 6370000000 HC RX 637 (ALT 250 FOR IP): Performed by: INTERNAL MEDICINE

## 2022-09-10 PROCEDURE — 94761 N-INVAS EAR/PLS OXIMETRY MLT: CPT

## 2022-09-10 PROCEDURE — 80048 BASIC METABOLIC PNL TOTAL CA: CPT

## 2022-09-10 PROCEDURE — 2580000003 HC RX 258: Performed by: INTERNAL MEDICINE

## 2022-09-10 PROCEDURE — 85027 COMPLETE CBC AUTOMATED: CPT

## 2022-09-10 PROCEDURE — 6360000002 HC RX W HCPCS: Performed by: INTERNAL MEDICINE

## 2022-09-10 PROCEDURE — 2700000000 HC OXYGEN THERAPY PER DAY

## 2022-09-10 PROCEDURE — 82962 GLUCOSE BLOOD TEST: CPT

## 2022-09-10 PROCEDURE — G0378 HOSPITAL OBSERVATION PER HR: HCPCS

## 2022-09-10 PROCEDURE — 36415 COLL VENOUS BLD VENIPUNCTURE: CPT

## 2022-09-10 PROCEDURE — 96374 THER/PROPH/DIAG INJ IV PUSH: CPT

## 2022-09-10 RX ORDER — FUROSEMIDE 10 MG/ML
40 INJECTION INTRAMUSCULAR; INTRAVENOUS ONCE
Status: COMPLETED | OUTPATIENT
Start: 2022-09-10 | End: 2022-09-10

## 2022-09-10 RX ORDER — ASPIRIN 81 MG/1
81 TABLET, CHEWABLE ORAL DAILY
Qty: 90 TABLET | Refills: 3 | Status: SHIPPED | OUTPATIENT
Start: 2022-09-11

## 2022-09-10 RX ORDER — FUROSEMIDE 20 MG/1
20 TABLET ORAL DAILY
Status: DISCONTINUED | OUTPATIENT
Start: 2022-09-11 | End: 2022-09-10 | Stop reason: HOSPADM

## 2022-09-10 RX ORDER — FUROSEMIDE 20 MG/1
20 TABLET ORAL DAILY
Qty: 60 TABLET | Refills: 3 | Status: SHIPPED | OUTPATIENT
Start: 2022-09-10 | End: 2022-11-02

## 2022-09-10 RX ADMIN — SODIUM CHLORIDE, PRESERVATIVE FREE 10 ML: 5 INJECTION INTRAVENOUS at 09:04

## 2022-09-10 RX ADMIN — LISINOPRIL AND HYDROCHLOROTHIAZIDE 2 TABLET: 12.5; 1 TABLET ORAL at 09:03

## 2022-09-10 RX ADMIN — AMLODIPINE BESYLATE 10 MG: 10 TABLET ORAL at 09:04

## 2022-09-10 RX ADMIN — GABAPENTIN 800 MG: 400 CAPSULE ORAL at 09:05

## 2022-09-10 RX ADMIN — MONTELUKAST 10 MG: 10 TABLET, FILM COATED ORAL at 09:04

## 2022-09-10 RX ADMIN — CLOPIDOGREL BISULFATE 75 MG: 75 TABLET ORAL at 09:03

## 2022-09-10 RX ADMIN — ALLOPURINOL 100 MG: 100 TABLET ORAL at 09:04

## 2022-09-10 RX ADMIN — ATORVASTATIN CALCIUM 20 MG: 10 TABLET, FILM COATED ORAL at 09:04

## 2022-09-10 RX ADMIN — ASPIRIN 81 MG: 81 TABLET, CHEWABLE ORAL at 09:04

## 2022-09-10 RX ADMIN — FUROSEMIDE 40 MG: 10 INJECTION, SOLUTION INTRAMUSCULAR; INTRAVENOUS at 11:37

## 2022-09-10 RX ADMIN — METOPROLOL TARTRATE 50 MG: 50 TABLET, FILM COATED ORAL at 09:04

## 2022-09-10 ASSESSMENT — PAIN SCALES - GENERAL: PAINLEVEL_OUTOF10: 0

## 2022-09-10 NOTE — DISCHARGE SUMMARY
Patient ID:  Myra Vega  [id number] y.o. 1945    Admit date: 9/9/2022    Discharge date: 9/10/2022    Admitting Physician: Migue Madison MD     Discharge Provider: BENSON Castillo CNP     Admission Diagnoses: Chest pain, unspecified [R07.9]  Abnormal result of other cardiovascular function study [R94.39]  CAD in native artery [I25.10]  ASCVD (arteriosclerotic cardiovascular disease) [I25.10]    Discharge Diagnoses: Active Hospital Problems    Diagnosis Date Noted    Status post left heart catheterization (LHC) by percutaneous approach [Z98.890] 09/10/2022     Priority: Medium    CAD in native artery [I25.10] 09/09/2022     Priority: Medium    ASCVD (arteriosclerotic cardiovascular disease) [I25.10] 09/09/2022     Priority: Medium    Status post coronary angioplasty [Z98.61] 09/09/2022     Priority: Medium    SOB (shortness of breath) [R06.02] 08/16/2022     Priority: Medium    Abnormal stress test [R94.39] 08/23/2022    FH: CAD (coronary artery disease) [Z82.49] 09/29/2015        Discharged Condition: good    Hospital Course: Myra Vega was admitted for elective LHC due to abnormal stress test. He was found to have severe LAD and RCA CAD. Proximal LAD had successful PCI. Distal LAD had good results from POBA. Ostial RCA had successful results of PCI and mild  RCA had good results of POBA. Patient given single dose IV lasix for ankle edema and shortness of breath. Reviewed need to postpone knee surgery patient states understanding. Reviewed discharge instructions, new medications, and to hold metformin until Monday morning. Patient is agreeable. HE is to follow up with Dr. Carmel Ruiz in 2 weeks.      Addressing  Aspirin:Indicated- Start 81 mg daily  Cardiac rehab phase 2 will be recommended post discharge     Consults: none    Procedures:    Labs:   Lab Results   Component Value Date    CREATININE 1.5 (H) 09/10/2022    BUN 20 09/10/2022     09/10/2022    K 4.8 09/10/2022     09/10/2022 CO2 26 09/10/2022      Lab Results   Component Value Date    WBC 6.4 09/10/2022    HGB 10.2 (L) 09/10/2022    HCT 32.8 (L) 09/10/2022    .2 (H) 09/10/2022     09/10/2022      Lab Results   Component Value Date    INR 1.57 07/08/2019    PROTIME 18.1 (H) 07/08/2019      Lab Results   Component Value Date    BNP 9.9 02/27/2014   :[email or web address]:     Discharge Medications:     Medication List        START taking these medications      aspirin 81 MG chewable tablet  Take 1 tablet by mouth daily  Start taking on: September 11, 2022     clopidogrel 75 MG tablet  Commonly known as: PLAVIX  Take 1 tablet by mouth daily            CHANGE how you take these medications      metFORMIN 1000 MG tablet  Commonly known as: GLUCOPHAGE  Take 0.5 tablets by mouth 2 times daily (with meals)  Start taking on: September 12, 2022  What changed: These instructions start on September 12, 2022. If you are unsure what to do until then, ask your doctor or other care provider.             CONTINUE taking these medications      allopurinol 100 MG tablet  Commonly known as: ZYLOPRIM     amLODIPine 10 MG tablet  Commonly known as: NORVASC  Take 1 tablet by mouth daily     atorvastatin 20 MG tablet  Commonly known as: LIPITOR  Take 1 tablet by mouth daily     Eliquis 5 MG Tabs tablet  Generic drug: apixaban  TAKE 1 TABLET TWICE A DAY     gabapentin 800 MG tablet  Commonly known as: NEURONTIN     Krill Oil 500 MG Caps     lisinopril-hydroCHLOROthiazide 20-25 MG per tablet  Commonly known as: PRINZIDE;ZESTORETIC  TAKE 1 TABLET BY MOUTH EVERY DAY     magnesium oxide 400 MG tablet  Commonly known as: MAG-OX  TAKE 1 TABLET BY MOUTH EVERY DAY     meclizine 25 MG tablet  Commonly known as: ANTIVERT     metoprolol tartrate 50 MG tablet  Commonly known as: LOPRESSOR  TAKE 1 TABLET BY MOUTH TWICE A DAY     montelukast 10 MG tablet  Commonly known as: SINGULAIR  Take 1 tablet by mouth daily     niacin 1000 MG extended release tablet  Commonly known as: NIASPAN     VITAMIN B COMPLEX PO               Where to Get Your Medications        These medications were sent to [place], [place] - [address] [place] [address] [address] [place] [address], [facility] [address]      Phone: [phone]   aspirin 81 MG chewable tablet       These medications were sent to [facility], [address] [place] [address] 25, [address] [facility] [address]      Phone: [phone]   clopidogrel 75 MG tablet       Information about where to get these medications is not yet available    Ask your nurse or doctor about these medications  metFORMIN 1000 MG tablet          Discharge Exam:  /75   Pulse 68   Temp 97.6 °F (36.4 °C) (Oral)   Resp 23   Ht 5' 11\" (1.803 m)   Wt 285 lb (129.3 kg)   SpO2 96%   BMI 39.75 kg/m²   General appearance: alert, appears stated age, and cooperative  Neck: no adenopathy, no carotid bruit, no JVD, and supple, symmetrical, trachea midline  Lungs: clear to auscultation bilaterally  Heart: regular rate and rhythm, S1, S2 normal, no murmur, click, rub or gallop  Neurologic: Alert and oriented X 3, normal strength and tone. Normal symmetric reflexes. Normal coordination and gait  right wrist site dressing is clean dry and intact, site is soft without hematoma, bruising or bleeding noted. +1 lower leg edema non pitting    Disposition: home    Patient Instructions:      Medication List        START taking these medications      aspirin 81 MG chewable tablet  Take 1 tablet by mouth daily  Start taking on: September 11, 2022     clopidogrel 75 MG tablet  Commonly known as: PLAVIX  Take 1 tablet by mouth daily            CHANGE how you take these medications      metFORMIN 1000 MG tablet  Commonly known as: GLUCOPHAGE  Take 0.5 tablets by mouth 2 times daily (with meals)  Start taking on: September 12, 2022  What changed:  These instructions start on September 12, 2022. If you are unsure what to do until then, ask your doctor or other care provider. CONTINUE taking these medications      allopurinol 100 MG tablet  Commonly known as: ZYLOPRIM     amLODIPine 10 MG tablet  Commonly known as: NORVASC  Take 1 tablet by mouth daily     atorvastatin 20 MG tablet  Commonly known as: LIPITOR  Take 1 tablet by mouth daily     Eliquis 5 MG Tabs tablet  Generic drug: apixaban  TAKE 1 TABLET TWICE A DAY     gabapentin 800 MG tablet  Commonly known as: NEURONTIN     Krill Oil 500 MG Caps     lisinopril-hydroCHLOROthiazide 20-25 MG per tablet  Commonly known as: PRINZIDE;ZESTORETIC  TAKE 1 TABLET BY MOUTH EVERY DAY     magnesium oxide 400 MG tablet  Commonly known as: MAG-OX  TAKE 1 TABLET BY MOUTH EVERY DAY     meclizine 25 MG tablet  Commonly known as: ANTIVERT     metoprolol tartrate 50 MG tablet  Commonly known as: LOPRESSOR  TAKE 1 TABLET BY MOUTH TWICE A DAY     montelukast 10 MG tablet  Commonly known as: SINGULAIR  Take 1 tablet by mouth daily     niacin 1000 MG extended release tablet  Commonly known as: NIASPAN     VITAMIN B COMPLEX PO               Where to Get Your Medications        These medications were sent to [address] [phone]  [place] [address] [address] [place] [address]      Phone: [phone]   aspirin 81 MG chewable tablet       These medications were sent to [address] [place] [address] 25, [address] [place] [address]      Phone: [phone]   clopidogrel 75 MG tablet       Information about where to get these medications is not yet available    Ask your nurse or doctor about these medications  metFORMIN 1000 MG tablet         Follow-up with Dr. Domingo Tsang in 2 weeks.     Signed:  Electronically signed by BENSON Sheth CNP on 9/10/2022 at 10:38 AM

## 2022-09-10 NOTE — DISCHARGE INSTRUCTIONS
You have had a stent placed in one of your heart's arteries. You are being placed on medications that are going to help keep that stent open. You MUST take this medication as prescribed by your physician. If you stop taking this medication your new stent could collapse or clot off and cause more harm to your heart. This medication must be taken until your cardiologist tells you to stop taking it. The medication for you is plavix  You take it daily    Please call your cardiologist if you can not afford the medication, have side effects, or are concerned about taking this medication. No driving X 1 days, can ride with someone else.   No heavy weight lifting, > 10 lbs for 1 weeks  F/U in 7 to 10 business days  No smoking; if applicable   No use of bath tube, hot tubs, or pools for 2 weeks  Call office if you have :   New swelling or bruise at the site  Site is hot  Develop a fever > 100.9  Drainage at the site [Potential consequences of obesity discussed] : Potential consequences of obesity discussed [Benefits of weight loss discussed] : Benefits of weight loss discussed [Encouraged to increase physical activity] : Encouraged to increase physical activity [Decrease Portions] : decrease portions [Good understanding] : Patient has a good understanding of disease, goals and obesity follow-up plan [Financial issues] : Financial issues [None] : None

## 2022-09-10 NOTE — PROGRESS NOTES
Reviewed discharge instructions including heart cath/PCI information and new medications. Pt wife/Eusebia confirmed she has his plavix and will  aspirin and new lasix RX. Pt and wife confirmed understanding to hold metformin until Monday. PIV removed with tip intact. All questions answered. Pt will be discharged as soon as his urination decreases from IV lasix administration.

## 2022-09-10 NOTE — PLAN OF CARE
Problem: Chronic Conditions and Co-morbidities  Goal: Patient's chronic conditions and co-morbidity symptoms are monitored and maintained or improved  Outcome: Adequate for Discharge     Problem: Discharge Planning  Goal: Discharge to home or other facility with appropriate resources  Outcome: Adequate for Discharge     Problem: Safety - Adult  Goal: Free from fall injury  Outcome: Adequate for Discharge     Problem: Pain  Goal: Verbalizes/displays adequate comfort level or baseline comfort level  Outcome: Adequate for Discharge     Problem: ABCDS Injury Assessment  Goal: Absence of physical injury  Outcome: Adequate for Discharge

## 2022-09-12 ENCOUNTER — TELEPHONE (OUTPATIENT)
Dept: ORTHOPEDIC SURGERY | Age: 77
End: 2022-09-12

## 2022-09-12 NOTE — TELEPHONE ENCOUNTER
Per Dr. Roe Cartwright patients surgery for LT TKA on 9/13/22, needs to be cancelled. Patient did not receive cardiac clearance. I called to verify with patient that he was aware of cancellation, I actually spoke with his wife and she agreed that surgery needed to be cancelled. I instructed her to call our office to set up follow up appointment in 6 month after he is cleared by cardiologist.   Deann ESPARZA-notified of cancellation. All post op appts cancelled.

## 2022-09-15 PROBLEM — Z01.818 PRE-OP EVALUATION: Status: RESOLVED | Noted: 2022-08-16 | Resolved: 2022-09-15

## 2022-09-27 ENCOUNTER — OFFICE VISIT (OUTPATIENT)
Dept: CARDIOLOGY CLINIC | Age: 77
End: 2022-09-27
Payer: MEDICARE

## 2022-09-27 VITALS
WEIGHT: 272 LBS | BODY MASS INDEX: 38.08 KG/M2 | HEIGHT: 71 IN | SYSTOLIC BLOOD PRESSURE: 126 MMHG | DIASTOLIC BLOOD PRESSURE: 80 MMHG | HEART RATE: 63 BPM

## 2022-09-27 DIAGNOSIS — I25.10 CAD IN NATIVE ARTERY: Primary | ICD-10-CM

## 2022-09-27 DIAGNOSIS — Z95.0 S/P PLACEMENT OF CARDIAC PACEMAKER: ICD-10-CM

## 2022-09-27 DIAGNOSIS — G47.33 OSA ON CPAP: ICD-10-CM

## 2022-09-27 DIAGNOSIS — I10 PRIMARY HYPERTENSION: ICD-10-CM

## 2022-09-27 DIAGNOSIS — N18.32 CHRONIC KIDNEY DISEASE (CKD) STAGE G3B/A1, MODERATELY DECREASED GLOMERULAR FILTRATION RATE (GFR) BETWEEN 30-44 ML/MIN/1.73 SQUARE METER AND ALBUMINURIA CREATININE RATIO LESS THAN 30 MG/G (HCC): ICD-10-CM

## 2022-09-27 DIAGNOSIS — M79.89 LEG SWELLING: ICD-10-CM

## 2022-09-27 DIAGNOSIS — Z99.89 OSA ON CPAP: ICD-10-CM

## 2022-09-27 DIAGNOSIS — I48.19 PERSISTENT ATRIAL FIBRILLATION (HCC): ICD-10-CM

## 2022-09-27 DIAGNOSIS — E78.00 PURE HYPERCHOLESTEROLEMIA: ICD-10-CM

## 2022-09-27 DIAGNOSIS — E11.9 TYPE 2 DIABETES MELLITUS WITHOUT COMPLICATION, WITHOUT LONG-TERM CURRENT USE OF INSULIN (HCC): ICD-10-CM

## 2022-09-27 PROBLEM — Z86.79 H/O ATRIAL FIBRILLATION WITHOUT CURRENT MEDICATION: Status: RESOLVED | Noted: 2019-04-03 | Resolved: 2022-09-27

## 2022-09-27 PROCEDURE — 93000 ELECTROCARDIOGRAM COMPLETE: CPT | Performed by: INTERNAL MEDICINE

## 2022-09-27 PROCEDURE — G8427 DOCREV CUR MEDS BY ELIG CLIN: HCPCS | Performed by: INTERNAL MEDICINE

## 2022-09-27 PROCEDURE — 1036F TOBACCO NON-USER: CPT | Performed by: INTERNAL MEDICINE

## 2022-09-27 PROCEDURE — 3044F HG A1C LEVEL LT 7.0%: CPT | Performed by: INTERNAL MEDICINE

## 2022-09-27 PROCEDURE — 1123F ACP DISCUSS/DSCN MKR DOCD: CPT | Performed by: INTERNAL MEDICINE

## 2022-09-27 PROCEDURE — G8417 CALC BMI ABV UP PARAM F/U: HCPCS | Performed by: INTERNAL MEDICINE

## 2022-09-27 PROCEDURE — 99214 OFFICE O/P EST MOD 30 MIN: CPT | Performed by: INTERNAL MEDICINE

## 2022-09-27 NOTE — PATIENT INSTRUCTIONS
Continue current cardiovascular medications which have been reviewed and discussed individually with you. Repeat BMP to assess renal function. Phase 2 cardiac rehab. Primary/secondary prevention is the goal by aggressive risk modification, healthy and therapeutic life style changes for cardiovascular risk reduction. Various goals are discussed and questions answered. Appropriate prescriptions if needed on this visit are addressed. After visit summery is provided. Questions answered and patient verbalizes understanding. Follow up in 6 months,  sooner if needed.

## 2022-09-27 NOTE — ASSESSMENT & PLAN NOTE
Continue dual antiplatelet therapy for now for at least 6 months and start phase 2 cardiac rehabilitation program.  Continue aggressive risk factor modification for secondary prevention.

## 2022-09-27 NOTE — PROGRESS NOTES
Francoise Sender  1945  Donte Wright MD      Chief Complaint   Patient presents with    Atrial Fibrillation    Hyperlipidemia    Hypertension     OV for follow up PTCA. Right wrist healed well. Pt denies any chest pain,SOB,dizziness, or palpitations. His swelling in legs are getting better. Chief complaint and HPI:  Francoise Sender  is a 76 y.o. male following up after having multi vessel coronary intervention on 9/9/2022. He is accompanied by his wife. Both stated that he is feeling so much better and not getting short of breath many gets up and do things and even lost weight and leg swelling has improved also. Patient had not had a follow-up BMP done. He denies any chest pains. Denies any orthopnea. Medications are reviewed and hospital records are reviewed and procedure details are reviewed and discussed with him and multiple questions are answered. Rest of the Cardiovascular system review is otherwise unchanged from prior encounter. Past medical history:  has a past medical history of Abnormal nuclear stress test, Arthritis, CAD s/p PCI to [facility] 9/9/22, Dizziness, FH: CAD (coronary artery disease), H/O 24 hour EKG monitoring, H/O atrial fibrillation without current medication, H/O cardiovascular stress test, H/O Doppler echocardiogram, H/O Doppler ultrasound (Venous Doppler Lower Extremities), H/O echocardiogram, H/O transesophageal echocardiography (SALAZAR) for monitoring, Heart murmur, History of nuclear stress test, Hx of Venous Doppler ultrasound, Hyperlipidemia, Hypertension, Kidney stones, Leg swelling, Neuropathy, Nonrheumatic aortic valve stenosis, Obesity, JAYE on CPAP, Persistent atrial fibrillation ([address] Syncope and collapse, Thoracic aortic aneurysm ([address] and Type II or unspecified type diabetes mellitus without mention of complication, not stated as uncontrolled. Past surgical history:  has a past surgical history that includes hernia repair (2008); hernia repair (2009);  Colonoscopy ([id number];[id number]); Colon surgery ( ); Cataract removal with implant (Right, 2019); Intracapsular cataract extraction (Right, 2019); Cataract removal with implant (Left, 05/10/2019); Intracapsular cataract extraction (Left, 5/10/2019); Pacemaker insertion (Left, 2019); Cardioversion (2019); and Total knee arthroplasty (Right, 2022). Social History:   Social History     Tobacco Use    Smoking status: Former     Packs/day: 1.00     Years: 7.00     Pack years: 7.00     Types: Cigarettes     Quit date: 3/26/1973     Years since quittin.5    Smokeless tobacco: Former   Substance Use Topics    Alcohol use: Yes     Alcohol/week: 3.0 standard drinks     Types: 3 Shots of liquor per week     Comment: small amount some nights     Family history: family history includes Diabetes in his mother; Heart Disease in his brother and father; High Blood Pressure in his brother. ALLERGIES:  Niacin and related and Oxycontin [oxycodone hcl]  Prior to Admission medications    Medication Sig Start Date End Date Taking?  Authorizing Provider   aspirin 81 MG chewable tablet Take 1 tablet by mouth daily 22  Yes BENSON Briggs CNP   metFORMIN (GLUCOPHAGE) 1000 MG tablet Take 0.5 tablets by mouth 2 times daily (with meals) 22  Yes BENSON Briggs CNP   furosemide (LASIX) 20 MG tablet Take 1 tablet by mouth daily 9/10/22  Yes Waleska Mancera MD   clopidogrel (PLAVIX) 75 MG tablet Take 1 tablet by mouth daily 22  Yes Waleska Mancera MD   montelukast (SINGULAIR) 10 MG tablet Take 1 tablet by mouth daily 22  Yes Margie Forte MD   lisinopril-hydroCHLOROthiazide (PRINZIDE;ZESTORETIC) 20-25 MG per tablet TAKE 1 TABLET BY MOUTH EVERY DAY 22  Yes Manju Witt MD   ELIQUIS 5 MG TABS tablet TAKE 1 TABLET TWICE A DAY 22  Yes Manju Witt MD   metoprolol tartrate (LOPRESSOR) 50 MG tablet TAKE 1 TABLET BY MOUTH TWICE A DAY 22  Yes Manju Witt MD magnesium oxide (MAG-OX) 400 MG tablet TAKE 1 TABLET BY MOUTH EVERY DAY 9/21/21  Yes Claude Fetter, MD   atorvastatin (LIPITOR) 20 MG tablet Take 1 tablet by mouth daily 2/25/20  Yes Kaity Lopes MD   meclizine (ANTIVERT) 25 MG tablet Take 25 mg by mouth as needed   Yes Historical Provider, MD   amLODIPine (NORVASC) 10 MG tablet Take 1 tablet by mouth daily 6/12/18  Yes MD Tierra Zamudio Jett Oil 500 MG CAPS Take 1 capsule by mouth daily    Yes Historical Provider, MD   allopurinol (ZYLOPRIM) 100 MG tablet Take 100 mg by mouth daily  1/13/15  Yes Historical Provider, MD   B Complex Vitamins (VITAMIN B COMPLEX PO) Take  by mouth daily. Yes Historical Provider, MD   gabapentin (NEURONTIN) 800 MG tablet Take 800 mg by mouth 3 times daily. Yes Historical Provider, MD   niacin (NIASPAN) 1000 MG extended release tablet  6/8/22   Historical Provider, MD     Vitals:    09/27/22 1348   BP: 126/80   Pulse: 63   Weight: 272 lb (123.4 kg)   Height: 5' 11\" (1.803 m)      Body mass index is 37.94 kg/m². Wt Readings from Last 3 Encounters:   09/27/22 272 lb (123.4 kg)   09/06/22 290 lb (131.5 kg)   09/09/22 285 lb (129.3 kg)     Constitutional:  Patient is obese pleasant male in no apparent distress. Eyes: Pupils are equal no conjunctival pallor noted. NECK: No JVP or thyromegaly  Cardiovascular: Auscultation: Normal S1 and S2.  2/6 systolic murmur noted in aortic area and left sternal border. Peripheral pulses: Radials and pedal pulses are 1+ equal in both side. Right radial access site has healed well. Respiratory:  Respiratory effort is normal. Breath sounds are clear to auscultation. Extremities: Bilateral 1-2+ pitting edema noted around the ankle. Ecchymosis noted on the left forearm from intravenous access site for IVs which were difficult for him. SKIN: Warm and well perfused, no pallor or cyanosis  Neurologic:  Oriented to time, place, and person and non-anxious.  No focal neurological deficit noted. Psychiatric: Normal mood and effect. EKG is consistent with ventricular paced rhythm 60 bpm.    Cardiac cath on 9/9/2022 and PCI reported  1. PCI to Proximal LAD for 80 -90 % lesion reduced to 0 % with   TYSON III Flow reduced to 0 % stenosis with 3.5 X 12 BARTOLO ,   followed by post dilated with 3.75 X 8 NC balloon   2. Balloon angioplasty of distal LAD for 90 % lesion reduced to   0 % using 2.0 X 10 Balloon TYSON III flow   3. PCI to ostial 80 % RCA lesion and mid RCA lesion of 80 % post   Balloon angioplasty using 4.0 X 34 BARTOLO inflated to 16 ELISABETH   LVEDP was 21 mmHG   Pt is referred to Cardiac Rehab Phase 2 as OP    LAB REVIEW:  CBC:   Lab Results   Component Value Date/Time    WBC 6.4 09/10/2022 06:25 AM    HGB 10.2 09/10/2022 06:25 AM    HCT 32.8 09/10/2022 06:25 AM     09/10/2022 06:25 AM     Lipids:   Lab Results   Component Value Date    CHOL 118 12/12/2016    CHOLFAST 120 04/13/2022    TRIG 103 12/12/2016    TRIGLYCFAST 93 04/13/2022    HDL 49 04/13/2022    LDLCALC 52 04/13/2022     Renal:   Lab Results   Component Value Date/Time    BUN 20 09/10/2022 06:25 AM    CREATININE 1.5 09/10/2022 06:25 AM     09/10/2022 06:25 AM    K 4.8 09/10/2022 06:25 AM     PT/INR:   Lab Results   Component Value Date/Time    INR 1.57 07/08/2019 10:39 AM     Lab Results   Component Value Date    LABA1C 5.6 04/13/2022     IMPRESSION and RECOMMENDATIONS:      1. CAD s/p PCI to [facility] 9/9/22  Assessment & Plan:  Continue dual antiplatelet therapy for now for at least 6 months and start phase 2 cardiac rehabilitation program.  Continue aggressive risk factor modification for secondary prevention. Orders:  -     Basic Metabolic Panel; Future  -     Ambulatory Referral to Cardiac Rehab  2. S/P placement of cardiac pacemaker 6/2019  3.  Chronic kidney disease (CKD) stage G3b/A1, moderately decreased glomerular filtration rate (GFR) between 30-44 mL/min/1.73 square meter and albuminuria creatinine ratio less than 30 mg/g [facility])  Assessment & Plan:  Creatinine was 1.5 on 9/10/22. We will repeat BMP. Patient follows up with nephrology also. 4. Type 2 diabetes mellitus without complication, without long-term current use of insulin (HCC)  Assessment & Plan:  Well-controlled with hemoglobin A1c of 5.6.   5. Persistent atrial fibrillation ([address]  Assessment & Plan:  Rate is controlled and he is anticoagulated with Eliquis. 6. Pure hypercholesterolemia  Assessment & Plan:  Well-controlled on Lipitor 20 mg daily. Last LDL was 52.   7. Primary hypertension  Assessment & Plan:  Well-controlled on amlodipine and Prinzide. Continue Boor. 8. Leg swelling  Assessment & Plan:  Has improved since recent interventions. 9. JAYE on CPAP  Assessment & Plan:  Continue compliance with CPAP therapy     Continue current cardiovascular medications which have been reviewed and discussed individually with you. Repeat BMP to assess renal function. Phase 2 cardiac rehab. Primary/secondary prevention is the goal by aggressive risk modification, healthy and therapeutic life style changes for cardiovascular risk reduction. Various goals are discussed and questions answered. Appropriate prescriptions if needed on this visit are addressed. After visit summery is provided. Questions answered and patient verbalizes understanding. Follow up in 6 months,  sooner if needed. Christian Thornton MD, 9/27/2022 2:23 PM     Please note this report has been partially produced using speech recognition software and may contain errors related to that system including errors in grammar, punctuation, and spelling, as well as words and phrases that may be inappropriate. If there are any questions or concerns please feel free to contact the dictating provider for clarification.

## 2022-10-05 ENCOUNTER — HOSPITAL ENCOUNTER (OUTPATIENT)
Dept: CARDIAC REHAB | Age: 77
Setting detail: THERAPIES SERIES
Discharge: HOME | End: 2022-10-05
Payer: MEDICARE

## 2022-10-05 PROCEDURE — G0422 INTENS CARDIAC REHAB W/EXERC: HCPCS

## 2022-10-05 PROCEDURE — G0423 INTENS CARDIAC REHAB NO EXER: HCPCS

## 2022-10-08 PROCEDURE — 93296 REM INTERROG EVL PM/IDS: CPT | Performed by: INTERNAL MEDICINE

## 2022-10-08 PROCEDURE — 93294 REM INTERROG EVL PM/LDLS PM: CPT | Performed by: INTERNAL MEDICINE

## 2022-10-10 ENCOUNTER — PROCEDURE VISIT (OUTPATIENT)
Dept: CARDIOLOGY CLINIC | Age: 77
End: 2022-10-10
Payer: MEDICARE

## 2022-10-10 DIAGNOSIS — Z95.0 CARDIAC PACEMAKER IN SITU: ICD-10-CM

## 2022-10-26 ENCOUNTER — HOSPITAL ENCOUNTER (OUTPATIENT)
Dept: CARDIAC REHAB | Age: 77
Setting detail: THERAPIES SERIES
Discharge: HOME | End: 2022-10-26
Payer: MEDICARE

## 2022-10-31 ENCOUNTER — HOSPITAL ENCOUNTER (OUTPATIENT)
Dept: CARDIAC REHAB | Age: 77
Setting detail: THERAPIES SERIES
Discharge: HOME | End: 2022-10-31
Payer: MEDICARE

## 2022-10-31 LAB
GLUCOSE BLD-MCNC: 136 MG/DL (ref 70–99)
GLUCOSE BLD-MCNC: 138 MG/DL (ref 70–99)

## 2022-10-31 PROCEDURE — G0422 INTENS CARDIAC REHAB W/EXERC: HCPCS

## 2022-10-31 PROCEDURE — 82962 GLUCOSE BLOOD TEST: CPT

## 2022-10-31 PROCEDURE — G0423 INTENS CARDIAC REHAB NO EXER: HCPCS

## 2022-11-02 ENCOUNTER — HOSPITAL ENCOUNTER (OUTPATIENT)
Dept: CARDIAC REHAB | Age: 77
Setting detail: THERAPIES SERIES
Discharge: HOME | End: 2022-11-02
Payer: MEDICARE

## 2022-11-02 LAB
GLUCOSE BLD-MCNC: 110 MG/DL (ref 70–99)
GLUCOSE BLD-MCNC: 110 MG/DL (ref 70–99)

## 2022-11-02 PROCEDURE — G0423 INTENS CARDIAC REHAB NO EXER: HCPCS

## 2022-11-02 PROCEDURE — G0422 INTENS CARDIAC REHAB W/EXERC: HCPCS

## 2022-11-02 PROCEDURE — 82962 GLUCOSE BLOOD TEST: CPT

## 2022-11-02 RX ORDER — FUROSEMIDE 20 MG/1
20 TABLET ORAL DAILY
Qty: 60 TABLET | Refills: 3 | Status: SHIPPED | OUTPATIENT
Start: 2022-11-02

## 2022-11-07 ENCOUNTER — OFFICE VISIT (OUTPATIENT)
Dept: CARDIOLOGY CLINIC | Age: 77
End: 2022-11-07
Payer: MEDICARE

## 2022-11-07 VITALS
BODY MASS INDEX: 38.61 KG/M2 | OXYGEN SATURATION: 97 % | HEART RATE: 70 BPM | WEIGHT: 275.8 LBS | HEIGHT: 71 IN | DIASTOLIC BLOOD PRESSURE: 64 MMHG | SYSTOLIC BLOOD PRESSURE: 114 MMHG

## 2022-11-07 DIAGNOSIS — E11.9 TYPE 2 DIABETES MELLITUS WITHOUT COMPLICATION, WITHOUT LONG-TERM CURRENT USE OF INSULIN (HCC): ICD-10-CM

## 2022-11-07 DIAGNOSIS — I25.10 CAD IN NATIVE ARTERY: Primary | ICD-10-CM

## 2022-11-07 DIAGNOSIS — E78.00 PURE HYPERCHOLESTEROLEMIA: ICD-10-CM

## 2022-11-07 DIAGNOSIS — Z95.0 S/P PLACEMENT OF CARDIAC PACEMAKER: ICD-10-CM

## 2022-11-07 DIAGNOSIS — I48.19 PERSISTENT ATRIAL FIBRILLATION (HCC): ICD-10-CM

## 2022-11-07 DIAGNOSIS — N18.32 CHRONIC KIDNEY DISEASE (CKD) STAGE G3B/A1, MODERATELY DECREASED GLOMERULAR FILTRATION RATE (GFR) BETWEEN 30-44 ML/MIN/1.73 SQUARE METER AND ALBUMINURIA CREATININE RATIO LESS THAN 30 MG/G (HCC): ICD-10-CM

## 2022-11-07 DIAGNOSIS — Z99.89 OSA ON CPAP: ICD-10-CM

## 2022-11-07 DIAGNOSIS — M79.89 LEG SWELLING: ICD-10-CM

## 2022-11-07 DIAGNOSIS — G47.33 OSA ON CPAP: ICD-10-CM

## 2022-11-07 DIAGNOSIS — I10 PRIMARY HYPERTENSION: ICD-10-CM

## 2022-11-07 PROCEDURE — 1036F TOBACCO NON-USER: CPT | Performed by: INTERNAL MEDICINE

## 2022-11-07 PROCEDURE — G8484 FLU IMMUNIZE NO ADMIN: HCPCS | Performed by: INTERNAL MEDICINE

## 2022-11-07 PROCEDURE — 3044F HG A1C LEVEL LT 7.0%: CPT | Performed by: INTERNAL MEDICINE

## 2022-11-07 PROCEDURE — G8417 CALC BMI ABV UP PARAM F/U: HCPCS | Performed by: INTERNAL MEDICINE

## 2022-11-07 PROCEDURE — 3078F DIAST BP <80 MM HG: CPT | Performed by: INTERNAL MEDICINE

## 2022-11-07 PROCEDURE — 99214 OFFICE O/P EST MOD 30 MIN: CPT | Performed by: INTERNAL MEDICINE

## 2022-11-07 PROCEDURE — 3074F SYST BP LT 130 MM HG: CPT | Performed by: INTERNAL MEDICINE

## 2022-11-07 PROCEDURE — 1123F ACP DISCUSS/DSCN MKR DOCD: CPT | Performed by: INTERNAL MEDICINE

## 2022-11-07 PROCEDURE — G8427 DOCREV CUR MEDS BY ELIG CLIN: HCPCS | Performed by: INTERNAL MEDICINE

## 2022-11-07 RX ORDER — TORSEMIDE 20 MG/1
20 TABLET ORAL DAILY
COMMUNITY

## 2022-11-07 NOTE — PATIENT INSTRUCTIONS
Counseled to elevate feet when resting and when resting in bed at night with pillows underneath and use thigh high compression stockings in the morning and remove them at night. Patient verbalizes understanding. Questions answered. Continue with phase 2 cardiac rehab. Appropriate prescriptions if needed on this visit are addressed. After visit summery is provided. Questions answered and patient verbalizes understanding. Follow up in 6 months,  sooner if needed.

## 2022-11-07 NOTE — PROGRESS NOTES
Luis F Rosario  1945  Elizabeth Goodwin MD      Chief Complaint   Patient presents with    Edema     Legs, feet and ankles, pt. States swelling is getting better. Chief complaint and HPI:  Luis F Rosario  is a 76 y.o. male following up for leg swelling and has multiple medical problems including known coronary artery disease status post recent PCI on 9/9/2022 which has helped his chest pains and shortness of breath tremendously. He started going to cardiac rehab but is not able to do 3 sessions a week due to multiple other doctors appointment due to multiple medical issues. Denies any new cardiac symptoms. He is not able to use compression stockings for leg swelling however he is regularly elevating his feet at night and that helps to bring the swelling down in the morning. He is compliant with his medications and denies any new cardiac symptoms today. He has history of left lower extremity venous ablation done by Dr. Tiffanie Humphreys in August 2020. It appears today's appointment with all appointments which she did not replace with a new appointment given on his last visit. Patient is counseled to discard all old appointments with me in place with an appointment to avoid confusion. Medications reviewed and I will discuss with cardiac rehab staff to accommodate him to the best of their ability so he can continue rehab and graduated from the program.  He does admit that rehab is helping him. Rest of the Cardiovascular system review is otherwise unchanged from prior encounter.   Past medical history:  has a past medical history of Abnormal nuclear stress test, Arthritis, CAD s/p PCI to [facility] 9/9/22, Dizziness, FH: CAD (coronary artery disease), H/O 24 hour EKG monitoring, H/O atrial fibrillation without current medication, H/O cardiovascular stress test, H/O Doppler echocardiogram, H/O Doppler ultrasound (Venous Doppler Lower Extremities), H/O echocardiogram, H/O transesophageal echocardiography (SALAZAR) for monitoring, Heart murmur, History of nuclear stress test, Hx of Venous Doppler ultrasound, Hyperlipidemia, Hypertension, Kidney stones, Leg swelling, Neuropathy, Nonrheumatic aortic valve stenosis, Obesity, JAYE on CPAP, Persistent atrial fibrillation (HCC), Syncope and collapse, Thoracic aortic aneurysm ([facility] [address] and Type II or unspecified type diabetes mellitus without mention of complication, not stated as uncontrolled. Past surgical history:  has a past surgical history that includes hernia repair (); hernia repair (); Colonoscopy (8455;6308); Colon surgery ( ); Cataract removal with implant (Right, 2019); Intracapsular cataract extraction (Right, 2019); Cataract removal with implant (Left, 05/10/2019); Intracapsular cataract extraction (Left, 5/10/2019); Pacemaker insertion (Left, 2019); Cardioversion (2019); and Total knee arthroplasty (Right, 2022). Social History:   Social History     Tobacco Use    Smoking status: Former     Packs/day: 1.00     Years: 7.00     Pack years: 7.00     Types: Cigarettes     Quit date: 3/26/1973     Years since quittin.6    Smokeless tobacco: Former   Substance Use Topics    Alcohol use: Yes     Alcohol/week: 3.0 standard drinks     Types: 3 Shots of liquor per week     Comment: small amount some nights     Family history: family history includes Diabetes in his mother; Heart Disease in his brother and father; High Blood Pressure in his brother. ALLERGIES:  Niacin and related and Oxycontin [oxycodone hcl]  Prior to Admission medications    Medication Sig Start Date End Date Taking?  Authorizing Provider   torsemide (DEMADEX) 20 MG tablet Take 20 mg by mouth daily   Yes Historical Provider, MD   furosemide (LASIX) 20 MG tablet Take 1 tablet by mouth daily 22  Yes Karthik Leos MD   montelukast (SINGULAIR) 10 MG tablet Take 1 tablet by mouth daily 10/6/22  Yes Frederick Rdz MD   magnesium oxide (MAG-OX) 400 MG tablet TAKE 1 TABLET BY MOUTH EVERY DAY 10/6/22  Yes Bonifacio Buchanan MD   aspirin 81 MG chewable tablet Take 1 tablet by mouth daily 9/11/22  Yes BENSON Dhillon CNP   metFORMIN (GLUCOPHAGE) 1000 MG tablet Take 0.5 tablets by mouth 2 times daily (with meals) 9/12/22  Yes BENSON Dhillon CNP   clopidogrel (PLAVIX) 75 MG tablet Take 1 tablet by mouth daily 9/9/22  Yes Genny Caal MD   lisinopril-hydroCHLOROthiazide (PRINZIDE;ZESTORETIC) 20-25 MG per tablet TAKE 1 TABLET BY MOUTH EVERY DAY 8/25/22  Yes Theodore Justin MD   ELIQUIS 5 MG TABS tablet TAKE 1 TABLET TWICE A DAY 1/19/22  Yes Theodore Justin MD   metoprolol tartrate (LOPRESSOR) 50 MG tablet TAKE 1 TABLET BY MOUTH TWICE A DAY 1/5/22  Yes Theodore Justin MD   atorvastatin (LIPITOR) 20 MG tablet Take 1 tablet by mouth daily 2/25/20  Yes Theodore Justin MD   meclizine (ANTIVERT) 25 MG tablet Take 25 mg by mouth as needed   Yes Historical Provider, MD   amLODIPine (NORVASC) 10 MG tablet Take 1 tablet by mouth daily 6/12/18  Yes Genny Caal MD   Levell Cranker Oil 500 MG CAPS Take 1 capsule by mouth daily    Yes Historical Provider, MD   allopurinol (ZYLOPRIM) 100 MG tablet Take 100 mg by mouth daily  1/13/15  Yes Historical Provider, MD   B Complex Vitamins (VITAMIN B COMPLEX PO) Take  by mouth daily. Yes Historical Provider, MD   gabapentin (NEURONTIN) 800 MG tablet Take 800 mg by mouth 3 times daily. Yes Historical Provider, MD   niacin (NIASPAN) 1000 MG extended release tablet  6/8/22   Historical Provider, MD     Vitals:    11/07/22 1138   BP: 114/64   Pulse: 70   SpO2: 97%   Weight: 275 lb 12.8 oz (125.1 kg)   Height: 5' 11\" (1.803 m)      Body mass index is 38.47 kg/m². Wt Readings from Last 3 Encounters:   11/07/22 275 lb 12.8 oz (125.1 kg)   09/27/22 272 lb (123.4 kg)   09/06/22 290 lb (131.5 kg)     Constitutional:  Patient is obese male in no apparent distress.   He gained 3 pounds since last visit with me on 9/27/2022. Cardiovascular: Auscultation: Normal S1 and S2.  2/6 systolic murmur noted in aortic area and left sternal border. Peripheral pulses: Radials and pedal pulses are 1+ equal in both side. Right radial access site has healed well. Respiratory:  Respiratory effort is normal. Breath sounds are clear to auscultation. Extremities: Bilateral 1-2+ pitting edema noted around the ankle. SKIN: Warm and well perfused, no pallor or cyanosis  Neurologic:  Oriented to time, place, and person and non-anxious. No focal neurological deficit noted. Psychiatric: Normal mood and effect. Tyler Philippe LAB REVIEW:  CBC:   Lab Results   Component Value Date/Time    WBC 6.4 09/10/2022 06:25 AM    HGB 10.2 09/10/2022 06:25 AM    HCT 32.8 09/10/2022 06:25 AM     09/10/2022 06:25 AM     Lipids:   Lab Results   Component Value Date    CHOL 118 12/12/2016    CHOLFAST 120 04/13/2022    TRIG 103 12/12/2016    TRIGLYCFAST 93 04/13/2022    HDL 49 04/13/2022    LDLCALC 52 04/13/2022     Renal:   Lab Results   Component Value Date/Time    BUN 20 09/10/2022 06:25 AM    CREATININE 1.5 09/10/2022 06:25 AM     09/10/2022 06:25 AM    K 4.8 09/10/2022 06:25 AM     PT/INR:   Lab Results   Component Value Date/Time    INR 1.57 07/08/2019 10:39 AM     Lab Results   Component Value Date    LABA1C 5.6 04/13/2022     IMPRESSION and RECOMMENDATIONS:      1. CAD s/p PCI to [facility] 9/9/22  2. Leg swelling  3. Primary hypertension  4. Pure hypercholesterolemia  5. Chronic kidney disease (CKD) stage G3b/A1, moderately decreased glomerular filtration rate (GFR) between 30-44 mL/min/1.73 square meter and albuminuria creatinine ratio less than 30 mg/g ([facility])  6. Type 2 diabetes mellitus without complication, without long-term current use of insulin ([facility])  7. S/P placement of cardiac pacemaker 6/2019  8. Persistent atrial fibrillation ([address]  9.  JAYE on CPAP    Counseled to elevate feet when resting and when resting in bed at night with pillows underneath and use thigh high compression stockings in the morning and remove them at night. Patient verbalizes understanding. Questions answered. Continue with phase 2 cardiac rehab. Appropriate prescriptions if needed on this visit are addressed. After visit summery is provided. Questions answered and patient verbalizes understanding. Follow up in 6 months,  sooner if needed. Jose Guadalupe Sorenson MD, 11/7/2022 12:12 PM     Please note this report has been partially produced using speech recognition software and may contain errors related to that system including errors in grammar, punctuation, and spelling, as well as words and phrases that may be inappropriate. If there are any questions or concerns please feel free to contact the dictating provider for clarification.

## 2022-11-18 RX ORDER — METOPROLOL TARTRATE 50 MG/1
TABLET, FILM COATED ORAL
Qty: 180 TABLET | Refills: 3 | Status: SHIPPED | OUTPATIENT
Start: 2022-11-18

## 2022-11-21 ENCOUNTER — APPOINTMENT (OUTPATIENT)
Dept: CARDIAC REHAB | Age: 77
End: 2022-11-21
Payer: MEDICARE

## 2022-11-21 DIAGNOSIS — I48.19 PERSISTENT ATRIAL FIBRILLATION (HCC): ICD-10-CM

## 2022-11-23 ENCOUNTER — APPOINTMENT (OUTPATIENT)
Dept: CARDIAC REHAB | Age: 77
End: 2022-11-23
Payer: MEDICARE

## 2022-11-25 ENCOUNTER — APPOINTMENT (OUTPATIENT)
Dept: CARDIAC REHAB | Age: 77
End: 2022-11-25
Payer: MEDICARE

## 2022-11-28 ENCOUNTER — APPOINTMENT (OUTPATIENT)
Dept: CARDIAC REHAB | Age: 77
End: 2022-11-28
Payer: MEDICARE

## 2022-11-30 ENCOUNTER — APPOINTMENT (OUTPATIENT)
Dept: CARDIAC REHAB | Age: 77
End: 2022-11-30
Payer: MEDICARE

## 2022-12-12 ENCOUNTER — HOSPITAL ENCOUNTER (OUTPATIENT)
Age: 77
Discharge: HOME | End: 2022-12-12
Payer: MEDICARE

## 2022-12-12 LAB
ALBUMIN SERPL-MCNC: 4.4 GM/DL (ref 3.4–5)
ALP BLD-CCNC: 61 IU/L (ref 40–129)
ALT SERPL-CCNC: 25 U/L (ref 10–40)
ANION GAP SERPL CALCULATED.3IONS-SCNC: 15 MMOL/L (ref 4–16)
AST SERPL-CCNC: 27 IU/L (ref 15–37)
BASOPHILS ABSOLUTE: 0 K/CU MM
BASOPHILS RELATIVE PERCENT: 0.6 % (ref 0–1)
BILIRUB SERPL-MCNC: 0.7 MG/DL (ref 0–1)
BILIRUBIN URINE: NEGATIVE MG/DL
BLOOD, URINE: NEGATIVE
BUN BLDV-MCNC: 57 MG/DL (ref 6–23)
CALCIUM SERPL-MCNC: 9.9 MG/DL (ref 8.3–10.6)
CHLORIDE BLD-SCNC: 98 MMOL/L (ref 99–110)
CLARITY: CLEAR
CO2: 25 MMOL/L (ref 21–32)
COLOR: YELLOW
COMMENT UA: NORMAL
CREAT SERPL-MCNC: 2.7 MG/DL (ref 0.9–1.3)
CREATININE URINE: 37.2 MG/DL (ref 39–259)
DIFFERENTIAL TYPE: ABNORMAL
EOSINOPHILS ABSOLUTE: 0.4 K/CU MM
EOSINOPHILS RELATIVE PERCENT: 5.6 % (ref 0–3)
FOLATE: >20 NG/ML (ref 3.1–17.5)
GFR SERPL CREATININE-BSD FRML MDRD: 23 ML/MIN/1.73M2
GLUCOSE BLD-MCNC: 103 MG/DL (ref 70–99)
GLUCOSE, URINE: NEGATIVE MG/DL
HCT VFR BLD CALC: 31.7 % (ref 42–52)
HEMOGLOBIN: 10.6 GM/DL (ref 13.5–18)
IMMATURE NEUTROPHIL %: 0.3 % (ref 0–0.43)
KETONES, URINE: NEGATIVE MG/DL
LEUKOCYTE ESTERASE, URINE: NEGATIVE
LYMPHOCYTES ABSOLUTE: 0.8 K/CU MM
LYMPHOCYTES RELATIVE PERCENT: 12.1 % (ref 24–44)
MCH RBC QN AUTO: 35.2 PG (ref 27–31)
MCHC RBC AUTO-ENTMCNC: 33.4 % (ref 32–36)
MCV RBC AUTO: 105.3 FL (ref 78–100)
MONOCYTES ABSOLUTE: 0.7 K/CU MM
MONOCYTES RELATIVE PERCENT: 11.5 % (ref 0–4)
NITRITE URINE, QUANTITATIVE: NEGATIVE
PDW BLD-RTO: 15.6 % (ref 11.7–14.9)
PH, URINE: 5.5 (ref 5–8)
PLATELET # BLD: 149 K/CU MM (ref 140–440)
PMV BLD AUTO: 9.7 FL (ref 7.5–11.1)
POTASSIUM SERPL-SCNC: 4.4 MMOL/L (ref 3.5–5.1)
PROT/CREAT RATIO, UR: 0.1
PROTEIN UA: NEGATIVE MG/DL
RBC # BLD: 3.01 M/CU MM (ref 4.6–6.2)
RETICULOCYTE COUNT PCT: 3 % (ref 0.2–2.2)
SEGMENTED NEUTROPHILS ABSOLUTE COUNT: 4.4 K/CU MM
SEGMENTED NEUTROPHILS RELATIVE PERCENT: 69.9 % (ref 36–66)
SODIUM BLD-SCNC: 138 MMOL/L (ref 135–145)
SPECIFIC GRAVITY UA: 1.01 (ref 1–1.03)
TOTAL IMMATURE NEUTOROPHIL: 0.02 K/CU MM
TOTAL PROTEIN: 7.7 GM/DL (ref 6.4–8.2)
TSH HIGH SENSITIVITY: 2.14 UIU/ML (ref 0.27–4.2)
URINE TOTAL PROTEIN: 4 MG/DL
UROBILINOGEN, URINE: 0.2 MG/DL (ref 0.2–1)
VITAMIN B-12: 526.6 PG/ML (ref 211–911)
WBC # BLD: 6.3 K/CU MM (ref 4–10.5)

## 2022-12-12 PROCEDURE — 82570 ASSAY OF URINE CREATININE: CPT

## 2022-12-12 PROCEDURE — 84443 ASSAY THYROID STIM HORMONE: CPT

## 2022-12-12 PROCEDURE — 81003 URINALYSIS AUTO W/O SCOPE: CPT

## 2022-12-12 PROCEDURE — 82746 ASSAY OF FOLIC ACID SERUM: CPT

## 2022-12-12 PROCEDURE — 83970 ASSAY OF PARATHORMONE: CPT

## 2022-12-12 PROCEDURE — 36415 COLL VENOUS BLD VENIPUNCTURE: CPT

## 2022-12-12 PROCEDURE — 85045 AUTOMATED RETICULOCYTE COUNT: CPT

## 2022-12-12 PROCEDURE — 84156 ASSAY OF PROTEIN URINE: CPT

## 2022-12-12 PROCEDURE — 80053 COMPREHEN METABOLIC PANEL: CPT

## 2022-12-12 PROCEDURE — 85025 COMPLETE CBC W/AUTO DIFF WBC: CPT

## 2022-12-12 PROCEDURE — 82607 VITAMIN B-12: CPT

## 2022-12-13 LAB — PARATHYROID HORMONE INTACT: 128 PG/ML (ref 15–65)

## 2023-01-05 PROCEDURE — 93296 REM INTERROG EVL PM/IDS: CPT | Performed by: INTERNAL MEDICINE

## 2023-01-05 PROCEDURE — 93294 REM INTERROG EVL PM/LDLS PM: CPT | Performed by: INTERNAL MEDICINE

## 2023-01-09 ENCOUNTER — PROCEDURE VISIT (OUTPATIENT)
Dept: CARDIOLOGY CLINIC | Age: 78
End: 2023-01-09
Payer: MEDICARE

## 2023-01-09 DIAGNOSIS — Z95.0 CARDIAC PACEMAKER IN SITU: ICD-10-CM

## 2023-01-23 ENCOUNTER — OFFICE VISIT (OUTPATIENT)
Dept: CARDIOLOGY CLINIC | Age: 78
End: 2023-01-23
Payer: MEDICARE

## 2023-01-23 VITALS
HEART RATE: 52 BPM | HEIGHT: 61 IN | DIASTOLIC BLOOD PRESSURE: 82 MMHG | SYSTOLIC BLOOD PRESSURE: 147 MMHG | BODY MASS INDEX: 52.18 KG/M2 | WEIGHT: 276.4 LBS

## 2023-01-23 DIAGNOSIS — R07.9 CHEST PAIN, UNSPECIFIED TYPE: Primary | ICD-10-CM

## 2023-01-23 DIAGNOSIS — Z99.89 OSA ON CPAP: ICD-10-CM

## 2023-01-23 DIAGNOSIS — M79.89 LEG SWELLING: ICD-10-CM

## 2023-01-23 DIAGNOSIS — I48.19 PERSISTENT ATRIAL FIBRILLATION (HCC): ICD-10-CM

## 2023-01-23 DIAGNOSIS — I25.10 CAD IN NATIVE ARTERY: ICD-10-CM

## 2023-01-23 DIAGNOSIS — E78.00 PURE HYPERCHOLESTEROLEMIA: ICD-10-CM

## 2023-01-23 DIAGNOSIS — I10 PRIMARY HYPERTENSION: ICD-10-CM

## 2023-01-23 DIAGNOSIS — E11.9 TYPE 2 DIABETES MELLITUS WITHOUT COMPLICATION, WITHOUT LONG-TERM CURRENT USE OF INSULIN (HCC): ICD-10-CM

## 2023-01-23 DIAGNOSIS — G47.33 OSA ON CPAP: ICD-10-CM

## 2023-01-23 DIAGNOSIS — I35.0 NONRHEUMATIC AORTIC VALVE STENOSIS: ICD-10-CM

## 2023-01-23 DIAGNOSIS — Z95.0 S/P PLACEMENT OF CARDIAC PACEMAKER: ICD-10-CM

## 2023-01-23 DIAGNOSIS — N18.32 CHRONIC KIDNEY DISEASE (CKD) STAGE G3B/A1, MODERATELY DECREASED GLOMERULAR FILTRATION RATE (GFR) BETWEEN 30-44 ML/MIN/1.73 SQUARE METER AND ALBUMINURIA CREATININE RATIO LESS THAN 30 MG/G (HCC): ICD-10-CM

## 2023-01-23 PROBLEM — R07.89 OTHER CHEST PAIN: Status: RESOLVED | Noted: 2022-08-16 | Resolved: 2023-01-23

## 2023-01-23 PROCEDURE — 93000 ELECTROCARDIOGRAM COMPLETE: CPT | Performed by: INTERNAL MEDICINE

## 2023-01-23 PROCEDURE — 3077F SYST BP >= 140 MM HG: CPT | Performed by: INTERNAL MEDICINE

## 2023-01-23 PROCEDURE — 99214 OFFICE O/P EST MOD 30 MIN: CPT | Performed by: INTERNAL MEDICINE

## 2023-01-23 PROCEDURE — G8417 CALC BMI ABV UP PARAM F/U: HCPCS | Performed by: INTERNAL MEDICINE

## 2023-01-23 PROCEDURE — 1036F TOBACCO NON-USER: CPT | Performed by: INTERNAL MEDICINE

## 2023-01-23 PROCEDURE — G8427 DOCREV CUR MEDS BY ELIG CLIN: HCPCS | Performed by: INTERNAL MEDICINE

## 2023-01-23 PROCEDURE — 1123F ACP DISCUSS/DSCN MKR DOCD: CPT | Performed by: INTERNAL MEDICINE

## 2023-01-23 PROCEDURE — G8484 FLU IMMUNIZE NO ADMIN: HCPCS | Performed by: INTERNAL MEDICINE

## 2023-01-23 PROCEDURE — 3079F DIAST BP 80-89 MM HG: CPT | Performed by: INTERNAL MEDICINE

## 2023-01-23 RX ORDER — ISOSORBIDE MONONITRATE 30 MG/1
30 TABLET, EXTENDED RELEASE ORAL DAILY
Qty: 30 TABLET | Refills: 3 | Status: SHIPPED | OUTPATIENT
Start: 2023-01-23

## 2023-01-23 RX ORDER — NITROGLYCERIN 0.3 MG/1
0.3 TABLET SUBLINGUAL EVERY 5 MIN PRN
Qty: 25 TABLET | Refills: 1 | Status: SHIPPED | OUTPATIENT
Start: 2023-01-23

## 2023-01-23 NOTE — PROGRESS NOTES
Kareem Stevens Larry  1945  Melissa Hernandez MD      Chief Complaint   Patient presents with    Coronary Artery Disease    Hyperlipidemia    Hypertension     Pt stating having chest pain, SOB,and leg swelling  No  dizziness or palpitations      Chief complaint and HPI:  Moe Baxter  is a 76 y.o. male following up for coronary artery disease and chest discomfort. He was in PCPs office today and he reported that he is more short of breath and having chest discomfort but not as bad as prior to his two-vessel stenting in September of last year. He continues to drink 4-5 shots of whiskey daily and drinks only 3 glasses of water and he follows up with nephrology. He says he has significant nocturia. He does not exercise and did not participate in rehab except for couple of sessions. He says it is hard for him to come here for rehab 3 times a week. Physically he is very sedentary. Medications are reviewed. He is compliant to his medications. Patient is complaining of increase in easy bruising as he is on dual antiplatelet therapy as well as on Eliquis for anticoagulation therapy. Rest of the Cardiovascular system review is otherwise unchanged from prior encounter.   Past medical history:  has a past medical history of Abnormal nuclear stress test, Arthritis, CAD s/p PCI to [facility] 9/9/22, Dizziness, FH: CAD (coronary artery disease), H/O 24 hour EKG monitoring, H/O atrial fibrillation without current medication, H/O cardiovascular stress test, H/O Doppler echocardiogram, H/O Doppler ultrasound (Venous Doppler Lower Extremities), H/O echocardiogram, H/O transesophageal echocardiography (SALAZAR) for monitoring, Heart murmur, History of nuclear stress test, Hx of Venous Doppler ultrasound, Hyperlipidemia, Hypertension, Kidney stones, Leg swelling, Neuropathy, Nonrheumatic aortic valve stenosis, Obesity, JAYE on CPAP, Persistent atrial fibrillation ([address] Syncope and collapse, Thoracic aortic aneurysm, and Type II or unspecified type diabetes mellitus without mention of complication, not stated as uncontrolled. Past surgical history:  has a past surgical history that includes hernia repair (); hernia repair (); Colonoscopy (7125;221); Colon surgery ( ); Cataract removal with implant (Right, 2019); Intracapsular cataract extraction (Right, 2019); Cataract removal with implant (Left, 05/10/2019); Intracapsular cataract extraction (Left, 5/10/2019); Pacemaker insertion (Left, 2019); Cardioversion (2019); and Total knee arthroplasty (Right, 2022). Social History:   Social History     Tobacco Use    Smoking status: Former     Packs/day: 1.00     Years: 7.00     Pack years: 7.00     Types: Cigarettes     Quit date: 3/26/1973     Years since quittin.8    Smokeless tobacco: Former   Substance Use Topics    Alcohol use: Yes     Alcohol/week: 3.0 standard drinks     Types: 3 Shots of liquor per week     Comment: small amount some nights     Family history: family history includes Diabetes in his mother; Heart Disease in his brother and father; High Blood Pressure in his brother. ALLERGIES:  Niacin and related and Oxycontin [oxycodone hcl]  Prior to Admission medications    Medication Sig Start Date End Date Taking?  Authorizing Provider   isosorbide mononitrate (IMDUR) 30 MG extended release tablet Take 1 tablet by mouth daily 23  Yes Celio Quinn MD   nitroGLYCERIN (NITROSTAT) 0.3 MG SL tablet Place 1 tablet under the tongue every 5 minutes as needed for Chest pain (1-3 doses a day) 23  Yes Celio Quinn MD   torsemide (DEMADEX) 20 MG tablet TAKE 1 TABLET DAILY 12/15/22  Yes Kevin Meza MD   magnesium oxide (MAG-OX) 400 MG tablet TAKE 1 TABLET BY MOUTH EVERY DAY 10/6/22  Yes Kevin Meza MD   aspirin 81 MG chewable tablet Take 1 tablet by mouth daily 22  Yes BENSON Johnson - CNP   metFORMIN (GLUCOPHAGE) 1000 MG tablet Take 0.5 tablets by mouth 2 times daily (with meals)  Patient taking differently: Take 500 mg by mouth 2 times daily (with meals) Half a pill 2 times a day 9/12/22  Yes BENSON Lasw - CNP   clopidogrel (PLAVIX) 75 MG tablet Take 1 tablet by mouth daily 9/9/22  Yes Reagan Gupta MD   lisinopril-hydroCHLOROthiazide (PRINZIDE;ZESTORETIC) 20-25 MG per tablet TAKE 1 TABLET BY MOUTH EVERY DAY 8/25/22  Yes Hellen Wilson MD   atorvastatin (LIPITOR) 20 MG tablet Take 1 tablet by mouth daily 2/25/20  Yes Hellen Wilson MD   amLODIPine (NORVASC) 10 MG tablet Take 1 tablet by mouth daily 6/12/18  Yes Reagan Gupta MD   allopurinol (ZYLOPRIM) 100 MG tablet Take 100 mg by mouth daily  1/13/15  Yes Historical Provider, MD   B Complex Vitamins (VITAMIN B COMPLEX PO) Take  by mouth daily. Yes Historical Provider, MD   gabapentin (NEURONTIN) 800 MG tablet Take 800 mg by mouth 3 times daily. Yes Historical Provider, MD   apixaban (ELIQUIS) 5 MG TABS tablet TAKE 1 TABLET TWICE A DAY  Patient not taking: Reported on 1/23/2023 11/21/22   Hellen Wilson MD   metoprolol tartrate (LOPRESSOR) 50 MG tablet TAKE 1 TABLET BY MOUTH TWICE A DAY  Patient not taking: Reported on 1/23/2023 11/18/22   Hellen Wilson MD   montelukast (SINGULAIR) 10 MG tablet Take 1 tablet by mouth daily  Patient not taking: Reported on 1/23/2023 10/6/22   Mirella Tucker MD   niacin (NIASPAN) 1000 MG extended release tablet  6/8/22   Historical Provider, MD   meclizine (ANTIVERT) 25 MG tablet Take 25 mg by mouth as needed  Patient not taking: No sig reported    Historical Provider, MD   Ivonne Sprain Oil 500 MG CAPS Take 1 capsule by mouth daily   Patient not taking: Reported on 1/23/2023    Historical Provider, MD     Vitals:    01/23/23 1529   BP: (!) 147/82   Pulse: 52   Weight: 276 lb 6.4 oz (125.4 kg)   Height: 5' 1.32\" (1.558 m)      Body mass index is 51.68 kg/m².   Wt Readings from Last 3 Encounters:   01/23/23 276 lb 6.4 oz (125.4 kg)   11/09/22 268 lb (121.6 kg)   11/07/22 275 lb 12.8 oz (125.1 kg)     Constitutional:  Patient is obese male in no apparent distress. Weight has not significantly changed since last visit on 11/7/2022. Cardiovascular: Auscultation: Normal S1 and S2.  3/6 systolic murmur noted in aortic area and left sternal border. Peripheral pulses: Radials and pedal pulses are 1+ equal in both side. Right radial access site has healed well. Respiratory:  Respiratory effort is normal. Breath sounds are clear to auscultation. Extremities: Bilateral 1-2+ pitting edema noted around the ankle. SKIN: Warm and well perfused, no pallor or cyanosis  Neurologic:  Oriented to time, place, and person and non-anxious. No focal neurological deficit noted. Psychiatric: Normal mood and effect. EKG today is consistent with ventricular paced rhythm 64 bpm with underlying persistent atrial fibrillation. September 9, 2022 cardiac cath and PCI report findings noted  1. PCI to Proximal LAD for 80 -90 % lesion reduced to 0 % with   TYSON III Flow reduced to 0 % stenosis with 3.5 X 12 BARTOLO ,   followed by post dilated with 3.75 X 8 NC balloon   2. Balloon angioplasty of distal LAD for 90 % lesion reduced to   0 % using 2.0 X 10 Balloon TYSON III flow   3. PCI to ostial 80 % RCA lesion and mid RCA lesion of 80 % post   Balloon angioplasty using 4.0 X 34 BARTOLO inflated to 16 ELISABETH   LVEDP was 21 mmHG    +-----+------------+  ! AO   !132/71 (95) !  +-----+------------+  ! LV   !140/14 ,21  ! +-----+------------+  ! AO   !118/71 (89) !  +-----+------------+  ! LV   !140/15 ,22  ! Maximum gradient of 22 mmHg across the aortic valve. Last echocardiogram from 8/9/2022 reported   Left ventricular systolic function is low normal with an ejection fraction   of 50%. Mild concentric left ventricular hypertrophy with Grade III diastolic   dysfunction. Mild bilateral atrial enlargement. Mildly dilated right ventricle.    Heavily sclerotic aortic valve with moderate aortic stenosis, mean gradient   of 26 mmHg and an JACK of 1.11 cm sq. Moderate aortic regurgitation is noted with a pressure half time of 466   msec. Moderate tricuspid and mitral regurgitation. Pacer wire noted on right side passing through tricuspid valve. Moderate pulmonary hypertension at 54 mmHg. Aortic root dilated at 4.1 cm. No evidence of pericardial effusion. Pacer analysis from 1/5/2023 is reviewed is consistent with normal dual-chamber MRI safe [employer] pacer function with stable leads and appropriate battery status for the age of the device. Remaining average battery life is 9.2 years. Device is programmed to DDDR mode lower rate 60 beats and 99.9% pacing in the ventricle. Patient is in persistent atrial fibrillation and on Eliquis for anticoagulation therapy. LAB REVIEW:  CBC:   Lab Results   Component Value Date/Time    WBC 6.3 12/12/2022 10:47 AM    HGB 10.6 12/12/2022 10:47 AM    HCT 31.7 12/12/2022 10:47 AM     12/12/2022 10:47 AM     Lipids:   Lab Results   Component Value Date    CHOL 118 12/12/2016    CHOLFAST 120 04/13/2022    TRIG 103 12/12/2016    TRIGLYCFAST 93 04/13/2022    HDL 49 04/13/2022    LDLCALC 52 04/13/2022     Renal:   Lab Results   Component Value Date/Time    BUN 57 12/12/2022 10:47 AM    CREATININE 2.7 12/12/2022 10:47 AM     12/12/2022 10:47 AM    K 4.4 12/12/2022 10:47 AM     PT/INR:   Lab Results   Component Value Date/Time    INR 1.57 07/08/2019 10:39 AM     Lab Results   Component Value Date    LABA1C 5.6 04/13/2022     IMPRESSION and RECOMMENDATIONS:      1. Chest pain, unspecified type  Assessment & Plan:  Start Imdur 30 mg daily and as needed nitroglycerin as instructed and we will obtain pharmacological stress testing and follow-up. He is high risk for cardiac catheterization may go on dialysis with nephro toxicity given his underlying kidney function is worse now than it was 6 months ago. We will repeat BMP also.    Orders:  - EKG 12 lead; Future  2. CAD s/p PCI to [facility] 9/9/22  Assessment & Plan:  Continue dual antiplatelet therapy for another 2 months and then we will discontinue aspirin and stay with Plavix. He is having symptoms similar but not quite as bad to prior intervention. We will evaluate him noninvasively. 3. Chronic kidney disease (CKD) stage G3b/A1, moderately decreased glomerular filtration rate (GFR) between 30-44 mL/min/1.73 square meter and albuminuria creatinine ratio less than 30 mg/g (HCC)  Assessment & Plan:  His creatinine has gotten worse since cardiac cath and PCI we will repeat BMP. He is counseled to minimize alcohol drinks and increase oral water intake and he follows up with nephrology. 4. Type 2 diabetes mellitus without complication, without long-term current use of insulin ([address]  5. JAYE on CPAP  Assessment & Plan:  Complaint continue compliance to CPAP therapy. 6. Pure hypercholesterolemia  Assessment & Plan:  Recent LDL was 52 on current dose of Lipitor 20 mg daily continue the same. 7. Persistent atrial fibrillation ([address]  Assessment & Plan:  Rate is controlled and he is on Eliquis for anticoagulation therapy. 8. S/P placement of cardiac pacemaker 6/2019  Assessment & Plan:  Pacemaker is doing well with remaining longevity of 9 years continue remote monitoring. 9. Primary hypertension  Assessment & Plan:  Continue current antihypertensive medication including amlodipine 10 mg and Prinzide 20-25 daily. 10. Nonrheumatic aortic valve stenosis  Assessment & Plan: We will repeat echocardiogram to assess gradient across the aortic valve. He is complaining of more chest discomfort and shortness of breath and tired feeling. Orders:  -     ECHO Complete 2D W Doppler W Color; Future  11. Leg swelling  Assessment & Plan:  Bilateral pitting edema has not changed much since last visit. Counseled for elevating feet at rest and at night and using compression stockings in the daytime.     Start Imdur 30 mg daily. Repeat echo and nuclear stress test. Avoid alcohol and increase water intake and repeat BMP in 2 weeks. Continue current cardiovascular medications which have been reviewed and discussed individually with you. Appropriate prescriptions if needed on this visit are addressed. After visit summery is provided. Questions answered and patient verbalizes understanding. Follow up in 3 weeks,  sooner if needed. Patient is instructed with regard to the usage of sublingual nitroglycerin on as needed basis for symptoms of angina or angina equivalent. Patient to sit down and rest if symptoms occur with activity and then take one NTG under the tongue and wait for symptoms to resolve. If no relief in 5 minutes one can repeat the dose. If not better within 5 minutes one can take third dose and call 911 if symptoms are not improved or resolved for further care. Headache and drop in blood pressure are common side effects. One may not to take second or third dose if dizzy or light headed due to lower blood pressure and call 911 immediately. For men it is advisable not to use NTG if they had had Viagra or similar medications in the last 24-48 hours as it may result life threatening drop in blood pressure. Celio Quinn MD, 1/23/2023 4:43 PM     Please note this report has been partially produced using speech recognition software and may contain errors related to that system including errors in grammar, punctuation, and spelling, as well as words and phrases that may be inappropriate. If there are any questions or concerns please feel free to contact the dictating provider for clarification.

## 2023-01-23 NOTE — PATIENT INSTRUCTIONS
Start Imdur 30 mg daily. Repeat echo and nuclear stress test. Avoid alcohol and increase water intake and repeat BMP in 2 weeks. Continue current cardiovascular medications which have been reviewed and discussed individually with you. Appropriate prescriptions if needed on this visit are addressed. After visit summery is provided. Questions answered and patient verbalizes understanding. Follow up in 3 weeks,  sooner if needed. Patient is instructed with regard to the usage of sublingual nitroglycerin on as needed basis for symptoms of angina or angina equivalent. Patient to sit down and rest if symptoms occur with activity and then take one NTG under the tongue and wait for symptoms to resolve. If no relief in 5 minutes one can repeat the dose. If not better within 5 minutes one can take third dose and call 911 if symptoms are not improved or resolved for further care. Headache and drop in blood pressure are common side effects. One may not to take second or third dose if dizzy or light headed due to lower blood pressure and call 911 immediately. For men it is advisable not to use NTG if they had had Viagra or similar medications in the last 24-48 hours as it may result life threatening drop in blood pressure.

## 2023-01-23 NOTE — ASSESSMENT & PLAN NOTE
Start Imdur 30 mg daily and as needed nitroglycerin as instructed and we will obtain pharmacological stress testing and follow-up. He is high risk for cardiac catheterization may go on dialysis with nephro toxicity given his underlying kidney function is worse now than it was 6 months ago. We will repeat BMP also.

## 2023-01-23 NOTE — ASSESSMENT & PLAN NOTE
Continue dual antiplatelet therapy for another 2 months and then we will discontinue aspirin and stay with Plavix. He is having symptoms similar but not quite as bad to prior intervention. We will evaluate him noninvasively.

## 2023-01-23 NOTE — ASSESSMENT & PLAN NOTE
We will repeat echocardiogram to assess gradient across the aortic valve. He is complaining of more chest discomfort and shortness of breath and tired feeling.

## 2023-01-23 NOTE — ASSESSMENT & PLAN NOTE
His creatinine has gotten worse since cardiac cath and PCI we will repeat BMP. He is counseled to minimize alcohol drinks and increase oral water intake and he follows up with nephrology.

## 2023-01-23 NOTE — ASSESSMENT & PLAN NOTE
Bilateral pitting edema has not changed much since last visit. Counseled for elevating feet at rest and at night and using compression stockings in the daytime.

## 2023-01-31 ENCOUNTER — PROCEDURE VISIT (OUTPATIENT)
Dept: CARDIOLOGY CLINIC | Age: 78
End: 2023-01-31
Payer: MEDICARE

## 2023-01-31 DIAGNOSIS — I35.0 NONRHEUMATIC AORTIC VALVE STENOSIS: Primary | ICD-10-CM

## 2023-01-31 DIAGNOSIS — N18.31 CKD STAGE G3A/A1, GFR 45-59 AND ALBUMIN CREATININE RATIO <30 MG/G (HCC): ICD-10-CM

## 2023-01-31 LAB
LV EF: 58 %
LVEF MODALITY: NORMAL

## 2023-01-31 PROCEDURE — 93306 TTE W/DOPPLER COMPLETE: CPT | Performed by: INTERNAL MEDICINE

## 2023-02-02 ENCOUNTER — PROCEDURE VISIT (OUTPATIENT)
Dept: CARDIOLOGY CLINIC | Age: 78
End: 2023-02-02

## 2023-02-02 DIAGNOSIS — R07.9 CHEST PAIN, UNSPECIFIED TYPE: Primary | ICD-10-CM

## 2023-02-02 DIAGNOSIS — R07.9 CHEST PAIN, UNSPECIFIED TYPE: ICD-10-CM

## 2023-02-02 DIAGNOSIS — I25.10 CAD IN NATIVE ARTERY: Primary | ICD-10-CM

## 2023-02-02 LAB
LV EF: 56 %
LVEF MODALITY: NORMAL

## 2023-02-06 ENCOUNTER — TELEPHONE (OUTPATIENT)
Dept: CARDIOLOGY CLINIC | Age: 78
End: 2023-02-06

## 2023-02-06 NOTE — TELEPHONE ENCOUNTER
Called patient to give him results of his NM stress test Left ventricular perfusion is abnormal consistent with inferior wall infarction without significant ischemia. EF 56%. While I had him on the phone he asked me about his echo. Instructed him that he has some moderated aortic stenosis but until it becomes severe they will just monitor it. Pt verbalized understanding of all information given.

## 2023-02-14 ENCOUNTER — OFFICE VISIT (OUTPATIENT)
Dept: CARDIOLOGY CLINIC | Age: 78
End: 2023-02-14
Payer: MEDICARE

## 2023-02-14 VITALS
HEART RATE: 68 BPM | DIASTOLIC BLOOD PRESSURE: 92 MMHG | WEIGHT: 286 LBS | HEIGHT: 70 IN | BODY MASS INDEX: 40.94 KG/M2 | SYSTOLIC BLOOD PRESSURE: 152 MMHG

## 2023-02-14 DIAGNOSIS — E78.00 PURE HYPERCHOLESTEROLEMIA: ICD-10-CM

## 2023-02-14 DIAGNOSIS — N18.32 CHRONIC KIDNEY DISEASE (CKD) STAGE G3B/A1, MODERATELY DECREASED GLOMERULAR FILTRATION RATE (GFR) BETWEEN 30-44 ML/MIN/1.73 SQUARE METER AND ALBUMINURIA CREATININE RATIO LESS THAN 30 MG/G (HCC): ICD-10-CM

## 2023-02-14 DIAGNOSIS — I10 PRIMARY HYPERTENSION: ICD-10-CM

## 2023-02-14 DIAGNOSIS — I25.10 CAD IN NATIVE ARTERY: ICD-10-CM

## 2023-02-14 DIAGNOSIS — I25.10 CAD IN NATIVE ARTERY: Primary | ICD-10-CM

## 2023-02-14 PROCEDURE — G8484 FLU IMMUNIZE NO ADMIN: HCPCS | Performed by: INTERNAL MEDICINE

## 2023-02-14 PROCEDURE — G8417 CALC BMI ABV UP PARAM F/U: HCPCS | Performed by: INTERNAL MEDICINE

## 2023-02-14 PROCEDURE — G8427 DOCREV CUR MEDS BY ELIG CLIN: HCPCS | Performed by: INTERNAL MEDICINE

## 2023-02-14 PROCEDURE — 3080F DIAST BP >= 90 MM HG: CPT | Performed by: INTERNAL MEDICINE

## 2023-02-14 PROCEDURE — 3077F SYST BP >= 140 MM HG: CPT | Performed by: INTERNAL MEDICINE

## 2023-02-14 PROCEDURE — 99214 OFFICE O/P EST MOD 30 MIN: CPT | Performed by: INTERNAL MEDICINE

## 2023-02-14 PROCEDURE — 1123F ACP DISCUSS/DSCN MKR DOCD: CPT | Performed by: INTERNAL MEDICINE

## 2023-02-14 PROCEDURE — 1036F TOBACCO NON-USER: CPT | Performed by: INTERNAL MEDICINE

## 2023-02-14 RX ORDER — ISOSORBIDE MONONITRATE 30 MG/1
TABLET, EXTENDED RELEASE ORAL
Qty: 30 TABLET | Refills: 3 | OUTPATIENT
Start: 2023-02-14

## 2023-02-14 RX ORDER — ISOSORBIDE MONONITRATE 60 MG/1
60 TABLET, EXTENDED RELEASE ORAL DAILY
Qty: 90 TABLET | Refills: 3 | Status: SHIPPED | OUTPATIENT
Start: 2023-02-14

## 2023-02-14 NOTE — PATIENT INSTRUCTIONS
Increase Imdur to 60 mg daily. Declines Phase 2 cardiac rehab to help angina and fatigue. Continue other current cardiovascular medications which have been reviewed and discussed individually with you. Counseled for calorie counting, reduction in serving size and exercise and lifestyle modification for weight loss. Appropriate prescriptions if needed on this visit are addressed. After visit summery is provided. Questions answered and patient verbalizes understanding. Follow up in 6 months,  sooner if needed.

## 2023-02-14 NOTE — PROGRESS NOTES
Kareem Merritt  1945  Weston Smith MD      Chief Complaint   Patient presents with    Coronary Artery Disease    Hypertension    Hyperlipidemia     Complains of SOB and chest pain mild across his chest just comes and goes also having swelling in both legs   No dizziness,            Chief complaint and HPI:  Ernie Bennett  is a 76 y.o. male following up for known coronary artery disease and valvular heart disease had a stress test and echocardiogram for further evaluation of recurrence of chest tightness and shortness of breath and reports fatigue. We had started him on Imdur 30 mg daily and it has helped his chest tightness and pressure on it is not as frequent and is not as intense now. He did not use any as needed nitroglycerin since last visit. Stress test and echo reports are discussed with him in detail. He does not exercise and does not diet and he is gaining weight. Rest of the Cardiovascular system review is otherwise unchanged from prior encounter. Past medical history:  has a past medical history of Abnormal nuclear stress test, Arthritis, CAD s/p PCI to [facility] 9/9/22, Dizziness, FH: CAD (coronary artery disease), H/O 24 hour EKG monitoring, H/O atrial fibrillation without current medication, H/O cardiovascular stress test, H/O Doppler echocardiogram, H/O Doppler ultrasound (Venous Doppler Lower Extremities), H/O echocardiogram, H/O transesophageal echocardiography (SALAZAR) for monitoring, Heart murmur, History of nuclear stress test, History of nuclear stress test, Hx of Venous Doppler ultrasound, Hyperlipidemia, Hypertension, Kidney stones, Leg swelling, Neuropathy, Nonrheumatic aortic valve stenosis, Obesity, JAYE on CPAP, Persistent atrial fibrillation ([address] Syncope and collapse, Thoracic aortic aneurysm, and Type II or unspecified type diabetes mellitus without mention of complication, not stated as uncontrolled.   Past surgical history:  has a past surgical history that includes hernia repair (); hernia repair (); Colonoscopy (2511;1180); Colon surgery ( ); Cataract removal with implant (Right, 2019); Intracapsular cataract extraction (Right, 2019); Cataract removal with implant (Left, 05/10/2019); Intracapsular cataract extraction (Left, 5/10/2019); Pacemaker insertion (Left, 2019); Cardioversion (2019); and Total knee arthroplasty (Right, 2022). Social History:   Social History     Tobacco Use    Smoking status: Former     Packs/day: 1.00     Years: 7.00     Pack years: 7.00     Types: Cigarettes     Quit date: 3/26/1973     Years since quittin.9    Smokeless tobacco: Former   Substance Use Topics    Alcohol use: Yes     Alcohol/week: 3.0 standard drinks     Types: 3 Shots of liquor per week     Comment: small amount some nights     Family history: family history includes Diabetes in his mother; Heart Disease in his brother and father; High Blood Pressure in his brother. ALLERGIES:  Niacin and related and Oxycontin [oxycodone hcl]  Prior to Admission medications    Medication Sig Start Date End Date Taking?  Authorizing Provider   isosorbide mononitrate (IMDUR) 30 MG extended release tablet Take 1 tablet by mouth daily 23  Yes Kenzie Casillas MD   nitroGLYCERIN (NITROSTAT) 0.3 MG SL tablet Place 1 tablet under the tongue every 5 minutes as needed for Chest pain (1-3 doses a day) 23  Yes Kenzie Casillas MD   torsemide (DEMADEX) 20 MG tablet TAKE 1 TABLET DAILY 12/15/22  Yes Sri Nelson MD   apixaban (ELIQUIS) 5 MG TABS tablet TAKE 1 TABLET TWICE A DAY 22  Yes Kenzie Casillas MD   montelukast (SINGULAIR) 10 MG tablet Take 1 tablet by mouth daily 10/6/22  Yes Mary Ellen Montez MD   magnesium oxide (MAG-OX) 400 MG tablet TAKE 1 TABLET BY MOUTH EVERY DAY 10/6/22  Yes Sri Nelson MD   aspirin 81 MG chewable tablet Take 1 tablet by mouth daily 22  Yes BENSON Cintron - CNP   metFORMIN (GLUCOPHAGE) 1000 MG tablet Take 0.5 tablets by mouth 2 times daily (with meals)  Patient taking differently: Take 500 mg by mouth 2 times daily (with meals) Half a pill 2 times a day 9/12/22  Yes BENSON Helms - CNP   clopidogrel (PLAVIX) 75 MG tablet Take 1 tablet by mouth daily 9/9/22  Yes Rachel Burch MD   lisinopril-hydroCHLOROthiazide (PRINZIDE;ZESTORETIC) 20-25 MG per tablet TAKE 1 TABLET BY MOUTH EVERY DAY 8/25/22  Yes Nicolas Garcia MD   atorvastatin (LIPITOR) 20 MG tablet Take 1 tablet by mouth daily 2/25/20  Yes Nicolas Garcia MD   amLODIPine (NORVASC) 10 MG tablet Take 1 tablet by mouth daily 6/12/18  Yes Rachel Burch MD   allopurinol (ZYLOPRIM) 100 MG tablet Take 100 mg by mouth daily  1/13/15  Yes Historical Provider, MD   B Complex Vitamins (VITAMIN B COMPLEX PO) Take  by mouth daily. Yes Historical Provider, MD   gabapentin (NEURONTIN) 800 MG tablet Take 800 mg by mouth 3 times daily. Yes Historical Provider, MD     Vitals:    02/14/23 1359   BP: (!) 152/92   Pulse: 68   Weight: 286 lb (129.7 kg)   Height: 5' 10.32\" (1.786 m)      Body mass index is 40.66 kg/m². Wt Readings from Last 3 Encounters:   02/14/23 286 lb (129.7 kg)   01/23/23 276 lb 6.4 oz (125.4 kg)   11/09/22 268 lb (121.6 kg)     Constitutional:  Patient is obese male in no apparent distress. Patient gained 10 pounds since last visit on 1/23/2023. Cardiovascular: Auscultation: Normal S1 and S2.  3/6 systolic murmur noted in aortic area and left sternal border. Peripheral pulses: Radials and pedal pulses are 1+ equal in both side. Right radial access site has healed well. Respiratory:  Respiratory effort is normal. Breath sounds are clear to auscultation. Extremities: Bilateral 1-2+ pitting edema noted around the ankle. SKIN: Warm and well perfused, no pallor or cyanosis  Neurologic:  Oriented to time, place, and person and non-anxious. No focal neurological deficit noted. Psychiatric: Normal mood and effect. Echocardiogram done on 1/31/2023 reported  Left ventricular systolic function is normal with an ejection fraction of   55-60%. Mild concentric left ventricular hypertrophy with Grade I diastolic   dysfunction. Hypokinetic motion of the apical septal wall segment(s) noted in the left   ventricle. The left atrium is moderately dilated. Dilatation of the aortic root measuring 4.1 cm. Dilatation of the ascending aorta measuring 4.2 cm. Moderate aortic stenosis with mean gradient of 25 mmHg. Doppler evaluation reveals moderate aortic, mild mitral, and mild to   moderate tricuspid regurgitation. Right ventricular systolic pressure of 55 mmHg consistent with moderate   pulmonary hypertension. No evidence of pericardial effusion. Compared to 8/9 2022 study no significant change noted. Nuclear stress test done on 2/2/2023 reported   Left ventricular perfusion is abnormal consistent with inferior wall  infarction without significant ischemia. Left ventricular function is normal. Left ventricular ejection fraction is  56%. compared to 8/16/2022 study no significant change.      LAB REVIEW:  CBC:   Lab Results   Component Value Date/Time    WBC 6.3 12/12/2022 10:47 AM    HGB 10.6 12/12/2022 10:47 AM    HCT 31.7 12/12/2022 10:47 AM     12/12/2022 10:47 AM     Lipids:   Lab Results   Component Value Date    CHOL 118 12/12/2016    CHOLFAST 120 04/13/2022    TRIG 103 12/12/2016    TRIGLYCFAST 93 04/13/2022    HDL 49 04/13/2022    LDLCALC 52 04/13/2022     Renal:   Component Ref Range & Units 12/12/22 1047 9/10/22 0625 9/6/22 1012 4/13/22 1258 3/1/22 0530 2/28/22 0535 2/27/22 0625   Sodium 135 - 145 MMOL/L 138  137  140  140  138  138  138    Potassium 3.5 - 5.1 MMOL/L 4.4  4.8  4.7  5.1  4.6  4.5  4.0    Chloride 99 - 110 mMol/L 98 Low   103  104  104  107  105  104    CO2 21 - 32 MMOL/L 25  26  20 Low   24  22  24  24    BUN 6 - 23 MG/DL 57 High   20  26 High   28 High   31 High   27 High   27 High     Creatinine 0.9 - 1.3 MG/DL 2.7 High   1.5 High   1.8 High   1.7 High   1.9 High        PT/INR:   Lab Results   Component Value Date/Time    INR 1.57 07/08/2019 10:39 AM     Lab Results   Component Value Date    LABA1C 5.6 04/13/2022     IMPRESSION and RECOMMENDATIONS:      1. CAD s/p PCI to [facility] 9/9/22  2. Chronic kidney disease (CKD) stage G3b/A1, moderately decreased glomerular filtration rate (GFR) between 30-44 mL/min/1.73 square meter and albuminuria creatinine ratio less than 30 mg/g (HCC)  3. Pure hypercholesterolemia  4. Primary hypertension    His creatinine significantly worse since from last heart cath I would refrain from doing further heart cath unless absolutely necessary and try to manage medically. We discussed pros and cons of this with him and he is in agreement. Increase Imdur to 60 mg daily. Declines Phase 2 cardiac rehab to help angina and fatigue. Continue other current cardiovascular medications which have been reviewed and discussed individually with you. Counseled for calorie counting, reduction in serving size and exercise and lifestyle modification for weight loss. Appropriate prescriptions if needed on this visit are addressed. After visit summery is provided. Questions answered and patient verbalizes understanding. Follow up in 6 months,  sooner if needed. Luci Henao MD, 2/14/2023 2:12 PM     Please note this report has been partially produced using speech recognition software and may contain errors related to that system including errors in grammar, punctuation, and spelling, as well as words and phrases that may be inappropriate. If there are any questions or concerns please feel free to contact the dictating provider for clarification.

## 2023-02-24 DIAGNOSIS — I48.19 PERSISTENT ATRIAL FIBRILLATION (HCC): ICD-10-CM

## 2023-02-27 ENCOUNTER — TELEPHONE (OUTPATIENT)
Dept: CARDIOLOGY CLINIC | Age: 78
End: 2023-02-27

## 2023-02-27 RX ORDER — FUROSEMIDE 20 MG/1
TABLET ORAL
Qty: 90 TABLET | Refills: 2 | OUTPATIENT
Start: 2023-02-27

## 2023-02-27 NOTE — TELEPHONE ENCOUNTER
DOS 9/9/2022- the CPT 30175 was denied by [employer] for [facility] after review of the medical notes for bundling with [phone]. Copy of the letter sent to  [email or web address]

## 2023-03-13 RX ORDER — FUROSEMIDE 20 MG/1
TABLET ORAL
Qty: 90 TABLET | Refills: 2 | OUTPATIENT
Start: 2023-03-13

## 2023-03-15 ENCOUNTER — OFFICE VISIT (OUTPATIENT)
Dept: ORTHOPEDIC SURGERY | Age: 78
End: 2023-03-15
Payer: MEDICARE

## 2023-03-15 VITALS
BODY MASS INDEX: 39.51 KG/M2 | RESPIRATION RATE: 15 BRPM | WEIGHT: 276 LBS | DIASTOLIC BLOOD PRESSURE: 74 MMHG | SYSTOLIC BLOOD PRESSURE: 100 MMHG | HEIGHT: 70 IN

## 2023-03-15 DIAGNOSIS — M17.10 LOCALIZED OSTEOARTHRITIS OF KNEE: Primary | ICD-10-CM

## 2023-03-15 PROCEDURE — 3078F DIAST BP <80 MM HG: CPT | Performed by: ORTHOPAEDIC SURGERY

## 2023-03-15 PROCEDURE — G8427 DOCREV CUR MEDS BY ELIG CLIN: HCPCS | Performed by: ORTHOPAEDIC SURGERY

## 2023-03-15 PROCEDURE — G8417 CALC BMI ABV UP PARAM F/U: HCPCS | Performed by: ORTHOPAEDIC SURGERY

## 2023-03-15 PROCEDURE — G8484 FLU IMMUNIZE NO ADMIN: HCPCS | Performed by: ORTHOPAEDIC SURGERY

## 2023-03-15 PROCEDURE — 99213 OFFICE O/P EST LOW 20 MIN: CPT | Performed by: ORTHOPAEDIC SURGERY

## 2023-03-15 PROCEDURE — 1123F ACP DISCUSS/DSCN MKR DOCD: CPT | Performed by: ORTHOPAEDIC SURGERY

## 2023-03-15 PROCEDURE — 1036F TOBACCO NON-USER: CPT | Performed by: ORTHOPAEDIC SURGERY

## 2023-03-15 PROCEDURE — 3074F SYST BP LT 130 MM HG: CPT | Performed by: ORTHOPAEDIC SURGERY

## 2023-03-15 NOTE — PROGRESS NOTES
Patient returns to the office today for FU of the left knee. Pt states pain today is a 5/10 he does notice some swelling. Pt states he does notice some grinding in the left knee at times.  Pt would like to discuss a left TKA

## 2023-03-15 NOTE — PATIENT INSTRUCTIONS
We will schedule surgery at soonest convenience. If you have any questions regarding your surgery please call our office and ask to speak with Demond Sutherland [phone]       We are committed to providing you the best care possible. If you receive a survey after visiting one of our offices, please take time to share your experience concerning your physician office visit. These surveys are confidential and no health information about you is shared. We are eager to improve for you and we are counting on your feedback to help make that happen.

## 2023-03-16 ENCOUNTER — TELEPHONE (OUTPATIENT)
Dept: CARDIOLOGY CLINIC | Age: 78
End: 2023-03-16

## 2023-03-16 NOTE — TELEPHONE ENCOUNTER
Cardiologist: Dr. Jeanna Hatfield  Surgeon: Dr. Mychal Guy   Surgery: Daya Maurice.  Total knee   Anesthesia: Nerve block, spinal  Date: 4/18/23  FAX# [phone]    Ph# [phone]    Last OV 2/14/23 Cassie

## 2023-03-17 ENCOUNTER — TELEPHONE (OUTPATIENT)
Dept: ORTHOPEDIC SURGERY | Age: 78
End: 2023-03-17

## 2023-03-17 NOTE — TELEPHONE ENCOUNTER
Patient scheduled for    Left Total Knee Arthroplasty  Date: 4/18/23  Facility: [facility]  Surgeon: Caity Shaffer D.O  Product: G[employer]ell    Surgery Request and Pathway Orders faxed 3/16/23  PCP Clearance mailed to Dr. David Colbert 3/16/23  Cardiac Clearance faxed to Dr. Mike Carvajal 3/16/23  Pulmon Clearance faxed to Dr. Steve Smith 3/16/23  Nephr Clearance faxed to Dr. Nelli May 3/16/23    Pre Op Appt 3/15/23    [address]  Contacted 3/16/23 via portal  Status: Pending CPT 03499 ICD M17.12/M25.562 for 23hr observation  Auth# [id number]    [facility] PAT and Georgette [email or web address]; spoke to wife Anniece Boast 3/16/23    Pt requesting home health PT upon d/c from hospital.

## 2023-03-18 ASSESSMENT — ENCOUNTER SYMPTOMS
COLOR CHANGE: 0
CHEST TIGHTNESS: 0
BACK PAIN: 0

## 2023-03-18 NOTE — PROGRESS NOTES
Subjective:      Patient ID: Wendy Zazueta is a 76 y.o. male. Patient returns to the office today for FU of the left knee. Pt states pain today is a 5/10 he does notice some swelling. Pt states he does notice some grinding in the left knee at times. Pt would like to discuss a left TKA     He states that since his right knee replacement a year ago he has been doing very well on that side and is not having any issues with his right knee. He does continue to have deep, aching and throbbing pain globally in his left knee. He states that now the pain in his left knee is beginning to limit his ability to perform his daily activities. He would like to discuss proceeding with surgical treatment for his left knee. He has now been cleared by cardiology to proceed with surgical treatment. He would like to have his surgery at [facility] again and plan on using a swing bed again after his next surgery. Review of Systems   Constitutional:  Negative for activity change, chills and fever. Respiratory:  Negative for chest tightness. Cardiovascular:  Negative for chest pain. Musculoskeletal:  Positive for arthralgias, gait problem, joint swelling and myalgias. Negative for back pain. Skin:  Negative for color change, pallor, rash and wound. Neurological:  Negative for weakness and numbness. Past Medical History:   Diagnosis Date    Abnormal nuclear stress test 08/23/2022    Inferior wall ischemia 8/2022    Arthritis     CAD s/p PCI to [facility] 9/9/22 09/09/2022    1. PCI to Proximal LAD for 80 -90 % lesion reduced to 0 % with  TYSON III Flow reduced to 0 % stenosis with 3.5 X 12 BARTOLO ,  followed by post dilated with 3.75 X 8 NC balloon  2. Balloon angioplasty of distal LAD for 90 % lesion reduced to  0 % using 2.0 X 10 Balloon TYSON III flow  3.  PCI to ostial 80 % RCA lesion and mid RCA lesion of 80 % post  Balloon angioplasty using 4.0 X 34 BARTOLO inflated to 16    Dizziness     FH: CAD (coronary artery disease) 09/29/2015    H/O 24 hour EKG monitoring 02/26/19,08/02/2016    Conclusion: Atrial fibrillation with  fairly well-controlled ventricular rate response without evidence of any significant tachy or alon arrhythmias.    H/O atrial fibrillation without current medication 04/03/2019    H/O cardiovascular stress test 11/24/15; 8/22/2019    Normal perfusion in the distribution of all coronaries, normal LV, EF 53%.    H/O Doppler echocardiogram 08/09/2022    EF 50%. Mild concentric LV hypertrophy with grade 3 diastolic dysfunction. Mild bilateral atrial enlargement. Mildly dilated RV. Heavily sclerotic aortic valve with moderate aortic stenosis, mean gradient of 26 and an JACK of 1.11. Moderate aortic regurg is noted with pressure half time of 466. Moderate tricuspid and mitral regurg. Pacer wire noted on right side passing through tricuspid valve.    H/O Doppler ultrasound (Venous Doppler Lower Extremities) 03/08/2017; 1/2/2020    No evidence of DVT or SVT in the bilaterally. No significant reflux noted in the veins of the right lower extremity. Significant reflux noted in the Left CFV.  Bilateral Calf and ankle edema noted.    H/O echocardiogram 10/28/2021    EF 55-60% Mild eccentric left ventricular hypertrophy.  There is severe fibrocalcific sclerosis of the aortic valve with mean gradient across the aortic valve of 29 mmHg and calculated aortic valve areawas 1.2square centimeters, all suggesting moderate aortic stenosis. See complete result documentation under cardiology tab.    H/O transesophageal echocardiography (SALAZAR) for monitoring 07/08/2019    No CEE clot    Heart murmur 09/29/2015    History of nuclear stress test 08/16/2022    LV perfusion is abn suggestiing inferior wall infarction with mild superimposed ischemia of moderate size. EF 51%.    History of nuclear stress test 02/02/2023    Left ventricular perfusion is abnormal consistent with inferior wall infarction without significant ischemia. EF 56%.  Hx of Doppler echocardiogram 01/31/2023    EF 55-60%, dilation aortic root 4.1cm, moderate aortic stenosis,moderate aortic,mild mitral and mild to moderate tricusip regurg,moderate pulmonary HTN    Hx of Venous Doppler ultrasound 08/03/2020    No evidence of DVT in the left CFV. The Left GSV is non-compressible with no evidence of flow just past the saphenofemoral junction to the distal calf. Hyperlipidemia     Hypertension     Kidney stones     Leg swelling 12/09/2019    Bilateral leg swelling    Neuropathy     Nonrheumatic aortic valve stenosis 02/15/2022    Obesity     JAYE on CPAP 06/26/2018    On CPAP 5/2018    Persistent atrial fibrillation ([address] 01/29/2019    New onset 1/2019    Syncope and collapse 12/09/2019    Thoracic aortic aneurysm 02/2011    4.5    Type II or unspecified type diabetes mellitus without mention of complication, not stated as uncontrolled        Objective:   Physical Exam  HENT:      Head: Normocephalic. Eyes:      Pupils: Pupils are equal, round, and reactive to light. Pulmonary:      Effort: Pulmonary effort is normal.   Musculoskeletal:         General: Swelling and tenderness present. No deformity. Normal range of motion. Cervical back: Normal range of motion. Right hip: Normal.      Left hip: Normal.      Right knee: Swelling present. No deformity, effusion, erythema, ecchymosis, lacerations or bony tenderness. Normal range of motion. No tenderness. No medial joint line, lateral joint line, MCL or LCL tenderness. No LCL laxity or MCL laxity. Normal alignment and normal patellar mobility. Left knee: Swelling and bony tenderness present. No deformity, effusion, erythema, ecchymosis or lacerations. Normal range of motion. No MCL, LCL or patellar tendon tenderness. No LCL laxity or MCL laxity. Normal alignment and normal patellar mobility. Skin:     General: Skin is warm and dry. Capillary Refill: Capillary refill takes less than 2 seconds. Coloration: Skin is not pale. Findings: No erythema or rash. Neurological:      Mental Status: He is alert and oriented to person, place, and time. Right knee-Incision clean, dry, intact, with no erythema, no drainage, and no signs of infection. 0-130    Left knee-Skin intact with no erythema, ecchymosis or lacerations present. 0-130    Bilateral lower extremity pitting edema, improved compared to prior exam, right worse than left    XR KNEE LEFT (3 VIEWS)    Result Date: 8/15/2022  XRAY X-ray 3 views of the left knee reviewed by me today in the office demonstrates age appropriate bone density throughout with severe degenerative changes with medial patellofemoral joint space collapse, moderate osteophyte formation all 3 compartments, normal tracking patella, no acute osseous abnormalities. Impression: Severe degenerative changes of left knee as above with no acute process. Assessment:      Right TKA, 1 year   left knee OA, severe      Plan:       I discussed with him again today his x-ray findings. I explained to him that he does have severe arthritis in the left knee. At this point given his persistent and worsening symptoms despite conservative treatment and with his x-ray findings I recommend surgical treatment. I had a lengthy discussion with him today in regards to left total knee replacement surgery. I explained risks, benefits, possible complications of the procedure and answered all questions for the patient. I explained postoperative rehabilitation protocol and expectations with the patient today. The patient understands and consents to the procedure. Patient will follow up with their primary care physician prior to surgical treatment for preoperative clearance. We will schedule surgery at soonest convenience. Continue weight-bearing as tolerated. Continue range of motion exercises as instructed. Ice and elevate as needed. Tylenol or Motrin for pain.   Follow up in 3 weeks postop. He would like to have a surgery at [facility] again and would like to do the swing bed after surgery again.                 Anyi Guerra, DO

## 2023-03-23 ENCOUNTER — TELEPHONE (OUTPATIENT)
Dept: ORTHOPEDIC SURGERY | Age: 78
End: 2023-03-23

## 2023-03-23 NOTE — TELEPHONE ENCOUNTER
Received response from [facility] regarding LT TKA on 4/18/23    Status: Approved CPT 60718 ICD M17.12/M25.562 for 23hr observation  Auth# [id number]  Date Span: 4/18/23-6/19/23  Scanned into media.

## 2023-04-03 ENCOUNTER — TELEPHONE (OUTPATIENT)
Dept: CARDIOLOGY CLINIC | Age: 78
End: 2023-04-03

## 2023-04-03 RX ORDER — SODIUM CHLORIDE, SODIUM LACTATE, POTASSIUM CHLORIDE, CALCIUM CHLORIDE 600; 310; 30; 20 MG/100ML; MG/100ML; MG/100ML; MG/100ML
INJECTION, SOLUTION INTRAVENOUS CONTINUOUS
OUTPATIENT
Start: 2023-04-18

## 2023-04-03 NOTE — PROGRESS NOTES
Left  with callback number with reminder of of pre-testing appointment on 4/5/23 @ 0963   Bring insurance card, picture ID and a list of your home medications. Check in at the information desk in the lobby upon your arrival. You can eat and take morning medications.

## 2023-04-03 NOTE — TELEPHONE ENCOUNTER
Patient called concerned because he states he has been having increased SOB and fatigue and is exhausted just walking from one end of the house to the other,is going to have a knee replacement 4/18 and is unsure if he is in good enough shape with these issues to have that done.  Would like a return call [phone]

## 2023-04-03 NOTE — TELEPHONE ENCOUNTER
Returned call to patient spoke to wife patient is very SOB with severe fatigue made patient an appt. To see Dr. Дмитрий Herrera on Monday 10th 11:30 before surgery.

## 2023-04-05 ENCOUNTER — HOSPITAL ENCOUNTER (OUTPATIENT)
Dept: GENERAL RADIOLOGY | Age: 78
Discharge: HOME | End: 2023-04-05
Payer: MEDICARE

## 2023-04-05 ENCOUNTER — HOSPITAL ENCOUNTER (OUTPATIENT)
Dept: PREADMISSION TESTING | Age: 78
Discharge: HOME | End: 2023-04-05
Payer: MEDICARE

## 2023-04-05 ENCOUNTER — HOSPITAL ENCOUNTER (OUTPATIENT)
Age: 78
Discharge: HOME | End: 2023-04-05
Payer: MEDICARE

## 2023-04-05 VITALS
RESPIRATION RATE: 22 BRPM | SYSTOLIC BLOOD PRESSURE: 90 MMHG | WEIGHT: 266 LBS | DIASTOLIC BLOOD PRESSURE: 51 MMHG | BODY MASS INDEX: 37.24 KG/M2 | HEART RATE: 66 BPM | TEMPERATURE: 97.3 F | HEIGHT: 71 IN

## 2023-04-05 DIAGNOSIS — Z01.818 PRE-OP TESTING: ICD-10-CM

## 2023-04-05 DIAGNOSIS — Z01.818 PRE-OP TESTING: Primary | ICD-10-CM

## 2023-04-05 LAB
ALBUMIN SERPL-MCNC: 4.4 GM/DL (ref 3.4–5)
ALP BLD-CCNC: 64 IU/L (ref 40–128)
ALT SERPL-CCNC: 19 U/L (ref 10–40)
ANION GAP SERPL CALCULATED.3IONS-SCNC: 15 MMOL/L (ref 4–16)
ANION GAP SERPL CALCULATED.3IONS-SCNC: 15 MMOL/L (ref 4–16)
AST SERPL-CCNC: 21 IU/L (ref 15–37)
BASOPHILS ABSOLUTE: 0 K/CU MM
BASOPHILS RELATIVE PERCENT: 0.8 % (ref 0–1)
BILIRUB SERPL-MCNC: 0.4 MG/DL (ref 0–1)
BILIRUBIN URINE: NEGATIVE MG/DL
BLOOD, URINE: NEGATIVE
BUN SERPL-MCNC: 85 MG/DL (ref 6–23)
BUN SERPL-MCNC: 87 MG/DL (ref 6–23)
CALCIUM SERPL-MCNC: 9.6 MG/DL (ref 8.3–10.6)
CALCIUM SERPL-MCNC: 9.6 MG/DL (ref 8.3–10.6)
CHLORIDE BLD-SCNC: 103 MMOL/L (ref 99–110)
CHLORIDE BLD-SCNC: 103 MMOL/L (ref 99–110)
CLARITY: CLEAR
CO2: 20 MMOL/L (ref 21–32)
CO2: 20 MMOL/L (ref 21–32)
COLOR: YELLOW
COMMENT UA: NORMAL
CREAT SERPL-MCNC: 4.2 MG/DL (ref 0.9–1.3)
CREAT SERPL-MCNC: 4.2 MG/DL (ref 0.9–1.3)
DIFFERENTIAL TYPE: ABNORMAL
EOSINOPHILS ABSOLUTE: 0.1 K/CU MM
EOSINOPHILS RELATIVE PERCENT: 2.4 % (ref 0–3)
ERYTHROCYTE SEDIMENTATION RATE: 23 MM/HR (ref 0–20)
GFR SERPL CREATININE-BSD FRML MDRD: 14 ML/MIN/1.73M2
GFR SERPL CREATININE-BSD FRML MDRD: 14 ML/MIN/1.73M2
GLUCOSE SERPL-MCNC: 107 MG/DL (ref 70–99)
GLUCOSE SERPL-MCNC: 107 MG/DL (ref 70–99)
GLUCOSE, URINE: NEGATIVE MG/DL
HCT VFR BLD CALC: 23.6 % (ref 42–52)
HEMOGLOBIN: 7.1 GM/DL (ref 13.5–18)
IMMATURE NEUTROPHIL %: 0.6 % (ref 0–0.43)
KETONES, URINE: NEGATIVE MG/DL
LEUKOCYTE ESTERASE, URINE: NEGATIVE
LYMPHOCYTES ABSOLUTE: 0.5 K/CU MM
LYMPHOCYTES RELATIVE PERCENT: 10.8 % (ref 24–44)
MCH RBC QN AUTO: 30.6 PG (ref 27–31)
MCHC RBC AUTO-ENTMCNC: 30.1 % (ref 32–36)
MCV RBC AUTO: 101.7 FL (ref 78–100)
MONOCYTES ABSOLUTE: 0.5 K/CU MM
MONOCYTES RELATIVE PERCENT: 9.2 % (ref 0–4)
NITRITE URINE, QUANTITATIVE: NEGATIVE
NUCLEATED RBC %: 0 %
PDW BLD-RTO: 15.9 % (ref 11.7–14.9)
PH, URINE: 5 (ref 5–8)
PLATELET # BLD: 192 K/CU MM (ref 140–440)
PMV BLD AUTO: 9.9 FL (ref 7.5–11.1)
POTASSIUM SERPL-SCNC: 4.9 MMOL/L (ref 3.5–5.1)
POTASSIUM SERPL-SCNC: 4.9 MMOL/L (ref 3.5–5.1)
PRO-BNP: 3246 PG/ML
PROTEIN UA: NEGATIVE MG/DL
RBC # BLD: 2.32 M/CU MM (ref 4.6–6.2)
SEGMENTED NEUTROPHILS ABSOLUTE COUNT: 3.8 K/CU MM
SEGMENTED NEUTROPHILS RELATIVE PERCENT: 76.2 % (ref 36–66)
SODIUM BLD-SCNC: 138 MMOL/L (ref 135–145)
SODIUM BLD-SCNC: 138 MMOL/L (ref 135–145)
SPECIFIC GRAVITY UA: 1.01 (ref 1–1.03)
TOTAL IMMATURE NEUTOROPHIL: 0.03 K/CU MM
TOTAL NUCLEATED RBC: 0 K/CU MM
TOTAL PROTEIN: 6.5 GM/DL (ref 6.4–8.2)
UROBILINOGEN, URINE: 0.2 MG/DL (ref 0.2–1)
WBC # BLD: 5 K/CU MM (ref 4–10.5)

## 2023-04-05 PROCEDURE — 36415 COLL VENOUS BLD VENIPUNCTURE: CPT

## 2023-04-05 PROCEDURE — 71046 X-RAY EXAM CHEST 2 VIEWS: CPT

## 2023-04-05 PROCEDURE — 85652 RBC SED RATE AUTOMATED: CPT

## 2023-04-05 PROCEDURE — 80048 BASIC METABOLIC PNL TOTAL CA: CPT

## 2023-04-05 PROCEDURE — 87086 URINE CULTURE/COLONY COUNT: CPT

## 2023-04-05 PROCEDURE — 85025 COMPLETE CBC W/AUTO DIFF WBC: CPT

## 2023-04-05 PROCEDURE — 80053 COMPREHEN METABOLIC PANEL: CPT

## 2023-04-05 PROCEDURE — 83880 ASSAY OF NATRIURETIC PEPTIDE: CPT

## 2023-04-05 PROCEDURE — 81003 URINALYSIS AUTO W/O SCOPE: CPT

## 2023-04-05 RX ORDER — METOPROLOL TARTRATE 50 MG/1
50 TABLET, FILM COATED ORAL 2 TIMES DAILY
COMMUNITY

## 2023-04-05 ASSESSMENT — PAIN DESCRIPTION - FREQUENCY: FREQUENCY: CONTINUOUS

## 2023-04-05 ASSESSMENT — PAIN SCALES - GENERAL: PAINLEVEL_OUTOF10: 6

## 2023-04-05 ASSESSMENT — PAIN DESCRIPTION - ONSET: ONSET: ON-GOING

## 2023-04-05 NOTE — PROGRESS NOTES
.Surgery @ [facility] on 4/18/23 you will be called 4/17/23 with times               1. Do not eat or drink anything after midnight - unless instructed by your doctor prior to surgery. This includes                   no water, chewing gum or mints. 2. Follow your directions as prescribed by the doctor for your procedure and medications. Morning of surgery take: Amlodipine,Gabapentin, Imdur,Metformin. Metoprolol with sips of water    3. Check with your Doctor regarding stopping vitamins, supplements, blood thinner Eliquis,ASA,Plavix and follow their instructions. Stop vitamins, supplements and NSAIDS: 4/11/23   4. Do not smoke, vape or use chewing tobacco morning of surgery. Do not drink any alcoholic beverages 24 hours prior to surgery. This includes NA Beer. No street drugs 7 days prior to surgery. 5. You may brush your teeth and gargle the morning of surgery. DO NOT SWALLOW WATER   6. You MUST make arrangements for a responsible adult to take you home after your surgery and be able to check on you every couple                   hours for the day. You will not be allowed to leave alone or drive yourself home. It is strongly suggested someone stay with you the first 24                   hrs. Your surgery will be cancelled if you do not have a ride home. 7. Please wear simple, loose fitting clothing to the hospital.   Palm not bring valuables (money, credit cards, checkbooks, etc.) Do not wear any                   makeup (including no eye makeup) or nail polish on your fingers or toes. 8. DO NOT wear any jewelry or piercings on day of surgery. All body piercing jewelry must be removed. 9. If you have dentures, they will be removed before going to the OR; we will provide you a container. If you wear contact lenses or glasses,                  they will be removed; please bring a case for them.            10. If you  have a Living Will and Durable Power of  for

## 2023-04-06 LAB
CULTURE: NORMAL
Lab: NORMAL
SPECIMEN: NORMAL

## 2023-04-11 RX ORDER — FUROSEMIDE 20 MG/1
TABLET ORAL
Qty: 90 TABLET | Refills: 2 | OUTPATIENT
Start: 2023-04-11

## 2023-04-17 ENCOUNTER — TELEPHONE (OUTPATIENT)
Dept: ORTHOPEDIC SURGERY | Age: 78
End: 2023-04-17

## 2023-04-18 NOTE — TELEPHONE ENCOUNTER
Nancy Sevilla from Dr. Micheal Cerda office left VM stating they reviewed pt's abnormal labs, pt will be having repeat labs on 4/19/23 and has appt for OV on 4/26/23. Stated she will fax over office note once completed.

## 2023-04-19 ENCOUNTER — HOSPITAL ENCOUNTER (OUTPATIENT)
Age: 78
Discharge: HOME | End: 2023-04-19
Payer: MEDICARE

## 2023-04-19 DIAGNOSIS — N18.32 CHRONIC KIDNEY DISEASE (CKD) STAGE G3B/A1, MODERATELY DECREASED GLOMERULAR FILTRATION RATE (GFR) BETWEEN 30-44 ML/MIN/1.73 SQUARE METER AND ALBUMINURIA CREATININE RATIO LESS THAN 30 MG/G (HCC): ICD-10-CM

## 2023-04-19 LAB
ANION GAP SERPL CALCULATED.3IONS-SCNC: 14 MMOL/L (ref 4–16)
BUN SERPL-MCNC: 63 MG/DL (ref 6–23)
CALCIUM SERPL-MCNC: 9.3 MG/DL (ref 8.3–10.6)
CHLORIDE BLD-SCNC: 105 MMOL/L (ref 99–110)
CO2: 22 MMOL/L (ref 21–32)
CREAT SERPL-MCNC: 3.3 MG/DL (ref 0.9–1.3)
GFR SERPL CREATININE-BSD FRML MDRD: 18 ML/MIN/1.73M2
GLUCOSE SERPL-MCNC: 119 MG/DL (ref 70–99)
POTASSIUM SERPL-SCNC: 5.1 MMOL/L (ref 3.5–5.1)
SODIUM BLD-SCNC: 141 MMOL/L (ref 135–145)

## 2023-04-19 PROCEDURE — 36415 COLL VENOUS BLD VENIPUNCTURE: CPT

## 2023-04-19 PROCEDURE — 80048 BASIC METABOLIC PNL TOTAL CA: CPT

## 2023-04-24 ENCOUNTER — PROCEDURE VISIT (OUTPATIENT)
Dept: CARDIOLOGY CLINIC | Age: 78
End: 2023-04-24

## 2023-04-24 DIAGNOSIS — Z95.0 CARDIAC PACEMAKER IN SITU: ICD-10-CM

## 2023-04-26 ENCOUNTER — APPOINTMENT (OUTPATIENT)
Dept: CT IMAGING | Age: 78
End: 2023-04-26
Payer: MEDICARE

## 2023-04-26 ENCOUNTER — HOSPITAL ENCOUNTER (EMERGENCY)
Age: 78
Discharge: ANOTHER HOSPITAL | End: 2023-04-27
Attending: EMERGENCY MEDICINE
Payer: MEDICARE

## 2023-04-26 VITALS
RESPIRATION RATE: 16 BRPM | SYSTOLIC BLOOD PRESSURE: 134 MMHG | DIASTOLIC BLOOD PRESSURE: 86 MMHG | WEIGHT: 283 LBS | TEMPERATURE: 97.5 F | HEIGHT: 71 IN | HEART RATE: 61 BPM | BODY MASS INDEX: 39.62 KG/M2 | OXYGEN SATURATION: 90 %

## 2023-04-26 DIAGNOSIS — R53.1 GENERAL WEAKNESS: ICD-10-CM

## 2023-04-26 DIAGNOSIS — D63.1 ANEMIA DUE TO CHRONIC KIDNEY DISEASE, UNSPECIFIED CKD STAGE: Primary | ICD-10-CM

## 2023-04-26 DIAGNOSIS — N18.9 ANEMIA DUE TO CHRONIC KIDNEY DISEASE, UNSPECIFIED CKD STAGE: Primary | ICD-10-CM

## 2023-04-26 LAB
ALBUMIN SERPL-MCNC: 4.1 GM/DL (ref 3.4–5)
ALP BLD-CCNC: 62 IU/L (ref 40–129)
ALT SERPL-CCNC: 20 U/L (ref 10–40)
ANION GAP SERPL CALCULATED.3IONS-SCNC: 13 MMOL/L (ref 4–16)
APTT: 45.4 SECONDS (ref 25.1–37.1)
AST SERPL-CCNC: 22 IU/L (ref 15–37)
BASOPHILS ABSOLUTE: 0 K/CU MM
BASOPHILS RELATIVE PERCENT: 0.4 % (ref 0–1)
BILIRUB SERPL-MCNC: 0.6 MG/DL (ref 0–1)
BUN SERPL-MCNC: 61 MG/DL (ref 6–23)
CALCIUM SERPL-MCNC: 9.3 MG/DL (ref 8.3–10.6)
CHLORIDE BLD-SCNC: 105 MMOL/L (ref 99–110)
CO2: 19 MMOL/L (ref 21–32)
CREAT SERPL-MCNC: 2.9 MG/DL (ref 0.9–1.3)
DIFFERENTIAL TYPE: ABNORMAL
EOSINOPHILS ABSOLUTE: 0.1 K/CU MM
EOSINOPHILS RELATIVE PERCENT: 1 % (ref 0–3)
GFR SERPL CREATININE-BSD FRML MDRD: 21 ML/MIN/1.73M2
GLUCOSE SERPL-MCNC: 128 MG/DL (ref 70–99)
HCT VFR BLD CALC: 21 % (ref 42–52)
HEMOGLOBIN: 5.9 GM/DL (ref 13.5–18)
IMMATURE NEUTROPHIL %: 0.4 % (ref 0–0.43)
INR BLD: 1.71 INDEX
LACTATE: 3 MMOL/L (ref 0.5–1.9)
LYMPHOCYTES ABSOLUTE: 0.6 K/CU MM
LYMPHOCYTES RELATIVE PERCENT: 8.4 % (ref 24–44)
MCH RBC QN AUTO: 28.9 PG (ref 27–31)
MCHC RBC AUTO-ENTMCNC: 28.1 % (ref 32–36)
MCV RBC AUTO: 102.9 FL (ref 78–100)
MONOCYTES ABSOLUTE: 0.7 K/CU MM
MONOCYTES RELATIVE PERCENT: 10.3 % (ref 0–4)
PDW BLD-RTO: 16.4 % (ref 11.7–14.9)
PLATELET # BLD: 179 K/CU MM (ref 140–440)
PMV BLD AUTO: 10 FL (ref 7.5–11.1)
POTASSIUM SERPL-SCNC: 5.2 MMOL/L (ref 3.5–5.1)
PRO-BNP: 4264 PG/ML
PROTHROMBIN TIME: 20.9 SECONDS (ref 11.7–14.5)
RBC # BLD: 2.04 M/CU MM (ref 4.6–6.2)
RETICULOCYTE COUNT PCT: 4.1 % (ref 0.2–2.2)
SEGMENTED NEUTROPHILS ABSOLUTE COUNT: 5.3 K/CU MM
SEGMENTED NEUTROPHILS RELATIVE PERCENT: 79.5 % (ref 36–66)
SODIUM BLD-SCNC: 137 MMOL/L (ref 135–145)
TOTAL IMMATURE NEUTOROPHIL: 0.03 K/CU MM
TOTAL PROTEIN: 6.6 GM/DL (ref 6.4–8.2)
TROPONIN T: <0.01 NG/ML
WBC # BLD: 6.7 K/CU MM (ref 4–10.5)

## 2023-04-26 PROCEDURE — 93005 ELECTROCARDIOGRAM TRACING: CPT | Performed by: EMERGENCY MEDICINE

## 2023-04-26 PROCEDURE — 86901 BLOOD TYPING SEROLOGIC RH(D): CPT

## 2023-04-26 PROCEDURE — 99285 EMERGENCY DEPT VISIT HI MDM: CPT

## 2023-04-26 PROCEDURE — 83880 ASSAY OF NATRIURETIC PEPTIDE: CPT

## 2023-04-26 PROCEDURE — 82728 ASSAY OF FERRITIN: CPT

## 2023-04-26 PROCEDURE — 71250 CT THORAX DX C-: CPT

## 2023-04-26 PROCEDURE — 85025 COMPLETE CBC W/AUTO DIFF WBC: CPT

## 2023-04-26 PROCEDURE — 85045 AUTOMATED RETICULOCYTE COUNT: CPT

## 2023-04-26 PROCEDURE — 85610 PROTHROMBIN TIME: CPT

## 2023-04-26 PROCEDURE — 80053 COMPREHEN METABOLIC PANEL: CPT

## 2023-04-26 PROCEDURE — 86900 BLOOD TYPING SEROLOGIC ABO: CPT

## 2023-04-26 PROCEDURE — 86922 COMPATIBILITY TEST ANTIGLOB: CPT

## 2023-04-26 PROCEDURE — 82746 ASSAY OF FOLIC ACID SERUM: CPT

## 2023-04-26 PROCEDURE — 83605 ASSAY OF LACTIC ACID: CPT

## 2023-04-26 PROCEDURE — 84484 ASSAY OF TROPONIN QUANT: CPT

## 2023-04-26 PROCEDURE — 86850 RBC ANTIBODY SCREEN: CPT

## 2023-04-26 PROCEDURE — 83540 ASSAY OF IRON: CPT

## 2023-04-26 PROCEDURE — 85730 THROMBOPLASTIN TIME PARTIAL: CPT

## 2023-04-26 PROCEDURE — 83550 IRON BINDING TEST: CPT

## 2023-04-26 PROCEDURE — 82607 VITAMIN B-12: CPT

## 2023-04-26 PROCEDURE — 74176 CT ABD & PELVIS W/O CONTRAST: CPT

## 2023-04-26 RX ORDER — SODIUM CHLORIDE 9 MG/ML
INJECTION, SOLUTION INTRAVENOUS PRN
Status: DISCONTINUED | OUTPATIENT
Start: 2023-04-26 | End: 2023-04-27 | Stop reason: HOSPADM

## 2023-04-26 ASSESSMENT — PAIN - FUNCTIONAL ASSESSMENT: PAIN_FUNCTIONAL_ASSESSMENT: NONE - DENIES PAIN

## 2023-04-26 NOTE — ED PROVIDER NOTES
Emergency Department Encounter  Location: [address]    Patient: Brianna Randolph  MRN: [id number]  : 1945  Date of evaluation: 2023  ED Provider: Angela Kiran DO, FACEP    Chief Complaint:    Anemia (Reports of hemoglobin of 7.3 a week ago) and Fatigue    [place]:  Brianna Randolph is a 76 y.o. male that presents to the emergency department with complaints of shortness of breath and occasional chest pain and fatigue. The patient was seen by his nephrologist today and was sent to the emergency department for possible transfusion. The patient had hemoglobin 7.1 earlier in the month. Most recently 7.3. He states he has felt bad since he had a heart cath about 6 months ago. He denies any blood in his stool or blood in his urine he states \"my urine has been very yellow lately\". The patient sees Dr. Judie Kebede for chronic renal insufficiency. He denies fever or chills. He has no cough and has no nausea vomiting or diarrhea. ROS - see HPI, below listed is current ROS at time of my eval:  At least 10 systems reviewed and otherwise acutely negative except as in the [address]   General:  No fevers, no chills, no weakness  Eyes:  No recent vison changes, no discharge  ENT:  No sore throat, no nasal congestion, no hearing changes  Cardiovascular: Positive for occasional chest pain, no palpitations  Respiratory: Positive for shortness of breath, no cough, no wheezing  Gastrointestinal:  No pain, no nausea, no vomiting, no diarrhea  Musculoskeletal:  No muscle pain, no joint pain  Skin:  No rash, no pruritis, no easy bruising  Neurologic:  No speech problems, no headache, no extremity numbness, no extremity tingling, no extremity weakness  Psychiatric:  No anxiety  Genitourinary:  No dysuria, no hematuria  Endocrine:  No unexpected weight gain, no unexpected weight loss  Extremities:  no edema, no pain    Past Medical History:   Diagnosis Date    Abnormal nuclear stress test 2022    Inferior spherecylinderaxisaddprismvertexVAInt VANVExaminer

## 2023-04-27 ENCOUNTER — HOSPITAL ENCOUNTER (INPATIENT)
Age: 78
LOS: 8 days | Discharge: OTHER INSTITUTION | End: 2023-05-05
Attending: STUDENT IN AN ORGANIZED HEALTH CARE EDUCATION/TRAINING PROGRAM | Admitting: STUDENT IN AN ORGANIZED HEALTH CARE EDUCATION/TRAINING PROGRAM
Payer: MEDICARE

## 2023-04-27 DIAGNOSIS — K92.2 GASTROINTESTINAL HEMORRHAGE, UNSPECIFIED GASTROINTESTINAL HEMORRHAGE TYPE: ICD-10-CM

## 2023-04-27 DIAGNOSIS — D64.9 ANEMIA, UNSPECIFIED TYPE: ICD-10-CM

## 2023-04-27 LAB
BILIRUBIN URINE: NEGATIVE MG/DL
BLOOD, URINE: NEGATIVE
CLARITY: CLEAR
COLOR: YELLOW
COMMENT UA: NORMAL
FERRITIN: 17 NG/ML (ref 30–400)
FERRITIN: 19 NG/ML (ref 30–400)
FOLATE SERPL-MCNC: >20 NG/ML (ref 3.1–17.5)
FOLATE SERPL-MCNC: >20 NG/ML (ref 3.1–17.5)
GLUCOSE BLD-MCNC: 136 MG/DL (ref 70–99)
GLUCOSE BLD-MCNC: 137 MG/DL (ref 70–99)
GLUCOSE BLD-MCNC: 146 MG/DL (ref 70–99)
GLUCOSE BLD-MCNC: 180 MG/DL (ref 70–99)
GLUCOSE, URINE: NEGATIVE MG/DL
HCT VFR BLD CALC: 20 % (ref 42–52)
HCT VFR BLD CALC: 25.3 % (ref 42–52)
HCT VFR BLD CALC: 25.7 % (ref 42–52)
HCT VFR BLD CALC: 27.8 % (ref 42–52)
HCT VFR BLD CALC: 28 % (ref 42–52)
HEMOGLOBIN: 5.7 GM/DL (ref 13.5–18)
HEMOGLOBIN: 6.9 GM/DL (ref 13.5–18)
HEMOGLOBIN: 7.8 GM/DL (ref 13.5–18)
HEMOGLOBIN: 8.2 GM/DL (ref 13.5–18)
HEMOGLOBIN: 8.2 GM/DL (ref 13.5–18)
IRON: 26 UG/DL (ref 59–158)
IRON: 27 UG/DL (ref 59–158)
KETONES, URINE: NEGATIVE MG/DL
LACTATE DEHYDROGENASE: 210 IU/L (ref 120–246)
LACTATE: 1.9 MMOL/L (ref 0.5–1.9)
LEUKOCYTE ESTERASE, URINE: NEGATIVE
NITRITE URINE, QUANTITATIVE: NEGATIVE
PCT TRANSFERRIN: 6 % (ref 10–44)
PCT TRANSFERRIN: 7 % (ref 10–44)
PH, URINE: 5.5 (ref 5–8)
PROTEIN UA: NEGATIVE MG/DL
RETICULOCYTE COUNT PCT: 2.8 % (ref 0.2–2.2)
SPECIFIC GRAVITY UA: 1.01 (ref 1–1.03)
TOTAL IRON BINDING CAPACITY: 381 UG/DL (ref 250–450)
TOTAL IRON BINDING CAPACITY: 420 UG/DL (ref 250–450)
UNSATURATED IRON BINDING CAPACITY: 355 UG/DL (ref 110–370)
UNSATURATED IRON BINDING CAPACITY: 393 UG/DL (ref 110–370)
UROBILINOGEN, URINE: 0.2 MG/DL (ref 0.2–1)
VITAMIN B-12: 527.6 PG/ML (ref 211–911)
VITAMIN B-12: 560.3 PG/ML (ref 211–911)

## 2023-04-27 PROCEDURE — 83010 ASSAY OF HAPTOGLOBIN QUANT: CPT

## 2023-04-27 PROCEDURE — 86901 BLOOD TYPING SEROLOGIC RH(D): CPT

## 2023-04-27 PROCEDURE — 86900 BLOOD TYPING SEROLOGIC ABO: CPT

## 2023-04-27 PROCEDURE — 99222 1ST HOSP IP/OBS MODERATE 55: CPT | Performed by: INTERNAL MEDICINE

## 2023-04-27 PROCEDURE — 85018 HEMOGLOBIN: CPT

## 2023-04-27 PROCEDURE — 86850 RBC ANTIBODY SCREEN: CPT

## 2023-04-27 PROCEDURE — 83540 ASSAY OF IRON: CPT

## 2023-04-27 PROCEDURE — 83605 ASSAY OF LACTIC ACID: CPT

## 2023-04-27 PROCEDURE — 6360000002 HC RX W HCPCS: Performed by: SPECIALIST

## 2023-04-27 PROCEDURE — 94761 N-INVAS EAR/PLS OXIMETRY MLT: CPT

## 2023-04-27 PROCEDURE — 85025 COMPLETE CBC W/AUTO DIFF WBC: CPT

## 2023-04-27 PROCEDURE — APPNB60 APP NON BILLABLE TIME 46-60 MINS: Performed by: NURSE PRACTITIONER

## 2023-04-27 PROCEDURE — 82607 VITAMIN B-12: CPT

## 2023-04-27 PROCEDURE — 36430 TRANSFUSION BLD/BLD COMPNT: CPT

## 2023-04-27 PROCEDURE — 6370000000 HC RX 637 (ALT 250 FOR IP): Performed by: STUDENT IN AN ORGANIZED HEALTH CARE EDUCATION/TRAINING PROGRAM

## 2023-04-27 PROCEDURE — 83550 IRON BINDING TEST: CPT

## 2023-04-27 PROCEDURE — 36415 COLL VENOUS BLD VENIPUNCTURE: CPT

## 2023-04-27 PROCEDURE — 2140000000 HC CCU INTERMEDIATE R&B

## 2023-04-27 PROCEDURE — 80048 BASIC METABOLIC PNL TOTAL CA: CPT

## 2023-04-27 PROCEDURE — 30233N1 TRANSFUSION OF NONAUTOLOGOUS RED BLOOD CELLS INTO PERIPHERAL VEIN, PERCUTANEOUS APPROACH: ICD-10-PCS | Performed by: STUDENT IN AN ORGANIZED HEALTH CARE EDUCATION/TRAINING PROGRAM

## 2023-04-27 PROCEDURE — 82270 OCCULT BLOOD FECES: CPT

## 2023-04-27 PROCEDURE — 82962 GLUCOSE BLOOD TEST: CPT

## 2023-04-27 PROCEDURE — C9113 INJ PANTOPRAZOLE SODIUM, VIA: HCPCS | Performed by: SPECIALIST

## 2023-04-27 PROCEDURE — 81003 URINALYSIS AUTO W/O SCOPE: CPT

## 2023-04-27 PROCEDURE — P9016 RBC LEUKOCYTES REDUCED: HCPCS

## 2023-04-27 PROCEDURE — 82728 ASSAY OF FERRITIN: CPT

## 2023-04-27 PROCEDURE — 85014 HEMATOCRIT: CPT

## 2023-04-27 PROCEDURE — 83615 LACTATE (LD) (LDH) ENZYME: CPT

## 2023-04-27 PROCEDURE — 2580000003 HC RX 258: Performed by: STUDENT IN AN ORGANIZED HEALTH CARE EDUCATION/TRAINING PROGRAM

## 2023-04-27 PROCEDURE — 85045 AUTOMATED RETICULOCYTE COUNT: CPT

## 2023-04-27 PROCEDURE — 99223 1ST HOSP IP/OBS HIGH 75: CPT | Performed by: INTERNAL MEDICINE

## 2023-04-27 PROCEDURE — 85610 PROTHROMBIN TIME: CPT

## 2023-04-27 PROCEDURE — 82746 ASSAY OF FOLIC ACID SERUM: CPT

## 2023-04-27 RX ORDER — SODIUM CHLORIDE 9 MG/ML
INJECTION, SOLUTION INTRAVENOUS PRN
Status: DISCONTINUED | OUTPATIENT
Start: 2023-04-27 | End: 2023-05-05 | Stop reason: HOSPADM

## 2023-04-27 RX ORDER — PANTOPRAZOLE SODIUM 40 MG/10ML
40 INJECTION, POWDER, LYOPHILIZED, FOR SOLUTION INTRAVENOUS DAILY
Status: DISCONTINUED | OUTPATIENT
Start: 2023-04-27 | End: 2023-05-05 | Stop reason: HOSPADM

## 2023-04-27 RX ORDER — SODIUM CHLORIDE 9 MG/ML
INJECTION, SOLUTION INTRAVENOUS PRN
Status: DISCONTINUED | OUTPATIENT
Start: 2023-04-27 | End: 2023-05-02 | Stop reason: SDUPTHER

## 2023-04-27 RX ORDER — MONTELUKAST SODIUM 10 MG/1
10 TABLET ORAL DAILY
Status: DISCONTINUED | OUTPATIENT
Start: 2023-04-27 | End: 2023-05-05 | Stop reason: HOSPADM

## 2023-04-27 RX ORDER — SODIUM CHLORIDE 0.9 % (FLUSH) 0.9 %
5-40 SYRINGE (ML) INJECTION EVERY 12 HOURS SCHEDULED
Status: DISCONTINUED | OUTPATIENT
Start: 2023-04-27 | End: 2023-05-05 | Stop reason: HOSPADM

## 2023-04-27 RX ORDER — SODIUM CHLORIDE 0.9 % (FLUSH) 0.9 %
5-40 SYRINGE (ML) INJECTION PRN
Status: DISCONTINUED | OUTPATIENT
Start: 2023-04-27 | End: 2023-05-05 | Stop reason: HOSPADM

## 2023-04-27 RX ORDER — ATORVASTATIN CALCIUM 10 MG/1
20 TABLET, FILM COATED ORAL DAILY
Status: DISCONTINUED | OUTPATIENT
Start: 2023-04-27 | End: 2023-05-05 | Stop reason: HOSPADM

## 2023-04-27 RX ORDER — ONDANSETRON 4 MG/1
4 TABLET, ORALLY DISINTEGRATING ORAL EVERY 8 HOURS PRN
Status: DISCONTINUED | OUTPATIENT
Start: 2023-04-27 | End: 2023-05-05 | Stop reason: HOSPADM

## 2023-04-27 RX ORDER — ONDANSETRON 2 MG/ML
4 INJECTION INTRAMUSCULAR; INTRAVENOUS EVERY 6 HOURS PRN
Status: DISCONTINUED | OUTPATIENT
Start: 2023-04-27 | End: 2023-05-05 | Stop reason: HOSPADM

## 2023-04-27 RX ORDER — ACETAMINOPHEN 325 MG/1
650 TABLET ORAL EVERY 6 HOURS PRN
Status: DISCONTINUED | OUTPATIENT
Start: 2023-04-27 | End: 2023-05-05 | Stop reason: HOSPADM

## 2023-04-27 RX ORDER — ACETAMINOPHEN 650 MG/1
650 SUPPOSITORY RECTAL EVERY 6 HOURS PRN
Status: DISCONTINUED | OUTPATIENT
Start: 2023-04-27 | End: 2023-05-05 | Stop reason: HOSPADM

## 2023-04-27 RX ADMIN — ATORVASTATIN CALCIUM 20 MG: 10 TABLET, FILM COATED ORAL at 08:03

## 2023-04-27 RX ADMIN — MONTELUKAST 10 MG: 10 TABLET, FILM COATED ORAL at 08:03

## 2023-04-27 RX ADMIN — SODIUM CHLORIDE, PRESERVATIVE FREE 10 ML: 5 INJECTION INTRAVENOUS at 20:35

## 2023-04-27 RX ADMIN — SODIUM CHLORIDE, PRESERVATIVE FREE 10 ML: 5 INJECTION INTRAVENOUS at 08:03

## 2023-04-27 RX ADMIN — PANTOPRAZOLE SODIUM 40 MG: 40 INJECTION, POWDER, FOR SOLUTION INTRAVENOUS at 13:35

## 2023-04-27 ASSESSMENT — PAIN SCALES - GENERAL: PAINLEVEL_OUTOF10: 0

## 2023-04-27 NOTE — ED PROVIDER NOTES
ADDENDUM:    Care of the patient was assumed  from Dr. Marylu Penn. I have reviewed the notes, assessments, and/or procedures performed, I concur with her/his documentation on Leni Saldana. I reviewed the medical record and evaluated the patient with the previous physician. See above physician note for HPI,  physical exam and other details. This was a checked out patient to me due to end of shift by above physican    ED COURSE/MDM:  Laboratory and imaging data were reviewed and care plan was arranged and discussed with the patient(see separate lab/imaging reports). RADIOLOGY:  Already resulted studies have been reviewed. CT CHEST WO CONTRAST   Final Result   1. Redemonstration of ectatic ascending aorta which currently measures 4.6 cm   up from 4.4 cm previously. 2. Prominent main pulmonary artery measuring 3.2 cm also previously seen. 3. No active lung parenchyma or pleural disease. Mild left pleural   thickening but no evidence of pleural effusion or pneumothorax. 4. Redemonstration of stable bilateral adrenal nodules likely adenomas. 5. Extensive diverticulosis worse in the sigmoid but no acute diverticulitis. 6. Prostatomegaly. No evidence of bladder outlet obstruction. RECOMMENDATIONS:   Yearly surveillance for the ectatic aorta. CT ABDOMEN PELVIS WO CONTRAST   Final Result   1. Redemonstration of ectatic ascending aorta which currently measures 4.6 cm   up from 4.4 cm previously. 2. Prominent main pulmonary artery measuring 3.2 cm also previously seen. 3. No active lung parenchyma or pleural disease. Mild left pleural   thickening but no evidence of pleural effusion or pneumothorax. 4. Redemonstration of stable bilateral adrenal nodules likely adenomas. 5. Extensive diverticulosis worse in the sigmoid but no acute diverticulitis. 6. Prostatomegaly. No evidence of bladder outlet obstruction. RECOMMENDATIONS:   Yearly surveillance for the ectatic aorta.

## 2023-04-27 NOTE — ED NOTES
Patient will be transferred to [facility] to see Nephrology and to receive blood.       Severiano Hodges RN  04/26/23 [id number]

## 2023-04-28 PROBLEM — I48.20 CHRONIC ATRIAL FIBRILLATION (HCC): Status: ACTIVE | Noted: 2023-04-28

## 2023-04-28 LAB
ABO/RH: NORMAL
ALBUMIN SERPL-MCNC: 4 GM/DL (ref 3.4–5)
ALP BLD-CCNC: 68 IU/L (ref 40–128)
ALT SERPL-CCNC: 17 U/L (ref 10–40)
AMYLASE: 54 U/L (ref 25–115)
ANION GAP SERPL CALCULATED.3IONS-SCNC: 13 MMOL/L (ref 4–16)
ANTIBODY SCREEN: NEGATIVE
APTT: 40.6 SECONDS (ref 25.1–37.1)
AST SERPL-CCNC: 18 IU/L (ref 15–37)
BASOPHILS ABSOLUTE: 0 K/CU MM
BASOPHILS RELATIVE PERCENT: 0.3 % (ref 0–1)
BILIRUB SERPL-MCNC: 1.1 MG/DL (ref 0–1)
BUN SERPL-MCNC: 44 MG/DL (ref 6–23)
CALCIUM SERPL-MCNC: 9.4 MG/DL (ref 8.3–10.6)
CHLORIDE BLD-SCNC: 108 MMOL/L (ref 99–110)
CO2: 19 MMOL/L (ref 21–32)
COMPONENT: NORMAL
COMPONENT: NORMAL
CREAT SERPL-MCNC: 2.3 MG/DL (ref 0.9–1.3)
CROSSMATCH RESULT: NORMAL
CROSSMATCH RESULT: NORMAL
DIFFERENTIAL TYPE: ABNORMAL
EKG ATRIAL RATE: 61 BPM
EKG DIAGNOSIS: NORMAL
EKG Q-T INTERVAL: 526 MS
EKG QRS DURATION: 162 MS
EKG QTC CALCULATION (BAZETT): 529 MS
EKG R AXIS: -73 DEGREES
EKG T AXIS: 105 DEGREES
EKG VENTRICULAR RATE: 61 BPM
EOSINOPHILS ABSOLUTE: 0 K/CU MM
EOSINOPHILS RELATIVE PERCENT: 0.3 % (ref 0–3)
GFR SERPL CREATININE-BSD FRML MDRD: 28 ML/MIN/1.73M2
GLUCOSE BLD-MCNC: 138 MG/DL (ref 70–99)
GLUCOSE BLD-MCNC: 157 MG/DL (ref 70–99)
GLUCOSE SERPL-MCNC: 141 MG/DL (ref 70–99)
HCT VFR BLD CALC: 26.1 % (ref 42–52)
HCT VFR BLD CALC: 26.5 % (ref 42–52)
HCT VFR BLD CALC: 29.2 % (ref 42–52)
HEMOCCULT SP1 STL QL: POSITIVE
HEMOGLOBIN: 7.9 GM/DL (ref 13.5–18)
HEMOGLOBIN: 7.9 GM/DL (ref 13.5–18)
HEMOGLOBIN: 8.4 GM/DL (ref 13.5–18)
IMMATURE NEUTROPHIL %: 0.5 % (ref 0–0.43)
INR BLD: 1.24 INDEX
LIPASE: 102 IU/L (ref 13–60)
LYMPHOCYTES ABSOLUTE: 0.4 K/CU MM
LYMPHOCYTES RELATIVE PERCENT: 4.2 % (ref 24–44)
MAGNESIUM: 2.2 MG/DL (ref 1.8–2.4)
MCH RBC QN AUTO: 29.4 PG (ref 27–31)
MCHC RBC AUTO-ENTMCNC: 29.8 % (ref 32–36)
MCV RBC AUTO: 98.5 FL (ref 78–100)
MONOCYTES ABSOLUTE: 0.9 K/CU MM
MONOCYTES RELATIVE PERCENT: 9.5 % (ref 0–4)
NUCLEATED RBC %: 0.3 %
PDW BLD-RTO: 17.2 % (ref 11.7–14.9)
PHOSPHORUS: 3.1 MG/DL (ref 2.5–4.9)
PLATELET # BLD: 170 K/CU MM (ref 140–440)
PMV BLD AUTO: 10 FL (ref 7.5–11.1)
POTASSIUM SERPL-SCNC: 4.6 MMOL/L (ref 3.5–5.1)
PROTHROMBIN TIME: 15.7 SECONDS (ref 11.7–14.5)
RBC # BLD: 2.69 M/CU MM (ref 4.6–6.2)
SEGMENTED NEUTROPHILS ABSOLUTE COUNT: 7.8 K/CU MM
SEGMENTED NEUTROPHILS RELATIVE PERCENT: 85.2 % (ref 36–66)
SODIUM BLD-SCNC: 140 MMOL/L (ref 135–145)
STATUS: NORMAL
STATUS: NORMAL
TOTAL IMMATURE NEUTOROPHIL: 0.05 K/CU MM
TOTAL NUCLEATED RBC: 0 K/CU MM
TOTAL PROTEIN: 6.4 GM/DL (ref 6.4–8.2)
TRANSFUSION STATUS: NORMAL
TRANSFUSION STATUS: NORMAL
UNIT DIVISION: 0
UNIT DIVISION: 0
UNIT NUMBER: NORMAL
UNIT NUMBER: NORMAL
WBC # BLD: 9.1 K/CU MM (ref 4–10.5)

## 2023-04-28 PROCEDURE — 97166 OT EVAL MOD COMPLEX 45 MIN: CPT

## 2023-04-28 PROCEDURE — 97162 PT EVAL MOD COMPLEX 30 MIN: CPT

## 2023-04-28 PROCEDURE — 94761 N-INVAS EAR/PLS OXIMETRY MLT: CPT

## 2023-04-28 PROCEDURE — 6360000002 HC RX W HCPCS: Performed by: SPECIALIST

## 2023-04-28 PROCEDURE — 85018 HEMOGLOBIN: CPT

## 2023-04-28 PROCEDURE — 85730 THROMBOPLASTIN TIME PARTIAL: CPT

## 2023-04-28 PROCEDURE — 85610 PROTHROMBIN TIME: CPT

## 2023-04-28 PROCEDURE — 99232 SBSQ HOSP IP/OBS MODERATE 35: CPT | Performed by: INTERNAL MEDICINE

## 2023-04-28 PROCEDURE — 85014 HEMATOCRIT: CPT

## 2023-04-28 PROCEDURE — 83735 ASSAY OF MAGNESIUM: CPT

## 2023-04-28 PROCEDURE — 82150 ASSAY OF AMYLASE: CPT

## 2023-04-28 PROCEDURE — 2580000003 HC RX 258: Performed by: STUDENT IN AN ORGANIZED HEALTH CARE EDUCATION/TRAINING PROGRAM

## 2023-04-28 PROCEDURE — 82962 GLUCOSE BLOOD TEST: CPT

## 2023-04-28 PROCEDURE — 84100 ASSAY OF PHOSPHORUS: CPT

## 2023-04-28 PROCEDURE — 6370000000 HC RX 637 (ALT 250 FOR IP): Performed by: SPECIALIST

## 2023-04-28 PROCEDURE — 85025 COMPLETE CBC W/AUTO DIFF WBC: CPT

## 2023-04-28 PROCEDURE — 83690 ASSAY OF LIPASE: CPT

## 2023-04-28 PROCEDURE — 93010 ELECTROCARDIOGRAM REPORT: CPT | Performed by: INTERNAL MEDICINE

## 2023-04-28 PROCEDURE — 80053 COMPREHEN METABOLIC PANEL: CPT

## 2023-04-28 PROCEDURE — 6370000000 HC RX 637 (ALT 250 FOR IP): Performed by: STUDENT IN AN ORGANIZED HEALTH CARE EDUCATION/TRAINING PROGRAM

## 2023-04-28 PROCEDURE — 1200000000 HC SEMI PRIVATE

## 2023-04-28 PROCEDURE — C9113 INJ PANTOPRAZOLE SODIUM, VIA: HCPCS | Performed by: SPECIALIST

## 2023-04-28 PROCEDURE — 36415 COLL VENOUS BLD VENIPUNCTURE: CPT

## 2023-04-28 RX ORDER — HYDROXYZINE HYDROCHLORIDE 10 MG/1
10 TABLET, FILM COATED ORAL DAILY PRN
Status: COMPLETED | OUTPATIENT
Start: 2023-04-28 | End: 2023-04-28

## 2023-04-28 RX ADMIN — SODIUM CHLORIDE, PRESERVATIVE FREE 10 ML: 5 INJECTION INTRAVENOUS at 09:10

## 2023-04-28 RX ADMIN — SODIUM CHLORIDE, PRESERVATIVE FREE 10 ML: 5 INJECTION INTRAVENOUS at 22:10

## 2023-04-28 RX ADMIN — HYDROXYZINE HYDROCHLORIDE 10 MG: 10 TABLET ORAL at 05:58

## 2023-04-28 RX ADMIN — MONTELUKAST 10 MG: 10 TABLET, FILM COATED ORAL at 09:10

## 2023-04-28 RX ADMIN — ATORVASTATIN CALCIUM 20 MG: 10 TABLET, FILM COATED ORAL at 09:09

## 2023-04-28 RX ADMIN — POLYETHYLENE GLYCOL 3350, SODIUM SULFATE ANHYDROUS, SODIUM BICARBONATE, SODIUM CHLORIDE, POTASSIUM CHLORIDE 4000 ML: 236; 22.74; 6.74; 5.86; 2.97 POWDER, FOR SOLUTION ORAL at 18:40

## 2023-04-28 RX ADMIN — PANTOPRAZOLE SODIUM 40 MG: 40 INJECTION, POWDER, FOR SOLUTION INTRAVENOUS at 09:09

## 2023-04-28 ASSESSMENT — ENCOUNTER SYMPTOMS
BLOOD IN STOOL: 0
WHEEZING: 0
SHORTNESS OF BREATH: 1
COUGH: 0
ABDOMINAL PAIN: 0
SORE THROAT: 0
NAUSEA: 0
BACK PAIN: 0
CHEST TIGHTNESS: 0
ABDOMINAL DISTENTION: 0

## 2023-04-28 ASSESSMENT — PAIN SCALES - GENERAL: PAINLEVEL_OUTOF10: 0

## 2023-04-28 ASSESSMENT — PAIN DESCRIPTION - FREQUENCY: FREQUENCY: CONTINUOUS

## 2023-04-29 ENCOUNTER — ANESTHESIA (OUTPATIENT)
Dept: ENDOSCOPY | Age: 78
End: 2023-04-29
Payer: MEDICARE

## 2023-04-29 ENCOUNTER — ANESTHESIA EVENT (OUTPATIENT)
Dept: ENDOSCOPY | Age: 78
End: 2023-04-29
Payer: MEDICARE

## 2023-04-29 LAB
ALBUMIN SERPL-MCNC: 4 GM/DL (ref 3.4–5)
ALP BLD-CCNC: 63 IU/L (ref 40–128)
ALT SERPL-CCNC: 16 U/L (ref 10–40)
ANION GAP SERPL CALCULATED.3IONS-SCNC: 16 MMOL/L (ref 4–16)
APTT: 47.3 SECONDS (ref 25.1–37.1)
AST SERPL-CCNC: 24 IU/L (ref 15–37)
BASOPHILS ABSOLUTE: 0 K/CU MM
BASOPHILS RELATIVE PERCENT: 0.4 % (ref 0–1)
BILIRUB SERPL-MCNC: 1.1 MG/DL (ref 0–1)
BUN SERPL-MCNC: 39 MG/DL (ref 6–23)
CALCIUM SERPL-MCNC: 9.3 MG/DL (ref 8.3–10.6)
CHLORIDE BLD-SCNC: 107 MMOL/L (ref 99–110)
CO2: 17 MMOL/L (ref 21–32)
CREAT SERPL-MCNC: 2.3 MG/DL (ref 0.9–1.3)
DIFFERENTIAL TYPE: ABNORMAL
EOSINOPHILS ABSOLUTE: 0.1 K/CU MM
EOSINOPHILS RELATIVE PERCENT: 1.2 % (ref 0–3)
GFR SERPL CREATININE-BSD FRML MDRD: 28 ML/MIN/1.73M2
GLUCOSE BLD-MCNC: 134 MG/DL (ref 70–99)
GLUCOSE BLD-MCNC: 141 MG/DL (ref 70–99)
GLUCOSE BLD-MCNC: 165 MG/DL (ref 70–99)
GLUCOSE SERPL-MCNC: 106 MG/DL (ref 70–99)
HCT VFR BLD CALC: 23.3 % (ref 42–52)
HCT VFR BLD CALC: 27.1 % (ref 42–52)
HEMOGLOBIN: 7 GM/DL (ref 13.5–18)
HEMOGLOBIN: 7.8 GM/DL (ref 13.5–18)
IMMATURE NEUTROPHIL %: 0.4 % (ref 0–0.43)
INR BLD: 1.26 INDEX
LIPASE: 79 IU/L (ref 13–60)
LYMPHOCYTES ABSOLUTE: 0.6 K/CU MM
LYMPHOCYTES RELATIVE PERCENT: 9.1 % (ref 24–44)
MCH RBC QN AUTO: 29.4 PG (ref 27–31)
MCHC RBC AUTO-ENTMCNC: 28.8 % (ref 32–36)
MCV RBC AUTO: 102.3 FL (ref 78–100)
MONOCYTES ABSOLUTE: 0.8 K/CU MM
MONOCYTES RELATIVE PERCENT: 12 % (ref 0–4)
NUCLEATED RBC %: 0 %
PDW BLD-RTO: 16.9 % (ref 11.7–14.9)
PLATELET # BLD: 163 K/CU MM (ref 140–440)
PMV BLD AUTO: 10.1 FL (ref 7.5–11.1)
POTASSIUM SERPL-SCNC: 4.6 MMOL/L (ref 3.5–5.1)
PROTHROMBIN TIME: 16 SECONDS (ref 11.7–14.5)
RBC # BLD: 2.65 M/CU MM (ref 4.6–6.2)
SEGMENTED NEUTROPHILS ABSOLUTE COUNT: 5.2 K/CU MM
SEGMENTED NEUTROPHILS RELATIVE PERCENT: 76.9 % (ref 36–66)
SODIUM BLD-SCNC: 140 MMOL/L (ref 135–145)
TOTAL IMMATURE NEUTOROPHIL: 0.03 K/CU MM
TOTAL NUCLEATED RBC: 0 K/CU MM
TOTAL PROTEIN: 6 GM/DL (ref 6.4–8.2)
WBC # BLD: 6.7 K/CU MM (ref 4–10.5)

## 2023-04-29 PROCEDURE — 2580000003 HC RX 258: Performed by: NURSE ANESTHETIST, CERTIFIED REGISTERED

## 2023-04-29 PROCEDURE — 0DB88ZX EXCISION OF SMALL INTESTINE, VIA NATURAL OR ARTIFICIAL OPENING ENDOSCOPIC, DIAGNOSTIC: ICD-10-PCS | Performed by: SPECIALIST

## 2023-04-29 PROCEDURE — 0DB98ZX EXCISION OF DUODENUM, VIA NATURAL OR ARTIFICIAL OPENING ENDOSCOPIC, DIAGNOSTIC: ICD-10-PCS | Performed by: SPECIALIST

## 2023-04-29 PROCEDURE — 6360000002 HC RX W HCPCS: Performed by: NURSE ANESTHETIST, CERTIFIED REGISTERED

## 2023-04-29 PROCEDURE — 85014 HEMATOCRIT: CPT

## 2023-04-29 PROCEDURE — 36415 COLL VENOUS BLD VENIPUNCTURE: CPT

## 2023-04-29 PROCEDURE — 0DB68ZX EXCISION OF STOMACH, VIA NATURAL OR ARTIFICIAL OPENING ENDOSCOPIC, DIAGNOSTIC: ICD-10-PCS | Performed by: SPECIALIST

## 2023-04-29 PROCEDURE — 88342 IMHCHEM/IMCYTCHM 1ST ANTB: CPT | Performed by: PATHOLOGY

## 2023-04-29 PROCEDURE — 3609010200 HC COLONOSCOPY ABLATION TUMOR POLYP/OTHER LES: Performed by: SPECIALIST

## 2023-04-29 PROCEDURE — 2500000003 HC RX 250 WO HCPCS: Performed by: NURSE ANESTHETIST, CERTIFIED REGISTERED

## 2023-04-29 PROCEDURE — 85018 HEMOGLOBIN: CPT

## 2023-04-29 PROCEDURE — 82962 GLUCOSE BLOOD TEST: CPT

## 2023-04-29 PROCEDURE — 80053 COMPREHEN METABOLIC PANEL: CPT

## 2023-04-29 PROCEDURE — 85730 THROMBOPLASTIN TIME PARTIAL: CPT

## 2023-04-29 PROCEDURE — 88305 TISSUE EXAM BY PATHOLOGIST: CPT | Performed by: PATHOLOGY

## 2023-04-29 PROCEDURE — 85610 PROTHROMBIN TIME: CPT

## 2023-04-29 PROCEDURE — 2709999900 HC NON-CHARGEABLE SUPPLY: Performed by: SPECIALIST

## 2023-04-29 PROCEDURE — 3609012400 HC EGD TRANSORAL BIOPSY SINGLE/MULTIPLE: Performed by: SPECIALIST

## 2023-04-29 PROCEDURE — 3700000000 HC ANESTHESIA ATTENDED CARE: Performed by: SPECIALIST

## 2023-04-29 PROCEDURE — C1889 IMPLANT/INSERT DEVICE, NOC: HCPCS | Performed by: SPECIALIST

## 2023-04-29 PROCEDURE — 6370000000 HC RX 637 (ALT 250 FOR IP): Performed by: STUDENT IN AN ORGANIZED HEALTH CARE EDUCATION/TRAINING PROGRAM

## 2023-04-29 PROCEDURE — 94761 N-INVAS EAR/PLS OXIMETRY MLT: CPT

## 2023-04-29 PROCEDURE — 0DBL8ZZ EXCISION OF TRANSVERSE COLON, VIA NATURAL OR ARTIFICIAL OPENING ENDOSCOPIC: ICD-10-PCS | Performed by: SPECIALIST

## 2023-04-29 PROCEDURE — C9113 INJ PANTOPRAZOLE SODIUM, VIA: HCPCS | Performed by: SPECIALIST

## 2023-04-29 PROCEDURE — 0W3P8ZZ CONTROL BLEEDING IN GASTROINTESTINAL TRACT, VIA NATURAL OR ARTIFICIAL OPENING ENDOSCOPIC: ICD-10-PCS | Performed by: SPECIALIST

## 2023-04-29 PROCEDURE — 0DBH8ZZ EXCISION OF CECUM, VIA NATURAL OR ARTIFICIAL OPENING ENDOSCOPIC: ICD-10-PCS | Performed by: SPECIALIST

## 2023-04-29 PROCEDURE — 2580000003 HC RX 258: Performed by: STUDENT IN AN ORGANIZED HEALTH CARE EDUCATION/TRAINING PROGRAM

## 2023-04-29 PROCEDURE — 2140000000 HC CCU INTERMEDIATE R&B

## 2023-04-29 PROCEDURE — 6360000002 HC RX W HCPCS: Performed by: SPECIALIST

## 2023-04-29 PROCEDURE — 83690 ASSAY OF LIPASE: CPT

## 2023-04-29 PROCEDURE — 85025 COMPLETE CBC W/AUTO DIFF WBC: CPT

## 2023-04-29 PROCEDURE — 3700000001 HC ADD 15 MINUTES (ANESTHESIA): Performed by: SPECIALIST

## 2023-04-29 PROCEDURE — 1200000000 HC SEMI PRIVATE

## 2023-04-29 RX ORDER — SODIUM CHLORIDE, SODIUM LACTATE, POTASSIUM CHLORIDE, CALCIUM CHLORIDE 600; 310; 30; 20 MG/100ML; MG/100ML; MG/100ML; MG/100ML
INJECTION, SOLUTION INTRAVENOUS CONTINUOUS PRN
Status: DISCONTINUED | OUTPATIENT
Start: 2023-04-29 | End: 2023-04-29 | Stop reason: SDUPTHER

## 2023-04-29 RX ORDER — PROPOFOL 10 MG/ML
INJECTION, EMULSION INTRAVENOUS PRN
Status: DISCONTINUED | OUTPATIENT
Start: 2023-04-29 | End: 2023-04-29 | Stop reason: SDUPTHER

## 2023-04-29 RX ORDER — LIDOCAINE HYDROCHLORIDE 20 MG/ML
INJECTION, SOLUTION EPIDURAL; INFILTRATION; INTRACAUDAL; PERINEURAL PRN
Status: DISCONTINUED | OUTPATIENT
Start: 2023-04-29 | End: 2023-04-29 | Stop reason: SDUPTHER

## 2023-04-29 RX ORDER — EPHEDRINE SULFATE 50 MG/ML
INJECTION INTRAVENOUS PRN
Status: DISCONTINUED | OUTPATIENT
Start: 2023-04-29 | End: 2023-04-29 | Stop reason: SDUPTHER

## 2023-04-29 RX ADMIN — DIPHENHYDRAMINE HCL 5 ML: 12.5 LIQUID ORAL at 16:55

## 2023-04-29 RX ADMIN — SODIUM CHLORIDE, PRESERVATIVE FREE 10 ML: 5 INJECTION INTRAVENOUS at 10:55

## 2023-04-29 RX ADMIN — EPHEDRINE SULFATE 10 MG: 50 INJECTION INTRAVENOUS at 08:50

## 2023-04-29 RX ADMIN — PROPOFOL 500 MG: 10 INJECTION, EMULSION INTRAVENOUS at 08:12

## 2023-04-29 RX ADMIN — MONTELUKAST 10 MG: 10 TABLET, FILM COATED ORAL at 10:55

## 2023-04-29 RX ADMIN — ATORVASTATIN CALCIUM 20 MG: 10 TABLET, FILM COATED ORAL at 10:55

## 2023-04-29 RX ADMIN — LIDOCAINE HYDROCHLORIDE 110 MG: 20 INJECTION, SOLUTION EPIDURAL; INFILTRATION; INTRACAUDAL; PERINEURAL at 08:10

## 2023-04-29 RX ADMIN — SODIUM CHLORIDE, POTASSIUM CHLORIDE, SODIUM LACTATE AND CALCIUM CHLORIDE: 600; 310; 30; 20 INJECTION, SOLUTION INTRAVENOUS at 08:00

## 2023-04-29 RX ADMIN — PHENYLEPHRINE HYDROCHLORIDE 100 MCG: 10 INJECTION INTRAVENOUS at 08:50

## 2023-04-29 RX ADMIN — PANTOPRAZOLE SODIUM 40 MG: 40 INJECTION, POWDER, FOR SOLUTION INTRAVENOUS at 10:55

## 2023-04-29 RX ADMIN — EPHEDRINE SULFATE 10 MG: 50 INJECTION INTRAVENOUS at 09:00

## 2023-04-29 ASSESSMENT — ENCOUNTER SYMPTOMS: SHORTNESS OF BREATH: 1

## 2023-04-29 ASSESSMENT — PAIN SCALES - GENERAL: PAINLEVEL_OUTOF10: 0

## 2023-04-29 NOTE — ANESTHESIA PRE PROCEDURE
Compared to 8/9 2022 study no significant change noted. Neuro/Psych:               GI/Hepatic/Renal:   (+) morbid obesity          Endo/Other:    (+) Diabetes, blood dyscrasia: anemia, arthritis: OA., electrolyte abnormalities, . Abdominal:   (+) obese,           Vascular: Other Findings:           Anesthesia Plan      MAC     ASA 4     (Await am hgb)  Induction: intravenous. Anesthetic plan and risks discussed with patient. Plan discussed with attending.                     Lili Blackwood MD   4/29/2023

## 2023-04-29 NOTE — ANESTHESIA POSTPROCEDURE EVALUATION
Department of Anesthesiology  Postprocedure Note    Patient: Ryan De Jesus  MRN: [id number]  [place]: 1945  Date of evaluation: 4/29/2023      Procedure Summary     Date: 04/29/23 Room / Location: [address]    Anesthesia Start: 0800 Anesthesia Stop: 0949    Procedures:       COLONOSCOPY POLYPECTOMY ABLATION WITH CLIP PLACEMENT X 3      EGD BIOPSY Diagnosis:       Anemia, unspecified type      Gastrointestinal hemorrhage, unspecified gastrointestinal hemorrhage type      (Anemia, unspecified type [D64.9])      (Gastrointestinal hemorrhage, unspecified gastrointestinal hemorrhage type [K92.2])    Surgeons: Rita Biggs MD Responsible Provider: Yuri Toscano MD    Anesthesia Type: MAC ASA Status: 4          Anesthesia Type: MAC    Kiel Phase I:      Kiel Phase II:        Anesthesia Post Evaluation    Patient location during evaluation: bedside  Patient participation: complete - patient participated  Level of consciousness: sleepy but conscious  Pain score: 0  Airway patency: patent  Nausea & Vomiting: no nausea and no vomiting  Complications: no  Cardiovascular status: blood pressure returned to baseline and hemodynamically stable  Respiratory status: acceptable  Hydration status: stable

## 2023-04-30 LAB
ALBUMIN SERPL-MCNC: 3.4 GM/DL (ref 3.4–5)
ALP BLD-CCNC: 55 IU/L (ref 40–129)
ALT SERPL-CCNC: 14 U/L (ref 10–40)
ANION GAP SERPL CALCULATED.3IONS-SCNC: 13 MMOL/L (ref 4–16)
APTT: 44.9 SECONDS (ref 25.1–37.1)
AST SERPL-CCNC: 17 IU/L (ref 15–37)
BASOPHILS ABSOLUTE: 0 K/CU MM
BASOPHILS RELATIVE PERCENT: 0.4 % (ref 0–1)
BILIRUB SERPL-MCNC: 0.9 MG/DL (ref 0–1)
BUN SERPL-MCNC: 34 MG/DL (ref 6–23)
CALCIUM SERPL-MCNC: 8.9 MG/DL (ref 8.3–10.6)
CHLORIDE BLD-SCNC: 106 MMOL/L (ref 99–110)
CO2: 20 MMOL/L (ref 21–32)
CREAT SERPL-MCNC: 2.3 MG/DL (ref 0.9–1.3)
DIFFERENTIAL TYPE: ABNORMAL
EOSINOPHILS ABSOLUTE: 0.1 K/CU MM
EOSINOPHILS RELATIVE PERCENT: 1.6 % (ref 0–3)
GFR SERPL CREATININE-BSD FRML MDRD: 28 ML/MIN/1.73M2
GLUCOSE BLD-MCNC: 131 MG/DL (ref 70–99)
GLUCOSE BLD-MCNC: 132 MG/DL (ref 70–99)
GLUCOSE BLD-MCNC: 162 MG/DL (ref 70–99)
GLUCOSE BLD-MCNC: 167 MG/DL (ref 70–99)
GLUCOSE SERPL-MCNC: 120 MG/DL (ref 70–99)
HAPTOGLOB SERPL-MCNC: 168 MG/DL (ref 30–200)
HCT VFR BLD CALC: 24.2 % (ref 42–52)
HCT VFR BLD CALC: 24.3 % (ref 42–52)
HCT VFR BLD CALC: 25.1 % (ref 42–52)
HCT VFR BLD CALC: 25.8 % (ref 42–52)
HEMOGLOBIN: 7.1 GM/DL (ref 13.5–18)
HEMOGLOBIN: 7.1 GM/DL (ref 13.5–18)
HEMOGLOBIN: 7.4 GM/DL (ref 13.5–18)
HEMOGLOBIN: 7.6 GM/DL (ref 13.5–18)
IMMATURE NEUTROPHIL %: 0.4 % (ref 0–0.43)
INR BLD: 1.18 INDEX
LYMPHOCYTES ABSOLUTE: 0.4 K/CU MM
LYMPHOCYTES RELATIVE PERCENT: 7.7 % (ref 24–44)
MCH RBC QN AUTO: 29.2 PG (ref 27–31)
MCHC RBC AUTO-ENTMCNC: 29.2 % (ref 32–36)
MCV RBC AUTO: 100 FL (ref 78–100)
MONOCYTES ABSOLUTE: 0.5 K/CU MM
MONOCYTES RELATIVE PERCENT: 9.7 % (ref 0–4)
NUCLEATED RBC %: 0 %
PDW BLD-RTO: 16.5 % (ref 11.7–14.9)
PLATELET # BLD: 144 K/CU MM (ref 140–440)
PMV BLD AUTO: 9.9 FL (ref 7.5–11.1)
POTASSIUM SERPL-SCNC: 4 MMOL/L (ref 3.5–5.1)
PROTHROMBIN TIME: 15 SECONDS (ref 11.7–14.5)
RBC # BLD: 2.43 M/CU MM (ref 4.6–6.2)
REASON FOR REJECTION: NORMAL
REJECTED TEST: NORMAL
SEGMENTED NEUTROPHILS ABSOLUTE COUNT: 4.5 K/CU MM
SEGMENTED NEUTROPHILS RELATIVE PERCENT: 80.2 % (ref 36–66)
SODIUM BLD-SCNC: 139 MMOL/L (ref 135–145)
TOTAL IMMATURE NEUTOROPHIL: 0.02 K/CU MM
TOTAL NUCLEATED RBC: 0 K/CU MM
TOTAL PROTEIN: 5.4 GM/DL (ref 6.4–8.2)
WBC # BLD: 5.6 K/CU MM (ref 4–10.5)

## 2023-04-30 PROCEDURE — 80053 COMPREHEN METABOLIC PANEL: CPT

## 2023-04-30 PROCEDURE — 85014 HEMATOCRIT: CPT

## 2023-04-30 PROCEDURE — 94761 N-INVAS EAR/PLS OXIMETRY MLT: CPT

## 2023-04-30 PROCEDURE — C9113 INJ PANTOPRAZOLE SODIUM, VIA: HCPCS | Performed by: SPECIALIST

## 2023-04-30 PROCEDURE — 1200000000 HC SEMI PRIVATE

## 2023-04-30 PROCEDURE — 2700000000 HC OXYGEN THERAPY PER DAY

## 2023-04-30 PROCEDURE — 82962 GLUCOSE BLOOD TEST: CPT

## 2023-04-30 PROCEDURE — 2140000000 HC CCU INTERMEDIATE R&B

## 2023-04-30 PROCEDURE — 6360000002 HC RX W HCPCS: Performed by: SPECIALIST

## 2023-04-30 PROCEDURE — 6370000000 HC RX 637 (ALT 250 FOR IP): Performed by: STUDENT IN AN ORGANIZED HEALTH CARE EDUCATION/TRAINING PROGRAM

## 2023-04-30 PROCEDURE — 85018 HEMOGLOBIN: CPT

## 2023-04-30 PROCEDURE — 2580000003 HC RX 258: Performed by: STUDENT IN AN ORGANIZED HEALTH CARE EDUCATION/TRAINING PROGRAM

## 2023-04-30 PROCEDURE — 36415 COLL VENOUS BLD VENIPUNCTURE: CPT

## 2023-04-30 PROCEDURE — 6370000000 HC RX 637 (ALT 250 FOR IP): Performed by: NURSE PRACTITIONER

## 2023-04-30 PROCEDURE — 85610 PROTHROMBIN TIME: CPT

## 2023-04-30 PROCEDURE — 85025 COMPLETE CBC W/AUTO DIFF WBC: CPT

## 2023-04-30 PROCEDURE — 85730 THROMBOPLASTIN TIME PARTIAL: CPT

## 2023-04-30 RX ORDER — LORAZEPAM 2 MG/ML
3 INJECTION INTRAMUSCULAR
Status: DISCONTINUED | OUTPATIENT
Start: 2023-04-30 | End: 2023-05-01

## 2023-04-30 RX ORDER — LORAZEPAM 1 MG/1
1 TABLET ORAL
Status: DISCONTINUED | OUTPATIENT
Start: 2023-04-30 | End: 2023-05-01

## 2023-04-30 RX ORDER — LORAZEPAM 2 MG/ML
1 INJECTION INTRAMUSCULAR
Status: DISCONTINUED | OUTPATIENT
Start: 2023-04-30 | End: 2023-05-01

## 2023-04-30 RX ORDER — LORAZEPAM 2 MG/ML
4 INJECTION INTRAMUSCULAR
Status: DISCONTINUED | OUTPATIENT
Start: 2023-04-30 | End: 2023-05-01

## 2023-04-30 RX ORDER — LORAZEPAM 1 MG/1
3 TABLET ORAL
Status: DISCONTINUED | OUTPATIENT
Start: 2023-04-30 | End: 2023-05-01

## 2023-04-30 RX ORDER — LANOLIN ALCOHOL/MO/W.PET/CERES
100 CREAM (GRAM) TOPICAL DAILY
Status: DISCONTINUED | OUTPATIENT
Start: 2023-05-01 | End: 2023-05-01

## 2023-04-30 RX ORDER — LORAZEPAM 1 MG/1
4 TABLET ORAL
Status: DISCONTINUED | OUTPATIENT
Start: 2023-04-30 | End: 2023-05-01

## 2023-04-30 RX ORDER — LORAZEPAM 1 MG/1
2 TABLET ORAL
Status: DISCONTINUED | OUTPATIENT
Start: 2023-04-30 | End: 2023-05-01

## 2023-04-30 RX ORDER — LORAZEPAM 2 MG/ML
2 INJECTION INTRAMUSCULAR
Status: DISCONTINUED | OUTPATIENT
Start: 2023-04-30 | End: 2023-05-01

## 2023-04-30 RX ADMIN — LORAZEPAM 1 MG: 1 TABLET ORAL at 22:52

## 2023-04-30 RX ADMIN — SODIUM CHLORIDE, PRESERVATIVE FREE 10 ML: 5 INJECTION INTRAVENOUS at 19:53

## 2023-04-30 RX ADMIN — SODIUM CHLORIDE, PRESERVATIVE FREE 10 ML: 5 INJECTION INTRAVENOUS at 09:40

## 2023-04-30 RX ADMIN — PANTOPRAZOLE SODIUM 40 MG: 40 INJECTION, POWDER, FOR SOLUTION INTRAVENOUS at 09:39

## 2023-04-30 RX ADMIN — MONTELUKAST 10 MG: 10 TABLET, FILM COATED ORAL at 09:39

## 2023-04-30 RX ADMIN — ATORVASTATIN CALCIUM 20 MG: 10 TABLET, FILM COATED ORAL at 09:39

## 2023-04-30 ASSESSMENT — PAIN DESCRIPTION - ONSET
ONSET: ON-GOING
ONSET: ON-GOING

## 2023-04-30 ASSESSMENT — PAIN SCALES - GENERAL
PAINLEVEL_OUTOF10: 0

## 2023-04-30 ASSESSMENT — PAIN DESCRIPTION - FREQUENCY
FREQUENCY: CONTINUOUS
FREQUENCY: CONTINUOUS

## 2023-05-01 ENCOUNTER — APPOINTMENT (OUTPATIENT)
Dept: CT IMAGING | Age: 78
End: 2023-05-01
Attending: STUDENT IN AN ORGANIZED HEALTH CARE EDUCATION/TRAINING PROGRAM
Payer: MEDICARE

## 2023-05-01 ENCOUNTER — APPOINTMENT (OUTPATIENT)
Dept: GENERAL RADIOLOGY | Age: 78
End: 2023-05-01
Attending: STUDENT IN AN ORGANIZED HEALTH CARE EDUCATION/TRAINING PROGRAM
Payer: MEDICARE

## 2023-05-01 LAB
ALBUMIN SERPL-MCNC: 3.5 GM/DL (ref 3.4–5)
ALP BLD-CCNC: 94 IU/L (ref 40–129)
ALT SERPL-CCNC: 16 U/L (ref 10–40)
AMMONIA: 34 UMOL/L (ref 16–60)
ANION GAP SERPL CALCULATED.3IONS-SCNC: 10 MMOL/L (ref 4–16)
AST SERPL-CCNC: 25 IU/L (ref 15–37)
BASE EXCESS: 3 (ref 0–3.3)
BILIRUB SERPL-MCNC: 0.5 MG/DL (ref 0–1)
BUN SERPL-MCNC: 50 MG/DL (ref 6–23)
CALCIUM SERPL-MCNC: 8.5 MG/DL (ref 8.3–10.6)
CHLORIDE BLD-SCNC: 98 MMOL/L (ref 99–110)
CO2: 22 MMOL/L (ref 21–32)
COMMENT: ABNORMAL
CREAT SERPL-MCNC: 2.4 MG/DL (ref 0.9–1.3)
CRP SERPL HS-MCNC: 19.8 MG/L
FOLATE SERPL-MCNC: >20 NG/ML (ref 3.1–17.5)
GFR SERPL CREATININE-BSD FRML MDRD: 27 ML/MIN/1.73M2
GLUCOSE BLD-MCNC: 122 MG/DL (ref 70–99)
GLUCOSE SERPL-MCNC: 96 MG/DL (ref 70–99)
HCO3 VENOUS: 20.9 MMOL/L (ref 19–25)
HCT VFR BLD CALC: 25.2 % (ref 42–52)
HCT VFR BLD CALC: 25.9 % (ref 42–52)
HCT VFR BLD CALC: 29.3 % (ref 42–52)
HEMOGLOBIN: 7.4 GM/DL (ref 13.5–18)
HEMOGLOBIN: 7.4 GM/DL (ref 13.5–18)
HEMOGLOBIN: 9.3 GM/DL (ref 13.5–18)
LACTATE: 1.3 MMOL/L (ref 0.5–1.9)
O2 SAT, VEN: 78.6 % (ref 50–70)
PCO2, VEN: 33 MMHG (ref 38–52)
PH VENOUS: 7.41 (ref 7.32–7.42)
PO2, VEN: 47 MMHG (ref 28–48)
POTASSIUM SERPL-SCNC: 4.9 MMOL/L (ref 3.5–5.1)
PRO-BNP: ABNORMAL PG/ML
PROCALCITONIN SERPL-MCNC: 0.14 NG/ML
SODIUM BLD-SCNC: 130 MMOL/L (ref 135–145)
TOTAL PROTEIN: 5.3 GM/DL (ref 6.4–8.2)
TSH SERPL DL<=0.005 MIU/L-ACNC: 0.99 UIU/ML (ref 0.27–4.2)
VITAMIN B-12: 1405 PG/ML (ref 211–911)

## 2023-05-01 PROCEDURE — 84443 ASSAY THYROID STIM HORMONE: CPT

## 2023-05-01 PROCEDURE — 36415 COLL VENOUS BLD VENIPUNCTURE: CPT

## 2023-05-01 PROCEDURE — 82805 BLOOD GASES W/O2 SATURATION: CPT

## 2023-05-01 PROCEDURE — 71045 X-RAY EXAM CHEST 1 VIEW: CPT

## 2023-05-01 PROCEDURE — 70450 CT HEAD/BRAIN W/O DYE: CPT

## 2023-05-01 PROCEDURE — 83880 ASSAY OF NATRIURETIC PEPTIDE: CPT

## 2023-05-01 PROCEDURE — 2580000003 HC RX 258: Performed by: STUDENT IN AN ORGANIZED HEALTH CARE EDUCATION/TRAINING PROGRAM

## 2023-05-01 PROCEDURE — 80053 COMPREHEN METABOLIC PANEL: CPT

## 2023-05-01 PROCEDURE — 6360000002 HC RX W HCPCS: Performed by: STUDENT IN AN ORGANIZED HEALTH CARE EDUCATION/TRAINING PROGRAM

## 2023-05-01 PROCEDURE — 86140 C-REACTIVE PROTEIN: CPT

## 2023-05-01 PROCEDURE — 85014 HEMATOCRIT: CPT

## 2023-05-01 PROCEDURE — 6370000000 HC RX 637 (ALT 250 FOR IP): Performed by: NURSE PRACTITIONER

## 2023-05-01 PROCEDURE — 86592 SYPHILIS TEST NON-TREP QUAL: CPT

## 2023-05-01 PROCEDURE — 82607 VITAMIN B-12: CPT

## 2023-05-01 PROCEDURE — 2140000000 HC CCU INTERMEDIATE R&B

## 2023-05-01 PROCEDURE — 87040 BLOOD CULTURE FOR BACTERIA: CPT

## 2023-05-01 PROCEDURE — 83605 ASSAY OF LACTIC ACID: CPT

## 2023-05-01 PROCEDURE — 85018 HEMOGLOBIN: CPT

## 2023-05-01 PROCEDURE — 82140 ASSAY OF AMMONIA: CPT

## 2023-05-01 PROCEDURE — 82746 ASSAY OF FOLIC ACID SERUM: CPT

## 2023-05-01 PROCEDURE — 2700000000 HC OXYGEN THERAPY PER DAY

## 2023-05-01 PROCEDURE — 84145 PROCALCITONIN (PCT): CPT

## 2023-05-01 PROCEDURE — 94761 N-INVAS EAR/PLS OXIMETRY MLT: CPT

## 2023-05-01 PROCEDURE — C9113 INJ PANTOPRAZOLE SODIUM, VIA: HCPCS | Performed by: SPECIALIST

## 2023-05-01 PROCEDURE — 82962 GLUCOSE BLOOD TEST: CPT

## 2023-05-01 PROCEDURE — 6360000002 HC RX W HCPCS: Performed by: SPECIALIST

## 2023-05-01 PROCEDURE — 6360000002 HC RX W HCPCS: Performed by: NURSE PRACTITIONER

## 2023-05-01 RX ORDER — THIAMINE HYDROCHLORIDE 100 MG/ML
100 INJECTION, SOLUTION INTRAMUSCULAR; INTRAVENOUS DAILY
Status: DISCONTINUED | OUTPATIENT
Start: 2023-05-01 | End: 2023-05-05 | Stop reason: HOSPADM

## 2023-05-01 RX ORDER — FOLIC ACID 1 MG/1
1 TABLET ORAL DAILY
Status: DISCONTINUED | OUTPATIENT
Start: 2023-05-01 | End: 2023-05-05 | Stop reason: HOSPADM

## 2023-05-01 RX ADMIN — SODIUM CHLORIDE, PRESERVATIVE FREE 10 ML: 5 INJECTION INTRAVENOUS at 10:08

## 2023-05-01 RX ADMIN — PANTOPRAZOLE SODIUM 40 MG: 40 INJECTION, POWDER, FOR SOLUTION INTRAVENOUS at 10:08

## 2023-05-01 RX ADMIN — LORAZEPAM 1 MG: 1 TABLET ORAL at 00:26

## 2023-05-01 RX ADMIN — THIAMINE HYDROCHLORIDE 100 MG: 100 INJECTION, SOLUTION INTRAMUSCULAR; INTRAVENOUS at 17:52

## 2023-05-01 RX ADMIN — SODIUM CHLORIDE, PRESERVATIVE FREE 10 ML: 5 INJECTION INTRAVENOUS at 21:01

## 2023-05-01 RX ADMIN — LORAZEPAM 2 MG: 2 INJECTION INTRAMUSCULAR; INTRAVENOUS at 02:33

## 2023-05-01 RX ADMIN — CEFTRIAXONE SODIUM 2000 MG: 2 INJECTION, POWDER, FOR SOLUTION INTRAMUSCULAR; INTRAVENOUS at 17:57

## 2023-05-01 NOTE — CARE COORDINATION
Discussed pt in IDR. Pt is confused and has a GAB. VM left for spouse to call this CM to discuss the PT/OT recommendations for SNF. Requested updated PT/OT notes via WB.   TE

## 2023-05-01 NOTE — PLAN OF CARE
Problem: Chronic Conditions and Co-morbidities  Goal: Patient's chronic conditions and co-morbidity symptoms are monitored and maintained or improved  Outcome: Progressing  Flowsheets  Taken 5/1/2023 0000 by Torsten Jha RN  Care Plan - Patient's Chronic Conditions and Co-Morbidity Symptoms are Monitored and Maintained or Improved:   Monitor and assess patient's chronic conditions and comorbid symptoms for stability, deterioration, or improvement   Collaborate with multidisciplinary team to address chronic and comorbid conditions and prevent exacerbation or deterioration   Update acute care plan with appropriate goals if chronic or comorbid symptoms are exacerbated and prevent overall improvement and discharge  Taken 4/30/2023 2000 by Dayday Cueto 34 - Patient's Chronic Conditions and Co-Morbidity Symptoms are Monitored and Maintained or Improved:   Monitor and assess patient's chronic conditions and comorbid symptoms for stability, deterioration, or improvement   Collaborate with multidisciplinary team to address chronic and comorbid conditions and prevent exacerbation or deterioration   Update acute care plan with appropriate goals if chronic or comorbid symptoms are exacerbated and prevent overall improvement and discharge  Taken 4/30/2023 1412 by Dayday Valle 34 - Patient's Chronic Conditions and Co-Morbidity Symptoms are Monitored and Maintained or Improved: Monitor and assess patient's chronic conditions and comorbid symptoms for stability, deterioration, or improvement     Problem: Discharge Planning  Goal: Discharge to home or other facility with appropriate resources  Outcome: Progressing  Flowsheets  Taken 5/1/2023 0000  Discharge to home or other facility with appropriate resources:   Identify barriers to discharge with patient and caregiver   Arrange for needed discharge resources and transportation as appropriate   Identify discharge learning needs (meds, wound

## 2023-05-01 NOTE — FLOWSHEET NOTE
Patient resting in bed eyes closed resting queitly at this time. No s/s of distress noted vitals stable afebrile call light within reach Bed in low position bed alarm on  on Will continue to monitor closely.

## 2023-05-01 NOTE — FLOWSHEET NOTE
Patient restless anxious and aggitated and verbal with staff attempting to get out of bed and easily aggitated when redirected to stay in bed, c/o right groin pain, becomes easily aggitated if attempt to redirect patient obvious mild tremors noted , notified Carlie West ,  orders receieved see orders  See CIWA Will continue to monitor closely.

## 2023-05-02 LAB
ALBUMIN SERPL-MCNC: 3.4 GM/DL (ref 3.4–5)
ALP BLD-CCNC: 63 IU/L (ref 40–128)
ALT SERPL-CCNC: 16 U/L (ref 10–40)
ANION GAP SERPL CALCULATED.3IONS-SCNC: 13 MMOL/L (ref 4–16)
APTT: 36.4 SECONDS (ref 25.1–37.1)
AST SERPL-CCNC: 20 IU/L (ref 15–37)
BASOPHILS ABSOLUTE: 0 K/CU MM
BASOPHILS RELATIVE PERCENT: 0.4 % (ref 0–1)
BILIRUB SERPL-MCNC: 0.8 MG/DL (ref 0–1)
BUN SERPL-MCNC: 20 MG/DL (ref 6–23)
CALCIUM SERPL-MCNC: 8.8 MG/DL (ref 8.3–10.6)
CHLORIDE BLD-SCNC: 110 MMOL/L (ref 99–110)
CO2: 19 MMOL/L (ref 21–32)
CREAT SERPL-MCNC: 2 MG/DL (ref 0.9–1.3)
DIFFERENTIAL TYPE: ABNORMAL
EOSINOPHILS ABSOLUTE: 0.2 K/CU MM
EOSINOPHILS RELATIVE PERCENT: 2.9 % (ref 0–3)
GFR SERPL CREATININE-BSD FRML MDRD: 33 ML/MIN/1.73M2
GLUCOSE BLD-MCNC: 119 MG/DL (ref 70–99)
GLUCOSE BLD-MCNC: 144 MG/DL (ref 70–99)
GLUCOSE SERPL-MCNC: 106 MG/DL (ref 70–99)
HCT VFR BLD CALC: 25 % (ref 42–52)
HCT VFR BLD CALC: 26.2 % (ref 42–52)
HCT VFR BLD CALC: 26.7 % (ref 42–52)
HEMOGLOBIN: 7.3 GM/DL (ref 13.5–18)
HEMOGLOBIN: 7.6 GM/DL (ref 13.5–18)
HEMOGLOBIN: 7.7 GM/DL (ref 13.5–18)
IMMATURE NEUTROPHIL %: 0.4 % (ref 0–0.43)
INR BLD: 1.13 INDEX
LYMPHOCYTES ABSOLUTE: 0.5 K/CU MM
LYMPHOCYTES RELATIVE PERCENT: 9.4 % (ref 24–44)
MAGNESIUM: 1.6 MG/DL (ref 1.8–2.4)
MCH RBC QN AUTO: 29.1 PG (ref 27–31)
MCHC RBC AUTO-ENTMCNC: 28.8 % (ref 32–36)
MCV RBC AUTO: 100.8 FL (ref 78–100)
MONOCYTES ABSOLUTE: 0.6 K/CU MM
MONOCYTES RELATIVE PERCENT: 11.7 % (ref 0–4)
NUCLEATED RBC %: 0 %
PDW BLD-RTO: 16.4 % (ref 11.7–14.9)
PHOSPHORUS: 3.2 MG/DL (ref 2.5–4.9)
PLATELET # BLD: 157 K/CU MM (ref 140–440)
PMV BLD AUTO: 10 FL (ref 7.5–11.1)
POTASSIUM SERPL-SCNC: 3.6 MMOL/L (ref 3.5–5.1)
PROTHROMBIN TIME: 14.4 SECONDS (ref 11.7–14.5)
RBC # BLD: 2.65 M/CU MM (ref 4.6–6.2)
REASON FOR REJECTION: NORMAL
REJECTED TEST: NORMAL
RPR SER QL: NON REACTIVE
SEGMENTED NEUTROPHILS ABSOLUTE COUNT: 3.9 K/CU MM
SEGMENTED NEUTROPHILS RELATIVE PERCENT: 75.2 % (ref 36–66)
SODIUM BLD-SCNC: 142 MMOL/L (ref 135–145)
TOTAL IMMATURE NEUTOROPHIL: 0.02 K/CU MM
TOTAL NUCLEATED RBC: 0 K/CU MM
TOTAL PROTEIN: 5.3 GM/DL (ref 6.4–8.2)
WBC # BLD: 5.1 K/CU MM (ref 4–10.5)

## 2023-05-02 PROCEDURE — 85730 THROMBOPLASTIN TIME PARTIAL: CPT

## 2023-05-02 PROCEDURE — 36415 COLL VENOUS BLD VENIPUNCTURE: CPT

## 2023-05-02 PROCEDURE — 6370000000 HC RX 637 (ALT 250 FOR IP): Performed by: STUDENT IN AN ORGANIZED HEALTH CARE EDUCATION/TRAINING PROGRAM

## 2023-05-02 PROCEDURE — 2140000000 HC CCU INTERMEDIATE R&B

## 2023-05-02 PROCEDURE — 6370000000 HC RX 637 (ALT 250 FOR IP): Performed by: INTERNAL MEDICINE

## 2023-05-02 PROCEDURE — 83735 ASSAY OF MAGNESIUM: CPT

## 2023-05-02 PROCEDURE — 94761 N-INVAS EAR/PLS OXIMETRY MLT: CPT

## 2023-05-02 PROCEDURE — 97116 GAIT TRAINING THERAPY: CPT

## 2023-05-02 PROCEDURE — 99223 1ST HOSP IP/OBS HIGH 75: CPT | Performed by: NURSE PRACTITIONER

## 2023-05-02 PROCEDURE — 6360000002 HC RX W HCPCS: Performed by: SPECIALIST

## 2023-05-02 PROCEDURE — 85014 HEMATOCRIT: CPT

## 2023-05-02 PROCEDURE — 85018 HEMOGLOBIN: CPT

## 2023-05-02 PROCEDURE — 2580000003 HC RX 258: Performed by: STUDENT IN AN ORGANIZED HEALTH CARE EDUCATION/TRAINING PROGRAM

## 2023-05-02 PROCEDURE — 6360000002 HC RX W HCPCS: Performed by: STUDENT IN AN ORGANIZED HEALTH CARE EDUCATION/TRAINING PROGRAM

## 2023-05-02 PROCEDURE — C9113 INJ PANTOPRAZOLE SODIUM, VIA: HCPCS | Performed by: SPECIALIST

## 2023-05-02 PROCEDURE — 6360000002 HC RX W HCPCS: Performed by: FAMILY MEDICINE

## 2023-05-02 PROCEDURE — 84100 ASSAY OF PHOSPHORUS: CPT

## 2023-05-02 PROCEDURE — 80053 COMPREHEN METABOLIC PANEL: CPT

## 2023-05-02 PROCEDURE — 6360000002 HC RX W HCPCS: Performed by: INTERNAL MEDICINE

## 2023-05-02 PROCEDURE — 85025 COMPLETE CBC W/AUTO DIFF WBC: CPT

## 2023-05-02 PROCEDURE — 2580000003 HC RX 258: Performed by: FAMILY MEDICINE

## 2023-05-02 PROCEDURE — 97530 THERAPEUTIC ACTIVITIES: CPT

## 2023-05-02 PROCEDURE — 85610 PROTHROMBIN TIME: CPT

## 2023-05-02 PROCEDURE — 2580000003 HC RX 258

## 2023-05-02 PROCEDURE — 97110 THERAPEUTIC EXERCISES: CPT

## 2023-05-02 RX ORDER — WATER 1000 ML/1000ML
INJECTION, SOLUTION INTRAVENOUS
Status: COMPLETED
Start: 2023-05-02 | End: 2023-05-02

## 2023-05-02 RX ORDER — SODIUM CHLORIDE 0.9 % (FLUSH) 0.9 %
5-40 SYRINGE (ML) INJECTION PRN
Status: DISCONTINUED | OUTPATIENT
Start: 2023-05-02 | End: 2023-05-05 | Stop reason: HOSPADM

## 2023-05-02 RX ORDER — LORAZEPAM 1 MG/1
2 TABLET ORAL
Status: DISCONTINUED | OUTPATIENT
Start: 2023-05-02 | End: 2023-05-03

## 2023-05-02 RX ORDER — LORAZEPAM 1 MG/1
4 TABLET ORAL
Status: DISCONTINUED | OUTPATIENT
Start: 2023-05-02 | End: 2023-05-03

## 2023-05-02 RX ORDER — SODIUM CHLORIDE 0.9 % (FLUSH) 0.9 %
5-40 SYRINGE (ML) INJECTION EVERY 12 HOURS SCHEDULED
Status: DISCONTINUED | OUTPATIENT
Start: 2023-05-02 | End: 2023-05-05 | Stop reason: HOSPADM

## 2023-05-02 RX ORDER — LANOLIN ALCOHOL/MO/W.PET/CERES
400 CREAM (GRAM) TOPICAL 2 TIMES DAILY
Status: DISCONTINUED | OUTPATIENT
Start: 2023-05-02 | End: 2023-05-05 | Stop reason: HOSPADM

## 2023-05-02 RX ORDER — SODIUM CHLORIDE 9 MG/ML
INJECTION, SOLUTION INTRAVENOUS PRN
Status: DISCONTINUED | OUTPATIENT
Start: 2023-05-02 | End: 2023-05-05 | Stop reason: HOSPADM

## 2023-05-02 RX ORDER — LORAZEPAM 1 MG/1
3 TABLET ORAL
Status: DISCONTINUED | OUTPATIENT
Start: 2023-05-02 | End: 2023-05-03

## 2023-05-02 RX ORDER — LORAZEPAM 1 MG/1
1 TABLET ORAL
Status: DISCONTINUED | OUTPATIENT
Start: 2023-05-02 | End: 2023-05-03

## 2023-05-02 RX ORDER — POTASSIUM CHLORIDE 20 MEQ/1
20 TABLET, EXTENDED RELEASE ORAL 2 TIMES DAILY WITH MEALS
Status: DISCONTINUED | OUTPATIENT
Start: 2023-05-02 | End: 2023-05-04

## 2023-05-02 RX ORDER — LORAZEPAM 2 MG/ML
4 INJECTION INTRAMUSCULAR
Status: DISCONTINUED | OUTPATIENT
Start: 2023-05-02 | End: 2023-05-03

## 2023-05-02 RX ORDER — LORAZEPAM 2 MG/ML
2 INJECTION INTRAMUSCULAR
Status: DISCONTINUED | OUTPATIENT
Start: 2023-05-02 | End: 2023-05-03

## 2023-05-02 RX ORDER — LORAZEPAM 2 MG/ML
1 INJECTION INTRAMUSCULAR
Status: DISCONTINUED | OUTPATIENT
Start: 2023-05-02 | End: 2023-05-03

## 2023-05-02 RX ORDER — MAGNESIUM SULFATE IN WATER 40 MG/ML
2000 INJECTION, SOLUTION INTRAVENOUS ONCE
Status: COMPLETED | OUTPATIENT
Start: 2023-05-02 | End: 2023-05-02

## 2023-05-02 RX ORDER — LORAZEPAM 2 MG/ML
3 INJECTION INTRAMUSCULAR
Status: DISCONTINUED | OUTPATIENT
Start: 2023-05-02 | End: 2023-05-03

## 2023-05-02 RX ADMIN — LORAZEPAM 1 MG: 2 INJECTION INTRAMUSCULAR; INTRAVENOUS at 22:56

## 2023-05-02 RX ADMIN — PANTOPRAZOLE SODIUM 40 MG: 40 INJECTION, POWDER, FOR SOLUTION INTRAVENOUS at 09:11

## 2023-05-02 RX ADMIN — THIAMINE HYDROCHLORIDE 100 MG: 100 INJECTION, SOLUTION INTRAMUSCULAR; INTRAVENOUS at 09:09

## 2023-05-02 RX ADMIN — POTASSIUM CHLORIDE 20 MEQ: 20 TABLET, EXTENDED RELEASE ORAL at 09:12

## 2023-05-02 RX ADMIN — Medication 400 MG: at 09:10

## 2023-05-02 RX ADMIN — Medication 400 MG: at 20:36

## 2023-05-02 RX ADMIN — ATORVASTATIN CALCIUM 20 MG: 10 TABLET, FILM COATED ORAL at 09:10

## 2023-05-02 RX ADMIN — SODIUM CHLORIDE, PRESERVATIVE FREE 10 ML: 5 INJECTION INTRAVENOUS at 09:12

## 2023-05-02 RX ADMIN — MAGNESIUM SULFATE HEPTAHYDRATE 2000 MG: 40 INJECTION, SOLUTION INTRAVENOUS at 09:28

## 2023-05-02 RX ADMIN — MONTELUKAST 10 MG: 10 TABLET, FILM COATED ORAL at 09:10

## 2023-05-02 RX ADMIN — IRON SUCROSE 200 MG: 20 INJECTION, SOLUTION INTRAVENOUS at 09:10

## 2023-05-02 RX ADMIN — POTASSIUM CHLORIDE 20 MEQ: 20 TABLET, EXTENDED RELEASE ORAL at 17:37

## 2023-05-02 RX ADMIN — ACETAMINOPHEN 650 MG: 325 TABLET ORAL at 12:56

## 2023-05-02 RX ADMIN — APIXABAN 5 MG: 5 TABLET, FILM COATED ORAL at 17:36

## 2023-05-02 RX ADMIN — WATER 10 ML: 1 INJECTION INTRAMUSCULAR; INTRAVENOUS; SUBCUTANEOUS at 09:10

## 2023-05-02 RX ADMIN — ACETAMINOPHEN 650 MG: 325 TABLET ORAL at 00:07

## 2023-05-02 RX ADMIN — FOLIC ACID 1 MG: 1 TABLET ORAL at 09:10

## 2023-05-02 RX ADMIN — SODIUM CHLORIDE, PRESERVATIVE FREE 10 ML: 5 INJECTION INTRAVENOUS at 23:00

## 2023-05-02 RX ADMIN — APIXABAN 5 MG: 5 TABLET, FILM COATED ORAL at 20:36

## 2023-05-02 RX ADMIN — SODIUM CHLORIDE, PRESERVATIVE FREE 10 ML: 5 INJECTION INTRAVENOUS at 20:36

## 2023-05-02 ASSESSMENT — PAIN SCALES - GENERAL
PAINLEVEL_OUTOF10: 0
PAINLEVEL_OUTOF10: 5

## 2023-05-02 ASSESSMENT — PAIN DESCRIPTION - ORIENTATION: ORIENTATION: MID

## 2023-05-02 ASSESSMENT — PAIN DESCRIPTION - LOCATION: LOCATION: BACK;COCCYX

## 2023-05-02 ASSESSMENT — PAIN - FUNCTIONAL ASSESSMENT: PAIN_FUNCTIONAL_ASSESSMENT: ACTIVITIES ARE NOT PREVENTED

## 2023-05-02 ASSESSMENT — PAIN DESCRIPTION - DESCRIPTORS: DESCRIPTORS: ACHING;DISCOMFORT

## 2023-05-02 NOTE — CARE COORDINATION
Met with pt and spouse in room to discuss the PT/OT recommendations for SNF. They are agreeable for pt to go to a SNF. SNF list provided. Wife has requested [facility]. Referral made to [facility]. She will review clinicals and will notify CM of decision to accept or not. Pt will require a pre-cert if accepted.   TE

## 2023-05-02 NOTE — CONSULTS
conversational tone, palate elevates symmetrically, shoulder elevation symmetric and tongue protrudes midline with movement side to side. Motor Exam:       Strength 4/5 UE's/LE's b/l  Tone and bulk normal   No pronator drift    Deep Tendon Reflexes: 1/4 biceps, triceps, brachioradialis, patellar, and achilles b/l; flexor plantar responses b/l    Sensation: Intact light touch/pinprick/vibration UE's/LE's b/l    Coordination/Cerebellum:       Tremors--right upper extremity pill rolling tremor at rest with cog wheeling rigidity      Rapidly alternating movements: no dysdiadochokinesia b/l                Heel-to-Shin: no dysmetria b/l      Finger-to-Nose: no dysmetria b/l    Gait and stance:      Gait: deferred      LABS:     Recent Labs     04/30/23  0042 05/01/23  0432 05/02/23  0629   WBC 5.6  --  5.1    130* 142   K 4.0 4.9 3.6    98* 110   CO2 20* 22 19*   BUN 34* 50* 20   CREATININE 2.3* 2.4* 2.0*   GLUCOSE 120* 96 106*   INR 1.18  --  1.13           IMAGING:    CT head w/o contrast:  IMPRESSION:  1. No acute intracranial process identified. 2. Mild-to-moderate diffuse cerebral volume loss, progressed from the  previous exam.       All imaging was personally reviewed   ASSESSMENT/PLAN:   76year old male presenting with GI bleed, now with altered mental status. Patient is seen today with sitter at bedside for safety. He is alert and oriented to self and location, otherwise confused. Vision is intact, he denies headache. Speech is clear, language is fluent. He has right upper extremity pill rolling tremor at rest with cog wheeling rigidity in the right upper extremity. He has generalized weakness in the upper and lower extremities with no focal or lateralizing deficits. Sensation is intact. Altered mental status likely secondary to ETOH withdraw v metabolic encephalopathy. Patient is also noted to have Parkinson like tremor with rigidity with no prior formal diagnosis or evaluation.    Neuro

## 2023-05-02 NOTE — DISCHARGE INSTR - COC
Continuity of Care Form    Patient Name: Padmini Ayala   :  [birth year]  MRN:  [id number]    Admit date:  2023  Discharge date:  23    Code Status Order: Full Code   Advance Directives:     Admitting Physician:  Marzena Zamarripa MD  PCP: Ananya Bermudez MD    Discharging Nurse: [address] [place] [address] Unit/Room#: 3108/3108-A  Discharging Unit Phone Number: ***    Emergency Contact:   Extended Emergency Contact Information  [facility]  Address: [facility] [address] [facility], [address] [facility] [place] [address]  Phone: [phone]  Mobile Phone: [phone]  Relation: Spouse  Secondary Emergency Contact: Lars Wilcox [address] [place] [address] Medical POA   [place] of [address] [facility] Phone: [phone]  Relation: Child    Past Surgical History:  Past Surgical History:   Procedure Laterality Date    CARDIOVERSION  2019    CATARACT REMOVAL WITH IMPLANT Right 2019    CATARACT REMOVAL WITH IMPLANT Left 05/10/2019    COLON SURGERY  2007     COLONOSCOPY  ;2017    colon polyps removed    COLONOSCOPY N/A 2023    COLONOSCOPY POLYPECTOMY ABLATION WITH CLIP PLACEMENT X 3 performed by Roxanne Acosta MD at [place]      HERNIA REPAIR      INTRACAPSULAR CATARACT EXTRACTION Right 2019    EYE CATARACT EMULSIFICATION IOL IMPLANT performed by Patience Scott MD at [address] [place] [address] Left 5/10/2019    EYE CATARACT EMULSIFICATION IOL IMPLANT performed by Patience cSott MD at [place] [address] Left 2019    [employer] dual permanent pacer-[place] XT DR MONIQUE SureScan     TOTAL KNEE ARTHROPLASTY Right 2022    RIGHT KNEE TOTAL ARTHROPLASTY performed by Az Mazariegos DO at [address] [place] [address] 2023    EGD BIOPSY performed by Roxanne Acosta MD at [facility] ENDOSCOPY       Immunization History:   Immunization History   Administered Date(s)

## 2023-05-02 NOTE — PLAN OF CARE
Problem: Chronic Conditions and Co-morbidities  Goal: Patient's chronic conditions and co-morbidity symptoms are monitored and maintained or improved  Outcome: Progressing  Flowsheets  Taken 5/2/2023 0000  Care Plan - Patient's Chronic Conditions and Co-Morbidity Symptoms are Monitored and Maintained or Improved:   Monitor and assess patient's chronic conditions and comorbid symptoms for stability, deterioration, or improvement   Collaborate with multidisciplinary team to address chronic and comorbid conditions and prevent exacerbation or deterioration   Update acute care plan with appropriate goals if chronic or comorbid symptoms are exacerbated and prevent overall improvement and discharge  Taken 5/1/2023 2000  Care Plan - Patient's Chronic Conditions and Co-Morbidity Symptoms are Monitored and Maintained or Improved:   Monitor and assess patient's chronic conditions and comorbid symptoms for stability, deterioration, or improvement   Collaborate with multidisciplinary team to address chronic and comorbid conditions and prevent exacerbation or deterioration   Update acute care plan with appropriate goals if chronic or comorbid symptoms are exacerbated and prevent overall improvement and discharge     Problem: Discharge Planning  Goal: Discharge to home or other facility with appropriate resources  Outcome: Progressing  Flowsheets  Taken 5/2/2023 0000  Discharge to home or other facility with appropriate resources:   Identify barriers to discharge with patient and caregiver   Arrange for needed discharge resources and transportation as appropriate   Identify discharge learning needs (meds, wound care, etc)   Arrange for interpreters to assist at discharge as needed   Refer to discharge planning if patient needs post-hospital services based on physician order or complex needs related to functional status, cognitive ability or social support system  Taken 5/1/2023 2000  Discharge to home or other facility with

## 2023-05-02 NOTE — CARE COORDINATION
Pt's spouse did return CM call yesterday afternoon and left a message that she will be here today. CM called pt's spouse again and left her a VM to call this CM when she arrives to discuss the PT/OT rec's for SNF.   TE

## 2023-05-03 LAB
ALBUMIN SERPL-MCNC: 3.4 GM/DL (ref 3.4–5)
ALP BLD-CCNC: 62 IU/L (ref 40–128)
ALT SERPL-CCNC: 16 U/L (ref 10–40)
ANION GAP SERPL CALCULATED.3IONS-SCNC: 12 MMOL/L (ref 4–16)
AST SERPL-CCNC: 21 IU/L (ref 15–37)
BASOPHILS ABSOLUTE: 0 K/CU MM
BASOPHILS RELATIVE PERCENT: 0.6 % (ref 0–1)
BILIRUB SERPL-MCNC: 0.7 MG/DL (ref 0–1)
BUN SERPL-MCNC: 19 MG/DL (ref 6–23)
CALCIUM SERPL-MCNC: 8.9 MG/DL (ref 8.3–10.6)
CHLORIDE BLD-SCNC: 113 MMOL/L (ref 99–110)
CO2: 19 MMOL/L (ref 21–32)
CREAT SERPL-MCNC: 2 MG/DL (ref 0.9–1.3)
DIFFERENTIAL TYPE: ABNORMAL
EOSINOPHILS ABSOLUTE: 0.1 K/CU MM
EOSINOPHILS RELATIVE PERCENT: 2.1 % (ref 0–3)
GFR SERPL CREATININE-BSD FRML MDRD: 33 ML/MIN/1.73M2
GLUCOSE BLD-MCNC: 102 MG/DL (ref 70–99)
GLUCOSE BLD-MCNC: 127 MG/DL (ref 70–99)
GLUCOSE SERPL-MCNC: 118 MG/DL (ref 70–99)
HCT VFR BLD CALC: 26.7 % (ref 42–52)
HCT VFR BLD CALC: 28.8 % (ref 42–52)
HEMOGLOBIN: 7.8 GM/DL (ref 13.5–18)
HEMOGLOBIN: 8.3 GM/DL (ref 13.5–18)
IMMATURE NEUTROPHIL %: 0.6 % (ref 0–0.43)
LYMPHOCYTES ABSOLUTE: 0.4 K/CU MM
LYMPHOCYTES RELATIVE PERCENT: 8.5 % (ref 24–44)
MCH RBC QN AUTO: 29 PG (ref 27–31)
MCHC RBC AUTO-ENTMCNC: 29.2 % (ref 32–36)
MCV RBC AUTO: 99.3 FL (ref 78–100)
MONOCYTES ABSOLUTE: 0.6 K/CU MM
MONOCYTES RELATIVE PERCENT: 10.8 % (ref 0–4)
NUCLEATED RBC %: 0 %
PDW BLD-RTO: 16.2 % (ref 11.7–14.9)
PLATELET # BLD: 163 K/CU MM (ref 140–440)
PMV BLD AUTO: 9.6 FL (ref 7.5–11.1)
POTASSIUM SERPL-SCNC: 4.1 MMOL/L (ref 3.5–5.1)
PRO-BNP: ABNORMAL PG/ML
RBC # BLD: 2.69 M/CU MM (ref 4.6–6.2)
SEGMENTED NEUTROPHILS ABSOLUTE COUNT: 4 K/CU MM
SEGMENTED NEUTROPHILS RELATIVE PERCENT: 77.4 % (ref 36–66)
SODIUM BLD-SCNC: 144 MMOL/L (ref 135–145)
TOTAL IMMATURE NEUTOROPHIL: 0.03 K/CU MM
TOTAL NUCLEATED RBC: 0 K/CU MM
TOTAL PROTEIN: 5.4 GM/DL (ref 6.4–8.2)
WBC # BLD: 5.2 K/CU MM (ref 4–10.5)

## 2023-05-03 PROCEDURE — 36415 COLL VENOUS BLD VENIPUNCTURE: CPT

## 2023-05-03 PROCEDURE — 6360000002 HC RX W HCPCS: Performed by: FAMILY MEDICINE

## 2023-05-03 PROCEDURE — 6360000002 HC RX W HCPCS: Performed by: SPECIALIST

## 2023-05-03 PROCEDURE — 85018 HEMOGLOBIN: CPT

## 2023-05-03 PROCEDURE — 2140000000 HC CCU INTERMEDIATE R&B

## 2023-05-03 PROCEDURE — 6370000000 HC RX 637 (ALT 250 FOR IP): Performed by: STUDENT IN AN ORGANIZED HEALTH CARE EDUCATION/TRAINING PROGRAM

## 2023-05-03 PROCEDURE — 6360000002 HC RX W HCPCS: Performed by: INTERNAL MEDICINE

## 2023-05-03 PROCEDURE — 85014 HEMATOCRIT: CPT

## 2023-05-03 PROCEDURE — 97110 THERAPEUTIC EXERCISES: CPT

## 2023-05-03 PROCEDURE — 82962 GLUCOSE BLOOD TEST: CPT

## 2023-05-03 PROCEDURE — 6370000000 HC RX 637 (ALT 250 FOR IP): Performed by: INTERNAL MEDICINE

## 2023-05-03 PROCEDURE — 85025 COMPLETE CBC W/AUTO DIFF WBC: CPT

## 2023-05-03 PROCEDURE — 83880 ASSAY OF NATRIURETIC PEPTIDE: CPT

## 2023-05-03 PROCEDURE — 2580000003 HC RX 258: Performed by: FAMILY MEDICINE

## 2023-05-03 PROCEDURE — C9113 INJ PANTOPRAZOLE SODIUM, VIA: HCPCS | Performed by: SPECIALIST

## 2023-05-03 PROCEDURE — 6360000002 HC RX W HCPCS: Performed by: STUDENT IN AN ORGANIZED HEALTH CARE EDUCATION/TRAINING PROGRAM

## 2023-05-03 PROCEDURE — 99233 SBSQ HOSP IP/OBS HIGH 50: CPT | Performed by: NURSE PRACTITIONER

## 2023-05-03 PROCEDURE — 80048 BASIC METABOLIC PNL TOTAL CA: CPT

## 2023-05-03 PROCEDURE — 80053 COMPREHEN METABOLIC PANEL: CPT

## 2023-05-03 PROCEDURE — 6370000000 HC RX 637 (ALT 250 FOR IP): Performed by: NURSE PRACTITIONER

## 2023-05-03 PROCEDURE — 94761 N-INVAS EAR/PLS OXIMETRY MLT: CPT

## 2023-05-03 PROCEDURE — 2700000000 HC OXYGEN THERAPY PER DAY

## 2023-05-03 RX ORDER — QUETIAPINE FUMARATE 25 MG/1
25 TABLET, FILM COATED ORAL NIGHTLY
Status: DISCONTINUED | OUTPATIENT
Start: 2023-05-03 | End: 2023-05-05

## 2023-05-03 RX ORDER — LANOLIN ALCOHOL/MO/W.PET/CERES
3 CREAM (GRAM) TOPICAL NIGHTLY
Status: DISCONTINUED | OUTPATIENT
Start: 2023-05-03 | End: 2023-05-05 | Stop reason: HOSPADM

## 2023-05-03 RX ORDER — FUROSEMIDE 10 MG/ML
20 INJECTION INTRAMUSCULAR; INTRAVENOUS ONCE
Status: COMPLETED | OUTPATIENT
Start: 2023-05-03 | End: 2023-05-03

## 2023-05-03 RX ORDER — HYDROXYZINE PAMOATE 25 MG/1
50 CAPSULE ORAL ONCE
Status: COMPLETED | OUTPATIENT
Start: 2023-05-03 | End: 2023-05-04

## 2023-05-03 RX ADMIN — IRON SUCROSE 200 MG: 20 INJECTION, SOLUTION INTRAVENOUS at 10:30

## 2023-05-03 RX ADMIN — PANTOPRAZOLE SODIUM 40 MG: 40 INJECTION, POWDER, FOR SOLUTION INTRAVENOUS at 10:30

## 2023-05-03 RX ADMIN — LORAZEPAM 2 MG: 2 INJECTION INTRAMUSCULAR; INTRAVENOUS at 00:10

## 2023-05-03 RX ADMIN — POTASSIUM CHLORIDE 20 MEQ: 20 TABLET, EXTENDED RELEASE ORAL at 10:29

## 2023-05-03 RX ADMIN — THIAMINE HYDROCHLORIDE 100 MG: 100 INJECTION, SOLUTION INTRAMUSCULAR; INTRAVENOUS at 10:30

## 2023-05-03 RX ADMIN — FUROSEMIDE 20 MG: 10 INJECTION, SOLUTION INTRAMUSCULAR; INTRAVENOUS at 14:48

## 2023-05-03 RX ADMIN — APIXABAN 5 MG: 5 TABLET, FILM COATED ORAL at 21:15

## 2023-05-03 RX ADMIN — LORAZEPAM 2 MG: 2 INJECTION INTRAMUSCULAR; INTRAVENOUS at 04:23

## 2023-05-03 RX ADMIN — FOLIC ACID 1 MG: 1 TABLET ORAL at 10:30

## 2023-05-03 RX ADMIN — Medication 400 MG: at 10:29

## 2023-05-03 RX ADMIN — Medication 3 MG: at 21:15

## 2023-05-03 RX ADMIN — APIXABAN 5 MG: 5 TABLET, FILM COATED ORAL at 10:29

## 2023-05-03 RX ADMIN — ATORVASTATIN CALCIUM 20 MG: 10 TABLET, FILM COATED ORAL at 10:30

## 2023-05-03 RX ADMIN — SODIUM CHLORIDE, PRESERVATIVE FREE 10 ML: 5 INJECTION INTRAVENOUS at 21:15

## 2023-05-03 RX ADMIN — SODIUM CHLORIDE, PRESERVATIVE FREE 10 ML: 5 INJECTION INTRAVENOUS at 10:30

## 2023-05-03 RX ADMIN — QUETIAPINE FUMARATE 25 MG: 25 TABLET ORAL at 21:15

## 2023-05-03 RX ADMIN — Medication 400 MG: at 21:15

## 2023-05-03 RX ADMIN — MONTELUKAST 10 MG: 10 TABLET, FILM COATED ORAL at 10:32

## 2023-05-03 RX ADMIN — POTASSIUM CHLORIDE 20 MEQ: 20 TABLET, EXTENDED RELEASE ORAL at 18:07

## 2023-05-03 ASSESSMENT — PAIN SCALES - GENERAL: PAINLEVEL_OUTOF10: 0

## 2023-05-04 ENCOUNTER — APPOINTMENT (OUTPATIENT)
Dept: GENERAL RADIOLOGY | Age: 78
End: 2023-05-04
Attending: STUDENT IN AN ORGANIZED HEALTH CARE EDUCATION/TRAINING PROGRAM
Payer: MEDICARE

## 2023-05-04 LAB
ALBUMIN SERPL-MCNC: 3.7 GM/DL (ref 3.4–5)
ALP BLD-CCNC: 70 IU/L (ref 40–129)
ALT SERPL-CCNC: 15 U/L (ref 10–40)
ANION GAP SERPL CALCULATED.3IONS-SCNC: 15 MMOL/L (ref 4–16)
AST SERPL-CCNC: 22 IU/L (ref 15–37)
BILIRUB SERPL-MCNC: 0.8 MG/DL (ref 0–1)
BUN SERPL-MCNC: 17 MG/DL (ref 6–23)
CALCIUM SERPL-MCNC: 9.2 MG/DL (ref 8.3–10.6)
CHLORIDE BLD-SCNC: 108 MMOL/L (ref 99–110)
CO2: 19 MMOL/L (ref 21–32)
CREAT SERPL-MCNC: 2 MG/DL (ref 0.9–1.3)
GFR SERPL CREATININE-BSD FRML MDRD: 33 ML/MIN/1.73M2
GLUCOSE BLD-MCNC: 117 MG/DL (ref 70–99)
GLUCOSE BLD-MCNC: 129 MG/DL (ref 70–99)
GLUCOSE BLD-MCNC: 151 MG/DL (ref 70–99)
GLUCOSE SERPL-MCNC: 119 MG/DL (ref 70–99)
HCT VFR BLD CALC: 31.4 % (ref 42–52)
HEMOGLOBIN: 8.9 GM/DL (ref 13.5–18)
MAGNESIUM: 1.9 MG/DL (ref 1.8–2.4)
PHOSPHORUS: 2.6 MG/DL (ref 2.5–4.9)
POTASSIUM SERPL-SCNC: 4.3 MMOL/L (ref 3.5–5.1)
SARS-COV-2 RDRP RESP QL NAA+PROBE: NOT DETECTED
SODIUM BLD-SCNC: 142 MMOL/L (ref 135–145)
SOURCE: NORMAL
TOTAL CK: 62 IU/L (ref 38–174)
TOTAL PROTEIN: 6 GM/DL (ref 6.4–8.2)

## 2023-05-04 PROCEDURE — 6370000000 HC RX 637 (ALT 250 FOR IP): Performed by: STUDENT IN AN ORGANIZED HEALTH CARE EDUCATION/TRAINING PROGRAM

## 2023-05-04 PROCEDURE — 84100 ASSAY OF PHOSPHORUS: CPT

## 2023-05-04 PROCEDURE — 97530 THERAPEUTIC ACTIVITIES: CPT

## 2023-05-04 PROCEDURE — 6370000000 HC RX 637 (ALT 250 FOR IP): Performed by: FAMILY MEDICINE

## 2023-05-04 PROCEDURE — 71045 X-RAY EXAM CHEST 1 VIEW: CPT

## 2023-05-04 PROCEDURE — 6360000002 HC RX W HCPCS: Performed by: SPECIALIST

## 2023-05-04 PROCEDURE — 2140000000 HC CCU INTERMEDIATE R&B

## 2023-05-04 PROCEDURE — 82550 ASSAY OF CK (CPK): CPT

## 2023-05-04 PROCEDURE — 6360000002 HC RX W HCPCS: Performed by: STUDENT IN AN ORGANIZED HEALTH CARE EDUCATION/TRAINING PROGRAM

## 2023-05-04 PROCEDURE — 2580000003 HC RX 258: Performed by: STUDENT IN AN ORGANIZED HEALTH CARE EDUCATION/TRAINING PROGRAM

## 2023-05-04 PROCEDURE — 85014 HEMATOCRIT: CPT

## 2023-05-04 PROCEDURE — 94761 N-INVAS EAR/PLS OXIMETRY MLT: CPT

## 2023-05-04 PROCEDURE — 82962 GLUCOSE BLOOD TEST: CPT

## 2023-05-04 PROCEDURE — 99232 SBSQ HOSP IP/OBS MODERATE 35: CPT | Performed by: NURSE PRACTITIONER

## 2023-05-04 PROCEDURE — 6370000000 HC RX 637 (ALT 250 FOR IP): Performed by: NURSE PRACTITIONER

## 2023-05-04 PROCEDURE — 83735 ASSAY OF MAGNESIUM: CPT

## 2023-05-04 PROCEDURE — 2580000003 HC RX 258: Performed by: FAMILY MEDICINE

## 2023-05-04 PROCEDURE — 36415 COLL VENOUS BLD VENIPUNCTURE: CPT

## 2023-05-04 PROCEDURE — 97110 THERAPEUTIC EXERCISES: CPT

## 2023-05-04 PROCEDURE — 85018 HEMOGLOBIN: CPT

## 2023-05-04 PROCEDURE — C9113 INJ PANTOPRAZOLE SODIUM, VIA: HCPCS | Performed by: SPECIALIST

## 2023-05-04 PROCEDURE — 87635 SARS-COV-2 COVID-19 AMP PRB: CPT

## 2023-05-04 PROCEDURE — 97116 GAIT TRAINING THERAPY: CPT

## 2023-05-04 PROCEDURE — 6360000002 HC RX W HCPCS: Performed by: INTERNAL MEDICINE

## 2023-05-04 PROCEDURE — 6370000000 HC RX 637 (ALT 250 FOR IP): Performed by: INTERNAL MEDICINE

## 2023-05-04 PROCEDURE — 80053 COMPREHEN METABOLIC PANEL: CPT

## 2023-05-04 RX ADMIN — Medication 3 MG: at 20:25

## 2023-05-04 RX ADMIN — QUETIAPINE FUMARATE 25 MG: 25 TABLET ORAL at 20:25

## 2023-05-04 RX ADMIN — PANTOPRAZOLE SODIUM 40 MG: 40 INJECTION, POWDER, FOR SOLUTION INTRAVENOUS at 10:15

## 2023-05-04 RX ADMIN — ACETAMINOPHEN 650 MG: 325 TABLET ORAL at 00:31

## 2023-05-04 RX ADMIN — SODIUM CHLORIDE, PRESERVATIVE FREE 10 ML: 5 INJECTION INTRAVENOUS at 10:15

## 2023-05-04 RX ADMIN — THIAMINE HYDROCHLORIDE 100 MG: 100 INJECTION, SOLUTION INTRAMUSCULAR; INTRAVENOUS at 10:15

## 2023-05-04 RX ADMIN — ATORVASTATIN CALCIUM 20 MG: 10 TABLET, FILM COATED ORAL at 10:15

## 2023-05-04 RX ADMIN — FOLIC ACID 1 MG: 1 TABLET ORAL at 10:15

## 2023-05-04 RX ADMIN — APIXABAN 5 MG: 5 TABLET, FILM COATED ORAL at 20:25

## 2023-05-04 RX ADMIN — APIXABAN 5 MG: 5 TABLET, FILM COATED ORAL at 10:15

## 2023-05-04 RX ADMIN — ACETAMINOPHEN 650 MG: 325 TABLET ORAL at 20:25

## 2023-05-04 RX ADMIN — SODIUM CHLORIDE, PRESERVATIVE FREE 10 ML: 5 INJECTION INTRAVENOUS at 22:50

## 2023-05-04 RX ADMIN — Medication 400 MG: at 10:15

## 2023-05-04 RX ADMIN — IRON SUCROSE 200 MG: 20 INJECTION, SOLUTION INTRAVENOUS at 10:15

## 2023-05-04 RX ADMIN — MONTELUKAST 10 MG: 10 TABLET, FILM COATED ORAL at 10:15

## 2023-05-04 RX ADMIN — HYDROXYZINE PAMOATE 50 MG: 25 CAPSULE ORAL at 00:09

## 2023-05-04 RX ADMIN — Medication 400 MG: at 20:25

## 2023-05-04 ASSESSMENT — PAIN DESCRIPTION - DESCRIPTORS: DESCRIPTORS: DISCOMFORT

## 2023-05-04 ASSESSMENT — PAIN - FUNCTIONAL ASSESSMENT: PAIN_FUNCTIONAL_ASSESSMENT: PREVENTS OR INTERFERES WITH MANY ACTIVE NOT PASSIVE ACTIVITIES

## 2023-05-04 NOTE — DISCHARGE SUMMARY
2. 0* 2.0* 2.0*   GLUCOSE 106* 118* 119*     Hepatic:   Recent Labs     05/02/23  0629 05/03/23  0136 05/04/23  0120   AST 20 21 22   ALT 16 16 15   BILITOT 0.8 0.7 0.8   ALKPHOS 63 62 70     Lipids:   Lab Results   Component Value Date/Time    CHOL 118 12/12/2016 01:00 PM    HDL 49 04/13/2022 12:58 PM    TRIG 103 12/12/2016 01:00 PM     Hemoglobin A1C:   Lab Results   Component Value Date/Time    LABA1C 5.6 04/13/2022 12:58 PM     TSH: No results found for: TSH  Troponin:   Lab Results   Component Value Date/Time    TROPONINT <0.010 04/26/2023 06:00 PM    TROPONINT <0.010 03/16/2018 11:30 AM    TROPONINT <0.010 03/08/2017 12:45 PM     Lactic Acid: No results for input(s): LACTA in the last 72 hours.   BNP:   Recent Labs     05/01/23  1750 05/03/23  0136   PROBNP 25,830* 32,370*     UA:  Lab Results   Component Value Date/Time    NITRU NEGATIVE 04/27/2023 04:15 AM    COLORU YELLOW 04/27/2023 04:15 AM    WBCUA <1 09/06/2022 01:00 PM    RBCUA 1 09/06/2022 01:00 PM    MUCUS RARE 09/06/2022 01:00 PM    TRICHOMONAS NONE SEEN 09/06/2022 01:00 PM    BACTERIA RARE 09/06/2022 01:00 PM    CLARITYU CLEAR 04/27/2023 04:15 AM    SPECGRAV 1.010 04/27/2023 04:15 AM    LEUKOCYTESUR NEGATIVE 04/27/2023 04:15 AM    UROBILINOGEN 0.2 04/27/2023 04:15 AM    BILIRUBINUR NEGATIVE 04/27/2023 04:15 AM    BLOODU NEGATIVE 04/27/2023 04:15 AM    KETUA NEGATIVE 04/27/2023 04:15 AM     Urine Cultures: No results found for: LABURIN  Blood Cultures: No results found for: BC  No results found for: BLOODCULT2  Organism: No results found for: ORG    Time Spent Discharging patient 35 minutes    Electronically signed by Irene Brewer MD on 5/5/2023 at 6:32 PM

## 2023-05-04 NOTE — CARE COORDINATION
D/c plan is [facility]. Will require a pre-cert on day of d/c. Must remain sitter free for 24/hrs prior to d/c. Pt does not have a sitter today. Requested updated PT notes via WB. OT updated on 05/03. Dr Irene Brown informed CM that pt should be medically ready for d/c tomorrow. He informed CM that pt's mental status has improved today.   TE

## 2023-05-04 NOTE — CARE COORDINATION
Discussed pt in IDR. Pt is medically ready for d/c per Dr. Electa Schirmer notified Jim Domingo to start the pre-cert. She will start the pre-cert when the updated PT notes are in.  CM requested updated PT notes via wb this am. Cookie/KAUSHAL to notify CM when she receives the pre-cert. Pt has his home c-pap in room per pt's nurse that will [facility] with him.    TE

## 2023-05-04 NOTE — CARE COORDINATION
Pre-cert pending for [facility]. Rapid covid sent. Electronic PAS/RR completed and placed in packet. WG to notify CM when they receive the pre-cert. D/C INSTRUCTIONS ARE ON FRONT OF PACKET LOCATED WITH THE SOFT CHART.    TE

## 2023-05-05 VITALS
TEMPERATURE: 97 F | RESPIRATION RATE: 18 BRPM | BODY MASS INDEX: 37.35 KG/M2 | HEIGHT: 71 IN | HEART RATE: 66 BPM | WEIGHT: 266.8 LBS | SYSTOLIC BLOOD PRESSURE: 131 MMHG | OXYGEN SATURATION: 98 % | DIASTOLIC BLOOD PRESSURE: 69 MMHG

## 2023-05-05 LAB
ALBUMIN SERPL-MCNC: 3.9 GM/DL (ref 3.4–5)
ALP BLD-CCNC: 73 IU/L (ref 40–128)
ALT SERPL-CCNC: 17 U/L (ref 10–40)
ANION GAP SERPL CALCULATED.3IONS-SCNC: 16 MMOL/L (ref 4–16)
AST SERPL-CCNC: 26 IU/L (ref 15–37)
BILIRUB SERPL-MCNC: 0.8 MG/DL (ref 0–1)
BUN SERPL-MCNC: 17 MG/DL (ref 6–23)
CALCIUM SERPL-MCNC: 9.1 MG/DL (ref 8.3–10.6)
CHLORIDE BLD-SCNC: 110 MMOL/L (ref 99–110)
CO2: 16 MMOL/L (ref 21–32)
CREAT SERPL-MCNC: 2.2 MG/DL (ref 0.9–1.3)
GFR SERPL CREATININE-BSD FRML MDRD: 30 ML/MIN/1.73M2
GLUCOSE BLD-MCNC: 108 MG/DL (ref 70–99)
GLUCOSE BLD-MCNC: 131 MG/DL (ref 70–99)
GLUCOSE BLD-MCNC: 153 MG/DL (ref 70–99)
GLUCOSE SERPL-MCNC: 127 MG/DL (ref 70–99)
LACTATE: 2.3 MMOL/L (ref 0.5–1.9)
POTASSIUM SERPL-SCNC: 4.2 MMOL/L (ref 3.5–5.1)
SODIUM BLD-SCNC: 142 MMOL/L (ref 135–145)
TOTAL PROTEIN: 5.9 GM/DL (ref 6.4–8.2)

## 2023-05-05 PROCEDURE — 83605 ASSAY OF LACTIC ACID: CPT

## 2023-05-05 PROCEDURE — 2580000003 HC RX 258: Performed by: STUDENT IN AN ORGANIZED HEALTH CARE EDUCATION/TRAINING PROGRAM

## 2023-05-05 PROCEDURE — 94761 N-INVAS EAR/PLS OXIMETRY MLT: CPT

## 2023-05-05 PROCEDURE — 6360000002 HC RX W HCPCS: Performed by: STUDENT IN AN ORGANIZED HEALTH CARE EDUCATION/TRAINING PROGRAM

## 2023-05-05 PROCEDURE — 6370000000 HC RX 637 (ALT 250 FOR IP): Performed by: STUDENT IN AN ORGANIZED HEALTH CARE EDUCATION/TRAINING PROGRAM

## 2023-05-05 PROCEDURE — 97530 THERAPEUTIC ACTIVITIES: CPT

## 2023-05-05 PROCEDURE — 36415 COLL VENOUS BLD VENIPUNCTURE: CPT

## 2023-05-05 PROCEDURE — 6370000000 HC RX 637 (ALT 250 FOR IP): Performed by: FAMILY MEDICINE

## 2023-05-05 PROCEDURE — 82962 GLUCOSE BLOOD TEST: CPT

## 2023-05-05 PROCEDURE — 6360000002 HC RX W HCPCS: Performed by: INTERNAL MEDICINE

## 2023-05-05 PROCEDURE — 80053 COMPREHEN METABOLIC PANEL: CPT

## 2023-05-05 PROCEDURE — 6360000002 HC RX W HCPCS: Performed by: SPECIALIST

## 2023-05-05 PROCEDURE — 97116 GAIT TRAINING THERAPY: CPT

## 2023-05-05 PROCEDURE — C9113 INJ PANTOPRAZOLE SODIUM, VIA: HCPCS | Performed by: SPECIALIST

## 2023-05-05 PROCEDURE — 6370000000 HC RX 637 (ALT 250 FOR IP): Performed by: INTERNAL MEDICINE

## 2023-05-05 PROCEDURE — 2580000003 HC RX 258: Performed by: FAMILY MEDICINE

## 2023-05-05 RX ORDER — FOLIC ACID 1 MG/1
1 TABLET ORAL DAILY
Qty: 30 TABLET | Refills: 3 | Status: SHIPPED | OUTPATIENT
Start: 2023-05-06

## 2023-05-05 RX ORDER — HYDROXYZINE PAMOATE 25 MG/1
50 CAPSULE ORAL ONCE
Status: COMPLETED | OUTPATIENT
Start: 2023-05-05 | End: 2023-05-05

## 2023-05-05 RX ORDER — PANTOPRAZOLE SODIUM 40 MG/10ML
40 INJECTION, POWDER, LYOPHILIZED, FOR SOLUTION INTRAVENOUS DAILY
Qty: 14 EACH | Refills: 0 | Status: SHIPPED | OUTPATIENT
Start: 2023-05-06 | End: 2023-05-20

## 2023-05-05 RX ORDER — 0.9 % SODIUM CHLORIDE 0.9 %
500 INTRAVENOUS SOLUTION INTRAVENOUS ONCE
Status: COMPLETED | OUTPATIENT
Start: 2023-05-05 | End: 2023-05-05

## 2023-05-05 RX ORDER — FERROUS SULFATE 325(65) MG
325 TABLET ORAL
Qty: 60 TABLET | Refills: 5 | Status: SHIPPED | OUTPATIENT
Start: 2023-05-05

## 2023-05-05 RX ADMIN — Medication 400 MG: at 11:25

## 2023-05-05 RX ADMIN — PANTOPRAZOLE SODIUM 40 MG: 40 INJECTION, POWDER, FOR SOLUTION INTRAVENOUS at 11:24

## 2023-05-05 RX ADMIN — SODIUM CHLORIDE 500 ML: 9 INJECTION, SOLUTION INTRAVENOUS at 11:34

## 2023-05-05 RX ADMIN — APIXABAN 5 MG: 5 TABLET, FILM COATED ORAL at 11:25

## 2023-05-05 RX ADMIN — IRON SUCROSE 200 MG: 20 INJECTION, SOLUTION INTRAVENOUS at 11:24

## 2023-05-05 RX ADMIN — MONTELUKAST 10 MG: 10 TABLET, FILM COATED ORAL at 11:25

## 2023-05-05 RX ADMIN — HYDROXYZINE PAMOATE 50 MG: 25 CAPSULE ORAL at 02:53

## 2023-05-05 RX ADMIN — ATORVASTATIN CALCIUM 20 MG: 10 TABLET, FILM COATED ORAL at 11:24

## 2023-05-05 RX ADMIN — SODIUM CHLORIDE, PRESERVATIVE FREE 10 ML: 5 INJECTION INTRAVENOUS at 11:24

## 2023-05-05 RX ADMIN — FOLIC ACID 1 MG: 1 TABLET ORAL at 11:25

## 2023-05-05 RX ADMIN — SODIUM CHLORIDE, PRESERVATIVE FREE 10 ML: 5 INJECTION INTRAVENOUS at 11:26

## 2023-05-05 RX ADMIN — THIAMINE HYDROCHLORIDE 100 MG: 100 INJECTION, SOLUTION INTRAMUSCULAR; INTRAVENOUS at 11:24

## 2023-05-05 NOTE — CARE COORDINATION
Pre-cert still pending for [facility] per Mariann/KAUSHAL. She will notify CM when she receives the pre-cert.   TE

## 2023-05-05 NOTE — CARE COORDINATION
Pre-cert pending for [facility]. VM left for Mariann/Cecilia Slaughter to call CM with update on pre-cert. Rapid covid neg yesterday.  TE

## 2023-05-05 NOTE — CARE COORDINATION
[facility] pt has been approved for [facility].      [id number]  [id number]  [facility]  05/04/2023 05/05/2023-05/09/2023  Approved

## 2023-05-05 NOTE — CARE COORDINATION
This CM had CM/AW check [facility] site to check on the pre-cert. Pre-cert received for [facility] per CM/AW. Notified Mariann/Cecilia Langston. She states that they can accept pt today after 1800,  Rapid covid was neg on 05/04. Notified Dr German Wilson via PS. Transport arranged with [facility]. ETA is 1800. Notified pt, pt's spouse at bedside, pt's nurse and Gene Rinaldi.   TE

## 2023-05-06 LAB
CULTURE: NORMAL
CULTURE: NORMAL
Lab: NORMAL
Lab: NORMAL
SPECIMEN: NORMAL
SPECIMEN: NORMAL

## 2023-05-09 NOTE — PROGRESS NOTES
APRN notified of continued restless, up out of bed, pulling at gown and wires. He is AxOx2 and pleasantly confused and states, \"I know I am not at home. \"  CIWA scale was discontinued the other day due to noted \"tremor\" however, pt has PMH of tremor NOT allergic reaction to Ativan medication. Pt has sitter in place as well. Pt states his last drink was 4/27 and drinks whiskey daily - unknown amount. Neurology and Nephrology following. CT Head non acute. MRI pending for now. Will check H&H due to downward trend. Will restart CIWA with close monitoring continued. Fall and delirium precautions remain in place.
APRN notified of increased confusion and pt stating he is wanting to go home. Pt states he is going to the parking lot to check for keys so he can drive home. Pt is very anxious. Delirium precautions in place & pt did have nightly Seroquel and Melatonin. Will order x1 dose PO Hydroxyzine 50 MG now - with continued monitoring overnight.
Comprehensive Nutrition Assessment    Type and Reason for Visit:  Initial, RD Nutrition Re-Screen/LOS    Nutrition Recommendations/Plan:   Continue current diet   May encourage small, more frequent meals  May offer oral nutrition supplements prn   Monitor PO intakes, GI status, weights, fluids, labs, lytes, glucose, and plan of care      Malnutrition Assessment:  Malnutrition Status: At risk for malnutrition (Comment) (05/04/23 7163)    Context:  Acute Illness     Findings of the 6 clinical characteristics of malnutrition:  Energy Intake:  Mild decrease in energy intake (Comment)  Weight Loss:  Greater than 5% over 1 month     Body Fat Loss:  Unable to assess     Muscle Mass Loss:  Unable to assess    Fluid Accumulation:  Unable to assess     Strength:  Not Performed    Nutrition Assessment:    Admitted with acute anemia, fatigue 2 weeks. PMH: SOB, CAD, ASCVD, DMII, CKD. Pt was on clears from 4/27-4/30, advanced to regular CHO 5 control diet. Last meal of 51-75%. Tolerates diet. Noted 6% wt loss in 1 month. Moderate nutrition risk. Nutrition Related Findings:    Hgb 8.9, POCT 117; Rx: Mag Ox, Thiamine, Magic mouthwash; Pt busy with provider at visit, plans for d/c to [facility] Wound Type: None       Current Nutrition Intake & Therapies:    Average Meal Intake: %, 51-75%  Average Supplements Intake: None Ordered  ADULT DIET; Regular; 5 carb choices (75 gm/meal)    Anthropometric Measures:  Height: 5' 11\" (180.3 cm)  Ideal Body Weight (IBW): 172 lbs (78 kg)    Admission Body Weight: 268 lb (121.6 kg)  Current Body Weight: 266 lb 12.1 oz (121 kg), 155.1 % IBW.  Weight Source: Bed Scale  Current BMI (kg/m2): 37.2  Usual Body Weight: 282 lb 3.2 oz (128 kg) (4/10/23)  % Weight Change (Calculated): -5.5  Weight Adjustment For: No Adjustment                 BMI Categories: Obese Class 2 (BMI 35.0 -39.9)    Estimated Daily Nutrient Needs:  Energy Requirements Based On: Formula  Weight Used for Energy Requirements:
DOING BETTER AWAKE ALERT AND ORIENTED NO GROSS GIT BLEEDING EATING REGULAR DIET PT BEING D/C TODAY TO [facility]  VITALS STABLE   LABS NOTED HB STABLE AT 8.9   WILL CPM
Nephrology Progress Note  5/1/2023 8:04 AM        Subjective:   Admit Date: 4/27/2023  PCP: Norma Torres MD    Interval History:  weekend events reviewed   confusion and felt to have alcoholic delayed high   [address] of withdrawal assessment score (  CIWA)    he has camera in the room     underwent endoscopy Gastroduodenoscopy, mild to moderate gastritis and duodenitis had biopsy   colonoscopy showed hemorrhoid and diverticulosis coli    Diet:  reported eating some    ROS:   he was alert and awake knew me, but did not sustain it concentration for long time, seems little confusion   no overt shortness of breath   he was asking me when he can go   no fever and acceptable blood pressure   urine output recorded only 300 cc likely not accurately recorded    Data:     Current meds:    thiamine  100 mg Oral Daily    atorvastatin  20 mg Oral Daily    montelukast  10 mg Oral Daily    sodium chloride flush  5-40 mL IntraVENous 2 times per day    pantoprazole  40 mg IntraVENous Daily      sodium chloride      sodium chloride      sodium chloride      sodium chloride           I/O last 3 completed shifts: In: 56 [P.O.:480; I.V.:10]  Out: 300 [Urine:300]    CBC:   Recent Labs     04/29/23  0548 04/29/23  1809 04/30/23  0042 04/30/23  1045 04/30/23  1747 05/01/23  0432   WBC 6.7  --  5.6  --   --   --    HGB 7.8*   < > 7.1*  7.1* 7.6* 7.4* 9.3*     --  144  --   --   --     < > = values in this interval not displayed.           Recent Labs     04/29/23  0548 04/30/23  0042 05/01/23  0432    139 130*   K 4.6 4.0 4.9    106 98*   CO2 17* 20* 22   BUN 39* 34* 50*   CREATININE 2.3* 2.3* 2.4*   GLUCOSE 106* 120* 96       Lab Results   Component Value Date    CALCIUM 8.5 05/01/2023    PHOS 3.1 04/28/2023       Objective:     Vitals: /78   Pulse 83   Temp 98.2 °F (36.8 °C) (Oral)   Resp 19   Ht 5' 11\" (1.803 m)   Wt 264 lb (119.7 kg)   SpO2 95%   BMI 36.82 kg/m² ,    General appearance:
Nephrology Progress Note  5/2/2023 8:23 AM        Subjective:   Admit Date: 4/27/2023  PCP: Norman Albert MD    Interval History:  seen in early morning    Diet:  some    ROS:   still intermittent agitation requiring sitter   he was alert awake and at least partially oriented and knew me this morning   I also had good discussion with his wife yesterday over the phone   urine output recorded only 600 cc for the last shift   no fever and acceptable blood pressure    Data:     Current meds:    thiamine  100 mg IntraVENous Daily    folic acid  1 mg Oral Daily    atorvastatin  20 mg Oral Daily    montelukast  10 mg Oral Daily    sodium chloride flush  5-40 mL IntraVENous 2 times per day    pantoprazole  40 mg IntraVENous Daily      sodium chloride      sodium chloride      sodium chloride      sodium chloride           I/O last 3 completed shifts: In: 36 [P.O.:720; I.V.:10]  Out: 900 [Urine:900]    CBC:   Recent Labs     04/30/23  0042 04/30/23  1045 05/01/23  1750 05/02/23  0419 05/02/23  0629   WBC 5.6  --   --   --  5.1   HGB 7.1*  7.1*   < > 7.4* 7.3* 7.7*     --   --   --  157    < > = values in this interval not displayed.           Recent Labs     04/30/23  0042 05/01/23  0432 05/02/23  0629    130* 142   K 4.0 4.9 3.6    98* 110   CO2 20* 22 19*   BUN 34* 50* 20   CREATININE 2.3* 2.4* 2.0*   GLUCOSE 120* 96 106*       Lab Results   Component Value Date    CALCIUM 8.8 05/02/2023    PHOS 3.2 05/02/2023       Objective:     Vitals: /78   Pulse 93   Temp 98 °F (36.7 °C) (Oral)   Resp 22   Ht 5' 11\" (1.803 m)   Wt 275 lb 9.2 oz (125 kg)   SpO2 95%   BMI 38.43 kg/m² ,    General appearance:   he was alert awake at least partially oriented with sitter in the room  HEENT:   positive conjunctival pallor no scleral icterus  Neck:   seemed supple  Lungs:   no gross crackles few rhonchi and coarse breath sound  Heart:   irregular  Abdomen:  soft  Extremities:   edema significantly
Nephrology Progress Note  5/3/2023 8:31 AM        Subjective:   Admit Date: 4/27/2023  PCP: Tasia Heimlich, MD    Interval History: His confusion and agitation  Requiring restraint and sitter    Diet: Some    ROS: Confusion agitation-likely delirium tremens    Urine output recorded only 125  cc  No fever and acceptable blood pressure    Data:     Current meds:    iron sucrose  200 mg IntraVENous Q24H    magnesium oxide  400 mg Oral BID    potassium chloride  20 mEq Oral BID WC    apixaban  5 mg Oral BID    sodium chloride flush  5-40 mL IntraVENous 2 times per day    thiamine  100 mg IntraVENous Daily    folic acid  1 mg Oral Daily    atorvastatin  20 mg Oral Daily    montelukast  10 mg Oral Daily    sodium chloride flush  5-40 mL IntraVENous 2 times per day    pantoprazole  40 mg IntraVENous Daily      sodium chloride      sodium chloride      sodium chloride      sodium chloride           I/O last 3 completed shifts:   In: 960 [P.O.:960]  Out: 725 [Urine:725]    CBC:   Recent Labs     05/02/23  0629 05/02/23  1643 05/03/23  0136   WBC 5.1  --  5.2   HGB 7.7* 7.6* 7.8*     --  163          Recent Labs     05/01/23  0432 05/02/23  0629 05/03/23  0136   * 142 144   K 4.9 3.6 4.1   CL 98* 110 113*   CO2 22 19* 19*   BUN 50* 20 19   CREATININE 2.4* 2.0* 2.0*   GLUCOSE 96 106* 118*       Lab Results   Component Value Date    CALCIUM 8.9 05/03/2023    PHOS 3.2 05/02/2023       Objective:     Vitals: /63   Pulse 77   Temp (!) 96.7 °F (35.9 °C) (Oral)   Resp 26   Ht 5' 11\" (1.803 m)   Wt 265 lb 3.4 oz (120.3 kg)   SpO2 96%   BMI 36.99 kg/m² ,    General appearance: He was awake and did recognize me-restrained-also has sitter  HEENT: Positive conjunctival pallor  Neck: Supple  Lungs: No gross crackles  Heart: Crescendo decrescendo murmur , irregular rhythm  Abdomen: Soft  Extremities: Minimal leg edema now      Problem List :         Impression :     Acute kidney disease-underlying chronic kidney
Nephrology Progress Note  5/5/2023 7:58 AM        Subjective:   Admit Date: 4/27/2023  PCP: Dian Figueroa MD    Interval History: He was alert and awake this morning  Still has some intermittent confusion-    Diet: Unsure how much he is eating    ROS:    Other healthcare providers and nurses report intermittent confusion-  Urine output was not recorded  No fever acceptable blood pressure    Data:     Current meds:    QUEtiapine  25 mg Oral Nightly    melatonin  3 mg Oral Nightly    iron sucrose  200 mg IntraVENous Q24H    magnesium oxide  400 mg Oral BID    apixaban  5 mg Oral BID    sodium chloride flush  5-40 mL IntraVENous 2 times per day    thiamine  100 mg IntraVENous Daily    folic acid  1 mg Oral Daily    atorvastatin  20 mg Oral Daily    montelukast  10 mg Oral Daily    sodium chloride flush  5-40 mL IntraVENous 2 times per day    pantoprazole  40 mg IntraVENous Daily      sodium chloride      sodium chloride      sodium chloride      sodium chloride           I/O last 3 completed shifts:   In: 61 [P.O.:60]  Out: -     CBC:   Recent Labs     05/03/23  0136 05/03/23  1851 05/04/23  0120   WBC 5.2  --   --    HGB 7.8* 8.3* 8.9*     --   --           Recent Labs     05/03/23  0136 05/04/23  0120    142   K 4.1 4.3   * 108   CO2 19* 19*   BUN 19 17   CREATININE 2.0* 2.0*   GLUCOSE 118* 119*       Lab Results   Component Value Date    CALCIUM 9.2 05/04/2023    PHOS 2.6 05/04/2023       Objective:     Vitals: /61   Pulse 94   Temp 97 °F (36.1 °C) (Oral)   Resp 16   Ht 5' 11\" (1.803 m)   Wt 266 lb 12.8 oz (121 kg)   SpO2 95%   BMI 37.21 kg/m² ,    General appearance: He was evaluated this morning and did talk to me  HEENT: At least 2+ conjunctival pallor  Neck: Supple  Lungs: Positive wheezing-coarse breath sound especially rhonchi  Heart: Regular rate rhythm and pressure medication the murmur and left distal internal cardiac device  Abdomen: Soft, nontender  Extremities: Leg
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 Cues were given for safety, sequence, UE/LE placement, awareness, and balance. Activities performed today included bed mobility training, sup-sit, sit-stand, ambulation. STS from recliner w/ CGA. Ambulated functional household distance using RW w/ CGA. Vcs for RW mgmt and pathway negotiation. Sit to stand to recliner w/ CGA, Vcs for sequencing/hand placement to control descent. Declined all ADL needs at this time. Left seated in recliner w/ all needs met, alarm on. Assessment / Impression:    Patient's tolerance of treatment: Well  Adverse Reaction: None  Significant change in status and impact: Improved from initial evaluation  Barriers to improvement: weakness, activity tolerance, safety/cognition      Plan for Next Session:    Continue w/ OT POC and functional goals, address safety/independence w/ ADLs and transfers/mobility.        Time in:  1020  Time out:  1031  Timed treatment minutes:  11  Total treatment time:  11      Electronically signed by:    Alayna Mo OT,   2023, 12:00 PM
Occupational Therapy  . Attempted Tx at 1205 hrs c pt soundly sleeping, arousable c nodding of head, however, not responding to commands at this time. Telemetry shows HR 77 and O2 sat 100%. Notified Nurse Sam Solomon of pt's status and for planned re-attempt later this date. Re-attempted at 1522 hrs c pt supine in bed, spouse at bedside reporting that pt is agitated and confused. Pt noted to be picking / pulling at lines and did not respond to questions. Reported to Nurse Sam Solomon that pt was not able to actively participate in therapy this date.         Electronically signed by:    BRAD Herbert  5/1/2023, 12:06 PM
Occupational Therapy  . Occupational Therapy Treatment Note    Name: Norma Hobbs MRN: [id number] :   1945   Date:  5/3/2023   Admission Date: 2023 Room:  [address] [place] [address]A     Primary Problem:      Restrictions/Precautions:          General precautions; Fall risk    Communication with other providers: Per chart review and Nurse Anna Galo, patient is appropriate for therapeutic intervention. Nurse reports pt tried to crawl out of bed earlier. Subjective:  Patient states:  Pt agreeable to bed level exercises. Pain:   Location, Type, Intensity (0/10 to 10/10):  0/10, denies pain    Objective:    Observation: Pt A&O to self by name and , identifies location as hospital, required cue to identify city, thought it was [place]. Spouse present at bedside. Objective Measures:  Telemetry, HR 70-84, RR varied 27-35    Treatment, including education:  Therapeutic Exercise:  Cues were given for technique, safety, recruitment, and rationale. Cues were verbal and/or tactile. Pt Sup + verbal / visual cues for technique to perform BUE AROM exercises to include: shoulder flex/extension, internal/external rotation, chest presses, bicep curls, and supination/pronation and BLE AROM for hip and knee flex/extension and B ankle pumps, all exercises 10 reps each, tapering to 5 reps for last BUE exercise c rest breaks between each set to manage RR and activity tolerance. .    All therapeutic intervention performed c emphasis on therapeutic AROM exercises to inc strength, endurance and act tolerance for inc Indep c ADL tasks, func transfers / mobility. Safety  Patient safely in bed + alarm and telesitter on at end of session, with call light/phone in reach, and nursing aware.      Assessment / Impression:    Patient's tolerance of treatment: Well c bed level Tx session, managed c rest breaks between each set of exercises  Adverse Reaction: None  Significant change in status and impact: Improved from initial
PT CONFUSED TODAY NO ABD COMPLAINTS HAD BM LAST NIGHT WITH NO BLOOD  VITALS STABLE  NO GIT BLEEDING  L;ABS NOTED HB STABLE AT 7.4  WILL ADVANCE DIET PATH REPORT PENDING MAY RESTART ANTICOAGULANTS NOW
PT STILL CONFUSED NO ABD PAIN N/ VOMITING HAD 1 BM YESTERDAY LIQUID WITH NO BLOOD ON ELIQUIS  VITALS STABLE   O/E ABD SOFT NON DISTENDED NON TENDER  LABS NOTED WBC 5.2 HB STABLE AT 7.8  PATH REPORT REVIEWED POLYPS ARE BENIGN WILL CPM D/W ZACARIAS LOPEZ
Patient found to have taken telemetry, pulse ox monitor,oxygen and standing at the edge of the bed by himself three times thus far in the shift. Patient very anxious and not redirectable. Patient is confused. Pt has had his evening medications which consisted of Seroquel and melatonin which did not help patient sleep or relax. Patient continued to remove gown, telemetry, oxygen, and climbing over the bed rails. MD is aware.
Patient is resting in bed alert and oriented x2 however, reoriented by staff. no complaints of pain.  at bedside. Patient tries to get out of bed without assistance but easily reoriented by staff. Vs wnl. Patient is currently on 3l nc 98% o2. Call light within reach, bed alarm on, bed at lowest position.
Patient reports drinking whiskey daily. Last drink was 3 days ago. [facility] protocol.
Physical Therapy    1205 Attempted to see pt for updated notes. Pt is sound asleep, would shake his head with his eyes closed but not be able to follow directions. Notified pt's nurse ot the attempt. Srinivasan Gray.  Albin Garcia PTA
Physical Therapy  Name: Jackelyn Bonner MRN: [id number] :   1945   Date:  2023   Admission Date: 2023 Room:  [address] [place], [place] [address]   Restrictions/Precautions:        General Precautions, Fall Risk     Communication with other providers:  Marcela Pulliam RN states pt is ok to see for therapy. Recommend to RN that  be removed for precert needs     Subjective:  Patient states:  Patient is agreeable for OOB activities  Pain:   Location, Type, Intensity (0/10 to 10/10): Denies    Objective:    Observation:  Patient is supine in bed upon arrival on room air. SpO2 95% during activities. Treatment, including education/measures:    Transfers with line management of TeleMonitor, Pulse Ox  Supine to sit :Mod A for trunk support  Scooting :Seated scooting SBA on EOB  Sit to stand :From slightly elevated EOB Min A x 2 sets  Standing balance: Patient has fair- balance with heavy BUE reliance on RW at [facility]. He demo's forward flexed posture and decrease hip and knee extension in stance  Stand to sit :Min A d/t fair eccentric control    Gait:  Pt amb with RW for 40 ft with CGA assist. Patient demos forward flexed posture, decrease step length and height bilaterally with decrease hip and knee flexion in stance phase on ea side. Cues for upright posture slightly improve posture and cues for step length partially improve gait sequence    Exercises  Sitting Exercises: Ankle pumps x 10  LAQ's x 10  Marching x 10   AAROM Hip Abd x 10  AAROM hip IR/ER x 10    Therapeutic exercises were instructed today. Cues were given for technique, safety, recruitment, and rationale. Cues were verbal and/or tactile. Safety  Patient left safely in the recliner, RN aware, with call light/phone in reach with alarm applied. Gait belt and mask were used for transfers and gait.     Assessment / Impression:     Patient's tolerance of treatment:  Good   Adverse Reaction: lethargy  Significant change in status and impact:  none  Barriers to improvement:
Physical Therapy  Name: Mary Hartmann MRN: [id number] :   1945   Date:  2023   Admission Date: 2023 Room:  [address] [place] [address]   Restrictions/Precautions:        general precautions, fall risk  Communication with other providers:  Francisca Jackson RN states pt is ok to see for therapy  Subjective:  Patient states:  he hasn't been able to walk while he is here, pt also states he drinks at night to get relief from the tremors  Pain:   Location, Type, Intensity (0/10 to 10/10):  R groin did not rate  Objective:    Observation:  pt was awake in the bed with a sitter in the room, pt has essential tremors  Treatment, including education/measures:  Transfers with line management of tele, IV  Pt required extra time with trans d/t starting a trans and then stopping, extra VC's given for motor planning and confusion  Rolling: mod A of 1  Supine to sit :mod A of 1  Scooting :mod a of 1  Sit to stand :mod A of 1 and CGA of 1  Pt stood with the RW in front of him, when turning towards the chair, pt followed 40% of VC's given. Pt placed the RW  4 ft away from him and attempted to amb. Pt was redirected 6 times to trans bed to chair requiring mod a of 2. Pt did not reach back for the chair needing max A of 2 to get sit down  Sitting Exercises: Ankle pumps x 10  Glute sets x 10  LAQ's x 10  Marching x 10 with pain in the R LE   Therapeutic Exercise:  Therapeutic exercises were instructed today. Cues were given for technique, safety, recruitment, and rationale. Cues were verbal and/or tactile. Safety  Patient left safely in the chair, with call light/phone in reach with sitter in the room. Gait belt was used for transfers and gait.   Assessment / Impression:     Patient's tolerance of treatment:  fair, followed 40% of directions given   Adverse Reaction:   Significant change in status and impact:  weaker than eval  Barriers to improvement:  pain, weakness, co-morbilities  Plan for Next Session:    Will cont to work towards pt's goals
Physical Therapy  Nursing reports pt was a 15 on the CIWA scale today, Attempted at , pt was more awake but not oriented. Pt's wife visiting but she says he is very agitated and confused and doesn't think she is appropriate to get out of bed. Will cont. Linh Reynolds.  Andra Bennett PTA
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 \"  Pain:   Location, Type, Intensity (0/10 to 10/10):  denies pain    Objective:    Observation:  pt up in chair upon entry and agreeable to session    Treatment, including education/measures:    Transfers: pt completed STS to/from chair CGA with cues for sequencing    Gait: Pt ambulated [de-identified]' with RW CGA with decreased chrissy and forward flexed posture. Cues provided for walker management and posture throughout    Assessment / Impression:    Pt up in chair at end of session with needs in reach, alarm on, and telesitter on.     Patient's tolerance of treatment:  well   Adverse Reaction: n/a  Significant change in status and impact:  n/a  Barriers to improvement:  decreased endurance    Plan for Next Session:    Continue to address transfer training and gait training in future sessions    Time in:  1020  Time out:  1031  Timed treatment minutes:  11  Total treatment time:  11    Previously filed items:           Short Term Goals  Time Frame for Short Term Goals: 1 week  Short Term Goal 1: pt to complete all bed mobility min A  Short Term Goal 2: pt to complete all STS transfers to/from bed, commode, and chair SBA  Short Term Goal 3: pt to ambulate 48' with LRAD CGA    Electronically signed by:    Margareth Richmond PT  2023, 1:03 PM
Physician Progress Note      Leander Cunningham  CSN #:                  [id number]  :                       1945  ADMIT DATE:       2023 1:21 AM  DISCH DATE:  RESPONDING  PROVIDER #:        Criss Belcher MD          QUERY TEXT:    Patient admitted with GI bleeding, noted to have colon polyps, hemorrhoids,   gastritis, and duodenitis. If possible, please document in progress notes and   discharge summary the cause of the GI bleeding: The medical record reflects the following:  Risk Factors: Polyps, hemorrhoid, gastritis, duodenitis, Eliquis  Clinical Indicators: EGD \"STOMACH. ..no erosion or ulcers were seen   . Mariah Le Roy Mariah Le Roy DUODENUM: no erosion or ulcers were seen. .. Mild-to-moderate gastritis   and duodenitis\", Colonoscopy \" 28 colon polyps. . grade 2 internal   hemorrhoids. .. Mariah Le Roy Mariah Le Roy Mariah Le Roy diverticulosis coli\"  Treatment: EGD, colonoscopy, Protonix, hold Eliquis. Thank you,  Janese Jeans, BSN, RN, [facility]  [phone]  Options provided:  -- GI bleeding due to colon polyps  -- GI bleeding due to hemorrhoids  -- GI bleeding due to gastritis  -- GI bleeding due to duodenitis  -- GI bleeding due to diverticulosis coli  -- Other - I will add my own diagnosis  -- Disagree - Not applicable / Not valid  -- Disagree - Clinically unable to determine / Unknown  -- Refer to Clinical Documentation Reviewer    PROVIDER RESPONSE TEXT:    This patient has GI bleeding due to colon polyps.     Query created by: Su Herrera on 2023 1:18 PM      Electronically signed by:  Criss Belcher MD 2023 1:36 PM
Physician Progress Note      Mary Seo  CSN #:                  [id number]  :                       1945  ADMIT DATE:       2023 1:21 AM  DISCH DATE:        2023 6:26 PM  RESPONDING  PROVIDER #:        Letty Smith MD          QUERY TEXT:    Pt admitted with acute anemia and has encephalopathy documented. If possible,   please document in progress notes and discharge summary further specificity   regarding the type of encephalopathy:    The medical record reflects the following:  Risk Factors: Ativan,  Clinical Indicators: DCS \" Acute encephalopathy suspected secondary to   medication, patient was treated with Ativan for suspected alcohol withdrawal   and patient became unresponsive, discontinued Ativan patient improved to back   to baseline\"  Treatment: DC Ativan, CIWA scale, sitter. Thank you,  ROBERT BarberN, RN, CDS  [phone]  Options provided:  -- Drug-induced encephalopathy due to Ativan  -- Toxic metabolic encephalopathy  -- Other - I will add my own diagnosis  -- Disagree - Not applicable / Not valid  -- Disagree - Clinically unable to determine / Unknown  -- Refer to Clinical Documentation Reviewer    PROVIDER RESPONSE TEXT:    This patient has drug-induced encephalopathy due to Ativan.     Query created by: Devang Castañeda on 2023 12:03 PM      Electronically signed by:  Letty Smith MD 2023 12:26 PM
Pt restless, agitated and poor sleep throughout shift d/t exiting behaviors, agitation, delirium and confusion noted throughout shift. Pt had periods where he wasn't able to be redirected but at times where he was able to be directed. See PRN and provider note for un redirectable event.
Pt restless, agitated and resistant to redirection throughout shift. Pt pulling wires, clothing and equipment tubing, unable to redirect. See Mar, poor sleep throughout shift d/t exiting behaviors, agitation, delirium and confusion.
Pt sleeping but restless. Scored a 5 on the CIWA, can not answer questions for this nures and mews is 3. Holding PO medication until pt is more alert and notified MD of pt condition.
This nurse called and gave report to [facility] nurse at [address].
V2.0    [facility] Progress Note      Name:  Mat Caraballo /Age/Sex: 1945  (73 y.o. male)   MRN & CSN:  [id number] & [id number] Encounter Date/Time: 5/3/2023 9:37 AM EDT   Location:  [address] [place], [place] [address] PCP: Sj Savage MD     Attending:Rajeswar Beauty Canavan, [address] Day: 7    Assessment and Recommendations   Mat Caraballo is a 76 y.o. male  who presents with Acute anemia    # Acute Encephalopathy: As Below  # Suspected Alcohol withdrawal syndrome: Overnight patient had agitation and started on CIWA protocol and given Ativan, Patient is here in hospital from 7 days, less likely in withdrawal, Will stop CIWA and monitor as patient is lethargic this AM.    # AcuteAnemia:  -Kira Thorntonstfreya having worsening fatigue over 2 weeks, no bleeding or melena, no NSAIDs, on DAPT and Eliquis  -Hemoglobin 5.9 in the ED, down from 7.1 on , baseline 10-12  -Patient status post 2 unit PRBC  -Active type and screen  -Trend H&H every 6 hours  -Transfuse to keep hemoglobin above 7  -GI consulted   -Pantoprazole  -Colonoscopy - s/p Polypectomy, report awaited    # CKD 3B/4:  -Followed by nephrology, consulted on admission  -Avoid nephrotoxic medications  -Creatinine trend 2.0<2.4<2.3< 2.3<2.9    # Lactic acidosis:  -Likely in the setting of GI bleed  -Improved to 1.9 from 3.0    # Essential HTN:  -Held home lisinopril-HCTZ, Norvasc and Imdur in the setting of borderline hypotension on admission  -Currently monitoring blood pressure and will reinstate home medications as necessary for blood pressure above 333 mmHg systolic    # CAD status post PCI to LAD and RCA in 2022:  -Held DAPT and antianginals in the setting of severe anemia and borderline hypotension, resumed on     # Atrial fibrillation status post PPM in 2019:  -Held Eliquis, resume Eliquis per GI from     # HLD:  -Lipitor    Comment: Please note this report has been produced using speech recognition software and may contain errors related to that system
V2.0    [facility] Progress Note      Name:  Luis E Shaffer /Age/Sex: 1945  (73 y.o. male)   MRN & CSN:  [id number] & [id number] Encounter Date/Time: 2023 9:37 AM EDT   [facility] PCP: Michelle Ferguson MD     Attending:Chet Arambula, [address] Day: 9    Assessment and Recommendations   Luis E Shaffer is a 76 y.o. male  who presents with Acute anemia      # Acute Anemia:  -Endorsing having worsening fatigue over 2 weeks, no bleeding or melena, no NSAIDs, on DAPT and Eliquis  -Hemoglobin 5.9 in the ED, down from 7.1 on , baseline 10-12  -Patient status post 2 unit PRBC  -Trended H&H, GI consulted, colonoscopy  s/p polypectomy  -Transfuse to keep hemoglobin above 7  -Continue pantoprazole hemoglobin stable, pending placement    # Acute encephalopathy suspected secondary to medication, patient was treated with Ativan for suspected alcohol withdrawal and patient became unresponsive, discontinued Ativan patient improved to back to baseline, continue to monitor. # CKD 3B/4:  -Followed by nephrology, consulted on admission  -Avoid nephrotoxic medications    # Lactic acidosis: Resolved  -Likely in the setting of GI bleed    # Essential HTN:  -Held home lisinopril-HCTZ, Norvasc and Imdur in the setting of borderline hypotension on admission  -Currently monitoring blood pressure and will reinstate home medications as necessary for blood pressure above 303 mmHg systolic    # CAD status post PCI to LAD and RCA in 2022:  -Held DAPT and antianginals in the setting of severe anemia and borderline hypotension, resumed on     # Atrial fibrillation status post PPM in 2019:  -Held Eliquis, resume Eliquis per GI from     # HLD:  -Lipitor    Comment: Please note this report has been produced using speech recognition software and may contain errors related to that system including errors in grammar, punctuation, and spelling, as well as words and phrases that may be inappropriate.  If there
V2.0    [facility] Progress Note      Name:  Gisela Esteban /Age/Sex: 1945  (73 y.o. male)   MRN & CSN:  [id number] & [id number] Encounter Date/Time: 2023 9:37 AM EDT   Location:  [address] [place] [address] PCP: Elías Parkinson MD     [address] [place] [address] MD       Hospital Day: 5    Assessment and Recommendations   Gisela Esteban is a 76 y.o. male  who presents with Acute anemia      Plan:   Anemia:  -Endorsing having worsening fatigue over 2 weeks, no bleeding or melena, no NSAIDs, on DAPT and Eliquis  -Hemoglobin 5.9 in the ED, down from 7.1 on , baseline 10-12  -Patient status post 2 unit PRBC  -Active type and screen  -Trend H&H every 6 hours  -Transfuse to keep hemoglobin above 7  -GI consult  -Pantoprazole  -Hb trend 9.3<7.4<7.1<7<7.8<7.8<7.9<7.8<6.9<5.7<5.9  -Colonoscopy - s/p Polypectomy, report awaited    CKD 3B/4:  -Followed by nephrology, consulted on admission  -Avoid nephrotoxic medications  -Creatinine trend 2.4<2.3< 2.3<2.9    Lactic acidosis:  -Likely in the setting of GI bleed  -Improved to 1.9 from 3.0    Essential HTN:  -Held home lisinopril-HCTZ, Norvasc and Imdur in the setting of borderline hypotension on admission  -Currently monitoring blood pressure and will reinstate home medications as necessary for blood pressure above 247 mmHg systolic    CAD status post PCI to LAD and RCA in 2022:  -Held DAPT and antianginals in the setting of severe anemia and borderline hypotension, may resume when GI bleed resolves after discussion with GI    Atrial fibrillation status post PPM in 2019:  -Hold Eliquis    HLD:  -Lipitor      Diet ADULT DIET;  Clear Liquid   DVT Prophylaxis [] Lovenox, []  Heparin, [] SCDs, [] Ambulation,  [] Eliquis, [] Xarelto   Code Status Full Code   Disposition From: Home  Expected Disposition: Home  Estimated Date of Discharge: 2-3 days  Patient requires continued admission due to GI bleed   Surrogate Decision Maker/ POA       Personally reviewed Lab Studies and Imaging
V2.0    [facility] [place] [facility]      Name:  Marlo Crabtree /Age/Sex: 1945  (73 y.o. male)   MRN & CSN:  [id number] & [id number] Encounter Date/Time: 2023 9:37 AM EDT   [facility]:  [facility] PCP: Madhu Prescott MD     Attending:Rajeswar Tomasa Hodgkin, [address] Day: 9    Assessment and Recommendations   Marlo Crabtree is a 76 y.o. male  who presents with Acute anemia    # Acute Encephalopathy: As Below  # Suspected Alcohol withdrawal syndrome: Overnight patient had agitation and started on CIWA protocol and given Ativan, Patient is here in hospital from 7 days, less likely in withdrawal, Will stop CIWA and monitor as patient is lethargic this AM.    # AcuteAnemia:  -Danuta Pounds having worsening fatigue over 2 weeks, no bleeding or melena, no NSAIDs, on DAPT and Eliquis  -Hemoglobin 5.9 in the ED, down from 7.1 on , baseline 10-12  -Patient status post 2 unit PRBC  -Active type and screen  -Trend H&H every 6 hours  -Transfuse to keep hemoglobin above 7  -GI consulted   -Pantoprazole  -Colonoscopy - s/p Polypectomy, report awaited    # CKD 3B/4:  -Followed by nephrology, consulted on admission  -Avoid nephrotoxic medications  -Creatinine trend 2.0<2.4<2.3< 2.3<2.9    # Lactic acidosis:  -Likely in the setting of GI bleed  -Improved to 1.9 from 3.0    # Essential HTN:  -Held home lisinopril-HCTZ, Norvasc and Imdur in the setting of borderline hypotension on admission  -Currently monitoring blood pressure and will reinstate home medications as necessary for blood pressure above 218 mmHg systolic    # CAD status post PCI to LAD and RCA in 2022:  -Held DAPT and antianginals in the setting of severe anemia and borderline hypotension, resumed on     # Atrial fibrillation status post PPM in 2019:  -Held Eliquis, resume Eliquis per GI from     # HLD:  -Lipitor    Comment: Please note this report has been produced using speech recognition software and may contain errors related to that system
he will never touch alcohol-follow clinically      Sil Andrea MD MD
the descending colon and sigmoid colon but no acute diverticulitis. Normal appendix. Pelvis: Urinary bladder appears unremarkable. Prostate is enlarged measuring 5.4 x 5.2 cm. No evidence of pelvic lymphadenopathy. Peritoneum/Retroperitoneum: No evidence of retroperitoneal lymphadenopathy. No acute mesenteric findings. Bones/Soft Tissues: No acute abnormality. 1. Redemonstration of ectatic ascending aorta which currently measures 4.6 cm up from 4.4 cm previously. 2. Prominent main pulmonary artery measuring 3.2 cm also previously seen. 3. No active lung parenchyma or pleural disease. Mild left pleural thickening but no evidence of pleural effusion or pneumothorax. 4. Redemonstration of stable bilateral adrenal nodules likely adenomas. 5. Extensive diverticulosis worse in the sigmoid but no acute diverticulitis. 6. Prostatomegaly. No evidence of bladder outlet obstruction. RECOMMENDATIONS: Yearly surveillance for the ectatic aorta. CBC:   Recent Labs     04/30/23  0042 04/30/23  1045 05/01/23  1750 05/02/23  0419 05/02/23  0629   WBC 5.6  --   --   --  5.1   HGB 7.1*  7.1*   < > 7.4* 7.3* 7.7*     --   --   --  157    < > = values in this interval not displayed.      BMP:    Recent Labs     04/30/23  0042 05/01/23  0432 05/02/23  0629    130* 142   K 4.0 4.9 3.6    98* 110   CO2 20* 22 19*   BUN 34* 50* 20   CREATININE 2.3* 2.4* 2.0*   GLUCOSE 120* 96 106*     Hepatic:   Recent Labs     04/30/23  0042 05/01/23  0432 05/02/23  0629   AST 17 25 20   ALT 14 16 16   BILITOT 0.9 0.5 0.8   ALKPHOS 55 94 63     Lipids:   Lab Results   Component Value Date/Time    CHOL 118 12/12/2016 01:00 PM    HDL 49 04/13/2022 12:58 PM    TRIG 103 12/12/2016 01:00 PM     Hemoglobin A1C:   Lab Results   Component Value Date/Time    LABA1C 5.6 04/13/2022 12:58 PM     TSH: No results found for: TSH  Troponin:   Lab Results   Component Value Date/Time    TROPONINT <0.010 04/26/2023 06:00 PM    TROPONINT <0.010
attending neurologist Dr. Christal Solis     Thank you for allowing us to participate in the care of your patient. If there are any questions regarding evaluation please feel free to contact us.      BENSON Jefferson - ASHWIN, 5/4/2023
further evaluation of Parkinson's like tremor  Will continue to follow loosely for neurologic improvement. Patient was discussed with attending neurologist Dr. Tresa Hammond     Thank you for allowing us to participate in the care of your patient. If there are any questions regarding evaluation please feel free to contact us.      Lissette Baldwin, BENSON - ASHWIN, 5/3/2023

## 2023-05-24 ENCOUNTER — TELEPHONE (OUTPATIENT)
Dept: CARDIOLOGY CLINIC | Age: 78
End: 2023-05-24

## 2023-05-31 ENCOUNTER — TELEPHONE (OUTPATIENT)
Dept: CARDIOLOGY CLINIC | Age: 78
End: 2023-05-31

## 2023-05-31 NOTE — TELEPHONE ENCOUNTER
Called patient earlier to see if we could reschedule appointment with Dr Susan Godron. Patients wife called me back and patient scheduled for Watchman class Friday 6/2/2023. Wife states patient is not currently taking any blood thinners. Eliquis and Plavix have been held since he was hospitalized. Dr Susan Gordon notified. Will follow.   Electronically signed by Efrain Castellano RN on 5/31/2023 at 3:29 PM [address] [facility]

## 2023-06-02 ENCOUNTER — OFFICE VISIT (OUTPATIENT)
Dept: CARDIOLOGY CLINIC | Age: 78
End: 2023-06-02
Payer: MEDICARE

## 2023-06-02 VITALS
SYSTOLIC BLOOD PRESSURE: 136 MMHG | HEIGHT: 71 IN | OXYGEN SATURATION: 98 % | WEIGHT: 268.2 LBS | HEART RATE: 77 BPM | DIASTOLIC BLOOD PRESSURE: 74 MMHG | BODY MASS INDEX: 37.55 KG/M2

## 2023-06-02 DIAGNOSIS — I48.19 PERSISTENT ATRIAL FIBRILLATION (HCC): ICD-10-CM

## 2023-06-02 DIAGNOSIS — I48.20 CHRONIC ATRIAL FIBRILLATION (HCC): Primary | ICD-10-CM

## 2023-06-02 PROCEDURE — 3074F SYST BP LT 130 MM HG: CPT | Performed by: INTERNAL MEDICINE

## 2023-06-02 PROCEDURE — 1123F ACP DISCUSS/DSCN MKR DOCD: CPT | Performed by: INTERNAL MEDICINE

## 2023-06-02 PROCEDURE — 99215 OFFICE O/P EST HI 40 MIN: CPT | Performed by: INTERNAL MEDICINE

## 2023-06-02 PROCEDURE — 3078F DIAST BP <80 MM HG: CPT | Performed by: INTERNAL MEDICINE

## 2023-06-02 RX ORDER — PANTOPRAZOLE SODIUM 40 MG/1
40 TABLET, DELAYED RELEASE ORAL
Qty: 30 TABLET | Refills: 5 | Status: SHIPPED | OUTPATIENT
Start: 2023-06-02 | End: 2023-06-13

## 2023-06-02 ASSESSMENT — ENCOUNTER SYMPTOMS
ABDOMINAL PAIN: 0
SORE THROAT: 0
NAUSEA: 0
BACK PAIN: 0
WHEEZING: 0
SHORTNESS OF BREATH: 1
ABDOMINAL DISTENTION: 0
COUGH: 0
BLOOD IN STOOL: 0
CHEST TIGHTNESS: 0

## 2023-06-05 ENCOUNTER — TELEPHONE (OUTPATIENT)
Dept: CARDIOLOGY CLINIC | Age: 78
End: 2023-06-05

## 2023-06-05 NOTE — TELEPHONE ENCOUNTER
Ayah to be scheduled 7/6/2023 at 0830. Spoke with DR Peter Alonzo and the patients wife. E mail sent to [facility]. Will follow.   Electronically signed by Maximiliano Oliver RN on 6/5/2023 at 2:20 PM [address] [facility]

## 2023-06-06 ENCOUNTER — TELEPHONE (OUTPATIENT)
Dept: CARDIOLOGY CLINIC | Age: 78
End: 2023-06-06

## 2023-06-06 NOTE — TELEPHONE ENCOUNTER
Watchman procedure scheduled. SD: 6/13/2023 (Dr Rebekah Lucas)  PPW: 6/9/2023 at 10:30 [facility] office)  SALAZAR: 7/3/2023 at 1000 (arrival of 0900)  Watchman 7/6/2023 at 0830 (arrival of 0630)  Patients wife notified of dates and times. All questions verbalized were answered. Will follow.   Electronically signed by Jacquelin Lopez RN on 6/6/2023 at 12:25 PM [address] [facility]

## 2023-06-07 ENCOUNTER — HOSPITAL ENCOUNTER (OUTPATIENT)
Age: 78
Discharge: HOME | End: 2023-06-07
Payer: MEDICARE

## 2023-06-07 LAB
ALBUMIN SERPL-MCNC: 3.9 GM/DL (ref 3.4–5)
ALP BLD-CCNC: 63 IU/L (ref 40–129)
ALT SERPL-CCNC: 12 U/L (ref 10–40)
ANION GAP SERPL CALCULATED.3IONS-SCNC: 11 MMOL/L (ref 4–16)
AST SERPL-CCNC: 21 IU/L (ref 15–37)
BASOPHILS ABSOLUTE: 0 K/CU MM
BASOPHILS RELATIVE PERCENT: 0.9 % (ref 0–1)
BILIRUB SERPL-MCNC: 0.9 MG/DL (ref 0–1)
BUN SERPL-MCNC: 14 MG/DL (ref 6–23)
CALCIUM SERPL-MCNC: 9.6 MG/DL (ref 8.3–10.6)
CHLORIDE BLD-SCNC: 108 MMOL/L (ref 99–110)
CO2: 23 MMOL/L (ref 21–32)
CREAT SERPL-MCNC: 1.5 MG/DL (ref 0.9–1.3)
DIFFERENTIAL TYPE: ABNORMAL
EOSINOPHILS ABSOLUTE: 0.2 K/CU MM
EOSINOPHILS RELATIVE PERCENT: 4.8 % (ref 0–3)
ESTIMATED AVERAGE GLUCOSE: 88 MG/DL
GFR SERPL CREATININE-BSD FRML MDRD: 47 ML/MIN/1.73M2
GLUCOSE P FAST SERPL-MCNC: 104 MG/DL (ref 70–99)
HBA1C MFR BLD: 4.7 % (ref 4.2–6.3)
HCT VFR BLD CALC: 37.6 % (ref 42–52)
HEMOGLOBIN: 11.4 GM/DL (ref 13.5–18)
IMMATURE NEUTROPHIL %: 0.4 % (ref 0–0.43)
LYMPHOCYTES ABSOLUTE: 0.5 K/CU MM
LYMPHOCYTES RELATIVE PERCENT: 11.4 % (ref 24–44)
MAGNESIUM: 1.8 MG/DL (ref 1.8–2.4)
MCH RBC QN AUTO: 31.3 PG (ref 27–31)
MCHC RBC AUTO-ENTMCNC: 30.3 % (ref 32–36)
MCV RBC AUTO: 103.3 FL (ref 78–100)
MONOCYTES ABSOLUTE: 0.3 K/CU MM
MONOCYTES RELATIVE PERCENT: 7.2 % (ref 0–4)
PDW BLD-RTO: 18.5 % (ref 11.7–14.9)
PLATELET # BLD: 129 K/CU MM (ref 140–440)
PMV BLD AUTO: 9.3 FL (ref 7.5–11.1)
POTASSIUM SERPL-SCNC: 4.7 MMOL/L (ref 3.5–5.1)
RBC # BLD: 3.64 M/CU MM (ref 4.6–6.2)
SEGMENTED NEUTROPHILS ABSOLUTE COUNT: 3.4 K/CU MM
SEGMENTED NEUTROPHILS RELATIVE PERCENT: 75.3 % (ref 36–66)
SODIUM BLD-SCNC: 142 MMOL/L (ref 135–145)
TOTAL IMMATURE NEUTOROPHIL: 0.02 K/CU MM
TOTAL PROTEIN: 6 GM/DL (ref 6.4–8.2)
WBC # BLD: 4.6 K/CU MM (ref 4–10.5)

## 2023-06-07 PROCEDURE — 80053 COMPREHEN METABOLIC PANEL: CPT

## 2023-06-07 PROCEDURE — 84443 ASSAY THYROID STIM HORMONE: CPT

## 2023-06-07 PROCEDURE — 83036 HEMOGLOBIN GLYCOSYLATED A1C: CPT

## 2023-06-07 PROCEDURE — 83735 ASSAY OF MAGNESIUM: CPT

## 2023-06-07 PROCEDURE — 85025 COMPLETE CBC W/AUTO DIFF WBC: CPT

## 2023-06-07 PROCEDURE — 36415 COLL VENOUS BLD VENIPUNCTURE: CPT

## 2023-06-08 ENCOUNTER — TELEPHONE (OUTPATIENT)
Dept: CARDIOLOGY CLINIC | Age: 78
End: 2023-06-08

## 2023-06-08 LAB — TSH SERPL DL<=0.005 MIU/L-ACNC: 2.02 UIU/ML (ref 0.27–4.2)

## 2023-06-08 NOTE — TELEPHONE ENCOUNTER
I initiated authorization for WATCHMAN on 6/8/23 2:02 PM via 11 [facility]  PENDING# [id number]

## 2023-06-09 ENCOUNTER — TELEPHONE (OUTPATIENT)
Dept: CARDIOLOGY CLINIC | Age: 78
End: 2023-06-09

## 2023-06-09 NOTE — TELEPHONE ENCOUNTER
I received authorization for OLIVER on 6/9/23 9:22 AM via 11 [facility]  AUTH#: [id number]  VALID:  7/6-7/7/23

## 2023-06-24 ENCOUNTER — HOSPITAL ENCOUNTER (OUTPATIENT)
Age: 78
Discharge: HOME | End: 2023-06-24
Payer: MEDICARE

## 2023-06-24 DIAGNOSIS — D50.0 IRON DEFICIENCY ANEMIA DUE TO CHRONIC BLOOD LOSS: ICD-10-CM

## 2023-06-24 LAB
HCT VFR BLD CALC: 36.6 % (ref 42–52)
HEMOGLOBIN: 11.3 GM/DL (ref 13.5–18)
MCH RBC QN AUTO: 31.8 PG (ref 27–31)
MCHC RBC AUTO-ENTMCNC: 30.9 % (ref 32–36)
MCV RBC AUTO: 103.1 FL (ref 78–100)
PDW BLD-RTO: 17.2 % (ref 11.7–14.9)
PLATELET # BLD: 110 K/CU MM (ref 140–440)
PMV BLD AUTO: 9.7 FL (ref 7.5–11.1)
RBC # BLD: 3.55 M/CU MM (ref 4.6–6.2)
WBC # BLD: 4.2 K/CU MM (ref 4–10.5)

## 2023-06-24 PROCEDURE — 85027 COMPLETE CBC AUTOMATED: CPT

## 2023-06-24 PROCEDURE — 36415 COLL VENOUS BLD VENIPUNCTURE: CPT

## 2023-06-29 ENCOUNTER — HOSPITAL ENCOUNTER (OUTPATIENT)
Age: 78
Discharge: HOME | End: 2023-06-29
Payer: MEDICARE

## 2023-06-29 ENCOUNTER — HOSPITAL ENCOUNTER (OUTPATIENT)
Dept: GENERAL RADIOLOGY | Age: 78
Discharge: HOME | End: 2023-06-29
Payer: MEDICARE

## 2023-06-29 ENCOUNTER — NURSE ONLY (OUTPATIENT)
Dept: CARDIOLOGY CLINIC | Age: 78
End: 2023-06-29

## 2023-06-29 ENCOUNTER — HOSPITAL ENCOUNTER (OUTPATIENT)
Age: 78
Setting detail: SPECIMEN
Discharge: HOME | End: 2023-06-29
Payer: MEDICARE

## 2023-06-29 DIAGNOSIS — I48.91 ATRIAL FIBRILLATION, UNSPECIFIED TYPE (HCC): ICD-10-CM

## 2023-06-29 DIAGNOSIS — I48.91 ATRIAL FIBRILLATION, UNSPECIFIED TYPE (HCC): Primary | ICD-10-CM

## 2023-06-29 DIAGNOSIS — Z01.810 PRE-OPERATIVE CARDIOVASCULAR EXAMINATION: ICD-10-CM

## 2023-06-29 DIAGNOSIS — Z79.01 LONG TERM (CURRENT) USE OF ANTICOAGULANTS: ICD-10-CM

## 2023-06-29 LAB
ANION GAP SERPL CALCULATED.3IONS-SCNC: 14 MMOL/L (ref 4–16)
APTT: 36.3 SECONDS (ref 25.1–37.1)
BUN SERPL-MCNC: 14 MG/DL (ref 6–23)
CALCIUM SERPL-MCNC: 8.8 MG/DL (ref 8.3–10.6)
CHLORIDE BLD-SCNC: 104 MMOL/L (ref 99–110)
CO2: 23 MMOL/L (ref 21–32)
CREAT SERPL-MCNC: 1.9 MG/DL (ref 0.9–1.3)
GFR SERPL CREATININE-BSD FRML MDRD: 35 ML/MIN/1.73M2
GLUCOSE SERPL-MCNC: 113 MG/DL (ref 70–99)
HCT VFR BLD CALC: 38.1 % (ref 42–52)
HEMOGLOBIN: 11.5 GM/DL (ref 13.5–18)
INR BLD: 1.5 INDEX
MAGNESIUM: 1.7 MG/DL (ref 1.8–2.4)
MCH RBC QN AUTO: 31.8 PG (ref 27–31)
MCHC RBC AUTO-ENTMCNC: 30.2 % (ref 32–36)
MCV RBC AUTO: 105.2 FL (ref 78–100)
PDW BLD-RTO: 17.1 % (ref 11.7–14.9)
PHOSPHORUS: 3 MG/DL (ref 2.5–4.9)
PLATELET # BLD: 179 K/CU MM (ref 140–440)
PMV BLD AUTO: 10.4 FL (ref 7.5–11.1)
POTASSIUM SERPL-SCNC: 4.2 MMOL/L (ref 3.5–5.1)
PROTHROMBIN TIME: 17.5 SECONDS (ref 11.7–14.5)
RBC # BLD: 3.62 M/CU MM (ref 4.6–6.2)
SODIUM BLD-SCNC: 141 MMOL/L (ref 135–145)
WBC # BLD: 6.2 K/CU MM (ref 4–10.5)

## 2023-06-29 PROCEDURE — 71046 X-RAY EXAM CHEST 2 VIEWS: CPT

## 2023-06-29 PROCEDURE — 36415 COLL VENOUS BLD VENIPUNCTURE: CPT

## 2023-06-29 PROCEDURE — 85610 PROTHROMBIN TIME: CPT

## 2023-06-29 PROCEDURE — 85027 COMPLETE CBC AUTOMATED: CPT

## 2023-06-29 PROCEDURE — 84100 ASSAY OF PHOSPHORUS: CPT

## 2023-06-29 PROCEDURE — 80048 BASIC METABOLIC PNL TOTAL CA: CPT

## 2023-06-29 PROCEDURE — 83735 ASSAY OF MAGNESIUM: CPT

## 2023-06-29 PROCEDURE — 87635 SARS-COV-2 COVID-19 AMP PRB: CPT

## 2023-06-29 PROCEDURE — 85730 THROMBOPLASTIN TIME PARTIAL: CPT

## 2023-06-30 ENCOUNTER — ANESTHESIA EVENT (OUTPATIENT)
Dept: NON INVASIVE DIAGNOSTICS | Age: 78
End: 2023-06-30

## 2023-06-30 LAB
SARS-COV-2 RNA RESP QL NAA+PROBE: NOT DETECTED
SOURCE: NORMAL

## 2023-07-03 ENCOUNTER — HOSPITAL ENCOUNTER (OUTPATIENT)
Dept: NON INVASIVE DIAGNOSTICS | Age: 78
Discharge: HOME | End: 2023-07-03
Payer: MEDICARE

## 2023-07-03 ENCOUNTER — TELEPHONE (OUTPATIENT)
Dept: CARDIAC CATH/INVASIVE PROCEDURES | Age: 78
End: 2023-07-03

## 2023-07-03 ENCOUNTER — ANESTHESIA (OUTPATIENT)
Dept: NON INVASIVE DIAGNOSTICS | Age: 78
End: 2023-07-03

## 2023-07-03 VITALS
HEART RATE: 61 BPM | SYSTOLIC BLOOD PRESSURE: 139 MMHG | DIASTOLIC BLOOD PRESSURE: 76 MMHG | RESPIRATION RATE: 11 BRPM | OXYGEN SATURATION: 96 %

## 2023-07-03 LAB
LV EF: 48 %
LVEF MODALITY: NORMAL

## 2023-07-03 PROCEDURE — 3700000001 HC ADD 15 MINUTES (ANESTHESIA)

## 2023-07-03 PROCEDURE — 2500000003 HC RX 250 WO HCPCS: Performed by: NURSE ANESTHETIST, CERTIFIED REGISTERED

## 2023-07-03 PROCEDURE — 2580000003 HC RX 258: Performed by: NURSE ANESTHETIST, CERTIFIED REGISTERED

## 2023-07-03 PROCEDURE — 2500000003 HC RX 250 WO HCPCS

## 2023-07-03 PROCEDURE — 3700000000 HC ANESTHESIA ATTENDED CARE

## 2023-07-03 PROCEDURE — 93325 DOPPLER ECHO COLOR FLOW MAPG: CPT | Performed by: INTERNAL MEDICINE

## 2023-07-03 PROCEDURE — 6360000002 HC RX W HCPCS

## 2023-07-03 PROCEDURE — 6360000002 HC RX W HCPCS: Performed by: NURSE ANESTHETIST, CERTIFIED REGISTERED

## 2023-07-03 PROCEDURE — 93312 ECHO TRANSESOPHAGEAL: CPT

## 2023-07-03 PROCEDURE — 7100000000 HC PACU RECOVERY - FIRST 15 MIN

## 2023-07-03 PROCEDURE — 93321 DOPPLER ECHO F-UP/LMTD STD: CPT | Performed by: INTERNAL MEDICINE

## 2023-07-03 PROCEDURE — 93312 ECHO TRANSESOPHAGEAL: CPT | Performed by: INTERNAL MEDICINE

## 2023-07-03 PROCEDURE — 7100000001 HC PACU RECOVERY - ADDTL 15 MIN

## 2023-07-03 RX ORDER — PROPOFOL 10 MG/ML
INJECTION, EMULSION INTRAVENOUS PRN
Status: DISCONTINUED | OUTPATIENT
Start: 2023-07-03 | End: 2023-07-03 | Stop reason: SDUPTHER

## 2023-07-03 RX ORDER — SODIUM CHLORIDE 9 MG/ML
INJECTION, SOLUTION INTRAVENOUS CONTINUOUS PRN
Status: DISCONTINUED | OUTPATIENT
Start: 2023-07-03 | End: 2023-07-03 | Stop reason: SDUPTHER

## 2023-07-03 RX ORDER — LIDOCAINE HYDROCHLORIDE 20 MG/ML
INJECTION, SOLUTION EPIDURAL; INFILTRATION; INTRACAUDAL; PERINEURAL PRN
Status: DISCONTINUED | OUTPATIENT
Start: 2023-07-03 | End: 2023-07-03 | Stop reason: SDUPTHER

## 2023-07-03 RX ORDER — CLOPIDOGREL BISULFATE 75 MG/1
75 TABLET ORAL DAILY
Qty: 30 TABLET | Refills: 5 | Status: SHIPPED | OUTPATIENT
Start: 2023-07-05

## 2023-07-03 RX ADMIN — SODIUM CHLORIDE: 9 INJECTION, SOLUTION INTRAVENOUS at 10:47

## 2023-07-03 RX ADMIN — PROPOFOL 130 MG: 10 INJECTION, EMULSION INTRAVENOUS at 11:00

## 2023-07-03 RX ADMIN — LIDOCAINE HYDROCHLORIDE 100 MG: 20 INJECTION, SOLUTION EPIDURAL; INFILTRATION; INTRACAUDAL; PERINEURAL at 11:00

## 2023-07-03 ASSESSMENT — ENCOUNTER SYMPTOMS
BACK PAIN: 0
ABDOMINAL DISTENTION: 0
SORE THROAT: 0
CHEST TIGHTNESS: 0
SHORTNESS OF BREATH: 1
ABDOMINAL PAIN: 0
BLOOD IN STOOL: 0
COUGH: 0
WHEEZING: 0
NAUSEA: 0

## 2023-07-03 NOTE — ANESTHESIA POSTPROCEDURE EVALUATION
Department of Anesthesiology  Postprocedure Note    Patient: Saritha Macias  MRN: [id number]  YOB: 1945  Date of evaluation: 7/3/2023      Procedure Summary     Date: 07/03/23 Room / Location: [address] [place] [address] Stress Lab    Anesthesia Start: 1059 Anesthesia Stop: 1115    Procedure: TRANSESOPHAGEAL ECHOCARDIOGRAM Diagnosis: Unspecified atrial fibrillation    Scheduled Providers:  Responsible Provider: Jamar Rider MD    Anesthesia Type: MAC ASA Status: 4          Anesthesia Type: No value filed.     Kiel Phase I: Kiel Score: 10    Kiel Phase II:  10      Anesthesia Post Evaluation    Patient location during evaluation: bedside  Patient participation: complete - patient participated  Level of consciousness: awake and alert  Pain score: 0  Airway patency: patent  Nausea & Vomiting: no nausea and no vomiting  Complications: no  Cardiovascular status: hemodynamically stable  Respiratory status: acceptable, room air, spontaneous ventilation and nonlabored ventilation  Hydration status: euvolemic

## 2023-07-03 NOTE — TELEPHONE ENCOUNTER
Medications reviewed for upcoming Watchman. Eliquis: Last dose Wed PM  ASA: Do not interrupt  Plavix: Start Wed PM.  Plavix Rx called to [facility] per Dr Pepito Johnston. Labs/ CXR results reviewed. Attempted several times to reach the patient and both of his alternate contacts without success. [facility] called and they will write the start date on the bottle for 7/5/2023 pm to start. I will attempt to reach again on Wednesday am.  Will follow.   Electronically signed by Dominique Aleman RN on 7/3/2023 at 5:34 PM [address] [place] [address]

## 2023-07-03 NOTE — DISCHARGE INSTR - COC
Continuity of Care Form    Patient Name: Dakota Sorenson   :    MRN:  [id number]    Admit date:  7/3/2023  Discharge date:  ***    Code Status Order: Prior   Advance Directives:     Admitting Physician:  No admitting provider for patient encounter. PCP: Kandi Ludwig MD    Discharging Nurse: [facility] Unit/Room#: No information available for this encounter.   Discharging Unit Phone Number: ***    Emergency Contact:   Extended Emergency Contact Information  Primary Emergency Contact: Lyndsay Nguyen [facility]  Address: [address] [place] [address] [place] of [address] Phone: [phone]  Mobile Phone: [phone]  Relation: Spouse  Secondary Emergency Contact: Grady blanco [facility]   [place] of [address] Phone: [phone]  Relation: Child    Past Surgical History:  Past Surgical History:   Procedure Laterality Date    CARDIOVERSION  2019    CATARACT REMOVAL WITH IMPLANT Right 2019    CATARACT REMOVAL WITH IMPLANT Left 05/10/2019    COLON SURGERY  2007     COLONOSCOPY  ;2017    colon polyps removed    COLONOSCOPY N/A 2023    COLONOSCOPY POLYPECTOMY ABLATION WITH CLIP PLACEMENT X 3 performed by Philly Mckenzie MD at [address] [place] [address]      HERNIA REPAIR      INTRACAPSULAR CATARACT EXTRACTION Right 2019    EYE CATARACT EMULSIFICATION IOL IMPLANT performed by Carol Benavidez MD at [address] [place] [address] And [address] [place] [address] Left 5/10/2019    EYE CATARACT EMULSIFICATION IOL IMPLANT performed by Carol Benavidez MD at [address] [place] [address] 2019    [employer] dual permanent pacer-Val XT DR MONIQUE SureScan     TOTAL KNEE ARTHROPLASTY Right 2022    RIGHT KNEE TOTAL ARTHROPLASTY performed by Darin Renee DO at [facility] 2023    EGD BIOPSY performed by Philly Mckenzie MD at [facility] ENDOSCOPY       Immunization History:   Immunization

## 2023-07-03 NOTE — PROGRESS NOTES
Medications reviewed for upcoming Watchman. Eliquis: Last dose Wed PM  ASA: Do not interrupt  Plavix: Start Wed PM.  Plavix Rx called to [facility] pharmacy per Dr Morgan Mccarthy. Labs/ CXR results reviewed. Attempted several times to reach the patient and both of his alternate contacts without success. [facility] called and they will write the start date on the bottle for 7/5/2023 pm to start. I will attempt to reach again on Wednesday am.  Will follow.   Electronically signed by Vidhi Montague RN on 7/3/2023 at 5:34 PM [address] [place] [address]

## 2023-07-03 NOTE — H&P
Electrophysiology H&p  Note      Reason for consultation:  Assess for watchman    Chief complaint :  prewatchman SALAZAR    Referring physician: Herbert Kennedy      Primary care physician: Alejandra Laureano MD      History of Present Illness: Today visit (07/03/23)    Patient here for prewatchman SALAZAR. No change in H&P noted from previous clinic visit. Previous visit (6/2/2023)    Patient here to discuss watchman  He attended the education class today too    Patient had a GI bleeding in April and hemoglobin was down to 5.9 and patient had colonoscopy and had 29 polyps removed and he was okay to be started on Eliquis back on May 1 but somehow in the nursing home it looks like it was discontinued. Patient did not restart any Eliquis he was not even on aspirin or Plavix    Patient and his wife are concerned  He is still weak. Patient denies chest pain. Has shortness of breath with moderate exertion. Denies dizziness or syncope  Denies further bleeding          Previous visit:     Natasha Law is a 76year old male with a history of CAD s/p PCI to LAD and RCA, atrial fibrillation, JAYE on cpap, diabetes type 2, hypertension, hyperlipidemia, hard of hearing, CKD, tachycardia bradycardia s/p pacemaker presents with gradually worsening fatigue for the last 2 weeks. Patient was recently seen by Nephrologist and was sent tot he ER. Patient has known low hemoglobin this past 1 month. Patient was found to have hemoglobin of 5.9. He has received blood transfusions this admission. Patient has history of atrial fibrillation and is on anticoagulation. He was short of breath but now he is better  EP was consulted for possible watchman device. Patient complains of fatigue. He denies chest pain, palpitations, shortness of breath, lightheadedness, dizziness, edema or syncope. He denies blood in stool.         Pastmedical history:   Past Medical History:   Diagnosis Date    Abnormal nuclear

## 2023-07-05 ENCOUNTER — TELEPHONE (OUTPATIENT)
Dept: CARDIOLOGY CLINIC | Age: 78
End: 2023-07-05

## 2023-07-05 NOTE — TELEPHONE ENCOUNTER
Ayah pre procedure call done:  AC as follows:  ASA: 7/6/2023 am with sip of water  Plavix: 7/5/2023 with PM medications. Eliquis: 7/5/2023 at 7 PM  All questions verbalized were answered. Will follow.   Electronically signed by Aurea Westfall RN on 7/5/2023 at 12:47 PM [address] [place] [address]

## 2023-07-06 ENCOUNTER — HOSPITAL ENCOUNTER (INPATIENT)
Dept: CARDIAC CATH/INVASIVE PROCEDURES | Age: 78
LOS: 1 days | Discharge: HOME | DRG: 274 | End: 2023-07-06
Attending: INTERNAL MEDICINE | Admitting: INTERNAL MEDICINE
Payer: MEDICARE

## 2023-07-06 ENCOUNTER — ANESTHESIA EVENT (OUTPATIENT)
Dept: CARDIAC CATH/INVASIVE PROCEDURES | Age: 78
End: 2023-07-06
Payer: MEDICARE

## 2023-07-06 ENCOUNTER — ANESTHESIA (OUTPATIENT)
Dept: CARDIAC CATH/INVASIVE PROCEDURES | Age: 78
End: 2023-07-06
Payer: MEDICARE

## 2023-07-06 VITALS
SYSTOLIC BLOOD PRESSURE: 121 MMHG | BODY MASS INDEX: 38.5 KG/M2 | WEIGHT: 275 LBS | DIASTOLIC BLOOD PRESSURE: 80 MMHG | RESPIRATION RATE: 18 BRPM | TEMPERATURE: 97.8 F | OXYGEN SATURATION: 96 % | HEIGHT: 71 IN | HEART RATE: 60 BPM

## 2023-07-06 PROBLEM — Z95.818 PRESENCE OF WATCHMAN LEFT ATRIAL APPENDAGE CLOSURE DEVICE: Status: ACTIVE | Noted: 2023-07-06

## 2023-07-06 LAB
ACTIVATED CLOTTING TIME, LOW RANGE: 172 SEC
ACTIVATED CLOTTING TIME, LOW RANGE: >400 SEC
ACTIVATED CLOTTING TIME, LOW RANGE: >400 SEC
BASE EXCESS MIXED: 2.5 (ref 0–1.2)
BASE EXCESS: ABNORMAL (ref 0–3.3)
CO2: 28 MMOL/L (ref 21–32)
EGFR, POC: 31 ML/MIN/1.73M2
GLUCOSE BLD-MCNC: 114 MG/DL (ref 70–99)
GLUCOSE BLD-MCNC: 127 MG/DL (ref 70–99)
HCO3 ARTERIAL: 26.7 MMOL/L (ref 18–23)
HCT VFR BLD CALC: 35 % (ref 42–52)
HEMOGLOBIN: 11.9 GM/DL (ref 13.5–18)
LV EF: 45 %
LV EF: 50 %
LVEF MODALITY: NORMAL
LVEF MODALITY: NORMAL
O2 SATURATION: 100 % (ref 96–97)
PCO2 ARTERIAL: 38.9 MMHG (ref 32–45)
PH BLOOD: 7.45 (ref 7.34–7.45)
PO2 ARTERIAL: 398.3 MMHG (ref 75–100)
POC CALCIUM: 1.11 MMOL/L (ref 1.12–1.32)
POC CHLORIDE: 106 MMOL/L (ref 98–109)
POC CREATININE: 2.1 MG/DL (ref 0.9–1.3)
POTASSIUM SERPL-SCNC: 3.1 MMOL/L (ref 3.5–4.5)
SODIUM BLD-SCNC: 143 MMOL/L (ref 138–146)
SOURCE, BLOOD GAS: ABNORMAL

## 2023-07-06 PROCEDURE — 3700000001 HC ADD 15 MINUTES (ANESTHESIA)

## 2023-07-06 PROCEDURE — 33340 PERQ CLSR TCAT L ATR APNDGE: CPT

## 2023-07-06 PROCEDURE — 86901 BLOOD TYPING SEROLOGIC RH(D): CPT

## 2023-07-06 PROCEDURE — 82803 BLOOD GASES ANY COMBINATION: CPT

## 2023-07-06 PROCEDURE — 82565 ASSAY OF CREATININE: CPT

## 2023-07-06 PROCEDURE — 93312 ECHO TRANSESOPHAGEAL: CPT

## 2023-07-06 PROCEDURE — 2500000003 HC RX 250 WO HCPCS

## 2023-07-06 PROCEDURE — 93312 ECHO TRANSESOPHAGEAL: CPT | Performed by: INTERNAL MEDICINE

## 2023-07-06 PROCEDURE — 6360000004 HC RX CONTRAST MEDICATION

## 2023-07-06 PROCEDURE — 82962 GLUCOSE BLOOD TEST: CPT

## 2023-07-06 PROCEDURE — 3700000000 HC ANESTHESIA ATTENDED CARE

## 2023-07-06 PROCEDURE — 33340 PERQ CLSR TCAT L ATR APNDGE: CPT | Performed by: INTERNAL MEDICINE

## 2023-07-06 PROCEDURE — B246ZZ4 ULTRASONOGRAPHY OF RIGHT AND LEFT HEART, TRANSESOPHAGEAL: ICD-10-PCS | Performed by: INTERNAL MEDICINE

## 2023-07-06 PROCEDURE — 7100000000 HC PACU RECOVERY - FIRST 15 MIN

## 2023-07-06 PROCEDURE — 7100000010 HC PHASE II RECOVERY - FIRST 15 MIN

## 2023-07-06 PROCEDURE — 1200000000 HC SEMI PRIVATE

## 2023-07-06 PROCEDURE — 02L73DK OCCLUSION OF LEFT ATRIAL APPENDAGE WITH INTRALUMINAL DEVICE, PERCUTANEOUS APPROACH: ICD-10-PCS | Performed by: INTERNAL MEDICINE

## 2023-07-06 PROCEDURE — 85018 HEMOGLOBIN: CPT

## 2023-07-06 PROCEDURE — 85014 HEMATOCRIT: CPT

## 2023-07-06 PROCEDURE — 85347 COAGULATION TIME ACTIVATED: CPT

## 2023-07-06 PROCEDURE — 2580000003 HC RX 258

## 2023-07-06 PROCEDURE — B24BZZ4 ULTRASONOGRAPHY OF HEART WITH AORTA, TRANSESOPHAGEAL: ICD-10-PCS | Performed by: INTERNAL MEDICINE

## 2023-07-06 PROCEDURE — C1889 IMPLANT/INSERT DEVICE, NOC: HCPCS

## 2023-07-06 PROCEDURE — 6360000002 HC RX W HCPCS

## 2023-07-06 PROCEDURE — 2709999900 HC NON-CHARGEABLE SUPPLY

## 2023-07-06 PROCEDURE — 2720000010 HC SURG SUPPLY STERILE

## 2023-07-06 PROCEDURE — 7100000011 HC PHASE II RECOVERY - ADDTL 15 MIN

## 2023-07-06 PROCEDURE — 36620 INSERTION CATHETER ARTERY: CPT | Performed by: ANESTHESIOLOGY

## 2023-07-06 PROCEDURE — 2580000003 HC RX 258: Performed by: INTERNAL MEDICINE

## 2023-07-06 PROCEDURE — 80051 ELECTROLYTE PANEL: CPT

## 2023-07-06 PROCEDURE — 6360000002 HC RX W HCPCS: Performed by: INTERNAL MEDICINE

## 2023-07-06 PROCEDURE — 86922 COMPATIBILITY TEST ANTIGLOB: CPT

## 2023-07-06 PROCEDURE — 86850 RBC ANTIBODY SCREEN: CPT

## 2023-07-06 PROCEDURE — C1894 INTRO/SHEATH, NON-LASER: HCPCS

## 2023-07-06 PROCEDURE — C1769 GUIDE WIRE: HCPCS

## 2023-07-06 PROCEDURE — 93308 TTE F-UP OR LMTD: CPT

## 2023-07-06 PROCEDURE — 86900 BLOOD TYPING SEROLOGIC ABO: CPT

## 2023-07-06 PROCEDURE — C1760 CLOSURE DEV, VASC: HCPCS

## 2023-07-06 PROCEDURE — 7100000001 HC PACU RECOVERY - ADDTL 15 MIN

## 2023-07-06 RX ORDER — LABETALOL HYDROCHLORIDE 5 MG/ML
10 INJECTION, SOLUTION INTRAVENOUS
Status: DISCONTINUED | OUTPATIENT
Start: 2023-07-06 | End: 2023-07-06 | Stop reason: HOSPADM

## 2023-07-06 RX ORDER — FENTANYL CITRATE 50 UG/ML
50 INJECTION, SOLUTION INTRAMUSCULAR; INTRAVENOUS EVERY 5 MIN PRN
Status: DISCONTINUED | OUTPATIENT
Start: 2023-07-06 | End: 2023-07-06 | Stop reason: HOSPADM

## 2023-07-06 RX ORDER — TORSEMIDE 20 MG/1
20 TABLET ORAL DAILY
Status: DISCONTINUED | OUTPATIENT
Start: 2023-07-06 | End: 2023-07-06 | Stop reason: HOSPADM

## 2023-07-06 RX ORDER — SODIUM CHLORIDE 0.9 % (FLUSH) 0.9 %
5-40 SYRINGE (ML) INJECTION PRN
Status: DISCONTINUED | OUTPATIENT
Start: 2023-07-06 | End: 2023-07-06 | Stop reason: HOSPADM

## 2023-07-06 RX ORDER — FOLIC ACID 1 MG/1
1 TABLET ORAL DAILY
Status: DISCONTINUED | OUTPATIENT
Start: 2023-07-06 | End: 2023-07-06 | Stop reason: HOSPADM

## 2023-07-06 RX ORDER — ACETAMINOPHEN 325 MG/1
650 TABLET ORAL EVERY 4 HOURS PRN
Status: DISCONTINUED | OUTPATIENT
Start: 2023-07-06 | End: 2023-07-06 | Stop reason: HOSPADM

## 2023-07-06 RX ORDER — PHENYLEPHRINE HYDROCHLORIDE 10 MG/ML
INJECTION INTRAVENOUS PRN
Status: DISCONTINUED | OUTPATIENT
Start: 2023-07-06 | End: 2023-07-06 | Stop reason: SDUPTHER

## 2023-07-06 RX ORDER — FENTANYL CITRATE 50 UG/ML
INJECTION, SOLUTION INTRAMUSCULAR; INTRAVENOUS PRN
Status: DISCONTINUED | OUTPATIENT
Start: 2023-07-06 | End: 2023-07-06 | Stop reason: SDUPTHER

## 2023-07-06 RX ORDER — ROCURONIUM BROMIDE 10 MG/ML
INJECTION, SOLUTION INTRAVENOUS PRN
Status: DISCONTINUED | OUTPATIENT
Start: 2023-07-06 | End: 2023-07-06 | Stop reason: SDUPTHER

## 2023-07-06 RX ORDER — MAGNESIUM OXIDE 400 MG/1
400 TABLET ORAL DAILY
Status: DISCONTINUED | OUTPATIENT
Start: 2023-07-06 | End: 2023-07-06 | Stop reason: HOSPADM

## 2023-07-06 RX ORDER — HEPARIN SODIUM 1000 [USP'U]/ML
INJECTION, SOLUTION INTRAVENOUS; SUBCUTANEOUS PRN
Status: DISCONTINUED | OUTPATIENT
Start: 2023-07-06 | End: 2023-07-06 | Stop reason: SDUPTHER

## 2023-07-06 RX ORDER — PROTAMINE SULFATE 10 MG/ML
INJECTION, SOLUTION INTRAVENOUS PRN
Status: DISCONTINUED | OUTPATIENT
Start: 2023-07-06 | End: 2023-07-06 | Stop reason: SDUPTHER

## 2023-07-06 RX ORDER — SODIUM CHLORIDE 9 MG/ML
INJECTION, SOLUTION INTRAVENOUS PRN
Status: DISCONTINUED | OUTPATIENT
Start: 2023-07-06 | End: 2023-07-06 | Stop reason: HOSPADM

## 2023-07-06 RX ORDER — CLOPIDOGREL BISULFATE 75 MG/1
75 TABLET ORAL DAILY
Status: DISCONTINUED | OUTPATIENT
Start: 2023-07-06 | End: 2023-07-06 | Stop reason: HOSPADM

## 2023-07-06 RX ORDER — DROPERIDOL 2.5 MG/ML
0.62 INJECTION, SOLUTION INTRAMUSCULAR; INTRAVENOUS
Status: DISCONTINUED | OUTPATIENT
Start: 2023-07-06 | End: 2023-07-06 | Stop reason: HOSPADM

## 2023-07-06 RX ORDER — ONDANSETRON 2 MG/ML
4 INJECTION INTRAMUSCULAR; INTRAVENOUS
Status: DISCONTINUED | OUTPATIENT
Start: 2023-07-06 | End: 2023-07-06 | Stop reason: HOSPADM

## 2023-07-06 RX ORDER — MAGNESIUM SULFATE IN WATER 40 MG/ML
2000 INJECTION, SOLUTION INTRAVENOUS ONCE
Status: COMPLETED | OUTPATIENT
Start: 2023-07-06 | End: 2023-07-06

## 2023-07-06 RX ORDER — SODIUM CHLORIDE 0.9 % (FLUSH) 0.9 %
5-40 SYRINGE (ML) INJECTION EVERY 12 HOURS SCHEDULED
Status: DISCONTINUED | OUTPATIENT
Start: 2023-07-06 | End: 2023-07-06 | Stop reason: HOSPADM

## 2023-07-06 RX ORDER — ONDANSETRON 2 MG/ML
INJECTION INTRAMUSCULAR; INTRAVENOUS PRN
Status: DISCONTINUED | OUTPATIENT
Start: 2023-07-06 | End: 2023-07-06 | Stop reason: SDUPTHER

## 2023-07-06 RX ORDER — MONTELUKAST SODIUM 10 MG/1
10 TABLET ORAL DAILY
Status: DISCONTINUED | OUTPATIENT
Start: 2023-07-06 | End: 2023-07-06 | Stop reason: HOSPADM

## 2023-07-06 RX ORDER — ATORVASTATIN CALCIUM 10 MG/1
20 TABLET, FILM COATED ORAL DAILY
Status: DISCONTINUED | OUTPATIENT
Start: 2023-07-06 | End: 2023-07-06 | Stop reason: HOSPADM

## 2023-07-06 RX ORDER — SODIUM CHLORIDE 9 MG/ML
INJECTION, SOLUTION INTRAVENOUS PRN
Status: COMPLETED | OUTPATIENT
Start: 2023-07-06 | End: 2023-07-06

## 2023-07-06 RX ORDER — HYDRALAZINE HYDROCHLORIDE 20 MG/ML
10 INJECTION INTRAMUSCULAR; INTRAVENOUS
Status: DISCONTINUED | OUTPATIENT
Start: 2023-07-06 | End: 2023-07-06 | Stop reason: HOSPADM

## 2023-07-06 RX ORDER — FERROUS SULFATE 325(65) MG
325 TABLET ORAL
Status: DISCONTINUED | OUTPATIENT
Start: 2023-07-07 | End: 2023-07-06 | Stop reason: HOSPADM

## 2023-07-06 RX ORDER — PROPOFOL 10 MG/ML
INJECTION, EMULSION INTRAVENOUS PRN
Status: DISCONTINUED | OUTPATIENT
Start: 2023-07-06 | End: 2023-07-06 | Stop reason: SDUPTHER

## 2023-07-06 RX ORDER — DEXAMETHASONE SODIUM PHOSPHATE 4 MG/ML
INJECTION, SOLUTION INTRA-ARTICULAR; INTRALESIONAL; INTRAMUSCULAR; INTRAVENOUS; SOFT TISSUE PRN
Status: DISCONTINUED | OUTPATIENT
Start: 2023-07-06 | End: 2023-07-06 | Stop reason: SDUPTHER

## 2023-07-06 RX ORDER — LIDOCAINE HYDROCHLORIDE 20 MG/ML
INJECTION, SOLUTION INTRAVENOUS PRN
Status: DISCONTINUED | OUTPATIENT
Start: 2023-07-06 | End: 2023-07-06 | Stop reason: SDUPTHER

## 2023-07-06 RX ORDER — MEPERIDINE HYDROCHLORIDE 25 MG/ML
12.5 INJECTION INTRAMUSCULAR; INTRAVENOUS; SUBCUTANEOUS EVERY 5 MIN PRN
Status: DISCONTINUED | OUTPATIENT
Start: 2023-07-06 | End: 2023-07-06 | Stop reason: HOSPADM

## 2023-07-06 RX ADMIN — ROCURONIUM BROMIDE 50 MG: 10 INJECTION INTRAVENOUS at 09:20

## 2023-07-06 RX ADMIN — PROTAMINE SULFATE 20 MG: 10 INJECTION, SOLUTION INTRAVENOUS at 10:28

## 2023-07-06 RX ADMIN — HEPARIN SODIUM 7000 UNITS: 1000 INJECTION, SOLUTION INTRAVENOUS; SUBCUTANEOUS at 09:50

## 2023-07-06 RX ADMIN — SUGAMMADEX 200 MG: 100 INJECTION, SOLUTION INTRAVENOUS at 10:23

## 2023-07-06 RX ADMIN — HEPARIN SODIUM 7000 UNITS: 1000 INJECTION, SOLUTION INTRAVENOUS; SUBCUTANEOUS at 09:58

## 2023-07-06 RX ADMIN — ONDANSETRON 4 MG: 2 INJECTION INTRAMUSCULAR; INTRAVENOUS at 09:59

## 2023-07-06 RX ADMIN — PROPOFOL 100 MG: 10 INJECTION, EMULSION INTRAVENOUS at 09:20

## 2023-07-06 RX ADMIN — SODIUM CHLORIDE: 9 INJECTION, SOLUTION INTRAVENOUS at 09:03

## 2023-07-06 RX ADMIN — LIDOCAINE HYDROCHLORIDE 100 MG: 20 INJECTION, SOLUTION INTRAVENOUS at 09:20

## 2023-07-06 RX ADMIN — FENTANYL CITRATE 50 MCG: 50 INJECTION, SOLUTION INTRAMUSCULAR; INTRAVENOUS at 09:20

## 2023-07-06 RX ADMIN — CEFAZOLIN 2000 MG: 2 INJECTION, POWDER, FOR SOLUTION INTRAMUSCULAR; INTRAVENOUS at 09:35

## 2023-07-06 RX ADMIN — PHENYLEPHRINE HYDROCHLORIDE 15 MCG/MIN: 10 INJECTION INTRAVENOUS at 09:49

## 2023-07-06 RX ADMIN — MAGNESIUM SULFATE HEPTAHYDRATE 2000 MG: 40 INJECTION, SOLUTION INTRAVENOUS at 07:25

## 2023-07-06 RX ADMIN — PHENYLEPHRINE HYDROCHLORIDE 50 MCG: 10 INJECTION INTRAVENOUS at 09:27

## 2023-07-06 RX ADMIN — DEXAMETHASONE SODIUM PHOSPHATE 4 MG: 4 INJECTION, SOLUTION INTRAMUSCULAR; INTRAVENOUS at 09:28

## 2023-07-06 ASSESSMENT — ENCOUNTER SYMPTOMS
ABDOMINAL PAIN: 0
WHEEZING: 0
COUGH: 0
ABDOMINAL DISTENTION: 0
SORE THROAT: 0
SHORTNESS OF BREATH: 1
BLOOD IN STOOL: 0
CHEST TIGHTNESS: 0
BACK PAIN: 0
NAUSEA: 0

## 2023-07-06 NOTE — PLAN OF CARE
Emptied 600ml clear urine. Patient eating lunch and denies needs.  [address] [facility], [address] 7/6/2023

## 2023-07-06 NOTE — OP NOTE
[facility]    Procedure Note      Procedure Performed by: Kelly Cotto MD       Procedures performed:     Percutaneous transcatheter closure of the left atrial appendage with endocardial implant   Transeptal Puncture  SALAZAR      Indication of procedure:      Patient with hx of HTN, CAD, CKD, DM-2, JAYE, GI bleed, anemia, chronic atrial fibrillation with CHADS-VAS score of 5 and Has bled score of 4 with high risk of bleeding with anticoagulation and high risk of stroke with out anticoagulation here for CEE closure device watchman implantation    Patient has been seen by General cardiology and shared decision making has been made for pursuing CEE closure. Patient here for CEE closure with Watchman device. Arterial line placed by anesthesia    Sedation : General anesthesia      Details of procedure: The patient was brought to the electrophysiology laboratory in stable condition. The patient was in a fasting and non-sedated state. The risks, benefits and alternatives of the procedure were discussed with the patient. The risks including, but not limited to, the risks of vascular injury, bleeding, infection, device malfunction, lead dislodgement, radiation exposure, injury to cardiac and surrounding structures (including pneumothorax), stroke, myocardial infarction and death were discussed in detail. The patient opted to proceed with the device implantation. Written informed consent was signed and placed in the chart. Prophylactic antibiotic was given. The patient was prepped and draped in a sterile fashion. A timeout protocol was completed to identify the patient and the procedure being performed.  Patient underwent general anesthesia by anesthesiology team.       Transesophageal echocardiography was performed and measurements of left atrial appendage, including

## 2023-07-06 NOTE — FLOWSHEET NOTE
Pt to pt  in wheelchair per RN for d/c home in private vehicle with spouse.  Right groin/left radial sites free of bleeding/hematoma at time of d/c

## 2023-07-06 NOTE — ANESTHESIA PROCEDURE NOTES
Arterial Line:    An arterial line was placed using surface landmarks, in the procedure area for the following indication(s): continuous blood pressure monitoring and blood sampling needed. A 20 gauge (size), 1 and 3/4 inch (length), Arrow (type) catheter was placed, Seldinger technique not used, into the left radial artery, secured by Tegaderm. Anesthesia type: General    Events:  patient tolerated procedure well with no complications. 7/6/2023 9:25 AM7/6/2023 9:32 AM  Resident/CRNA: BENSON Chapman - CRNA  Performed: Resident/CRNA   Preanesthetic Checklist  Completed: patient identified, IV checked, site marked, risks and benefits discussed, surgical/procedural consents, equipment checked, pre-op evaluation, timeout performed, anesthesia consent given, oxygen available, monitors applied/VS acknowledged, fire risk safety assessment completed and verbalized and blood product R/B/A discussed and consented

## 2023-07-06 NOTE — PROGRESS NOTES
Discharge instructions reviewed. Questions answered. Belongings gathered and checked with admission list. Patient transferred to chair without difficulty. PIV removed. Femoral site WNL.

## 2023-07-06 NOTE — DISCHARGE INSTRUCTIONS
Watchman Discharge Instructions:    Please follow these instructions carefully and call the [facility] Lab at [phone] with    any questions or concerns. AFTER YOU GO HOME:                 Drink extra fluids for 2 days (Do not exceed fluid restrictions if they are previously prescribed)                 You may resume your normal diet                 No smoking                 Relax and take it easy                 Do NOT make any important or legal decisions 24 hrs because of anesthesia effects                 Do NOT drive or operate machines at home or work 3 days                 Do NOT drink alcohol         CARE OF GROIN SITES:                 For the first 24 hours, check the puncture site every 1-2 hours while awake. The nursing staff will also assess while you are in the hospital.                 For 2 days, when you cough, sneeze or laugh hold your hand over the puncture site and press firmly on/above the site. Remove the bandage after 24 hours. If there is minor oozing, apply another bandage and remove it after 12 hours. It is normal to have a small bruise or pea size lump at the site. You may shower after your procedure. Do NOT soak in a tub bath, or use a hot tub or pool for at least 1 week. Do NOT scrub the site. Do NOT put lotion or powder near the puncture site. No stooping or squatting 3 days                 Do NOT do any heavy activity such as exercise, lifting, or straining for 3 days                 No housework, yard work, or any activities that make your sweat for a week                 Do NOT lift more than 10 pounds for 3 days         BLEEDING:                 If you start bleeding from the groin site, lie down flat and press firmly on/above the site for 10 minutes. Call 911 right away with heavy bleeding or bleeding does not stop.        MEDICINES:                 If you have stopped any

## 2023-07-06 NOTE — PROGRESS NOTES
1040  Patient arrived to pacu, monitors placed and alarms on. Received report from Antoni Spencer and [address]. Patient awake and alert. Denies any complaints. Right groin soft, dressing clean and dry. Left arterial line in place, leveled ad zeroed. VSS. US echo completed in procedure room. 1100 Patient tolerating ice chips. Encouraged to cough and deep breathe.  02 2L nC placed to keep sats greater than 92%  1108 Left arterial line removed per protocol. Pressure held x 5 mins, tolerated well. No s/s of hematoma noted. Pressure dressing applied. 1120 Transported to Cath lab holding, bedside report given to [facility].

## 2023-07-07 ENCOUNTER — TELEPHONE (OUTPATIENT)
Dept: CARDIAC CATH/INVASIVE PROCEDURES | Age: 78
End: 2023-07-07

## 2023-07-07 ENCOUNTER — TELEPHONE (OUTPATIENT)
Dept: CARDIOLOGY CLINIC | Age: 78
End: 2023-07-07

## 2023-07-07 LAB
ABO/RH: NORMAL
ANTIBODY SCREEN: NEGATIVE
COMPONENT: NORMAL
CROSSMATCH RESULT: NORMAL
STATUS: NORMAL
TRANSFUSION STATUS: NORMAL
UNIT DIVISION: 0
UNIT NUMBER: NORMAL

## 2023-07-07 NOTE — TELEPHONE ENCOUNTER
Post watchman day 1 call done. Patient states he is doing well. .   Groin/Left wrist has no bruising or hematoma present per  wife  Patient is taking anticoagulants as prescribed. Denies c/o or needs. All questions answered. Will call me if questions arise.     Electronically signed by Jono Banks RN on 7/7/2023 at 11:37 AM [address]

## 2023-07-07 NOTE — TELEPHONE ENCOUNTER
Patient's wife called stating that patient recently had Watchman procedure and he was supposed to hold his metformin afterward but he went ahead and took it, she wants to make sure that is okay  Please call her back at ph# [phone].

## 2023-07-07 NOTE — TELEPHONE ENCOUNTER
Patients wife called and states patient accidentally took his metformin today. Also states dose was changed from 1000 mg to 500 mg. States we told them to cut it in half. He normally takes a whole one. Dr Benja Ortega notified and the patient is to restart it on Monday. Patient to reach out to Dr Leilani Mariee to verify ordered dose and take as directed.   Electronically signed by Ran Wilkes RN on 7/7/2023 at 2:05 PM [address] [facility]

## 2023-07-07 NOTE — DISCHARGE SUMMARY
[facility]  Discharge Summary    Nina Islas  :  [birth year]  MRN:  [id number]    Admit date:  2023  Discharge date:  2023    Admitting Physician:  Timi Hernandes MD    Discharge Diagnoses:     1.  Sp Watchman device procedure         Patient Active Problem List   Diagnosis    Anemia    Colon polyps    Type 2 diabetes mellitus without complication (HCC)    Hypertension    Hyperlipidemia    Obesity    Vertigo    FH: CAD (coronary artery disease)    Heart murmur    Bradycardia    Coronary artery disease due to calcified coronary lesion    [place] (hard of hearing)    History of gout    Adenomatous polyp of transverse colon    JAYE on CPAP    Acute kidney failure (HCC)    Persistent atrial fibrillation (HCC)    Chronic kidney disease (CKD) stage G3b/A1, moderately decreased glomerular filtration rate (GFR) between 30-44 mL/min/1.73 square meter and albuminuria creatinine ratio less than 30 mg/g (HCC)    Hypomagnesemia    Age-related nuclear cataract of right eye    Age-related nuclear cataract of left eye    S/P placement of cardiac pacemaker 2019    Tachycardia-bradycardia ([address])    Leg swelling    Varicose veins of both legs with edema    Nonrheumatic aortic valve stenosis    Physical debility    Chest pain    SOB (shortness of breath)    Abnormal stress test    CAD s/p PCI to [facility] 22    ASCVD (arteriosclerotic cardiovascular disease)    Post PTCA    Status post left heart catheterization (LHC) by percutaneous approach    Chronic atrial fibrillation (HCC)    Presence of Watchman left atrial appendage closure device   Hypercoagulable secondary to atrial fibrillation   oBESITY bmi 38    Admission Condition:  fair    Discharged Condition:  good    Hospital Course:   Patient with hx of HTN, CAD, CKD, DM-2, JAYE, GI bleed, anemia, chronic atrial fibrillation with CHADS-VAS score of 5 and Has bled score of 4 with high risk of bleeding with anticoagulation and high risk of stroke with out anticoagulation here for

## 2023-07-08 RX ORDER — PANTOPRAZOLE SODIUM 40 MG/1
TABLET, DELAYED RELEASE ORAL
Qty: 30 TABLET | Refills: 5 | OUTPATIENT
Start: 2023-07-08

## 2023-07-08 RX ORDER — FUROSEMIDE 20 MG/1
TABLET ORAL
Qty: 90 TABLET | OUTPATIENT
Start: 2023-07-08

## 2023-07-25 RX ORDER — PANTOPRAZOLE SODIUM 40 MG/1
TABLET, DELAYED RELEASE ORAL
Qty: 30 TABLET | Refills: 5 | OUTPATIENT
Start: 2023-07-25

## 2023-07-26 ENCOUNTER — PROCEDURE VISIT (OUTPATIENT)
Dept: CARDIOLOGY CLINIC | Age: 78
End: 2023-07-26

## 2023-07-26 DIAGNOSIS — I48.91 ATRIAL FIBRILLATION, UNSPECIFIED TYPE (HCC): Primary | ICD-10-CM

## 2023-08-01 PROCEDURE — 93294 REM INTERROG EVL PM/LDLS PM: CPT | Performed by: INTERNAL MEDICINE

## 2023-08-01 PROCEDURE — 93296 REM INTERROG EVL PM/IDS: CPT | Performed by: INTERNAL MEDICINE

## 2023-08-08 ENCOUNTER — PROCEDURE VISIT (OUTPATIENT)
Dept: CARDIOLOGY CLINIC | Age: 78
End: 2023-08-08
Payer: MEDICARE

## 2023-08-08 DIAGNOSIS — Z95.0 S/P PLACEMENT OF CARDIAC PACEMAKER: Primary | ICD-10-CM

## 2023-08-09 ENCOUNTER — OFFICE VISIT (OUTPATIENT)
Dept: CARDIOLOGY CLINIC | Age: 78
End: 2023-08-09
Payer: MEDICARE

## 2023-08-09 VITALS
OXYGEN SATURATION: 97 % | DIASTOLIC BLOOD PRESSURE: 70 MMHG | WEIGHT: 261.4 LBS | HEART RATE: 61 BPM | BODY MASS INDEX: 36.6 KG/M2 | SYSTOLIC BLOOD PRESSURE: 104 MMHG | HEIGHT: 71 IN

## 2023-08-09 DIAGNOSIS — Z95.0 S/P PLACEMENT OF CARDIAC PACEMAKER: ICD-10-CM

## 2023-08-09 DIAGNOSIS — Z95.818 PRESENCE OF WATCHMAN LEFT ATRIAL APPENDAGE CLOSURE DEVICE: Primary | ICD-10-CM

## 2023-08-09 DIAGNOSIS — I48.91 ATRIAL FIBRILLATION, UNSPECIFIED TYPE (HCC): ICD-10-CM

## 2023-08-09 DIAGNOSIS — I10 PRIMARY HYPERTENSION: ICD-10-CM

## 2023-08-09 PROCEDURE — 1123F ACP DISCUSS/DSCN MKR DOCD: CPT | Performed by: NURSE PRACTITIONER

## 2023-08-09 PROCEDURE — 3078F DIAST BP <80 MM HG: CPT | Performed by: NURSE PRACTITIONER

## 2023-08-09 PROCEDURE — 93000 ELECTROCARDIOGRAM COMPLETE: CPT | Performed by: NURSE PRACTITIONER

## 2023-08-09 PROCEDURE — 3074F SYST BP LT 130 MM HG: CPT | Performed by: NURSE PRACTITIONER

## 2023-08-09 PROCEDURE — 99214 OFFICE O/P EST MOD 30 MIN: CPT | Performed by: NURSE PRACTITIONER

## 2023-08-09 RX ORDER — ALLOPURINOL 100 MG/1
TABLET ORAL DAILY
COMMUNITY
Start: 2023-07-25

## 2023-08-09 RX ORDER — AMLODIPINE BESYLATE 10 MG/1
TABLET ORAL DAILY
COMMUNITY
Start: 2023-08-02

## 2023-08-09 RX ORDER — GABAPENTIN 300 MG/1
300 CAPSULE ORAL 3 TIMES DAILY
COMMUNITY
Start: 2023-06-03

## 2023-08-09 ASSESSMENT — ENCOUNTER SYMPTOMS
BACK PAIN: 0
ABDOMINAL DISTENTION: 0
BLOOD IN STOOL: 0
PHOTOPHOBIA: 0
SINUS PRESSURE: 0
SHORTNESS OF BREATH: 0
ABDOMINAL PAIN: 0
NAUSEA: 0
SINUS PAIN: 0
COUGH: 0

## 2023-08-16 ENCOUNTER — NURSE ONLY (OUTPATIENT)
Dept: CARDIOLOGY CLINIC | Age: 78
End: 2023-08-16

## 2023-08-16 DIAGNOSIS — I48.91 ATRIAL FIBRILLATION, UNSPECIFIED TYPE (HCC): Primary | ICD-10-CM

## 2023-08-16 NOTE — PROGRESS NOTES
Patient here in office and educated on SALAZAR, schedule for 8/21/23 @ 1030, with arrival @ 0930, @ [facility]; risk explained; and consents signed. Also copy of orders given for labs and CXR due STAT at [facility]. Instruction given to patient to :  NPO after midnight the night before procedure; call hospital at [phone] to pre-register. May take rest of morning meds of procedure  Hold Metformin the day before, the day of and two days after procedure. Patient voiced understanding. Copies of consent & info scanned in chart.

## 2023-08-18 ENCOUNTER — ANESTHESIA EVENT (OUTPATIENT)
Dept: NON INVASIVE DIAGNOSTICS | Age: 78
End: 2023-08-18

## 2023-08-21 ENCOUNTER — ANESTHESIA (OUTPATIENT)
Dept: NON INVASIVE DIAGNOSTICS | Age: 78
End: 2023-08-21

## 2023-08-21 ENCOUNTER — HOSPITAL ENCOUNTER (OUTPATIENT)
Dept: NON INVASIVE DIAGNOSTICS | Age: 78
Discharge: HOME | End: 2023-08-21
Payer: MEDICARE

## 2023-08-21 ENCOUNTER — HOSPITAL ENCOUNTER (OUTPATIENT)
Age: 78
Discharge: HOME | End: 2023-08-21
Payer: MEDICARE

## 2023-08-21 VITALS
RESPIRATION RATE: 14 BRPM | SYSTOLIC BLOOD PRESSURE: 120 MMHG | OXYGEN SATURATION: 96 % | DIASTOLIC BLOOD PRESSURE: 77 MMHG | HEART RATE: 60 BPM

## 2023-08-21 LAB
ANION GAP SERPL CALCULATED.3IONS-SCNC: 13 MMOL/L (ref 4–16)
APTT: 28.2 SECONDS (ref 25.1–37.1)
BUN SERPL-MCNC: 26 MG/DL (ref 6–23)
CALCIUM SERPL-MCNC: 9.6 MG/DL (ref 8.3–10.6)
CHLORIDE BLD-SCNC: 99 MMOL/L (ref 99–110)
CO2: 27 MMOL/L (ref 21–32)
CREAT SERPL-MCNC: 2.3 MG/DL (ref 0.9–1.3)
GFR SERPL CREATININE-BSD FRML MDRD: 28 ML/MIN/1.73M2
GLUCOSE SERPL-MCNC: 104 MG/DL (ref 70–99)
HCT VFR BLD CALC: 36.4 % (ref 42–52)
HEMOGLOBIN: 11.9 GM/DL (ref 13.5–18)
INR BLD: 1 INDEX
MAGNESIUM: 2.1 MG/DL (ref 1.8–2.4)
MCH RBC QN AUTO: 33.8 PG (ref 27–31)
MCHC RBC AUTO-ENTMCNC: 32.7 % (ref 32–36)
MCV RBC AUTO: 103.4 FL (ref 78–100)
PDW BLD-RTO: 14.2 % (ref 11.7–14.9)
PHOSPHORUS: 4 MG/DL (ref 2.5–4.9)
PLATELET # BLD: 127 K/CU MM (ref 140–440)
PMV BLD AUTO: 9.6 FL (ref 7.5–11.1)
POTASSIUM SERPL-SCNC: 4.5 MMOL/L (ref 3.5–5.1)
PROTHROMBIN TIME: 13.6 SECONDS (ref 11.7–14.5)
RBC # BLD: 3.52 M/CU MM (ref 4.6–6.2)
SODIUM BLD-SCNC: 139 MMOL/L (ref 135–145)
WBC # BLD: 4.4 K/CU MM (ref 4–10.5)

## 2023-08-21 PROCEDURE — 80048 BASIC METABOLIC PNL TOTAL CA: CPT

## 2023-08-21 PROCEDURE — 7100000000 HC PACU RECOVERY - FIRST 15 MIN

## 2023-08-21 PROCEDURE — 85730 THROMBOPLASTIN TIME PARTIAL: CPT

## 2023-08-21 PROCEDURE — 93320 DOPPLER ECHO COMPLETE: CPT | Performed by: INTERNAL MEDICINE

## 2023-08-21 PROCEDURE — 93325 DOPPLER ECHO COLOR FLOW MAPG: CPT | Performed by: INTERNAL MEDICINE

## 2023-08-21 PROCEDURE — 7100000001 HC PACU RECOVERY - ADDTL 15 MIN

## 2023-08-21 PROCEDURE — 36415 COLL VENOUS BLD VENIPUNCTURE: CPT

## 2023-08-21 PROCEDURE — 3700000001 HC ADD 15 MINUTES (ANESTHESIA)

## 2023-08-21 PROCEDURE — 93312 ECHO TRANSESOPHAGEAL: CPT

## 2023-08-21 PROCEDURE — 6360000002 HC RX W HCPCS

## 2023-08-21 PROCEDURE — 83735 ASSAY OF MAGNESIUM: CPT

## 2023-08-21 PROCEDURE — 3700000000 HC ANESTHESIA ATTENDED CARE

## 2023-08-21 PROCEDURE — 2500000003 HC RX 250 WO HCPCS

## 2023-08-21 PROCEDURE — 85610 PROTHROMBIN TIME: CPT

## 2023-08-21 PROCEDURE — 85027 COMPLETE CBC AUTOMATED: CPT

## 2023-08-21 PROCEDURE — 2580000003 HC RX 258

## 2023-08-21 PROCEDURE — 93312 ECHO TRANSESOPHAGEAL: CPT | Performed by: INTERNAL MEDICINE

## 2023-08-21 PROCEDURE — 84100 ASSAY OF PHOSPHORUS: CPT

## 2023-08-21 RX ORDER — SODIUM CHLORIDE 9 MG/ML
INJECTION, SOLUTION INTRAVENOUS CONTINUOUS PRN
Status: DISCONTINUED | OUTPATIENT
Start: 2023-08-21 | End: 2023-08-21 | Stop reason: SDUPTHER

## 2023-08-21 RX ORDER — PROPOFOL 10 MG/ML
INJECTION, EMULSION INTRAVENOUS PRN
Status: DISCONTINUED | OUTPATIENT
Start: 2023-08-21 | End: 2023-08-21 | Stop reason: SDUPTHER

## 2023-08-21 RX ORDER — LIDOCAINE HYDROCHLORIDE 20 MG/ML
INJECTION, SOLUTION INFILTRATION; PERINEURAL PRN
Status: DISCONTINUED | OUTPATIENT
Start: 2023-08-21 | End: 2023-08-21 | Stop reason: SDUPTHER

## 2023-08-21 RX ADMIN — LIDOCAINE HYDROCHLORIDE 100 MG: 20 INJECTION, SOLUTION INFILTRATION; PERINEURAL at 10:21

## 2023-08-21 RX ADMIN — SODIUM CHLORIDE: 9 INJECTION, SOLUTION INTRAVENOUS at 10:15

## 2023-08-21 RX ADMIN — PROPOFOL 170 MG: 10 INJECTION, EMULSION INTRAVENOUS at 10:21

## 2023-08-21 ASSESSMENT — ENCOUNTER SYMPTOMS
ABDOMINAL DISTENTION: 0
SINUS PAIN: 0
NAUSEA: 0
BLOOD IN STOOL: 0
BACK PAIN: 0
ABDOMINAL PAIN: 0
COUGH: 0
PHOTOPHOBIA: 0
SHORTNESS OF BREATH: 0
SINUS PRESSURE: 0

## 2023-08-21 NOTE — H&P
Electrophysiology H&p  Note      Reason for consultation:  Assess for watchman    Chief complaint :  POST WATCHMAN SALAZAR 45 days    Referring physician: Heather Herrera      Primary care physician: Pita Mckeon MD      History of Present Illness: Today visit (08/21/23)    Patient here for post watchman SALAZAR 45 days. Patient overall feels good. Tolerating aspirin and plavix  No GI bleeding at this time  Patient denies chest pain,dizziness  He has shortness of breath with moderate exertion        Previous visit (6/2/2023)    Patient here to discuss watchman  He attended the education class today too    Patient had a GI bleeding in April and hemoglobin was down to 5.9 and patient had colonoscopy and had 29 polyps removed and he was okay to be started on Eliquis back on May 1 but somehow in the nursing home it looks like it was discontinued. Patient did not restart any Eliquis he was not even on aspirin or Plavix    Patient and his wife are concerned  He is still weak. Patient denies chest pain. Has shortness of breath with moderate exertion. Denies dizziness or syncope  Denies further bleeding          Previous visit:     Choco Hampton is a 76year old male with a history of CAD s/p PCI to LAD and RCA, atrial fibrillation, JAYE on cpap, diabetes type 2, hypertension, hyperlipidemia, hard of hearing, CKD, tachycardia bradycardia s/p pacemaker presents with gradually worsening fatigue for the last 2 weeks. Patient was recently seen by Nephrologist and was sent tot he ER. Patient has known low hemoglobin this past 1 month. Patient was found to have hemoglobin of 5.9. He has received blood transfusions this admission. Patient has history of atrial fibrillation and is on anticoagulation. He was short of breath but now he is better  EP was consulted for possible watchman device. Patient complains of fatigue.  He denies chest pain, palpitations, shortness of breath,

## 2023-08-21 NOTE — ANESTHESIA POSTPROCEDURE EVALUATION
Department of Anesthesiology  Postprocedure Note    Patient: Tata Vital  MRN: [id number]  YOB: 1945  Date of evaluation: 8/21/2023      Procedure Summary     Date: 08/21/23 Room / Location: [address] [place] [address] Stress Lab    Anesthesia Start: 0306 Anesthesia Stop: 3607    Procedure: TRANSESOPHAGEAL ECHOCARDIOGRAM Diagnosis: Unspecified atrial fibrillation    Scheduled Providers:  Responsible Provider: Monica Gamboa MD    Anesthesia Type: MAC ASA Status: 4          Anesthesia Type: MAC    Kiel Phase I: Kiel Score: 10    Kiel Phase II:        Anesthesia Post Evaluation    Patient location during evaluation: PACU  Patient participation: complete - patient participated  Level of consciousness: awake and alert  Airway patency: patent  Nausea & Vomiting: no nausea and no vomiting  Complications: no  Cardiovascular status: hemodynamically stable  Respiratory status: spontaneous ventilation and room air  Hydration status: stable  Pain management: adequate

## 2023-09-01 ENCOUNTER — OFFICE VISIT (OUTPATIENT)
Dept: CARDIOLOGY CLINIC | Age: 78
End: 2023-09-01

## 2023-09-01 VITALS
BODY MASS INDEX: 37.66 KG/M2 | SYSTOLIC BLOOD PRESSURE: 128 MMHG | DIASTOLIC BLOOD PRESSURE: 70 MMHG | HEART RATE: 70 BPM | WEIGHT: 269 LBS | HEIGHT: 71 IN | OXYGEN SATURATION: 96 %

## 2023-09-01 DIAGNOSIS — I48.11 LONGSTANDING PERSISTENT ATRIAL FIBRILLATION (HCC): ICD-10-CM

## 2023-09-01 DIAGNOSIS — I48.11 HYPERCOAGULABLE STATE DUE TO LONGSTANDING PERSISTENT ATRIAL FIBRILLATION (HCC): ICD-10-CM

## 2023-09-01 DIAGNOSIS — K92.2 GASTROINTESTINAL HEMORRHAGE, UNSPECIFIED GASTROINTESTINAL HEMORRHAGE TYPE: ICD-10-CM

## 2023-09-01 DIAGNOSIS — D68.69 HYPERCOAGULABLE STATE DUE TO LONGSTANDING PERSISTENT ATRIAL FIBRILLATION (HCC): ICD-10-CM

## 2023-09-01 DIAGNOSIS — I10 PRIMARY HYPERTENSION: ICD-10-CM

## 2023-09-01 DIAGNOSIS — Z95.818 PRESENCE OF WATCHMAN LEFT ATRIAL APPENDAGE CLOSURE DEVICE: Primary | ICD-10-CM

## 2023-09-01 RX ORDER — AMLODIPINE BESYLATE 5 MG/1
5 TABLET ORAL DAILY
Qty: 90 TABLET | Refills: 2 | Status: SHIPPED | COMMUNITY
Start: 2023-09-01

## 2023-09-01 ASSESSMENT — ENCOUNTER SYMPTOMS
BACK PAIN: 0
COUGH: 0
SHORTNESS OF BREATH: 0
SINUS PAIN: 0
SINUS PRESSURE: 0
ABDOMINAL DISTENTION: 0
PHOTOPHOBIA: 0
NAUSEA: 0
BLOOD IN STOOL: 0
ABDOMINAL PAIN: 0

## 2023-09-01 NOTE — PROGRESS NOTES
CO2 27 08/21/2023    BUN 26 (H) 08/21/2023     CMP:   Lab Results   Component Value Date    AST 21 06/07/2023    PROT 6.0 (L) 06/07/2023    BILITOT 0.9 06/07/2023    ALKPHOS 63 06/07/2023     TSH:  No results found for: TSH, T4    EKGINTERPRETATION - EKG Interpretation:  ventricular paced rhythm      Vitals:    09/01/23 1151   BP: 128/70   Site: Right Upper Arm   Position: Sitting   Cuff Size: Medium Adult   Pulse: 70   SpO2: 96%   Weight: 269 lb (122 kg)   Height: 5' 11\" (1.803 m)        Device Assessment:    The device is Medtronic pacemaker - Dual Chamber chamber      MRI Compatible : yes    Device interrogation was performed. Mode: AAIR --- DDDR     Sensing is normal. Impedence is normal.  Threshold is normal.     There has not been interval changes. Estimated battery life is 7.8 years     The underlying rhythm is AS,.  0.4 % atrial paced; 93.1 % ventricular paced. Atrial Arrhythmia :  persistent atrial fibrillation    Non sustained VT episodes : 1    Sustained VT episodes : No      The patient is ventricular pacemaker dependent. IMPRESSION / RECOMMENDATIONS:     S/p Watchman  Chronic atrial fibrillation  Hypercoagulation due to atrial fibrillation  Possible GI bleed  Severe anemia  HTN    Patient here for follow up on watchman  I did review SALAZAR with patient and wife  Watchman is seated in the CEE with no noted leak  Patient denies issues with bleeding  Continue ASA 81 mg daily and Plavix 75 mg daily for the duration of the 6 months post watchman    Patient has 1 syncopal episode  Mostlikely from hypotension  He is going to get compression stockings  I will decrease his norvasc to 5 mg daily    Vitals:    09/01/23 1151   BP: 128/70   Pulse: 70   SpO2: 96%   We may need to allow for permissible hypertension given he appears to be orthostatic.              BENSON Tao - CNP, 9/1/2023 12:14 PM     Please note this report has been partially produced using speech recognition software and

## 2023-09-01 NOTE — PATIENT INSTRUCTIONS
Please be informed that if you contact our office outside of normal business hours the physician on call cannot help with any scheduling or rescheduling issues, procedure instruction questions or any type of medication issue. We advise you for any urgent/emergency that you go to the nearest emergency room! PLEASE CALL OUR OFFICE DURING NORMAL BUSINESS HOURS    Monday - Friday   8 am to 5 pm    [place]: [address]: [phone]    [place]:  [facility] Locations - No appointment necessary. Sites open Monday to Friday. Call your preferred location for test preparation, business   hours and other information you need. [employer] accepts [employer]. [address] [place] [address]. [address] [facility]. [facility], [address] [facility]  Phone: [phone]     **It is YOUR responsibilty to bring medication bottles and/or updated medication list to [address] [place] [address]. This will allow us to better serve you and all your healthcare needs**  Thank you for allowing us to care for you today! We want to ensure we can follow your treatment plan and we strive to give you the best outcomes and experience possible. If you ever have a life threatening emergency and call 911 - for an ambulance (EMS)   Our providers can only care for you at:   [facility] or [facility]. Even if you have someone take you or you drive yourself we can only care for you in a [facility] facility. Our providers are not setup at the other healthcare locations!

## 2023-09-06 ENCOUNTER — TELEPHONE (OUTPATIENT)
Dept: CARDIOLOGY CLINIC | Age: 78
End: 2023-09-06

## 2023-09-06 NOTE — TELEPHONE ENCOUNTER
Patient wife called today requesting refill on metoprolol for patient. Looking in patients chart it says that medication was d/c at hospital discharge on 5/5/23. She wants to verify with you if he is to take or not. Please advise.

## 2023-09-06 NOTE — TELEPHONE ENCOUNTER
Is This A New Presentation, Or A Follow-Up?: Skin Lesion Patient wife advised and voices understanding. How Severe Is Your Skin Lesion?: mild Has Your Skin Lesion Been Treated?: not been treated

## 2023-10-16 ENCOUNTER — OFFICE VISIT (OUTPATIENT)
Dept: CARDIOLOGY CLINIC | Age: 78
End: 2023-10-16
Payer: MEDICARE

## 2023-10-16 VITALS
SYSTOLIC BLOOD PRESSURE: 120 MMHG | HEIGHT: 71 IN | BODY MASS INDEX: 38.36 KG/M2 | WEIGHT: 274 LBS | HEART RATE: 80 BPM | DIASTOLIC BLOOD PRESSURE: 62 MMHG

## 2023-10-16 DIAGNOSIS — R25.1 TREMORS OF NERVOUS SYSTEM: ICD-10-CM

## 2023-10-16 DIAGNOSIS — Z95.818 PRESENCE OF WATCHMAN LEFT ATRIAL APPENDAGE CLOSURE DEVICE: ICD-10-CM

## 2023-10-16 DIAGNOSIS — I48.19 PERSISTENT ATRIAL FIBRILLATION (HCC): Primary | ICD-10-CM

## 2023-10-16 DIAGNOSIS — M79.89 LEG SWELLING: ICD-10-CM

## 2023-10-16 DIAGNOSIS — E11.9 TYPE 2 DIABETES MELLITUS WITHOUT COMPLICATION, WITHOUT LONG-TERM CURRENT USE OF INSULIN (HCC): ICD-10-CM

## 2023-10-16 DIAGNOSIS — I10 PRIMARY HYPERTENSION: ICD-10-CM

## 2023-10-16 DIAGNOSIS — Z95.0 S/P PLACEMENT OF CARDIAC PACEMAKER: ICD-10-CM

## 2023-10-16 DIAGNOSIS — I25.10 CAD IN NATIVE ARTERY: ICD-10-CM

## 2023-10-16 DIAGNOSIS — N18.32 CHRONIC KIDNEY DISEASE (CKD) STAGE G3B/A1, MODERATELY DECREASED GLOMERULAR FILTRATION RATE (GFR) BETWEEN 30-44 ML/MIN/1.73 SQUARE METER AND ALBUMINURIA CREATININE RATIO LESS THAN 30 MG/G (HCC): ICD-10-CM

## 2023-10-16 DIAGNOSIS — E78.00 PURE HYPERCHOLESTEROLEMIA: ICD-10-CM

## 2023-10-16 PROBLEM — I48.11 LONGSTANDING PERSISTENT ATRIAL FIBRILLATION (HCC): Status: RESOLVED | Noted: 2023-09-01 | Resolved: 2023-10-16

## 2023-10-16 PROCEDURE — 3078F DIAST BP <80 MM HG: CPT | Performed by: INTERNAL MEDICINE

## 2023-10-16 PROCEDURE — 3044F HG A1C LEVEL LT 7.0%: CPT | Performed by: INTERNAL MEDICINE

## 2023-10-16 PROCEDURE — 99214 OFFICE O/P EST MOD 30 MIN: CPT | Performed by: INTERNAL MEDICINE

## 2023-10-16 PROCEDURE — 3074F SYST BP LT 130 MM HG: CPT | Performed by: INTERNAL MEDICINE

## 2023-10-16 PROCEDURE — 1123F ACP DISCUSS/DSCN MKR DOCD: CPT | Performed by: INTERNAL MEDICINE

## 2023-10-16 NOTE — PROGRESS NOTES
Kareem Wilcox  1945  [place] Chase MD      Chief Complaint   Patient presents with    Atrial Fibrillation    Hypertension    Hyperlipidemia     OV for 4 month check. Pt denies any chest pain,SOB,dizziness,or palpitations. He does have some swelling to ankles. Chief complaint and HPI:  Elham May  is a 78 y.o. male following up for known persistent atrial fibrillation and had left atrial appendage occlusion with Watchman implanted July this year and tolerated well. Now he is concerned about tremors which are getting worse. He is accompanied by his wife. He also had a fall tripped on something and injured his right knee walking with a walker. Continues to drink alcohol every day in the evening denies any chest pains or any other cardiac symptoms. Denies syncope or near syncope or palpitations. Rest of the Cardiovascular system review is otherwise unchanged from prior encounter. Past medical history:  has a past medical history of Abnormal nuclear stress test, Arthritis, CAD s/p PCI to [facility] 9/9/22, Dizziness, FH: CAD (coronary artery disease), H/O 24 hour EKG monitoring, H/O atrial fibrillation without current medication, H/O cardiovascular stress test, H/O Doppler echocardiogram, H/O Doppler ultrasound (Venous Doppler Lower Extremities), H/O echocardiogram, H/O transesophageal echocardiography (SALAZAR) for monitoring, Heart murmur, History of nuclear stress test, History of nuclear stress test, Hx of Doppler echocardiogram, Hx of Venous Doppler ultrasound, Hyperlipidemia, Hypertension, Kidney stones, Leg swelling, Neuropathy, Nonrheumatic aortic valve stenosis, Obesity, JAYE on CPAP, Persistent atrial fibrillation ([address]), Syncope and collapse, Thoracic aortic aneurysm ([address]), and Type II or unspecified type diabetes mellitus without mention of complication, not stated as uncontrolled.   Past surgical history:  has a past surgical history that includes hernia repair (2008); hernia repair (2009);

## 2023-10-16 NOTE — PATIENT INSTRUCTIONS
Hina Fuentes Continue current cardiovascular medications which have been reviewed and discussed individually with you. Counseled for calorie counting, reduction in serving size and exercise and lifestyle modification for weight loss. Follow up with PCP for neuropathy and tremors. Appropriate prescriptions if needed on this visit are addressed. Continue device check as per care link schedule. After visit summery is provided. Questions answered and patient verbalizes understanding. Follow up in 6 months,  sooner if needed.   KRIS Negative Negative    Gonococcus by KRIS 

## 2023-11-08 ENCOUNTER — PROCEDURE VISIT (OUTPATIENT)
Dept: CARDIOLOGY CLINIC | Age: 78
End: 2023-11-08
Payer: MEDICARE

## 2023-11-08 DIAGNOSIS — Z95.0 S/P PLACEMENT OF CARDIAC PACEMAKER: ICD-10-CM

## 2023-11-08 PROCEDURE — 93296 REM INTERROG EVL PM/IDS: CPT | Performed by: INTERNAL MEDICINE

## 2023-11-08 PROCEDURE — 93294 REM INTERROG EVL PM/LDLS PM: CPT | Performed by: INTERNAL MEDICINE

## 2023-11-20 ENCOUNTER — HOSPITAL ENCOUNTER (OUTPATIENT)
Age: 78
Discharge: HOME | End: 2023-11-20
Payer: MEDICARE

## 2023-11-20 LAB
ALBUMIN SERPL-MCNC: 4.1 GM/DL (ref 3.4–5)
ALP BLD-CCNC: 80 IU/L (ref 40–129)
ALT SERPL-CCNC: 23 U/L (ref 10–40)
ANION GAP SERPL CALCULATED.3IONS-SCNC: 15 MMOL/L (ref 4–16)
AST SERPL-CCNC: 29 IU/L (ref 15–37)
BILIRUB SERPL-MCNC: 1 MG/DL (ref 0–1)
BUN SERPL-MCNC: 26 MG/DL (ref 6–23)
CALCIUM SERPL-MCNC: 9.9 MG/DL (ref 8.3–10.6)
CHLORIDE BLD-SCNC: 98 MMOL/L (ref 99–110)
CHOLESTEROL, FASTING: 133 MG/DL
CO2: 28 MMOL/L (ref 21–32)
CREAT SERPL-MCNC: 2 MG/DL (ref 0.9–1.3)
ESTIMATED AVERAGE GLUCOSE: 97 MG/DL
GFR SERPL CREATININE-BSD FRML MDRD: 33 ML/MIN/1.73M2
GLUCOSE P FAST SERPL-MCNC: 126 MG/DL (ref 70–99)
HBA1C MFR BLD: 5 % (ref 4.2–6.3)
HCT VFR BLD CALC: 37.1 % (ref 42–52)
HDLC SERPL-MCNC: 52 MG/DL
HEMOGLOBIN: 12.4 GM/DL (ref 13.5–18)
LDLC SERPL CALC-MCNC: 58 MG/DL
POTASSIUM SERPL-SCNC: 3.9 MMOL/L (ref 3.5–5.1)
SODIUM BLD-SCNC: 141 MMOL/L (ref 135–145)
TOTAL PROTEIN: 7.3 GM/DL (ref 6.4–8.2)
TRIGLYCERIDE, FASTING: 115 MG/DL
URATE SERPL-MCNC: 8.7 MG/DL (ref 3.5–7.2)

## 2023-11-20 PROCEDURE — 84550 ASSAY OF BLOOD/URIC ACID: CPT

## 2023-11-20 PROCEDURE — 83036 HEMOGLOBIN GLYCOSYLATED A1C: CPT

## 2023-11-20 PROCEDURE — 80053 COMPREHEN METABOLIC PANEL: CPT

## 2023-11-20 PROCEDURE — 36415 COLL VENOUS BLD VENIPUNCTURE: CPT

## 2023-11-20 PROCEDURE — 80061 LIPID PANEL: CPT

## 2023-11-20 PROCEDURE — 85014 HEMATOCRIT: CPT

## 2023-11-20 PROCEDURE — 85018 HEMOGLOBIN: CPT

## 2024-01-01 NOTE — PATIENT INSTRUCTIONS
Please be informed that if you contact our office outside of normal business hours the physician on call cannot help with any scheduling or rescheduling issues, procedure instruction questions or any type of medication issue. We advise you for any urgent/emergency that you go to the nearest emergency room! PLEASE CALL OUR OFFICE DURING NORMAL BUSINESS HOURS    Monday - Friday   8 am to 5 pm    [place]: [address]: [phone]    [place]:  [phone]    **It is YOUR responsibilty to bring medication bottles and/or updated medication list to [address] [place] [address]. This will allow us to better serve you and all your healthcare needs**    [facility] Locations - No appointment necessary. Sites open Monday to Friday. Call your preferred location for test preparation, business   hours and other information you need. [employer]. [address] [place] [address]. [address] [place], [address] [facility]  Phone: [phone]     Thank you for allowing us to care for you today! We want to ensure we can follow your treatment plan and we strive to give you the best outcomes and experience possible. If you ever have a life threatening emergency and call 911 - for an ambulance (EMS)   Our providers can only care for you at:   [facility] or [facility]. Even if you have someone take you or you drive yourself we can only care for you in a [address] [place] [address] facility. Our providers are not setup at the other healthcare locations! We are committed to providing you the best care possible. If you receive a survey after visiting one of our offices, please take time to share your experience concerning your physician office visit. These surveys are confidential and no health information about you is shared. We are eager to improve for you and we are counting on your feedback to help make that happen.
Anus position and patency

## 2024-01-05 ENCOUNTER — OFFICE VISIT (OUTPATIENT)
Dept: CARDIOLOGY CLINIC | Age: 79
End: 2024-01-05

## 2024-01-05 VITALS
BODY MASS INDEX: 34.58 KG/M2 | DIASTOLIC BLOOD PRESSURE: 78 MMHG | HEIGHT: 71 IN | OXYGEN SATURATION: 97 % | WEIGHT: 247 LBS | SYSTOLIC BLOOD PRESSURE: 138 MMHG | HEART RATE: 69 BPM

## 2024-01-05 DIAGNOSIS — I48.20 HYPERCOAGULABLE STATE DUE TO CHRONIC ATRIAL FIBRILLATION (HCC): ICD-10-CM

## 2024-01-05 DIAGNOSIS — D68.69 HYPERCOAGULABLE STATE DUE TO CHRONIC ATRIAL FIBRILLATION (HCC): ICD-10-CM

## 2024-01-05 DIAGNOSIS — I10 PRIMARY HYPERTENSION: ICD-10-CM

## 2024-01-05 DIAGNOSIS — Z95.818 PRESENCE OF WATCHMAN LEFT ATRIAL APPENDAGE CLOSURE DEVICE: Primary | ICD-10-CM

## 2024-01-05 DIAGNOSIS — I48.20 CHRONIC ATRIAL FIBRILLATION (HCC): ICD-10-CM

## 2024-01-05 ASSESSMENT — ENCOUNTER SYMPTOMS
BACK PAIN: 0
NAUSEA: 0
SINUS PAIN: 0
PHOTOPHOBIA: 0
ABDOMINAL DISTENTION: 0
SHORTNESS OF BREATH: 0
SINUS PRESSURE: 0
ABDOMINAL PAIN: 0
COUGH: 0
BLOOD IN STOOL: 0

## 2024-01-05 NOTE — PROGRESS NOTES
Electrophysiology Follow up  Note      Reason for consultation:  Assess for watchman    Chief complaint :  6 month follow up on watchman    Referring physician:       Primary care physician: Eduar Birmingham MD      History of Present Illness:     This visit 1/5/2023  Patient is here today for 6-month follow-up status post watchman.  Patient reports he is feeling well.  He denies chest pain, palpitations, shortness of breath, lightheadedness, dizziness or edema      Previous visit 9/1/2023  Patient is here today for follow up on recent SALAZAR s/p watchman. He states he had 1 syncopal episode since the watchman was placed. He was standing, became lightheaded and lost consciousness. He regained consciousness pretty quickly.   He states he thinks his blood pressure dropped. He denies chest pain, shortness of breath, or edema.     Previous  Patient is here for 1 week follow up on watchman implantation.He is felling well. He reports the right groin is a little bruised. He denies chest pain, palpitations, shortness of breath, lightheadedness, dizziness or syncope. He does have intermittent pedal edema. He is on torsemide.     Previous visit (6/2/2023)    Patient here to discuss watchman  He attended the education class today too    Patient had a GI bleeding in April and hemoglobin was down to 5.9 and patient had colonoscopy and had 29 polyps removed and he was okay to be started on Eliquis back on May 1 but somehow in the nursing home it looks like it was discontinued.  Patient did not restart any Eliquis he was not even on aspirin or Plavix    Patient and his wife are concerned  He is still weak. Patient denies chest pain. Has shortness of breath with moderate exertion. Denies dizziness or syncope  Denies further bleeding          Previous visit:     Kareem Rey is a 77 year old male with a history of CAD s/p PCI to LAD and RCA, atrial fibrillation, JAYE on cpap, diabetes

## 2024-01-05 NOTE — PATIENT INSTRUCTIONS
Please be informed that if you contact our office outside of normal business hours the physician on call cannot help with any scheduling or rescheduling issues, procedure instruction questions or any type of medication issue.    We advise you for any urgent/emergency that you go to the nearest emergency room!    PLEASE CALL OUR OFFICE DURING NORMAL BUSINESS HOURS    Monday - Friday   8 am to 5 pm    [place]: [phone]    [place]: [phone]    [place]:  [phone]  **It is YOUR responsibilty to bring medication bottles and/or updated medication list to EACH APPOINTMENT. This will allow us to better serve you and all your healthcare needs**  Thank you for allowing us to care for you today!   We want to ensure we can follow your treatment plan and we strive to give you the best outcomes and experience possible.   If you ever have a life threatening emergency and call 911 - for an ambulance (EMS)   Our providers can only care for you at:   [facility] or [facility].   Even if you have someone take you or you drive yourself we can only care for you in a [facility] facility. Our providers are not setup at the other healthcare locations!   We are committed to providing you the best care possible.    If you receive a survey after visiting one of our offices, please take time to share your experience concerning your physician office visit.  These surveys are confidential and no health information about you is shared.    We are eager to improve for you and we are counting on your feedback to help make that happen.

## 2024-02-10 PROCEDURE — 93294 REM INTERROG EVL PM/LDLS PM: CPT | Performed by: INTERNAL MEDICINE

## 2024-02-10 PROCEDURE — 93296 REM INTERROG EVL PM/IDS: CPT | Performed by: INTERNAL MEDICINE

## 2024-02-13 ENCOUNTER — PROCEDURE VISIT (OUTPATIENT)
Dept: CARDIOLOGY CLINIC | Age: 79
End: 2024-02-13
Payer: MEDICARE

## 2024-02-13 DIAGNOSIS — Z95.0 S/P PLACEMENT OF CARDIAC PACEMAKER: ICD-10-CM

## 2024-02-15 RX ORDER — CLOPIDOGREL BISULFATE 75 MG/1
75 TABLET ORAL DAILY
Qty: 30 TABLET | Refills: 5 | Status: SHIPPED | OUTPATIENT
Start: 2024-02-15

## 2024-02-23 ENCOUNTER — HOSPITAL ENCOUNTER (EMERGENCY)
Age: 79
Discharge: ANOTHER HOSPITAL | End: 2024-02-24
Attending: EMERGENCY MEDICINE
Payer: MEDICARE

## 2024-02-23 DIAGNOSIS — R55 SYNCOPE AND COLLAPSE: Primary | ICD-10-CM

## 2024-02-23 DIAGNOSIS — S01.81XA FACIAL LACERATION, INITIAL ENCOUNTER: ICD-10-CM

## 2024-02-23 LAB
BASOPHILS ABSOLUTE: 0 K/CU MM
BASOPHILS RELATIVE PERCENT: 0.7 % (ref 0–1)
DIFFERENTIAL TYPE: ABNORMAL
EKG ATRIAL RATE: 340 BPM
EKG DIAGNOSIS: NORMAL
EKG Q-T INTERVAL: 430 MS
EKG QRS DURATION: 98 MS
EKG QTC CALCULATION (BAZETT): 443 MS
EKG R AXIS: 6 DEGREES
EKG T AXIS: 242 DEGREES
EKG VENTRICULAR RATE: 64 BPM
EOSINOPHILS ABSOLUTE: 0.1 K/CU MM
EOSINOPHILS RELATIVE PERCENT: 2.7 % (ref 0–3)
HCT VFR BLD CALC: 35.1 % (ref 42–52)
HEMOGLOBIN: 11.6 GM/DL (ref 13.5–18)
IMMATURE NEUTROPHIL %: 0.7 % (ref 0–0.43)
LYMPHOCYTES ABSOLUTE: 0.8 K/CU MM
LYMPHOCYTES RELATIVE PERCENT: 17.1 % (ref 24–44)
MCH RBC QN AUTO: 35.4 PG (ref 27–31)
MCHC RBC AUTO-ENTMCNC: 33 % (ref 32–36)
MCV RBC AUTO: 107 FL (ref 78–100)
MONOCYTES ABSOLUTE: 0.5 K/CU MM
MONOCYTES RELATIVE PERCENT: 10 % (ref 0–4)
PDW BLD-RTO: 13.4 % (ref 11.7–14.9)
PLATELET # BLD: 114 K/CU MM (ref 140–440)
PMV BLD AUTO: 9 FL (ref 7.5–11.1)
RBC # BLD: 3.28 M/CU MM (ref 4.6–6.2)
SEGMENTED NEUTROPHILS ABSOLUTE COUNT: 3.1 K/CU MM
SEGMENTED NEUTROPHILS RELATIVE PERCENT: 68.8 % (ref 36–66)
TOTAL IMMATURE NEUTOROPHIL: 0.03 K/CU MM
WBC # BLD: 4.5 K/CU MM (ref 4–10.5)

## 2024-02-23 PROCEDURE — 2500000003 HC RX 250 WO HCPCS: Performed by: EMERGENCY MEDICINE

## 2024-02-23 PROCEDURE — 80053 COMPREHEN METABOLIC PANEL: CPT

## 2024-02-23 PROCEDURE — 84484 ASSAY OF TROPONIN QUANT: CPT

## 2024-02-23 PROCEDURE — 99285 EMERGENCY DEPT VISIT HI MDM: CPT

## 2024-02-23 PROCEDURE — 12011 RPR F/E/E/N/L/M 2.5 CM/<: CPT

## 2024-02-23 PROCEDURE — 85025 COMPLETE CBC W/AUTO DIFF WBC: CPT

## 2024-02-23 RX ORDER — LIDOCAINE HYDROCHLORIDE AND EPINEPHRINE 10; 10 MG/ML; UG/ML
20 INJECTION, SOLUTION INFILTRATION; PERINEURAL ONCE
Status: DISCONTINUED | OUTPATIENT
Start: 2024-02-23 | End: 2024-02-23

## 2024-02-23 RX ADMIN — LIDOCAINE HYDROCHLORIDE 10 ML: 10; .005 INJECTION, SOLUTION EPIDURAL; INFILTRATION; INTRACAUDAL; PERINEURAL at 23:29

## 2024-02-23 ASSESSMENT — PAIN DESCRIPTION - LOCATION: LOCATION: EYE

## 2024-02-23 ASSESSMENT — PAIN SCALES - GENERAL: PAINLEVEL_OUTOF10: 10

## 2024-02-24 ENCOUNTER — APPOINTMENT (OUTPATIENT)
Dept: CT IMAGING | Age: 79
End: 2024-02-24
Payer: MEDICARE

## 2024-02-24 ENCOUNTER — HOSPITAL ENCOUNTER (INPATIENT)
Age: 79
LOS: 2 days | Discharge: NURSING, REHAB OR LONG-TERM CARE | DRG: 074 | End: 2024-02-29
Attending: STUDENT IN AN ORGANIZED HEALTH CARE EDUCATION/TRAINING PROGRAM | Admitting: INTERNAL MEDICINE
Payer: MEDICARE

## 2024-02-24 VITALS
TEMPERATURE: 97.4 F | DIASTOLIC BLOOD PRESSURE: 70 MMHG | SYSTOLIC BLOOD PRESSURE: 124 MMHG | OXYGEN SATURATION: 98 % | HEART RATE: 65 BPM | WEIGHT: 247 LBS | BODY MASS INDEX: 34.58 KG/M2 | HEIGHT: 71 IN | RESPIRATION RATE: 12 BRPM

## 2024-02-24 DIAGNOSIS — R55 SYNCOPE AND COLLAPSE: ICD-10-CM

## 2024-02-24 DIAGNOSIS — R29.6 FREQUENT FALLS: ICD-10-CM

## 2024-02-24 DIAGNOSIS — I25.10 CAD IN NATIVE ARTERY: ICD-10-CM

## 2024-02-24 DIAGNOSIS — R55 SYNCOPE, UNSPECIFIED SYNCOPE TYPE: Primary | ICD-10-CM

## 2024-02-24 LAB
ALBUMIN SERPL-MCNC: 3.8 GM/DL (ref 3.4–5)
ALBUMIN SERPL-MCNC: 3.9 GM/DL (ref 3.4–5)
ALCOHOL SCREEN SERUM: 0.15 %WT/VOL
ALP BLD-CCNC: 81 IU/L (ref 40–129)
ALP BLD-CCNC: 87 IU/L (ref 40–129)
ALT SERPL-CCNC: 18 U/L (ref 10–40)
ALT SERPL-CCNC: 20 U/L (ref 10–40)
ANION GAP SERPL CALCULATED.3IONS-SCNC: 22 MMOL/L (ref 7–16)
ANION GAP SERPL CALCULATED.3IONS-SCNC: 22 MMOL/L (ref 7–16)
AST SERPL-CCNC: 32 IU/L (ref 15–37)
AST SERPL-CCNC: 34 IU/L (ref 15–37)
BASOPHILS ABSOLUTE: 0 K/CU MM
BASOPHILS RELATIVE PERCENT: 0.6 % (ref 0–1)
BILIRUB SERPL-MCNC: 0.6 MG/DL (ref 0–1)
BILIRUB SERPL-MCNC: 0.6 MG/DL (ref 0–1)
BUN SERPL-MCNC: 16 MG/DL (ref 6–23)
BUN SERPL-MCNC: 18 MG/DL (ref 6–23)
CALCIUM SERPL-MCNC: 8.9 MG/DL (ref 8.3–10.6)
CALCIUM SERPL-MCNC: 9.1 MG/DL (ref 8.3–10.6)
CHLORIDE BLD-SCNC: 98 MMOL/L (ref 99–110)
CHLORIDE BLD-SCNC: 98 MMOL/L (ref 99–110)
CO2: 20 MMOL/L (ref 21–32)
CO2: 21 MMOL/L (ref 21–32)
CREAT SERPL-MCNC: 1.6 MG/DL (ref 0.9–1.3)
CREAT SERPL-MCNC: 1.6 MG/DL (ref 0.9–1.3)
DIFFERENTIAL TYPE: ABNORMAL
EOSINOPHILS ABSOLUTE: 0.1 K/CU MM
EOSINOPHILS RELATIVE PERCENT: 2.7 % (ref 0–3)
GFR SERPL CREATININE-BSD FRML MDRD: 43 ML/MIN/1.73M2
GFR SERPL CREATININE-BSD FRML MDRD: 43 ML/MIN/1.73M2
GLUCOSE BLD-MCNC: 121 MG/DL (ref 70–99)
GLUCOSE BLD-MCNC: 129 MG/DL (ref 70–99)
GLUCOSE BLD-MCNC: 172 MG/DL (ref 70–99)
GLUCOSE BLD-MCNC: 85 MG/DL (ref 70–99)
GLUCOSE BLD-MCNC: 93 MG/DL (ref 70–99)
GLUCOSE SERPL-MCNC: 114 MG/DL (ref 70–99)
GLUCOSE SERPL-MCNC: 90 MG/DL (ref 70–99)
HCT VFR BLD CALC: 32.8 % (ref 42–52)
HEMOGLOBIN: 10.7 GM/DL (ref 13.5–18)
IMMATURE NEUTROPHIL %: 0.4 % (ref 0–0.43)
INR BLD: 1.1 INDEX
LYMPHOCYTES ABSOLUTE: 0.8 K/CU MM
LYMPHOCYTES RELATIVE PERCENT: 15.8 % (ref 24–44)
MAGNESIUM: 1.5 MG/DL (ref 1.8–2.4)
MAGNESIUM: 1.5 MG/DL (ref 1.8–2.4)
MCH RBC QN AUTO: 35.5 PG (ref 27–31)
MCHC RBC AUTO-ENTMCNC: 32.6 % (ref 32–36)
MCV RBC AUTO: 109 FL (ref 78–100)
MONOCYTES ABSOLUTE: 0.4 K/CU MM
MONOCYTES RELATIVE PERCENT: 8.2 % (ref 0–4)
NUCLEATED RBC %: 0 %
PDW BLD-RTO: 13.4 % (ref 11.7–14.9)
PLATELET # BLD: 103 K/CU MM (ref 140–440)
PMV BLD AUTO: 9.2 FL (ref 7.5–11.1)
POTASSIUM SERPL-SCNC: 3.4 MMOL/L (ref 3.5–5.1)
POTASSIUM SERPL-SCNC: 3.4 MMOL/L (ref 3.5–5.1)
POTASSIUM SERPL-SCNC: 4.2 MMOL/L (ref 3.5–5.1)
PROTHROMBIN TIME: 13.9 SECONDS (ref 11.7–14.5)
RBC # BLD: 3.01 M/CU MM (ref 4.6–6.2)
SEGMENTED NEUTROPHILS ABSOLUTE COUNT: 3.4 K/CU MM
SEGMENTED NEUTROPHILS RELATIVE PERCENT: 72.3 % (ref 36–66)
SODIUM BLD-SCNC: 140 MMOL/L (ref 135–145)
SODIUM BLD-SCNC: 141 MMOL/L (ref 135–145)
TOTAL IMMATURE NEUTOROPHIL: 0.02 K/CU MM
TOTAL NUCLEATED RBC: 0 K/CU MM
TOTAL PROTEIN: 5.6 GM/DL (ref 6.4–8.2)
TOTAL PROTEIN: 6.7 GM/DL (ref 6.4–8.2)
TROPONIN, HIGH SENSITIVITY: 36 NG/L (ref 0–22)
TROPONIN, HIGH SENSITIVITY: 38 NG/L (ref 0–22)
WBC # BLD: 4.8 K/CU MM (ref 4–10.5)

## 2024-02-24 PROCEDURE — 70450 CT HEAD/BRAIN W/O DYE: CPT

## 2024-02-24 PROCEDURE — 80053 COMPREHEN METABOLIC PANEL: CPT

## 2024-02-24 PROCEDURE — 84132 ASSAY OF SERUM POTASSIUM: CPT

## 2024-02-24 PROCEDURE — 93294 REM INTERROG EVL PM/LDLS PM: CPT | Performed by: INTERNAL MEDICINE

## 2024-02-24 PROCEDURE — 6370000000 HC RX 637 (ALT 250 FOR IP)

## 2024-02-24 PROCEDURE — 6370000000 HC RX 637 (ALT 250 FOR IP): Performed by: STUDENT IN AN ORGANIZED HEALTH CARE EDUCATION/TRAINING PROGRAM

## 2024-02-24 PROCEDURE — 6360000002 HC RX W HCPCS

## 2024-02-24 PROCEDURE — 36415 COLL VENOUS BLD VENIPUNCTURE: CPT

## 2024-02-24 PROCEDURE — 94761 N-INVAS EAR/PLS OXIMETRY MLT: CPT

## 2024-02-24 PROCEDURE — 82962 GLUCOSE BLOOD TEST: CPT

## 2024-02-24 PROCEDURE — 6370000000 HC RX 637 (ALT 250 FOR IP): Performed by: EMERGENCY MEDICINE

## 2024-02-24 PROCEDURE — G0378 HOSPITAL OBSERVATION PER HR: HCPCS

## 2024-02-24 PROCEDURE — 2580000003 HC RX 258: Performed by: EMERGENCY MEDICINE

## 2024-02-24 PROCEDURE — 84484 ASSAY OF TROPONIN QUANT: CPT

## 2024-02-24 PROCEDURE — 93296 REM INTERROG EVL PM/IDS: CPT | Performed by: INTERNAL MEDICINE

## 2024-02-24 PROCEDURE — 2580000003 HC RX 258: Performed by: STUDENT IN AN ORGANIZED HEALTH CARE EDUCATION/TRAINING PROGRAM

## 2024-02-24 PROCEDURE — 85025 COMPLETE CBC W/AUTO DIFF WBC: CPT

## 2024-02-24 PROCEDURE — 83735 ASSAY OF MAGNESIUM: CPT

## 2024-02-24 PROCEDURE — G0480 DRUG TEST DEF 1-7 CLASSES: HCPCS

## 2024-02-24 PROCEDURE — G0379 DIRECT REFER HOSPITAL OBSERV: HCPCS

## 2024-02-24 PROCEDURE — 6360000002 HC RX W HCPCS: Performed by: STUDENT IN AN ORGANIZED HEALTH CARE EDUCATION/TRAINING PROGRAM

## 2024-02-24 PROCEDURE — 99223 1ST HOSP IP/OBS HIGH 75: CPT | Performed by: INTERNAL MEDICINE

## 2024-02-24 PROCEDURE — 85610 PROTHROMBIN TIME: CPT

## 2024-02-24 PROCEDURE — 72125 CT NECK SPINE W/O DYE: CPT

## 2024-02-24 RX ORDER — POTASSIUM CHLORIDE 7.45 MG/ML
10 INJECTION INTRAVENOUS PRN
Status: DISCONTINUED | OUTPATIENT
Start: 2024-02-24 | End: 2024-02-24

## 2024-02-24 RX ORDER — ATORVASTATIN CALCIUM 10 MG/1
20 TABLET, FILM COATED ORAL DAILY
Status: DISCONTINUED | OUTPATIENT
Start: 2024-02-24 | End: 2024-02-29 | Stop reason: HOSPADM

## 2024-02-24 RX ORDER — POTASSIUM CHLORIDE 20 MEQ/1
40 TABLET, EXTENDED RELEASE ORAL PRN
Status: DISCONTINUED | OUTPATIENT
Start: 2024-02-24 | End: 2024-02-29 | Stop reason: HOSPADM

## 2024-02-24 RX ORDER — SODIUM CHLORIDE 9 MG/ML
INJECTION, SOLUTION INTRAVENOUS PRN
Status: DISCONTINUED | OUTPATIENT
Start: 2024-02-24 | End: 2024-02-29 | Stop reason: HOSPADM

## 2024-02-24 RX ORDER — INSULIN LISPRO 100 [IU]/ML
0-4 INJECTION, SOLUTION INTRAVENOUS; SUBCUTANEOUS
Status: DISCONTINUED | OUTPATIENT
Start: 2024-02-24 | End: 2024-02-29 | Stop reason: HOSPADM

## 2024-02-24 RX ORDER — SODIUM CHLORIDE, SODIUM LACTATE, POTASSIUM CHLORIDE, CALCIUM CHLORIDE 600; 310; 30; 20 MG/100ML; MG/100ML; MG/100ML; MG/100ML
INJECTION, SOLUTION INTRAVENOUS CONTINUOUS
Status: ACTIVE | OUTPATIENT
Start: 2024-02-24 | End: 2024-02-24

## 2024-02-24 RX ORDER — GABAPENTIN 300 MG/1
300 CAPSULE ORAL 3 TIMES DAILY
Status: DISCONTINUED | OUTPATIENT
Start: 2024-02-24 | End: 2024-02-29 | Stop reason: HOSPADM

## 2024-02-24 RX ORDER — LANOLIN ALCOHOL/MO/W.PET/CERES
400 CREAM (GRAM) TOPICAL DAILY
Status: DISCONTINUED | OUTPATIENT
Start: 2024-02-24 | End: 2024-02-29 | Stop reason: HOSPADM

## 2024-02-24 RX ORDER — CLOPIDOGREL BISULFATE 75 MG/1
75 TABLET ORAL DAILY
Status: DISCONTINUED | OUTPATIENT
Start: 2024-02-24 | End: 2024-02-29 | Stop reason: HOSPADM

## 2024-02-24 RX ORDER — SODIUM CHLORIDE 0.9 % (FLUSH) 0.9 %
5-40 SYRINGE (ML) INJECTION PRN
Status: DISCONTINUED | OUTPATIENT
Start: 2024-02-24 | End: 2024-02-29 | Stop reason: HOSPADM

## 2024-02-24 RX ORDER — GLUCAGON 1 MG/ML
1 KIT INJECTION PRN
Status: DISCONTINUED | OUTPATIENT
Start: 2024-02-24 | End: 2024-02-29 | Stop reason: HOSPADM

## 2024-02-24 RX ORDER — POTASSIUM CHLORIDE 20 MEQ/1
20 TABLET, EXTENDED RELEASE ORAL ONCE
Status: COMPLETED | OUTPATIENT
Start: 2024-02-24 | End: 2024-02-24

## 2024-02-24 RX ORDER — TORSEMIDE 20 MG/1
20 TABLET ORAL DAILY
Status: DISCONTINUED | OUTPATIENT
Start: 2024-02-24 | End: 2024-02-29 | Stop reason: HOSPADM

## 2024-02-24 RX ORDER — ACETAMINOPHEN 500 MG
1000 TABLET ORAL
Status: COMPLETED | OUTPATIENT
Start: 2024-02-24 | End: 2024-02-24

## 2024-02-24 RX ORDER — FERROUS SULFATE 325(65) MG
325 TABLET ORAL
Status: DISCONTINUED | OUTPATIENT
Start: 2024-02-24 | End: 2024-02-29 | Stop reason: HOSPADM

## 2024-02-24 RX ORDER — ALLOPURINOL 100 MG/1
100 TABLET ORAL DAILY
Status: DISCONTINUED | OUTPATIENT
Start: 2024-02-24 | End: 2024-02-29 | Stop reason: HOSPADM

## 2024-02-24 RX ORDER — ONDANSETRON 2 MG/ML
4 INJECTION INTRAMUSCULAR; INTRAVENOUS EVERY 6 HOURS PRN
Status: DISCONTINUED | OUTPATIENT
Start: 2024-02-24 | End: 2024-02-29 | Stop reason: HOSPADM

## 2024-02-24 RX ORDER — POTASSIUM CHLORIDE 20 MEQ/1
40 TABLET, EXTENDED RELEASE ORAL ONCE
Status: DISCONTINUED | OUTPATIENT
Start: 2024-02-24 | End: 2024-02-24 | Stop reason: ALTCHOICE

## 2024-02-24 RX ORDER — ONDANSETRON 4 MG/1
4 TABLET, ORALLY DISINTEGRATING ORAL EVERY 8 HOURS PRN
Status: DISCONTINUED | OUTPATIENT
Start: 2024-02-24 | End: 2024-02-29 | Stop reason: HOSPADM

## 2024-02-24 RX ORDER — BACITRACIN ZINC 500 [USP'U]/G
OINTMENT TOPICAL ONCE
Status: COMPLETED | OUTPATIENT
Start: 2024-02-24 | End: 2024-02-24

## 2024-02-24 RX ORDER — MAGNESIUM SULFATE IN WATER 40 MG/ML
2000 INJECTION, SOLUTION INTRAVENOUS PRN
Status: DISCONTINUED | OUTPATIENT
Start: 2024-02-24 | End: 2024-02-24

## 2024-02-24 RX ORDER — ACETAMINOPHEN 650 MG/1
650 SUPPOSITORY RECTAL EVERY 6 HOURS PRN
Status: DISCONTINUED | OUTPATIENT
Start: 2024-02-24 | End: 2024-02-29 | Stop reason: HOSPADM

## 2024-02-24 RX ORDER — ACETAMINOPHEN 325 MG/1
650 TABLET ORAL EVERY 6 HOURS PRN
Status: DISCONTINUED | OUTPATIENT
Start: 2024-02-24 | End: 2024-02-29 | Stop reason: HOSPADM

## 2024-02-24 RX ORDER — ENOXAPARIN SODIUM 100 MG/ML
30 INJECTION SUBCUTANEOUS 2 TIMES DAILY
Status: DISCONTINUED | OUTPATIENT
Start: 2024-02-24 | End: 2024-02-29 | Stop reason: HOSPADM

## 2024-02-24 RX ORDER — MAGNESIUM SULFATE IN WATER 40 MG/ML
2000 INJECTION, SOLUTION INTRAVENOUS PRN
Status: DISCONTINUED | OUTPATIENT
Start: 2024-02-24 | End: 2024-02-29 | Stop reason: HOSPADM

## 2024-02-24 RX ORDER — INSULIN LISPRO 100 [IU]/ML
0-4 INJECTION, SOLUTION INTRAVENOUS; SUBCUTANEOUS EVERY 4 HOURS
Status: DISCONTINUED | OUTPATIENT
Start: 2024-02-24 | End: 2024-02-26

## 2024-02-24 RX ORDER — DEXTROSE MONOHYDRATE 100 MG/ML
INJECTION, SOLUTION INTRAVENOUS CONTINUOUS PRN
Status: DISCONTINUED | OUTPATIENT
Start: 2024-02-24 | End: 2024-02-29 | Stop reason: HOSPADM

## 2024-02-24 RX ORDER — FOLIC ACID 1 MG/1
1 TABLET ORAL DAILY
Status: DISCONTINUED | OUTPATIENT
Start: 2024-02-24 | End: 2024-02-29 | Stop reason: HOSPADM

## 2024-02-24 RX ORDER — MAGNESIUM SULFATE IN WATER 40 MG/ML
2000 INJECTION, SOLUTION INTRAVENOUS ONCE
Status: COMPLETED | OUTPATIENT
Start: 2024-02-24 | End: 2024-02-24

## 2024-02-24 RX ORDER — SODIUM CHLORIDE 0.9 % (FLUSH) 0.9 %
5-40 SYRINGE (ML) INJECTION EVERY 12 HOURS SCHEDULED
Status: DISCONTINUED | OUTPATIENT
Start: 2024-02-24 | End: 2024-02-29 | Stop reason: HOSPADM

## 2024-02-24 RX ORDER — POLYETHYLENE GLYCOL 3350 17 G/17G
17 POWDER, FOR SOLUTION ORAL DAILY PRN
Status: DISCONTINUED | OUTPATIENT
Start: 2024-02-24 | End: 2024-02-29 | Stop reason: HOSPADM

## 2024-02-24 RX ORDER — MONTELUKAST SODIUM 10 MG/1
10 TABLET ORAL DAILY
Status: DISCONTINUED | OUTPATIENT
Start: 2024-02-24 | End: 2024-02-29 | Stop reason: HOSPADM

## 2024-02-24 RX ORDER — 0.9 % SODIUM CHLORIDE 0.9 %
1000 INTRAVENOUS SOLUTION INTRAVENOUS ONCE
Status: COMPLETED | OUTPATIENT
Start: 2024-02-24 | End: 2024-02-24

## 2024-02-24 RX ORDER — POTASSIUM CHLORIDE 20 MEQ/1
40 TABLET, EXTENDED RELEASE ORAL PRN
Status: DISCONTINUED | OUTPATIENT
Start: 2024-02-24 | End: 2024-02-24

## 2024-02-24 RX ORDER — INSULIN LISPRO 100 [IU]/ML
0-4 INJECTION, SOLUTION INTRAVENOUS; SUBCUTANEOUS NIGHTLY
Status: DISCONTINUED | OUTPATIENT
Start: 2024-02-24 | End: 2024-02-29 | Stop reason: HOSPADM

## 2024-02-24 RX ORDER — POTASSIUM CHLORIDE 7.45 MG/ML
10 INJECTION INTRAVENOUS PRN
Status: DISCONTINUED | OUTPATIENT
Start: 2024-02-24 | End: 2024-02-29 | Stop reason: HOSPADM

## 2024-02-24 RX ORDER — AMLODIPINE BESYLATE 5 MG/1
5 TABLET ORAL DAILY
Status: DISCONTINUED | OUTPATIENT
Start: 2024-02-24 | End: 2024-02-29 | Stop reason: HOSPADM

## 2024-02-24 RX ADMIN — POTASSIUM CHLORIDE 20 MEQ: 1500 TABLET, EXTENDED RELEASE ORAL at 14:06

## 2024-02-24 RX ADMIN — ACETAMINOPHEN 1000 MG: 500 TABLET ORAL at 01:28

## 2024-02-24 RX ADMIN — POTASSIUM CHLORIDE 40 MEQ: 1500 TABLET, EXTENDED RELEASE ORAL at 06:58

## 2024-02-24 RX ADMIN — ATORVASTATIN CALCIUM 20 MG: 10 TABLET, FILM COATED ORAL at 08:29

## 2024-02-24 RX ADMIN — ENOXAPARIN SODIUM 30 MG: 100 INJECTION SUBCUTANEOUS at 08:30

## 2024-02-24 RX ADMIN — FERROUS SULFATE TAB 325 MG (65 MG ELEMENTAL FE) 325 MG: 325 (65 FE) TAB at 08:30

## 2024-02-24 RX ADMIN — BACITRACIN ZINC: 500 OINTMENT TOPICAL at 02:39

## 2024-02-24 RX ADMIN — SODIUM CHLORIDE, POTASSIUM CHLORIDE, SODIUM LACTATE AND CALCIUM CHLORIDE: 600; 310; 30; 20 INJECTION, SOLUTION INTRAVENOUS at 05:38

## 2024-02-24 RX ADMIN — SODIUM CHLORIDE 1000 ML: 9 INJECTION, SOLUTION INTRAVENOUS at 01:35

## 2024-02-24 RX ADMIN — FOLIC ACID 1 MG: 1 TABLET ORAL at 08:30

## 2024-02-24 RX ADMIN — MONTELUKAST 10 MG: 10 TABLET, FILM COATED ORAL at 08:30

## 2024-02-24 RX ADMIN — MAGNESIUM SULFATE HEPTAHYDRATE 2000 MG: 40 INJECTION, SOLUTION INTRAVENOUS at 14:19

## 2024-02-24 RX ADMIN — SODIUM CHLORIDE, POTASSIUM CHLORIDE, SODIUM LACTATE AND CALCIUM CHLORIDE: 600; 310; 30; 20 INJECTION, SOLUTION INTRAVENOUS at 14:13

## 2024-02-24 RX ADMIN — ALLOPURINOL 100 MG: 100 TABLET ORAL at 08:30

## 2024-02-24 RX ADMIN — CLOPIDOGREL BISULFATE 75 MG: 75 TABLET ORAL at 08:30

## 2024-02-24 RX ADMIN — GABAPENTIN 300 MG: 300 CAPSULE ORAL at 08:29

## 2024-02-24 RX ADMIN — GABAPENTIN 300 MG: 300 CAPSULE ORAL at 21:04

## 2024-02-24 RX ADMIN — Medication 400 MG: at 08:29

## 2024-02-24 RX ADMIN — TORSEMIDE 20 MG: 20 TABLET ORAL at 08:29

## 2024-02-24 RX ADMIN — GABAPENTIN 300 MG: 300 CAPSULE ORAL at 14:06

## 2024-02-24 RX ADMIN — ENOXAPARIN SODIUM 30 MG: 100 INJECTION SUBCUTANEOUS at 21:04

## 2024-02-24 RX ADMIN — AMLODIPINE BESYLATE 5 MG: 5 TABLET ORAL at 08:30

## 2024-02-24 ASSESSMENT — PAIN DESCRIPTION - DESCRIPTORS: DESCRIPTORS: CRAMPING

## 2024-02-24 ASSESSMENT — PAIN DESCRIPTION - ORIENTATION: ORIENTATION: MID

## 2024-02-24 ASSESSMENT — PAIN SCALES - GENERAL
PAINLEVEL_OUTOF10: 0
PAINLEVEL_OUTOF10: 4

## 2024-02-24 ASSESSMENT — PAIN DESCRIPTION - LOCATION: LOCATION: ABDOMEN

## 2024-02-24 NOTE — ED PROVIDER NOTES
Triage Chief Complaint:    Fall, Laceration, and Head Injury    HPI   Kareem Rey is a 78 y.o. male that presents for evaluation after syncopal episode.  The patient was using his rollator and states that he was trying to go to bed and then all of a sudden passed out.  He states he has pain around his right eye where he sustained a laceration.  He has denied pain elsewhere.  No chest pain or shortness of breath.  No numbness tingling or weakness.  No fevers or chills.  Denies any other acute concerns at this time.  He states this happened multiple times in the past and he is talk to his primary care doctor about it but he is not sure why this is occurring.  He does report that last year he did have a Watchman procedure.  He is not on DOAC anymore.  He does take Plavix still.    History from : Patient    Limitations to history : None    ROS:  10 systems reviewed and negative except as above.     Past Medical History:   Diagnosis Date    Abnormal nuclear stress test 08/23/2022    Inferior wall ischemia 8/2022    Arthritis     CAD s/p PCI to LAD+RCA 9/9/22 09/09/2022    1. PCI to Proximal LAD for 80 -90 % lesion reduced to 0 % with  TYSON III Flow reduced to 0 % stenosis with 3.5 X 12 BARTOLO ,  followed by post dilated with 3.75 X 8 NC balloon  2. Balloon angioplasty of distal LAD for 90 % lesion reduced to  0 % using 2.0 X 10 Balloon TYSON III flow  3. PCI to ostial 80 % RCA lesion and mid RCA lesion of 80 % post  Balloon angioplasty using 4.0 X 34 BARTOLO inflated to 16    Dizziness     FH: CAD (coronary artery disease) 09/29/2015    H/O 24 hour EKG monitoring 02/26/19,08/02/2016    Conclusion: Atrial fibrillation with  fairly well-controlled ventricular rate response without evidence of any significant tachy or alon arrhythmias.    H/O atrial fibrillation without current medication 04/03/2019    H/O cardiovascular stress test 11/24/15; 8/22/2019    Normal perfusion in the distribution of all coronaries, normal LV, EF 53%.     that he is safe because it is only him and his wife at home.  They were hopeful the patient may be hospitalized.  I explained to him that we do not have cardiology here and they can talk to cardiology first to determine what the neck steps are.  We did interrogate the pacemaker and this was reassuring.  Initial troponin slightly elevated but in the setting of renal insufficiency which appears to be close to baseline for him.  Repeat troponin currently pending.  Hemoglobin also appears to be close to baseline for him may be slightly lower but no history of hematochezia or melena to suggest GI etiology of his symptoms.  Laceration was repaired.  Spoke with cardiology who is okay with transfer since we do not have cardiology coverage here.  Clarified with family after they arrived and the patient actually had multiple falls -8 in the last 10 days and they have been unprovoked.  They are not sure if he syncopized during each of these times.  They did discuss the case with his primary care doctor and they have home health scheduled to come out for physical therapy and a nurse that can come out and check on him.  Patient does have some arthropathy which may be contributory.  No focal deficits to suggest acute CNS etiology but possibly still part of differential.  Plan will be to transfer for further evaluation after discussion with HM team at [facility].  Alcohol level did come back elevated and I discussed the case with him.  Patient reports that he has been drinking a couple shots at bedside to help with his tremors for years now.  His primary care doctor already knows about this.  He states it helps with his neuropathy as well as for his tremors.  We discussed that he should follow-up outpatient with neurology which I have included here in my note.    Is this patient to be included in the SEP-1 core measure due to severe sepsis or septic shock? No Exclusion criteria - the patient is NOT to be included for SEP-1 Core Measure

## 2024-02-24 NOTE — H&P
CLARITYU CLEAR 04/27/2023 04:15 AM    SPECGRAV 1.010 04/27/2023 04:15 AM    LEUKOCYTESUR NEGATIVE 04/27/2023 04:15 AM    UROBILINOGEN 0.2 04/27/2023 04:15 AM    BILIRUBINUR NEGATIVE 04/27/2023 04:15 AM    BLOODU NEGATIVE 04/27/2023 04:15 AM    KETUA NEGATIVE 04/27/2023 04:15 AM     Urine Cultures: No results found for: \"LABURIN\"  Blood Cultures: No results found for: \"BC\"  No results found for: \"BLOODCULT2\"  Organism: No results found for: \"ORG\"    Radiology results:  No orders to display       Windy Almeida MD  02/24/24 4:17 AM

## 2024-02-25 PROBLEM — R29.6 FREQUENT FALLS: Status: ACTIVE | Noted: 2024-02-25

## 2024-02-25 LAB
25(OH)D3 SERPL-MCNC: 18.38 NG/ML
ALBUMIN SERPL-MCNC: 3.2 GM/DL (ref 3.4–5)
ALCOHOL SCREEN SERUM: <0.01 %WT/VOL
ALP BLD-CCNC: 80 IU/L (ref 40–128)
ALT SERPL-CCNC: 16 U/L (ref 10–40)
ANION GAP SERPL CALCULATED.3IONS-SCNC: 13 MMOL/L (ref 7–16)
ANISOCYTOSIS: ABNORMAL
AST SERPL-CCNC: 31 IU/L (ref 15–37)
BANDED NEUTROPHILS ABSOLUTE COUNT: 0.11 K/CU MM
BANDED NEUTROPHILS RELATIVE PERCENT: 3 % (ref 5–11)
BASOPHILS ABSOLUTE: 0 K/CU MM
BASOPHILS RELATIVE PERCENT: 1 % (ref 0–1)
BILIRUB SERPL-MCNC: 1.1 MG/DL (ref 0–1)
BUN SERPL-MCNC: 15 MG/DL (ref 6–23)
CALCIUM SERPL-MCNC: 8.7 MG/DL (ref 8.3–10.6)
CHLORIDE BLD-SCNC: 101 MMOL/L (ref 99–110)
CO2: 25 MMOL/L (ref 21–32)
CREAT SERPL-MCNC: 1.4 MG/DL (ref 0.9–1.3)
DIFFERENTIAL TYPE: ABNORMAL
EOSINOPHILS ABSOLUTE: 0.1 K/CU MM
EOSINOPHILS RELATIVE PERCENT: 2 % (ref 0–3)
FERRITIN: 197 NG/ML (ref 30–400)
FOLATE SERPL-MCNC: 15.5 NG/ML (ref 3.1–17.5)
GFR SERPL CREATININE-BSD FRML MDRD: 51 ML/MIN/1.73M2
GLUCOSE BLD-MCNC: 100 MG/DL (ref 70–99)
GLUCOSE BLD-MCNC: 107 MG/DL (ref 70–99)
GLUCOSE BLD-MCNC: 110 MG/DL (ref 70–99)
GLUCOSE BLD-MCNC: 133 MG/DL (ref 70–99)
GLUCOSE BLD-MCNC: 138 MG/DL (ref 70–99)
GLUCOSE BLD-MCNC: 157 MG/DL (ref 70–99)
GLUCOSE SERPL-MCNC: 100 MG/DL (ref 70–99)
HAV IGM SERPL QL IA: NON REACTIVE
HBV CORE IGM SERPL QL IA: NON REACTIVE
HBV SURFACE AG SERPL QL IA: NON REACTIVE
HCT VFR BLD CALC: 32.1 % (ref 42–52)
HCV AB SERPL QL IA: NON REACTIVE
HEMOGLOBIN: 10.4 GM/DL (ref 13.5–18)
IRON: 54 UG/DL (ref 59–158)
LACTATE DEHYDROGENASE: 169 IU/L (ref 120–246)
LYMPHOCYTES ABSOLUTE: 0.4 K/CU MM
LYMPHOCYTES RELATIVE PERCENT: 10 % (ref 24–44)
MAGNESIUM: 1.9 MG/DL (ref 1.8–2.4)
MCH RBC QN AUTO: 35.6 PG (ref 27–31)
MCHC RBC AUTO-ENTMCNC: 32.4 % (ref 32–36)
MCV RBC AUTO: 109.9 FL (ref 78–100)
MONOCYTES ABSOLUTE: 0.3 K/CU MM
NUCLEATED RBC %: 0 %
PCT TRANSFERRIN: 25 % (ref 10–44)
PDW BLD-RTO: 13.4 % (ref 11.7–14.9)
PLATELET # BLD: 98 K/CU MM (ref 140–440)
PLT MORPHOLOGY: ABNORMAL
PMV BLD AUTO: 10 FL (ref 7.5–11.1)
POTASSIUM SERPL-SCNC: 3.8 MMOL/L (ref 3.5–5.1)
RBC # BLD: 2.92 M/CU MM (ref 4.6–6.2)
RBC # BLD: ABNORMAL 10*6/UL
RETICULOCYTE COUNT PCT: 2 % (ref 0.2–2.2)
SEGMENTED NEUTROPHILS ABSOLUTE COUNT: 2.7 K/CU MM
SEGMENTED NEUTROPHILS RELATIVE PERCENT: 75 % (ref 36–66)
SODIUM BLD-SCNC: 139 MMOL/L (ref 135–145)
T4 FREE SERPL-MCNC: 1.04 NG/DL (ref 0.9–1.8)
TOTAL IRON BINDING CAPACITY: 219 UG/DL (ref 250–450)
TOTAL NUCLEATED RBC: 0 K/CU MM
TOTAL PROTEIN: 5.2 GM/DL (ref 6.4–8.2)
TSH SERPL DL<=0.005 MIU/L-ACNC: 2.51 UIU/ML (ref 0.27–4.2)
UNSATURATED IRON BINDING CAPACITY: 165 UG/DL (ref 110–370)
VITAMIN B-12: 314.1 PG/ML (ref 211–911)
WBC # BLD: 3.6 K/CU MM (ref 4–10.5)

## 2024-02-25 PROCEDURE — 84443 ASSAY THYROID STIM HORMONE: CPT

## 2024-02-25 PROCEDURE — 84439 ASSAY OF FREE THYROXINE: CPT

## 2024-02-25 PROCEDURE — 83615 LACTATE (LD) (LDH) ENZYME: CPT

## 2024-02-25 PROCEDURE — 82746 ASSAY OF FOLIC ACID SERUM: CPT

## 2024-02-25 PROCEDURE — 6370000000 HC RX 637 (ALT 250 FOR IP): Performed by: STUDENT IN AN ORGANIZED HEALTH CARE EDUCATION/TRAINING PROGRAM

## 2024-02-25 PROCEDURE — 6360000002 HC RX W HCPCS: Performed by: STUDENT IN AN ORGANIZED HEALTH CARE EDUCATION/TRAINING PROGRAM

## 2024-02-25 PROCEDURE — 85007 BL SMEAR W/DIFF WBC COUNT: CPT

## 2024-02-25 PROCEDURE — 99205 OFFICE O/P NEW HI 60 MIN: CPT | Performed by: PSYCHIATRY & NEUROLOGY

## 2024-02-25 PROCEDURE — 2580000003 HC RX 258: Performed by: STUDENT IN AN ORGANIZED HEALTH CARE EDUCATION/TRAINING PROGRAM

## 2024-02-25 PROCEDURE — 80074 ACUTE HEPATITIS PANEL: CPT

## 2024-02-25 PROCEDURE — 82607 VITAMIN B-12: CPT

## 2024-02-25 PROCEDURE — 83735 ASSAY OF MAGNESIUM: CPT

## 2024-02-25 PROCEDURE — 85045 AUTOMATED RETICULOCYTE COUNT: CPT

## 2024-02-25 PROCEDURE — 82728 ASSAY OF FERRITIN: CPT

## 2024-02-25 PROCEDURE — 82962 GLUCOSE BLOOD TEST: CPT

## 2024-02-25 PROCEDURE — 6370000000 HC RX 637 (ALT 250 FOR IP): Performed by: HOSPITALIST

## 2024-02-25 PROCEDURE — G0480 DRUG TEST DEF 1-7 CLASSES: HCPCS

## 2024-02-25 PROCEDURE — 83550 IRON BINDING TEST: CPT

## 2024-02-25 PROCEDURE — G0378 HOSPITAL OBSERVATION PER HR: HCPCS

## 2024-02-25 PROCEDURE — 94761 N-INVAS EAR/PLS OXIMETRY MLT: CPT

## 2024-02-25 PROCEDURE — 36415 COLL VENOUS BLD VENIPUNCTURE: CPT

## 2024-02-25 PROCEDURE — 83540 ASSAY OF IRON: CPT

## 2024-02-25 PROCEDURE — APPNB15 APP NON BILLABLE TIME 0-15 MINS

## 2024-02-25 PROCEDURE — 80053 COMPREHEN METABOLIC PANEL: CPT

## 2024-02-25 PROCEDURE — 85027 COMPLETE CBC AUTOMATED: CPT

## 2024-02-25 PROCEDURE — 82306 VITAMIN D 25 HYDROXY: CPT

## 2024-02-25 RX ADMIN — GABAPENTIN 300 MG: 300 CAPSULE ORAL at 13:44

## 2024-02-25 RX ADMIN — MONTELUKAST 10 MG: 10 TABLET, FILM COATED ORAL at 10:37

## 2024-02-25 RX ADMIN — Medication 1 DROP: at 13:44

## 2024-02-25 RX ADMIN — FERROUS SULFATE TAB 325 MG (65 MG ELEMENTAL FE) 325 MG: 325 (65 FE) TAB at 10:37

## 2024-02-25 RX ADMIN — Medication 400 MG: at 10:38

## 2024-02-25 RX ADMIN — ALLOPURINOL 100 MG: 100 TABLET ORAL at 10:37

## 2024-02-25 RX ADMIN — ENOXAPARIN SODIUM 30 MG: 100 INJECTION SUBCUTANEOUS at 20:39

## 2024-02-25 RX ADMIN — SODIUM CHLORIDE, PRESERVATIVE FREE 10 ML: 5 INJECTION INTRAVENOUS at 20:41

## 2024-02-25 RX ADMIN — ATORVASTATIN CALCIUM 20 MG: 10 TABLET, FILM COATED ORAL at 10:38

## 2024-02-25 RX ADMIN — FOLIC ACID 1 MG: 1 TABLET ORAL at 10:38

## 2024-02-25 RX ADMIN — CLOPIDOGREL BISULFATE 75 MG: 75 TABLET ORAL at 10:38

## 2024-02-25 RX ADMIN — ENOXAPARIN SODIUM 30 MG: 100 INJECTION SUBCUTANEOUS at 10:38

## 2024-02-25 RX ADMIN — SODIUM CHLORIDE, PRESERVATIVE FREE 10 ML: 5 INJECTION INTRAVENOUS at 10:40

## 2024-02-25 RX ADMIN — AMLODIPINE BESYLATE 5 MG: 5 TABLET ORAL at 10:37

## 2024-02-25 RX ADMIN — GABAPENTIN 300 MG: 300 CAPSULE ORAL at 20:40

## 2024-02-25 RX ADMIN — GABAPENTIN 300 MG: 300 CAPSULE ORAL at 10:37

## 2024-02-25 RX ADMIN — ACETAMINOPHEN 650 MG: 325 TABLET ORAL at 16:31

## 2024-02-25 ASSESSMENT — PAIN DESCRIPTION - ORIENTATION: ORIENTATION: RIGHT

## 2024-02-25 ASSESSMENT — PAIN SCALES - GENERAL
PAINLEVEL_OUTOF10: 3
PAINLEVEL_OUTOF10: 0

## 2024-02-25 ASSESSMENT — PAIN - FUNCTIONAL ASSESSMENT: PAIN_FUNCTIONAL_ASSESSMENT: ACTIVITIES ARE NOT PREVENTED

## 2024-02-25 ASSESSMENT — PAIN DESCRIPTION - DESCRIPTORS: DESCRIPTORS: BURNING

## 2024-02-25 ASSESSMENT — PAIN DESCRIPTION - LOCATION: LOCATION: EYE

## 2024-02-25 NOTE — CARE COORDINATION
02/25/24 1452   Service Assessment   Patient Orientation Alert and Oriented   Cognition Alert   History Provided By Patient   Primary Caregiver Self   Support Systems Spouse/Significant Other;Friends/Neighbors   PCP Verified by CM Yes   Last Visit to PCP Within last 3 months   Prior Functional Level Independent in ADLs/IADLs;Assistance with the following:;Mobility  (uses Rollator)   Current Functional Level Independent in ADLs/IADLs;Assistance with the following:;Mobility  (uses Rollator)   Can patient return to prior living arrangement Yes   Ability to make needs known: Good   Family able to assist with home care needs: Other (comment)  (neighbors help)   Would you like for me to discuss the discharge plan with any other family members/significant others, and if so, who? Yes  (spouse)   CM/SW Referral Other (see comment)   Social/Functional History   Lives With Spouse   Type of Home House   Home Layout One level   Home Access Level entry;Ramped entrance   Bathroom Shower/Tub Walk-in shower   Bathroom Toilet Standard   Bathroom Equipment Grab bars in shower;Built-in shower seat;Grab bars around toilet   Bathroom Accessibility Accessible   Home Equipment Cane;Rollator;Grab bars;Lift chair   Receives Help From Neighbor   ADL Assistance Independent   Homemaking Responsibilities No   Ambulation Assistance Independent  (Uses a Rollator)   Transfer Assistance Independent   Active  Yes  (Doesn't drive d/t blacking out)   Occupation Retired   Discharge Planning   Type of Residence House   Living Arrangements Spouse/Significant Other   Current Services Prior To Admission Home Care   Potential Assistance Needed Home Care   Type of Home Care Services Nursing Services;PT   Patient expects to be discharged to: House   One/Two Story Residence One story   History of falls? 1   Services At/After Discharge   Services At/After Discharge Home Health;PT     CM reviewed chart and spoke with pt. Spouse at bedside. Pt is from

## 2024-02-26 ENCOUNTER — APPOINTMENT (OUTPATIENT)
Dept: NON INVASIVE DIAGNOSTICS | Age: 79
DRG: 074 | End: 2024-02-26
Attending: INTERNAL MEDICINE
Payer: MEDICARE

## 2024-02-26 ENCOUNTER — APPOINTMENT (OUTPATIENT)
Dept: MRI IMAGING | Age: 79
DRG: 074 | End: 2024-02-26
Attending: STUDENT IN AN ORGANIZED HEALTH CARE EDUCATION/TRAINING PROGRAM
Payer: MEDICARE

## 2024-02-26 ENCOUNTER — PROCEDURE VISIT (OUTPATIENT)
Dept: CARDIOLOGY CLINIC | Age: 79
End: 2024-02-26
Payer: MEDICARE

## 2024-02-26 DIAGNOSIS — Z95.0 S/P PLACEMENT OF CARDIAC PACEMAKER: ICD-10-CM

## 2024-02-26 PROBLEM — R55 SYNCOPE AND COLLAPSE: Status: ACTIVE | Noted: 2024-02-26

## 2024-02-26 LAB
ALBUMIN SERPL-MCNC: 3.3 GM/DL (ref 3.4–5)
ALP BLD-CCNC: 81 IU/L (ref 40–128)
ALT SERPL-CCNC: 15 U/L (ref 10–40)
ANION GAP SERPL CALCULATED.3IONS-SCNC: 11 MMOL/L (ref 7–16)
AST SERPL-CCNC: 29 IU/L (ref 15–37)
BASOPHILS ABSOLUTE: 0 K/CU MM
BASOPHILS RELATIVE PERCENT: 0.5 % (ref 0–1)
BILIRUB SERPL-MCNC: 0.9 MG/DL (ref 0–1)
BUN SERPL-MCNC: 13 MG/DL (ref 6–23)
CALCIUM SERPL-MCNC: 8.9 MG/DL (ref 8.3–10.6)
CHLORIDE BLD-SCNC: 101 MMOL/L (ref 99–110)
CO2: 25 MMOL/L (ref 21–32)
CREAT SERPL-MCNC: 1.4 MG/DL (ref 0.9–1.3)
DIFFERENTIAL TYPE: ABNORMAL
ECHO AO ASC DIAM: 4.3 CM
ECHO AO ASCENDING AORTA INDEX: 1.85 CM/M2
ECHO AO ROOT DIAM: 3.8 CM
ECHO AO ROOT INDEX: 1.64 CM/M2
ECHO AR MAX VEL PISA: 4.5 M/S
ECHO AV AREA PEAK VELOCITY: 1 CM2
ECHO AV AREA VTI: 1 CM2
ECHO AV AREA/BSA PEAK VELOCITY: 0.4 CM2/M2
ECHO AV AREA/BSA VTI: 0.4 CM2/M2
ECHO AV MEAN GRADIENT: 26 MMHG
ECHO AV MEAN VELOCITY: 2.5 M/S
ECHO AV PEAK GRADIENT: 36 MMHG
ECHO AV PEAK VELOCITY: 3 M/S
ECHO AV REGURGITANT PHT: 494 MS
ECHO AV VELOCITY RATIO: 0.23
ECHO AV VTI: 65.7 CM
ECHO BSA: 2.38 M2
ECHO EST RA PRESSURE: 3 MMHG
ECHO LA AREA 4C: 31.4 CM2
ECHO LA DIAMETER INDEX: 2.37 CM/M2
ECHO LA DIAMETER: 5.5 CM
ECHO LA MAJOR AXIS: 7.5 CM
ECHO LA TO AORTIC ROOT RATIO: 1.45
ECHO LA VOL MOD A4C: 111 ML (ref 18–58)
ECHO LA VOLUME INDEX MOD A4C: 48 ML/M2 (ref 16–34)
ECHO LV E' LATERAL VELOCITY: 8 CM/S
ECHO LV E' SEPTAL VELOCITY: 7 CM/S
ECHO LV EDV A4C: 75 ML
ECHO LV EDV INDEX A4C: 32 ML/M2
ECHO LV EJECTION FRACTION A4C: 49 %
ECHO LV ESV A4C: 38 ML
ECHO LV ESV INDEX A4C: 16 ML/M2
ECHO LV FRACTIONAL SHORTENING: 26 % (ref 28–44)
ECHO LV INTERNAL DIMENSION DIASTOLE INDEX: 2.28 CM/M2
ECHO LV INTERNAL DIMENSION DIASTOLIC: 5.3 CM (ref 4.2–5.9)
ECHO LV INTERNAL DIMENSION SYSTOLIC INDEX: 1.68 CM/M2
ECHO LV INTERNAL DIMENSION SYSTOLIC: 3.9 CM
ECHO LV IVSD: 1.2 CM (ref 0.6–1)
ECHO LV MASS 2D: 286.9 G (ref 88–224)
ECHO LV MASS INDEX 2D: 123.7 G/M2 (ref 49–115)
ECHO LV POSTERIOR WALL DIASTOLIC: 1.4 CM (ref 0.6–1)
ECHO LV RELATIVE WALL THICKNESS RATIO: 0.53
ECHO LVOT AREA: 4.2 CM2
ECHO LVOT AV VTI INDEX: 0.25
ECHO LVOT DIAM: 2.3 CM
ECHO LVOT MEAN GRADIENT: 1 MMHG
ECHO LVOT PEAK GRADIENT: 2 MMHG
ECHO LVOT PEAK VELOCITY: 0.7 M/S
ECHO LVOT STROKE VOLUME INDEX: 29 ML/M2
ECHO LVOT SV: 67.3 ML
ECHO LVOT VTI: 16.2 CM
ECHO MV A VELOCITY: 0.27 M/S
ECHO MV E DECELERATION TIME (DT): 201 MS
ECHO MV E VELOCITY: 0.68 M/S
ECHO MV E/A RATIO: 2.52
ECHO MV E/E' LATERAL: 8.5
ECHO MV E/E' RATIO (AVERAGED): 9.11
ECHO RIGHT VENTRICULAR SYSTOLIC PRESSURE (RVSP): 32 MMHG
ECHO TV REGURGITANT MAX VELOCITY: 2.68 M/S
ECHO TV REGURGITANT PEAK GRADIENT: 29 MMHG
EOSINOPHILS ABSOLUTE: 0.1 K/CU MM
EOSINOPHILS RELATIVE PERCENT: 3.1 % (ref 0–3)
GFR SERPL CREATININE-BSD FRML MDRD: 51 ML/MIN/1.73M2
GLUCOSE BLD-MCNC: 105 MG/DL (ref 70–99)
GLUCOSE BLD-MCNC: 107 MG/DL (ref 70–99)
GLUCOSE BLD-MCNC: 109 MG/DL (ref 70–99)
GLUCOSE BLD-MCNC: 111 MG/DL (ref 70–99)
GLUCOSE BLD-MCNC: 154 MG/DL (ref 70–99)
GLUCOSE BLD-MCNC: 188 MG/DL (ref 70–99)
GLUCOSE SERPL-MCNC: 108 MG/DL (ref 70–99)
HCT VFR BLD CALC: 33.7 % (ref 42–52)
HEMOGLOBIN: 10.6 GM/DL (ref 13.5–18)
IMMATURE NEUTROPHIL %: 0.2 % (ref 0–0.43)
LYMPHOCYTES ABSOLUTE: 0.6 K/CU MM
LYMPHOCYTES RELATIVE PERCENT: 13.7 % (ref 24–44)
MAGNESIUM: 1.8 MG/DL (ref 1.8–2.4)
MCH RBC QN AUTO: 34.4 PG (ref 27–31)
MCHC RBC AUTO-ENTMCNC: 31.5 % (ref 32–36)
MCV RBC AUTO: 109.4 FL (ref 78–100)
MONOCYTES ABSOLUTE: 0.4 K/CU MM
MONOCYTES RELATIVE PERCENT: 8.5 % (ref 0–4)
NUCLEATED RBC %: 0 %
PDW BLD-RTO: 13.4 % (ref 11.7–14.9)
PLATELET # BLD: 93 K/CU MM (ref 140–440)
PMV BLD AUTO: 9.6 FL (ref 7.5–11.1)
POTASSIUM SERPL-SCNC: 4.2 MMOL/L (ref 3.5–5.1)
RBC # BLD: 3.08 M/CU MM (ref 4.6–6.2)
SEGMENTED NEUTROPHILS ABSOLUTE COUNT: 3.1 K/CU MM
SEGMENTED NEUTROPHILS RELATIVE PERCENT: 74 % (ref 36–66)
SODIUM BLD-SCNC: 137 MMOL/L (ref 135–145)
TOTAL IMMATURE NEUTOROPHIL: 0.01 K/CU MM
TOTAL NUCLEATED RBC: 0 K/CU MM
TOTAL PROTEIN: 5.2 GM/DL (ref 6.4–8.2)
WBC # BLD: 4.2 K/CU MM (ref 4–10.5)

## 2024-02-26 PROCEDURE — 99232 SBSQ HOSP IP/OBS MODERATE 35: CPT | Performed by: NURSE PRACTITIONER

## 2024-02-26 PROCEDURE — 82962 GLUCOSE BLOOD TEST: CPT

## 2024-02-26 PROCEDURE — 72141 MRI NECK SPINE W/O DYE: CPT

## 2024-02-26 PROCEDURE — 97530 THERAPEUTIC ACTIVITIES: CPT

## 2024-02-26 PROCEDURE — 1200000000 HC SEMI PRIVATE

## 2024-02-26 PROCEDURE — 6370000000 HC RX 637 (ALT 250 FOR IP): Performed by: STUDENT IN AN ORGANIZED HEALTH CARE EDUCATION/TRAINING PROGRAM

## 2024-02-26 PROCEDURE — 97162 PT EVAL MOD COMPLEX 30 MIN: CPT

## 2024-02-26 PROCEDURE — C8929 TTE W OR WO FOL WCON,DOPPLER: HCPCS

## 2024-02-26 PROCEDURE — 36415 COLL VENOUS BLD VENIPUNCTURE: CPT

## 2024-02-26 PROCEDURE — 2580000003 HC RX 258: Performed by: STUDENT IN AN ORGANIZED HEALTH CARE EDUCATION/TRAINING PROGRAM

## 2024-02-26 PROCEDURE — 97166 OT EVAL MOD COMPLEX 45 MIN: CPT

## 2024-02-26 PROCEDURE — 85025 COMPLETE CBC W/AUTO DIFF WBC: CPT

## 2024-02-26 PROCEDURE — APPNB15 APP NON BILLABLE TIME 0-15 MINS

## 2024-02-26 PROCEDURE — 6370000000 HC RX 637 (ALT 250 FOR IP): Performed by: NURSE PRACTITIONER

## 2024-02-26 PROCEDURE — 93306 TTE W/DOPPLER COMPLETE: CPT | Performed by: INTERNAL MEDICINE

## 2024-02-26 PROCEDURE — APPNB60 APP NON BILLABLE TIME 46-60 MINS: Performed by: NURSE PRACTITIONER

## 2024-02-26 PROCEDURE — G0378 HOSPITAL OBSERVATION PER HR: HCPCS

## 2024-02-26 PROCEDURE — 94761 N-INVAS EAR/PLS OXIMETRY MLT: CPT

## 2024-02-26 PROCEDURE — 70551 MRI BRAIN STEM W/O DYE: CPT

## 2024-02-26 PROCEDURE — 83735 ASSAY OF MAGNESIUM: CPT

## 2024-02-26 PROCEDURE — 6360000004 HC RX CONTRAST MEDICATION

## 2024-02-26 PROCEDURE — 5A09357 ASSISTANCE WITH RESPIRATORY VENTILATION, LESS THAN 24 CONSECUTIVE HOURS, CONTINUOUS POSITIVE AIRWAY PRESSURE: ICD-10-PCS | Performed by: INTERNAL MEDICINE

## 2024-02-26 PROCEDURE — 97116 GAIT TRAINING THERAPY: CPT

## 2024-02-26 PROCEDURE — 99233 SBSQ HOSP IP/OBS HIGH 50: CPT | Performed by: INTERNAL MEDICINE

## 2024-02-26 PROCEDURE — 80053 COMPREHEN METABOLIC PANEL: CPT

## 2024-02-26 PROCEDURE — 6360000002 HC RX W HCPCS: Performed by: STUDENT IN AN ORGANIZED HEALTH CARE EDUCATION/TRAINING PROGRAM

## 2024-02-26 RX ORDER — ERGOCALCIFEROL 1.25 MG/1
50000 CAPSULE ORAL WEEKLY
Status: DISCONTINUED | OUTPATIENT
Start: 2024-02-26 | End: 2024-02-29 | Stop reason: HOSPADM

## 2024-02-26 RX ORDER — VITAMIN B COMPLEX
2000 TABLET ORAL DAILY
Status: DISCONTINUED | OUTPATIENT
Start: 2024-05-20 | End: 2024-02-29 | Stop reason: HOSPADM

## 2024-02-26 RX ORDER — INSULIN LISPRO 100 [IU]/ML
0-4 INJECTION, SOLUTION INTRAVENOUS; SUBCUTANEOUS
Status: DISCONTINUED | OUTPATIENT
Start: 2024-02-27 | End: 2024-02-29 | Stop reason: HOSPADM

## 2024-02-26 RX ADMIN — Medication 1 DROP: at 21:24

## 2024-02-26 RX ADMIN — Medication 1 DROP: at 16:57

## 2024-02-26 RX ADMIN — Medication 1 DROP: at 14:08

## 2024-02-26 RX ADMIN — FERROUS SULFATE TAB 325 MG (65 MG ELEMENTAL FE) 325 MG: 325 (65 FE) TAB at 09:18

## 2024-02-26 RX ADMIN — ACETAMINOPHEN 650 MG: 325 TABLET ORAL at 09:18

## 2024-02-26 RX ADMIN — SODIUM CHLORIDE, PRESERVATIVE FREE 10 ML: 5 INJECTION INTRAVENOUS at 21:22

## 2024-02-26 RX ADMIN — ATORVASTATIN CALCIUM 20 MG: 10 TABLET, FILM COATED ORAL at 09:18

## 2024-02-26 RX ADMIN — Medication 400 MG: at 09:18

## 2024-02-26 RX ADMIN — ENOXAPARIN SODIUM 30 MG: 100 INJECTION SUBCUTANEOUS at 09:18

## 2024-02-26 RX ADMIN — MONTELUKAST 10 MG: 10 TABLET, FILM COATED ORAL at 09:18

## 2024-02-26 RX ADMIN — GABAPENTIN 300 MG: 300 CAPSULE ORAL at 14:07

## 2024-02-26 RX ADMIN — AMLODIPINE BESYLATE 5 MG: 5 TABLET ORAL at 09:18

## 2024-02-26 RX ADMIN — CLOPIDOGREL BISULFATE 75 MG: 75 TABLET ORAL at 09:18

## 2024-02-26 RX ADMIN — SODIUM CHLORIDE, PRESERVATIVE FREE 10 ML: 5 INJECTION INTRAVENOUS at 09:19

## 2024-02-26 RX ADMIN — GABAPENTIN 300 MG: 300 CAPSULE ORAL at 21:22

## 2024-02-26 RX ADMIN — PERFLUTREN 2.2 ML: 6.52 INJECTION, SUSPENSION INTRAVENOUS at 08:58

## 2024-02-26 RX ADMIN — ENOXAPARIN SODIUM 30 MG: 100 INJECTION SUBCUTANEOUS at 21:22

## 2024-02-26 RX ADMIN — GABAPENTIN 300 MG: 300 CAPSULE ORAL at 09:18

## 2024-02-26 RX ADMIN — ALLOPURINOL 100 MG: 100 TABLET ORAL at 09:18

## 2024-02-26 RX ADMIN — FOLIC ACID 1 MG: 1 TABLET ORAL at 09:18

## 2024-02-26 RX ADMIN — ERGOCALCIFEROL 50000 UNITS: 1.25 CAPSULE ORAL at 14:07

## 2024-02-26 ASSESSMENT — ENCOUNTER SYMPTOMS
BLOOD IN STOOL: 0
CHEST TIGHTNESS: 0
BACK PAIN: 0
PHOTOPHOBIA: 0
COUGH: 0
DIARRHEA: 0
NAUSEA: 0
SHORTNESS OF BREATH: 0
COLOR CHANGE: 0
WHEEZING: 0
VOMITING: 0
EYE PAIN: 0
CONSTIPATION: 0
ABDOMINAL PAIN: 0

## 2024-02-26 ASSESSMENT — PAIN SCALES - GENERAL: PAINLEVEL_OUTOF10: 5

## 2024-02-26 ASSESSMENT — PAIN DESCRIPTION - DESCRIPTORS: DESCRIPTORS: ACHING

## 2024-02-26 ASSESSMENT — PAIN DESCRIPTION - ORIENTATION: ORIENTATION: MID

## 2024-02-26 ASSESSMENT — PAIN DESCRIPTION - LOCATION: LOCATION: EYE

## 2024-02-26 NOTE — CONSULTS
[facility] Contin[facility] Nurse  Consult Note       Kareem Rey  AGE: 78 y.o.   GENDER: male  : 1945  TODAY'S DATE:  2024    Subjective:     Reason for Evaluation and Assessment: wound care eval.      Kareem Rey is a 78 y.o. male referred by:   [x] Physician  [] Nursing  [] Other:     Wound Identification:  Wound Type: diabetic and traumatic  Contributing Factors: diabetes, chronic pressure, and decreased mobility        PAST MEDICAL HISTORY        Diagnosis Date    Abnormal nuclear stress test 2022    Inferior wall ischemia 2022    Arthritis     CAD s/p PCI to LAD+RCA 2022    1. PCI to Proximal LAD for 80 -90 % lesion reduced to 0 % with  TYSON III Flow reduced to 0 % stenosis with 3.5 X 12 BARTOLO ,  followed by post dilated with 3.75 X 8 NC balloon  2. Balloon angioplasty of distal LAD for 90 % lesion reduced to  0 % using 2.0 X 10 Balloon TYSON III flow  3. PCI to ostial 80 % RCA lesion and mid RCA lesion of 80 % post  Balloon angioplasty using 4.0 X 34 BARTOLO inflated to 16    Dizziness     FH: CAD (coronary artery disease) 2015    H/O 24 hour EKG monitoring 19,2016    Conclusion: Atrial fibrillation with  fairly well-controlled ventricular rate response without evidence of any significant tachy or alon arrhythmias.    H/O atrial fibrillation without current medication 2019    H/O cardiovascular stress test 11/24/15; 2019    Normal perfusion in the distribution of all coronaries, normal LV, EF 53%.    H/O Doppler echocardiogram 2022    EF 50%. Mild concentric LV hypertrophy with grade 3 diastolic dysfunction. Mild bilateral atrial enlargement. Mildly dilated RV. Heavily sclerotic aortic valve with moderate aortic stenosis, mean gradient of 26 and an JACK of 1.11. Moderate aortic regurg is noted with pressure half time of 466. Moderate tricuspid and mitral regurg. Pacer wire noted on right side passing through tricuspid valve.    H/O Doppler 
[facility] ACUTE CARE PHYSICAL THERAPY EVALUATION  Ray CHICA Rey, 1945, [id number], 2/26/2024    History  [place]:  The primary encounter diagnosis was Syncope, unspecified syncope type. Diagnoses of Syncope and collapse, Frequent falls, and CAD s/p PCI to LAD+RCA 9/9/22 were also pertinent to this visit.  Patient  has a past medical history of Abnormal nuclear stress test, Arthritis, CAD s/p PCI to LAD+RCA 9/9/22, Dizziness, FH: CAD (coronary artery disease), H/O 24 hour EKG monitoring, H/O atrial fibrillation without current medication, H/O cardiovascular stress test, H/O Doppler echocardiogram, H/O Doppler ultrasound (Venous Doppler Lower Extremities), H/O echocardiogram, H/O transesophageal echocardiography (SALAZAR) for monitoring, Heart murmur, History of nuclear stress test, History of nuclear stress test, Hx of Doppler echocardiogram, Hx of Venous Doppler ultrasound, Hyperlipidemia, Hypertension, Kidney stones, Leg swelling, Neuropathy, Nonrheumatic aortic valve stenosis, Obesity, JAYE on CPAP, Persistent atrial fibrillation (HCC), Syncope and collapse, Thoracic aortic aneurysm (HCC), Tremors of nervous system, and Type II or unspecified type diabetes mellitus without mention of complication, not stated as uncontrolled.  Patient  has a past surgical history that includes hernia repair (2008); hernia repair (2009); Colonoscopy (2007;2017); Colon surgery (2007); Cataract removal with implant (Right, 04/12/2019); Intracapsular cataract extraction (Right, 04/12/2019); Cataract removal with implant (Left, 05/10/2019); Intracapsular cataract extraction (Left, 05/10/2019); Pacemaker insertion (Left, 06/25/2019); Cardioversion (07/08/2019); Total knee arthroplasty (Right, 02/16/2022); Colonoscopy (N/A, 04/29/2023); Upper gastrointestinal endoscopy (N/A, 04/29/2023); and Cardiac surgery (N/A, 07/06/2023).    Discharge Recommendation: Swing Bed    Subjective:    Patient states:  \"This is my first time walking 
Persistent atrial fibrillation (HCC), Syncope and collapse, Thoracic aortic aneurysm (HCC), Tremors of nervous system, and Type II or unspecified type diabetes mellitus without mention of complication, not stated as uncontrolled.  Past surgical history:   has a past surgical history that includes hernia repair (2008); hernia repair (2009); Colonoscopy (2007;2017); Colon surgery (2007); Cataract removal with implant (Right, 04/12/2019); Intracapsular cataract extraction (Right, 04/12/2019); Cataract removal with implant (Left, 05/10/2019); Intracapsular cataract extraction (Left, 05/10/2019); Pacemaker insertion (Left, 06/25/2019); Cardioversion (07/08/2019); Total knee arthroplasty (Right, 02/16/2022); Colonoscopy (N/A, 04/29/2023); Upper gastrointestinal endoscopy (N/A, 04/29/2023); and Cardiac surgery (N/A, 07/06/2023).  Social History:   reports that he quit smoking about 50 years ago. His smoking use included cigarettes. He started smoking about 57 years ago. He has a 7.0 pack-year smoking history. He has quit using smokeless tobacco. He reports current alcohol use of about 3.0 standard drinks of alcohol per week. He reports that he does not use drugs.  Family history:   no family history of CAD, STROKE of DM    Allergies   Allergen Reactions    Niacin And Related Hives    Ativan [Lorazepam] Other (See Comments)     DO NOT GIVE    Oxycontin [Oxycodone Hcl] Hives     Itching in his feet       allopurinol (ZYLOPRIM) tablet 100 mg, Daily  amLODIPine (NORVASC) tablet 5 mg, Daily  atorvastatin (LIPITOR) tablet 20 mg, Daily  clopidogrel (PLAVIX) tablet 75 mg, Daily  ferrous sulfate (IRON 325) tablet 325 mg, Daily with breakfast  folic acid (FOLVITE) tablet 1 mg, Daily  gabapentin (NEURONTIN) capsule 300 mg, TID  magnesium oxide (MAG-OX) tablet 400 mg, Daily  montelukast (SINGULAIR) tablet 10 mg, Daily  [Held by provider] torsemide (DEMADEX) tablet 20 mg, Daily  [Held by provider] insulin lispro (HUMALOG) injection 
(LIPITOR) 20 MG tablet Take 1 tablet by mouth daily 90 tablet 3       Review of Systems:   Review of Systems   Constitutional:  Negative for activity change, chills, fatigue and fever.   HENT:  Negative for congestion, ear pain and tinnitus.    Eyes:  Negative for photophobia, pain and visual disturbance.   Respiratory:  Negative for cough, chest tightness, shortness of breath and wheezing.    Cardiovascular:  Negative for chest pain, palpitations and leg swelling.   Gastrointestinal:  Negative for abdominal pain, blood in stool, constipation, diarrhea, nausea and vomiting.   Endocrine: Negative for cold intolerance and heat intolerance.   Genitourinary:  Negative for dysuria, flank pain and hematuria.   Musculoskeletal:  Negative for arthralgias, back pain, myalgias and neck stiffness.   Skin:  Negative for color change and rash.   Allergic/Immunologic: Negative for food allergies.   Neurological:  Positive for syncope. Negative for dizziness, light-headedness, numbness and headaches.   Hematological:  Does not bruise/bleed easily.   Psychiatric/Behavioral:  Negative for agitation, behavioral problems and confusion.            Examination:      Vitals:    02/25/24 1300 02/25/24 1945 02/26/24 0345 02/26/24 0929   BP: (!) 144/88 130/81 (!) 146/81 (!) 146/81   Pulse: 68 62 66 66   Resp: 13 16 16    Temp: 97.5 °F (36.4 °C) 97.5 °F (36.4 °C)     TempSrc: Oral Tympanic     SpO2: 98% 99%     Weight:    113.4 kg (250 lb)   Height:    1.803 m (5' 11\")        Body mass index is 34.87 kg/m².      Physical Exam  Constitutional:       General: He is not in acute distress.     Appearance: He is not ill-appearing or diaphoretic.   HENT:      Head: Normocephalic.      Comments: Trauma to the side of the eye -right side     Right Ear: External ear normal.      Left Ear: External ear normal.      Nose: No congestion.      Mouth/Throat:      Mouth: Mucous membranes are moist.   Eyes:      Extraocular Movements: Extraocular movements 
    Coordination/Cerebellum:       Tremors--none      Rapidly alternating movements: no dysdiadochokinesia b/l                Finger-to-Nose: no dysmetria b/l    Gait and stance:      Gait: deferred for safety       LABS:     Recent Labs     02/23/24  2333 02/24/24  0551 02/24/24  1350 02/25/24  0306   WBC 4.5 4.8  --  3.6*    140  --  139   K 3.4* 3.4* 4.2 3.8   CL 98* 98*  --  101   CO2 21 20*  --  25   BUN 18 16  --  15   CREATININE 1.6* 1.6*  --  1.4*   GLUCOSE 114* 90  --  100*   INR  --  1.1  --   --            IMAGING:    MRI brain wo pending   MRI C spine wo pending       ASSESSMENT/PLAN:     78 year old male with reported repeated falls, suspect deconditioning and acute on chronic ataxia superimposed on known hx of ETOH abuse, BLE PN and Parkinsonian Tremor. Suspect he has a form of dysautonomia as well stemming from his PN and parkinsonism. Awaiting cardiology recommendations for perfusion etiology. Patient has been told in the past he needs extensive outpatient work up for memory, balance, and PD rule out. Neurological exam today shows BLE stocking distribution sensation loss, decreased BLE reflexes, BUE tremor.  Patient will need extensive outpatient PT/OT may qualify for ARU. No new medications to add at this time. Would like to get an MRI of brain and C spine however pacemaker, not compatible, will attempt to order and have MRI team look into this. Further recommendations pending completion of neurodiagnostics.         Thank you for allowing us to participate in the care of your patient.  If there are any questions regarding evaluation please feel free to contact us.     BENSON Pal - CNP, 2/25/2024       ------------------------------------    Attending Note:  I have rounded on this patient with Meka Barreto CNP. I have reviewed the chart and we have discussed this case in detail. The patient was seen and examined by myself. Pertinent labs and imaging have been personally reviewed.

## 2024-02-26 NOTE — CARE COORDINATION
Pt's discharge plans is home with Spouse and [facility].  Pt refusing SNF.  CM will need a inpt HC order for nursing and PT/OT.  If pt is discharged after hours please call [facility] [phone] and tell of pt discharge and the need to resume HC services.  Fax HC order and AVS to [phone]

## 2024-02-27 ENCOUNTER — APPOINTMENT (OUTPATIENT)
Dept: NON INVASIVE DIAGNOSTICS | Age: 79
DRG: 074 | End: 2024-02-27
Attending: STUDENT IN AN ORGANIZED HEALTH CARE EDUCATION/TRAINING PROGRAM
Payer: MEDICARE

## 2024-02-27 LAB
ALBUMIN SERPL-MCNC: 3.4 GM/DL (ref 3.4–5)
ALP BLD-CCNC: 90 IU/L (ref 40–128)
ALT SERPL-CCNC: 18 U/L (ref 10–40)
ANION GAP SERPL CALCULATED.3IONS-SCNC: 14 MMOL/L (ref 7–16)
AST SERPL-CCNC: 36 IU/L (ref 15–37)
BASOPHILS ABSOLUTE: 0 K/CU MM
BASOPHILS RELATIVE PERCENT: 0.4 % (ref 0–1)
BILIRUB SERPL-MCNC: 1.1 MG/DL (ref 0–1)
BUN SERPL-MCNC: 12 MG/DL (ref 6–23)
CALCIUM SERPL-MCNC: 8.9 MG/DL (ref 8.3–10.6)
CHLORIDE BLD-SCNC: 102 MMOL/L (ref 99–110)
CO2: 21 MMOL/L (ref 21–32)
CREAT SERPL-MCNC: 1.4 MG/DL (ref 0.9–1.3)
DIFFERENTIAL TYPE: ABNORMAL
EOSINOPHILS ABSOLUTE: 0.1 K/CU MM
EOSINOPHILS RELATIVE PERCENT: 2.2 % (ref 0–3)
GFR SERPL CREATININE-BSD FRML MDRD: 51 ML/MIN/1.73M2
GLUCOSE BLD-MCNC: 112 MG/DL (ref 70–99)
GLUCOSE BLD-MCNC: 114 MG/DL (ref 70–99)
GLUCOSE BLD-MCNC: 121 MG/DL (ref 70–99)
GLUCOSE BLD-MCNC: 125 MG/DL (ref 70–99)
GLUCOSE SERPL-MCNC: 98 MG/DL (ref 70–99)
HCT VFR BLD CALC: 34.6 % (ref 42–52)
HEMOGLOBIN: 11.1 GM/DL (ref 13.5–18)
IMMATURE NEUTROPHIL %: 0.2 % (ref 0–0.43)
LYMPHOCYTES ABSOLUTE: 0.5 K/CU MM
LYMPHOCYTES RELATIVE PERCENT: 10.7 % (ref 24–44)
MAGNESIUM: 1.9 MG/DL (ref 1.8–2.4)
MCH RBC QN AUTO: 35.4 PG (ref 27–31)
MCHC RBC AUTO-ENTMCNC: 32.1 % (ref 32–36)
MCV RBC AUTO: 110.2 FL (ref 78–100)
MONOCYTES ABSOLUTE: 0.5 K/CU MM
MONOCYTES RELATIVE PERCENT: 10.3 % (ref 0–4)
NUCLEATED RBC %: 0 %
PDW BLD-RTO: 13.5 % (ref 11.7–14.9)
PLATELET # BLD: 100 K/CU MM (ref 140–440)
PMV BLD AUTO: 10.1 FL (ref 7.5–11.1)
POTASSIUM SERPL-SCNC: 4.1 MMOL/L (ref 3.5–5.1)
RBC # BLD: 3.14 M/CU MM (ref 4.6–6.2)
SEGMENTED NEUTROPHILS ABSOLUTE COUNT: 3.5 K/CU MM
SEGMENTED NEUTROPHILS RELATIVE PERCENT: 76.2 % (ref 36–66)
SODIUM BLD-SCNC: 137 MMOL/L (ref 135–145)
TOTAL IMMATURE NEUTOROPHIL: 0.01 K/CU MM
TOTAL NUCLEATED RBC: 0 K/CU MM
TOTAL PROTEIN: 5.7 GM/DL (ref 6.4–8.2)
WBC # BLD: 4.6 K/CU MM (ref 4–10.5)

## 2024-02-27 PROCEDURE — 99233 SBSQ HOSP IP/OBS HIGH 50: CPT | Performed by: INTERNAL MEDICINE

## 2024-02-27 PROCEDURE — 83735 ASSAY OF MAGNESIUM: CPT

## 2024-02-27 PROCEDURE — B24BZZ4 ULTRASONOGRAPHY OF HEART WITH AORTA, TRANSESOPHAGEAL: ICD-10-PCS | Performed by: INTERNAL MEDICINE

## 2024-02-27 PROCEDURE — 99153 MOD SED SAME PHYS/QHP EA: CPT | Performed by: INTERNAL MEDICINE

## 2024-02-27 PROCEDURE — 94761 N-INVAS EAR/PLS OXIMETRY MLT: CPT

## 2024-02-27 PROCEDURE — 93312 ECHO TRANSESOPHAGEAL: CPT

## 2024-02-27 PROCEDURE — 6360000002 HC RX W HCPCS: Performed by: INTERNAL MEDICINE

## 2024-02-27 PROCEDURE — 99152 MOD SED SAME PHYS/QHP 5/>YRS: CPT | Performed by: INTERNAL MEDICINE

## 2024-02-27 PROCEDURE — 97530 THERAPEUTIC ACTIVITIES: CPT

## 2024-02-27 PROCEDURE — 97535 SELF CARE MNGMENT TRAINING: CPT

## 2024-02-27 PROCEDURE — 2580000003 HC RX 258: Performed by: STUDENT IN AN ORGANIZED HEALTH CARE EDUCATION/TRAINING PROGRAM

## 2024-02-27 PROCEDURE — 36415 COLL VENOUS BLD VENIPUNCTURE: CPT

## 2024-02-27 PROCEDURE — 6370000000 HC RX 637 (ALT 250 FOR IP): Performed by: INTERNAL MEDICINE

## 2024-02-27 PROCEDURE — APPNB15 APP NON BILLABLE TIME 0-15 MINS

## 2024-02-27 PROCEDURE — 1200000000 HC SEMI PRIVATE

## 2024-02-27 PROCEDURE — 6370000000 HC RX 637 (ALT 250 FOR IP): Performed by: NURSE PRACTITIONER

## 2024-02-27 PROCEDURE — 7100000011 HC PHASE II RECOVERY - ADDTL 15 MIN: Performed by: INTERNAL MEDICINE

## 2024-02-27 PROCEDURE — 6370000000 HC RX 637 (ALT 250 FOR IP): Performed by: STUDENT IN AN ORGANIZED HEALTH CARE EDUCATION/TRAINING PROGRAM

## 2024-02-27 PROCEDURE — 85025 COMPLETE CBC W/AUTO DIFF WBC: CPT

## 2024-02-27 PROCEDURE — 80053 COMPREHEN METABOLIC PANEL: CPT

## 2024-02-27 PROCEDURE — 7100000010 HC PHASE II RECOVERY - FIRST 15 MIN: Performed by: INTERNAL MEDICINE

## 2024-02-27 PROCEDURE — 99232 SBSQ HOSP IP/OBS MODERATE 35: CPT | Performed by: NURSE PRACTITIONER

## 2024-02-27 PROCEDURE — 6360000002 HC RX W HCPCS: Performed by: STUDENT IN AN ORGANIZED HEALTH CARE EDUCATION/TRAINING PROGRAM

## 2024-02-27 PROCEDURE — 82962 GLUCOSE BLOOD TEST: CPT

## 2024-02-27 RX ORDER — MIDAZOLAM HYDROCHLORIDE 1 MG/ML
INJECTION INTRAMUSCULAR; INTRAVENOUS PRN
Status: COMPLETED | OUTPATIENT
Start: 2024-02-27 | End: 2024-02-27

## 2024-02-27 RX ORDER — LIDOCAINE HYDROCHLORIDE 20 MG/ML
SOLUTION OROPHARYNGEAL PRN
Status: COMPLETED | OUTPATIENT
Start: 2024-02-27 | End: 2024-02-27

## 2024-02-27 RX ORDER — LANOLIN ALCOHOL/MO/W.PET/CERES
1000 CREAM (GRAM) TOPICAL DAILY
Status: DISCONTINUED | OUTPATIENT
Start: 2024-02-27 | End: 2024-02-29 | Stop reason: HOSPADM

## 2024-02-27 RX ADMIN — Medication 1 DROP: at 20:23

## 2024-02-27 RX ADMIN — ENOXAPARIN SODIUM 30 MG: 100 INJECTION SUBCUTANEOUS at 20:22

## 2024-02-27 RX ADMIN — LIDOCAINE HYDROCHLORIDE 10 ML: 20 SOLUTION ORAL; TOPICAL at 12:09

## 2024-02-27 RX ADMIN — FERROUS SULFATE TAB 325 MG (65 MG ELEMENTAL FE) 325 MG: 325 (65 FE) TAB at 14:57

## 2024-02-27 RX ADMIN — CLOPIDOGREL BISULFATE 75 MG: 75 TABLET ORAL at 14:57

## 2024-02-27 RX ADMIN — GABAPENTIN 300 MG: 300 CAPSULE ORAL at 14:56

## 2024-02-27 RX ADMIN — CYANOCOBALAMIN TAB 1000 MCG 1000 MCG: 1000 TAB at 14:57

## 2024-02-27 RX ADMIN — ATORVASTATIN CALCIUM 20 MG: 10 TABLET, FILM COATED ORAL at 14:57

## 2024-02-27 RX ADMIN — ALLOPURINOL 100 MG: 100 TABLET ORAL at 14:57

## 2024-02-27 RX ADMIN — Medication 400 MG: at 14:57

## 2024-02-27 RX ADMIN — Medication 1 DROP: at 18:15

## 2024-02-27 RX ADMIN — AMLODIPINE BESYLATE 5 MG: 5 TABLET ORAL at 14:57

## 2024-02-27 RX ADMIN — MONTELUKAST 10 MG: 10 TABLET, FILM COATED ORAL at 14:57

## 2024-02-27 RX ADMIN — MIDAZOLAM 1 MG: 1 INJECTION INTRAMUSCULAR; INTRAVENOUS at 12:12

## 2024-02-27 RX ADMIN — FOLIC ACID 1 MG: 1 TABLET ORAL at 14:57

## 2024-02-27 RX ADMIN — GABAPENTIN 300 MG: 300 CAPSULE ORAL at 20:22

## 2024-02-27 RX ADMIN — SODIUM CHLORIDE, PRESERVATIVE FREE 10 ML: 5 INJECTION INTRAVENOUS at 20:22

## 2024-02-27 RX ADMIN — Medication 1 DROP: at 11:00

## 2024-02-27 RX ADMIN — Medication 1 DROP: at 14:56

## 2024-02-27 RX ADMIN — MIDAZOLAM 1 MG: 1 INJECTION INTRAMUSCULAR; INTRAVENOUS at 12:24

## 2024-02-27 RX ADMIN — SODIUM CHLORIDE, PRESERVATIVE FREE 10 ML: 5 INJECTION INTRAVENOUS at 11:04

## 2024-02-28 ENCOUNTER — APPOINTMENT (OUTPATIENT)
Dept: GENERAL RADIOLOGY | Age: 79
DRG: 074 | End: 2024-02-28
Attending: STUDENT IN AN ORGANIZED HEALTH CARE EDUCATION/TRAINING PROGRAM
Payer: MEDICARE

## 2024-02-28 PROBLEM — E44.0 MODERATE MALNUTRITION (HCC): Chronic | Status: ACTIVE | Noted: 2024-02-28

## 2024-02-28 LAB
ALBUMIN SERPL-MCNC: 3.2 GM/DL (ref 3.4–5)
ALP BLD-CCNC: 86 IU/L (ref 40–128)
ALT SERPL-CCNC: 17 U/L (ref 10–40)
ANION GAP SERPL CALCULATED.3IONS-SCNC: 11 MMOL/L (ref 7–16)
AST SERPL-CCNC: 32 IU/L (ref 15–37)
BASOPHILS ABSOLUTE: 0 K/CU MM
BASOPHILS RELATIVE PERCENT: 0.6 % (ref 0–1)
BILIRUB SERPL-MCNC: 1 MG/DL (ref 0–1)
BUN SERPL-MCNC: 14 MG/DL (ref 6–23)
CALCIUM SERPL-MCNC: 8.7 MG/DL (ref 8.3–10.6)
CHLORIDE BLD-SCNC: 105 MMOL/L (ref 99–110)
CO2: 23 MMOL/L (ref 21–32)
CREAT SERPL-MCNC: 1.4 MG/DL (ref 0.9–1.3)
DIFFERENTIAL TYPE: ABNORMAL
ECHO BSA: 2.35 M2
EOSINOPHILS ABSOLUTE: 0.1 K/CU MM
EOSINOPHILS RELATIVE PERCENT: 1.3 % (ref 0–3)
GFR SERPL CREATININE-BSD FRML MDRD: 51 ML/MIN/1.73M2
GLUCOSE BLD-MCNC: 108 MG/DL (ref 70–99)
GLUCOSE BLD-MCNC: 125 MG/DL (ref 70–99)
GLUCOSE BLD-MCNC: 197 MG/DL (ref 70–99)
GLUCOSE BLD-MCNC: 214 MG/DL (ref 70–99)
GLUCOSE SERPL-MCNC: 98 MG/DL (ref 70–99)
HCT VFR BLD CALC: 33.3 % (ref 42–52)
HEMOGLOBIN: 10.8 GM/DL (ref 13.5–18)
IMMATURE NEUTROPHIL %: 0.4 % (ref 0–0.43)
LYMPHOCYTES ABSOLUTE: 0.5 K/CU MM
LYMPHOCYTES RELATIVE PERCENT: 9.4 % (ref 24–44)
MAGNESIUM: 1.8 MG/DL (ref 1.8–2.4)
MCH RBC QN AUTO: 35 PG (ref 27–31)
MCHC RBC AUTO-ENTMCNC: 32.4 % (ref 32–36)
MCV RBC AUTO: 107.8 FL (ref 78–100)
MONOCYTES ABSOLUTE: 0.5 K/CU MM
MONOCYTES RELATIVE PERCENT: 10.1 % (ref 0–4)
NUCLEATED RBC %: 0 %
PDW BLD-RTO: 13.4 % (ref 11.7–14.9)
PLATELET # BLD: 103 K/CU MM (ref 140–440)
PMV BLD AUTO: 10.3 FL (ref 7.5–11.1)
POTASSIUM SERPL-SCNC: 3.8 MMOL/L (ref 3.5–5.1)
RBC # BLD: 3.09 M/CU MM (ref 4.6–6.2)
SEGMENTED NEUTROPHILS ABSOLUTE COUNT: 4.2 K/CU MM
SEGMENTED NEUTROPHILS RELATIVE PERCENT: 78.2 % (ref 36–66)
SMEAR REVIEW: NORMAL
SODIUM BLD-SCNC: 139 MMOL/L (ref 135–145)
TOTAL IMMATURE NEUTOROPHIL: 0.02 K/CU MM
TOTAL NUCLEATED RBC: 0 K/CU MM
TOTAL PROTEIN: 5.2 GM/DL (ref 6.4–8.2)
WBC # BLD: 5.3 K/CU MM (ref 4–10.5)

## 2024-02-28 PROCEDURE — 6370000000 HC RX 637 (ALT 250 FOR IP): Performed by: NURSE PRACTITIONER

## 2024-02-28 PROCEDURE — 83735 ASSAY OF MAGNESIUM: CPT

## 2024-02-28 PROCEDURE — G0378 HOSPITAL OBSERVATION PER HR: HCPCS

## 2024-02-28 PROCEDURE — 85025 COMPLETE CBC W/AUTO DIFF WBC: CPT

## 2024-02-28 PROCEDURE — 6370000000 HC RX 637 (ALT 250 FOR IP): Performed by: STUDENT IN AN ORGANIZED HEALTH CARE EDUCATION/TRAINING PROGRAM

## 2024-02-28 PROCEDURE — 73110 X-RAY EXAM OF WRIST: CPT

## 2024-02-28 PROCEDURE — 80053 COMPREHEN METABOLIC PANEL: CPT

## 2024-02-28 PROCEDURE — 97116 GAIT TRAINING THERAPY: CPT

## 2024-02-28 PROCEDURE — 99232 SBSQ HOSP IP/OBS MODERATE 35: CPT | Performed by: INTERNAL MEDICINE

## 2024-02-28 PROCEDURE — 6370000000 HC RX 637 (ALT 250 FOR IP): Performed by: FAMILY MEDICINE

## 2024-02-28 PROCEDURE — 99232 SBSQ HOSP IP/OBS MODERATE 35: CPT | Performed by: NURSE PRACTITIONER

## 2024-02-28 PROCEDURE — 94761 N-INVAS EAR/PLS OXIMETRY MLT: CPT

## 2024-02-28 PROCEDURE — 2580000003 HC RX 258: Performed by: STUDENT IN AN ORGANIZED HEALTH CARE EDUCATION/TRAINING PROGRAM

## 2024-02-28 PROCEDURE — 36415 COLL VENOUS BLD VENIPUNCTURE: CPT

## 2024-02-28 PROCEDURE — 97530 THERAPEUTIC ACTIVITIES: CPT

## 2024-02-28 PROCEDURE — 6360000002 HC RX W HCPCS: Performed by: STUDENT IN AN ORGANIZED HEALTH CARE EDUCATION/TRAINING PROGRAM

## 2024-02-28 PROCEDURE — 82962 GLUCOSE BLOOD TEST: CPT

## 2024-02-28 RX ADMIN — FOLIC ACID 1 MG: 1 TABLET ORAL at 09:49

## 2024-02-28 RX ADMIN — INSULIN LISPRO 1 UNITS: 100 INJECTION, SOLUTION INTRAVENOUS; SUBCUTANEOUS at 12:11

## 2024-02-28 RX ADMIN — Medication 1 DROP: at 20:32

## 2024-02-28 RX ADMIN — MONTELUKAST 10 MG: 10 TABLET, FILM COATED ORAL at 10:08

## 2024-02-28 RX ADMIN — ALLOPURINOL 100 MG: 100 TABLET ORAL at 09:49

## 2024-02-28 RX ADMIN — FERROUS SULFATE TAB 325 MG (65 MG ELEMENTAL FE) 325 MG: 325 (65 FE) TAB at 09:50

## 2024-02-28 RX ADMIN — GABAPENTIN 300 MG: 300 CAPSULE ORAL at 20:32

## 2024-02-28 RX ADMIN — Medication 400 MG: at 10:08

## 2024-02-28 RX ADMIN — ENOXAPARIN SODIUM 30 MG: 100 INJECTION SUBCUTANEOUS at 09:55

## 2024-02-28 RX ADMIN — Medication 1 DROP: at 14:51

## 2024-02-28 RX ADMIN — GABAPENTIN 300 MG: 300 CAPSULE ORAL at 14:51

## 2024-02-28 RX ADMIN — Medication 1 DROP: at 17:23

## 2024-02-28 RX ADMIN — ENOXAPARIN SODIUM 30 MG: 100 INJECTION SUBCUTANEOUS at 20:33

## 2024-02-28 RX ADMIN — CLOPIDOGREL BISULFATE 75 MG: 75 TABLET ORAL at 09:50

## 2024-02-28 RX ADMIN — SODIUM CHLORIDE, PRESERVATIVE FREE 10 ML: 5 INJECTION INTRAVENOUS at 20:33

## 2024-02-28 RX ADMIN — AMLODIPINE BESYLATE 5 MG: 5 TABLET ORAL at 09:50

## 2024-02-28 RX ADMIN — GABAPENTIN 300 MG: 300 CAPSULE ORAL at 10:08

## 2024-02-28 RX ADMIN — CYANOCOBALAMIN TAB 1000 MCG 1000 MCG: 1000 TAB at 09:50

## 2024-02-28 RX ADMIN — ATORVASTATIN CALCIUM 20 MG: 10 TABLET, FILM COATED ORAL at 09:49

## 2024-02-28 ASSESSMENT — PAIN DESCRIPTION - DESCRIPTORS
DESCRIPTORS: ACHING
DESCRIPTORS: ACHING

## 2024-02-28 ASSESSMENT — PAIN SCALES - WONG BAKER: WONGBAKER_NUMERICALRESPONSE: 2

## 2024-02-28 ASSESSMENT — PAIN SCALES - GENERAL
PAINLEVEL_OUTOF10: 3
PAINLEVEL_OUTOF10: 8
PAINLEVEL_OUTOF10: 6
PAINLEVEL_OUTOF10: 7

## 2024-02-28 ASSESSMENT — PAIN - FUNCTIONAL ASSESSMENT: PAIN_FUNCTIONAL_ASSESSMENT: ACTIVITIES ARE NOT PREVENTED

## 2024-02-28 ASSESSMENT — PAIN DESCRIPTION - ORIENTATION: ORIENTATION: RIGHT

## 2024-02-28 ASSESSMENT — PAIN DESCRIPTION - FREQUENCY: FREQUENCY: INTERMITTENT

## 2024-02-28 ASSESSMENT — PAIN DESCRIPTION - PAIN TYPE: TYPE: ACUTE PAIN

## 2024-02-28 ASSESSMENT — PAIN DESCRIPTION - ONSET: ONSET: ON-GOING

## 2024-02-28 ASSESSMENT — PAIN DESCRIPTION - LOCATION
LOCATION: HAND;FOOT
LOCATION: WRIST

## 2024-02-28 NOTE — CARE COORDINATION
LSW read that pt is a 2 person max assist.  LSW spoke with pt about having 2 people to get out of bed and not walking well.  Pt agreed him and his wife would struggle due ot wife just had back surgery.  LSW went over PT/OT recs for Swing Bed.  Pt is agreeable to go to the Swing Bed.  Plan B will be [facility] or [facility].  LSW called Supervisor Kendal with the Swing Bed and gave referral.  Kendal will look over pt info and get back with this LSW if they can take pt.

## 2024-02-28 NOTE — PROGRESS NOTES
[place] [address]  Phone: [phone]    Fax [phone]                  Kassidy Avila MD, [facility]       Jonah Mathur MD, [facility]  Kevan Johnson MD, [facility]    MD Binu Willis MD Tariq Rizvi, MD Bilal Alam, MD Dr. Waseem Sajjad MD Melissa Kellis, APRJASON Rodriguez, APRJASON Muller, APRJASON Orozco, APRN  Kareem Ellsworth PA-C    CARDIOLOGY  NOTE      Name:  Kareem Rey /Age/Sex: 1945  (78 y.o. male)   MRN & CSN:  [id number] & [id number] Admission Date/Time: 2024  3:57 AM   Location:  [address] [place] [address] PCP: Eduar Birmingham MD       Hospital Day: 3    - Cardiology consult is for:  Syncope            CARDIOLOGY ATTENDING ADDENDUM    I have seen, spoken to and examined this patient personally, independent of the NP/PAC. I have reviewed the hospital care given to date and reviewed all pertinent labs and imaging. I have spoken with patient, nursing staff and provided written and verbal instructions .The above note has been reviewed. I have spent substantive (>50%) amount of time in formulating patient care.               Physical Exam:       Head: normal  Eye: normal  Chest:  coarse  Cardiovascular:  S1S2         MEDICAL DECISION MAKING :          Recurrent syncope/Fall  History of moderate aortic stenosis with mean gradient of 25 mmHg  Chronic Atrial fibrillation  Frequent falls requiring Watchman procedure recently  S/p pacemaker placement: 2019  Chronic kidney disease stage III  History of coronary disease s/p PCI to LAD and RCA   Hyperlipidemia  Hypertension  Diabetes mellitus  EtOH use  Pancytopenia     Orthostatic vital signs within normal limits    Syncope possibly related to alcohol intake?    Pacemaker showing 100% atrial fibrillation, 53% V pacing with 7-year battery life.  No other significant arrhythmias noted     Hold off on torsemide, likely 
         Patient seen and examined today. Recently admitted overnight. Awaiting Cardiology consults.  Denies CP, shortness of breathe, N/V/D/Abd pain. Orthostatics ordered.  Denies further needs at this time. Plan of care discussed. +3 pitting edema to BLE. No rales, wheezes or rhonchi. On RA, wears Cpap at HS. Will order    Patient discussed with supervising MD Jolie John, [facility]  Hospitalist TAYLOR    
    V2.0  [facility] [place]      Name:  Kareem Rey /Age/Sex: 1945  (78 y.o. male)   MRN & CSN:  [id number] & [id number] Encounter Date/Time: 2024 3:35 PM EST    Location:  [address] [place] [address]- PCP: Eduar Birmingham MD       Hospital Day: 5    Assessment and Plan:   Kareem Rey is a 78 y.o. male  who presents with Syncope, unspecified syncope type      Plan:    Syncope   Recurrent Falls   Peripheral Neuropathy Likely 2/2 ETOH Abuse   - pertinent hx of cardiac pacemaker who presents for recurrent falls and syncope  -on admit elevated ETOH 0.15   -EKG shows afib with ventricular paced rate   -SALAZAR on  showed moderate aortic stenosis  - Per ED provider, device was interrogated with no issues.   -Concern at this time is cardiac etiology  with possible cause being worsening aortic stenosis VS alcohol use at night patient reports drinking a half a glass of alcohol per day  -Telemetry continuous , cont gabapentin 300 mg PO TID    -cardiology consulted: Dr. Javier, HOLD torsemide, may keep off torsemide due to dehydration   -EP evaluation 24: Recommend continue Plavix 75 mg daily and amlodipine 5 mg daily. EP signed off  -Orthostatic BP NEG   -Neurology consulted: -MRI Brain: Cerebral atrophy, no suggestion of an acute ischemic event  -MRI C Spine: Mild anterior subluxation of C2 on C3 and C3 on C4.  Disc space narrowing and  osteophytes at C3-4, C4-5, C5-6, and C6-7 with narrowing of the neural  foramina as discussed above.  No significant stenosis of the thecal sac in  the cervical region  -ECHO: EF 50%, low normal LV function, increased wall thickness, hypokinetic apical septal and apical inferior wall segments, septal bounce noted, dilated ascending aorta 4.3 cm  -SALAZAR: EF 50-55%, bicuspid aortic valve w/ fusion of left and non coronary cusps, mild regurgitation, watchman device in left atrium     Right wrist pain s/p fall. POA  - C/o worsening pain, Bruising, bruising and internal rotation noted 
    V2.0  [facility] [place]      Name:  Kareem Rey /Age/Sex: 1945  (78 y.o. male)   MRN & CSN:  [id number] & [id number] Encounter Date/Time: 2024 7:33 AM EST    Location:  [address] [place] [address]-A PCP: Eduar Birmingham MD       Hospital Day: 4    Assessment and Plan:   Kareem Rey is a 78 y.o. male with pmh of hx ETOH Abuse, CAD, afib s/p watchman, cardiac pacemaker, gout, HTN, HLD, T2DM,  who presents with Syncope, unspecified syncope type      Plan:    Syncope   Recurrent Falls   Peripheral Neuropathy Likely 2/2 ETOH Abuse   - pertinent hx of cardiac pacemaker who presents for recurrent falls and syncope  -on admit elevated ETOH 0.15   -EKG shows afib with ventricular paced rate   -SALAZAR on  showed moderate aortic stenosis  - Per ED provide, device was interrogated with no issues.   -Concern at this time is cardiac etiology with possible cause being worsening aortic stenosis  -Telemetry continuous , cont gabapentin 300 mg PO TID    -cardiology consulted: Dr. Javier, HOLD torsemide, may keep off torsemide due to dehydration   -EP evaluation 24   -Orthostatic BP NEG : 133/74, 150/85, 149/96   -Neurology consulted:  MRI Brain an C Spine if pacemaker can be turned off   -MRI Brain: pending   -MRI C Spine: pending   -ECHO: EF 50%, low normal LV function, increased wall thickness, hypokinetic apical septal and apical inferior wall segments, septal bounce noted, dilated ascending aorta 4.3 cm  -SALAZAR: EF 50-55%, bicuspid aortic valve w/ fusion of left and non coronary cusps, mild regurgitation, watchman divide in left atrium    Pancytopenia  ?Liver Cirrhosis   -WBC 3.6, Hgb 10.4, .9, Plts 98,   -check B12 314, Folate 15.5, Ferritin 197, Iron studies wnl, TSH 2.510,  Free T4 1.04, Reticulocytes 2.0%,    -Flow Cytometry pending  -will start B12 supplement daily for low normal level and pre-existing neuropathy     Vit D Deficiency  -Vit D 18.38  -begin Vit D 50,000 units weekly x 12, then 
  Physician Progress Note      PATIENT:               MINERVA LEON  CSN #:                  [id number]  :                       1945  ADMIT DATE:       2024 3:57 AM  DISCH DATE:  RESPONDING  PROVIDER #:        GURJIT VILLAR          QUERY TEXT:    Patient admitted with syncope. noted to have alcohol use, dehydration. If   possible, please document in progress notes and discharge summary after study   the etiology of the syncope:    The medical record reflects the following:  Risk Factors: advanced age, daily alcohol use  Clinical Indicators: Pt presented to the ED with fall and laceration to head.   Per H&P, \"Patient reports recurrent falls that have been ongoing for the last   3 months. He states he was in the bedroom, parked the rollater and walked   around the bed and fell and hit his right eye on he dresser. He reports he   does not remember falling and woke up on the floor.\" PN dated 24 by   MAGNOLIA Salinas, cardiology consultant, \"Syncope possibly related to   alcohol intake? Pacemaker showing 100% atrial fibrillation, 53% V pacing with   7-year battery life.  No other significant arrhyth  Treatment: neuro consult, card consult, labs    Thank you,  Luann Donaldson RN  Options provided:  -- Syncope due to dehydration  -- Syncope due to alcohol use  -- Other - I will add my own diagnosis  -- Disagree - Not applicable / Not valid  -- Disagree - Clinically unable to determine / Unknown  -- Refer to Clinical Documentation Reviewer    PROVIDER RESPONSE TEXT:    Syncope workup underway .Syncope etiology may be multifactorial.    Query created by: Deidre Donaldson on 2024 12:45 PM      Electronically signed by:  GURJIT VILLAR 2024 1:26 PM          
4 Eyes Skin Assessment     NAME:  Kareem Rey  YOB: 1945  MEDICAL RECORD NUMBER:  [id number]    The patient is being assessed for  Admission    I agree that at least one RN has performed a thorough Head to Toe Skin Assessment on the patient. ALL assessment sites listed below have been assessed.      Areas assessed by both nurses:    Head, Face, Ears, Shoulders, Back, Chest, Arms, Elbows, Hands, Sacrum. Buttock, Coccyx, Ischium, Legs. Feet and Heels, and Under Medical Devices         Does the Patient have a Wound? Yes wound(s) were present on assessment. LDA wound assessment was Initiated and completed by RN     Patient has laceration above his right eye due to fall.  Carlos Prevention initiated by RN: No  Wound Care Orders initiated by RN: Yes    Pressure Injury (Stage 3,4, Unstageable, DTI, NWPT, and Complex wounds) if present, place Wound referral order by RN under : No    New Ostomies, if present place, Ostomy referral order under : No     Nurse 1 eSignature: Electronically signed by Asa Calle RN on 2/24/24 at 5:10 AM EST    **SHARE this note so that the co-signing nurse can place an eSignature**    Nurse 2 eSignature: Electronically signed by Felicitas Vazquez RN on 2/24/24 at 5:16 AM EST    
Admit Date:  2/24/2024    Admission diagnosis / Complaint: falling      Subjective:  Mr. Rey is resting in bed. Denies cardiac complaints.     Objective:   /73   Pulse 87   Temp 97.5 °F (36.4 °C) (Oral)   Resp 18   Ht 1.803 m (5' 11\")   Wt 110.7 kg (244 lb 0.8 oz)   SpO2 97%   BMI 34.04 kg/m²     Intake/Output Summary (Last 24 hours) at 2/28/2024 1230  Last data filed at 2/27/2024 1753  Gross per 24 hour   Intake --   Output 300 ml   Net -300 ml       TELEMETRY: atrial fibrillation   has a past medical history of Abnormal nuclear stress test, Arthritis, CAD s/p PCI to LAD+RCA 9/9/22, Dizziness, FH: CAD (coronary artery disease), H/O 24 hour EKG monitoring, H/O atrial fibrillation without current medication, H/O cardiovascular stress test, H/O Doppler echocardiogram, H/O Doppler ultrasound (Venous Doppler Lower Extremities), H/O echocardiogram, H/O transesophageal echocardiography (SALAZAR) for monitoring, Heart murmur, History of nuclear stress test, History of nuclear stress test, Hx of Doppler echocardiogram, Hx of Venous Doppler ultrasound, Hyperlipidemia, Hypertension, Kidney stones, Leg swelling, Neuropathy, Nonrheumatic aortic valve stenosis, Obesity, JAYE on CPAP, Persistent atrial fibrillation (HCC), Syncope and collapse, Thoracic aortic aneurysm (HCC), Tremors of nervous system, and Type II or unspecified type diabetes mellitus without mention of complication, not stated as uncontrolled.   has a past surgical history that includes hernia repair (2008); hernia repair (2009); Colonoscopy (2007;2017); Colon surgery (2007); Cataract removal with implant (Right, 04/12/2019); Intracapsular cataract extraction (Right, 04/12/2019); Cataract removal with implant (Left, 05/10/2019); Intracapsular cataract extraction (Left, 05/10/2019); Pacemaker insertion (Left, 06/25/2019); Cardioversion (07/08/2019); Total knee arthroplasty (Right, 02/16/2022); Colonoscopy (N/A, 04/29/2023); Upper gastrointestinal 
Comprehensive Nutrition Assessment    Type and Reason for Visit:  Initial, Positive Nutrition Screen    Nutrition Recommendations/Plan:   Increase carb choices to 5 to better meet needs  Add protein snacks to trays   Assist pt w/ tray set up and eating PRN  Monitor weights, po intakes, labs, POC     Malnutrition Assessment:  Malnutrition Status:  Insufficient data (02/24/24 1311)    Context:  Chronic Illness       Nutrition Assessment:    Admitted w/ syncope, repeated falls,  PMHx of CAD, afib s/p watchman, cardiac pacemaker, gout, HTN, HLD, T2DM. Pt reports poor appetite for since beginning of the year, has lost about 20#. Unable to confirm per chart review, recent wt hx is stated; last measured wt from May 2023 shows non-significant wt loss of 6.1%. Pt ate 1 pancake and some OJ for breakfast. Denies trouble chewing/swallowing. Has some tremors in his hands, uses weighted utensils and weighted glove at home. NFPE not appropriate, pt waiting to get cleaned up from BM. Pt dislikes oral supps, but agreeable to add protein snacks to meals, cottage cheese w/ dinner and hard boiled eggs w/ lunch. Will increase carb choices as well to better fit his needs. Follow at high nutrition risk at this time.    Nutrition Related Findings:    +LR @ 100ml/hr, K 3.4, GFR 43, Mg 1.5 Wound Type: None       Current Nutrition Intake & Therapies:    Average Meal Intake: 1-25%  Average Supplements Intake: None Ordered  ADULT DIET; Regular; 4 carb choices (60 gm/meal); Low Fat/Low Chol/High Fiber/TYRA    Anthropometric Measures:  Height: 180.3 cm (5' 11\")  Ideal Body Weight (IBW): 172 lbs (78 kg)    Admission Body Weight: 113.6 kg (250 lb 7.1 oz)  Current Body Weight: 113.6 kg (250 lb 7.1 oz),   IBW. Weight Source: Bed Scale  Current BMI (kg/m2): 34.9  Usual Body Weight: 121 kg (266 lb 12.1 oz) (5/4/23)  % Weight Change (Calculated): -6.1  Weight Adjustment For: No Adjustment                 BMI Categories: Obese Class 1 (BMI 
I have NOT personally seen and examined the patient independently. I have reviewed the patient's available data, including medical history and recent test results. Reviewed and discussed note as documented by TAYLOR.  I agree with the assessment and plan.       -pt/ot recommending snf  -getting mri  Ep to see tomorrow  
I have NOT personally seen and examined the patient independently. I have reviewed the patient's available data, including medical history and recent test results. Reviewed and discussed note as documented by TAYLOR.  I agree with the assessment and plan.       -still awaiting for MRI  
MRI needs screening form needs completed. Patient also has a pacemaker and MR will need to be coordinated with cardiac RN once we have the sf filled out.   
Neurology Service Progress Note  [facility]   Patient Name: Kareem Rey  : 1945        Subjective:   CC: \"I keep falling\"  Chart was reviewed in detail, patient was seen and assessed.  Patient is doing well today. No significant events overnight. Plan is for SALAZAR today because of recurrent syncopal events. Reviewed MRI images with the patient. No evidence of acute stroke. He does have evidence of degenerative changes in the cervical spine without any significant cord compression. Patient tells me today that he has not been drinking for 4-5 days and he is going to try to abstain. He goes on to explain that he actually drinks more than initially discussed. He drinks two glasses of whisky  with ice before bed. That helps him sleep until about 3 am at which point he wakes up and can not fall back to sleep so he gets up and drinks two more whiskeys which helps him sleep til the morning. Supported the patient in the thought of sobriety and encouraged him to continue to move in that direction.       Past Medical History:   Diagnosis Date    Abnormal nuclear stress test 2022    Inferior wall ischemia 2022    Arthritis     CAD s/p PCI to LAD+RCA 2022    1. PCI to Proximal LAD for 80 -90 % lesion reduced to 0 % with  TYSON III Flow reduced to 0 % stenosis with 3.5 X 12 BARTOLO ,  followed by post dilated with 3.75 X 8 NC balloon  2. Balloon angioplasty of distal LAD for 90 % lesion reduced to  0 % using 2.0 X 10 Balloon TYSON III flow  3. PCI to ostial 80 % RCA lesion and mid RCA lesion of 80 % post  Balloon angioplasty using 4.0 X 34 BARTOLO inflated to 16    Dizziness     FH: CAD (coronary artery disease) 2015    H/O 24 hour EKG monitoring 19,2016    Conclusion: Atrial fibrillation with  fairly well-controlled ventricular rate response without evidence of any significant tachy or alon arrhythmias.    H/O atrial fibrillation without current medication 2019    H/O 
Neurology Service Progress Note  [facility]   Patient Name: Kareem Rey  : 1945        Subjective:   CC: \"I keep falling\"  Chart was reviewed in detail, patient was seen and assessed.  He is resting in bed and is awake and alert today.  Tells me that he feels his tremors are better.  Is noted to have tremor in the bilateral upper extremities right greater than left.  This is not a pill-rolling tremor but more of an essential tremor that is worse with intention.  Patient states that he notices his tremors improved with drinking alcohol.  Did discuss that you can have tremor associated with withdrawal and if it has been many hours since last alcoholic drink that he may start to feel that his tremors be more prevalent.  Patient reports drinking 2-3 glasses of liquor with ice to \"water it down\" daily.  He does this primarily in the evening before bed so that he can sleep better.  Had a long discussion that heavy alcohol consumption should be avoided.  Patient willing to engage in the conversation however unsure if he is willing to stop drinking.      Past Medical History:   Diagnosis Date    Abnormal nuclear stress test 2022    Inferior wall ischemia 2022    Arthritis     CAD s/p PCI to LAD+RCA 2022    1. PCI to Proximal LAD for 80 -90 % lesion reduced to 0 % with  TYSON III Flow reduced to 0 % stenosis with 3.5 X 12 BARTOLO ,  followed by post dilated with 3.75 X 8 NC balloon  2. Balloon angioplasty of distal LAD for 90 % lesion reduced to  0 % using 2.0 X 10 Balloon TYSON III flow  3. PCI to ostial 80 % RCA lesion and mid RCA lesion of 80 % post  Balloon angioplasty using 4.0 X 34 BARTOLO inflated to 16    Dizziness     FH: CAD (coronary artery disease) 2015    H/O 24 hour EKG monitoring 19,2016    Conclusion: Atrial fibrillation with  fairly well-controlled ventricular rate response without evidence of any significant tachy or alon arrhythmias.    H/O atrial 
Occupational Therapy    Occupational Therapy Treatment Note    Name: Kareem Rey MRN: [id number] :   1945   Date:  2024   Admission Date: 2024 Room:  [facility]     Per OTR/L Discharge Recommendation: Swing bed    Primary Problem:  Elem:  The primary encounter diagnosis was Syncope, unspecified syncope type. Diagnoses of Syncope and collapse, Frequent falls, and CAD s/p PCI to LAD+RCA 22 were also pertinent to this visit.    Restrictions/Precautions:  fall risk     Communication with other providers: RN approved session.     Subjective:  Patient states:  \"I don't know if you ladies will be able to get me up.\"   Pain:   Location, Type, Intensity (0/10 to 10/10):  8/10 pain reported in neck.     Objective:    Observation: Pt supine in bed upon arrival.    Objective Measures:  Pt alert and oriented x3. A-fib per tele.     Treatment, including education:  Self Care Training:   Cues were given for safety, sequence, UE/LE placement, visual cues, and balance.    Activities performed today included toileting and oral hygiene    Pt supine in bed upon arrival. Pt completed sup to sit transfer with MOD A and hand held A, use of bed rails for trunk control, pt able to move BLEs to EOB. Pt scooted to EOB with CGA for safety. Pt sat EOB with SBA. Pt completed sit to stand transfer with MOD A x2 for force production and balance, Vcs for hand placement and walker management. Pt completed functional mobility to bathroom with use of WW and MIN A x2 for balance and walker management, MAX Vcs to attend to walker and for safety awareness. Pt completed toilet transfer with MOD A for controlled descent, Vcs for hand placement and body awareness. Pt completed sit to stand from toilet with MOD A x2 for force production and balance control, Vcs for hand placement and safety awareness. Pt completed functional mobility to chair with WW and MIN A x2 for walker management and balance support, Vcs for safety awareness. Pt 
Occupational Therapy  [facility] ACUTE CARE OCCUPATIONAL THERAPY EVALUATION  Ray CHICA Rey, 1945, 3018/3018-A, 2/26/2024    Discharge Recommendation: Swing bed    History  Cheesh-Na:  The primary encounter diagnosis was Syncope, unspecified syncope type. Diagnoses of Syncope and collapse, Frequent falls, and CAD s/p PCI to LAD+RCA 9/9/22 were also pertinent to this visit.  Patient  has a past medical history of Abnormal nuclear stress test, Arthritis, CAD s/p PCI to LAD+RCA 9/9/22, Dizziness, FH: CAD (coronary artery disease), H/O 24 hour EKG monitoring, H/O atrial fibrillation without current medication, H/O cardiovascular stress test, H/O Doppler echocardiogram, H/O Doppler ultrasound (Venous Doppler Lower Extremities), H/O echocardiogram, H/O transesophageal echocardiography (SALAZAR) for monitoring, Heart murmur, History of nuclear stress test, History of nuclear stress test, Hx of Doppler echocardiogram, Hx of Venous Doppler ultrasound, Hyperlipidemia, Hypertension, Kidney stones, Leg swelling, Neuropathy, Nonrheumatic aortic valve stenosis, Obesity, JAYE on CPAP, Persistent atrial fibrillation (HCC), Syncope and collapse, Thoracic aortic aneurysm (HCC), Tremors of nervous system, and Type II or unspecified type diabetes mellitus without mention of complication, not stated as uncontrolled.  Patient  has a past surgical history that includes hernia repair (2008); hernia repair (2009); Colonoscopy (2007;2017); Colon surgery (2007); Cataract removal with implant (Right, 04/12/2019); Intracapsular cataract extraction (Right, 04/12/2019); Cataract removal with implant (Left, 05/10/2019); Intracapsular cataract extraction (Left, 05/10/2019); Pacemaker insertion (Left, 06/25/2019); Cardioversion (07/08/2019); Total knee arthroplasty (Right, 02/16/2022); Colonoscopy (N/A, 04/29/2023); Upper gastrointestinal endoscopy (N/A, 04/29/2023); and Cardiac surgery (N/A, 07/06/2023).    Subjective:  Patient states:  \"I 
Patient arrived to  room 3018 via [facility]. Vital stable. No complaints at this time.  
Physical Therapy    Physical Therapy Treatment Note  Name: Kareem Rey MRN: [id number] :   1945   Date:  2024   Admission Date: 2024 Room:  [address] [place] [address]   Restrictions/Precautions:          fall risk, gen prec  Communication with other providers:  nurse states pt ok to treat  Subjective:  Patient states:  agreeable    Pain:   Location, Type, Intensity (0/10 to 10/10):  RUE pain from fall  Objective:    Observation:  In bed  Treatment, including education/measures:  Transfers   Rolling: Xin  Supine to sit :modA  Scooting :SBA  Sit to stand :min-modA x multiple sets  Vc for hand placement and body mechanics  Stand to sit :Xin  SPT:Xin /c FWW slow chrissy and pivot, vc for full turn and to reach back properly. Pt sits before reaching back and has fast stand  >sit descent /c Xin guidance per PTA. Edu for safe techs.  Stand balance using FWW for UE support x~3' F- grade once CATHERINE established. Vc for trunk/hip ext and upright posture.  Gait:  Pt amb /c FWW x~10' /c Xin. Slow chrissy /c discontinuous gait. Some instability, decreased step length/ht. Vc for safe techs.  Safety  Patient left safely in the chair, with call light/phone in reach with alarm applied. Gait belt was used for transfers and gait.      Assessment / Impression:    Pt tolerates tx well, but needs assist for all functional mobility d/t weakness and instabliity  Patient's tolerance of treatment:  good   Adverse Reaction: na  Significant change in status and impact:  na  Barriers to improvement:  weakness and endurance  Plan for Next Session:    Cont gait, transfer training   Time in:  1002  Time out:  1029  Timed treatment minutes:  27  Total treatment time:  27    Previously filed items:  Social/Functional History  Lives With: Spouse  Type of Home: House  Home Layout: One level  Home Access: Level entry, Ramped entrance  Bathroom Shower/Tub: Walk-in shower  Bathroom Toilet: Standard  Bathroom Equipment: Grab bars in shower, Built-in 
Breakfast, Lunch, Dinner; Standard High Calorie/High Protein Oral Supplement    Anthropometric Measures:  Height: 180.3 cm (5' 11\")  Ideal Body Weight (IBW): 172 lbs (78 kg)    Admission Body Weight: 113.6 kg (250 lb 7.1 oz)  Current Body Weight: 110.7 kg (244 lb 0.8 oz), 141.9 % IBW. Weight Source: Bed Scale  Current BMI (kg/m2): 34.1  Usual Body Weight: 121 kg (266 lb 12.1 oz) (5/4/23)  % Weight Change (Calculated): -8.5  Weight Adjustment For: No Adjustment                 BMI Categories: Obese Class 1 (BMI 30.0-34.9)    Estimated Daily Nutrient Needs:  Energy Requirements Based On: Formula  Weight Used for Energy Requirements: Current  Energy (kcal/day): 7714-8156 (MSJ)  Weight Used for Protein Requirements: Ideal  Protein (g/day): 63-78 (0.8-1.0g/kg)  Method Used for Fluid Requirements: 1 ml/kcal  Fluid (ml/day): 2200    Nutrition Diagnosis:   Moderate malnutrition, In context of chronic illness related to inadequate protein-energy intake (reduced oral intake) as evidenced by poor intake prior to admission, weight loss, moderate muscle loss    Nutrition Interventions:   Food and/or Nutrient Delivery: Continue Current Diet, Vitamin Supplement, Continue Oral Nutrition Supplement, Snacks (Comment)  Nutrition Education/Counseling: Education completed, Survival skills/brief education completed (Malnutrition bag and education given)  Coordination of Nutrition Care: Continue to monitor while inpatient, Coordination of Care  Plan of Care discussed with: pt, PS MD of malnutrition dx    Goals:  Previous Goal Met: Progressing toward Goal(s)  Goals: PO intake 50% or greater, by next RD assessment       Nutrition Monitoring and Evaluation:   Behavioral-Environmental Outcomes: None Identified  Food/Nutrient Intake Outcomes: Food and Nutrient Intake, Supplement Intake  Physical Signs/Symptoms Outcomes: Biochemical Data, Meal Time Behavior, Nutrition Focused Physical Findings, Weight    Discharge Planning:    Continue Oral 
NAD  Skin:  Warm and dry. Bruise to right eye  Neck:  JVD not appreciated  Chest:  Clear to auscultation, respiration easy  Cardiovascular:  irregular rate and rhythm, S1S2, grade 2/6 systolic murmur  Abdomen:  Soft nontender  Extremities:  no edema    Medications:    vitamin B-12  1,000 mcg Oral Daily    vitamin D  50,000 Units Oral Weekly    [START ON 5/20/2024] Vitamin D  2,000 Units Oral Daily    Naphazoline-Hydroxyprop Mcell  1 drop Both Eyes 4x daily    insulin lispro  0-4 Units SubCUTAneous 4x Daily AC & HS    allopurinol  100 mg Oral Daily    amLODIPine  5 mg Oral Daily    atorvastatin  20 mg Oral Daily    clopidogrel  75 mg Oral Daily    ferrous sulfate  325 mg Oral Daily with breakfast    folic acid  1 mg Oral Daily    gabapentin  300 mg Oral TID    magnesium oxide  400 mg Oral Daily    montelukast  10 mg Oral Daily    [Held by provider] torsemide  20 mg Oral Daily    [Held by provider] insulin lispro  0-4 Units SubCUTAneous TID WC    [Held by provider] insulin lispro  0-4 Units SubCUTAneous Nightly    sodium chloride flush  5-40 mL IntraVENous 2 times per day    enoxaparin  30 mg SubCUTAneous BID      dextrose      sodium chloride       glucose, dextrose bolus **OR** dextrose bolus, glucagon (rDNA), dextrose, sodium chloride flush, sodium chloride, ondansetron **OR** ondansetron, polyethylene glycol, acetaminophen **OR** acetaminophen, potassium chloride **OR** potassium alternative oral replacement **OR** potassium chloride, magnesium sulfate    Lab Data:  CBC:   Recent Labs     02/25/24  0306 02/26/24  0259 02/27/24  1046   WBC 3.6* 4.2 4.6   HGB 10.4* 10.6* 11.1*   HCT 32.1* 33.7* 34.6*   .9* 109.4* 110.2*   PLT 98* 93* 100*     BMP:   Recent Labs     02/25/24  0306 02/26/24  0259 02/27/24  1046    137 137   K 3.8 4.2 4.1    101 102   CO2 25 25 21   BUN 15 13 12   CREATININE 1.4* 1.4* 1.4*     LIVER PROFILE:   Recent Labs     02/25/24  0306 02/26/24  0259 02/27/24  1046   AST 31 29 
states that his alcohol use helps to ease some of his pain as well as that shaking in his hands.  Informed him that it is difficult to deal with his discomfort however if he continues to drink at the rate he is doing he will have continued long-term side effects    Patient is planning on going to [facility] upon discharge    Objective: Temperature:  Current - Temp: 97.5 °F (36.4 °C); Max - Temp  Av.8 °F (36.6 °C)  Min: 97.4 °F (36.3 °C)  Max: 98.2 °F (36.8 °C)    Respiratory Rate : Resp  Av  Min: 18  Max: 20    Pulse Range: Pulse  Av.8  Min: 60  Max: 87    Blood Presuure Range:  Systolic (24hrs), Av , Min:102 , Max:132   ; Diastolic (24hrs), Av, Min:71, Max:87      Pulse ox Range: SpO2  Av.5 %  Min: 97 %  Max: 98 %    24hr I & O:    Intake/Output Summary (Last 24 hours) at 2024 1337  Last data filed at 2024 1753  Gross per 24 hour   Intake --   Output 300 ml   Net -300 ml           /73   Pulse 87   Temp 97.5 °F (36.4 °C) (Oral)   Resp 18   Ht 1.803 m (5' 11\")   Wt 110.7 kg (244 lb 0.8 oz)   SpO2 97%   BMI 34.04 kg/m²         TELEMETRY: Atrial fibrillation and V pacing      has a past medical history of Abnormal nuclear stress test, Arthritis, CAD s/p PCI to LAD+RCA 22, Dizziness, FH: CAD (coronary artery disease), H/O 24 hour EKG monitoring, H/O atrial fibrillation without current medication, H/O cardiovascular stress test, H/O Doppler echocardiogram, H/O Doppler ultrasound (Venous Doppler Lower Extremities), H/O echocardiogram, H/O transesophageal echocardiography (SALAZAR) for monitoring, Heart murmur, History of nuclear stress test, History of nuclear stress test, Hx of Doppler echocardiogram, Hx of Venous Doppler ultrasound, Hyperlipidemia, Hypertension, Kidney stones, Leg swelling, Neuropathy, Nonrheumatic aortic valve stenosis, Obesity, JAYE on CPAP, Persistent atrial fibrillation (HCC), Syncope and collapse, Thoracic aortic aneurysm (HCC), Tremors of 
21 20*  --  25   BUN 18 16  --  15   CREATININE 1.6* 1.6*  --  1.4*     LIVER PROFILE:   Recent Labs     02/23/24  2333 02/24/24  0551 02/25/24  0306   AST 34 32 31   ALT 20 18 16   BILITOT 0.6 0.6 1.1*   ALKPHOS 87 81 80     PT/INR:   Recent Labs     02/24/24  0551   PROTIME 13.9   INR 1.1     APTT: No results for input(s): \"APTT\" in the last 72 hours.  BNP:  No results for input(s): \"BNP\" in the last 72 hours.  TROPONIN: No results for input(s): \"TROPONINT\" in the last 72 hours.  Labs, consult, tests reviewed          Kareem Ellsworth PA-C, 2/25/2024 3:14 PM   
Value Ref Range    Sodium 137 135 - 145 MMOL/L    Potassium 4.2 3.5 - 5.1 MMOL/L    Chloride 101 99 - 110 mMol/L    CO2 25 21 - 32 MMOL/L    Anion Gap 11 7 - 16    Glucose 108 (H) 70 - 99 MG/DL    BUN 13 6 - 23 MG/DL    Creatinine 1.4 (H) 0.9 - 1.3 MG/DL    Est, Glom Filt Rate 51 (L) >60 mL/min/1.73m2    Calcium 8.9 8.3 - 10.6 MG/DL    Total Protein 5.2 (L) 6.4 - 8.2 GM/DL    Albumin 3.3 (L) 3.4 - 5.0 GM/DL    Total Bilirubin 0.9 0.0 - 1.0 MG/DL    Alkaline Phosphatase 81 40 - 128 IU/L    ALT 15 10 - 40 U/L    AST 29 15 - 37 IU/L   CBC with Auto Differential    Collection Time: 02/26/24  2:59 AM   Result Value Ref Range    WBC 4.2 4.0 - 10.5 K/CU MM    RBC 3.08 (L) 4.6 - 6.2 M/CU MM    Hemoglobin 10.6 (L) 13.5 - 18.0 GM/DL    Hematocrit 33.7 (L) 42 - 52 %    .4 (H) 78 - 100 FL    MCH 34.4 (H) 27 - 31 PG    MCHC 31.5 (L) 32.0 - 36.0 %    RDW 13.4 11.7 - 14.9 %    Platelets 93 (L) 140 - 440 K/CU MM    MPV 9.6 7.5 - 11.1 FL    Differential Type AUTOMATED DIFFERENTIAL     Segs Relative 74.0 (H) 36 - 66 %    Lymphocytes % 13.7 (L) 24 - 44 %    Monocytes % 8.5 (H) 0 - 4 %    Eosinophils % 3.1 (H) 0 - 3 %    Basophils % 0.5 0 - 1 %    Segs Absolute 3.1 K/CU MM    Lymphocytes Absolute 0.6 K/CU MM    Monocytes Absolute 0.4 K/CU MM    Eosinophils Absolute 0.1 K/CU MM    Basophils Absolute 0.0 K/CU MM    Nucleated RBC % 0.0 %    Total Nucleated RBC 0.0 K/CU MM    Total Immature Neutrophil 0.01 K/CU MM    Immature Neutrophil % 0.2 0 - 0.43 %   Magnesium    Collection Time: 02/26/24  2:59 AM   Result Value Ref Range    Magnesium 1.8 1.8 - 2.4 mg/dl   POCT Glucose    Collection Time: 02/26/24  3:54 AM   Result Value Ref Range    POC Glucose 107 (H) 70 - 99 MG/DL   Echo (TTE) complete (PRN contrast/bubble/strain/3D)    Collection Time: 02/26/24  9:09 AM   Result Value Ref Range    LV EDV A4C 75 mL    LV ESV A4C 38 mL    IVSd 1.2 (A) 0.6 - 1.0 cm    LVIDd 5.3 4.2 - 5.9 cm    LVIDs 3.9 cm    LVOT Diameter 2.3 cm    LVOT Mean 
[START ON 5/20/2024] Vitamin D  2,000 Units Oral Daily    Naphazoline-Hydroxyprop Mcell  1 drop Both Eyes 4x daily    insulin lispro  0-4 Units SubCUTAneous 4x Daily AC & HS    allopurinol  100 mg Oral Daily    amLODIPine  5 mg Oral Daily    atorvastatin  20 mg Oral Daily    clopidogrel  75 mg Oral Daily    ferrous sulfate  325 mg Oral Daily with breakfast    folic acid  1 mg Oral Daily    gabapentin  300 mg Oral TID    magnesium oxide  400 mg Oral Daily    montelukast  10 mg Oral Daily    [Held by provider] torsemide  20 mg Oral Daily    [Held by provider] insulin lispro  0-4 Units SubCUTAneous TID WC    [Held by provider] insulin lispro  0-4 Units SubCUTAneous Nightly    sodium chloride flush  5-40 mL IntraVENous 2 times per day    enoxaparin  30 mg SubCUTAneous BID      dextrose      sodium chloride       glucose, dextrose bolus **OR** dextrose bolus, glucagon (rDNA), dextrose, sodium chloride flush, sodium chloride, ondansetron **OR** ondansetron, polyethylene glycol, acetaminophen **OR** acetaminophen, potassium chloride **OR** potassium alternative oral replacement **OR** potassium chloride, magnesium sulfate    Lab Data:  CBC:   Recent Labs     02/25/24  0306 02/26/24  0259   WBC 3.6* 4.2   HGB 10.4* 10.6*   HCT 32.1* 33.7*   .9* 109.4*   PLT 98* 93*       BMP:   Recent Labs     02/24/24  1350 02/25/24  0306 02/26/24  0259   NA  --  139 137   K 4.2 3.8 4.2   CL  --  101 101   CO2  --  25 25   BUN  --  15 13   CREATININE  --  1.4* 1.4*       LIVER PROFILE:   Recent Labs     02/25/24  0306 02/26/24  0259   AST 31 29   ALT 16 15   BILITOT 1.1* 0.9   ALKPHOS 80 81       PT/INR:   No results for input(s): \"PROTIME\", \"INR\" in the last 72 hours.    APTT: No results for input(s): \"APTT\" in the last 72 hours.  BNP:  No results for input(s): \"BNP\" in the last 72 hours.  TROPONIN: No results for input(s): \"TROPONINT\" in the last 72 hours.  Labs, consult, tests reviewed          Kareem Ellsworth PA-C, 
cervical spine. SOFT TISSUES: There is no prevertebral soft tissue swelling.  The aortic arch is calcified and ectatic.  There is a left subclavian cardiac pacemaker.     Multilevel degenerative changes with no acute fracture or traumatic malalignment.       Electronically signed by BENSON LINTON CNP on 2/25/2024 at 1:13 PM

## 2024-02-29 ENCOUNTER — HOSPITAL ENCOUNTER (INPATIENT)
Age: 79
LOS: 22 days | Discharge: HOME HEALTH | End: 2024-03-22
Attending: INTERNAL MEDICINE | Admitting: INTERNAL MEDICINE
Payer: MEDICARE

## 2024-02-29 VITALS
WEIGHT: 241.4 LBS | BODY MASS INDEX: 33.8 KG/M2 | RESPIRATION RATE: 20 BRPM | OXYGEN SATURATION: 98 % | DIASTOLIC BLOOD PRESSURE: 86 MMHG | SYSTOLIC BLOOD PRESSURE: 142 MMHG | HEART RATE: 67 BPM | TEMPERATURE: 97.7 F | HEIGHT: 71 IN

## 2024-02-29 PROBLEM — R53.81 DEBILITY: Status: ACTIVE | Noted: 2024-02-29

## 2024-02-29 LAB
GLUCOSE BLD-MCNC: 114 MG/DL (ref 70–99)
GLUCOSE BLD-MCNC: 126 MG/DL (ref 70–99)
GLUCOSE BLD-MCNC: 146 MG/DL (ref 70–99)
GLUCOSE BLD-MCNC: 177 MG/DL (ref 70–99)

## 2024-02-29 PROCEDURE — 2580000003 HC RX 258: Performed by: STUDENT IN AN ORGANIZED HEALTH CARE EDUCATION/TRAINING PROGRAM

## 2024-02-29 PROCEDURE — 94761 N-INVAS EAR/PLS OXIMETRY MLT: CPT

## 2024-02-29 PROCEDURE — 1200000002 HC SEMI PRIVATE SWING BED

## 2024-02-29 PROCEDURE — 82962 GLUCOSE BLOOD TEST: CPT

## 2024-02-29 PROCEDURE — 6360000002 HC RX W HCPCS

## 2024-02-29 PROCEDURE — 6370000000 HC RX 637 (ALT 250 FOR IP): Performed by: NURSE PRACTITIONER

## 2024-02-29 PROCEDURE — G0378 HOSPITAL OBSERVATION PER HR: HCPCS

## 2024-02-29 PROCEDURE — 6360000002 HC RX W HCPCS: Performed by: STUDENT IN AN ORGANIZED HEALTH CARE EDUCATION/TRAINING PROGRAM

## 2024-02-29 PROCEDURE — 6370000000 HC RX 637 (ALT 250 FOR IP)

## 2024-02-29 PROCEDURE — 6370000000 HC RX 637 (ALT 250 FOR IP): Performed by: STUDENT IN AN ORGANIZED HEALTH CARE EDUCATION/TRAINING PROGRAM

## 2024-02-29 RX ORDER — FERROUS SULFATE 325(65) MG
325 TABLET ORAL
Status: CANCELLED | OUTPATIENT
Start: 2024-03-01

## 2024-02-29 RX ORDER — LANOLIN ALCOHOL/MO/W.PET/CERES
1000 CREAM (GRAM) TOPICAL DAILY
Status: CANCELLED | OUTPATIENT
Start: 2024-03-01

## 2024-02-29 RX ORDER — ONDANSETRON 4 MG/1
4 TABLET, ORALLY DISINTEGRATING ORAL EVERY 8 HOURS PRN
Status: CANCELLED | OUTPATIENT
Start: 2024-02-29

## 2024-02-29 RX ORDER — FOLIC ACID 1 MG/1
1 TABLET ORAL DAILY
Status: DISCONTINUED | OUTPATIENT
Start: 2024-03-01 | End: 2024-03-22 | Stop reason: HOSPADM

## 2024-02-29 RX ORDER — ENOXAPARIN SODIUM 100 MG/ML
30 INJECTION SUBCUTANEOUS 2 TIMES DAILY
Status: DISCONTINUED | OUTPATIENT
Start: 2024-02-29 | End: 2024-03-22 | Stop reason: HOSPADM

## 2024-02-29 RX ORDER — ERGOCALCIFEROL 1.25 MG/1
50000 CAPSULE ORAL WEEKLY
Status: DISCONTINUED | OUTPATIENT
Start: 2024-03-04 | End: 2024-03-22 | Stop reason: HOSPADM

## 2024-02-29 RX ORDER — SODIUM CHLORIDE 9 MG/ML
INJECTION, SOLUTION INTRAVENOUS PRN
Status: DISCONTINUED | OUTPATIENT
Start: 2024-02-29 | End: 2024-02-29

## 2024-02-29 RX ORDER — GABAPENTIN 300 MG/1
300 CAPSULE ORAL 3 TIMES DAILY
Status: DISCONTINUED | OUTPATIENT
Start: 2024-02-29 | End: 2024-03-22 | Stop reason: HOSPADM

## 2024-02-29 RX ORDER — ACETAMINOPHEN 325 MG/1
650 TABLET ORAL EVERY 6 HOURS PRN
Status: DISCONTINUED | OUTPATIENT
Start: 2024-02-29 | End: 2024-03-22 | Stop reason: HOSPADM

## 2024-02-29 RX ORDER — SODIUM CHLORIDE 9 MG/ML
INJECTION, SOLUTION INTRAVENOUS PRN
Status: CANCELLED | OUTPATIENT
Start: 2024-02-29

## 2024-02-29 RX ORDER — POTASSIUM CHLORIDE 20 MEQ/1
40 TABLET, EXTENDED RELEASE ORAL PRN
Status: CANCELLED | OUTPATIENT
Start: 2024-02-29

## 2024-02-29 RX ORDER — ONDANSETRON 2 MG/ML
4 INJECTION INTRAMUSCULAR; INTRAVENOUS EVERY 6 HOURS PRN
Status: DISCONTINUED | OUTPATIENT
Start: 2024-02-29 | End: 2024-03-22 | Stop reason: HOSPADM

## 2024-02-29 RX ORDER — ACETAMINOPHEN 650 MG/1
650 SUPPOSITORY RECTAL EVERY 6 HOURS PRN
Status: CANCELLED | OUTPATIENT
Start: 2024-02-29

## 2024-02-29 RX ORDER — GLUCAGON 1 MG/ML
1 KIT INJECTION PRN
Status: CANCELLED | OUTPATIENT
Start: 2024-02-29

## 2024-02-29 RX ORDER — DEXTROSE MONOHYDRATE 100 MG/ML
INJECTION, SOLUTION INTRAVENOUS CONTINUOUS PRN
Status: DISCONTINUED | OUTPATIENT
Start: 2024-02-29 | End: 2024-03-22 | Stop reason: HOSPADM

## 2024-02-29 RX ORDER — ALLOPURINOL 100 MG/1
100 TABLET ORAL DAILY
Status: DISCONTINUED | OUTPATIENT
Start: 2024-03-01 | End: 2024-03-22 | Stop reason: HOSPADM

## 2024-02-29 RX ORDER — SODIUM CHLORIDE 0.9 % (FLUSH) 0.9 %
5-40 SYRINGE (ML) INJECTION PRN
Status: CANCELLED | OUTPATIENT
Start: 2024-02-29

## 2024-02-29 RX ORDER — FERROUS SULFATE 325(65) MG
325 TABLET ORAL
Status: DISCONTINUED | OUTPATIENT
Start: 2024-03-01 | End: 2024-03-22 | Stop reason: HOSPADM

## 2024-02-29 RX ORDER — INSULIN LISPRO 100 [IU]/ML
0-4 INJECTION, SOLUTION INTRAVENOUS; SUBCUTANEOUS
Status: CANCELLED | OUTPATIENT
Start: 2024-02-29

## 2024-02-29 RX ORDER — POTASSIUM CHLORIDE 7.45 MG/ML
10 INJECTION INTRAVENOUS PRN
Status: DISCONTINUED | OUTPATIENT
Start: 2024-02-29 | End: 2024-02-29

## 2024-02-29 RX ORDER — FOLIC ACID 1 MG/1
1 TABLET ORAL DAILY
Status: CANCELLED | OUTPATIENT
Start: 2024-03-01

## 2024-02-29 RX ORDER — AMLODIPINE BESYLATE 5 MG/1
5 TABLET ORAL DAILY
Status: DISCONTINUED | OUTPATIENT
Start: 2024-03-01 | End: 2024-03-22 | Stop reason: HOSPADM

## 2024-02-29 RX ORDER — ATORVASTATIN CALCIUM 10 MG/1
20 TABLET, FILM COATED ORAL DAILY
Status: CANCELLED | OUTPATIENT
Start: 2024-03-01

## 2024-02-29 RX ORDER — SODIUM CHLORIDE 0.9 % (FLUSH) 0.9 %
5-40 SYRINGE (ML) INJECTION EVERY 12 HOURS SCHEDULED
Status: DISCONTINUED | OUTPATIENT
Start: 2024-02-29 | End: 2024-02-29

## 2024-02-29 RX ORDER — MAGNESIUM SULFATE IN WATER 40 MG/ML
2000 INJECTION, SOLUTION INTRAVENOUS PRN
Status: CANCELLED | OUTPATIENT
Start: 2024-02-29

## 2024-02-29 RX ORDER — VITAMIN B COMPLEX
2000 TABLET ORAL DAILY
Status: CANCELLED | OUTPATIENT
Start: 2024-05-20

## 2024-02-29 RX ORDER — INSULIN LISPRO 100 [IU]/ML
0-4 INJECTION, SOLUTION INTRAVENOUS; SUBCUTANEOUS
Status: DISCONTINUED | OUTPATIENT
Start: 2024-03-01 | End: 2024-03-22 | Stop reason: HOSPADM

## 2024-02-29 RX ORDER — DEXTROSE MONOHYDRATE 100 MG/ML
INJECTION, SOLUTION INTRAVENOUS CONTINUOUS PRN
Status: CANCELLED | OUTPATIENT
Start: 2024-02-29

## 2024-02-29 RX ORDER — VITAMIN B COMPLEX
2000 TABLET ORAL DAILY
Status: DISCONTINUED | OUTPATIENT
Start: 2024-05-20 | End: 2024-03-05

## 2024-02-29 RX ORDER — POTASSIUM CHLORIDE 7.45 MG/ML
10 INJECTION INTRAVENOUS PRN
Status: CANCELLED | OUTPATIENT
Start: 2024-02-29

## 2024-02-29 RX ORDER — LANOLIN ALCOHOL/MO/W.PET/CERES
400 CREAM (GRAM) TOPICAL DAILY
Status: DISCONTINUED | OUTPATIENT
Start: 2024-03-01 | End: 2024-03-22 | Stop reason: HOSPADM

## 2024-02-29 RX ORDER — ALLOPURINOL 100 MG/1
100 TABLET ORAL DAILY
Status: CANCELLED | OUTPATIENT
Start: 2024-03-01

## 2024-02-29 RX ORDER — SODIUM CHLORIDE 0.9 % (FLUSH) 0.9 %
5-40 SYRINGE (ML) INJECTION PRN
Status: DISCONTINUED | OUTPATIENT
Start: 2024-02-29 | End: 2024-02-29

## 2024-02-29 RX ORDER — POLYETHYLENE GLYCOL 3350 17 G/17G
17 POWDER, FOR SOLUTION ORAL DAILY PRN
Status: CANCELLED | OUTPATIENT
Start: 2024-02-29

## 2024-02-29 RX ORDER — ACETAMINOPHEN 650 MG/1
650 SUPPOSITORY RECTAL EVERY 6 HOURS PRN
Status: DISCONTINUED | OUTPATIENT
Start: 2024-02-29 | End: 2024-03-22 | Stop reason: HOSPADM

## 2024-02-29 RX ORDER — ONDANSETRON 2 MG/ML
4 INJECTION INTRAMUSCULAR; INTRAVENOUS EVERY 6 HOURS PRN
Status: CANCELLED | OUTPATIENT
Start: 2024-02-29

## 2024-02-29 RX ORDER — MAGNESIUM SULFATE IN WATER 40 MG/ML
2000 INJECTION, SOLUTION INTRAVENOUS PRN
Status: DISCONTINUED | OUTPATIENT
Start: 2024-02-29 | End: 2024-02-29

## 2024-02-29 RX ORDER — POTASSIUM CHLORIDE 20 MEQ/1
40 TABLET, EXTENDED RELEASE ORAL PRN
Status: DISCONTINUED | OUTPATIENT
Start: 2024-02-29 | End: 2024-03-03

## 2024-02-29 RX ORDER — MONTELUKAST SODIUM 10 MG/1
10 TABLET ORAL DAILY
Status: CANCELLED | OUTPATIENT
Start: 2024-03-01

## 2024-02-29 RX ORDER — LANOLIN ALCOHOL/MO/W.PET/CERES
1000 CREAM (GRAM) TOPICAL DAILY
Status: DISCONTINUED | OUTPATIENT
Start: 2024-03-01 | End: 2024-03-22 | Stop reason: HOSPADM

## 2024-02-29 RX ORDER — ONDANSETRON 4 MG/1
4 TABLET, ORALLY DISINTEGRATING ORAL EVERY 8 HOURS PRN
Status: DISCONTINUED | OUTPATIENT
Start: 2024-02-29 | End: 2024-03-22 | Stop reason: HOSPADM

## 2024-02-29 RX ORDER — POLYETHYLENE GLYCOL 3350 17 G/17G
17 POWDER, FOR SOLUTION ORAL DAILY PRN
Status: DISCONTINUED | OUTPATIENT
Start: 2024-02-29 | End: 2024-03-22 | Stop reason: HOSPADM

## 2024-02-29 RX ORDER — CLOPIDOGREL BISULFATE 75 MG/1
75 TABLET ORAL DAILY
Status: DISCONTINUED | OUTPATIENT
Start: 2024-03-01 | End: 2024-03-22 | Stop reason: HOSPADM

## 2024-02-29 RX ORDER — AMLODIPINE BESYLATE 5 MG/1
5 TABLET ORAL DAILY
Status: CANCELLED | OUTPATIENT
Start: 2024-03-01

## 2024-02-29 RX ORDER — LANOLIN ALCOHOL/MO/W.PET/CERES
400 CREAM (GRAM) TOPICAL DAILY
Status: CANCELLED | OUTPATIENT
Start: 2024-03-01

## 2024-02-29 RX ORDER — ERGOCALCIFEROL 1.25 MG/1
50000 CAPSULE ORAL WEEKLY
Status: CANCELLED | OUTPATIENT
Start: 2024-03-04 | End: 2024-05-20

## 2024-02-29 RX ORDER — SODIUM CHLORIDE 0.9 % (FLUSH) 0.9 %
5-40 SYRINGE (ML) INJECTION EVERY 12 HOURS SCHEDULED
Status: CANCELLED | OUTPATIENT
Start: 2024-02-29

## 2024-02-29 RX ORDER — MONTELUKAST SODIUM 10 MG/1
10 TABLET ORAL DAILY
Status: DISCONTINUED | OUTPATIENT
Start: 2024-03-01 | End: 2024-03-01

## 2024-02-29 RX ORDER — ENOXAPARIN SODIUM 100 MG/ML
30 INJECTION SUBCUTANEOUS 2 TIMES DAILY
Status: CANCELLED | OUTPATIENT
Start: 2024-02-29

## 2024-02-29 RX ORDER — ATORVASTATIN CALCIUM 20 MG/1
20 TABLET, FILM COATED ORAL DAILY
Status: DISCONTINUED | OUTPATIENT
Start: 2024-03-01 | End: 2024-03-22 | Stop reason: HOSPADM

## 2024-02-29 RX ORDER — ACETAMINOPHEN 325 MG/1
650 TABLET ORAL EVERY 6 HOURS PRN
Status: CANCELLED | OUTPATIENT
Start: 2024-02-29

## 2024-02-29 RX ORDER — CLOPIDOGREL BISULFATE 75 MG/1
75 TABLET ORAL DAILY
Status: CANCELLED | OUTPATIENT
Start: 2024-03-01

## 2024-02-29 RX ORDER — GABAPENTIN 300 MG/1
300 CAPSULE ORAL 3 TIMES DAILY
Status: CANCELLED | OUTPATIENT
Start: 2024-02-29

## 2024-02-29 RX ADMIN — ACETAMINOPHEN 650 MG: 325 TABLET ORAL at 20:13

## 2024-02-29 RX ADMIN — ENOXAPARIN SODIUM 30 MG: 100 INJECTION SUBCUTANEOUS at 20:14

## 2024-02-29 RX ADMIN — NAPHAZOLINE HYDROCHLORIDE AND PHENIRAMINE MALEATE 1 DROP: .25; 3 SOLUTION/ DROPS OPHTHALMIC at 20:15

## 2024-02-29 RX ADMIN — Medication 400 MG: at 09:13

## 2024-02-29 RX ADMIN — GABAPENTIN 300 MG: 300 CAPSULE ORAL at 20:13

## 2024-02-29 RX ADMIN — FOLIC ACID 1 MG: 1 TABLET ORAL at 09:14

## 2024-02-29 RX ADMIN — Medication 1 DROP: at 14:45

## 2024-02-29 RX ADMIN — ENOXAPARIN SODIUM 30 MG: 100 INJECTION SUBCUTANEOUS at 09:14

## 2024-02-29 RX ADMIN — AMLODIPINE BESYLATE 5 MG: 5 TABLET ORAL at 09:14

## 2024-02-29 RX ADMIN — ATORVASTATIN CALCIUM 20 MG: 10 TABLET, FILM COATED ORAL at 09:14

## 2024-02-29 RX ADMIN — GABAPENTIN 300 MG: 300 CAPSULE ORAL at 14:45

## 2024-02-29 RX ADMIN — ALLOPURINOL 100 MG: 100 TABLET ORAL at 09:14

## 2024-02-29 RX ADMIN — CYANOCOBALAMIN TAB 1000 MCG 1000 MCG: 1000 TAB at 09:13

## 2024-02-29 RX ADMIN — FERROUS SULFATE TAB 325 MG (65 MG ELEMENTAL FE) 325 MG: 325 (65 FE) TAB at 09:13

## 2024-02-29 RX ADMIN — CLOPIDOGREL BISULFATE 75 MG: 75 TABLET ORAL at 09:14

## 2024-02-29 RX ADMIN — GABAPENTIN 300 MG: 300 CAPSULE ORAL at 09:13

## 2024-02-29 RX ADMIN — Medication 1 DROP: at 09:15

## 2024-02-29 RX ADMIN — MONTELUKAST 10 MG: 10 TABLET, FILM COATED ORAL at 09:14

## 2024-02-29 RX ADMIN — SODIUM CHLORIDE, PRESERVATIVE FREE 10 ML: 5 INJECTION INTRAVENOUS at 09:14

## 2024-02-29 ASSESSMENT — PAIN SCALES - GENERAL
PAINLEVEL_OUTOF10: 0
PAINLEVEL_OUTOF10: 3
PAINLEVEL_OUTOF10: 0

## 2024-02-29 ASSESSMENT — PAIN DESCRIPTION - DESCRIPTORS: DESCRIPTORS: ACHING

## 2024-02-29 ASSESSMENT — PAIN DESCRIPTION - FREQUENCY: FREQUENCY: INTERMITTENT

## 2024-02-29 ASSESSMENT — PAIN DESCRIPTION - ONSET: ONSET: GRADUAL

## 2024-02-29 ASSESSMENT — PAIN DESCRIPTION - PAIN TYPE: TYPE: ACUTE PAIN

## 2024-02-29 ASSESSMENT — PAIN DESCRIPTION - LOCATION: LOCATION: NECK

## 2024-02-29 ASSESSMENT — PAIN - FUNCTIONAL ASSESSMENT: PAIN_FUNCTIONAL_ASSESSMENT: ACTIVITIES ARE NOT PREVENTED

## 2024-02-29 ASSESSMENT — PAIN DESCRIPTION - ORIENTATION: ORIENTATION: RIGHT

## 2024-02-29 NOTE — CARE COORDINATION
Precert initiated via [employer]: [id number]  02/29/2024-03/04/2024  Approved

## 2024-02-29 NOTE — CARE COORDINATION
SALEEMW received a call from [facility] with Swing Bed Unit and they can take pt if ready.  Pt needs precert.  If ready for discharge we will start precert.

## 2024-02-29 NOTE — DISCHARGE SUMMARY
Atrium: Left atrium is severely dilated.   Aorta: Dilated ascending aorta. Ao ascending diameter is 4.3 cm.   Pericardium: No pericardial effusion. Moderate to Severe AS Suggest  SALAZAR to further define anatomy     CT HEAD WO CONTRAST    Result Date: 2/24/2024  EXAMINATION: CT OF THE HEAD WITHOUT CONTRAST  2/24/2024 12:05 am TECHNIQUE: CT of the head was performed without the administration of intravenous contrast. Automated exposure control, iterative reconstruction, and/or weight based adjustment of the mA/kV was utilized to reduce the radiation dose to as low as reasonably achievable. COMPARISON: 05/01/2023 HISTORY: ORDERING SYSTEM PROVIDED HISTORY: Trauma TECHNOLOGIST PROVIDED HISTORY: Has a \"code stroke\" or \"stroke alert\" been called?->No Reason for exam:->Trauma Decision Support Exception - unselect if not a suspected or confirmed emergency medical condition->Emergency Medical Condition (MA) Reason for Exam: fall FINDINGS: BRAIN/VENTRICLES: No acute intraparenchymal hemorrhage or extra-axial collection.  Generalized volume loss is present.  The CSF over the sulci is the same density as the fissures and cisterns.  The ventricles are enlarged likely due to central volume loss but the configuration is stable.  No mass effect or midline shift.  There are mild chronic ischemic changes in the white matter.  Calcification at the internal carotid arteries. ORBITS: The visualized portion of the orbits demonstrate no acute abnormality.  Previous cataract surgery. SINUSES: The visualized paranasal sinuses and mastoid air cells demonstrate no acute abnormality. SOFT TISSUES/SKULL:  Areas of swelling, contusion, and hematoma from the right frontal vertex down the right scalp, forehead, periorbital region, and into the upper right cheek.  There are some localized areas of hematoma and soft tissue emphysema lateral to the right orbit.  No radiopaque foreign body in the soft tissues. There is no acute fracture or depression of

## 2024-02-29 NOTE — CARE COORDINATION
Pt on discharge to go to Swing Bed Unit at [facility].  [facility] next available  time is 6:00PM.  [place] paperwork completed and placed on packet.  RN, pt, pt's wife and Jarett at the swing bed were informed of  time.

## 2024-02-29 NOTE — PLAN OF CARE
Problem: Discharge Planning  Goal: Discharge to home or other facility with appropriate resources  2/28/2024 1358 by Alena Mejia RN  Outcome: Progressing  2/28/2024 0730 by Elio Muñoz  Outcome: Progressing  2/28/2024 0010 by Hardy Mace RN  Outcome: Progressing     Problem: ABCDS Injury Assessment  Goal: Absence of physical injury  2/28/2024 1358 by Alena Mejia RN  Outcome: Progressing  2/28/2024 0730 by Elio Muñoz  Outcome: Progressing  2/28/2024 0010 by Hardy Mace RN  Outcome: Progressing     Problem: Safety - Adult  Goal: Free from fall injury  2/28/2024 1358 by Alena Mejia RN  Outcome: Progressing  2/28/2024 0730 by Elio Muñoz  Outcome: Progressing  2/28/2024 0010 by Hardy Mace RN  Outcome: Progressing     Problem: Pain  Goal: Verbalizes/displays adequate comfort level or baseline comfort level  2/28/2024 1358 by Alena Mejia RN  Outcome: Progressing  2/28/2024 0730 by Elio Muñoz  Outcome: Progressing  2/28/2024 0010 by Hardy Mace RN  Outcome: Progressing     Problem: Nutrition Deficit:  Goal: Optimize nutritional status  2/28/2024 1358 by Alena Mejia RN  Outcome: Progressing  Flowsheets (Taken 2/28/2024 1144 by Missy Fleming, RD, LD)  Nutrient intake appropriate for improving, restoring, or maintaining nutritional needs:   Monitor oral intake, labs, and treatment plans   Assess nutritional status and recommend course of action   Recommend appropriate diets, oral nutritional supplements, and vitamin/mineral supplements   Provide specific nutrition education to patient or family as appropriate  2/28/2024 0730 by Elio Muñoz  Outcome: Progressing  2/28/2024 0010 by Hardy Mace RN  Outcome: Progressing     Problem: Chronic Conditions and Co-morbidities  Goal: Patient's chronic conditions and co-morbidity symptoms are monitored and maintained or improved  2/28/2024 1358 by Alena Mejia RN  Outcome: 
  Problem: Discharge Planning  Goal: Discharge to home or other facility with appropriate resources  Outcome: Progressing     Problem: ABCDS Injury Assessment  Goal: Absence of physical injury  Outcome: Progressing     Problem: Safety - Adult  Goal: Free from fall injury  Outcome: Progressing     Problem: Pain  Goal: Verbalizes/displays adequate comfort level or baseline comfort level  Outcome: Progressing     Problem: Nutrition Deficit:  Goal: Optimize nutritional status  Outcome: Progressing     Problem: Chronic Conditions and Co-morbidities  Goal: Patient's chronic conditions and co-morbidity symptoms are monitored and maintained or improved  Outcome: Progressing     Problem: Skin/Tissue Integrity  Goal: Absence of new skin breakdown  Description: 1.  Monitor for areas of redness and/or skin breakdown  2.  Assess vascular access sites hourly  3.  Every 4-6 hours minimum:  Change oxygen saturation probe site  4.  Every 4-6 hours:  If on nasal continuous positive airway pressure, respiratory therapy assess nares and determine need for appliance change or resting period.  Outcome: Progressing     
Care plan is still progressing.  
probe site  4.  Every 4-6 hours:  If on nasal continuous positive airway pressure, respiratory therapy assess nares and determine need for appliance change or resting period.  2/28/2024 2305 by Kyle Allen, RN  Outcome: Progressing  2/28/2024 1358 by Alena Mejia, RN  Outcome: Progressing

## 2024-03-01 LAB
GLUCOSE BLD-MCNC: 109 MG/DL (ref 70–99)
GLUCOSE BLD-MCNC: 114 MG/DL (ref 70–99)
GLUCOSE BLD-MCNC: 150 MG/DL (ref 70–99)
GLUCOSE BLD-MCNC: 157 MG/DL (ref 70–99)
GLUCOSE BLD-MCNC: 173 MG/DL (ref 70–99)

## 2024-03-01 PROCEDURE — 94761 N-INVAS EAR/PLS OXIMETRY MLT: CPT

## 2024-03-01 PROCEDURE — 6370000000 HC RX 637 (ALT 250 FOR IP)

## 2024-03-01 PROCEDURE — 97162 PT EVAL MOD COMPLEX 30 MIN: CPT

## 2024-03-01 PROCEDURE — 97530 THERAPEUTIC ACTIVITIES: CPT

## 2024-03-01 PROCEDURE — 1200000002 HC SEMI PRIVATE SWING BED

## 2024-03-01 PROCEDURE — 6360000002 HC RX W HCPCS

## 2024-03-01 PROCEDURE — 97166 OT EVAL MOD COMPLEX 45 MIN: CPT

## 2024-03-01 PROCEDURE — 97116 GAIT TRAINING THERAPY: CPT

## 2024-03-01 PROCEDURE — 82962 GLUCOSE BLOOD TEST: CPT

## 2024-03-01 RX ORDER — MONTELUKAST SODIUM 10 MG/1
10 TABLET ORAL NIGHTLY
Status: DISCONTINUED | OUTPATIENT
Start: 2024-03-01 | End: 2024-03-22 | Stop reason: HOSPADM

## 2024-03-01 RX ADMIN — NAPHAZOLINE HYDROCHLORIDE AND PHENIRAMINE MALEATE 1 DROP: .25; 3 SOLUTION/ DROPS OPHTHALMIC at 21:59

## 2024-03-01 RX ADMIN — GABAPENTIN 300 MG: 300 CAPSULE ORAL at 08:50

## 2024-03-01 RX ADMIN — CYANOCOBALAMIN TAB 1000 MCG 1000 MCG: 1000 TAB at 08:50

## 2024-03-01 RX ADMIN — NAPHAZOLINE HYDROCHLORIDE AND PHENIRAMINE MALEATE 1 DROP: .25; 3 SOLUTION/ DROPS OPHTHALMIC at 17:35

## 2024-03-01 RX ADMIN — GABAPENTIN 300 MG: 300 CAPSULE ORAL at 21:59

## 2024-03-01 RX ADMIN — ENOXAPARIN SODIUM 30 MG: 100 INJECTION SUBCUTANEOUS at 08:50

## 2024-03-01 RX ADMIN — ENOXAPARIN SODIUM 30 MG: 100 INJECTION SUBCUTANEOUS at 21:59

## 2024-03-01 RX ADMIN — CLOPIDOGREL BISULFATE 75 MG: 75 TABLET ORAL at 08:50

## 2024-03-01 RX ADMIN — FERROUS SULFATE TAB 325 MG (65 MG ELEMENTAL FE) 325 MG: 325 (65 FE) TAB at 08:50

## 2024-03-01 RX ADMIN — FOLIC ACID 1 MG: 1 TABLET ORAL at 08:50

## 2024-03-01 RX ADMIN — NAPHAZOLINE HYDROCHLORIDE AND PHENIRAMINE MALEATE 1 DROP: .25; 3 SOLUTION/ DROPS OPHTHALMIC at 13:15

## 2024-03-01 RX ADMIN — AMLODIPINE BESYLATE 5 MG: 5 TABLET ORAL at 08:50

## 2024-03-01 RX ADMIN — ACETAMINOPHEN 650 MG: 325 TABLET ORAL at 08:54

## 2024-03-01 RX ADMIN — MONTELUKAST SODIUM 10 MG: 10 TABLET, FILM COATED ORAL at 21:59

## 2024-03-01 RX ADMIN — GABAPENTIN 300 MG: 300 CAPSULE ORAL at 15:07

## 2024-03-01 RX ADMIN — ALLOPURINOL 100 MG: 100 TABLET ORAL at 08:50

## 2024-03-01 RX ADMIN — Medication 400 MG: at 08:50

## 2024-03-01 RX ADMIN — ATORVASTATIN CALCIUM 20 MG: 20 TABLET, FILM COATED ORAL at 08:50

## 2024-03-01 ASSESSMENT — PAIN DESCRIPTION - FREQUENCY: FREQUENCY: INTERMITTENT

## 2024-03-01 ASSESSMENT — PAIN - FUNCTIONAL ASSESSMENT: PAIN_FUNCTIONAL_ASSESSMENT: PREVENTS OR INTERFERES SOME ACTIVE ACTIVITIES AND ADLS

## 2024-03-01 ASSESSMENT — PAIN DESCRIPTION - DIRECTION: RADIATING_TOWARDS: NO

## 2024-03-01 ASSESSMENT — PAIN DESCRIPTION - ONSET: ONSET: ON-GOING

## 2024-03-01 ASSESSMENT — PAIN SCALES - GENERAL
PAINLEVEL_OUTOF10: 0
PAINLEVEL_OUTOF10: 3

## 2024-03-01 ASSESSMENT — PAIN DESCRIPTION - LOCATION: LOCATION: KNEE

## 2024-03-01 ASSESSMENT — PAIN DESCRIPTION - ORIENTATION: ORIENTATION: LEFT

## 2024-03-01 ASSESSMENT — PAIN DESCRIPTION - DESCRIPTORS: DESCRIPTORS: ACHING;DISCOMFORT

## 2024-03-01 ASSESSMENT — PAIN DESCRIPTION - PAIN TYPE: TYPE: ACUTE PAIN

## 2024-03-01 NOTE — PROGRESS NOTES
Met with Patient today for our Activity. Patient has no other activity needs at this time.  Lela Szymanski,  Activities Asst.

## 2024-03-01 NOTE — PROGRESS NOTES
Facility/Department: [facility]  Physical Therapy Initial Assessment    Name Kareem Rey    1945   MRN [id number]   Date of Service 3/1/2024   Patient Room  017017-01     Patient Diagnoses Physical Debility   Past Medical History  has a past medical history of Abnormal nuclear stress test, Arthritis, CAD s/p PCI to LAD+RCA 22, Dizziness, FH: CAD (coronary artery disease), H/O 24 hour EKG monitoring, H/O atrial fibrillation without current medication, H/O cardiovascular stress test, H/O Doppler echocardiogram, H/O Doppler ultrasound (Venous Doppler Lower Extremities), H/O echocardiogram, H/O transesophageal echocardiography (SALAZAR) for monitoring, Heart murmur, History of nuclear stress test, History of nuclear stress test, Hx of Doppler echocardiogram, Hx of Venous Doppler ultrasound, Hyperlipidemia, Hypertension, Kidney stones, Leg swelling, Neuropathy, Nonrheumatic aortic valve stenosis, Obesity, JAYE on CPAP, Persistent atrial fibrillation (HCC), Syncope and collapse, Thoracic aortic aneurysm (HCC), Tremors of nervous system, and Type II or unspecified type diabetes mellitus without mention of complication, not stated as uncontrolled.   Past Surgical History  has a past surgical history that includes hernia repair (); hernia repair (); Colonoscopy (;); Colon surgery (); Cataract removal with implant (Right, 2019); Intracapsular cataract extraction (Right, 2019); Cataract removal with implant (Left, 05/10/2019); Intracapsular cataract extraction (Left, 05/10/2019); Pacemaker insertion (Left, 2019); Cardioversion (2019); Total knee arthroplasty (Right, 2022); Colonoscopy (N/A, 2023); Upper gastrointestinal endoscopy (N/A, 2023); and Cardiac surgery (N/A, 2023).       Discharge Recommendations  Continue to assess pending progress, Home with Home Health PT, or Home with assist PRN  Equipment: Continue to

## 2024-03-01 NOTE — PLAN OF CARE
Problem: Safety - Adult  Goal: Free from fall injury  Outcome: Progressing     Problem: Pain  Goal: Verbalizes/displays adequate comfort level or baseline comfort level  Outcome: Progressing     Problem: Skin/Tissue Integrity  Goal: Absence of new skin breakdown  Description: 1.  Monitor for areas of redness and/or skin breakdown  2.  Assess vascular access sites hourly  3.  Every 4-6 hours minimum:  Change oxygen saturation probe site  4.  Every 4-6 hours:  If on nasal continuous positive airway pressure, respiratory therapy assess nares and determine need for appliance change or resting period.  Outcome: Progressing     Problem: Discharge Planning  Goal: Discharge to home or other facility with appropriate resources  Outcome: Progressing  Flowsheets (Taken 2/29/2024 2000)  Discharge to home or other facility with appropriate resources:   Identify barriers to discharge with patient and caregiver   Identify discharge learning needs (meds, wound care, etc)   Refer to discharge planning if patient needs post-hospital services based on physician order or complex needs related to functional status, cognitive ability or social support system

## 2024-03-01 NOTE — CARE COORDINATION
03/01/24 1315   Service Assessment   Patient Orientation Alert and Oriented   Cognition Alert   History Provided By Patient   Primary Caregiver Spouse   Support Systems Spouse/Significant Other;Family Members;Friends/Neighbors   Patient's Healthcare Decision Maker is: Legal Next of Kin   PCP Verified by CM Yes   Prior Functional Level Assistance with the following:;Dressing;Mobility;Housework;Shopping;Cooking   Current Functional Level Assistance with the following:;Housework;Shopping;Mobility;Bathing;Dressing;Toileting;Cooking   Can patient return to prior living arrangement Yes   Ability to make needs known: Good   Family able to assist with home care needs: Yes   Would you like for me to discuss the discharge plan with any other family members/significant others, and if so, who? Yes  (Wife)   Financial Resources Medicare   Social/Functional History   Home Equipment Rollator;Cane;Lift chair   Receives Help From Family;Neighbor   Active  No   Patient's  Info Friends/neighbors   Discharge Planning   Type of Residence House   Living Arrangements Spouse/Significant Other   Potential Assistance Needed Home Care   Type of Home Care Services PT;OT;Nursing Services   Patient expects to be discharged to: House   History of falls? 1   Services At/After Discharge   Services At/After Discharge Home Health    Resource Information Provided? No   Mode of Transport at Discharge Other (see comment)  (Friends/neighbor)   Confirm Follow Up Transport Friends   Condition of Participation: Discharge Planning   The Plan for Transition of Care is related to the following treatment goals: Patient plans to return home   The Patient and/or Patient Representative was provided with a Choice of Provider? Patient   The Patient and/Or Patient Representative agree with the Discharge Plan? Yes   Freedom of Choice list was provided with basic dialogue that supports the patient's individualized plan of care/goals, treatment

## 2024-03-01 NOTE — PROGRESS NOTES
Occupational Therapy  Facility/Department: [facility]  Occupational Therapy Initial Assessment    Name: Kareem Rey  : 1945  MRN: [id number]  Date of Service: 3/1/2024    Discharge Recommendations:  Continue to assess pending progress, Home with assist PRN, Home with Home health OT  OT Equipment Recommendations  Equipment Needed:  (will continue to assess)       Patient Diagnosis(es): There were no encounter diagnoses.  Past Medical History:  has a past medical history of Abnormal nuclear stress test, Arthritis, CAD s/p PCI to LAD+RCA 22, Dizziness, FH: CAD (coronary artery disease), H/O 24 hour EKG monitoring, H/O atrial fibrillation without current medication, H/O cardiovascular stress test, H/O Doppler echocardiogram, H/O Doppler ultrasound (Venous Doppler Lower Extremities), H/O echocardiogram, H/O transesophageal echocardiography (SALAZAR) for monitoring, Heart murmur, History of nuclear stress test, History of nuclear stress test, Hx of Doppler echocardiogram, Hx of Venous Doppler ultrasound, Hyperlipidemia, Hypertension, Kidney stones, Leg swelling, Neuropathy, Nonrheumatic aortic valve stenosis, Obesity, JAYE on CPAP, Persistent atrial fibrillation (HCC), Syncope and collapse, Thoracic aortic aneurysm (HCC), Tremors of nervous system, and Type II or unspecified type diabetes mellitus without mention of complication, not stated as uncontrolled.  Past Surgical History:  has a past surgical history that includes hernia repair (); hernia repair (); Colonoscopy (;); Colon surgery (); Cataract removal with implant (Right, 2019); Intracapsular cataract extraction (Right, 2019); Cataract removal with implant (Left, 05/10/2019); Intracapsular cataract extraction (Left, 05/10/2019); Pacemaker insertion (Left, 2019); Cardioversion (2019); Total knee arthroplasty (Right, 2022); Colonoscopy (N/A, 2023); Upper gastrointestinal endoscopy (N/A,

## 2024-03-01 NOTE — PROGRESS NOTES
4 Eyes Skin Assessment     NAME:  Kareem Rey  YOB: 1945  MEDICAL RECORD NUMBER:  [id number]    The patient is being assessed for  Admission    I agree that at least one RN has performed a thorough Head to Toe Skin Assessment on the patient. ALL assessment sites listed below have been assessed.      Areas assessed by both nurses:    Head, Face, Ears, Shoulders, Back, Chest, Arms, Elbows, Hands, Sacrum. Buttock, Coccyx, Ischium, and Legs. Feet and Heels        Does the Patient have a Wound? Yes wound(s) were present on assessment. LDA wound assessment was Initiated and completed by RN       Carlos Prevention initiated by RN: Yes  Wound Care Orders initiated by RN: Yes    Pressure Injury (Stage 3,4, Unstageable, DTI, NWPT, and Complex wounds) if present, place Wound referral order by RN under : No    New Ostomies, if present place, Ostomy referral order under : No     Nurse 1 eSignature: Electronically signed by Adeline Frazier RN on 2/29/24 at 9:53 PM EST    **SHARE this note so that the co-signing nurse can place an eSignature**    Nurse 2 eSignature: Electronically signed by Pippa Norton RN on 3/1/24 at 12:27 AM EST

## 2024-03-01 NOTE — H&P
Oral Daily    gabapentin  300 mg Oral TID    magnesium oxide  400 mg Oral Daily    enoxaparin  30 mg SubCUTAneous BID    [START ON 3/4/2024] vitamin D  50,000 Units Oral Weekly    [START ON 5/20/2024] Vitamin D  2,000 Units Oral Daily    naphazoline-pheniramine  1 drop Both Eyes 4x daily    insulin lispro  0-4 Units SubCUTAneous 4x Daily AC & HS    vitamin B-12  1,000 mcg Oral Daily      Infusions:    dextrose       PRN Meds: glucose, 4 tablet, PRN  dextrose bolus, 125 mL, PRN   Or  dextrose bolus, 250 mL, PRN  glucagon (rDNA), 1 mg, PRN  dextrose, , Continuous PRN  ondansetron, 4 mg, Q8H PRN   Or  ondansetron, 4 mg, Q6H PRN  polyethylene glycol, 17 g, Daily PRN  acetaminophen, 650 mg, Q6H PRN   Or  acetaminophen, 650 mg, Q6H PRN  potassium chloride, 40 mEq, PRN   Or  potassium alternative oral replacement, 40 mEq, PRN          Electronically signed by Colton Don MD on 3/1/2024 at 8:24 AM

## 2024-03-02 ENCOUNTER — APPOINTMENT (OUTPATIENT)
Dept: GENERAL RADIOLOGY | Age: 79
End: 2024-03-02
Attending: INTERNAL MEDICINE
Payer: MEDICARE

## 2024-03-02 LAB
CREAT SERPL-MCNC: 1.3 MG/DL (ref 0.9–1.3)
GFR SERPL CREATININE-BSD FRML MDRD: 56 ML/MIN/1.73M2
GLUCOSE BLD-MCNC: 106 MG/DL (ref 70–99)
GLUCOSE BLD-MCNC: 152 MG/DL (ref 70–99)
GLUCOSE BLD-MCNC: 184 MG/DL (ref 70–99)
GLUCOSE BLD-MCNC: 219 MG/DL (ref 70–99)
PLATELET # BLD: 133 K/CU MM (ref 140–440)

## 2024-03-02 PROCEDURE — 73562 X-RAY EXAM OF KNEE 3: CPT

## 2024-03-02 PROCEDURE — 82962 GLUCOSE BLOOD TEST: CPT

## 2024-03-02 PROCEDURE — 6370000000 HC RX 637 (ALT 250 FOR IP)

## 2024-03-02 PROCEDURE — 85049 AUTOMATED PLATELET COUNT: CPT

## 2024-03-02 PROCEDURE — 94761 N-INVAS EAR/PLS OXIMETRY MLT: CPT

## 2024-03-02 PROCEDURE — 82565 ASSAY OF CREATININE: CPT

## 2024-03-02 PROCEDURE — 73110 X-RAY EXAM OF WRIST: CPT

## 2024-03-02 PROCEDURE — 6360000002 HC RX W HCPCS

## 2024-03-02 PROCEDURE — 94640 AIRWAY INHALATION TREATMENT: CPT

## 2024-03-02 PROCEDURE — 1200000002 HC SEMI PRIVATE SWING BED

## 2024-03-02 RX ADMIN — ALLOPURINOL 100 MG: 100 TABLET ORAL at 08:34

## 2024-03-02 RX ADMIN — NAPHAZOLINE HYDROCHLORIDE AND PHENIRAMINE MALEATE 1 DROP: .25; 3 SOLUTION/ DROPS OPHTHALMIC at 20:30

## 2024-03-02 RX ADMIN — GABAPENTIN 300 MG: 300 CAPSULE ORAL at 20:30

## 2024-03-02 RX ADMIN — ENOXAPARIN SODIUM 30 MG: 100 INJECTION SUBCUTANEOUS at 08:34

## 2024-03-02 RX ADMIN — MONTELUKAST SODIUM 10 MG: 10 TABLET, FILM COATED ORAL at 20:30

## 2024-03-02 RX ADMIN — ACETAMINOPHEN 650 MG: 325 TABLET ORAL at 13:19

## 2024-03-02 RX ADMIN — ENOXAPARIN SODIUM 30 MG: 100 INJECTION SUBCUTANEOUS at 20:30

## 2024-03-02 RX ADMIN — CYANOCOBALAMIN TAB 1000 MCG 1000 MCG: 1000 TAB at 08:34

## 2024-03-02 RX ADMIN — ATORVASTATIN CALCIUM 20 MG: 20 TABLET, FILM COATED ORAL at 08:34

## 2024-03-02 RX ADMIN — NAPHAZOLINE HYDROCHLORIDE AND PHENIRAMINE MALEATE 1 DROP: .25; 3 SOLUTION/ DROPS OPHTHALMIC at 08:50

## 2024-03-02 RX ADMIN — FERROUS SULFATE TAB 325 MG (65 MG ELEMENTAL FE) 325 MG: 325 (65 FE) TAB at 08:34

## 2024-03-02 RX ADMIN — GABAPENTIN 300 MG: 300 CAPSULE ORAL at 08:34

## 2024-03-02 RX ADMIN — NAPHAZOLINE HYDROCHLORIDE AND PHENIRAMINE MALEATE 1 DROP: .25; 3 SOLUTION/ DROPS OPHTHALMIC at 15:23

## 2024-03-02 RX ADMIN — GABAPENTIN 300 MG: 300 CAPSULE ORAL at 15:23

## 2024-03-02 RX ADMIN — Medication 400 MG: at 08:34

## 2024-03-02 RX ADMIN — FOLIC ACID 1 MG: 1 TABLET ORAL at 08:34

## 2024-03-02 RX ADMIN — AMLODIPINE BESYLATE 5 MG: 5 TABLET ORAL at 08:34

## 2024-03-02 RX ADMIN — CLOPIDOGREL BISULFATE 75 MG: 75 TABLET ORAL at 08:34

## 2024-03-02 ASSESSMENT — PAIN DESCRIPTION - LOCATION: LOCATION: KNEE;WRIST

## 2024-03-02 ASSESSMENT — PAIN SCALES - GENERAL
PAINLEVEL_OUTOF10: 8
PAINLEVEL_OUTOF10: 4
PAINLEVEL_OUTOF10: 0

## 2024-03-02 ASSESSMENT — PAIN DESCRIPTION - PAIN TYPE: TYPE: CHRONIC PAIN

## 2024-03-02 ASSESSMENT — PAIN DESCRIPTION - ONSET: ONSET: ON-GOING

## 2024-03-02 ASSESSMENT — PAIN - FUNCTIONAL ASSESSMENT: PAIN_FUNCTIONAL_ASSESSMENT: PREVENTS OR INTERFERES SOME ACTIVE ACTIVITIES AND ADLS

## 2024-03-02 ASSESSMENT — PAIN DESCRIPTION - DESCRIPTORS: DESCRIPTORS: ACHING;POUNDING;SORE

## 2024-03-02 ASSESSMENT — PAIN DESCRIPTION - ORIENTATION: ORIENTATION: LEFT;RIGHT

## 2024-03-02 ASSESSMENT — PAIN DESCRIPTION - FREQUENCY: FREQUENCY: CONTINUOUS

## 2024-03-02 NOTE — FLOWSHEET NOTE
Pt refuses therapy this date after much encouragement. States \"I am just hurting all over and I think what they did yesterday made it worse.\"  Educated on importance of strengthening to lower chances of fall risk. Also educated pt that he will not receive therapy again until Monday.     Rangel Barreto, PTA

## 2024-03-02 NOTE — PLAN OF CARE
Problem: Safety - Adult  Goal: Free from fall injury  3/2/2024 0903 by Nickie Diehl RN  Outcome: Progressing  3/2/2024 0056 by Meka Mancia LPN  Outcome: Progressing     Problem: Pain  Goal: Verbalizes/displays adequate comfort level or baseline comfort level  3/2/2024 0903 by Nickie Diehl RN  Outcome: Progressing  3/2/2024 0056 by Meka Mancia LPN  Outcome: Progressing     Problem: Skin/Tissue Integrity  Goal: Absence of new skin breakdown  Description: 1.  Monitor for areas of redness and/or skin breakdown  2.  Assess vascular access sites hourly  3.  Every 4-6 hours minimum:  Change oxygen saturation probe site  4.  Every 4-6 hours:  If on nasal continuous positive airway pressure, respiratory therapy assess nares and determine need for appliance change or resting period.  3/2/2024 0903 by Nickie Diehl RN  Outcome: Progressing  3/2/2024 0056 by Meka Mancia LPN  Outcome: Progressing     Problem: Discharge Planning  Goal: Discharge to home or other facility with appropriate resources  3/2/2024 0903 by Nickie Diehl RN  Outcome: Progressing  3/2/2024 0056 by Meka Mancia LPN  Outcome: Progressing     Problem: Chronic Conditions and Co-morbidities  Goal: Patient's chronic conditions and co-morbidity symptoms are monitored and maintained or improved  3/2/2024 0903 by Nickie Diehl RN  Outcome: Progressing  3/2/2024 0056 by Meka Mancia LPN  Outcome: Progressing     Problem: ABCDS Injury Assessment  Goal: Absence of physical injury  3/2/2024 0903 by Nikcie Diehl RN  Outcome: Progressing  3/2/2024 0056 by Meka Mancia LPN  Outcome: Progressing

## 2024-03-02 NOTE — PROGRESS NOTES
This RN has reviewed and agrees with MARIANA Ackerman's data collection and has collaborated with this LPN regarding the patient's care plan.

## 2024-03-02 NOTE — PLAN OF CARE
Problem: Safety - Adult  Goal: Free from fall injury  Outcome: Progressing     Problem: Pain  Goal: Verbalizes/displays adequate comfort level or baseline comfort level  Outcome: Progressing     Problem: Skin/Tissue Integrity  Goal: Absence of new skin breakdown  Description: 1.  Monitor for areas of redness and/or skin breakdown  2.  Assess vascular access sites hourly  3.  Every 4-6 hours minimum:  Change oxygen saturation probe site  4.  Every 4-6 hours:  If on nasal continuous positive airway pressure, respiratory therapy assess nares and determine need for appliance change or resting period.  Outcome: Progressing     Problem: Discharge Planning  Goal: Discharge to home or other facility with appropriate resources  Outcome: Progressing     Problem: Chronic Conditions and Co-morbidities  Goal: Patient's chronic conditions and co-morbidity symptoms are monitored and maintained or improved  Outcome: Progressing     Problem: ABCDS Injury Assessment  Goal: Absence of physical injury  Outcome: Progressing

## 2024-03-03 LAB
GLUCOSE BLD-MCNC: 114 MG/DL (ref 70–99)
GLUCOSE BLD-MCNC: 120 MG/DL (ref 70–99)
GLUCOSE BLD-MCNC: 126 MG/DL (ref 70–99)
GLUCOSE BLD-MCNC: 233 MG/DL (ref 70–99)

## 2024-03-03 PROCEDURE — 84550 ASSAY OF BLOOD/URIC ACID: CPT

## 2024-03-03 PROCEDURE — 6360000002 HC RX W HCPCS

## 2024-03-03 PROCEDURE — 6370000000 HC RX 637 (ALT 250 FOR IP)

## 2024-03-03 PROCEDURE — 6370000000 HC RX 637 (ALT 250 FOR IP): Performed by: NURSE PRACTITIONER

## 2024-03-03 PROCEDURE — 1200000002 HC SEMI PRIVATE SWING BED

## 2024-03-03 PROCEDURE — 82962 GLUCOSE BLOOD TEST: CPT

## 2024-03-03 RX ORDER — HYDROCODONE BITARTRATE AND ACETAMINOPHEN 5; 325 MG/1; MG/1
1 TABLET ORAL EVERY 6 HOURS PRN
Status: DISCONTINUED | OUTPATIENT
Start: 2024-03-03 | End: 2024-03-22 | Stop reason: HOSPADM

## 2024-03-03 RX ADMIN — ATORVASTATIN CALCIUM 20 MG: 20 TABLET, FILM COATED ORAL at 08:24

## 2024-03-03 RX ADMIN — ENOXAPARIN SODIUM 30 MG: 100 INJECTION SUBCUTANEOUS at 21:14

## 2024-03-03 RX ADMIN — ALLOPURINOL 100 MG: 100 TABLET ORAL at 08:24

## 2024-03-03 RX ADMIN — HYDROCODONE BITARTRATE AND ACETAMINOPHEN 1 TABLET: 5; 325 TABLET ORAL at 17:07

## 2024-03-03 RX ADMIN — NAPHAZOLINE HYDROCHLORIDE AND PHENIRAMINE MALEATE 1 DROP: .25; 3 SOLUTION/ DROPS OPHTHALMIC at 17:11

## 2024-03-03 RX ADMIN — INSULIN LISPRO 1 UNITS: 100 INJECTION, SOLUTION INTRAVENOUS; SUBCUTANEOUS at 21:13

## 2024-03-03 RX ADMIN — GABAPENTIN 300 MG: 300 CAPSULE ORAL at 08:24

## 2024-03-03 RX ADMIN — FOLIC ACID 1 MG: 1 TABLET ORAL at 08:24

## 2024-03-03 RX ADMIN — NAPHAZOLINE HYDROCHLORIDE AND PHENIRAMINE MALEATE 1 DROP: .25; 3 SOLUTION/ DROPS OPHTHALMIC at 21:15

## 2024-03-03 RX ADMIN — NAPHAZOLINE HYDROCHLORIDE AND PHENIRAMINE MALEATE 1 DROP: .25; 3 SOLUTION/ DROPS OPHTHALMIC at 12:44

## 2024-03-03 RX ADMIN — FERROUS SULFATE TAB 325 MG (65 MG ELEMENTAL FE) 325 MG: 325 (65 FE) TAB at 08:24

## 2024-03-03 RX ADMIN — GABAPENTIN 300 MG: 300 CAPSULE ORAL at 17:07

## 2024-03-03 RX ADMIN — Medication 400 MG: at 08:24

## 2024-03-03 RX ADMIN — ENOXAPARIN SODIUM 30 MG: 100 INJECTION SUBCUTANEOUS at 08:24

## 2024-03-03 RX ADMIN — NAPHAZOLINE HYDROCHLORIDE AND PHENIRAMINE MALEATE 1 DROP: .25; 3 SOLUTION/ DROPS OPHTHALMIC at 08:33

## 2024-03-03 RX ADMIN — CLOPIDOGREL BISULFATE 75 MG: 75 TABLET ORAL at 08:24

## 2024-03-03 RX ADMIN — AMLODIPINE BESYLATE 5 MG: 5 TABLET ORAL at 08:24

## 2024-03-03 RX ADMIN — CYANOCOBALAMIN TAB 1000 MCG 1000 MCG: 1000 TAB at 08:24

## 2024-03-03 RX ADMIN — GABAPENTIN 300 MG: 300 CAPSULE ORAL at 21:14

## 2024-03-03 RX ADMIN — ACETAMINOPHEN 650 MG: 325 TABLET ORAL at 12:43

## 2024-03-03 RX ADMIN — MONTELUKAST SODIUM 10 MG: 10 TABLET, FILM COATED ORAL at 21:14

## 2024-03-03 ASSESSMENT — PAIN SCALES - GENERAL
PAINLEVEL_OUTOF10: 7
PAINLEVEL_OUTOF10: 4
PAINLEVEL_OUTOF10: 3
PAINLEVEL_OUTOF10: 6
PAINLEVEL_OUTOF10: 2

## 2024-03-03 ASSESSMENT — PAIN DESCRIPTION - LOCATION
LOCATION: WRIST
LOCATION: WRIST

## 2024-03-03 ASSESSMENT — PAIN DESCRIPTION - DESCRIPTORS
DESCRIPTORS: ACHING
DESCRIPTORS: ACHING

## 2024-03-03 ASSESSMENT — PAIN DESCRIPTION - FREQUENCY
FREQUENCY: CONTINUOUS
FREQUENCY: CONTINUOUS

## 2024-03-03 ASSESSMENT — PAIN DESCRIPTION - ONSET
ONSET: ON-GOING
ONSET: ON-GOING

## 2024-03-03 ASSESSMENT — PAIN DESCRIPTION - ORIENTATION
ORIENTATION: RIGHT
ORIENTATION: RIGHT

## 2024-03-03 NOTE — PLAN OF CARE
Problem: Safety - Adult  Goal: Free from fall injury  3/2/2024 2232 by Meka Mancia LPN  Outcome: Progressing  3/2/2024 0903 by Nickie Diehl RN  Outcome: Progressing     Problem: Pain  Goal: Verbalizes/displays adequate comfort level or baseline comfort level  3/2/2024 2232 by Meka Mancia LPN  Outcome: Progressing  3/2/2024 0903 by Nickie Diehl RN  Outcome: Progressing     Problem: Skin/Tissue Integrity  Goal: Absence of new skin breakdown  Description: 1.  Monitor for areas of redness and/or skin breakdown  2.  Assess vascular access sites hourly  3.  Every 4-6 hours minimum:  Change oxygen saturation probe site  4.  Every 4-6 hours:  If on nasal continuous positive airway pressure, respiratory therapy assess nares and determine need for appliance change or resting period.  3/2/2024 2232 by Meka Mancia LPN  Outcome: Progressing  3/2/2024 0903 by Nickie Diehl RN  Outcome: Progressing     Problem: Discharge Planning  Goal: Discharge to home or other facility with appropriate resources  3/2/2024 2232 by Meka Mancia LPN  Outcome: Progressing  3/2/2024 0903 by Nickie Diehl RN  Outcome: Progressing     Problem: Chronic Conditions and Co-morbidities  Goal: Patient's chronic conditions and co-morbidity symptoms are monitored and maintained or improved  3/2/2024 2232 by Meka Mancia LPN  Outcome: Progressing  3/2/2024 0903 by Nickie Diehl RN  Outcome: Progressing     Problem: ABCDS Injury Assessment  Goal: Absence of physical injury  3/2/2024 2232 by Meka Mancia LPN  Outcome: Progressing  3/2/2024 0903 by Nickie Diehl RN  Outcome: Progressing

## 2024-03-03 NOTE — PROGRESS NOTES
[place]    [facility] [place]      Name:  Kareem Rey /Age/Sex: 1945  (78 y.o. male)   MRN & CSN:  [id number] & [id number] Encounter Date/Time: 3/3/2024 8:16 AM EST   Location:   PCP: Eduar Birmingham MD     Attending:Colton Don MD       Hospital Day: 4    Assessment and Recommendations   Kareem Rey is a 78 y.o. male with who presents with Debility      Plan:       Physical debility: Patient was unable to work with therapy yesterday secondary to left knee pain, x-ray was completed it did show, no fracture, moderate to severe tricompartmental osteoarthritis, moderate suprapatellar joint effusion.  May need orthopedic consult.  Chronic atrial fibrillation: Has a pacer and is status post Watchman; continue Plavix and follow-up with EP/cardiology as outpatient  Vitamin B12 deficiency: Continue oral supplementation   Type 2 diabetes: Continue insulin before meals and at bedtime sliding scale, monitor for signs and symptoms of hypoglycemia.  Hypoglycemic protocol ordered  Hypertension: Continue amlodipine  History of diabetic neuropathy: Continue gabapentin  History of alcoholism: Monitor closely for any signs and symptoms of DTs.    History of coronary disease: Continue atorvastatin and Plavix  History of JAYE: CPAP at night  History of vitamin D deficiency: Continue vitamin D supplements      Diet ADULT DIET; Regular; 5 carb choices (75 gm/meal)  ADULT ORAL NUTRITION SUPPLEMENT; Breakfast, Lunch, Dinner; Standard High Calorie/High Protein Oral Supplement   DVT Prophylaxis [x] Lovenox, []  Heparin, [] SCDs, [] Ambulation,  [] Eliquis, [] Xarelto  [] Coumadin   Code Status Full Code   Disposition From: [facility]  Expected Disposition: TBD  Estimated Date of Discharge: TBD  Patient requires continued admission due to debility   Surrogate Decision Maker/ POA Eusebia Rey wife     Personally reviewed Lab Studies and Imaging     Discussed management of the case with Dr. Storm my supervising physician who  radiographic evidence of acute fracture. 2. Severe degenerative changes of the triscaphe and first carpometacarpal joint. 2. Mild soft tissue swelling around the wrist. Left knee 1. No radiographic evidence of acute fracture. 2. Moderate to severe tricompartmental osteoarthritis. 3. Moderate suprapatellar joint effusion. Electronically signed by Thang Hagen MD    SALAZAR (with contrast/ 3D PRN)    Addendum Date: 2/28/2024      Left Ventricle: The EF by visual approximation is 50 - 55%.   Aortic Valve: Aortic vave sclerotic fusion of the left and non coronary cusps. The JACK by plainimetry is 1.1cm2. MODERATE AORTIC STENOSIS.  Mild regurgitation.   Mitral Valve: Moderate regurgitation with multiple jets.   Left Atrium: Left atrium is dilated. Device closure in the appendage with a Watchman.   Right Atrium: Right atrium is dilated.   Aorta: Dilated descending aorta. Aortic Valve: Aortic vave sclerotic fusion of the left and non coronary cusps. The JACK by plainimetry is 1.1cm2. MODERATE AORTIC STENOSIS. Mild regurgitation.     Result Date: 2/28/2024    Left Ventricle: The EF by visual approximation is 50 - 55%.   Aortic Valve: Aortic vave appears to be functionally biscuspid with fusion of the left and non coronary cusps. The JACK by plainimetry is 1.1cm2. Mild regurgitation.   Mitral Valve: Moderate regurgitation with multiple jets.   Left Atrium: Left atrium is dilated. Device closure in the appendage with a Watchman.   Right Atrium: Right atrium is dilated.   Aorta: Dilated descending aorta.     XR WRIST RIGHT (MIN 3 VIEWS)    Result Date: 2/28/2024  EXAMINATION: 3 XRAY VIEWS OF THE RIGHT WRIST 2/28/2024 2:22 pm COMPARISON: None. HISTORY: ORDERING SYSTEM PROVIDED HISTORY: right wrist pain s/p fall TECHNOLOGIST PROVIDED HISTORY: Reason for exam:->right wrist pain s/p fall Reason for Exam: right wrist pain s/p fall FINDINGS: No fracture.  No dislocation.  Degenerative changes in the triscaphe joint and in the 1st CMC

## 2024-03-03 NOTE — PLAN OF CARE
Problem: Safety - Adult  Goal: Free from fall injury  3/3/2024 0829 by Nickie Diehl RN  Outcome: Progressing  3/2/2024 2232 by Meka Mancia LPN  Outcome: Progressing     Problem: Pain  Goal: Verbalizes/displays adequate comfort level or baseline comfort level  3/3/2024 0829 by Nickie Diehl RN  Outcome: Progressing  3/2/2024 2232 by Meka Mancia LPN  Outcome: Progressing     Problem: Skin/Tissue Integrity  Goal: Absence of new skin breakdown  Description: 1.  Monitor for areas of redness and/or skin breakdown  2.  Assess vascular access sites hourly  3.  Every 4-6 hours minimum:  Change oxygen saturation probe site  4.  Every 4-6 hours:  If on nasal continuous positive airway pressure, respiratory therapy assess nares and determine need for appliance change or resting period.  3/3/2024 0829 by Nickie Diehl RN  Outcome: Progressing  3/2/2024 2232 by Meka Mancia LPN  Outcome: Progressing     Problem: Discharge Planning  Goal: Discharge to home or other facility with appropriate resources  3/3/2024 0829 by Nickie Diehl RN  Outcome: Progressing  3/2/2024 2232 by Meka Mancia LPN  Outcome: Progressing     Problem: Chronic Conditions and Co-morbidities  Goal: Patient's chronic conditions and co-morbidity symptoms are monitored and maintained or improved  3/3/2024 0829 by Nickie Diehl RN  Outcome: Progressing  3/2/2024 2232 by Meka Mancia LPN  Outcome: Progressing     Problem: ABCDS Injury Assessment  Goal: Absence of physical injury  3/3/2024 0829 by Nickie Diehl RN  Outcome: Progressing  3/2/2024 2232 by Meka Mancia LPN  Outcome: Progressing

## 2024-03-03 NOTE — PROGRESS NOTES
This RN has reviewed and agrees with BRYNN Mancia LPN's data collection and has collaborated with this LPN regarding the patient's care plan.

## 2024-03-04 LAB
GLUCOSE BLD-MCNC: 104 MG/DL (ref 70–99)
GLUCOSE BLD-MCNC: 115 MG/DL (ref 70–99)
GLUCOSE BLD-MCNC: 185 MG/DL (ref 70–99)
GLUCOSE BLD-MCNC: 193 MG/DL (ref 70–99)
URATE SERPL-MCNC: 4.7 MG/DL (ref 3.5–7.2)

## 2024-03-04 PROCEDURE — 1200000002 HC SEMI PRIVATE SWING BED

## 2024-03-04 PROCEDURE — 94761 N-INVAS EAR/PLS OXIMETRY MLT: CPT

## 2024-03-04 PROCEDURE — 97530 THERAPEUTIC ACTIVITIES: CPT

## 2024-03-04 PROCEDURE — 97535 SELF CARE MNGMENT TRAINING: CPT

## 2024-03-04 PROCEDURE — 6370000000 HC RX 637 (ALT 250 FOR IP)

## 2024-03-04 PROCEDURE — 97112 NEUROMUSCULAR REEDUCATION: CPT

## 2024-03-04 PROCEDURE — 82962 GLUCOSE BLOOD TEST: CPT

## 2024-03-04 PROCEDURE — 6360000002 HC RX W HCPCS

## 2024-03-04 RX ADMIN — FOLIC ACID 1 MG: 1 TABLET ORAL at 08:54

## 2024-03-04 RX ADMIN — GABAPENTIN 300 MG: 300 CAPSULE ORAL at 20:51

## 2024-03-04 RX ADMIN — AMLODIPINE BESYLATE 5 MG: 5 TABLET ORAL at 08:54

## 2024-03-04 RX ADMIN — ATORVASTATIN CALCIUM 20 MG: 20 TABLET, FILM COATED ORAL at 08:54

## 2024-03-04 RX ADMIN — CLOPIDOGREL BISULFATE 75 MG: 75 TABLET ORAL at 08:54

## 2024-03-04 RX ADMIN — GABAPENTIN 300 MG: 300 CAPSULE ORAL at 16:50

## 2024-03-04 RX ADMIN — NAPHAZOLINE HYDROCHLORIDE AND PHENIRAMINE MALEATE 1 DROP: .25; 3 SOLUTION/ DROPS OPHTHALMIC at 08:55

## 2024-03-04 RX ADMIN — CYANOCOBALAMIN TAB 1000 MCG 1000 MCG: 1000 TAB at 08:54

## 2024-03-04 RX ADMIN — FERROUS SULFATE TAB 325 MG (65 MG ELEMENTAL FE) 325 MG: 325 (65 FE) TAB at 08:54

## 2024-03-04 RX ADMIN — NAPHAZOLINE HYDROCHLORIDE AND PHENIRAMINE MALEATE 1 DROP: .25; 3 SOLUTION/ DROPS OPHTHALMIC at 13:08

## 2024-03-04 RX ADMIN — ALLOPURINOL 100 MG: 100 TABLET ORAL at 08:54

## 2024-03-04 RX ADMIN — ENOXAPARIN SODIUM 30 MG: 100 INJECTION SUBCUTANEOUS at 08:55

## 2024-03-04 RX ADMIN — ENOXAPARIN SODIUM 30 MG: 100 INJECTION SUBCUTANEOUS at 20:51

## 2024-03-04 RX ADMIN — GABAPENTIN 300 MG: 300 CAPSULE ORAL at 08:54

## 2024-03-04 RX ADMIN — NAPHAZOLINE HYDROCHLORIDE AND PHENIRAMINE MALEATE 1 DROP: .25; 3 SOLUTION/ DROPS OPHTHALMIC at 20:52

## 2024-03-04 RX ADMIN — ERGOCALCIFEROL 50000 UNITS: 1.25 CAPSULE ORAL at 08:54

## 2024-03-04 RX ADMIN — MONTELUKAST SODIUM 10 MG: 10 TABLET, FILM COATED ORAL at 20:51

## 2024-03-04 RX ADMIN — Medication 400 MG: at 08:54

## 2024-03-04 RX ADMIN — NAPHAZOLINE HYDROCHLORIDE AND PHENIRAMINE MALEATE 1 DROP: .25; 3 SOLUTION/ DROPS OPHTHALMIC at 16:50

## 2024-03-04 ASSESSMENT — PAIN SCALES - GENERAL: PAINLEVEL_OUTOF10: 0

## 2024-03-04 NOTE — CONSULTS
LAD+RCA 9/9/22    ASCVD (arteriosclerotic cardiovascular disease)    Post PTCA    Status post left heart catheterization (LHC) by percutaneous approach    Chronic atrial fibrillation (HCC)    Presence of Watchman left atrial appendage closure device    Hypercoagulable state due to chronic atrial fibrillation (HCC)    Gastrointestinal hemorrhage    Tremors of nervous system    Syncope, unspecified syncope type    Frequent falls    Syncope and collapse    Moderate malnutrition (HCC)    Debility       Measurements:  Wound 02/26/24 Left;Plantar (Active)   Wound Image   03/04/24 0935   Wound Etiology Diabetic 03/04/24 0935   Dressing Status New dressing applied 03/04/24 0935   Wound Cleansed Cleansed with saline 03/04/24 0935   Dressing/Treatment Betadine swabs/povidone iodine;Silicone border 03/04/24 0935   Wound Length (cm) 1.5 cm 03/04/24 0935   Wound Width (cm) 1.5 cm 03/04/24 0935   Wound Depth (cm) 0.1 cm 03/04/24 0935   Wound Surface Area (cm^2) 2.25 cm^2 03/04/24 0935   Change in Wound Size % (l*w) 25 03/04/24 0935   Wound Volume (cm^3) 0.225 cm^3 03/04/24 0935   Distance Tunneling (cm) 0 cm 03/04/24 0935   Tunneling Position ___ O'Clock 0 03/04/24 0935   Undermining Starts ___ O'Clock 0 03/04/24 0935   Undermining Ends___ O'Clock 0 03/04/24 0935   Undermining Maxium Distance (cm) 0 03/04/24 0935   Wound Assessment Eschar dry 03/04/24 0935   Drainage Amount None (dry) 03/04/24 0935   Odor None 03/04/24 0935   Jessica-wound Assessment Hyperkeratosis (callous) 03/04/24 0935   Margins Attached edges 03/04/24 0935   Number of days: 6       Response to treatment:  Well tolerated by patient.     Pain Assessment:  Severity:  none  Quality of pain:   Wound Pain Timing/Severity:   Premedicated: no    Plan:     Plan of Care: Wound 02/26/24 Left;Plantar-Dressing/Treatment: Betadine swabs/povidone iodine, Silicone border    Patient in bed sitting up getting ready to work with therapy ok with wound care for reassessment. Pt has  a chronic diabetic dry eschar/calloused area to left plantar cleansed with NS measured and pictured painted with betadine covered with optifoam border. Heels intact and buttocks intact with blanchable erythema. Pt is at mild risk for skin breakdown AEB deb. Follow deb orders. Pt getting up with therapy. Atmos air pump is on the bed.    Specialty Bed Required : yes  [] Low Air Loss   [x] Pressure Redistribution  [] Fluid Immersion  [] Bariatric  [] Total Pressure Relief  [] Other:     Discharge Plan:  Placement for patient upon discharge: tbd  Hospice Care: no  Patient appropriate for Outpatient Wound Care Center: tbd- pt has podiatrist     Patient/Caregiver Teaching:  Level of patient/caregiver understanding able to: pt voiced understanding.          Electronically signed by Agnes Maddox RN,  on 3/4/2024 at 9:38 AM

## 2024-03-04 NOTE — PROGRESS NOTES
Occupational Therapy    Occupational Therapy Treatment Note  Name: Kareem Rey MRN: [id number] :   1945   Date:  3/4/2024   Admission Date: 2024 Room:  [address] [place] [address]     Primary Problem:  recurrent falls, peripheral neuropathy likely due to ETOH abuse and syncope.     Restrictions/Precautions:  Restrictions/Precautions  Restrictions/Precautions: Fall Risk     Communication with other providers:   Cleared for treatment by  RN.    Subjective:  Patient states:  \"I can't do a lot\"  Pain:   Location, Type, Intensity (0/10 to 10/10):  not rated but R elbow/wrist    Objective:    Observation:  pt supine in bed asleep upon entering the room    Treatment, including education:  Transfers  Supine to sit :Moderate Assistance for trunk support  Sit to supine :Moderate Assistance for B LEs  Scooting :Contact Guard Assistance  Sit to stand :Moderate Assistance to Max A  Stand to sit :Moderate Assistance to control descent  SPT:Contact Guard Assistance      Self Care Training:   Activities performed today included the following:    Grooming  Supervision to wash face    Bathing   Moderate Assistance pt required assistance to wash B feet and buttocks and maciej area with pt having difficulty with standing in RW due to fatigue and pain    Therapeutic Activity Training:   Pt sat at EOB x 30 min with SBA while completing ADL tasks.  Pt required Max A x 2 for repostioning up in the bed.        Safety Measures: Gait belt used, Left in bed, Pull/Bed Alarm activated and call light left in reach        Assessment / Impression:        Patient's tolerance of treatment: Good   Adverse Reaction: None  Significant change in status and impact:  None  Barriers to improvement:  Dec strength and endurance    Plan for Next Session:    Continue with POC.    Time in:  1400  Time out:  1440  Total treatment time:  40  Billed Units: 2 ADL 1 TA  Electronically signed by:    BRAD Ramesh 1994    3/4/2024, 2:51 PM    Previously filed

## 2024-03-04 NOTE — PLAN OF CARE
Problem: Safety - Adult  Goal: Free from fall injury  3/3/2024 2222 by Adele Chaves RN  Outcome: Progressing  3/3/2024 2221 by Adele Chaves RN  Outcome: Progressing  3/3/2024 0829 by Nickie Diehl RN  Outcome: Progressing     Problem: Pain  Goal: Verbalizes/displays adequate comfort level or baseline comfort level  3/3/2024 2222 by Adele Chvaes RN  Outcome: Progressing  3/3/2024 2221 by Adele Chaves RN  Outcome: Progressing  3/3/2024 0829 by Nickie Diehl RN  Outcome: Progressing     Problem: Skin/Tissue Integrity  Goal: Absence of new skin breakdown  Description: 1.  Monitor for areas of redness and/or skin breakdown  2.  Assess vascular access sites hourly  3.  Every 4-6 hours minimum:  Change oxygen saturation probe site  4.  Every 4-6 hours:  If on nasal continuous positive airway pressure, respiratory therapy assess nares and determine need for appliance change or resting period.  3/3/2024 2222 by Adele Chaves RN  Outcome: Progressing  3/3/2024 2221 by Adele Chaves RN  Outcome: Progressing  3/3/2024 0829 by Nickie Diehl RN  Outcome: Progressing     Problem: Discharge Planning  Goal: Discharge to home or other facility with appropriate resources  3/3/2024 2222 by Adele Chaves RN  Outcome: Progressing  3/3/2024 2221 by Adele Chaves RN  Outcome: Progressing  3/3/2024 0829 by Nickie Diehl RN  Outcome: Progressing     Problem: Chronic Conditions and Co-morbidities  Goal: Patient's chronic conditions and co-morbidity symptoms are monitored and maintained or improved  3/3/2024 2222 by Adele Chaves RN  Outcome: Progressing  3/3/2024 2221 by Adele Chaves RN  Outcome: Progressing  3/3/2024 0829 by Nickie Diehl RN  Outcome: Progressing     Problem: ABCDS Injury Assessment  Goal: Absence of physical injury  3/3/2024 2222 by Adele Chaves RN  Outcome: Progressing  3/3/2024 2221 by Adele Chaves RN  Outcome: Progressing  3/3/2024 0829 by

## 2024-03-04 NOTE — FLOWSHEET NOTE
Physical Therapy Daily Treatment Note   Date: 3/4/2024 Room: [address] [place] [address]    Name: Kareem Rey : 1945   MRN: [id number] Admission Date:2024      Restrictions/Precautions:   Restrictions/Precautions: Fall Risk     Initial Pain level: 8/10- R arm, 10/10- bottom of L foot    Subjective/Observation: Pt in bed upon arrival. \"It hurts so bad, I think getting up will make it worse.\" Agreeable to therapy with much encouragement.     Communication with other providers: Wound care nurse in room part of session      Therapeutic Activities/Neuro Re-Education/Gait Training   Date: 3/4/24   Date:  Date:  Date:    Bed   Mobility ModA supine<>sit for trunk and LE management,   Jose rolling to R        STS   Transfer Min-ModA to RW from EOB 4x      Stand   Pivot   Transfer x          Commode Transfer x          Sitting  Balance SBA at EOB          Standing Balance   Fair w/ RW.  Pt stood w/ RW 3 trials: 30\", 45\", and 26\" with seated rest breaks between each time        Ambulation Pt took 2 steps before LOB and letting go of walker. Pt fell back onto bed and states \"I knew the bed was there, that's why I let go.\"        Stairs x            Therapeutic Exercises   Date:    Date:  Date:  Date:                     Education provided:       Treatment/Activity Tolerance:   [x] Patient limited by fatigue   [x] Patient limited by pain   [] Patient limited by other medical complications   [] Patient tolerated therapy well  [] Other:     Safety Precautions:     [x] Left in bed    [] Left in chair   [] Call light within reach    [x] Bed alarm on    [x] Personal alarm on    [] Other staff present:   [] Family/Caregiver present:      Post Tx Pain Ratin/10- R arm, 10/10- bottom of foot    Social/Functional History:  Lives With: Spouse  Type of Home: House  Home Layout: One level  Home Access: Level entry, Ramped entrance          PT Equipment:  Home Equipment: Rollator, Cane, Lift chair    Evaluation Assessment    Assessment:

## 2024-03-04 NOTE — PLAN OF CARE
Problem: Safety - Adult  Goal: Free from fall injury  3/4/2024 1100 by Bethel Coffman RN  Outcome: Progressing  Flowsheets (Taken 3/4/2024 1100)  Free From Fall Injury: Instruct family/caregiver on patient safety  3/3/2024 2222 by Adele Chaves RN  Outcome: Progressing  3/3/2024 2221 by Adele Chaves RN  Outcome: Progressing     Problem: Pain  Goal: Verbalizes/displays adequate comfort level or baseline comfort level  3/4/2024 1100 by Bethel Coffman RN  Outcome: Progressing  Flowsheets (Taken 3/4/2024 1100)  Verbalizes/displays adequate comfort level or baseline comfort level:   Assess pain using appropriate pain scale   Encourage patient to monitor pain and request assistance   Administer analgesics based on type and severity of pain and evaluate response   Implement non-pharmacological measures as appropriate and evaluate response   Consider cultural and social influences on pain and pain management  3/3/2024 2222 by Adele Chaves RN  Outcome: Progressing  3/3/2024 2221 by Adele Chaves RN  Outcome: Progressing     Problem: Skin/Tissue Integrity  Goal: Absence of new skin breakdown  Description: 1.  Monitor for areas of redness and/or skin breakdown  2.  Assess vascular access sites hourly  3.  Every 4-6 hours minimum:  Change oxygen saturation probe site  4.  Every 4-6 hours:  If on nasal continuous positive airway pressure, respiratory therapy assess nares and determine need for appliance change or resting period.  3/4/2024 1100 by Bethel Coffman RN  Outcome: Progressing  3/3/2024 2222 by Adele Chaves RN  Outcome: Progressing  3/3/2024 2221 by Adele Chaves RN  Outcome: Progressing     Problem: Discharge Planning  Goal: Discharge to home or other facility with appropriate resources  3/4/2024 1100 by Bethel Coffman RN  Outcome: Progressing  Flowsheets (Taken 2/29/2024 2000 by Adeline Frazier RN)  Discharge to home or other facility with appropriate resources:   Identify

## 2024-03-04 NOTE — PLAN OF CARE
Problem: Safety - Adult  Goal: Free from fall injury  3/3/2024 2221 by Adele Chaves RN  Outcome: Progressing  3/3/2024 0829 by Nickie Diehl RN  Outcome: Progressing     Problem: Pain  Goal: Verbalizes/displays adequate comfort level or baseline comfort level  3/3/2024 2221 by Adele Chaves RN  Outcome: Progressing  3/3/2024 0829 by Nickie Diehl RN  Outcome: Progressing     Problem: Skin/Tissue Integrity  Goal: Absence of new skin breakdown  Description: 1.  Monitor for areas of redness and/or skin breakdown  2.  Assess vascular access sites hourly  3.  Every 4-6 hours minimum:  Change oxygen saturation probe site  4.  Every 4-6 hours:  If on nasal continuous positive airway pressure, respiratory therapy assess nares and determine need for appliance change or resting period.  3/3/2024 2221 by Adele Chaves RN  Outcome: Progressing  3/3/2024 0829 by Nickie Diehl RN  Outcome: Progressing     Problem: Discharge Planning  Goal: Discharge to home or other facility with appropriate resources  3/3/2024 2221 by Adele Chaves RN  Outcome: Progressing  3/3/2024 0829 by Nickie Diehl RN  Outcome: Progressing     Problem: Chronic Conditions and Co-morbidities  Goal: Patient's chronic conditions and co-morbidity symptoms are monitored and maintained or improved  3/3/2024 2221 by Adele Chaves RN  Outcome: Progressing  3/3/2024 0829 by Nickie Diehl RN  Outcome: Progressing     Problem: ABCDS Injury Assessment  Goal: Absence of physical injury  3/3/2024 2221 by Adele Chaves RN  Outcome: Progressing  3/3/2024 0829 by Nickie Diehl RN  Outcome: Progressing

## 2024-03-04 NOTE — PROGRESS NOTES
SWINGBED PATIENT ACTIVITY PROGRAM ASSESSMENT    Patient Name: Kareem Rey  : 1945   Gender: male   Diagnosis:  Physical debility [R53.81]  Syncope, unspecified syncope type [R55]  Debility [R53.81] MRN: [id number]     Ability to read or write? [x] Yes   [] No  Ability to hear?   [x] Yes   [] No  Is patient confused?  [] Yes   [x] No    Enter Codes in Boxes Coding:  Very Important  Somewhat important  Not very important  Not important at all  Important but can’t do or no choice  No response or non-responsive   2 A. How important is it to you to have books, newspapers, and magazines to read?   4 B.  How important is it to you to listen to music you like?   2 C.  How important is it to you to be around animals such as pets?   2 D.  How important is it to you to keep up with the news?   3 E.  How important is it to you to do things with groups of people?   2 F.  How important is it to you to do your favorite activities?   1 G.  How important is it to you to go outside to get fresh air when the weather is good?   4 H.  How important is it to you to participate in Orthodox services or practices?     Activity Care Plan:  Will participate in activity programs of choice daily with no decline throughout [facility] stay. Patient looks at Magazines, Had a Dog and likes pets, use to do wood working, and keeps up with the News by TV. Patient was left a magazine today.          EVALUATOR’S SIGNATURE:  Lela Szymanski  Date: 3/4/2024

## 2024-03-05 ENCOUNTER — APPOINTMENT (OUTPATIENT)
Dept: GENERAL RADIOLOGY | Age: 79
End: 2024-03-05
Attending: INTERNAL MEDICINE
Payer: MEDICARE

## 2024-03-05 LAB
ALBUMIN SERPL-MCNC: 2.7 GM/DL (ref 3.4–5)
ALP BLD-CCNC: 114 IU/L (ref 40–129)
ALT SERPL-CCNC: 135 U/L (ref 10–40)
ANION GAP SERPL CALCULATED.3IONS-SCNC: 12 MMOL/L (ref 7–16)
AST SERPL-CCNC: 162 IU/L (ref 15–37)
BASOPHILS ABSOLUTE: 0 K/CU MM
BASOPHILS RELATIVE PERCENT: 0.8 % (ref 0–1)
BILIRUB SERPL-MCNC: 0.5 MG/DL (ref 0–1)
BUN SERPL-MCNC: 25 MG/DL (ref 6–23)
CALCIUM SERPL-MCNC: 8.6 MG/DL (ref 8.3–10.6)
CHLORIDE BLD-SCNC: 103 MMOL/L (ref 99–110)
CO2: 21 MMOL/L (ref 21–32)
CREAT SERPL-MCNC: 1.2 MG/DL (ref 0.9–1.3)
DIFFERENTIAL TYPE: ABNORMAL
EOSINOPHILS ABSOLUTE: 0.1 K/CU MM
EOSINOPHILS RELATIVE PERCENT: 2.5 % (ref 0–3)
GFR SERPL CREATININE-BSD FRML MDRD: >60 ML/MIN/1.73M2
GLUCOSE BLD-MCNC: 114 MG/DL (ref 70–99)
GLUCOSE BLD-MCNC: 160 MG/DL (ref 70–99)
GLUCOSE BLD-MCNC: 169 MG/DL (ref 70–99)
GLUCOSE BLD-MCNC: 201 MG/DL (ref 70–99)
GLUCOSE SERPL-MCNC: 181 MG/DL (ref 70–99)
HCT VFR BLD CALC: 31.1 % (ref 42–52)
HEMOGLOBIN: 10.1 GM/DL (ref 13.5–18)
IMMATURE NEUTROPHIL %: 0.4 % (ref 0–0.43)
LYMPHOCYTES ABSOLUTE: 0.3 K/CU MM
LYMPHOCYTES RELATIVE PERCENT: 6.3 % (ref 24–44)
MCH RBC QN AUTO: 35.2 PG (ref 27–31)
MCHC RBC AUTO-ENTMCNC: 32.5 % (ref 32–36)
MCV RBC AUTO: 108.4 FL (ref 78–100)
MONOCYTES ABSOLUTE: 0.3 K/CU MM
MONOCYTES RELATIVE PERCENT: 6.5 % (ref 0–4)
PDW BLD-RTO: 13.4 % (ref 11.7–14.9)
PLATELET # BLD: 220 K/CU MM (ref 140–440)
PMV BLD AUTO: 9.9 FL (ref 7.5–11.1)
POTASSIUM SERPL-SCNC: 4.7 MMOL/L (ref 3.5–5.1)
RBC # BLD: 2.87 M/CU MM (ref 4.6–6.2)
SEGMENTED NEUTROPHILS ABSOLUTE COUNT: 4.3 K/CU MM
SEGMENTED NEUTROPHILS RELATIVE PERCENT: 83.5 % (ref 36–66)
SODIUM BLD-SCNC: 136 MMOL/L (ref 135–145)
TOTAL IMMATURE NEUTOROPHIL: 0.02 K/CU MM
TOTAL PROTEIN: 6 GM/DL (ref 6.4–8.2)
WBC # BLD: 5.2 K/CU MM (ref 4–10.5)

## 2024-03-05 PROCEDURE — 97116 GAIT TRAINING THERAPY: CPT

## 2024-03-05 PROCEDURE — 82962 GLUCOSE BLOOD TEST: CPT

## 2024-03-05 PROCEDURE — 1200000002 HC SEMI PRIVATE SWING BED

## 2024-03-05 PROCEDURE — 73080 X-RAY EXAM OF ELBOW: CPT

## 2024-03-05 PROCEDURE — 36415 COLL VENOUS BLD VENIPUNCTURE: CPT

## 2024-03-05 PROCEDURE — 80053 COMPREHEN METABOLIC PANEL: CPT

## 2024-03-05 PROCEDURE — 94761 N-INVAS EAR/PLS OXIMETRY MLT: CPT

## 2024-03-05 PROCEDURE — 85025 COMPLETE CBC W/AUTO DIFF WBC: CPT

## 2024-03-05 PROCEDURE — 97530 THERAPEUTIC ACTIVITIES: CPT

## 2024-03-05 PROCEDURE — 6370000000 HC RX 637 (ALT 250 FOR IP)

## 2024-03-05 PROCEDURE — 6360000002 HC RX W HCPCS

## 2024-03-05 RX ADMIN — FERROUS SULFATE TAB 325 MG (65 MG ELEMENTAL FE) 325 MG: 325 (65 FE) TAB at 09:32

## 2024-03-05 RX ADMIN — AMLODIPINE BESYLATE 5 MG: 5 TABLET ORAL at 09:32

## 2024-03-05 RX ADMIN — GABAPENTIN 300 MG: 300 CAPSULE ORAL at 20:25

## 2024-03-05 RX ADMIN — MONTELUKAST SODIUM 10 MG: 10 TABLET, FILM COATED ORAL at 20:25

## 2024-03-05 RX ADMIN — CLOPIDOGREL BISULFATE 75 MG: 75 TABLET ORAL at 09:32

## 2024-03-05 RX ADMIN — GABAPENTIN 300 MG: 300 CAPSULE ORAL at 09:32

## 2024-03-05 RX ADMIN — CYANOCOBALAMIN TAB 1000 MCG 1000 MCG: 1000 TAB at 09:32

## 2024-03-05 RX ADMIN — FOLIC ACID 1 MG: 1 TABLET ORAL at 09:32

## 2024-03-05 RX ADMIN — ATORVASTATIN CALCIUM 20 MG: 20 TABLET, FILM COATED ORAL at 09:32

## 2024-03-05 RX ADMIN — Medication 400 MG: at 09:32

## 2024-03-05 RX ADMIN — NAPHAZOLINE HYDROCHLORIDE AND PHENIRAMINE MALEATE 1 DROP: .25; 3 SOLUTION/ DROPS OPHTHALMIC at 15:41

## 2024-03-05 RX ADMIN — GABAPENTIN 300 MG: 300 CAPSULE ORAL at 15:40

## 2024-03-05 RX ADMIN — ENOXAPARIN SODIUM 30 MG: 100 INJECTION SUBCUTANEOUS at 09:32

## 2024-03-05 RX ADMIN — ALLOPURINOL 100 MG: 100 TABLET ORAL at 09:32

## 2024-03-05 RX ADMIN — ENOXAPARIN SODIUM 30 MG: 100 INJECTION SUBCUTANEOUS at 20:25

## 2024-03-05 RX ADMIN — NAPHAZOLINE HYDROCHLORIDE AND PHENIRAMINE MALEATE 1 DROP: .25; 3 SOLUTION/ DROPS OPHTHALMIC at 20:25

## 2024-03-05 RX ADMIN — NAPHAZOLINE HYDROCHLORIDE AND PHENIRAMINE MALEATE 1 DROP: .25; 3 SOLUTION/ DROPS OPHTHALMIC at 09:33

## 2024-03-05 ASSESSMENT — PAIN SCALES - GENERAL: PAINLEVEL_OUTOF10: 0

## 2024-03-05 NOTE — PLAN OF CARE
Problem: Safety - Adult  Goal: Free from fall injury  Outcome: Progressing  Flowsheets (Taken 3/4/2024 1100)  Free From Fall Injury: Instruct family/caregiver on patient safety     Problem: Pain  Goal: Verbalizes/displays adequate comfort level or baseline comfort level  Outcome: Progressing  Flowsheets (Taken 3/4/2024 1100)  Verbalizes/displays adequate comfort level or baseline comfort level:   Assess pain using appropriate pain scale   Encourage patient to monitor pain and request assistance   Administer analgesics based on type and severity of pain and evaluate response   Implement non-pharmacological measures as appropriate and evaluate response   Consider cultural and social influences on pain and pain management     Problem: Skin/Tissue Integrity  Goal: Absence of new skin breakdown  Description: 1.  Monitor for areas of redness and/or skin breakdown  2.  Assess vascular access sites hourly  3.  Every 4-6 hours minimum:  Change oxygen saturation probe site  4.  Every 4-6 hours:  If on nasal continuous positive airway pressure, respiratory therapy assess nares and determine need for appliance change or resting period.  Outcome: Progressing     Problem: Discharge Planning  Goal: Discharge to home or other facility with appropriate resources  Outcome: Progressing  Flowsheets (Taken 2/29/2024 2000 by Adeline Frazier, RN)  Discharge to home or other facility with appropriate resources:   Identify barriers to discharge with patient and caregiver   Identify discharge learning needs (meds, wound care, etc)   Refer to discharge planning if patient needs post-hospital services based on physician order or complex needs related to functional status, cognitive ability or social support system     Problem: Chronic Conditions and Co-morbidities  Goal: Patient's chronic conditions and co-morbidity symptoms are monitored and maintained or improved  Outcome: Progressing  Flowsheets (Taken 3/4/2024 1100)  Care Plan -

## 2024-03-05 NOTE — PROGRESS NOTES
I was contacted earlier today in regard to this patient.  Because he is an outside facility I went ahead and reviewed his imaging and found that all the areas of concern demonstrating significant osteoarthritic changes.  I was able to reach out to the patient's nurse who states that none of the areas are red, swollen, warm and none are concerning for infection and all are actually improving.  Therefore, I will hold off on seeing this patient but if anything changes they will contact me and I will be happy to discuss the patient over the phone and possibly come see the patient if warranted.  Patient can follow-up in my office once he is discharged if these issues continue to be problematic.    3 views right elbow demonstrate no acute osseous abnormalities.  No significant swelling or effusion.  Significant osteophytic changes seen throughout.  Bone quality is poor.  No sign of any soft tissue avulsion type injury or fracture.      3 views right wrist, 3 views left knee demonstrate:    Right wrist  1. No radiographic evidence of acute fracture.  2. Severe degenerative changes of the triscaphe and first carpometacarpal joint.  2. Mild soft tissue swelling around the wrist.     Left knee  1. No radiographic evidence of acute fracture.  2. Moderate to severe tricompartmental osteoarthritis.  3. Moderate suprapatellar joint effusion.

## 2024-03-05 NOTE — FLOWSHEET NOTE
Physical Therapy Daily Treatment Note   Date: 3/5/2024 Room: [address] [place] [address]    Name: Kareem Rey : 1945   MRN: [id number] Admission Date:2024      Restrictions/Precautions:  Fall Risk     Initial Pain level: Pt reports pain in the R elbow and wrist, bottom of L foot, and L knee. Pt fixated on pain level requiring encouragement and redirection to task throughout session    Subjective/Observation: Pt in bed upon arrival. \"I suppose you're here to get me up even though I don't want to\". Patient requires discussion of PT POC and encouragement to participate as pt states \"When I go home I will just lay in bed and soil myself like I do here. It's my wife's job to take care of me\".    Communication with other providers: A cotreat was completed this date with  [x] OT    This pt requires simultaneous intervention of 2 skilled therapists to safely complete tasks to address complexity of deficits defined in the goals including:  []Attention  []Communication   [x]Safety []UE Function   [x]Sequencing []Motor planning   [x]Initiation  [x]Balance   [x]Processing [x]Energy Conservation    [x]Patient Tolerance [x]Transfers   Pt's response to cotreat: Fair      Therapeutic Activities/Neuro Re-Education/Gait Training   Date: 3/4/24   Date: 3/5/24 Date:    Bed   Mobility ModA supine<>sit for trunk and LE management,   Jose rolling to R Mod A for trunk support, pt able to get LEs to EOB and reach for bed rail. Assist given for last 25% of supine>sit movement    STS   Transfer Min-ModA to RW from EOB 4x CGA from EOB, CGA-Min A to/from w/c with cues for hand placement    Stand   Pivot   Transfer x     x    Commode Transfer x     x    Sitting  Balance SBA at EOB     SBA at EOB    Standing Balance   Fair w/ RW.  Pt stood w/ RW 3 trials: 30\", 45\", and 26\" with seated rest breaks between each time CGA-Min A during standing and ambulation d/t poor walker management and fwd flexed posture      Ambulation Pt took 2 steps before LOB and

## 2024-03-05 NOTE — PROGRESS NOTES
V2.0  [facility] Hospitalist Progress Note      Name:  Kareem Rey /Age/Sex: 1945  (78 y.o. male)   MRN & CSN:  [id number] & [id number] Encounter Date/Time: 3/5/2024 3:00 PM EST    Location:   PCP: Eduar Birmingham MD       Hospital Day: 6    Assessment and Plan:   Kareem Rey is a 78 y.o. male who presents with Debility    Physical debility: intermittently working with PT/OT due to pain. May need placement to SNF. CM following   L knee pain: XR L knee no fracture, moderate to severe tricompartmental osteoarthritis, moderate suprapatellar joint effusion. Pain improving today. Ortho reviewed imaging, no indication for intervention at this time. Apply ice, Voltaren gel PRN.   R wrist/elbow pain: XR R wrist with concerns for osteoarthritis. XR R elbow pending. Ortho reviewed imaging, no acute process. Apply ice, Voltaren gel PRN.  Elevate extremity.  Elevated LFTs: , .  Denies abdominal pain.  Hold statin.  Check hepatitis panel and repeat CMP in AM.  Consider further imaging if persistently elevated.  Chronic atrial fibrillation: Has a pacer and is status post Watchman; continue Plavix and follow-up with EP/cardiology as outpatient  Vitamin B12 deficiency: Continue oral supplementation   Type 2 diabetes: Continue insulin before meals and at bedtime with sliding scale. Titrate PRN. Hypoglycemic protocol ordered  Hypertension: Continue amlodipine  History of diabetic neuropathy: Continue gabapentin  History of alcohol abuse: no signs of withdrawal while admitted.   CAD: Continue atorvastatin and Plavix  History of JAYE: CPAP at night  History of vitamin D deficiency: Continue vitamin D supplements    This patient was discussed with Dr. Connor. He was agreeable with the assessment and plan as dictated above.     Current Living situation: home  Expected Disposition: TBD  Estimated D/C: TBD    Diet ADULT DIET; Regular; 5 carb choices (75 gm/meal)  ADULT ORAL NUTRITION SUPPLEMENT; Breakfast,  0.43 %    Total Immature Neutrophil 0.02 K/CU MM   Comprehensive Metabolic Panel w/ Reflex to MG    Collection Time: 03/05/24 10:15 AM   Result Value Ref Range    Sodium 136 135 - 145 MMOL/L    Potassium 4.7 3.5 - 5.1 MMOL/L    Chloride 103 99 - 110 mMol/L    CO2 21 21 - 32 MMOL/L    Anion Gap 12 7 - 16    Glucose 181 (H) 70 - 99 MG/DL    BUN 25 (H) 6 - 23 MG/DL    Creatinine 1.2 0.9 - 1.3 MG/DL    Est, Glom Filt Rate >60 >60 mL/min/1.73m2    Calcium 8.6 8.3 - 10.6 MG/DL    Total Protein 6.0 (L) 6.4 - 8.2 GM/DL    Albumin 2.7 (L) 3.4 - 5.0 GM/DL    Total Bilirubin 0.5 0.0 - 1.0 MG/DL    Alkaline Phosphatase 114 40 - 129 IU/L     (H) 10 - 40 U/L     (H) 15 - 37 IU/L   POCT Glucose    Collection Time: 03/05/24 12:06 PM   Result Value Ref Range    POC Glucose 169 (H) 70 - 99 MG/DL        Imaging/Diagnostics Last 24 Hours   No results found.    Electronically signed by Lena Burnett PA-C on 3/5/2024 at 3:00 PM

## 2024-03-05 NOTE — FLOWSHEET NOTE
Occupational Therapy Treatment Note  Name: Kareem Rey MRN: [id number] :   1945   Date:  3/5/2024   Admission Date: 2024 Room:  [address] [place] [address]   Restrictions/Precautions:  Restrictions/Precautions  Restrictions/Precautions: Fall Risk     Primary Problem:  recurrent falls, peripheral neuropathy likely due to ETOH abuse and syncope.      Communication with other providers:   Cleared for treatment by  RN.  A cotreat was completed this date with  [x] PT   [] ST      This pt requires simultaneous intervention of 2 skilled therapists to safely complete tasks to address complexity of deficits defined in the goals including:  []Attention  []Communication   [x]Safety []UE Function   [x]Sequencing [x]Motor planning   [x]Initiation  [x]Balance   [x]Processing []Energy Conservation    [x]Problem Solving [x]Transfers   [x]Verbal cues  []Self Feeding    [x] Tactile Cues []ADL's    []Recall of learned tasks  []Device Use   Patient's response to cotreat: Fair  Progress toward goals: Fair    Pain:  Patient reports pain in R wrist, L knee, L foot, R elbow. Patient perseverating on pain in multiple different locations throughout treatment session requiring verbal cues for re-direction and participation     Subjective/Observation:  Patient supine in bed, HOB elevated upon arrival. Upon OT and PT entering room, Patient stated \"I suppose you're here to get me up even though I don't want to\" and continuously made statements reporting he didn't not want to participate in therapy.     Treatment, including education:  Transfers  Supine to sit :Moderate Assistance trunk support. Patient given verbal cues to utilize bed rail with L hand and to push through R elbow  Sit to supine :Minimal Assistance for BLE placement  Sit to stand :Minimal Assistancefrom EOB with cues given for safe hand placement. Patient CGA/min A STS from wheelchairwith cues given for safe hand placement  Stand to sit :Contact Guard Assistancecue given for safe

## 2024-03-06 LAB
ALBUMIN SERPL-MCNC: 2.7 GM/DL (ref 3.4–5)
ALP BLD-CCNC: 111 IU/L (ref 40–129)
ALT SERPL-CCNC: 137 U/L (ref 10–40)
ANION GAP SERPL CALCULATED.3IONS-SCNC: 18 MMOL/L (ref 7–16)
AST SERPL-CCNC: 167 IU/L (ref 15–37)
BILIRUB SERPL-MCNC: 0.5 MG/DL (ref 0–1)
BUN SERPL-MCNC: 25 MG/DL (ref 6–23)
CALCIUM SERPL-MCNC: 8.6 MG/DL (ref 8.3–10.6)
CHLORIDE BLD-SCNC: 101 MMOL/L (ref 99–110)
CO2: 18 MMOL/L (ref 21–32)
CREAT SERPL-MCNC: 1.3 MG/DL (ref 0.9–1.3)
GFR SERPL CREATININE-BSD FRML MDRD: 56 ML/MIN/1.73M2
GLUCOSE BLD-MCNC: 123 MG/DL (ref 70–99)
GLUCOSE BLD-MCNC: 147 MG/DL (ref 70–99)
GLUCOSE BLD-MCNC: 176 MG/DL (ref 70–99)
GLUCOSE BLD-MCNC: 181 MG/DL (ref 70–99)
GLUCOSE SERPL-MCNC: 168 MG/DL (ref 70–99)
HAV IGM SERPL QL IA: NON REACTIVE
HBV CORE IGM SERPL QL IA: NON REACTIVE
HBV SURFACE AG SERPL QL IA: NON REACTIVE
HCV AB SERPL QL IA: NON REACTIVE
MAGNESIUM: 2.2 MG/DL (ref 1.8–2.4)
POTASSIUM SERPL-SCNC: 4.9 MMOL/L (ref 3.5–5.1)
SODIUM BLD-SCNC: 137 MMOL/L (ref 135–145)
TOTAL PROTEIN: 6.2 GM/DL (ref 6.4–8.2)

## 2024-03-06 PROCEDURE — 82962 GLUCOSE BLOOD TEST: CPT

## 2024-03-06 PROCEDURE — 97530 THERAPEUTIC ACTIVITIES: CPT

## 2024-03-06 PROCEDURE — 6360000002 HC RX W HCPCS

## 2024-03-06 PROCEDURE — 97116 GAIT TRAINING THERAPY: CPT

## 2024-03-06 PROCEDURE — 80074 ACUTE HEPATITIS PANEL: CPT

## 2024-03-06 PROCEDURE — 94761 N-INVAS EAR/PLS OXIMETRY MLT: CPT

## 2024-03-06 PROCEDURE — 83735 ASSAY OF MAGNESIUM: CPT

## 2024-03-06 PROCEDURE — 80053 COMPREHEN METABOLIC PANEL: CPT

## 2024-03-06 PROCEDURE — 36415 COLL VENOUS BLD VENIPUNCTURE: CPT

## 2024-03-06 PROCEDURE — 1200000002 HC SEMI PRIVATE SWING BED

## 2024-03-06 PROCEDURE — 6370000000 HC RX 637 (ALT 250 FOR IP)

## 2024-03-06 PROCEDURE — 97535 SELF CARE MNGMENT TRAINING: CPT

## 2024-03-06 PROCEDURE — 97112 NEUROMUSCULAR REEDUCATION: CPT

## 2024-03-06 RX ORDER — ATORVASTATIN CALCIUM 20 MG/1
20 TABLET, FILM COATED ORAL DAILY
Status: ON HOLD | COMMUNITY
End: 2024-03-22 | Stop reason: HOSPADM

## 2024-03-06 RX ORDER — LANOLIN ALCOHOL/MO/W.PET/CERES
400 CREAM (GRAM) TOPICAL DAILY
COMMUNITY

## 2024-03-06 RX ORDER — GABAPENTIN 300 MG/1
300 CAPSULE ORAL 3 TIMES DAILY
COMMUNITY

## 2024-03-06 RX ORDER — AMLODIPINE BESYLATE 5 MG/1
5 TABLET ORAL DAILY
Status: ON HOLD | COMMUNITY
End: 2024-03-22 | Stop reason: HOSPADM

## 2024-03-06 RX ORDER — TORSEMIDE 20 MG/1
20 TABLET ORAL EVERY OTHER DAY
Status: ON HOLD | COMMUNITY
End: 2024-03-22 | Stop reason: HOSPADM

## 2024-03-06 RX ADMIN — GABAPENTIN 300 MG: 300 CAPSULE ORAL at 20:14

## 2024-03-06 RX ADMIN — ENOXAPARIN SODIUM 30 MG: 100 INJECTION SUBCUTANEOUS at 09:22

## 2024-03-06 RX ADMIN — Medication 400 MG: at 09:22

## 2024-03-06 RX ADMIN — GABAPENTIN 300 MG: 300 CAPSULE ORAL at 09:22

## 2024-03-06 RX ADMIN — FOLIC ACID 1 MG: 1 TABLET ORAL at 09:22

## 2024-03-06 RX ADMIN — ENOXAPARIN SODIUM 30 MG: 100 INJECTION SUBCUTANEOUS at 20:14

## 2024-03-06 RX ADMIN — AMLODIPINE BESYLATE 5 MG: 5 TABLET ORAL at 09:22

## 2024-03-06 RX ADMIN — NAPHAZOLINE HYDROCHLORIDE AND PHENIRAMINE MALEATE 1 DROP: .25; 3 SOLUTION/ DROPS OPHTHALMIC at 09:22

## 2024-03-06 RX ADMIN — CYANOCOBALAMIN TAB 1000 MCG 1000 MCG: 1000 TAB at 09:22

## 2024-03-06 RX ADMIN — ALLOPURINOL 100 MG: 100 TABLET ORAL at 09:22

## 2024-03-06 RX ADMIN — MONTELUKAST SODIUM 10 MG: 10 TABLET, FILM COATED ORAL at 20:14

## 2024-03-06 RX ADMIN — FERROUS SULFATE TAB 325 MG (65 MG ELEMENTAL FE) 325 MG: 325 (65 FE) TAB at 09:22

## 2024-03-06 RX ADMIN — GABAPENTIN 300 MG: 300 CAPSULE ORAL at 15:33

## 2024-03-06 RX ADMIN — NAPHAZOLINE HYDROCHLORIDE AND PHENIRAMINE MALEATE 1 DROP: .25; 3 SOLUTION/ DROPS OPHTHALMIC at 20:14

## 2024-03-06 RX ADMIN — CLOPIDOGREL BISULFATE 75 MG: 75 TABLET ORAL at 09:22

## 2024-03-06 ASSESSMENT — PAIN SCALES - GENERAL
PAINLEVEL_OUTOF10: 0
PAINLEVEL_OUTOF10: 0

## 2024-03-06 NOTE — CARE COORDINATION
CM submitted updated clinical documentation to [facility] as requested.  CM will follow.     8:56 AM  [facility] has approved an extension to the patient's stay in the swing bed program and requested that updated clinical documentation be submitted to them Monday 3/11/24.  [facility] indicated that another extension is unlikely.  CM will follow.

## 2024-03-06 NOTE — PROGRESS NOTES
Return call from Dr. Eddy re: consult. Per Dr. Eddy, after reviewing pt's xrays, pt is full of arthritis. Dr. Eddy stated that he will not see pt at this time but is available if things change. Lena MERIDA notified.

## 2024-03-06 NOTE — FLOWSHEET NOTE
level  Home Access: Level entry, Ramped entrance          PT Equipment:  Home Equipment: Rollator, Cane, Lift chair    Evaluation Assessment    Assessment: Patient is a 78 y.o. male who presents to [facility] for the swingbed program post hospitalization for syncope, recurrent falls, peripheral neuropathy likely due to ETOH abuse. Patient presents below baseline functional level and would benefit from continued skilled physical therapy services to address decreased strength, ROM, endurance, impaired balance, and decline in functional mobility.    Goals:         Short Term Goals  Time Frame for Short Term Goals: 1 week or until discharge  Short Term Goal 1: Patient will complete all bed mobility with Mod I  Short Term Goal 2: Patient will complete STS transfers from EOB, commode, and bedside chair with SUP  Short Term Goal 3: Patient will ambulate 50ft with LRAD and SUP  Short Term Goal 4: Patient will perform dynamic standing balance activities for 3 minutes without LOB.       Stairs addressed    Stair goal met      []Yes  [x]No          []Yes  []No      Time In 0930   1259   Time Out 1025   1306   Total Minutes 55  +  7 = 62   Billed Units 2Gt, 1Nr, 1TA       Signed: Ivette Camara, PT, DPT, ATC  3/6/2024, 12:46 PM

## 2024-03-06 NOTE — FLOWSHEET NOTE
Occupational Therapy Treatment Note  Name: Kareem Rey MRN: [id number] :   1945   Date:  3/6/2024   Admission Date: 2024 Room:  [address] [place] [address]     Restrictions/Precautions:  Restrictions/Precautions  Restrictions/Precautions: Fall Risk     Pain: Patient perseverated on multiple different locations hurting, however did not rate    Communication with other providers:   Cleared for treatment by ZACARIAS Hugo.  A cotreat was completed this date with  [x] PT   [] ST      This pt requires simultaneous intervention of 2 skilled therapists to safely complete tasks to address complexity of deficits defined in the goals including:  []Attention  []Communication   [x]Safety []UE Function   [x]Sequencing []Motor planning   [x]Initiation  [x]Balance   [x]Processing []Energy Conservation    [x]Problem Solving [x]Transfers   [x]Verbal cues  []Self Feeding    [x]Tactile Cues [x]ADL's    []Recall of learned tasks  []Device Use   Patient's response to cotreat: Fair  Progress toward goals: Fair      Subjective/Observation:  Patient supine in bed, HOB elevated upon arrival. Upon entering room, patient reported \"I soiled myself\". Patient asked if he pushed his call light for nurses to assist him to the restroom or to assist him with clean up to which patient reported no. Per nurse, patient does not use call light when needing to use the restroom and will soil the bed. He will also not report he has soiled the bed until someone has entered the room, although he is aware when he needs to go/or has gone. OT placed urinal on tray and informed patient it will be there for him to utilize.    Treatment, including education:  Transfers  Supine to sit :Moderate Assistancefor trunk support. Patient given verbal cues to utilize bed rail with L hand and to push through R elbow   Sit to stand :Minimal Assistance from EOB with cues given for safe hand placement. Moderate Assistance for STS from chair after completing handwashing and moderate  met  Short Term Goal 1: Patient will complete all functional transfers supervision using AE PRN  Short Term Goal 2: Patient will complete all toilet tasks supervision  Short Term Goal 3: Patient will complete UB ADLS MI  Short Term Goal 4: Patient will complete LB ADLS min A using AE PRN  Short Term Goal 5: Patient will complete 10+ minutes of ther ex/ther act with good safety awareness using AE PRN for general strengthening

## 2024-03-06 NOTE — PROGRESS NOTES
SWING BED WEEKLY TEAM SHEET     WEEK#  1     NUTRITION   Diet:  Diabetic 5 cabrs per meals                   Supplement: standard high calorie, high protein TID    TF:  n/a        TPN:  n/a   Appropriate/Adequate [X] yes [ ] no       Meal intakes: 25%, 25%, 25%, %, 25%    Weight: 239#-no new weight from admission     BMI: 33.5               Significant Change: None noted-continues to have decreased po intake and no new weight to assess    Nutrition Assessment: Pt remains in swing bed at this time. Diet order remains diabetic 5 carbs per meal with standard high calorie, high protein oral supplement TID. Noted intakes consistently less than 50%, however, pt is consuming oral supplement. Pt continues to state he does not feel hungry. Did encourage oral intake at all meals. Recommend to continue with oral supplements. If po intake does not consistently improve question if pt would benefit from appetite stimulant. Pt at this time continues to meet criteria for moderate malnutrition. Will continue to monitor and follow at moderate nutrition risk.     Nutrition Diagnosis: Pt meets ASPEN criteria for moderate malnutrition in context of chronic illness r/t injury/trauma AEB moderate muscle loss, wt loss, poor intake prior to admission and criteria as identified in malnutrition assessment.     Recommendations: Continue current diet, continue oral supplements. Encourage intake at all meals.     Issues to be resolved before discharge: Meet at least 75% of estimated nutrition needs    Dietitian Signature:   Jesus Pfeiffer RD, LD  [phone]    For full initial assessment see nutrition flowsheet.

## 2024-03-06 NOTE — CARE COORDINATION
SWING BED WEEKLY TEAM SHEET     Kareem Rey   3/6/2024 WEEK # 1    Care Management    Issues to be resolved before discharge: Increased strength/balance/mobility     Family Education: Patient/staff to update family     Discharge Plan: Home with  HC   Patient/Family Adjustment     Goals of previous week:   [] 1st team   [] met   [] partially met    [x] not met             Why goals were not met: Continued weakness     Skilled Level of Care Remains   [x] yes   [] no    Estimated length of stay: Insurance review due 3/6/24, determination unknown at this time     Patient/Family Concerns/input: None at this time     Patient/Family  Signature _________________  3/6/2024       Care Management Signature:  Joelle Myers, RN

## 2024-03-07 LAB
GLUCOSE BLD-MCNC: 121 MG/DL (ref 70–99)
GLUCOSE BLD-MCNC: 124 MG/DL (ref 70–99)
GLUCOSE BLD-MCNC: 126 MG/DL (ref 70–99)
GLUCOSE BLD-MCNC: 213 MG/DL (ref 70–99)

## 2024-03-07 PROCEDURE — 82962 GLUCOSE BLOOD TEST: CPT

## 2024-03-07 PROCEDURE — 97530 THERAPEUTIC ACTIVITIES: CPT

## 2024-03-07 PROCEDURE — 1200000002 HC SEMI PRIVATE SWING BED

## 2024-03-07 PROCEDURE — 94761 N-INVAS EAR/PLS OXIMETRY MLT: CPT

## 2024-03-07 PROCEDURE — 97535 SELF CARE MNGMENT TRAINING: CPT

## 2024-03-07 PROCEDURE — 97116 GAIT TRAINING THERAPY: CPT

## 2024-03-07 PROCEDURE — 6370000000 HC RX 637 (ALT 250 FOR IP)

## 2024-03-07 PROCEDURE — 6360000002 HC RX W HCPCS

## 2024-03-07 RX ADMIN — FOLIC ACID 1 MG: 1 TABLET ORAL at 09:58

## 2024-03-07 RX ADMIN — GABAPENTIN 300 MG: 300 CAPSULE ORAL at 17:33

## 2024-03-07 RX ADMIN — ENOXAPARIN SODIUM 30 MG: 100 INJECTION SUBCUTANEOUS at 20:10

## 2024-03-07 RX ADMIN — NAPHAZOLINE HYDROCHLORIDE AND PHENIRAMINE MALEATE 1 DROP: .25; 3 SOLUTION/ DROPS OPHTHALMIC at 10:00

## 2024-03-07 RX ADMIN — NAPHAZOLINE HYDROCHLORIDE AND PHENIRAMINE MALEATE 1 DROP: .25; 3 SOLUTION/ DROPS OPHTHALMIC at 20:10

## 2024-03-07 RX ADMIN — FERROUS SULFATE TAB 325 MG (65 MG ELEMENTAL FE) 325 MG: 325 (65 FE) TAB at 09:59

## 2024-03-07 RX ADMIN — CYANOCOBALAMIN TAB 1000 MCG 1000 MCG: 1000 TAB at 09:59

## 2024-03-07 RX ADMIN — AMLODIPINE BESYLATE 5 MG: 5 TABLET ORAL at 09:58

## 2024-03-07 RX ADMIN — GABAPENTIN 300 MG: 300 CAPSULE ORAL at 09:59

## 2024-03-07 RX ADMIN — MONTELUKAST SODIUM 10 MG: 10 TABLET, FILM COATED ORAL at 20:10

## 2024-03-07 RX ADMIN — NAPHAZOLINE HYDROCHLORIDE AND PHENIRAMINE MALEATE 1 DROP: .25; 3 SOLUTION/ DROPS OPHTHALMIC at 17:33

## 2024-03-07 RX ADMIN — CLOPIDOGREL BISULFATE 75 MG: 75 TABLET ORAL at 09:59

## 2024-03-07 RX ADMIN — GABAPENTIN 300 MG: 300 CAPSULE ORAL at 20:10

## 2024-03-07 RX ADMIN — ENOXAPARIN SODIUM 30 MG: 100 INJECTION SUBCUTANEOUS at 09:58

## 2024-03-07 RX ADMIN — ALLOPURINOL 100 MG: 100 TABLET ORAL at 09:59

## 2024-03-07 RX ADMIN — Medication 400 MG: at 09:59

## 2024-03-07 ASSESSMENT — PAIN SCALES - GENERAL: PAINLEVEL_OUTOF10: 0

## 2024-03-07 NOTE — PROGRESS NOTES
all functional transfers supervision using AE PRN  Short Term Goal 2: Patient will complete all toilet tasks supervision  Short Term Goal 3: Patient will complete UB ADLS MI  Short Term Goal 4: Patient will complete LB ADLS min A using AE PRN  Short Term Goal 5: Patient will complete 10+ minutes of ther ex/ther act with good safety awareness using AE PRN for general strengthening

## 2024-03-07 NOTE — PROGRESS NOTES
V2.0  [facility] Hospitalist Progress Note      Name:  Kareem Rey /Age/Sex: 1945  (78 y.o. male)   MRN & CSN:  [id number] & [id number] Encounter Date/Time: 3/7/2024 3:00 PM EST    Location:   PCP: Eduar Birmingham MD       Hospital Day: 8    Assessment and Plan:   Kareem Rey is a 78 y.o. male who presents with Debility    Physical debility: intermittently working with PT/OT due to pain. May need placement to SNF. CM following   L knee pain: XR L knee no fracture, moderate to severe tricompartmental osteoarthritis, moderate suprapatellar joint effusion. Pain improving today. Ortho reviewed imaging, no indication for intervention at this time. Apply ice, Voltaren gel PRN.   Olecranon bursitis: XR and exam consistent with olecranon bursitis. No erythema, warmth, fevers or leukocytosis to suggest septic bursitis.  Discussed with orthopedic surgery, Dr. Eddy.  Recommended Ace bandage/compression, ice.  Monitor for improvement.  R wrist pain: XR R wrist with concerns for osteoarthritis. . Ortho reviewed imaging, no acute process. Apply ice, Voltaren gel PRN.  Elevate extremity.  Elevated LFTs: , , overall stable.  Denies abdominal pain.  Hepatitis panel negative. Statin and allopurinol held. Recheck CMP in AM. Consider further imaging if persistently elevated.  Chronic atrial fibrillation: Has a pacer and is status post Watchman; continue Plavix and follow-up with EP/cardiology as outpatient  Vitamin B12 deficiency: Continue oral supplementation   Type 2 diabetes: Continue insulin before meals and at bedtime with sliding scale. Titrate PRN. Hypoglycemic protocol ordered  Hypertension: Continue amlodipine  History of diabetic neuropathy: Continue gabapentin  History of alcohol abuse: no signs of withdrawal while admitted.   CAD: Continue atorvastatin and Plavix  History of JAYE: CPAP at night  History of vitamin D deficiency: Continue vitamin D supplements  Facial laceration: sutures

## 2024-03-07 NOTE — FLOWSHEET NOTE
Physical Therapy Daily Treatment Note   Date: 3/7/2024 Room: [address] [place] [address]    Name: Kareem Rey : 1945   MRN: [id number] Admission Date:2024      Restrictions/Precautions:  Fall Risk     Initial Pain level: 2/10 in wrists and bottom of L foot at rest, 9/10 when walking    Subjective/Observation: Pt in bed upon arrival. \"I think you guys are going to be disappointed in me. I just soiled myself.\"    Communication with other providers: A cotreat was completed this date with  [x] OT    This pt requires simultaneous intervention of 2 skilled therapists to safely complete tasks to address complexity of deficits defined in the goals including:  []Attention  []Communication   [x]Safety []UE Function   [x]Sequencing []Motor planning   [x]Initiation  [x]Balance   [x]Processing [x]Energy Conservation    [x]Patient Tolerance [x]Transfers   Pt's response to cotreat: Fair      Therapeutic Activities/Neuro Re-Education/Gait Training   Date: 3/4/24   Date: 3/5/24 Date: 3/6/24 Date: 3/7/24   Bed   Mobility ModA supine<>sit for trunk and LE management,   Jose rolling to R Mod A for trunk support, pt able to get LEs to EOB and reach for bed rail. Assist given for last 25% of supine>sit movement Min A for trunk support, pt able to get LEs to EOB and reach for bed rail. Assist given for last 25% of supine>sit movement Rolling L for maciej cleaning  Jose for trunk support, pt able to get LEs to EOB and reach for bed rail. Assist given for last 25% of supine>sit movement   STS   Transfer Min-ModA to RW from EOB 4x CGA from EOB, CGA-Min A to/from w/c with cues for hand placement CGA from EOB  Min-Mod A to/from w/c with cues for hand placement, pt unable to push up with RUE d/t wrist pain Jose from EOB, ModA from w/c with cues for hand placement    Stand   Pivot   Transfer x     x x x   Commode Transfer x     x x x   Sitting  Balance SBA at EOB     SBA at EOB SB-CGA at EOB ~8min SBA at EOB ~5'   Standing Balance   Fair w/ RW.  Pt

## 2024-03-07 NOTE — PROGRESS NOTES
Met with Patient today for our Activity. Patient has no other activity needs a this time.  Lela Szymanski,  Activities Asst.

## 2024-03-08 LAB
GLUCOSE BLD-MCNC: 117 MG/DL (ref 70–99)
GLUCOSE BLD-MCNC: 118 MG/DL (ref 70–99)
GLUCOSE BLD-MCNC: 140 MG/DL (ref 70–99)
GLUCOSE BLD-MCNC: 189 MG/DL (ref 70–99)

## 2024-03-08 PROCEDURE — 97116 GAIT TRAINING THERAPY: CPT

## 2024-03-08 PROCEDURE — 82962 GLUCOSE BLOOD TEST: CPT

## 2024-03-08 PROCEDURE — 6370000000 HC RX 637 (ALT 250 FOR IP)

## 2024-03-08 PROCEDURE — 97530 THERAPEUTIC ACTIVITIES: CPT

## 2024-03-08 PROCEDURE — 97110 THERAPEUTIC EXERCISES: CPT

## 2024-03-08 PROCEDURE — 97535 SELF CARE MNGMENT TRAINING: CPT

## 2024-03-08 PROCEDURE — 6370000000 HC RX 637 (ALT 250 FOR IP): Performed by: FAMILY MEDICINE

## 2024-03-08 PROCEDURE — 94761 N-INVAS EAR/PLS OXIMETRY MLT: CPT

## 2024-03-08 PROCEDURE — 6360000002 HC RX W HCPCS

## 2024-03-08 PROCEDURE — 1200000002 HC SEMI PRIVATE SWING BED

## 2024-03-08 RX ORDER — HYDROXYZINE PAMOATE 25 MG/1
25 CAPSULE ORAL 3 TIMES DAILY PRN
Status: DISCONTINUED | OUTPATIENT
Start: 2024-03-08 | End: 2024-03-11 | Stop reason: ALTCHOICE

## 2024-03-08 RX ADMIN — GABAPENTIN 300 MG: 300 CAPSULE ORAL at 17:08

## 2024-03-08 RX ADMIN — FERROUS SULFATE TAB 325 MG (65 MG ELEMENTAL FE) 325 MG: 325 (65 FE) TAB at 09:57

## 2024-03-08 RX ADMIN — ENOXAPARIN SODIUM 30 MG: 100 INJECTION SUBCUTANEOUS at 20:04

## 2024-03-08 RX ADMIN — NAPHAZOLINE HYDROCHLORIDE AND PHENIRAMINE MALEATE 1 DROP: .25; 3 SOLUTION/ DROPS OPHTHALMIC at 20:04

## 2024-03-08 RX ADMIN — FOLIC ACID 1 MG: 1 TABLET ORAL at 09:57

## 2024-03-08 RX ADMIN — GABAPENTIN 300 MG: 300 CAPSULE ORAL at 20:03

## 2024-03-08 RX ADMIN — NAPHAZOLINE HYDROCHLORIDE AND PHENIRAMINE MALEATE 1 DROP: .25; 3 SOLUTION/ DROPS OPHTHALMIC at 13:56

## 2024-03-08 RX ADMIN — AMLODIPINE BESYLATE 5 MG: 5 TABLET ORAL at 09:57

## 2024-03-08 RX ADMIN — CLOPIDOGREL BISULFATE 75 MG: 75 TABLET ORAL at 09:58

## 2024-03-08 RX ADMIN — MONTELUKAST SODIUM 10 MG: 10 TABLET, FILM COATED ORAL at 20:03

## 2024-03-08 RX ADMIN — HYDROXYZINE PAMOATE 25 MG: 25 CAPSULE ORAL at 20:03

## 2024-03-08 RX ADMIN — GABAPENTIN 300 MG: 300 CAPSULE ORAL at 09:57

## 2024-03-08 RX ADMIN — NAPHAZOLINE HYDROCHLORIDE AND PHENIRAMINE MALEATE 1 DROP: .25; 3 SOLUTION/ DROPS OPHTHALMIC at 17:08

## 2024-03-08 RX ADMIN — ENOXAPARIN SODIUM 30 MG: 100 INJECTION SUBCUTANEOUS at 09:57

## 2024-03-08 RX ADMIN — Medication 400 MG: at 09:57

## 2024-03-08 RX ADMIN — CYANOCOBALAMIN TAB 1000 MCG 1000 MCG: 1000 TAB at 09:57

## 2024-03-08 RX ADMIN — NAPHAZOLINE HYDROCHLORIDE AND PHENIRAMINE MALEATE 1 DROP: .25; 3 SOLUTION/ DROPS OPHTHALMIC at 09:59

## 2024-03-08 ASSESSMENT — PAIN SCALES - GENERAL
PAINLEVEL_OUTOF10: 0
PAINLEVEL_OUTOF10: 0

## 2024-03-08 NOTE — PROGRESS NOTES
Patient has his own cpap unit at the bedside for use at night during sleep.  Patient on room air and denies any further needs at this time.  Call light within reach.  RN aware.

## 2024-03-08 NOTE — PROGRESS NOTES
Occupational Therapy    Occupational Therapy Treatment Note  Name: Kareem Rey MRN: [id number] :   1945   Date:  3/8/2024   Admission Date: 2024 Room:  [address] [place] [address]     Primary Problem:  recurrent falls, peripheral neuropathy likely due to ETOH abuse and syncope.     Restrictions/Precautions:  Restrictions/Precautions  Restrictions/Precautions: Fall Risk     Communication with other providers:   Cleared for treatment by ZACARIAS Fu.    Subjective:  Patient states:  \"I don't know if I will ever walk again\"  Pain:   Location, Type, Intensity (0/10 to 10/10):  not rate R UE and foot    Objective:    Observation:  pt alert and oriented      Treatment, including education:  Transfers  Supine to sit :Minimal Assistance pull in therapist  Scooting :Stand By Assistance  Sit to stand :Moderate Assistance x 2 from bed or chair  Stand to sit :Minimal Assistance with mod verbal cues to control descent  SPT:Contact Guard Assistance using RW    Self Care Training:   Activities performed today included the following:      Grooming  Supervision to wash face and brush hair    Bathing   Moderate AssistancePt able to wash self sitting at EOB, required assistance to wash B feet and requires Mod A for sit to stand to RW to wash buttocks and maciej area and in stance is unsteady with slight Posterior leaning.    UB Dress   N/A  Donned gown    LB Dress  Moderate Assistance Pt able to thread B LEs into brief but requires assistance to pull up LE's and pt required Mod A for sit to stand to pull up brief with slight unsteadiness.        Safety Measures: Gait belt used, up in the recliner, Pull/Bed Alarm activated and call light left in reach        Assessment / Impression:        Patient's tolerance of treatment: Good   Adverse Reaction: None  Significant change in status and impact:  None  Barriers to improvement:  Dec strength and endurance    Plan for Next Session:    Continue with POC.    Time in:  1415   Time out:  1445  Total

## 2024-03-08 NOTE — FLOWSHEET NOTE
Physical Therapy Daily Treatment Note   Date: 3/8/2024 Room: [address] [place] [address]    Name: Kareem Rey : 1945   MRN: [id number] Admission Date:2024      Restrictions/Precautions:  Fall Risk     Initial Pain level: increased pain in L foot and knee when ambulating    Subjective/Observation: Pt in bed upon arrival. Agreeable to therapy session    Communication with other providers: A cotreat was completed this date with  [x] OT    This pt requires simultaneous intervention of 2 skilled therapists to safely complete tasks to address complexity of deficits defined in the goals including:  []Attention  []Communication   [x]Safety []UE Function   [x]Sequencing []Motor planning   [x]Initiation  [x]Balance   [x]Processing [x]Energy Conservation    [x]Patient Tolerance [x]Transfers   Pt's response to cotreat: Fair      Therapeutic Activities/Neuro Re-Education/Gait Training   Date: 3/5/24 Date: 3/6/24 Date: 3/7/24 3/8/24   Bed   Mobility Mod A for trunk support, pt able to get LEs to EOB and reach for bed rail. Assist given for last 25% of supine>sit movement Min A for trunk support, pt able to get LEs to EOB and reach for bed rail. Assist given for last 25% of supine>sit movement Rolling L for maciej cleaning  Jose for trunk support, pt able to get LEs to EOB and reach for bed rail. Assist given for last 25% of supine>sit movement Supine>sit Min A for trunk support, pt able to get LEs to EOB and reach for bed rail. Assist given for last 25% of supine>sit movement   STS   Transfer CGA from EOB, CGA-Min A to/from w/c with cues for hand placement CGA from EOB  Min-Mod A to/from w/c with cues for hand placement, pt unable to push up with RUE d/t wrist pain Jose from EOB, ModA from w/c with cues for hand placement  Jose from EOB 1x, ModA to/from w/c 3x with cues for hand placement    Stand   Pivot   Transfer x x x x   Commode Transfer x x x x   Sitting  Balance SBA at EOB SB-CGA at EOB ~8min SBA at EOB ~5' SBA at EOB    Standing

## 2024-03-09 LAB
ALBUMIN SERPL-MCNC: 2.9 GM/DL (ref 3.4–5)
ALP BLD-CCNC: 104 IU/L (ref 40–129)
ALT SERPL-CCNC: 129 U/L (ref 10–40)
ANION GAP SERPL CALCULATED.3IONS-SCNC: 12 MMOL/L (ref 7–16)
AST SERPL-CCNC: 138 IU/L (ref 15–37)
BILIRUB SERPL-MCNC: 0.5 MG/DL (ref 0–1)
BUN SERPL-MCNC: 24 MG/DL (ref 6–23)
CALCIUM SERPL-MCNC: 9.1 MG/DL (ref 8.3–10.6)
CHLORIDE BLD-SCNC: 101 MMOL/L (ref 99–110)
CO2: 23 MMOL/L (ref 21–32)
CREAT SERPL-MCNC: 1.2 MG/DL (ref 0.9–1.3)
GFR SERPL CREATININE-BSD FRML MDRD: >60 ML/MIN/1.73M2
GLUCOSE BLD-MCNC: 111 MG/DL (ref 70–99)
GLUCOSE BLD-MCNC: 116 MG/DL (ref 70–99)
GLUCOSE BLD-MCNC: 162 MG/DL (ref 70–99)
GLUCOSE BLD-MCNC: 162 MG/DL (ref 70–99)
GLUCOSE SERPL-MCNC: 174 MG/DL (ref 70–99)
POTASSIUM SERPL-SCNC: 4.5 MMOL/L (ref 3.5–5.1)
SODIUM BLD-SCNC: 136 MMOL/L (ref 135–145)
TOTAL PROTEIN: 6.3 GM/DL (ref 6.4–8.2)

## 2024-03-09 PROCEDURE — 36415 COLL VENOUS BLD VENIPUNCTURE: CPT

## 2024-03-09 PROCEDURE — 1200000002 HC SEMI PRIVATE SWING BED

## 2024-03-09 PROCEDURE — 6360000002 HC RX W HCPCS

## 2024-03-09 PROCEDURE — 97530 THERAPEUTIC ACTIVITIES: CPT

## 2024-03-09 PROCEDURE — 82962 GLUCOSE BLOOD TEST: CPT

## 2024-03-09 PROCEDURE — 80053 COMPREHEN METABOLIC PANEL: CPT

## 2024-03-09 PROCEDURE — 97535 SELF CARE MNGMENT TRAINING: CPT

## 2024-03-09 PROCEDURE — 6370000000 HC RX 637 (ALT 250 FOR IP)

## 2024-03-09 PROCEDURE — 94761 N-INVAS EAR/PLS OXIMETRY MLT: CPT

## 2024-03-09 RX ADMIN — NAPHAZOLINE HYDROCHLORIDE AND PHENIRAMINE MALEATE 1 DROP: .25; 3 SOLUTION/ DROPS OPHTHALMIC at 13:37

## 2024-03-09 RX ADMIN — NAPHAZOLINE HYDROCHLORIDE AND PHENIRAMINE MALEATE 1 DROP: .25; 3 SOLUTION/ DROPS OPHTHALMIC at 09:54

## 2024-03-09 RX ADMIN — NAPHAZOLINE HYDROCHLORIDE AND PHENIRAMINE MALEATE 1 DROP: .25; 3 SOLUTION/ DROPS OPHTHALMIC at 20:57

## 2024-03-09 RX ADMIN — CYANOCOBALAMIN TAB 1000 MCG 1000 MCG: 1000 TAB at 09:58

## 2024-03-09 RX ADMIN — AMLODIPINE BESYLATE 5 MG: 5 TABLET ORAL at 09:50

## 2024-03-09 RX ADMIN — Medication 400 MG: at 09:50

## 2024-03-09 RX ADMIN — FERROUS SULFATE TAB 325 MG (65 MG ELEMENTAL FE) 325 MG: 325 (65 FE) TAB at 09:50

## 2024-03-09 RX ADMIN — MONTELUKAST SODIUM 10 MG: 10 TABLET, FILM COATED ORAL at 20:57

## 2024-03-09 RX ADMIN — NAPHAZOLINE HYDROCHLORIDE AND PHENIRAMINE MALEATE 1 DROP: .25; 3 SOLUTION/ DROPS OPHTHALMIC at 17:11

## 2024-03-09 RX ADMIN — CLOPIDOGREL BISULFATE 75 MG: 75 TABLET ORAL at 09:50

## 2024-03-09 RX ADMIN — FOLIC ACID 1 MG: 1 TABLET ORAL at 09:50

## 2024-03-09 RX ADMIN — GABAPENTIN 300 MG: 300 CAPSULE ORAL at 09:50

## 2024-03-09 RX ADMIN — ENOXAPARIN SODIUM 30 MG: 100 INJECTION SUBCUTANEOUS at 09:49

## 2024-03-09 RX ADMIN — GABAPENTIN 300 MG: 300 CAPSULE ORAL at 15:01

## 2024-03-09 RX ADMIN — ENOXAPARIN SODIUM 30 MG: 100 INJECTION SUBCUTANEOUS at 20:57

## 2024-03-09 RX ADMIN — GABAPENTIN 300 MG: 300 CAPSULE ORAL at 20:57

## 2024-03-09 ASSESSMENT — PAIN SCALES - GENERAL: PAINLEVEL_OUTOF10: 0

## 2024-03-09 NOTE — FLOWSHEET NOTE
Occupational Therapy Treatment Note  Name: Kareem Rey MRN: [id number] :   1945   Date:  3/9/2024   Admission Date: 2024 Room:  [address] [place] [address]   Restrictions/Precautions:  Restrictions/Precautions  Restrictions/Precautions: Fall Risk     Primary Problem:  recurrent falls, peripheral neuropathy likely due to ETOH abuse and syncope.      Restrictions/Precautions:  Restrictions/Precautions  Restrictions/Precautions: Fall Risk     Communication with other providers:   Cleared for treatment by ZACARIAS Hook.    Subjective:  Patient states:  \"It is so hard for me to walk anymore\"  Pain:   Location, Type, Intensity (0/10 to 10/10):  8-9/10 L foot from callus, per pt    Objective:    Observation:  Lying supine in bed  Objective Measures:  Alert and oriented    Treatment, including education:  Transfers  Supine to sit :Moderate Assistance  Sit to supine :Maximum Assistance, for LE's  Scooting :Moderate Assistance  Sit to stand :Minimal Assistance, with bed elevated  Stand to sit :Contact Guard Assistance, min cues for safety    Self Care Training:   Activities performed today included the following:    Toileting  Pt incontinent of urine, changed brief    UB Dress  Minimal Assistance, donned gown with Min A    LB Dress  Moderate Assistance for donning brief, Max A for socks and shoes   LOB with hiking brief able to correct with Min A   Min A to doff brief    Therapeutic Activity Training: Pt ambulated with FWW, gait belt,and Min A due to unsteadiness.  Pt reports difficulty with advancing R foot, especially after put shoes on.  First walk in treaded socks, put shoes on to see if helped with pain in L foot, second walk struggling to advance R foot and still had pain in L foot(8-9/10)    Safety Measures: Gait belt used, Left in bed,  Pull/Bed Alarm activated and call light left in reach    Assessment / Impression:        Patient's tolerance of treatment: Good   Adverse Reaction: None  Significant change in status and  impact:  None  Barriers to improvement:  Dec strength, pain, and endurance    Plan for Next Session:    Continue with POC.    Time in:  13:52  Time out:  14:17  Total treatment time:  25 minutes  Billed Units: ADL x 1, therapeutic activity x 1  Electronically signed by:    Irene Fernandez OT/LUZ MARINA [id number]    3/9/2024, 2:24 PM    Previously filed values:     Short Term Goals  Time Frame for Short Term Goals: until discharge or all goals met  Short Term Goal 1: Patient will complete all functional transfers supervision using AE PRN  Short Term Goal 2: Patient will complete all toilet tasks supervision  Short Term Goal 3: Patient will complete UB ADLS MI  Short Term Goal 4: Patient will complete LB ADLS min A using AE PRN  Short Term Goal 5: Patient will complete 10+ minutes of ther ex/ther act with good safety awareness using AE PRN for general strengthening

## 2024-03-09 NOTE — PROGRESS NOTES
V2.0  [facility] Hospitalist Progress Note      Name:  Kareem Rey /Age/Sex: 1945  (78 y.o. male)   MRN & CSN:  [id number] & [id number] Encounter Date/Time: 3/9/2024 10:10 AM EST    Location:   PCP: Eduar Birmingham MD       Hospital Day: 10    Assessment and Plan:   Kareem Rey is a 78 y.o. male with pmh of coronary disease, paroxysmal atrial fibrillation status post Watchman device with a pacemaker, moderate aortic stenosis, gout, hypertension, hyperlipidemia, type 2 diabetes was initially presented to [facility] with a 4 and a syncope with a laceration to his head.  Patient was found moderate aortic stenosis with a SALAZAR.  Patient was admitted to swing bed program on  for Debility.  Continue PT OT.  Review case on Monday ().    Plan:  Physical debility: intermittently working with PT/OT due to pain. May need placement to SNF. CM following   L knee pain: XR L knee no fracture, moderate to severe tricompartmental osteoarthritis, moderate suprapatellar joint effusion. Pain improving today. Ortho reviewed imaging, no indication for intervention at this time. Apply ice, Voltaren gel PRN.   Olecranon bursitis: XR and exam consistent with olecranon bursitis. No erythema, warmth, fevers or leukocytosis to suggest septic bursitis.  Discussed with orthopedic surgery, Dr. Eddy.  Recommended Ace bandage/compression, ice.  Monitor for improvement.  R wrist pain: XR R wrist with concerns for osteoarthritis. . Ortho reviewed imaging, no acute process. Apply ice, Voltaren gel PRN.  Elevate extremity.  Elevated LFTs: , , overall stable.  Denies abdominal pain.  Hepatitis panel negative. Statin and allopurinol held. Recheck CMP in AM. Consider further imaging if persistently elevated.  Chronic atrial fibrillation: Has a pacer and is status post Watchman; continue Plavix and follow-up with EP/cardiology as outpatient  Vitamin B12 deficiency: Continue oral

## 2024-03-10 LAB
GLUCOSE BLD-MCNC: 119 MG/DL (ref 70–99)
GLUCOSE BLD-MCNC: 119 MG/DL (ref 70–99)
GLUCOSE BLD-MCNC: 140 MG/DL (ref 70–99)
GLUCOSE BLD-MCNC: 140 MG/DL (ref 70–99)

## 2024-03-10 PROCEDURE — 6370000000 HC RX 637 (ALT 250 FOR IP)

## 2024-03-10 PROCEDURE — 6360000002 HC RX W HCPCS

## 2024-03-10 PROCEDURE — 82962 GLUCOSE BLOOD TEST: CPT

## 2024-03-10 PROCEDURE — 1200000002 HC SEMI PRIVATE SWING BED

## 2024-03-10 RX ADMIN — FOLIC ACID 1 MG: 1 TABLET ORAL at 10:21

## 2024-03-10 RX ADMIN — AMLODIPINE BESYLATE 5 MG: 5 TABLET ORAL at 10:21

## 2024-03-10 RX ADMIN — NAPHAZOLINE HYDROCHLORIDE AND PHENIRAMINE MALEATE 1 DROP: .25; 3 SOLUTION/ DROPS OPHTHALMIC at 10:21

## 2024-03-10 RX ADMIN — CLOPIDOGREL BISULFATE 75 MG: 75 TABLET ORAL at 10:21

## 2024-03-10 RX ADMIN — CYANOCOBALAMIN TAB 1000 MCG 1000 MCG: 1000 TAB at 10:21

## 2024-03-10 RX ADMIN — NAPHAZOLINE HYDROCHLORIDE AND PHENIRAMINE MALEATE 1 DROP: .25; 3 SOLUTION/ DROPS OPHTHALMIC at 18:33

## 2024-03-10 RX ADMIN — GABAPENTIN 300 MG: 300 CAPSULE ORAL at 21:29

## 2024-03-10 RX ADMIN — Medication 400 MG: at 10:21

## 2024-03-10 RX ADMIN — ENOXAPARIN SODIUM 30 MG: 100 INJECTION SUBCUTANEOUS at 10:20

## 2024-03-10 RX ADMIN — NAPHAZOLINE HYDROCHLORIDE AND PHENIRAMINE MALEATE 1 DROP: .25; 3 SOLUTION/ DROPS OPHTHALMIC at 21:29

## 2024-03-10 RX ADMIN — MONTELUKAST SODIUM 10 MG: 10 TABLET, FILM COATED ORAL at 21:29

## 2024-03-10 RX ADMIN — GABAPENTIN 300 MG: 300 CAPSULE ORAL at 18:23

## 2024-03-10 RX ADMIN — GABAPENTIN 300 MG: 300 CAPSULE ORAL at 10:21

## 2024-03-10 RX ADMIN — FERROUS SULFATE TAB 325 MG (65 MG ELEMENTAL FE) 325 MG: 325 (65 FE) TAB at 10:21

## 2024-03-10 RX ADMIN — ENOXAPARIN SODIUM 30 MG: 100 INJECTION SUBCUTANEOUS at 21:29

## 2024-03-10 ASSESSMENT — PAIN SCALES - GENERAL: PAINLEVEL_OUTOF10: 0

## 2024-03-11 LAB
ANION GAP SERPL CALCULATED.3IONS-SCNC: 12 MMOL/L (ref 7–16)
BUN SERPL-MCNC: 33 MG/DL (ref 6–23)
CALCIUM SERPL-MCNC: 9.2 MG/DL (ref 8.3–10.6)
CHLORIDE BLD-SCNC: 103 MMOL/L (ref 99–110)
CO2: 24 MMOL/L (ref 21–32)
CREAT SERPL-MCNC: 1.3 MG/DL (ref 0.9–1.3)
GFR SERPL CREATININE-BSD FRML MDRD: 56 ML/MIN/1.73M2
GLUCOSE BLD-MCNC: 151 MG/DL (ref 70–99)
GLUCOSE BLD-MCNC: 196 MG/DL (ref 70–99)
GLUCOSE BLD-MCNC: 197 MG/DL (ref 70–99)
GLUCOSE BLD-MCNC: 209 MG/DL (ref 70–99)
GLUCOSE SERPL-MCNC: 160 MG/DL (ref 70–99)
HCT VFR BLD CALC: 32.8 % (ref 42–52)
HEMOGLOBIN: 10.5 GM/DL (ref 13.5–18)
MAGNESIUM: 2 MG/DL (ref 1.8–2.4)
MCH RBC QN AUTO: 34.7 PG (ref 27–31)
MCHC RBC AUTO-ENTMCNC: 32 % (ref 32–36)
MCV RBC AUTO: 108.3 FL (ref 78–100)
PDW BLD-RTO: 13.8 % (ref 11.7–14.9)
PLATELET # BLD: 304 K/CU MM (ref 140–440)
PMV BLD AUTO: 9.4 FL (ref 7.5–11.1)
POTASSIUM SERPL-SCNC: 4.4 MMOL/L (ref 3.5–5.1)
RBC # BLD: 3.03 M/CU MM (ref 4.6–6.2)
SODIUM BLD-SCNC: 139 MMOL/L (ref 135–145)
WBC # BLD: 6.8 K/CU MM (ref 4–10.5)

## 2024-03-11 PROCEDURE — 80048 BASIC METABOLIC PNL TOTAL CA: CPT

## 2024-03-11 PROCEDURE — 82962 GLUCOSE BLOOD TEST: CPT

## 2024-03-11 PROCEDURE — 6370000000 HC RX 637 (ALT 250 FOR IP)

## 2024-03-11 PROCEDURE — 36415 COLL VENOUS BLD VENIPUNCTURE: CPT

## 2024-03-11 PROCEDURE — 97116 GAIT TRAINING THERAPY: CPT

## 2024-03-11 PROCEDURE — 94761 N-INVAS EAR/PLS OXIMETRY MLT: CPT

## 2024-03-11 PROCEDURE — 6360000002 HC RX W HCPCS

## 2024-03-11 PROCEDURE — 83735 ASSAY OF MAGNESIUM: CPT

## 2024-03-11 PROCEDURE — 85027 COMPLETE CBC AUTOMATED: CPT

## 2024-03-11 PROCEDURE — 97530 THERAPEUTIC ACTIVITIES: CPT

## 2024-03-11 PROCEDURE — 1200000002 HC SEMI PRIVATE SWING BED

## 2024-03-11 PROCEDURE — 97535 SELF CARE MNGMENT TRAINING: CPT

## 2024-03-11 RX ORDER — HYDROXYZINE HYDROCHLORIDE 25 MG/1
25 TABLET, FILM COATED ORAL 3 TIMES DAILY PRN
Status: DISCONTINUED | OUTPATIENT
Start: 2024-03-11 | End: 2024-03-22 | Stop reason: HOSPADM

## 2024-03-11 RX ADMIN — CLOPIDOGREL BISULFATE 75 MG: 75 TABLET ORAL at 09:48

## 2024-03-11 RX ADMIN — GABAPENTIN 300 MG: 300 CAPSULE ORAL at 09:47

## 2024-03-11 RX ADMIN — NAPHAZOLINE HYDROCHLORIDE AND PHENIRAMINE MALEATE 1 DROP: .25; 3 SOLUTION/ DROPS OPHTHALMIC at 09:43

## 2024-03-11 RX ADMIN — MONTELUKAST SODIUM 10 MG: 10 TABLET, FILM COATED ORAL at 21:01

## 2024-03-11 RX ADMIN — AMLODIPINE BESYLATE 5 MG: 5 TABLET ORAL at 09:47

## 2024-03-11 RX ADMIN — GABAPENTIN 300 MG: 300 CAPSULE ORAL at 21:01

## 2024-03-11 RX ADMIN — CYANOCOBALAMIN TAB 1000 MCG 1000 MCG: 1000 TAB at 09:47

## 2024-03-11 RX ADMIN — ENOXAPARIN SODIUM 30 MG: 100 INJECTION SUBCUTANEOUS at 21:01

## 2024-03-11 RX ADMIN — FOLIC ACID 1 MG: 1 TABLET ORAL at 09:48

## 2024-03-11 RX ADMIN — Medication 400 MG: at 09:48

## 2024-03-11 RX ADMIN — NAPHAZOLINE HYDROCHLORIDE AND PHENIRAMINE MALEATE 1 DROP: .25; 3 SOLUTION/ DROPS OPHTHALMIC at 16:33

## 2024-03-11 RX ADMIN — ENOXAPARIN SODIUM 30 MG: 100 INJECTION SUBCUTANEOUS at 09:47

## 2024-03-11 RX ADMIN — GABAPENTIN 300 MG: 300 CAPSULE ORAL at 16:33

## 2024-03-11 RX ADMIN — FERROUS SULFATE TAB 325 MG (65 MG ELEMENTAL FE) 325 MG: 325 (65 FE) TAB at 09:47

## 2024-03-11 RX ADMIN — NAPHAZOLINE HYDROCHLORIDE AND PHENIRAMINE MALEATE 1 DROP: .25; 3 SOLUTION/ DROPS OPHTHALMIC at 11:55

## 2024-03-11 RX ADMIN — NAPHAZOLINE HYDROCHLORIDE AND PHENIRAMINE MALEATE 1 DROP: .25; 3 SOLUTION/ DROPS OPHTHALMIC at 21:01

## 2024-03-11 RX ADMIN — ERGOCALCIFEROL 50000 UNITS: 1.25 CAPSULE ORAL at 09:47

## 2024-03-11 ASSESSMENT — PAIN SCALES - GENERAL: PAINLEVEL_OUTOF10: 0

## 2024-03-11 NOTE — FLOWSHEET NOTE
Physical Therapy Daily Treatment Note   Date: 3/11/2024 Room: [address] [place] [address]    Name: Kareem Rey : 1945   MRN: [id number] Admission Date:2024      Restrictions/Precautions:  Fall Risk     Initial Pain level: increased pain in R wrist and L knee when ambulating    Subjective/Observation: \" I don't know how this is going to go\".     Communication with other providers:     Therapeutic Activities/Neuro Re-Education/Gait Training   Date: 3/6/24 Date: 3/7/24 3/8/24 3/11/24   Bed   Mobility Min A for trunk support, pt able to get LEs to EOB and reach for bed rail. Assist given for last 25% of supine>sit movement Rolling L for maciej cleaning  Jose for trunk support, pt able to get LEs to EOB and reach for bed rail. Assist given for last 25% of supine>sit movement Supine>sit Min A for trunk support, pt able to get LEs to EOB and reach for bed rail. Assist given for last 25% of supine>sit movement Supine>sit Min A for trunk support, pt able to get LEs to EOB and reach for bed rail. HHA for trunk support   STS   Transfer CGA from EOB  Min-Mod A to/from w/c with cues for hand placement, pt unable to push up with RUE d/t wrist pain Jose from EOB, ModA from w/c with cues for hand placement  Jose from EOB 1x, ModA to/from w/c 3x with cues for hand placement  Mod A STS at EOB  Max A ascending from w/c with cues for UE assist, to RW   Sitting  Balance SB-CGA at EOB ~8min SBA at EOB ~5' SBA at EOB  Initially Mod A at EOB upon sitting up due to posterior LOB. With assist to sit forward, pt able to maintain seated position with CGA-SBA x5min   Standing Balance   Pt stood for pericare and donning brief 2x4' and at sink to wash hands  with 1UE assist on RW and CGA for safety. Pt able to reach to wipe several times with no LOB CGA to abhay brief with 1 UE on RW, CGA for amb CGA for amb CGA-Mod A during amb, see below    Min A when pulling up brief as pt uses EOB for support     Ambulation 5ft + 15ft + 8ft with RW and CGA. Short

## 2024-03-11 NOTE — PROGRESS NOTES
V2.0  [facility] [place]      Name:  Kareem Rey /Age/Sex: 1945  (78 y.o. male)   MRN & CSN:  [id number] & [id number] Encounter Date/Time: 3/11/2024 11:11 AM EDT    Location:   PCP: Eduar Birmingham MD       Hospital Day: 12    Assessment and Plan:   Kareem Rey is a 78 y.o. male with pmh of coronary disease, paroxysmal atrial fibrillation status post Watchman device with a pacemaker, moderate aortic stenosis, gout, hypertension, hyperlipidemia, type 2 diabetes was initially presented to [facility] with a 4 and a syncope with a laceration to his head.  Patient was found moderate aortic stenosis with a SALAZAR.  Patient was admitted to swing bed program on  for Debility.  Case reviewed today. [facility] approved an extension to the patient's stay in the swing bed program.  Continue PT OT.    Plan:  Physical debility: Still complaining of left foot and right elbow pain during physical therapy.  L knee pain: XR L knee no fracture, moderate to severe tricompartmental osteoarthritis, moderate suprapatellar joint effusion. Pain improving today. Ortho reviewed imaging, no indication for intervention at this time. Apply ice, Voltaren gel PRN.   Olecranon bursitis: XR and exam consistent with olecranon bursitis. No erythema, warmth, fevers or leukocytosis to suggest septic bursitis.  Discussed with orthopedic surgery, Dr. Eddy.  Recommended Ace bandage/compression, ice.  Monitor for improvement.  R wrist pain: XR R wrist with concerns for osteoarthritis. . Ortho reviewed imaging, no acute process. Apply ice, Voltaren gel PRN.  Elevate extremity.  Elevated LFTs: , , overall stable.  Denies abdominal pain.  Hepatitis panel negative. Statin and allopurinol held. Recheck CMP in AM. Consider further imaging if persistently elevated.  Chronic atrial fibrillation: Has a pacer and is status post Watchman; continue Plavix and follow-up with EP/cardiology  14.9 %    Platelets 304 140 - 440 K/CU MM    MPV 9.4 7.5 - 11.1 FL        Imaging/Diagnostics Last 24 Hours   No results found.    Electronically signed by BENSON Greenfield - CNP on 3/11/2024 at 11:11 AM

## 2024-03-11 NOTE — CARE COORDINATION
CM submitted updated clinical documentation to [facility] as requested.  CM will follow.      9:31 AM  [facility] approved an extension to the patient's stay in the swing bed program and requested that updated clinical documentation be submitted to them Friday 3/15/24.  CM will follow.

## 2024-03-12 LAB
GLUCOSE BLD-MCNC: 134 MG/DL (ref 70–99)
GLUCOSE BLD-MCNC: 148 MG/DL (ref 70–99)
GLUCOSE BLD-MCNC: 209 MG/DL (ref 70–99)
GLUCOSE BLD-MCNC: 226 MG/DL (ref 70–99)

## 2024-03-12 PROCEDURE — 97530 THERAPEUTIC ACTIVITIES: CPT

## 2024-03-12 PROCEDURE — 1200000002 HC SEMI PRIVATE SWING BED

## 2024-03-12 PROCEDURE — 97112 NEUROMUSCULAR REEDUCATION: CPT

## 2024-03-12 PROCEDURE — 97535 SELF CARE MNGMENT TRAINING: CPT

## 2024-03-12 PROCEDURE — 6370000000 HC RX 637 (ALT 250 FOR IP)

## 2024-03-12 PROCEDURE — 6360000002 HC RX W HCPCS

## 2024-03-12 PROCEDURE — 94761 N-INVAS EAR/PLS OXIMETRY MLT: CPT

## 2024-03-12 PROCEDURE — 97116 GAIT TRAINING THERAPY: CPT

## 2024-03-12 PROCEDURE — 82962 GLUCOSE BLOOD TEST: CPT

## 2024-03-12 RX ADMIN — CLOPIDOGREL BISULFATE 75 MG: 75 TABLET ORAL at 08:12

## 2024-03-12 RX ADMIN — FERROUS SULFATE TAB 325 MG (65 MG ELEMENTAL FE) 325 MG: 325 (65 FE) TAB at 08:13

## 2024-03-12 RX ADMIN — ENOXAPARIN SODIUM 30 MG: 100 INJECTION SUBCUTANEOUS at 08:12

## 2024-03-12 RX ADMIN — NAPHAZOLINE HYDROCHLORIDE AND PHENIRAMINE MALEATE 1 DROP: .25; 3 SOLUTION/ DROPS OPHTHALMIC at 08:12

## 2024-03-12 RX ADMIN — GABAPENTIN 300 MG: 300 CAPSULE ORAL at 15:14

## 2024-03-12 RX ADMIN — AMLODIPINE BESYLATE 5 MG: 5 TABLET ORAL at 08:13

## 2024-03-12 RX ADMIN — GABAPENTIN 300 MG: 300 CAPSULE ORAL at 08:12

## 2024-03-12 RX ADMIN — MONTELUKAST SODIUM 10 MG: 10 TABLET, FILM COATED ORAL at 20:05

## 2024-03-12 RX ADMIN — GABAPENTIN 300 MG: 300 CAPSULE ORAL at 20:05

## 2024-03-12 RX ADMIN — CYANOCOBALAMIN TAB 1000 MCG 1000 MCG: 1000 TAB at 08:12

## 2024-03-12 RX ADMIN — ENOXAPARIN SODIUM 30 MG: 100 INJECTION SUBCUTANEOUS at 20:04

## 2024-03-12 RX ADMIN — NAPHAZOLINE HYDROCHLORIDE AND PHENIRAMINE MALEATE 1 DROP: .25; 3 SOLUTION/ DROPS OPHTHALMIC at 17:03

## 2024-03-12 RX ADMIN — Medication 400 MG: at 08:13

## 2024-03-12 RX ADMIN — INSULIN LISPRO 1 UNITS: 100 INJECTION, SOLUTION INTRAVENOUS; SUBCUTANEOUS at 20:04

## 2024-03-12 RX ADMIN — NAPHAZOLINE HYDROCHLORIDE AND PHENIRAMINE MALEATE 1 DROP: .25; 3 SOLUTION/ DROPS OPHTHALMIC at 20:04

## 2024-03-12 RX ADMIN — FOLIC ACID 1 MG: 1 TABLET ORAL at 08:13

## 2024-03-12 ASSESSMENT — PAIN SCALES - GENERAL
PAINLEVEL_OUTOF10: 0
PAINLEVEL_OUTOF10: 0

## 2024-03-12 NOTE — PROGRESS NOTES
Occupational Therapy    Occupational Therapy Treatment Note  Name: Kareem Rey MRN: [id number] :   1945   Date:  3/12/2024   Admission Date: 2024 Room:  [address] [place] [address]     Primary Problem:  recurrent falls, peripheral neuropathy likely due to ETOH abuse and syncope.     Restrictions/Precautions:  Restrictions/Precautions  Restrictions/Precautions: Fall Risk     Communication with other providers:   Cleared for treatment by RN.    Subjective:  Patient states:  \"I feel real clean but wore out\"  Pain:   Location, Type, Intensity (0/10 to 10/10):  not rated R UE and foot    Objective:    Observation:  pt alert and oriented      Treatment, including education:  Transfers  Supine to sit :Moderate Assistance pull in therapist  Scooting :Stand By Assistance  Sit to stand :Moderate Assistance x 1-2 from bed or chair  Stand to sit :Minimal Assistance with min verbal cues to control descent  SPT:Contact Guard Assistance using RW  Shower: CGA to get in shower using grab bars and mod verbal cues. Min A for sit to stand off shower bench.    Self Care Training:   Activities performed today included the following:      Grooming  Supervision to wash face and brush hair    Bathing   Moderate AssistancePt able to wash self sitting at EOB, required assistance to wash B feet and requires Mod A for sit to stand to RW to wash buttocks in stance using grab bars for balance.    UB Dress  Minimal Assistance pt able to thread B UEs and pull over head but required assistance to pull down over shoulders due to damp skin.    LB Dress  Maximum Assistance Pt required assistance to thread B LEs into brief but requires assistance to pull up LE's and pt required Mod A for sit to stand to pull up brief with slight unsteadiness.    Therapeutic Activity Training:   Pt completed functional mobility with RW x 50' with CGA to Min A.      Safety Measures: Gait belt used, up in the recliner, Pull/Bed Alarm activated and call light left in

## 2024-03-12 NOTE — FLOWSHEET NOTE
Physical Therapy Daily Treatment Note   Date: 3/12/2024 Room: [address] [place] [address]    Name: Kareem Rey : 1945   MRN: [id number] Admission Date:2024      Restrictions/Precautions:  Fall Risk     Initial Pain level: increased pain in R wrist and L knee when ambulating    Subjective/Observation: Pt resting in bed, agreeable to therapy session.    Communication with other providers: cotx with SH[facility] for pt tolerance and safety with standing showering tasks    Therapeutic Activities/Neuro Re-Education/Gait Training   3/8/24 3/11/24 3/12/24   Bed   Mobility Supine>sit Min A for trunk support, pt able to get LEs to EOB and reach for bed rail. Assist given for last 25% of supine>sit movement Supine>sit Min A for trunk support, pt able to get LEs to EOB and reach for bed rail. HHA for trunk support Supine>sit Min A for trunk support, pt able to get LEs to EOB and reach for bed rail. HHA for trunk support   STS   Transfer Jose from EOB 1x, ModA to/from w/c 3x with cues for hand placement  Mod A STS at EOB  Max A ascending from w/c with cues for UE assist, to RW Mod-Max A at EOB and w/c, cues required for hand placement and eccentric control   Sitting  Balance SBA at EOB  Initially Mod A at EOB upon sitting up due to posterior LOB. With assist to sit forward, pt able to maintain seated position with CGA-SBA x5min CGA-SBA this date at EOB and while in shower with dynamic tasks x20 min with 0-1UE support   Standing Balance   CGA for amb CGA-Mod A during amb, see below    Min A when pulling up brief as pt uses EOB for support CGA-Min A for amb, CGA in shower for standing static and dynamic tasks     Ambulation 84ft + 8ft + later 10ft with RW and CGA, decreased step length/height and step-to gait pattern noted. Given cues with minimal improvement in step height First walk ~50ft with RW, mostly close CGA with 2 instances of Mod A d/t postural sway. Pt able to recover and continue amb  After seated rest, pt then amb ~60ft with  improved balance CGA. Cues for upright posture, and improved pt>RW distance to decrease R foot making contact with RW. 65ft + 10ft with RW and CGA-Min A for balance. Decreased step length/height and step-to gait pattern noted. Assist for walker>pt distance on occasion     Therapeutic Exercises   Date: 3/8/24   Date:  Date:  Date:   Nustep 8' Lvl 3 with BLEs for improved strength and endurance               Other therapeutic activity: PT returned in PM to assist RN with STS and pivot back to bed. Max A for stand with cues for hand placement and assist to correct posterior lean. Min A for walker mgmt with Max cues for lifting feet and pivoting to EOB    Education provided:     Treatment/Activity Tolerance:   [x] Patient limited by fatigue   [] Patient limited by pain   [] Patient limited by other medical complications   [x] Patient tolerated therapy well  [] Other:     Safety Precautions:     [] Left in bed    [x] Left in chair   [x] Call light within reach    [] Bed alarm on    [x] Personal alarm on    [] Other staff present:  [] Family/Caregiver present:      Post Tx Pain Rating: Same as above    Social/Functional History:  Lives With: Spouse  Type of Home: House  Home Layout: One level  Home Access: Level entry, Ramped entrance          PT Equipment:  Home Equipment: Rollator, Cane, Lift chair    Evaluation Assessment    Assessment: Patient is a 78 y.o. male who presents to [facility] for the swingbed program post hospitalization for syncope, recurrent falls, peripheral neuropathy likely due to ETOH abuse. Patient presents below baseline functional level and would benefit from continued skilled physical therapy services to address decreased strength, ROM, endurance, impaired balance, and decline in functional mobility.    Goals:         Short Term Goals  Time Frame for Short Term Goals: 1 week or until discharge  Short Term Goal 1: Patient will complete all bed mobility with Mod I  Short Term Goal 2: Patient will complete

## 2024-03-12 NOTE — PROGRESS NOTES
Occupational Therapy    Occupational Therapy Treatment Note  Name: Kareem Rey MRN: [id number] :   1945   Date:  24   Admission Date: 2024 Room:  [address] [place] [address]     Primary Problem:  recurrent falls, peripheral neuropathy likely due to ETOH abuse and syncope.     Restrictions/Precautions:  Restrictions/Precautions  Restrictions/Precautions: Fall Risk     Communication with other providers:   Cleared for treatment by ZACARIAS Fu.    Subjective:  Patient states:  \"I don't know if I will ever walk again\"  Pain:   Location, Type, Intensity (0/10 to 10/10):  not rate R UE and foot    Objective:    Observation:  pt alert and oriented      Treatment, including education:  Transfers  Supine to sit :Minimal Assistance pull in therapist  Scooting :Stand By Assistance  Sit to stand :Moderate Assistance x 2 from bed or chair  Stand to sit :Minimal Assistance with mod verbal cues to control descent  SPT:Contact Guard Assistance using RW    Self Care Training:   Activities performed today included the following:      Grooming  Supervision to wash face and brush hair    Bathing   Moderate AssistancePt able to wash self sitting at EOB, required assistance to wash B feet and requires Mod A for sit to stand to RW to wash buttocks and maciej area and in stance is unsteady with slight Posterior leaning.    UB Dress   N/A  Donned gown    LB Dress  Moderate Assistance Pt able to thread B LEs into brief but requires assistance to pull up LE's and pt required Mod A for sit to stand to pull up brief with slight unsteadiness.        Safety Measures: Gait belt used, up in the recliner, Pull/Bed Alarm activated and call light left in reach        Assessment / Impression:        Patient's tolerance of treatment: Good   Adverse Reaction: None  Significant change in status and impact:  None  Barriers to improvement:  Dec strength and endurance    Plan for Next Session:    Continue with POC.    Time in:  1415   Time out:  1445  Total

## 2024-03-12 NOTE — PROGRESS NOTES
SWING BED WEEKLY TEAM SHEET      WEEK#  2     NUTRITION   Diet:  Diabetic 5 cabrs per meals                                             Supplement: standard high calorie, high protein TID     TF:  n/a                                   TPN:  n/a   Appropriate/Adequate [X] yes [ ] no                    Meal intakes: 51-75%, %     Weight: 239#-no new weight from admission                          BMI: 33.5                                                 Significant Change: None noted     Nutrition Assessment:  Pt remains in swing bed at this time. Diet order remains diabetic 5 carbs per meal with standard high calorie high protein oral supplement TID. Pt at this time denies further nutrition interventions or needs. Reports decent appetite. Noted intakes consistently 50% or more which is adequate to meet needs. Will continue with oral supplements, however, if oral intake remains adequate supplements may no longer be necessary. Pt does continue to meet criteria for moderate malnutrition. Due to improvement in po intake and nutrition interventions in place will monitor and follow at low risk-will follow up sooner if noted change in condition.     Nutrition Diagnosis: Pt meets ASPEN criteria for moderate malnutrition in context of chronic illness r/t injury/trauma AEB moderate muscle loss, wt loss, poor intake prior to admission and criteria as identified in malnutrition assessment.      Recommendations: Continue current diet, continue oral supplements. Encourage intake at all meals.      Issues to be resolved before discharge: Meet at least 75% of estimated nutrition needs     Dietitian Signature:   Jesus Pfeiffer RD, LD  [phone]     For full initial assessment see nutrition flowsheet.

## 2024-03-12 NOTE — CARE COORDINATION
CM met with the patient to notify him that his insurance provider has requested that updated clinical documentation be submitted to them Friday 3/15/24.  CM advised the patient that there is a possibility a termination letter may be issued Friday.  Patient stated that he feels he is not yet ready to return home and hopes that his insurance provider will approve an extension.  CM will follow.

## 2024-03-12 NOTE — CONSULTS
[facility] Contin[facility] Nurse  Consult Note       Kareem Rey  AGE: 78 y.o.   GENDER: male  : 1945  TODAY'S DATE:  3/12/2024    Subjective:     Reason for CWOCN Evaluation and Assessment: wound reassessment      Kareem Rey is a 78 y.o. male referred by:   [x] Physician  [] Nursing  [] Other:     Wound Identification:  Wound Type: diabetic and traumatic  Contributing Factors: edema, diabetes, chronic pressure, decreased mobility, malnutrition, and incontinence of urine        PAST MEDICAL HISTORY        Diagnosis Date    Abnormal nuclear stress test 2022    Inferior wall ischemia 2022    Arthritis     CAD s/p PCI to LAD+RCA 2022    1. PCI to Proximal LAD for 80 -90 % lesion reduced to 0 % with  TYSON III Flow reduced to 0 % stenosis with 3.5 X 12 BARTOLO ,  followed by post dilated with 3.75 X 8 NC balloon  2. Balloon angioplasty of distal LAD for 90 % lesion reduced to  0 % using 2.0 X 10 Balloon TYSON III flow  3. PCI to ostial 80 % RCA lesion and mid RCA lesion of 80 % post  Balloon angioplasty using 4.0 X 34 BARTOLO inflated to 16    Dizziness     FH: CAD (coronary artery disease) 2015    H/O 24 hour EKG monitoring 19,2016    Conclusion: Atrial fibrillation with  fairly well-controlled ventricular rate response without evidence of any significant tachy or alon arrhythmias.    H/O atrial fibrillation without current medication 2019    H/O cardiovascular stress test 11/24/15; 2019    Normal perfusion in the distribution of all coronaries, normal LV, EF 53%.    H/O Doppler echocardiogram 2022    EF 50%. Mild concentric LV hypertrophy with grade 3 diastolic dysfunction. Mild bilateral atrial enlargement. Mildly dilated RV. Heavily sclerotic aortic valve with moderate aortic stenosis, mean gradient of 26 and an JACK of 1.11. Moderate aortic regurg is noted with pressure half time of 466. Moderate tricuspid and mitral regurg. Pacer wire noted on right side  LAD+RCA 9/9/22    ASCVD (arteriosclerotic cardiovascular disease)    Post PTCA    Status post left heart catheterization (LHC) by percutaneous approach    Chronic atrial fibrillation (HCC)    Presence of Watchman left atrial appendage closure device    Hypercoagulable state due to chronic atrial fibrillation (HCC)    Gastrointestinal hemorrhage    Tremors of nervous system    Syncope, unspecified syncope type    Frequent falls    Syncope and collapse    Moderate malnutrition (HCC)    Debility       Measurements:  Wound 02/26/24 Foot Left;Plantar (Active)   Wound Image   03/12/24 0930   Wound Etiology Diabetic 03/12/24 0930   Dressing Status New dressing applied 03/12/24 0930   Wound Cleansed Cleansed with saline 03/12/24 0930   Dressing/Treatment Betadine swabs/povidone iodine;Silicone border 03/12/24 0930   Wound Length (cm) 1.8 cm 03/12/24 0930   Wound Width (cm) 1.8 cm 03/12/24 0930   Wound Depth (cm) 0.1 cm 03/12/24 0930   Wound Surface Area (cm^2) 3.24 cm^2 03/12/24 0930   Change in Wound Size % (l*w) -8 03/12/24 0930   Wound Volume (cm^3) 0.324 cm^3 03/12/24 0930   Distance Tunneling (cm) 0 cm 03/12/24 0930   Tunneling Position ___ O'Clock 0 03/12/24 0930   Undermining Starts ___ O'Clock 0 03/12/24 0930   Undermining Ends___ O'Clock 0 03/12/24 0930   Undermining Maxium Distance (cm) 0 03/12/24 0930   Wound Assessment Eschar dry 03/12/24 0930   Drainage Amount None (dry) 03/12/24 0930   Odor None 03/12/24 0930   Jessica-wound Assessment Hyperkeratosis (callous) 03/12/24 0930   Margins Attached edges 03/12/24 0930   Number of days: 14       Wound 03/12/24 Face Right (Active)   Wound Image   03/12/24 0930   Wound Etiology Traumatic 03/12/24 0930   Wound Cleansed Cleansed with saline 03/12/24 0930   Dressing/Treatment Open to air 03/12/24 0930   Wound Length (cm) 0.3 cm 03/12/24 0930   Wound Width (cm) 1 cm 03/12/24 0930   Wound Depth (cm) 0.1 cm 03/12/24 0930   Wound Surface Area (cm^2) 0.3 cm^2 03/12/24 0930

## 2024-03-13 LAB
GLUCOSE BLD-MCNC: 129 MG/DL (ref 70–99)
GLUCOSE BLD-MCNC: 192 MG/DL (ref 70–99)
GLUCOSE BLD-MCNC: 211 MG/DL (ref 70–99)
GLUCOSE BLD-MCNC: 264 MG/DL (ref 70–99)

## 2024-03-13 PROCEDURE — 82962 GLUCOSE BLOOD TEST: CPT

## 2024-03-13 PROCEDURE — 97116 GAIT TRAINING THERAPY: CPT

## 2024-03-13 PROCEDURE — 97530 THERAPEUTIC ACTIVITIES: CPT

## 2024-03-13 PROCEDURE — 94761 N-INVAS EAR/PLS OXIMETRY MLT: CPT

## 2024-03-13 PROCEDURE — 6370000000 HC RX 637 (ALT 250 FOR IP)

## 2024-03-13 PROCEDURE — 1200000002 HC SEMI PRIVATE SWING BED

## 2024-03-13 PROCEDURE — 97535 SELF CARE MNGMENT TRAINING: CPT

## 2024-03-13 PROCEDURE — 6360000002 HC RX W HCPCS

## 2024-03-13 RX ADMIN — INSULIN LISPRO 2 UNITS: 100 INJECTION, SOLUTION INTRAVENOUS; SUBCUTANEOUS at 21:14

## 2024-03-13 RX ADMIN — MONTELUKAST SODIUM 10 MG: 10 TABLET, FILM COATED ORAL at 21:06

## 2024-03-13 RX ADMIN — CYANOCOBALAMIN TAB 1000 MCG 1000 MCG: 1000 TAB at 11:30

## 2024-03-13 RX ADMIN — Medication 400 MG: at 11:30

## 2024-03-13 RX ADMIN — NAPHAZOLINE HYDROCHLORIDE AND PHENIRAMINE MALEATE 1 DROP: .25; 3 SOLUTION/ DROPS OPHTHALMIC at 11:51

## 2024-03-13 RX ADMIN — AMLODIPINE BESYLATE 5 MG: 5 TABLET ORAL at 11:30

## 2024-03-13 RX ADMIN — GABAPENTIN 300 MG: 300 CAPSULE ORAL at 11:30

## 2024-03-13 RX ADMIN — NAPHAZOLINE HYDROCHLORIDE AND PHENIRAMINE MALEATE 1 DROP: .25; 3 SOLUTION/ DROPS OPHTHALMIC at 15:53

## 2024-03-13 RX ADMIN — FERROUS SULFATE TAB 325 MG (65 MG ELEMENTAL FE) 325 MG: 325 (65 FE) TAB at 11:30

## 2024-03-13 RX ADMIN — CLOPIDOGREL BISULFATE 75 MG: 75 TABLET ORAL at 11:30

## 2024-03-13 RX ADMIN — NAPHAZOLINE HYDROCHLORIDE AND PHENIRAMINE MALEATE 1 DROP: .25; 3 SOLUTION/ DROPS OPHTHALMIC at 18:50

## 2024-03-13 RX ADMIN — INSULIN LISPRO 1 UNITS: 100 INJECTION, SOLUTION INTRAVENOUS; SUBCUTANEOUS at 16:48

## 2024-03-13 RX ADMIN — NAPHAZOLINE HYDROCHLORIDE AND PHENIRAMINE MALEATE 1 DROP: .25; 3 SOLUTION/ DROPS OPHTHALMIC at 21:06

## 2024-03-13 RX ADMIN — GABAPENTIN 300 MG: 300 CAPSULE ORAL at 15:54

## 2024-03-13 RX ADMIN — FOLIC ACID 1 MG: 1 TABLET ORAL at 11:30

## 2024-03-13 RX ADMIN — ENOXAPARIN SODIUM 30 MG: 100 INJECTION SUBCUTANEOUS at 11:29

## 2024-03-13 RX ADMIN — ENOXAPARIN SODIUM 30 MG: 100 INJECTION SUBCUTANEOUS at 21:06

## 2024-03-13 RX ADMIN — GABAPENTIN 300 MG: 300 CAPSULE ORAL at 21:06

## 2024-03-13 ASSESSMENT — PAIN SCALES - GENERAL
PAINLEVEL_OUTOF10: 0

## 2024-03-13 NOTE — PROGRESS NOTES
Occupational Therapy    Occupational Therapy Treatment Note  Name: Kareem Rey MRN: [id number] :   1945   Date:  3/13/2024   Admission Date: 2024 Room:  [address] [place] [address]     Primary Problem:  recurrent falls, peripheral neuropathy likely due to ETOH abuse and syncope.     Restrictions/Precautions:  Restrictions/Precautions  Restrictions/Precautions: Fall Risk     Communication with other providers:   Cleared for treatment by RN.    Subjective:  Patient states:  \"I would like to just stay in bed\"  Pain:   Location, Type, Intensity (0/10 to 10/10):  not rated R UE and foot    Objective:    Observation:  pt alert and oriented      Treatment, including education:  Transfers  Supine to sit :Moderate Assistance pull in therapist  Scooting :Stand By Assistance  Sit to stand :Minimal Assistance x 1 from bed   Stand to sit :Minimal Assistance with min verbal cues to control descent  SPT:Contact Guard Assistance using RW      Self Care Training:   Activities performed today included the following:      Grooming  Supervision to wash face and brush hair    Bathing   Moderate AssistancePt able to wash self sitting at EOB, required assistance to wash B feet and requires Min A for sit to stand to RW to wash buttocks using RW for balance.    UB Dress  Supervision pt able to abhay pull over shirt    LB Dress  Maximum Assistance Pt required assistance to thread B LEs into brief but requires assistance to pull up LE's and pt required Min A for sit to stand to pull up brief with slight unsteadiness.        Safety Measures: Gait belt used, up in the recliner, Pull/Bed Alarm activated and call light left in reach        Assessment / Impression:        Patient's tolerance of treatment: Good   Adverse Reaction: None  Significant change in status and impact:  None  Barriers to improvement:  Dec strength and endurance    Plan for Next Session:    Continue with POC.    Time in:  1018  Time out:  1056  Total treatment time: 38  Billed

## 2024-03-13 NOTE — PROGRESS NOTES
bedtime with sliding scale. Titrate PRN. Hypoglycemic protocol ordered  Hypertension: Continue amlodipine  History of diabetic neuropathy: Continue gabapentin  History of alcohol abuse: no signs of withdrawal while admitted.   CAD: Continue atorvastatin and Plavix  History of JAYE: CPAP at night  History of vitamin D deficiency: Continue vitamin D supplements  Facial laceration: sutures removed 3/7. Continue wound care.     Note ranges in above assessment and plan as patient is here in swing bed program for physical and Occupational Therapy.    Diet ADULT DIET; Regular; 5 carb choices (75 gm/meal)  ADULT ORAL NUTRITION SUPPLEMENT; Breakfast, Lunch, Dinner; Standard High Calorie/High Protein Oral Supplement   DVT Prophylaxis [] Lovenox, []  Heparin, [] SCDs, [] Ambulation,  [] Eliquis, [] Xarelto  [] Coumadin   Code Status Full Code   Disposition From: Home  Expected Disposition: Home  Estimated Date of Discharge: Pending  Patient requires continued admission due to PT OT   Surrogate Decision Maker/ POA        Case discussed with Dr. Storm my supervising physician who is in agreement with the above-noted plan of care.    This case was also discussed with case management during IDR's        Subjective:     Chief Complaint: Physical debility    Patient seen and examined this morning he still complains with some elbow pain and some knee pain.  Otherwise he is doing well.      Review of Systems:      10 point review of systems complete is negative with stated above      Objective:     Intake/Output Summary (Last 24 hours) at 3/13/2024 0719  Last data filed at 3/13/2024 0250  Gross per 24 hour   Intake 840 ml   Output 250 ml   Net 590 ml        Vitals:   Vitals:    03/12/24 2000   BP: 124/85   Pulse: 59   Resp: 17   Temp: 98.1 °F (36.7 °C)   SpO2: 98%       Physical Exam:     General: NAD  Eyes: EOMI  ENT: neck supple  Cardiovascular: Regular rate and rhythm  Respiratory: Clear to auscultation bilaterally throughout  by BENSON VILLAREAL NP on 3/13/2024 at 7:19 AM

## 2024-03-13 NOTE — FLOWSHEET NOTE
Physical Therapy Daily Treatment Note   Date: 3/13/2024 Room: [address] [place] [address]    Name: Kareem Rey : 1945   MRN: [id number] Admission Date:2024      Restrictions/Precautions:  Fall Risk     Initial Pain level: increased pain in R wrist and L knee when ambulating    Subjective/Observation: Pt sitting up in recliner chair. With encouragement, agreeable to therapy session.    Communication with other providers: assistance from PTA for transfers and gait training    Therapeutic Activities/Neuro Re-Education/Gait Training   3/8/24 3/11/24 3/12/24 3/13/24   Bed   Mobility Supine>sit Min A for trunk support, pt able to get LEs to EOB and reach for bed rail. Assist given for last 25% of supine>sit movement Supine>sit Min A for trunk support, pt able to get LEs to EOB and reach for bed rail. HHA for trunk support Supine>sit Min A for trunk support, pt able to get LEs to EOB and reach for bed rail. HHA for trunk support SBA sit>supine with cues for sequencing,  Mod A scooting toward HOB   STS   Transfer Jose from EOB 1x, ModA to/from w/c 3x with cues for hand placement  Mod A STS at EOB  Max A ascending from w/c with cues for UE assist, to RW Mod-Max A at EOB and w/c, cues required for hand placement and eccentric control Max A x2 standing from recliner  Mod A x2 standing from w/c 2x  Min A x2 standing from nustep.  On stand>sit pt requires cues for hand placement and eccentric control. Sit>stand pt able to verbalize hand placement and forward lean prior to transfers   Stand Pivot transfer    W/c<>nustep and w/c>EOB with RW and CGA-Min A for balance support and cues for sequencing   Sitting  Balance SBA at EOB  Initially Mod A at EOB upon sitting up due to posterior LOB. With assist to sit forward, pt able to maintain seated position with CGA-SBA x5min CGA-SBA this date at EOB and while in shower with dynamic tasks x20 min with 0-1UE support Good    Standing Balance   CGA for amb CGA-Mod A during amb, see below    Min

## 2024-03-13 NOTE — PROGRESS NOTES
Met with Patient for our Activity. Patient has no other activity needs at this time.  Lela Szymanski,  Activities Asst.

## 2024-03-14 LAB
GLUCOSE BLD-MCNC: 140 MG/DL (ref 70–99)
GLUCOSE BLD-MCNC: 142 MG/DL (ref 70–99)
GLUCOSE BLD-MCNC: 218 MG/DL (ref 70–99)
GLUCOSE BLD-MCNC: 223 MG/DL (ref 70–99)
URATE SERPL-MCNC: 6.8 MG/DL (ref 3.5–7.2)

## 2024-03-14 PROCEDURE — 97530 THERAPEUTIC ACTIVITIES: CPT

## 2024-03-14 PROCEDURE — 97116 GAIT TRAINING THERAPY: CPT

## 2024-03-14 PROCEDURE — 1200000002 HC SEMI PRIVATE SWING BED

## 2024-03-14 PROCEDURE — 97535 SELF CARE MNGMENT TRAINING: CPT

## 2024-03-14 PROCEDURE — 36415 COLL VENOUS BLD VENIPUNCTURE: CPT

## 2024-03-14 PROCEDURE — 6370000000 HC RX 637 (ALT 250 FOR IP): Performed by: NURSE PRACTITIONER

## 2024-03-14 PROCEDURE — 6370000000 HC RX 637 (ALT 250 FOR IP)

## 2024-03-14 PROCEDURE — 84550 ASSAY OF BLOOD/URIC ACID: CPT

## 2024-03-14 PROCEDURE — 94761 N-INVAS EAR/PLS OXIMETRY MLT: CPT

## 2024-03-14 PROCEDURE — 6360000002 HC RX W HCPCS

## 2024-03-14 PROCEDURE — 82962 GLUCOSE BLOOD TEST: CPT

## 2024-03-14 RX ORDER — BISACODYL 5 MG/1
20 TABLET, DELAYED RELEASE ORAL ONCE
Status: COMPLETED | OUTPATIENT
Start: 2024-03-14 | End: 2024-03-14

## 2024-03-14 RX ADMIN — GABAPENTIN 300 MG: 300 CAPSULE ORAL at 21:31

## 2024-03-14 RX ADMIN — ENOXAPARIN SODIUM 30 MG: 100 INJECTION SUBCUTANEOUS at 09:44

## 2024-03-14 RX ADMIN — ALLOPURINOL 100 MG: 100 TABLET ORAL at 12:28

## 2024-03-14 RX ADMIN — INSULIN LISPRO 1 UNITS: 100 INJECTION, SOLUTION INTRAVENOUS; SUBCUTANEOUS at 12:37

## 2024-03-14 RX ADMIN — INSULIN LISPRO 1 UNITS: 100 INJECTION, SOLUTION INTRAVENOUS; SUBCUTANEOUS at 21:31

## 2024-03-14 RX ADMIN — NAPHAZOLINE HYDROCHLORIDE AND PHENIRAMINE MALEATE 1 DROP: .25; 3 SOLUTION/ DROPS OPHTHALMIC at 21:30

## 2024-03-14 RX ADMIN — ENOXAPARIN SODIUM 30 MG: 100 INJECTION SUBCUTANEOUS at 21:31

## 2024-03-14 RX ADMIN — FERROUS SULFATE TAB 325 MG (65 MG ELEMENTAL FE) 325 MG: 325 (65 FE) TAB at 09:44

## 2024-03-14 RX ADMIN — GABAPENTIN 300 MG: 300 CAPSULE ORAL at 09:44

## 2024-03-14 RX ADMIN — MONTELUKAST SODIUM 10 MG: 10 TABLET, FILM COATED ORAL at 21:30

## 2024-03-14 RX ADMIN — FOLIC ACID 1 MG: 1 TABLET ORAL at 09:44

## 2024-03-14 RX ADMIN — ACETAMINOPHEN 650 MG: 325 TABLET ORAL at 21:30

## 2024-03-14 RX ADMIN — NAPHAZOLINE HYDROCHLORIDE AND PHENIRAMINE MALEATE 1 DROP: .25; 3 SOLUTION/ DROPS OPHTHALMIC at 14:00

## 2024-03-14 RX ADMIN — NAPHAZOLINE HYDROCHLORIDE AND PHENIRAMINE MALEATE 1 DROP: .25; 3 SOLUTION/ DROPS OPHTHALMIC at 10:20

## 2024-03-14 RX ADMIN — BISACODYL 20 MG: 5 TABLET, COATED ORAL at 12:28

## 2024-03-14 RX ADMIN — CYANOCOBALAMIN TAB 1000 MCG 1000 MCG: 1000 TAB at 09:44

## 2024-03-14 RX ADMIN — Medication 400 MG: at 09:44

## 2024-03-14 RX ADMIN — ACETAMINOPHEN 650 MG: 325 TABLET ORAL at 09:53

## 2024-03-14 RX ADMIN — GABAPENTIN 300 MG: 300 CAPSULE ORAL at 15:29

## 2024-03-14 RX ADMIN — CLOPIDOGREL BISULFATE 75 MG: 75 TABLET ORAL at 09:44

## 2024-03-14 RX ADMIN — AMLODIPINE BESYLATE 5 MG: 5 TABLET ORAL at 09:44

## 2024-03-14 RX ADMIN — NAPHAZOLINE HYDROCHLORIDE AND PHENIRAMINE MALEATE 1 DROP: .25; 3 SOLUTION/ DROPS OPHTHALMIC at 17:27

## 2024-03-14 ASSESSMENT — PAIN DESCRIPTION - DESCRIPTORS
DESCRIPTORS: DULL;ACHING
DESCRIPTORS: ACHING

## 2024-03-14 ASSESSMENT — PAIN DESCRIPTION - FREQUENCY
FREQUENCY: CONTINUOUS
FREQUENCY: INTERMITTENT
FREQUENCY: CONTINUOUS
FREQUENCY: CONTINUOUS

## 2024-03-14 ASSESSMENT — PAIN DESCRIPTION - PAIN TYPE
TYPE: ACUTE PAIN
TYPE: CHRONIC PAIN
TYPE: ACUTE PAIN
TYPE: ACUTE PAIN

## 2024-03-14 ASSESSMENT — PAIN DESCRIPTION - LOCATION
LOCATION: WRIST

## 2024-03-14 ASSESSMENT — PAIN DESCRIPTION - ONSET
ONSET: GRADUAL

## 2024-03-14 ASSESSMENT — PAIN SCALES - GENERAL
PAINLEVEL_OUTOF10: 6
PAINLEVEL_OUTOF10: 0
PAINLEVEL_OUTOF10: 3
PAINLEVEL_OUTOF10: 0
PAINLEVEL_OUTOF10: 4
PAINLEVEL_OUTOF10: 6
PAINLEVEL_OUTOF10: 6

## 2024-03-14 ASSESSMENT — PAIN - FUNCTIONAL ASSESSMENT
PAIN_FUNCTIONAL_ASSESSMENT: PREVENTS OR INTERFERES SOME ACTIVE ACTIVITIES AND ADLS
PAIN_FUNCTIONAL_ASSESSMENT: ACTIVITIES ARE NOT PREVENTED

## 2024-03-14 ASSESSMENT — PAIN DESCRIPTION - ORIENTATION
ORIENTATION: RIGHT

## 2024-03-14 ASSESSMENT — PAIN DESCRIPTION - DIRECTION: RADIATING_TOWARDS: NO

## 2024-03-14 NOTE — PROGRESS NOTES
Occupational Therapy    Occupational Therapy Treatment Note  Name: Kareem Rey MRN: [id number] :   1945   Date:  3/14/2024   Admission Date: 2024 Room:  [address] [place] [address]     Primary Problem:  recurrent falls, peripheral neuropathy likely due to ETOH abuse and syncope.     Restrictions/Precautions:  Restrictions/Precautions  Restrictions/Precautions: Fall Risk     Communication with other providers:   Cleared for treatment by RN. Co treat.    Subjective:  Patient states:  pt agreeable for therapy  Pain:   Location, Type, Intensity (0/10 to 10/10):  not rated R UE and foot    Objective:    Observation:  pt alert and oriented      Treatment, including education:  Transfers  Supine to sit :Minimal Assistance pull in therapist  Scooting :Stand By Assistance  Sit to stand :Minimal Assistance x 1 from bed with verbal cues for hand placement   Stand to sit :Minimal Assistance with min verbal cues to control descent  SPT:Contact Guard Assistance using RW      Self Care Training:   Activities performed today included the following:        LB Dress  Minimal Assistance Pt able to thread scrubs over B feet and pull up pants.Pt used sock aide but required assistance to pull over heel.      Therapeutic Activity Training:   Pt completed functional mobility with RW x 80' x 2 with CGA and WC follow for safety      Safety Measures: Gait belt used, up in the recliner, Pull/Bed Alarm activated and call light left in reach        Assessment / Impression:        Patient's tolerance of treatment: Good   Adverse Reaction: None  Significant change in status and impact:  None  Barriers to improvement:  Dec strength and endurance    Plan for Next Session:    Continue with POC.    Time in:  1032  Time out:  1102  Total treatment time: 30  Billed Units:1 ADL 1 TA  Electronically signed by:    BRAD Ramesh 1992    3/14/2024, 11:48 AM    Previously filed values:     Short Term Goals  Time Frame for Short Term Goals: until

## 2024-03-15 LAB
GLUCOSE BLD-MCNC: 125 MG/DL (ref 70–99)
GLUCOSE BLD-MCNC: 150 MG/DL (ref 70–99)
GLUCOSE BLD-MCNC: 176 MG/DL (ref 70–99)
GLUCOSE BLD-MCNC: 212 MG/DL (ref 70–99)

## 2024-03-15 PROCEDURE — 6360000002 HC RX W HCPCS

## 2024-03-15 PROCEDURE — 97535 SELF CARE MNGMENT TRAINING: CPT

## 2024-03-15 PROCEDURE — 6370000000 HC RX 637 (ALT 250 FOR IP)

## 2024-03-15 PROCEDURE — 97530 THERAPEUTIC ACTIVITIES: CPT

## 2024-03-15 PROCEDURE — 97116 GAIT TRAINING THERAPY: CPT

## 2024-03-15 PROCEDURE — 94761 N-INVAS EAR/PLS OXIMETRY MLT: CPT

## 2024-03-15 PROCEDURE — 1200000002 HC SEMI PRIVATE SWING BED

## 2024-03-15 PROCEDURE — 82962 GLUCOSE BLOOD TEST: CPT

## 2024-03-15 RX ADMIN — FOLIC ACID 1 MG: 1 TABLET ORAL at 10:44

## 2024-03-15 RX ADMIN — ENOXAPARIN SODIUM 30 MG: 100 INJECTION SUBCUTANEOUS at 10:43

## 2024-03-15 RX ADMIN — NAPHAZOLINE HYDROCHLORIDE AND PHENIRAMINE MALEATE 1 DROP: .25; 3 SOLUTION/ DROPS OPHTHALMIC at 20:38

## 2024-03-15 RX ADMIN — CLOPIDOGREL BISULFATE 75 MG: 75 TABLET ORAL at 10:44

## 2024-03-15 RX ADMIN — INSULIN LISPRO 1 UNITS: 100 INJECTION, SOLUTION INTRAVENOUS; SUBCUTANEOUS at 20:33

## 2024-03-15 RX ADMIN — Medication 400 MG: at 10:44

## 2024-03-15 RX ADMIN — NAPHAZOLINE HYDROCHLORIDE AND PHENIRAMINE MALEATE 1 DROP: .25; 3 SOLUTION/ DROPS OPHTHALMIC at 10:48

## 2024-03-15 RX ADMIN — MONTELUKAST SODIUM 10 MG: 10 TABLET, FILM COATED ORAL at 20:34

## 2024-03-15 RX ADMIN — ENOXAPARIN SODIUM 30 MG: 100 INJECTION SUBCUTANEOUS at 20:33

## 2024-03-15 RX ADMIN — GABAPENTIN 300 MG: 300 CAPSULE ORAL at 10:44

## 2024-03-15 RX ADMIN — GABAPENTIN 300 MG: 300 CAPSULE ORAL at 20:33

## 2024-03-15 RX ADMIN — CYANOCOBALAMIN TAB 1000 MCG 1000 MCG: 1000 TAB at 10:44

## 2024-03-15 RX ADMIN — AMLODIPINE BESYLATE 5 MG: 5 TABLET ORAL at 10:44

## 2024-03-15 RX ADMIN — NAPHAZOLINE HYDROCHLORIDE AND PHENIRAMINE MALEATE 1 DROP: .25; 3 SOLUTION/ DROPS OPHTHALMIC at 18:23

## 2024-03-15 RX ADMIN — GABAPENTIN 300 MG: 300 CAPSULE ORAL at 15:33

## 2024-03-15 RX ADMIN — ALLOPURINOL 100 MG: 100 TABLET ORAL at 10:44

## 2024-03-15 RX ADMIN — FERROUS SULFATE TAB 325 MG (65 MG ELEMENTAL FE) 325 MG: 325 (65 FE) TAB at 10:44

## 2024-03-15 ASSESSMENT — PAIN SCALES - GENERAL
PAINLEVEL_OUTOF10: 0

## 2024-03-15 NOTE — PROGRESS NOTES
Occupational Therapy    Occupational Therapy Treatment Note  Name: Kareem Rey MRN: [id number] :   1945   Date:  3/15/2024   Admission Date: 2024 Room:  [address] [place] [address]     Primary Problem:  recurrent falls, peripheral neuropathy likely due to ETOH abuse and syncope.     Restrictions/Precautions:  Restrictions/Precautions  Restrictions/Precautions: Fall Risk     Communication with other providers:   Cleared for treatment by  RN. Co treat with PT    Subjective:  Patient states:  \"I'm sore today\"  Pain:   Location, Type, Intensity (0/10 to 10/10):  8/10 legs    Objective:    Observation:  pt supine in bed asleep upon entering the room    Treatment, including education:  Transfers  Supine to sit :Stand By Assistance used handrails  Sit to supine :Stand By Assistance pt able to manage B LEs  Scooting :Contact Guard Assistance  Sit to stand :Moderate Assistance x 2 from lower surface and Jose from EOB  Stand to sit :Minimal Assistance to control descent  SPT:Contact Guard Assistance      Self Care Training:   Activities performed today included the following:    toileting  Max A to remove brief. Pt able to sit down in chair using reacher to don brief over B feet and pull up to knees with extra time and verbal cues. Pt required Mod A for sit to stand to RW and pt pulled up brief with therapists provided touching assistance for balance.        Therapeutic Activity Training:     Pt completed functional mobility with RW x 80' x 3 with Min A for sit to stand from WC with standing rest breaks.  Pt completed functional mobility x 80' with Min to Mod A for sit to stand and required seated rest breaks due to fatigue. Pt required Min correction for LOB    Safety Measures: Gait belt used, Left in bed, Pull/Bed Alarm activated and call light left in reach        Assessment / Impression:        Patient's tolerance of treatment: Good   Adverse Reaction: None  Significant change in status and impact:  None  Barriers to  improvement:  Dec strength and endurance    Plan for Next Session:    Continue with POC.    Time in:  1100  1335  Time out:  1120  1353  Total treatment time:  20 + 18=38  Billed Units:1 ADL 2 TA  Electronically signed by:    PABLO Ramesh/L NELI 1992    3/15/2024, 2:07 PM    Previously filed values:     Short Term Goals  Time Frame for Short Term Goals: until discharge or all goals met  Short Term Goal 1: Patient will complete all functional transfers supervision using AE PRN  Short Term Goal 2: Patient will complete all toilet tasks supervision  Short Term Goal 3: Patient will complete UB ADLS MI  Short Term Goal 4: Patient will complete LB ADLS min A using AE PRN  Short Term Goal 5: Patient will complete 10+ minutes of ther ex/ther act with good safety awareness using AE PRN for general strengthening

## 2024-03-15 NOTE — CARE COORDINATION
CM submitted updated clinical documentation to [facility] as requested.  CM will follow.     9:52 AM  [facility] has approved an extension to the patient's stay in the swing bed program and have requested that updated clinical documentation be submitted to them Wednesday 3/20/24.  CM will follow.

## 2024-03-15 NOTE — FLOWSHEET NOTE
Physical Therapy Daily Treatment Note   Date: 3/15/2024 Room: [address] [place] [address]    Name: Kareem Rey : 1945   MRN: [id number] Admission Date:2024      Restrictions/Precautions:  Fall Risk     Initial Pain level: 8/10 bottom of foot and B wrists    Subjective/Observation: Pt laying in bed upon arrival, agreeable to therapy..    Communication with other providers: Cotx with [facility] for safety and pt tolerance    Therapeutic Activities/Neuro Re-Education/Gait Training   3/12/24 3/13/24 3/14/24 3/15/24   Bed   Mobility Supine>sit Min A for trunk support, pt able to get LEs to EOB and reach for bed rail. HHA for trunk support SBA sit>supine with cues for sequencing,  Mod A scooting toward HOB Supine>sit Min A for trunk support, pt able to get LEs to EOB and reach for bed rail. HHA for trunk support Supine<>sit SBA with use of bed rails  Mod A scooting toward HOB   STS   Transfer Mod-Max A at EOB and w/c, cues required for hand placement and eccentric control Max A x2 standing from recliner  Mod A x2 standing from w/c 2x  Min A x2 standing from nustep.  On stand>sit pt requires cues for hand placement and eccentric control. Sit>stand pt able to verbalize hand placement and forward lean prior to transfers Jose standing from EOB. Cues for hand placement and eccentric control Min A at EOB  Mod A ascending from recliner and w/c. Cues required for controlled descent with little carryover   Stand Pivot transfer  W/c<>nustep and w/c>EOB with RW and CGA-Min A for balance support and cues for sequencing x x   Sitting  Balance CGA-SBA this date at EOB and while in shower with dynamic tasks x20 min with 0-1UE support Good  SBA at EOB to abhay pants and socks Sitting in recliner threading feet in to brief using reacher with no LOB   Standing Balance   CGA-Min A for amb, CGA in shower for standing static and dynamic tasks CGA-Mod A for amb and transfers CGA-Jose to abhay pants Mostly CGA for amb, 1 LOB requiring Mod A to recover.

## 2024-03-15 NOTE — PLAN OF CARE
Problem: Safety - Adult  Goal: Free from fall injury  Outcome: Progressing     Problem: Pain  Goal: Verbalizes/displays adequate comfort level or baseline comfort level  Outcome: Progressing     Problem: Pain  Goal: Verbalizes/displays adequate comfort level or baseline comfort level  Outcome: Progressing     Problem: Skin/Tissue Integrity  Goal: Absence of new skin breakdown  Description: 1.  Monitor for areas of redness and/or skin breakdown  2.  Assess vascular access sites hourly  3.  Every 4-6 hours minimum:  Change oxygen saturation probe site  4.  Every 4-6 hours:  If on nasal continuous positive airway pressure, respiratory therapy assess nares and determine need for appliance change or resting period.  Outcome: Progressing     Problem: Discharge Planning  Goal: Discharge to home or other facility with appropriate resources  Outcome: Progressing     Problem: Chronic Conditions and Co-morbidities  Goal: Patient's chronic conditions and co-morbidity symptoms are monitored and maintained or improved  Outcome: Progressing     Problem: ABCDS Injury Assessment  Goal: Absence of physical injury  Outcome: Progressing

## 2024-03-15 NOTE — PROGRESS NOTES
V2.0  [facility] Hospitalist Progress Note      Name:  Kareem Rey /Age/Sex: 1945  (78 y.o. male)   MRN & CSN:  [id number] & [id number] Encounter Date/Time: 3/15/2024 11:11 AM EDT    Location:   PCP: Eduar Birmingham MD       Hospital Day: 16    Assessment and Plan:   Kareem Rey is a 78 y.o. male with pmh of coronary disease, paroxysmal atrial fibrillation status post Watchman device with a pacemaker, moderate aortic stenosis, gout, hypertension, hyperlipidemia, type 2 diabetes was initially presented to [facility] with a 4 and a syncope with a laceration to his head.  Patient was found moderate aortic stenosis with a SALAZAR.  Patient was admitted to swing bed program on  for Debility.      Next insurance case review is scheduled for today 3/15/2024    Plan:  Physical debility: Still complaining of left foot and right elbow pain during physical therapy.  L knee pain: XR L knee no fracture, moderate to severe tricompartmental osteoarthritis, moderate suprapatellar joint effusion. Pain improving today. Ortho reviewed imaging, no indication for intervention at this time. Apply ice, Voltaren gel PRN.   Olecranon bursitis: XR and exam consistent with olecranon bursitis. No erythema, warmth, fevers or leukocytosis to suggest septic bursitis.  Discussed with orthopedic surgery, Dr. Eddy.  Recommended Ace bandage/compression, ice.  Monitor for improvement.  R wrist pain: XR R wrist with concerns for osteoarthritis. . Ortho reviewed imaging, no acute process. Apply ice, Voltaren gel PRN.  Elevate extremity.  Elevated LFTs: , , overall stable.  Denies abdominal pain.  Hepatitis panel negative. Statin and allopurinol held.  Chronic atrial fibrillation: Has a pacer and is status post Watchman; continue Plavix and follow-up with EP/cardiology as outpatient  Vitamin B12 deficiency: Continue oral supplementation   Type 2 diabetes: Continue insulin before meals and at

## 2024-03-16 LAB
GLUCOSE BLD-MCNC: 129 MG/DL (ref 70–99)
GLUCOSE BLD-MCNC: 149 MG/DL (ref 70–99)
GLUCOSE BLD-MCNC: 185 MG/DL (ref 70–99)
GLUCOSE BLD-MCNC: 312 MG/DL (ref 70–99)

## 2024-03-16 PROCEDURE — 1200000002 HC SEMI PRIVATE SWING BED

## 2024-03-16 PROCEDURE — 97530 THERAPEUTIC ACTIVITIES: CPT

## 2024-03-16 PROCEDURE — 94761 N-INVAS EAR/PLS OXIMETRY MLT: CPT

## 2024-03-16 PROCEDURE — 97110 THERAPEUTIC EXERCISES: CPT

## 2024-03-16 PROCEDURE — 82962 GLUCOSE BLOOD TEST: CPT

## 2024-03-16 PROCEDURE — 6360000002 HC RX W HCPCS

## 2024-03-16 PROCEDURE — 6370000000 HC RX 637 (ALT 250 FOR IP)

## 2024-03-16 RX ADMIN — GABAPENTIN 300 MG: 300 CAPSULE ORAL at 20:09

## 2024-03-16 RX ADMIN — FOLIC ACID 1 MG: 1 TABLET ORAL at 08:50

## 2024-03-16 RX ADMIN — NAPHAZOLINE HYDROCHLORIDE AND PHENIRAMINE MALEATE 1 DROP: .25; 3 SOLUTION/ DROPS OPHTHALMIC at 16:50

## 2024-03-16 RX ADMIN — ENOXAPARIN SODIUM 30 MG: 100 INJECTION SUBCUTANEOUS at 08:50

## 2024-03-16 RX ADMIN — CYANOCOBALAMIN TAB 1000 MCG 1000 MCG: 1000 TAB at 08:50

## 2024-03-16 RX ADMIN — ENOXAPARIN SODIUM 30 MG: 100 INJECTION SUBCUTANEOUS at 20:09

## 2024-03-16 RX ADMIN — MONTELUKAST SODIUM 10 MG: 10 TABLET, FILM COATED ORAL at 20:09

## 2024-03-16 RX ADMIN — CLOPIDOGREL BISULFATE 75 MG: 75 TABLET ORAL at 08:50

## 2024-03-16 RX ADMIN — FERROUS SULFATE TAB 325 MG (65 MG ELEMENTAL FE) 325 MG: 325 (65 FE) TAB at 08:50

## 2024-03-16 RX ADMIN — ALLOPURINOL 100 MG: 100 TABLET ORAL at 08:50

## 2024-03-16 RX ADMIN — GABAPENTIN 300 MG: 300 CAPSULE ORAL at 16:50

## 2024-03-16 RX ADMIN — NAPHAZOLINE HYDROCHLORIDE AND PHENIRAMINE MALEATE 1 DROP: .25; 3 SOLUTION/ DROPS OPHTHALMIC at 08:50

## 2024-03-16 RX ADMIN — INSULIN LISPRO 3 UNITS: 100 INJECTION, SOLUTION INTRAVENOUS; SUBCUTANEOUS at 20:08

## 2024-03-16 RX ADMIN — Medication 400 MG: at 08:50

## 2024-03-16 RX ADMIN — AMLODIPINE BESYLATE 5 MG: 5 TABLET ORAL at 08:50

## 2024-03-16 RX ADMIN — GABAPENTIN 300 MG: 300 CAPSULE ORAL at 08:50

## 2024-03-16 NOTE — FLOWSHEET NOTE
Physical Therapy Daily Treatment Note   Date: 3/16/2024 Room: [address] [place] [address]    Name: Kareem Rey : 1945   MRN: [id number] Admission Date:2024      Restrictions/Precautions:  Fall Risk     Initial Pain level: 0/10  initially reported. Then pt reported pain in B wrists paulo R wrist, but not in bed, only when WB thorugh them on the walker and with transfers. After standing pt reported pain in his L foot secondary to a callus.     Subjective/Observation: Pt laying in bed upon arrival, agreeable to therapy, but was skeptical of 1 PT treating him.     Communication with other providers: nursing notified.     Therapeutic Activities/Neuro Re-Education/Gait Training   3/12/24 3/13/24 3/14/24 3/15/24 Date: 3/16/24   Bed   Mobility Supine>sit Min A for trunk support, pt able to get LEs to EOB and reach for bed rail. HHA for trunk support SBA sit>supine with cues for sequencing,  Mod A scooting toward HOB Supine>sit Min A for trunk support, pt able to get LEs to EOB and reach for bed rail. HHA for trunk support Supine<>sit SBA with use of bed rails  Mod A scooting toward HOB Supine to sit: min A with features of the bed.    STS   Transfer Mod-Max A at EOB and w/c, cues required for hand placement and eccentric control Max A x2 standing from recliner  Mod A x2 standing from w/c 2x  Min A x2 standing from nustep.  On stand>sit pt requires cues for hand placement and eccentric control. Sit>stand pt able to verbalize hand placement and forward lean prior to transfers Jose standing from EOB. Cues for hand placement and eccentric control Min A at EOB  Mod A ascending from recliner and w/c. Cues required for controlled descent with little carryover Sit to stand from EOB:   min-mod A with cues to place hands on bed to push up    Stand to sit: CGA with plop into chair.    Stand Pivot transfer  W/c<>nustep and w/c>EOB with RW and CGA-Min A for balance support and cues for sequencing x x Pt performed with CGA-min A

## 2024-03-16 NOTE — PROGRESS NOTES
This RN has reviewed and agrees with NERIS Graves LPN's data collection and has collaborated with this LPN regarding the patient's care plan.

## 2024-03-16 NOTE — PLAN OF CARE
Problem: Safety - Adult  Goal: Free from fall injury  Outcome: Progressing  Flowsheets (Taken 3/4/2024 1100)  Free From Fall Injury: Instruct family/caregiver on patient safety     Problem: Pain  Goal: Verbalizes/displays adequate comfort level or baseline comfort level  Outcome: Progressing  Flowsheets (Taken 3/4/2024 1100)  Verbalizes/displays adequate comfort level or baseline comfort level:   Assess pain using appropriate pain scale   Encourage patient to monitor pain and request assistance   Administer analgesics based on type and severity of pain and evaluate response   Implement non-pharmacological measures as appropriate and evaluate response   Consider cultural and social influences on pain and pain management     Problem: Skin/Tissue Integrity  Goal: Absence of new skin breakdown  Description: 1.  Monitor for areas of redness and/or skin breakdown  2.  Assess vascular access sites hourly  3.  Every 4-6 hours minimum:  Change oxygen saturation probe site  4.  Every 4-6 hours:  If on nasal continuous positive airway pressure, respiratory therapy assess nares and determine need for appliance change or resting period.  Outcome: Progressing     Problem: Discharge Planning  Goal: Discharge to home or other facility with appropriate resources  Outcome: Progressing  Flowsheets (Taken 2/29/2024 2000 by Adeline Frazier, RN)  Discharge to home or other facility with appropriate resources:   Identify barriers to discharge with patient and caregiver   Identify discharge learning needs (meds, wound care, etc)   Refer to discharge planning if patient needs post-hospital services based on physician order or complex needs related to functional status, cognitive ability or social support system     Problem: Chronic Conditions and Co-morbidities  Goal: Patient's chronic conditions and co-morbidity symptoms are monitored and maintained or improved  Outcome: Progressing  Flowsheets (Taken 3/4/2024 1100)  Care Plan -  Patient's Chronic Conditions and Co-Morbidity Symptoms are Monitored and Maintained or Improved:   Monitor and assess patient's chronic conditions and comorbid symptoms for stability, deterioration, or improvement   Collaborate with multidisciplinary team to address chronic and comorbid conditions and prevent exacerbation or deterioration   Update acute care plan with appropriate goals if chronic or comorbid symptoms are exacerbated and prevent overall improvement and discharge     Problem: ABCDS Injury Assessment  Goal: Absence of physical injury  Outcome: Progressing  Flowsheets (Taken 2/29/2024 2123 by Adeline Frazier RN)  Absence of Physical Injury: Implement safety measures based on patient assessment

## 2024-03-17 LAB
GLUCOSE BLD-MCNC: 146 MG/DL (ref 70–99)
GLUCOSE BLD-MCNC: 149 MG/DL (ref 70–99)
GLUCOSE BLD-MCNC: 194 MG/DL (ref 70–99)
GLUCOSE BLD-MCNC: 206 MG/DL (ref 70–99)

## 2024-03-17 PROCEDURE — 6370000000 HC RX 637 (ALT 250 FOR IP)

## 2024-03-17 PROCEDURE — 6360000002 HC RX W HCPCS

## 2024-03-17 PROCEDURE — 82962 GLUCOSE BLOOD TEST: CPT

## 2024-03-17 PROCEDURE — 1200000002 HC SEMI PRIVATE SWING BED

## 2024-03-17 RX ADMIN — CLOPIDOGREL BISULFATE 75 MG: 75 TABLET ORAL at 09:00

## 2024-03-17 RX ADMIN — ENOXAPARIN SODIUM 30 MG: 100 INJECTION SUBCUTANEOUS at 21:05

## 2024-03-17 RX ADMIN — NAPHAZOLINE HYDROCHLORIDE AND PHENIRAMINE MALEATE 1 DROP: .25; 3 SOLUTION/ DROPS OPHTHALMIC at 12:18

## 2024-03-17 RX ADMIN — ALLOPURINOL 100 MG: 100 TABLET ORAL at 09:01

## 2024-03-17 RX ADMIN — Medication 400 MG: at 09:00

## 2024-03-17 RX ADMIN — GABAPENTIN 300 MG: 300 CAPSULE ORAL at 09:00

## 2024-03-17 RX ADMIN — FERROUS SULFATE TAB 325 MG (65 MG ELEMENTAL FE) 325 MG: 325 (65 FE) TAB at 09:00

## 2024-03-17 RX ADMIN — AMLODIPINE BESYLATE 5 MG: 5 TABLET ORAL at 09:01

## 2024-03-17 RX ADMIN — ENOXAPARIN SODIUM 30 MG: 100 INJECTION SUBCUTANEOUS at 09:01

## 2024-03-17 RX ADMIN — INSULIN LISPRO 1 UNITS: 100 INJECTION, SOLUTION INTRAVENOUS; SUBCUTANEOUS at 12:18

## 2024-03-17 RX ADMIN — CYANOCOBALAMIN TAB 1000 MCG 1000 MCG: 1000 TAB at 09:00

## 2024-03-17 RX ADMIN — NAPHAZOLINE HYDROCHLORIDE AND PHENIRAMINE MALEATE 1 DROP: .25; 3 SOLUTION/ DROPS OPHTHALMIC at 09:01

## 2024-03-17 RX ADMIN — FOLIC ACID 1 MG: 1 TABLET ORAL at 09:00

## 2024-03-17 RX ADMIN — MONTELUKAST SODIUM 10 MG: 10 TABLET, FILM COATED ORAL at 21:05

## 2024-03-17 RX ADMIN — NAPHAZOLINE HYDROCHLORIDE AND PHENIRAMINE MALEATE 1 DROP: .25; 3 SOLUTION/ DROPS OPHTHALMIC at 21:05

## 2024-03-17 RX ADMIN — GABAPENTIN 300 MG: 300 CAPSULE ORAL at 21:05

## 2024-03-17 RX ADMIN — GABAPENTIN 300 MG: 300 CAPSULE ORAL at 15:54

## 2024-03-17 ASSESSMENT — PAIN SCALES - GENERAL: PAINLEVEL_OUTOF10: 0

## 2024-03-17 NOTE — PROGRESS NOTES
V2.0  [facility] [place] [facility] Hospitalist Progress Note      Name:  Kareem Rey /Age/Sex: 1945  (78 y.o. male)   MRN & CSN:  [id number] & [id number] Encounter Date/Time: 3/17/2024 11:11 AM EDT    Location:   PCP: Eduar Birmingham MD       Hospital Day: 18    Assessment and Plan:   Kareem Rey is a 78 y.o. male with pmh of coronary disease, paroxysmal atrial fibrillation status post Watchman device with a pacemaker, moderate aortic stenosis, gout, hypertension, hyperlipidemia, type 2 diabetes was initially presented to [facility] with a 4 and a syncope with a laceration to his head.  Patient was found moderate aortic stenosis with a SALAZAR.  Patient was admitted to swing bed program on  for Debility.        Next insurance review is on Wednesday, 3/20/2024          Plan:  Physical debility: Still complaining of left foot and right elbow pain during physical therapy.  On Friday, 3/15/2024 patient did ambulate 160 feet with standing rest breaks and then another 80 feet with a sitting rest break.  Later in the day on Friday, 3/15/2024 patient did ambulate another 80 feet.  L knee pain: XR L knee no fracture, moderate to severe tricompartmental osteoarthritis, moderate suprapatellar joint effusion. Pain improving today. Ortho reviewed imaging, no indication for intervention at this time. Apply ice, Voltaren gel PRN.   Olecranon bursitis: XR and exam consistent with olecranon bursitis. No erythema, warmth, fevers or leukocytosis to suggest septic bursitis.  Discussed with orthopedic surgery, Dr. Eddy.  Recommended Ace bandage/compression, ice.  Monitor for improvement.  R wrist pain: XR R wrist with concerns for osteoarthritis. . Ortho reviewed imaging, no acute process. Apply ice, Voltaren gel PRN.  Elevate extremity.  Elevated LFTs: , , overall stable.  Denies abdominal pain.  Hepatitis panel negative. Statin and allopurinol held.  Chronic atrial fibrillation: Has a pacer  NAD  Eyes: EOMI  ENT: neck supple  Cardiovascular: Regular rate and rhythm  Respiratory: Clear to auscultation bilaterally throughout all lung fields  Gastrointestinal: Soft, non tender nondistended bowel sounds present in all 4 quadrants  Genitourinary: no suprapubic tenderness  Musculoskeletal: Still complains with right wrist pain it is still mildly swollen no redness no warmth.  Skin: Dressing  CDI left foot  wound    Neuro: Alert.  Psych: Mood appropriate.     Medications:   Medications:    montelukast  10 mg Oral Nightly    allopurinol  100 mg Oral Daily    amLODIPine  5 mg Oral Daily    [Held by provider] atorvastatin  20 mg Oral Daily    clopidogrel  75 mg Oral Daily    ferrous sulfate  325 mg Oral Daily with breakfast    folic acid  1 mg Oral Daily    gabapentin  300 mg Oral TID    magnesium oxide  400 mg Oral Daily    enoxaparin  30 mg SubCUTAneous BID    vitamin D  50,000 Units Oral Weekly    naphazoline-pheniramine  1 drop Both Eyes 4x daily    insulin lispro  0-4 Units SubCUTAneous 4x Daily AC & HS    vitamin B-12  1,000 mcg Oral Daily      Infusions:    dextrose       PRN Meds: hydrOXYzine HCl, 25 mg, TID PRN  diclofenac sodium, 2 g, 4x Daily PRN  HYDROcodone-acetaminophen, 1 tablet, Q6H PRN  glucose, 4 tablet, PRN  dextrose bolus, 125 mL, PRN   Or  dextrose bolus, 250 mL, PRN  glucagon (rDNA), 1 mg, PRN  dextrose, , Continuous PRN  ondansetron, 4 mg, Q8H PRN   Or  ondansetron, 4 mg, Q6H PRN  polyethylene glycol, 17 g, Daily PRN  acetaminophen, 650 mg, Q6H PRN   Or  acetaminophen, 650 mg, Q6H PRN        Labs      Recent Results (from the past 24 hour(s))   POCT Glucose    Collection Time: 03/16/24 11:36 AM   Result Value Ref Range    POC Glucose 185 (H) 70 - 99 MG/DL   POCT Glucose    Collection Time: 03/16/24  4:31 PM   Result Value Ref Range    POC Glucose 129 (H) 70 - 99 MG/DL   POCT Glucose    Collection Time: 03/16/24  8:04 PM   Result Value Ref Range    POC Glucose 312 (H) 70 - 99 MG/DL   POCT

## 2024-03-18 LAB
GLUCOSE BLD-MCNC: 125 MG/DL (ref 70–99)
GLUCOSE BLD-MCNC: 133 MG/DL (ref 70–99)
GLUCOSE BLD-MCNC: 183 MG/DL (ref 70–99)
GLUCOSE BLD-MCNC: 188 MG/DL (ref 70–99)

## 2024-03-18 PROCEDURE — 82962 GLUCOSE BLOOD TEST: CPT

## 2024-03-18 PROCEDURE — 6360000002 HC RX W HCPCS

## 2024-03-18 PROCEDURE — 97116 GAIT TRAINING THERAPY: CPT

## 2024-03-18 PROCEDURE — 97110 THERAPEUTIC EXERCISES: CPT

## 2024-03-18 PROCEDURE — 97535 SELF CARE MNGMENT TRAINING: CPT

## 2024-03-18 PROCEDURE — 1200000002 HC SEMI PRIVATE SWING BED

## 2024-03-18 PROCEDURE — 97530 THERAPEUTIC ACTIVITIES: CPT

## 2024-03-18 PROCEDURE — 6370000000 HC RX 637 (ALT 250 FOR IP)

## 2024-03-18 PROCEDURE — 94761 N-INVAS EAR/PLS OXIMETRY MLT: CPT

## 2024-03-18 RX ADMIN — GABAPENTIN 300 MG: 300 CAPSULE ORAL at 08:42

## 2024-03-18 RX ADMIN — ERGOCALCIFEROL 50000 UNITS: 1.25 CAPSULE ORAL at 08:42

## 2024-03-18 RX ADMIN — ENOXAPARIN SODIUM 30 MG: 100 INJECTION SUBCUTANEOUS at 08:42

## 2024-03-18 RX ADMIN — GABAPENTIN 300 MG: 300 CAPSULE ORAL at 19:57

## 2024-03-18 RX ADMIN — CLOPIDOGREL BISULFATE 75 MG: 75 TABLET ORAL at 08:43

## 2024-03-18 RX ADMIN — NAPHAZOLINE HYDROCHLORIDE AND PHENIRAMINE MALEATE 1 DROP: .25; 3 SOLUTION/ DROPS OPHTHALMIC at 08:42

## 2024-03-18 RX ADMIN — AMLODIPINE BESYLATE 5 MG: 5 TABLET ORAL at 08:42

## 2024-03-18 RX ADMIN — NAPHAZOLINE HYDROCHLORIDE AND PHENIRAMINE MALEATE 1 DROP: .25; 3 SOLUTION/ DROPS OPHTHALMIC at 19:57

## 2024-03-18 RX ADMIN — NAPHAZOLINE HYDROCHLORIDE AND PHENIRAMINE MALEATE 1 DROP: .25; 3 SOLUTION/ DROPS OPHTHALMIC at 15:04

## 2024-03-18 RX ADMIN — GABAPENTIN 300 MG: 300 CAPSULE ORAL at 15:03

## 2024-03-18 RX ADMIN — FERROUS SULFATE TAB 325 MG (65 MG ELEMENTAL FE) 325 MG: 325 (65 FE) TAB at 08:42

## 2024-03-18 RX ADMIN — FOLIC ACID 1 MG: 1 TABLET ORAL at 08:42

## 2024-03-18 RX ADMIN — Medication 400 MG: at 08:42

## 2024-03-18 RX ADMIN — CYANOCOBALAMIN TAB 1000 MCG 1000 MCG: 1000 TAB at 08:42

## 2024-03-18 RX ADMIN — MONTELUKAST SODIUM 10 MG: 10 TABLET, FILM COATED ORAL at 19:57

## 2024-03-18 RX ADMIN — ENOXAPARIN SODIUM 30 MG: 100 INJECTION SUBCUTANEOUS at 19:58

## 2024-03-18 RX ADMIN — ALLOPURINOL 100 MG: 100 TABLET ORAL at 08:42

## 2024-03-18 ASSESSMENT — PAIN SCALES - GENERAL: PAINLEVEL_OUTOF10: 0

## 2024-03-18 NOTE — PROGRESS NOTES
Occupational Therapy    Occupational Therapy Treatment Note  Name: Kareem Rey MRN: [id number] :   1945   Date:  3/18/2024   Admission Date: 2024 Room:  [address] [place] [address]     Primary Problem:  recurrent falls, peripheral neuropathy likely due to ETOH abuse and syncope.     Restrictions/Precautions:  Restrictions/Precautions  Restrictions/Precautions: Fall Risk     Communication with other providers:   Cleared for treatment by RN.    Subjective:  Patient states:  \"well, I guess I can do a little\"  Pain:   Location, Type, Intensity (0/10 to 10/10):  not rated R UE and foot    Objective:    Observation:  pt alert and oriented      Treatment, including education:  Transfers  Supine to sit :Stand By Assistance  Scooting :Stand By Assistance  Sit to stand :Minimal Assistance x 1 from bed and recliner  Stand to sit :Minimal Assistance with min verbal cues to control descent  SPT:Minimal Assistance for slight LOB using RW      Self Care Training:   Activities performed today included the following:      Grooming  Supervision to wash face and brush hair    Bathing   Minimal Assistance Pt able to wash self sitting at EOB, required assistance to wash B feet and requires Min A for sit to stand to RW to wash buttocks using RW for balance.    UB Dress  Supervision pt able to donned gown, had no clothes available    LB Dress  Minimal Assistance Pt required able to thread B LEs into brief and scrub pants using reacher with extra time but requires assistance to pull up LE's and pt required Min A for sit to stand to pull up brief with slight unsteadiness.    Therapeutic Activity Training:   Pt completed functional mobility with RW x 45-50' x 4 attempts with CGA requiring standing rest breaks and 2 sitting rest breaks. Pt stood x 5 min with CGA with RW to facilitate increased endurance/strength for ADL tasks and transfers.  Pt completed sit to sit from recliner to bed with CGA using RW and pt able to pull self up in

## 2024-03-18 NOTE — FLOWSHEET NOTE
Physical Therapy Daily Treatment Note   Date: 3/18/2024 Room: [address] [place] [address]    Name: Kareem Rey : 1945   MRN: [id number] Admission Date:2024      Restrictions/Precautions:  Fall Risk     Initial Pain level: 0/10  initially reported. Then pt reported pain in B wrists paulo R wrist, but not in bed, only when WB thorugh them on the walker and with transfers. After standing pt reported pain in his L foot secondary to a callus.     Subjective/Observation: Pt laying in bed upon arrival, agreeable to therapy with encouragement    Communication with other providers: cotx with [facility]    Therapeutic Activities/Neuro Re-Education/Gait Training   3/14/24 3/15/24 Date: 3/16/24 3/18/24   Bed   Mobility Supine>sit Min A for trunk support, pt able to get LEs to EOB and reach for bed rail. HHA for trunk support Supine<>sit SBA with use of bed rails  Mod A scooting toward HOB Supine to sit: min A with features of the bed.  Supine>sit SBA with increased time and heavy US assist on bed rails   STS   Transfer Jose standing from EOB. Cues for hand placement and eccentric control Min A at EOB  Mod A ascending from recliner and w/c. Cues required for controlled descent with little carryover Sit to stand from EOB:   min-mod A with cues to place hands on bed to push up    Stand to sit: CGA with plop into chair.  CGA to/from raised EOB 3x  Min A at nustep and w/c with cues for hand placement    Stand Pivot transfer x x Pt performed with CGA-min A x   Sitting  Balance SBA at EOB to abhay pants and socks Sitting in recliner threading feet in to brief using reacher with no LOB Pt sat EOB with supervision - SBA to perform seated exercises.  Dynamic sitting balance performed at EOB for donning brief and pants using reacher as needed. SBA with no LOB. Increased time to complete   Standing Balance   CGA-Jose to abhay pants Mostly CGA for amb, 1 LOB requiring Mod A to recover. Pt stood with CGA-mod A secondary to LOB with standing /marchying

## 2024-03-18 NOTE — PROGRESS NOTES
NURSE TX:    Wound reassessment completed. Wound to right side of face closed, bruising remains, will continue to lota.   Hyperkeratosis remains to left plantar foot. Same treatment continues. Denies pain or discomfort. No needs voiced. Resting in bed. Call light in reach.

## 2024-03-19 LAB
GLUCOSE BLD-MCNC: 121 MG/DL (ref 70–99)
GLUCOSE BLD-MCNC: 137 MG/DL (ref 70–99)
GLUCOSE BLD-MCNC: 201 MG/DL (ref 70–99)
GLUCOSE BLD-MCNC: 228 MG/DL (ref 70–99)

## 2024-03-19 PROCEDURE — 97530 THERAPEUTIC ACTIVITIES: CPT

## 2024-03-19 PROCEDURE — 6370000000 HC RX 637 (ALT 250 FOR IP)

## 2024-03-19 PROCEDURE — 82962 GLUCOSE BLOOD TEST: CPT

## 2024-03-19 PROCEDURE — 6360000002 HC RX W HCPCS

## 2024-03-19 PROCEDURE — 94761 N-INVAS EAR/PLS OXIMETRY MLT: CPT

## 2024-03-19 PROCEDURE — 1200000002 HC SEMI PRIVATE SWING BED

## 2024-03-19 PROCEDURE — 97535 SELF CARE MNGMENT TRAINING: CPT

## 2024-03-19 PROCEDURE — 97112 NEUROMUSCULAR REEDUCATION: CPT

## 2024-03-19 PROCEDURE — 97116 GAIT TRAINING THERAPY: CPT

## 2024-03-19 RX ADMIN — GABAPENTIN 300 MG: 300 CAPSULE ORAL at 15:17

## 2024-03-19 RX ADMIN — CLOPIDOGREL BISULFATE 75 MG: 75 TABLET ORAL at 09:42

## 2024-03-19 RX ADMIN — NAPHAZOLINE HYDROCHLORIDE AND PHENIRAMINE MALEATE 1 DROP: .25; 3 SOLUTION/ DROPS OPHTHALMIC at 09:44

## 2024-03-19 RX ADMIN — FERROUS SULFATE TAB 325 MG (65 MG ELEMENTAL FE) 325 MG: 325 (65 FE) TAB at 09:42

## 2024-03-19 RX ADMIN — NAPHAZOLINE HYDROCHLORIDE AND PHENIRAMINE MALEATE 1 DROP: .25; 3 SOLUTION/ DROPS OPHTHALMIC at 13:48

## 2024-03-19 RX ADMIN — FOLIC ACID 1 MG: 1 TABLET ORAL at 09:42

## 2024-03-19 RX ADMIN — NAPHAZOLINE HYDROCHLORIDE AND PHENIRAMINE MALEATE 1 DROP: .25; 3 SOLUTION/ DROPS OPHTHALMIC at 20:29

## 2024-03-19 RX ADMIN — MONTELUKAST SODIUM 10 MG: 10 TABLET, FILM COATED ORAL at 20:28

## 2024-03-19 RX ADMIN — AMLODIPINE BESYLATE 5 MG: 5 TABLET ORAL at 09:42

## 2024-03-19 RX ADMIN — CYANOCOBALAMIN TAB 1000 MCG 1000 MCG: 1000 TAB at 09:42

## 2024-03-19 RX ADMIN — GABAPENTIN 300 MG: 300 CAPSULE ORAL at 09:42

## 2024-03-19 RX ADMIN — ENOXAPARIN SODIUM 30 MG: 100 INJECTION SUBCUTANEOUS at 20:28

## 2024-03-19 RX ADMIN — INSULIN LISPRO 1 UNITS: 100 INJECTION, SOLUTION INTRAVENOUS; SUBCUTANEOUS at 20:29

## 2024-03-19 RX ADMIN — GABAPENTIN 300 MG: 300 CAPSULE ORAL at 20:28

## 2024-03-19 RX ADMIN — ALLOPURINOL 100 MG: 100 TABLET ORAL at 09:42

## 2024-03-19 RX ADMIN — ENOXAPARIN SODIUM 30 MG: 100 INJECTION SUBCUTANEOUS at 09:42

## 2024-03-19 RX ADMIN — Medication 400 MG: at 09:42

## 2024-03-19 RX ADMIN — INSULIN LISPRO 1 UNITS: 100 INJECTION, SOLUTION INTRAVENOUS; SUBCUTANEOUS at 13:47

## 2024-03-19 NOTE — PLAN OF CARE
Problem: Safety - Adult  Goal: Free from fall injury  3/18/2024 2211 by Meka Mancia LPN  Outcome: Progressing  3/18/2024 1901 by Audrey Hinojosa RN  Outcome: Progressing     Problem: Pain  Goal: Verbalizes/displays adequate comfort level or baseline comfort level  3/18/2024 2211 by Meka Mancia LPN  Outcome: Progressing  3/18/2024 1901 by Audrey Hinojosa RN  Outcome: Progressing     Problem: Skin/Tissue Integrity  Goal: Absence of new skin breakdown  Description: 1.  Monitor for areas of redness and/or skin breakdown  2.  Assess vascular access sites hourly  3.  Every 4-6 hours minimum:  Change oxygen saturation probe site  4.  Every 4-6 hours:  If on nasal continuous positive airway pressure, respiratory therapy assess nares and determine need for appliance change or resting period.  3/18/2024 2211 by Meka Mancia LPN  Outcome: Progressing  3/18/2024 1901 by Audrey Hinojosa RN  Outcome: Progressing     Problem: Discharge Planning  Goal: Discharge to home or other facility with appropriate resources  3/18/2024 2211 by Meka Mancia LPN  Outcome: Progressing  3/18/2024 1901 by Audrey Hinojosa RN  Outcome: Progressing     Problem: Chronic Conditions and Co-morbidities  Goal: Patient's chronic conditions and co-morbidity symptoms are monitored and maintained or improved  3/18/2024 2211 by Meka Mancia LPN  Outcome: Progressing  3/18/2024 1901 by Audrey Hinojosa RN  Outcome: Progressing     Problem: ABCDS Injury Assessment  Goal: Absence of physical injury  3/18/2024 2211 by Meka Mancia LPN  Outcome: Progressing  3/18/2024 1901 by Audrey Hinojosa RN  Outcome: Progressing

## 2024-03-19 NOTE — PROGRESS NOTES
Occupational Therapy    Occupational Therapy Treatment Note  Name: Kareem Rey MRN: [id number] :   1945   Date:  3/19/2024   Admission Date: 2024 Room:  [address] [place] [address]     Primary Problem:  recurrent falls, peripheral neuropathy likely due to ETOH abuse and syncope.     Restrictions/Precautions:  Restrictions/Precautions  Restrictions/Precautions: Fall Risk     Communication with other providers:   Cleared for treatment by RN.    Subjective:  Patient states:  \"I feel real clean but wore out\"  Pain:   Location, Type, Intensity (0/10 to 10/10):  not rated R UE and foot    Objective:    Observation:  pt alert and oriented      Treatment, including education:  Transfers  Supine to sit :Minimal Assistance pull in therapist  Scooting :Stand By Assistance  Sit to stand :Minimal Assistance x 1-2 from bed or chair  Stand to sit :Minimal Assistance with min verbal cues to control descent  SPT:Moderate Assistance using RW Pt had LOB and required mod assistance from therapist to control descent to chair  Shower: CGA to get in shower using grab bars and mod verbal cues. Min A for sit to stand off shower bench.    Self Care Training:   Activities performed today included the following:      Grooming  Supervision to wash face and brush hair    Bathing   Minimal AssistancePt able to wash self sitting at EOB, required assistance to wash sit to stand to using grab bars to wash buttocks .    UB Dress  Minimal Assistance pt able to thread B UEs and pull over head but required assistance to pull down over shoulders due to damp skin.    LB Dress  Minimal Assistance Pt required assistance to thread B LEs into brief but requires assistance to pull up LE's and pt required Jose for sit to stand to pull up brief up over hip with slight unsteadiness.    Therapeutic Activity Training:   Pt completed functional mobility with RW x 60' with CGA to Min A.      Safety Measures: Gait belt used, up in the recliner, Pull/Bed Alarm activated and

## 2024-03-19 NOTE — PROGRESS NOTES
This RN has reviewed and agrees with Meka Mancia LPN's data collection and has collaborated with this LPN regarding the patient's care plan.

## 2024-03-19 NOTE — CARE COORDINATION
SWING BED WEEKLY TEAM SHEET     Kareem Rey   3/19/2024 WEEK # 3    Care Management    Issues to be resolved before discharge:  Increased strength/balance/mobility     Family Education: Patient/staff to update family     Discharge Plan: Home with  HC   Patient/Family Adjustment     Goals of previous week:   [] 1st team   [] met   [] partially met    [x] not met             Why goals were not met: Continued weakness     Skilled Level of Care Remains   [x] yes   [] no    Estimated length of stay: Insurance review due 3/20/24    Patient/Family Concerns/input: None at this time     Patient/Family  Signature _________________  3/19/2024       Care Management Signature:  Joelle Myers, RN

## 2024-03-19 NOTE — PROGRESS NOTES
V2.0  [facility]      Name:  Kareem Rey /Age/Sex: 1945  (78 y.o. male)   MRN & CSN:  [id number] & [id number] Encounter Date/Time: 3/19/2024 11:11 AM EDT    Location:   PCP: Eduar Birmingham MD       Hospital Day:     Assessment and Plan:   Kareem Rey is a 78 y.o. male with pmh of coronary disease, paroxysmal atrial fibrillation status post Watchman device with a pacemaker, moderate aortic stenosis, gout, hypertension, hyperlipidemia, type 2 diabetes was initially presented to [facility] with a 4 and a syncope with a laceration to his head.  Patient was found moderate aortic stenosis with a SALAZAR.  Patient was admitted to swing bed program on  for Debility.        Next insurance review is on Wednesday, 3/20/2024          Plan:  Physical debility:   Admitted to swing bed program for rehab 3/1/2024 after syncopal episode and Hx of recurrent falls with injury  Insurance review 3/20 and will likely receive cut letter  Working well with therapy needs encouragement to be as independent as possible.  (3/18) ambulated 80 feet with rolling walker/ CGA but noted to have some balance instability    L knee pain: XR L knee no fracture, moderate to severe tricompartmental osteoarthritis, moderate suprapatellar joint effusion.   Reports pain is improved today  Ortho consulted no indication for acute intervention  PRN symptom control     Olecranon bursitis: XR and exam consistent with olecranon bursitis.   No evidence of septic arthritis/bursitis no erythema, warmth, fevers or leukocytosis   Orthopedic consulted and with recommended PRN symptom control    R wrist pain: XR R wrist with concerns for osteoarthritis. . Ortho reviewed imaging, no acute process. Apply ice, Voltaren gel PRN.  Elevate extremity.    Elevated LFTs: , , overall stable.  Denies abdominal pain.  Hepatitis panel negative. Statin and allopurinol held.    Chronic atrial  NAD  Eyes: EOMI  ENT: neck supple  Cardiovascular: Regular rate and rhythm  Respiratory: Clear to auscultation bilaterally throughout all lung fields  Gastrointestinal: Soft, non tender nondistended bowel sounds present in all 4 quadrants  Genitourinary: no suprapubic tenderness  Musculoskeletal: Still complains with right wrist pain it is still mildly swollen no redness no warmth.  Skin: Dressing  CDI left foot  wound    Neuro: Alert.  Psych: Mood appropriate.     Medications:   Medications:    montelukast  10 mg Oral Nightly    allopurinol  100 mg Oral Daily    amLODIPine  5 mg Oral Daily    [Held by provider] atorvastatin  20 mg Oral Daily    clopidogrel  75 mg Oral Daily    ferrous sulfate  325 mg Oral Daily with breakfast    folic acid  1 mg Oral Daily    gabapentin  300 mg Oral TID    magnesium oxide  400 mg Oral Daily    enoxaparin  30 mg SubCUTAneous BID    vitamin D  50,000 Units Oral Weekly    naphazoline-pheniramine  1 drop Both Eyes 4x daily    insulin lispro  0-4 Units SubCUTAneous 4x Daily AC & HS    vitamin B-12  1,000 mcg Oral Daily      Infusions:    dextrose       PRN Meds: hydrOXYzine HCl, 25 mg, TID PRN  diclofenac sodium, 2 g, 4x Daily PRN  HYDROcodone-acetaminophen, 1 tablet, Q6H PRN  glucose, 4 tablet, PRN  dextrose bolus, 125 mL, PRN   Or  dextrose bolus, 250 mL, PRN  glucagon (rDNA), 1 mg, PRN  dextrose, , Continuous PRN  ondansetron, 4 mg, Q8H PRN   Or  ondansetron, 4 mg, Q6H PRN  polyethylene glycol, 17 g, Daily PRN  acetaminophen, 650 mg, Q6H PRN   Or  acetaminophen, 650 mg, Q6H PRN        Labs      Recent Results (from the past 24 hour(s))   POCT Glucose    Collection Time: 03/18/24  4:59 PM   Result Value Ref Range    POC Glucose 133 (H) 70 - 99 MG/DL   POCT Glucose    Collection Time: 03/18/24  7:55 PM   Result Value Ref Range    POC Glucose 188 (H) 70 - 99 MG/DL   POCT Glucose    Collection Time: 03/19/24  7:43 AM   Result Value Ref Range    POC Glucose 137 (H) 70 - 99 MG/DL   POCT

## 2024-03-19 NOTE — FLOWSHEET NOTE
Physical Therapy Daily Treatment Note   Date: 3/19/2024 Room: [address] [place] [address]    Name: Kareem Rey : 1945   MRN: [id number] Admission Date:2024      Restrictions/Precautions:  Fall Risk     Initial Pain level: no pain at rest, increased with wt bearing in BLEs and wrists    Subjective/Observation: Pt laying in bed upon arrival, agreeable to therapy with encouragement    Communication with other providers: cotx with [facility]    Therapeutic Activities/Neuro Re-Education/Gait Training   3/15/24 Date: 3/16/24 3/18/24 3/19/24   Bed   Mobility Supine<>sit SBA with use of bed rails  Mod A scooting toward HOB Supine to sit: min A with features of the bed.  Supine>sit SBA with increased time and heavy UE assist on bed rails Supine>sit SBA with increased time and heavy UE assist on bed rails   STS   Transfer Min A at EOB  Mod A ascending from recliner and w/c. Cues required for controlled descent with little carryover Sit to stand from EOB:   min-mod A with cues to place hands on bed to push up    Stand to sit: CGA with plop into chair.  CGA to/from raised EOB 3x  Min A at nustep and w/c with cues for hand placement  CGA from EOB  Min A to/from shower chair  Mod A to/from w/c  Min A to/from BSC  Upon returning to recliner chair pt has 1 LOB when turning to sit requiring assist to lower safely to recliner chair.   Stand Pivot transfer x Pt performed with CGA-min A x See above   Sitting  Balance Sitting in recliner threading feet in to brief using reacher with no LOB Pt sat EOB with supervision - SBA to perform seated exercises.  Dynamic sitting balance performed at EOB for donning brief and pants using reacher as needed. SBA with no LOB. Increased time to complete CGA-SBA this date at EOB and while in shower with dynamic tasks x20 min with 0-1UE support    Standing Balance   Mostly CGA for amb, 1 LOB requiring Mod A to recover. Pt stood with CGA-mod A secondary to LOB with standing SYEDA/iqra SBA-CGA standing at EOB for  dressing tasks, 1-2 minor LOB posteriorly with pt using EOB for support.  For amb CGA-Mod A see below CGA for amb, CGA in shower for standing static and dynamic tasks      Ambulation AM: 80ft + 80ft with standing rest between, followed by sitting rest and another 80ft with RW and CGA  PM: 80ft with mostly CGA, 1 LOB due to R foot hitting walker wheel requiring Mod A to recover Pt ambulated in place see below    Pt ambulated bed to chair with CGA-min A 3' with cues to stand upright and decreased speed and foot clearance.  Pt amb to therapy gym ~80ft with RW and CGA with improved step length/height, upon turning to sit on nustep pt loses balance to R side req Min A to recover. On walk back to room pt completed ~70ft before beginning to los balance R and posteriorly and unable to advance R foot. Pt required seated rest before amb 15ft into room and sit in recliner with CGA.   60ft room to shower with RW and CGA. Followed by shorter amb w/c>BSC and BSC>recliner chair ~10ft each. On 1st walk pt demos improved step pattern and safety with RW. Once fatigued pt requires increased cues for step sequencing and safety.     Therapeutic Exercises   Date: 3/13/24 Date: 3/16/24 Date:3/18/24    Nustep 8' Lvl 3, 175 steps with BLEs and LUE for improved strength and endurance  15' Lvl 3   323 steps  with BLE/UE for improved strength and endurance    Ankle pumps  1x15 B LE     Quad set  1x15 5\" B LE     Hip abd  1x10 B LE     Seated marching  1x10 B LE     Seated LAQ  1x10 3\" B LE     Seated glute set  1x10 5\"     Scapular retraction  1x10     Shoulder flexion  1x10 B UE       Other therapeutic activity:     Education provided:     Treatment/Activity Tolerance:   [x] Patient limited by fatigue   [x] Patient limited by pain   [] Patient limited by other medical complications   [x] Patient tolerated therapy well  [] Other:     Safety Precautions:     [] Left in bed    [x] Left in chair   [x] Call light within reach    [] Bed alarm on

## 2024-03-19 NOTE — PROGRESS NOTES
SWING BED WEEKLY TEAM SHEET      WEEK#  3    NUTRITION   Diet:  Diabetic 5 cabrs per meals                                             Supplement: standard high calorie, high protein once daily     TF:  n/a                                   TPN:  n/a   Appropriate/Adequate [X] yes [ ] no                    Meal intakes: % consistently     Weight: 239#-no new weight from admission                          BMI: 33.5                                                 Significant Change: None noted     Nutrition Assessment:  Pt remains in swing bed. Diet order Carb control 5 carbs per meal with standard high calorie, high protein oral supplement TID-pt reports his appetite is at this time good and denies further needs for nutrition interventions. Noted intakes consistently 50% or greater. Will at this time decrease oral supplements to once daily d/t adequate oral intake. Weights, labs, progress notes all reviewed. At this time will monitor and follow at low risk-will follow up sooner if noted change in condition.      Nutrition Diagnosis: Pt meets ASPEN criteria for moderate malnutrition in context of chronic illness r/t injury/trauma AEB moderate muscle loss, wt loss, poor intake prior to admission and criteria as identified in malnutrition assessment.      Recommendations: Continue current diet, decrease oral supplements frequency to once daily d/t adequate oral intake. Encourage intake at all meals.      Issues to be resolved before discharge: Meet at least 75% of estimated nutrition needs     Dietitian Signature:   Jesus Pfeiffer RD, LD  [phone]

## 2024-03-19 NOTE — CARE COORDINATION
CM met with the patient to remind him that his insurance review is due tomorrow (3/20/24), patient voiced understanding.  [facility] has indicated that a [employer] letter will likely be issued terminating coverage of skilled care.  CM will follow.     9:47 AM  CM faxed the [facility] order and supporting documentation to [facility] to initiate the referral.  CM will follow.

## 2024-03-20 LAB
ALBUMIN SERPL-MCNC: 3.2 GM/DL (ref 3.4–5)
ALP BLD-CCNC: 81 IU/L (ref 40–129)
ALT SERPL-CCNC: 64 U/L (ref 10–40)
ANION GAP SERPL CALCULATED.3IONS-SCNC: 14 MMOL/L (ref 7–16)
AST SERPL-CCNC: 65 IU/L (ref 15–37)
BILIRUB SERPL-MCNC: 0.3 MG/DL (ref 0–1)
BUN SERPL-MCNC: 37 MG/DL (ref 6–23)
CALCIUM SERPL-MCNC: 9.2 MG/DL (ref 8.3–10.6)
CHLORIDE BLD-SCNC: 103 MMOL/L (ref 99–110)
CO2: 21 MMOL/L (ref 21–32)
CREAT SERPL-MCNC: 1.3 MG/DL (ref 0.9–1.3)
GFR SERPL CREATININE-BSD FRML MDRD: 56 ML/MIN/1.73M2
GLUCOSE BLD-MCNC: 125 MG/DL (ref 70–99)
GLUCOSE BLD-MCNC: 151 MG/DL (ref 70–99)
GLUCOSE BLD-MCNC: 165 MG/DL (ref 70–99)
GLUCOSE BLD-MCNC: 216 MG/DL (ref 70–99)
GLUCOSE SERPL-MCNC: 206 MG/DL (ref 70–99)
HCT VFR BLD CALC: 33.8 % (ref 42–52)
HEMOGLOBIN: 10.8 GM/DL (ref 13.5–18)
MCH RBC QN AUTO: 34.2 PG (ref 27–31)
MCHC RBC AUTO-ENTMCNC: 32 % (ref 32–36)
MCV RBC AUTO: 107 FL (ref 78–100)
PDW BLD-RTO: 13.8 % (ref 11.7–14.9)
PLATELET # BLD: 223 K/CU MM (ref 140–440)
PMV BLD AUTO: 9.7 FL (ref 7.5–11.1)
POTASSIUM SERPL-SCNC: 4.9 MMOL/L (ref 3.5–5.1)
RBC # BLD: 3.16 M/CU MM (ref 4.6–6.2)
SODIUM BLD-SCNC: 138 MMOL/L (ref 135–145)
TOTAL PROTEIN: 6.4 GM/DL (ref 6.4–8.2)
WBC # BLD: 7.2 K/CU MM (ref 4–10.5)

## 2024-03-20 PROCEDURE — 80053 COMPREHEN METABOLIC PANEL: CPT

## 2024-03-20 PROCEDURE — 97530 THERAPEUTIC ACTIVITIES: CPT

## 2024-03-20 PROCEDURE — 97116 GAIT TRAINING THERAPY: CPT

## 2024-03-20 PROCEDURE — 6360000002 HC RX W HCPCS

## 2024-03-20 PROCEDURE — 82962 GLUCOSE BLOOD TEST: CPT

## 2024-03-20 PROCEDURE — 36415 COLL VENOUS BLD VENIPUNCTURE: CPT

## 2024-03-20 PROCEDURE — 1200000002 HC SEMI PRIVATE SWING BED

## 2024-03-20 PROCEDURE — 94761 N-INVAS EAR/PLS OXIMETRY MLT: CPT

## 2024-03-20 PROCEDURE — 6370000000 HC RX 637 (ALT 250 FOR IP)

## 2024-03-20 PROCEDURE — 97535 SELF CARE MNGMENT TRAINING: CPT

## 2024-03-20 PROCEDURE — 85027 COMPLETE CBC AUTOMATED: CPT

## 2024-03-20 PROCEDURE — 97112 NEUROMUSCULAR REEDUCATION: CPT

## 2024-03-20 RX ADMIN — GABAPENTIN 300 MG: 300 CAPSULE ORAL at 20:12

## 2024-03-20 RX ADMIN — NAPHAZOLINE HYDROCHLORIDE AND PHENIRAMINE MALEATE 1 DROP: .25; 3 SOLUTION/ DROPS OPHTHALMIC at 17:31

## 2024-03-20 RX ADMIN — Medication 400 MG: at 08:21

## 2024-03-20 RX ADMIN — FOLIC ACID 1 MG: 1 TABLET ORAL at 08:21

## 2024-03-20 RX ADMIN — FERROUS SULFATE TAB 325 MG (65 MG ELEMENTAL FE) 325 MG: 325 (65 FE) TAB at 08:21

## 2024-03-20 RX ADMIN — INSULIN LISPRO 1 UNITS: 100 INJECTION, SOLUTION INTRAVENOUS; SUBCUTANEOUS at 20:12

## 2024-03-20 RX ADMIN — NAPHAZOLINE HYDROCHLORIDE AND PHENIRAMINE MALEATE 1 DROP: .25; 3 SOLUTION/ DROPS OPHTHALMIC at 20:15

## 2024-03-20 RX ADMIN — GABAPENTIN 300 MG: 300 CAPSULE ORAL at 08:21

## 2024-03-20 RX ADMIN — AMLODIPINE BESYLATE 5 MG: 5 TABLET ORAL at 08:21

## 2024-03-20 RX ADMIN — NAPHAZOLINE HYDROCHLORIDE AND PHENIRAMINE MALEATE 1 DROP: .25; 3 SOLUTION/ DROPS OPHTHALMIC at 13:29

## 2024-03-20 RX ADMIN — NAPHAZOLINE HYDROCHLORIDE AND PHENIRAMINE MALEATE 1 DROP: .25; 3 SOLUTION/ DROPS OPHTHALMIC at 08:21

## 2024-03-20 RX ADMIN — GABAPENTIN 300 MG: 300 CAPSULE ORAL at 15:25

## 2024-03-20 RX ADMIN — ALLOPURINOL 100 MG: 100 TABLET ORAL at 08:21

## 2024-03-20 RX ADMIN — CYANOCOBALAMIN TAB 1000 MCG 1000 MCG: 1000 TAB at 08:21

## 2024-03-20 RX ADMIN — MONTELUKAST SODIUM 10 MG: 10 TABLET, FILM COATED ORAL at 20:12

## 2024-03-20 RX ADMIN — CLOPIDOGREL BISULFATE 75 MG: 75 TABLET ORAL at 08:21

## 2024-03-20 RX ADMIN — ENOXAPARIN SODIUM 30 MG: 100 INJECTION SUBCUTANEOUS at 08:21

## 2024-03-20 RX ADMIN — ENOXAPARIN SODIUM 30 MG: 100 INJECTION SUBCUTANEOUS at 20:12

## 2024-03-20 NOTE — CARE COORDINATION
CM submitted updated clinical documentation to [facility] as requested.  CM will follow.     10:03 AM  CM received a [employer] letter from the patient's insurance provider terminating coverage of skilled care Friday 3/22/24.  CM will follow.      10:15 AM  CM met with the patient to explain the [employer] letter and to notify him that his last day of insurance coverage is Friday 3/22/24, patient voiced understanding and signed the [employer] letter.  CM advised the patient that a referral to [facility] has already been initiated as he had previously requested.  Patient feels that he will likely need medical transportation home but will first speak with his wife and then follow-up with this CM regarding their decision.  CM will follow.     10:28 AM  CM faxed the patient's signed [employer] letter to [facility] as requested.  CM will follow.     3 PM  CM met with the patient for a follow-up discussion regarding discharge planning.  Patient called his wife Bernarda on his cell phone and requested this CM speak with her.  Bernarda advised that she would like to plan for discharge at 11 AM Friday 3/22/24.  Bernarda plans to have friends available to assist getting the patient from the car into his home.  CM will follow.

## 2024-03-20 NOTE — PROGRESS NOTES
Occupational Therapy    Occupational Therapy Treatment Note  Name: Kareem Rey MRN: [id number] :   1945   Date:  3/20/2024   Admission Date: 2024 Room:  [address] [place] [address]     Primary Problem:  recurrent falls, peripheral neuropathy likely due to ETOH abuse and syncope.     Restrictions/Precautions:  Restrictions/Precautions  Restrictions/Precautions: Fall Risk     Communication with other providers:   Cleared for treatment by RN.    Subjective:  Patient states:  \"I'm going home tomorrow\"  Pain:   Location, Type, Intensity (0/10 to 10/10):  not rated R UE and foot    Objective:    Observation:  pt alert and oriented      Treatment, including education:  Transfers  Supine to sit :Stand By Assistance  Scooting :Stand By Assistance  Sit to stand :Moderate Assistance x 1 from bed and recliner  Stand to sit :Minimal Assistance with min verbal cues to control descent  SPT:Minimal Assistance for slight LOB using RW and mod verbal cues to reach back      Self Care Training:   Activities performed today included the following:          UB Dress  Supervision pt able to abhay pull over shirt    LB Dress  Minimal Assistance Pt required able to thread B LEs into brief and shorts using reacher with extra time but requires assistance to pull up LE's and pt required Min A for sit to stand to pull up brief with slight unsteadiness.    Therapeutic Activity Training:   Pt completed functional mobility with RW x 80-90' x 2 attempts with CGA requiring standing rest breaks and 2 sitting rest breaks. Pt stood x 3-4 min with CGA with RW to facilitate increased endurance/strength for ADL tasks and transfers. Therapist educated pt on safety awareness with walker safety with pt having poor insight for at home with safety.          Safety Measures: Gait belt used, up in the recliner, Pull/Bed Alarm activated and call light left in reach        Assessment / Impression:        Patient's tolerance of treatment: Good   Adverse Reaction:

## 2024-03-20 NOTE — FLOWSHEET NOTE
Physical Therapy Daily Treatment Note   Date: 3/20/2024 Room: [address] [place] [address]    Name: Kareem Rey : 1945   MRN: [id number] Admission Date:2024      Restrictions/Precautions:  Fall Risk     Initial Pain level: no pain at rest, increased with wt bearing in BLEs and wrists    Subjective/Observation: Pt laying in bed upon arrival, agreeable to therapy with encouragement    Communication with other providers: cotx with [facility]    Therapeutic Activities/Neuro Re-Education/Gait Training   Date: 3/16/24 3/18/24 3/19/24 3/20/24   Bed   Mobility Supine to sit: min A with features of the bed.  Supine>sit SBA with increased time and heavy UE assist on bed rails Supine>sit SBA with increased time and heavy UE assist on bed rails Supine>sit SBA with increased time and heavy UE assist on bed rails   STS   Transfer Sit to stand from EOB:   min-mod A with cues to place hands on bed to push up    Stand to sit: CGA with plop into chair.  CGA to/from raised EOB 3x  Min A at nustep and w/c with cues for hand placement  CGA from EOB  Min A to/from shower chair  Mod A to/from w/c  Min A to/from BSC  Upon returning to recliner chair pt has 1 LOB when turning to sit requiring assist to lower safely to recliner chair. Mod A from EOB and w/c this date. Cues for fwd posture and hand placement however pt unable to complete without physical assist   Stand Pivot transfer Pt performed with CGA-min A x See above x   Sitting  Balance Pt sat EOB with supervision - SBA to perform seated exercises.  Dynamic sitting balance performed at EOB for donning brief and pants using reacher as needed. SBA with no LOB. Increased time to complete CGA-SBA this date at EOB and while in shower with dynamic tasks x20 min with 0-1UE support  x   Standing Balance   Pt stood with CGA-mod A secondary to LOB with standing WS/marches SBA-CGA standing at EOB for dressing tasks, 1-2 minor LOB posteriorly with pt using EOB for support.  For amb CGA-Mod A see below CGA for  amb, CGA in shower for standing static and dynamic tasks  CGA-Mod A for amb, mostly CGA with 1 LOB when attempting upward gaze.  Static standing for dressing tasks with CGA for safety  Pt completed weaving cones and stepping over 4\" box with CGA and cues for slow steady amb and walker mgmt.     Ambulation Pt ambulated in place see below    Pt ambulated bed to chair with CGA-min A 3' with cues to stand upright and decreased speed and foot clearance.  Pt amb to therapy gym ~80ft with RW and CGA with improved step length/height, upon turning to sit on nustep pt loses balance to R side req Min A to recover. On walk back to room pt completed ~70ft before beginning to los balance R and posteriorly and unable to advance R foot. Pt required seated rest before amb 15ft into room and sit in recliner with CGA.   60ft room to shower with RW and CGA. Followed by shorter amb w/c>BSC and BSC>recliner chair ~10ft each. On 1st walk pt demos improved step pattern and safety with RW. Once fatigued pt requires increased cues for step sequencing and safety. 85ft 2x with RW and CGA-Mod A. See above for details     Therapeutic Exercises   Date: 3/13/24 Date: 3/16/24 Date:3/18/24    Nustep 8' Lvl 3, 175 steps with BLEs and LUE for improved strength and endurance  15' Lvl 3   323 steps  with BLE/UE for improved strength and endurance    Ankle pumps  1x15 B LE     Quad set  1x15 5\" B LE     Hip abd  1x10 B LE     Seated marching  1x10 B LE     Seated LAQ  1x10 3\" B LE     Seated glute set  1x10 5\"     Scapular retraction  1x10     Shoulder flexion  1x10 B UE       Other therapeutic activity:     Education provided:     Treatment/Activity Tolerance:   [x] Patient limited by fatigue   [x] Patient limited by pain   [] Patient limited by other medical complications   [x] Patient tolerated therapy well  [] Other:     Safety Precautions:     [] Left in bed    [x] Left in chair   [x] Call light within reach    [] Bed alarm on    [x] Personal alarm

## 2024-03-21 LAB
GLUCOSE BLD-MCNC: 138 MG/DL (ref 70–99)
GLUCOSE BLD-MCNC: 140 MG/DL (ref 70–99)
GLUCOSE BLD-MCNC: 148 MG/DL (ref 70–99)

## 2024-03-21 PROCEDURE — 82962 GLUCOSE BLOOD TEST: CPT

## 2024-03-21 PROCEDURE — 97116 GAIT TRAINING THERAPY: CPT

## 2024-03-21 PROCEDURE — 97110 THERAPEUTIC EXERCISES: CPT

## 2024-03-21 PROCEDURE — 1200000002 HC SEMI PRIVATE SWING BED

## 2024-03-21 PROCEDURE — 97535 SELF CARE MNGMENT TRAINING: CPT

## 2024-03-21 PROCEDURE — 6370000000 HC RX 637 (ALT 250 FOR IP)

## 2024-03-21 PROCEDURE — 94761 N-INVAS EAR/PLS OXIMETRY MLT: CPT

## 2024-03-21 PROCEDURE — 6360000002 HC RX W HCPCS

## 2024-03-21 PROCEDURE — 97530 THERAPEUTIC ACTIVITIES: CPT

## 2024-03-21 RX ADMIN — GABAPENTIN 300 MG: 300 CAPSULE ORAL at 15:18

## 2024-03-21 RX ADMIN — ENOXAPARIN SODIUM 30 MG: 100 INJECTION SUBCUTANEOUS at 21:15

## 2024-03-21 RX ADMIN — NAPHAZOLINE HYDROCHLORIDE AND PHENIRAMINE MALEATE 1 DROP: .25; 3 SOLUTION/ DROPS OPHTHALMIC at 18:05

## 2024-03-21 RX ADMIN — FOLIC ACID 1 MG: 1 TABLET ORAL at 10:10

## 2024-03-21 RX ADMIN — ENOXAPARIN SODIUM 30 MG: 100 INJECTION SUBCUTANEOUS at 10:10

## 2024-03-21 RX ADMIN — Medication 400 MG: at 10:11

## 2024-03-21 RX ADMIN — GABAPENTIN 300 MG: 300 CAPSULE ORAL at 21:16

## 2024-03-21 RX ADMIN — ALLOPURINOL 100 MG: 100 TABLET ORAL at 10:10

## 2024-03-21 RX ADMIN — NAPHAZOLINE HYDROCHLORIDE AND PHENIRAMINE MALEATE 1 DROP: .25; 3 SOLUTION/ DROPS OPHTHALMIC at 21:16

## 2024-03-21 RX ADMIN — NAPHAZOLINE HYDROCHLORIDE AND PHENIRAMINE MALEATE 1 DROP: .25; 3 SOLUTION/ DROPS OPHTHALMIC at 10:10

## 2024-03-21 RX ADMIN — CYANOCOBALAMIN TAB 1000 MCG 1000 MCG: 1000 TAB at 10:10

## 2024-03-21 RX ADMIN — MONTELUKAST SODIUM 10 MG: 10 TABLET, FILM COATED ORAL at 21:16

## 2024-03-21 RX ADMIN — CLOPIDOGREL BISULFATE 75 MG: 75 TABLET ORAL at 10:10

## 2024-03-21 RX ADMIN — AMLODIPINE BESYLATE 5 MG: 5 TABLET ORAL at 10:10

## 2024-03-21 RX ADMIN — GABAPENTIN 300 MG: 300 CAPSULE ORAL at 10:11

## 2024-03-21 RX ADMIN — INSULIN LISPRO 1 UNITS: 100 INJECTION, SOLUTION INTRAVENOUS; SUBCUTANEOUS at 21:15

## 2024-03-21 RX ADMIN — FERROUS SULFATE TAB 325 MG (65 MG ELEMENTAL FE) 325 MG: 325 (65 FE) TAB at 10:13

## 2024-03-21 ASSESSMENT — PAIN SCALES - GENERAL: PAINLEVEL_OUTOF10: 0

## 2024-03-21 NOTE — PROGRESS NOTES
V2.0  [facility] Hospitalist Progress Note      Name:  Kareem Rey /Age/Sex: 1945  (78 y.o. male)   MRN & CSN:  [id number] & [id number] Encounter Date/Time: 3/21/2024 11:11 AM EDT    Location:   PCP: Eduar Birmingham MD       Hospital Day:     Assessment and Plan:   Kareem Rey is a 78 y.o. male with pmh of coronary disease, paroxysmal atrial fibrillation status post Watchman device with a pacemaker, moderate aortic stenosis, gout, hypertension, hyperlipidemia, type 2 diabetes was initially presented to [facility] with a 4 and a syncope with a laceration to his head.  Patient was found moderate aortic stenosis with a SALAZAR.  Patient was admitted to swing bed program on  for Debility.        Next insurance review is on Wednesday, 3/20/2024          Plan:  Physical debility:   Admitted to swing bed program for rehab 3/1/2024 after syncopal episode and Hx of recurrent falls with injury  Insurance review 3/20; plan for discharge home 3/22/2024    L knee pain: XR L knee no fracture, moderate to severe tricompartmental osteoarthritis, moderate suprapatellar joint effusion.   Reports pain is improved today  Ortho consulted no indication for acute intervention  PRN symptom control     Olecranon bursitis: XR and exam consistent with olecranon bursitis.   No evidence of septic arthritis/bursitis no erythema, warmth, fevers or leukocytosis   Orthopedic consulted and with recommended PRN symptom control    R wrist pain: XR R wrist with concerns for osteoarthritis. Ortho reviewed imaging, no acute process. Apply ice, Voltaren gel PRN.  Elevate extremity.    Elevated LFTs: ALT 64, AST 65, overall decreased and stable.  Denies abdominal pain.  Hepatitis panel negative. Statin remains on hold.    Chronic atrial fibrillation:   Status post Watchman;   Continue Plavix   follow-up with EP/cardiology as outpatient    Vitamin B12 deficiency:   Continue oral supplementation     Type 2  fields  Gastrointestinal: Obese, soft, non tender nondistended bowel sounds present in all 4 quadrants  Genitourinary: no suprapubic tenderness  Musculoskeletal: no warmth or redness.  Skin: Dressing CDI left foot wound    Neuro: Alert.  Psych: Mood appropriate.     Medications:   Medications:    montelukast  10 mg Oral Nightly    allopurinol  100 mg Oral Daily    amLODIPine  5 mg Oral Daily    [Held by provider] atorvastatin  20 mg Oral Daily    clopidogrel  75 mg Oral Daily    ferrous sulfate  325 mg Oral Daily with breakfast    folic acid  1 mg Oral Daily    gabapentin  300 mg Oral TID    magnesium oxide  400 mg Oral Daily    enoxaparin  30 mg SubCUTAneous BID    vitamin D  50,000 Units Oral Weekly    naphazoline-pheniramine  1 drop Both Eyes 4x daily    insulin lispro  0-4 Units SubCUTAneous 4x Daily AC & HS    vitamin B-12  1,000 mcg Oral Daily      Infusions:    dextrose       PRN Meds: hydrOXYzine HCl, 25 mg, TID PRN  diclofenac sodium, 2 g, 4x Daily PRN  HYDROcodone-acetaminophen, 1 tablet, Q6H PRN  glucose, 4 tablet, PRN  dextrose bolus, 125 mL, PRN   Or  dextrose bolus, 250 mL, PRN  glucagon (rDNA), 1 mg, PRN  dextrose, , Continuous PRN  ondansetron, 4 mg, Q8H PRN   Or  ondansetron, 4 mg, Q6H PRN  polyethylene glycol, 17 g, Daily PRN  acetaminophen, 650 mg, Q6H PRN   Or  acetaminophen, 650 mg, Q6H PRN        Labs      Recent Results (from the past 24 hour(s))   POCT Glucose    Collection Time: 03/20/24  8:10 AM   Result Value Ref Range    POC Glucose 125 (H) 70 - 99 MG/DL   POCT Glucose    Collection Time: 03/20/24 11:47 AM   Result Value Ref Range    POC Glucose 165 (H) 70 - 99 MG/DL   CBC    Collection Time: 03/20/24  2:45 PM   Result Value Ref Range    WBC 7.2 4.0 - 10.5 K/CU MM    RBC 3.16 (L) 4.6 - 6.2 M/CU MM    Hemoglobin 10.8 (L) 13.5 - 18.0 GM/DL    Hematocrit 33.8 (L) 42 - 52 %    .0 (H) 78 - 100 FL    MCH 34.2 (H) 27 - 31 PG    MCHC 32.0 32.0 - 36.0 %    RDW 13.8 11.7 - 14.9 %

## 2024-03-21 NOTE — FLOWSHEET NOTE
Physical Therapy Daily Treatment Note   Date: 3/21/2024 Room: [address] [place] [address]    Name: Kareem Rey : 1945   MRN: [id number] Admission Date:2024      Restrictions/Precautions:  Fall Risk     Initial Pain level: 7/10 feet/wrists    Subjective/Observation: Pt laying in bed with nurse giving meds upon arrival. Pt agreeable to therapy. \"My tailbone is hurting.\"    Communication with other providers: Cleared for therapy by nurse. Nurse aware of pain in tailbone.     Therapeutic Activities/Neuro Re-Education/Gait Training   3/18/24 3/19/24 3/20/24 3/21/24   Bed   Mobility Supine>sit SBA with increased time and heavy UE assist on bed rails Supine>sit SBA with increased time and heavy UE assist on bed rails Supine>sit SBA with increased time and heavy UE assist on bed rails Supine>sit SBA with increased time and heavy UE assist on bed rails   STS   Transfer CGA to/from raised EOB 3x  Min A at nustep and w/c with cues for hand placement  CGA from EOB  Min A to/from shower chair  Mod A to/from w/c  Min A to/from BSC  Upon returning to recliner chair pt has 1 LOB when turning to sit requiring assist to lower safely to recliner chair. Mod A from EOB and w/c this date. Cues for fwd posture and hand placement however pt unable to complete without physical assist CGA from EOB, ModA from w/c several attempts. Cues for hand placement each time with little carryover   Stand Pivot transfer x See above x From w/c>bed w/ RW and Jose   Sitting  Balance Dynamic sitting balance performed at EOB for donning brief and pants using reacher as needed. SBA with no LOB. Increased time to complete CGA-SBA this date at EOB and while in shower with dynamic tasks x20 min with 0-1UE support  x x   Standing Balance   SBA-CGA standing at EOB for dressing tasks, 1-2 minor LOB posteriorly with pt using EOB for support.  For amb CGA-Mod A see below CGA for amb, CGA in shower for standing static and dynamic tasks  CGA-Mod A for amb, mostly CGA with 1

## 2024-03-21 NOTE — PROGRESS NOTES
This RN has reviewed and agrees with Miracle Graves LPN's data collection and has collaborated with this LPN regarding the patient's care plan.

## 2024-03-21 NOTE — PROGRESS NOTES
This RN has reviewed and agrees with NERIS Farrar LPN's data collection and has collaborated with this LPN regarding the patient's care plan.    Adeline Frazier RN  9:32 PM

## 2024-03-21 NOTE — PROGRESS NOTES
Occupational Therapy    Occupational Therapy Treatment Note  Name: Kareem Rey MRN: [id number] :   1945   Date:  3/21/2024   Admission Date: 2024 Room:  [address] [place] [address]     Primary Problem:  recurrent falls, peripheral neuropathy likely due to ETOH abuse and syncope.     Restrictions/Precautions:  Restrictions/Precautions  Restrictions/Precautions: Fall Risk     Communication with other providers:   Cleared for treatment by RN.    Subjective:  Patient states:  \"I'm going home tomorrow\"  Pain:   Location, Type, Intensity (0/10 to 10/10):  not rated R UE and foot    Objective:    Observation:  pt alert and oriented      Treatment, including education:  Transfers  Supine to sit :Stand By Assistance  Scooting :Stand By Assistance  Sit to stand :Contact Guard Assistance x  from bed   Stand to sit :Minimal Assistance with min verbal cues to control descent  SPT:Minimal Assistance for slight LOB using RW      Self Care Training:   Activities performed today included the following:      Grooming  Modified Independent to wash face and brush hair    Bathing   Minimal Assistance Pt able to wash self sitting at EOB, required assistance to wash B feet and requires Min A for LOB while in stance sit to stand to RW to wash buttocks using RW for balance.    UB Dress  Supervision to abhay to thread B arms and pull over head.    LB Dress  Minimal Assistance Pt required able to thread B LEs into brief and scrub pants using reacher with extra time but requires assistance to pull up LE's and pt required Min A for sit to stand to pull up brief with slight unsteadiness.      Safety Measures: Gait belt used, up in the recliner, Pull/Bed Alarm activated and call light left in reach        Assessment / Impression:        Patient's tolerance of treatment: Good   Adverse Reaction: None  Significant change in status and impact:  None  Barriers to improvement:  Dec strength and endurance    Plan for Next Session:    Continue with

## 2024-03-22 VITALS
DIASTOLIC BLOOD PRESSURE: 85 MMHG | HEIGHT: 71 IN | SYSTOLIC BLOOD PRESSURE: 133 MMHG | OXYGEN SATURATION: 96 % | TEMPERATURE: 98.4 F | WEIGHT: 239.4 LBS | RESPIRATION RATE: 18 BRPM | HEART RATE: 84 BPM | BODY MASS INDEX: 33.52 KG/M2

## 2024-03-22 LAB — GLUCOSE BLD-MCNC: 136 MG/DL (ref 70–99)

## 2024-03-22 PROCEDURE — 6360000002 HC RX W HCPCS

## 2024-03-22 PROCEDURE — 82962 GLUCOSE BLOOD TEST: CPT

## 2024-03-22 PROCEDURE — 6370000000 HC RX 637 (ALT 250 FOR IP)

## 2024-03-22 PROCEDURE — 94761 N-INVAS EAR/PLS OXIMETRY MLT: CPT

## 2024-03-22 PROCEDURE — 97535 SELF CARE MNGMENT TRAINING: CPT

## 2024-03-22 RX ORDER — ERGOCALCIFEROL 1.25 MG/1
50000 CAPSULE ORAL WEEKLY
Qty: 5 CAPSULE | Refills: 0 | Status: SHIPPED | OUTPATIENT
Start: 2024-03-25 | End: 2024-05-14

## 2024-03-22 RX ORDER — AMLODIPINE BESYLATE 5 MG/1
5 TABLET ORAL DAILY
Qty: 30 TABLET | Refills: 3 | Status: SHIPPED | OUTPATIENT
Start: 2024-03-23

## 2024-03-22 RX ADMIN — ENOXAPARIN SODIUM 30 MG: 100 INJECTION SUBCUTANEOUS at 09:03

## 2024-03-22 RX ADMIN — FERROUS SULFATE TAB 325 MG (65 MG ELEMENTAL FE) 325 MG: 325 (65 FE) TAB at 08:59

## 2024-03-22 RX ADMIN — NAPHAZOLINE HYDROCHLORIDE AND PHENIRAMINE MALEATE 1 DROP: .25; 3 SOLUTION/ DROPS OPHTHALMIC at 08:59

## 2024-03-22 RX ADMIN — CLOPIDOGREL BISULFATE 75 MG: 75 TABLET ORAL at 08:58

## 2024-03-22 RX ADMIN — FOLIC ACID 1 MG: 1 TABLET ORAL at 08:58

## 2024-03-22 RX ADMIN — AMLODIPINE BESYLATE 5 MG: 5 TABLET ORAL at 08:58

## 2024-03-22 RX ADMIN — ALLOPURINOL 100 MG: 100 TABLET ORAL at 08:58

## 2024-03-22 RX ADMIN — GABAPENTIN 300 MG: 300 CAPSULE ORAL at 08:57

## 2024-03-22 RX ADMIN — Medication 400 MG: at 08:58

## 2024-03-22 RX ADMIN — CYANOCOBALAMIN TAB 1000 MCG 1000 MCG: 1000 TAB at 08:58

## 2024-03-22 ASSESSMENT — PAIN SCALES - GENERAL: PAINLEVEL_OUTOF10: 0

## 2024-03-22 NOTE — PLAN OF CARE
Problem: Safety - Adult  Goal: Free from fall injury  3/22/2024 1124 by Karo Mcguire RN  Outcome: Completed  3/22/2024 0059 by Adeline Frazier RN  Outcome: Progressing     Problem: Pain  Goal: Verbalizes/displays adequate comfort level or baseline comfort level  3/22/2024 1124 by Karo Mcguire RN  Outcome: Completed  3/22/2024 0059 by Adeline Frazier RN  Outcome: Progressing     Problem: Skin/Tissue Integrity  Goal: Absence of new skin breakdown  Description: 1.  Monitor for areas of redness and/or skin breakdown  2.  Assess vascular access sites hourly  3.  Every 4-6 hours minimum:  Change oxygen saturation probe site  4.  Every 4-6 hours:  If on nasal continuous positive airway pressure, respiratory therapy assess nares and determine need for appliance change or resting period.  3/22/2024 1124 by Karo Mcguire RN  Outcome: Completed  3/22/2024 0059 by Adeline Frazier RN  Outcome: Progressing     Problem: Discharge Planning  Goal: Discharge to home or other facility with appropriate resources  3/22/2024 1124 by Karo Mcguire RN  Outcome: Completed  3/22/2024 0059 by Adeline Frazier RN  Outcome: Progressing     Problem: Chronic Conditions and Co-morbidities  Goal: Patient's chronic conditions and co-morbidity symptoms are monitored and maintained or improved  3/22/2024 1124 by Karo Mcguire RN  Outcome: Completed  3/22/2024 0059 by Adeline Frazier RN  Outcome: Progressing     Problem: ABCDS Injury Assessment  Goal: Absence of physical injury  3/22/2024 1124 by Karo Mcguire RN  Outcome: Completed  3/22/2024 0059 by Adeline Frazier RN  Outcome: Progressing

## 2024-03-22 NOTE — DISCHARGE SUMMARY
V2.0  Discharge Summary    Name:  Kareem Rey /Age/Sex: 1945 (78 y.o. male)   Admit Date: 2024  Discharge Date: 3/22/24    MRN & CSN:  [id number] & [id number] Encounter Date and Time 3/22/24 10:56 AM EDT    Attending:  Javier Abrams MD Discharging Provider: BENSON Hartman - CNP       Hospital Course:     Brief HPI: Kareem Rey is a 78 y.o. male with pmh of coronary disease, paroxysmal atrial fibrillation status post Watchman device with a pacemaker, moderate aortic stenosis, gout, hypertension, hyperlipidemia, type 2 diabetes was initially presented to [facility] with a 4 and a syncope with a laceration to his head.  Patient was found moderate aortic stenosis with a SALAZAR.  Patient was admitted to swing bed program on  for Debility.       Brief Problem Based Course:     Physical debility:   Admitted to swing bed program for rehab 3/1/2024 after syncopal episode and Hx of recurrent falls with injury  Progressed well with PT/OT  Stable for discharge home with home health care.     L knee pain: XR L knee no fracture, moderate to severe tricompartmental osteoarthritis, moderate suprapatellar joint effusion.   Ortho consulted no indication for acute intervention  PRN symptom control; continue Voltaren gel on discharge     Olecranon bursitis: XR and exam consistent with olecranon bursitis.   No evidence of septic arthritis/bursitis no erythema, warmth, fevers or leukocytosis   Orthopedic consulted and with recommended PRN symptom control     R wrist pain: XR R wrist with concerns for osteoarthritis. Ortho reviewed imaging, no acute process. Apply ice, Voltaren gel PRN.  Elevate extremity.     Elevated LFTs: ALT 64, AST 65, overall decreased and stable.  Denies abdominal pain.  Hepatitis panel negative. Statin remains on hold.  Follow-up with PCP for timing of resumption of statin     Chronic atrial fibrillation:   Status post Watchman;   Continue Plavix on

## 2024-03-22 NOTE — PROGRESS NOTES
Occupational Therapy    Occupational Therapy Treatment Note  Name: Kareem Rey MRN: [id number] :   1945   Date:  3/22/2024   Admission Date: 2024 Room:  [address] [place] [address]     Primary Problem:  recurrent falls, peripheral neuropathy likely due to ETOH abuse and syncope.     Restrictions/Precautions:  Restrictions/Precautions  Restrictions/Precautions: Fall Risk     Communication with other providers:   Cleared for treatment by RN.    Subjective:  Patient states:  \"I'm a little scared to go home\"  Pain:   Location, Type, Intensity (0/10 to 10/10):  not rated R UE and foot    Objective:    Observation:  pt alert and oriented      Treatment, including education:  Transfers  Supine to sit :Stand By Assistance  Scooting :Stand By Assistance  Sit to stand :Contact Guard Assistance   from bed   Stand to sit :Contact Guard Assistance with min verbal cues to control descent        Self Care Training:   Activities performed today included the following:      Grooming  Modified Independent to wash face and brush hair, declined oral care    Bathing   Stand By Assistance Pt able to wash self sitting at EOB, sit to stand to RW to wash buttocks using RW for balance.    UB Dress  Supervision to abhay to thread B arms and pull over head.    LB Dress  Stand By Assistance Pt required able to thread B LEs into brief and scrub pants using reacher with extra time but requires assistance to pull up LE's     Safety Measures: Gait belt used, up in the recliner, Pull/Bed Alarm activated and call light left in reach        Assessment / Impression:        Patient's tolerance of treatment: Good   Adverse Reaction: None  Significant change in status and impact:  None  Barriers to improvement:  Dec strength and endurance    Plan for Next Session:    Continue with POC.    Time in:  955  Time out:  1035  Total treatment time: 40  Billed Units:3 ADL  Electronically signed by:    BRAD Ramesh 1994    3/22/2024, 10:36 AM    Previously

## 2024-03-22 NOTE — PROGRESS NOTES
Discharge instructions given to patient's wife and daughter, all questions answered, verbalized understanding, patient taken to car with all belongings to be driven home by wife.

## 2024-03-22 NOTE — CARE COORDINATION
CM faxed the discharge summary to [facility].  CM will follow.     11:53 AM  CM faxed the patient's discharge instructions to [facility].  RUBEN also spoke with Vanna at [facility] to notify them of the patient's discharge, patient will be seen at home tomorrow (3/23/24).  CM available if needs arise.

## 2024-03-27 ENCOUNTER — HOSPITAL ENCOUNTER (EMERGENCY)
Age: 79
Discharge: HOME | End: 2024-03-28
Attending: EMERGENCY MEDICINE
Payer: MEDICARE

## 2024-03-27 ENCOUNTER — APPOINTMENT (OUTPATIENT)
Dept: CT IMAGING | Age: 79
End: 2024-03-27
Payer: MEDICARE

## 2024-03-27 DIAGNOSIS — R11.2 NAUSEA AND VOMITING, UNSPECIFIED VOMITING TYPE: Primary | ICD-10-CM

## 2024-03-27 DIAGNOSIS — K59.00 CONSTIPATION, UNSPECIFIED CONSTIPATION TYPE: ICD-10-CM

## 2024-03-27 LAB
BASOPHILS ABSOLUTE: 0 K/CU MM
BASOPHILS RELATIVE PERCENT: 0.4 % (ref 0–1)
DIFFERENTIAL TYPE: ABNORMAL
EOSINOPHILS ABSOLUTE: 0.1 K/CU MM
EOSINOPHILS RELATIVE PERCENT: 2 % (ref 0–3)
HCT VFR BLD CALC: 36.1 % (ref 42–52)
HEMOGLOBIN: 11.9 GM/DL (ref 13.5–18)
IMMATURE NEUTROPHIL %: 0.6 % (ref 0–0.43)
LYMPHOCYTES ABSOLUTE: 0.8 K/CU MM
LYMPHOCYTES RELATIVE PERCENT: 10.7 % (ref 24–44)
MCH RBC QN AUTO: 33.8 PG (ref 27–31)
MCHC RBC AUTO-ENTMCNC: 33 % (ref 32–36)
MCV RBC AUTO: 102.6 FL (ref 78–100)
MONOCYTES ABSOLUTE: 0.4 K/CU MM
MONOCYTES RELATIVE PERCENT: 6.2 % (ref 0–4)
PDW BLD-RTO: 13.7 % (ref 11.7–14.9)
PLATELET # BLD: 205 K/CU MM (ref 140–440)
PMV BLD AUTO: 9.1 FL (ref 7.5–11.1)
RBC # BLD: 3.52 M/CU MM (ref 4.6–6.2)
SEGMENTED NEUTROPHILS ABSOLUTE COUNT: 5.7 K/CU MM
SEGMENTED NEUTROPHILS RELATIVE PERCENT: 80.1 % (ref 36–66)
TOTAL IMMATURE NEUTOROPHIL: 0.04 K/CU MM
WBC # BLD: 7.1 K/CU MM (ref 4–10.5)

## 2024-03-27 PROCEDURE — 2580000003 HC RX 258: Performed by: EMERGENCY MEDICINE

## 2024-03-27 PROCEDURE — 6360000002 HC RX W HCPCS: Performed by: EMERGENCY MEDICINE

## 2024-03-27 PROCEDURE — 96374 THER/PROPH/DIAG INJ IV PUSH: CPT

## 2024-03-27 PROCEDURE — 96375 TX/PRO/DX INJ NEW DRUG ADDON: CPT

## 2024-03-27 PROCEDURE — 99285 EMERGENCY DEPT VISIT HI MDM: CPT

## 2024-03-27 PROCEDURE — 93005 ELECTROCARDIOGRAM TRACING: CPT | Performed by: EMERGENCY MEDICINE

## 2024-03-27 PROCEDURE — 74177 CT ABD & PELVIS W/CONTRAST: CPT

## 2024-03-27 PROCEDURE — 83690 ASSAY OF LIPASE: CPT

## 2024-03-27 PROCEDURE — 80053 COMPREHEN METABOLIC PANEL: CPT

## 2024-03-27 PROCEDURE — 85025 COMPLETE CBC W/AUTO DIFF WBC: CPT

## 2024-03-27 RX ORDER — 0.9 % SODIUM CHLORIDE 0.9 %
1000 INTRAVENOUS SOLUTION INTRAVENOUS ONCE
Status: COMPLETED | OUTPATIENT
Start: 2024-03-27 | End: 2024-03-28

## 2024-03-27 RX ORDER — ONDANSETRON 2 MG/ML
8 INJECTION INTRAMUSCULAR; INTRAVENOUS ONCE
Status: COMPLETED | OUTPATIENT
Start: 2024-03-27 | End: 2024-03-27

## 2024-03-27 RX ORDER — MORPHINE SULFATE 2 MG/ML
2 INJECTION, SOLUTION INTRAMUSCULAR; INTRAVENOUS ONCE
Status: COMPLETED | OUTPATIENT
Start: 2024-03-27 | End: 2024-03-27

## 2024-03-27 RX ADMIN — SODIUM CHLORIDE 1000 ML: 9 INJECTION, SOLUTION INTRAVENOUS at 23:42

## 2024-03-27 RX ADMIN — ONDANSETRON 8 MG: 2 INJECTION INTRAMUSCULAR; INTRAVENOUS at 23:45

## 2024-03-27 RX ADMIN — MORPHINE SULFATE 2 MG: 2 INJECTION, SOLUTION INTRAMUSCULAR; INTRAVENOUS at 23:47

## 2024-03-27 ASSESSMENT — PAIN DESCRIPTION - ORIENTATION: ORIENTATION: MID;UPPER

## 2024-03-27 ASSESSMENT — PAIN DESCRIPTION - ONSET: ONSET: SUDDEN

## 2024-03-27 ASSESSMENT — PAIN - FUNCTIONAL ASSESSMENT: PAIN_FUNCTIONAL_ASSESSMENT: 0-10

## 2024-03-27 ASSESSMENT — PAIN DESCRIPTION - PAIN TYPE: TYPE: ACUTE PAIN

## 2024-03-27 ASSESSMENT — PAIN DESCRIPTION - FREQUENCY: FREQUENCY: CONTINUOUS

## 2024-03-27 ASSESSMENT — PAIN DESCRIPTION - DESCRIPTORS
DESCRIPTORS: BURNING
DESCRIPTORS: ACHING

## 2024-03-27 ASSESSMENT — PAIN SCALES - GENERAL
PAINLEVEL_OUTOF10: 5
PAINLEVEL_OUTOF10: 7

## 2024-03-27 ASSESSMENT — PAIN DESCRIPTION - LOCATION
LOCATION: ABDOMEN
LOCATION: ABDOMEN

## 2024-03-28 VITALS
TEMPERATURE: 97.9 F | OXYGEN SATURATION: 96 % | BODY MASS INDEX: 37.8 KG/M2 | RESPIRATION RATE: 14 BRPM | WEIGHT: 270 LBS | HEIGHT: 71 IN | SYSTOLIC BLOOD PRESSURE: 132 MMHG | DIASTOLIC BLOOD PRESSURE: 80 MMHG | HEART RATE: 80 BPM

## 2024-03-28 LAB
ALBUMIN SERPL-MCNC: 3.6 GM/DL (ref 3.4–5)
ALP BLD-CCNC: 95 IU/L (ref 40–129)
ALT SERPL-CCNC: 36 U/L (ref 10–40)
ANION GAP SERPL CALCULATED.3IONS-SCNC: 21 MMOL/L (ref 7–16)
AST SERPL-CCNC: 30 IU/L (ref 15–37)
BILIRUB SERPL-MCNC: 0.4 MG/DL (ref 0–1)
BUN SERPL-MCNC: 29 MG/DL (ref 6–23)
CALCIUM SERPL-MCNC: 10.5 MG/DL (ref 8.3–10.6)
CHLORIDE BLD-SCNC: 96 MMOL/L (ref 99–110)
CO2: 25 MMOL/L (ref 21–32)
CREAT SERPL-MCNC: 1.6 MG/DL (ref 0.9–1.3)
EKG ATRIAL RATE: 357 BPM
EKG DIAGNOSIS: NORMAL
EKG Q-T INTERVAL: 434 MS
EKG QRS DURATION: 86 MS
EKG QTC CALCULATION (BAZETT): 488 MS
EKG R AXIS: -6 DEGREES
EKG T AXIS: 223 DEGREES
EKG VENTRICULAR RATE: 76 BPM
GFR SERPL CREATININE-BSD FRML MDRD: 43 ML/MIN/1.73M2
GLUCOSE SERPL-MCNC: 113 MG/DL (ref 70–99)
LIPASE: 29 IU/L (ref 13–60)
POTASSIUM SERPL-SCNC: 4.4 MMOL/L (ref 3.5–5.1)
SODIUM BLD-SCNC: 142 MMOL/L (ref 135–145)
TOTAL PROTEIN: 7.1 GM/DL (ref 6.4–8.2)

## 2024-03-28 PROCEDURE — 6360000002 HC RX W HCPCS: Performed by: EMERGENCY MEDICINE

## 2024-03-28 PROCEDURE — 96372 THER/PROPH/DIAG INJ SC/IM: CPT

## 2024-03-28 PROCEDURE — 93010 ELECTROCARDIOGRAM REPORT: CPT | Performed by: INTERNAL MEDICINE

## 2024-03-28 PROCEDURE — 6370000000 HC RX 637 (ALT 250 FOR IP): Performed by: EMERGENCY MEDICINE

## 2024-03-28 PROCEDURE — 6360000004 HC RX CONTRAST MEDICATION: Performed by: EMERGENCY MEDICINE

## 2024-03-28 RX ORDER — DICYCLOMINE HCL 20 MG
20 TABLET ORAL 4 TIMES DAILY
Qty: 40 TABLET | Refills: 0 | Status: SHIPPED | OUTPATIENT
Start: 2024-03-28 | End: 2024-04-07

## 2024-03-28 RX ORDER — POLYETHYLENE GLYCOL 3350 17 G/17G
17 POWDER, FOR SOLUTION ORAL DAILY
Qty: 510 G | Refills: 0 | Status: SHIPPED | OUTPATIENT
Start: 2024-03-28 | End: 2024-04-27

## 2024-03-28 RX ORDER — POLYETHYLENE GLYCOL 3350 17 G/17G
17 POWDER, FOR SOLUTION ORAL DAILY
Status: DISCONTINUED | OUTPATIENT
Start: 2024-03-28 | End: 2024-03-28 | Stop reason: HOSPADM

## 2024-03-28 RX ORDER — DOCUSATE SODIUM 100 MG/1
100 CAPSULE, LIQUID FILLED ORAL 2 TIMES DAILY
Qty: 20 CAPSULE | Refills: 0 | Status: SHIPPED | OUTPATIENT
Start: 2024-03-28 | End: 2024-04-07

## 2024-03-28 RX ORDER — ONDANSETRON 4 MG/1
4 TABLET, ORALLY DISINTEGRATING ORAL 3 TIMES DAILY PRN
Qty: 21 TABLET | Refills: 0 | Status: SHIPPED | OUTPATIENT
Start: 2024-03-28

## 2024-03-28 RX ORDER — DICYCLOMINE HYDROCHLORIDE 10 MG/ML
20 INJECTION INTRAMUSCULAR ONCE
Status: COMPLETED | OUTPATIENT
Start: 2024-03-28 | End: 2024-03-28

## 2024-03-28 RX ORDER — DOCUSATE SODIUM 100 MG/1
100 CAPSULE, LIQUID FILLED ORAL DAILY
Status: DISCONTINUED | OUTPATIENT
Start: 2024-03-28 | End: 2024-03-28 | Stop reason: HOSPADM

## 2024-03-28 RX ADMIN — POLYETHYLENE GLYCOL 3350 17 G: 17 POWDER, FOR SOLUTION ORAL at 01:32

## 2024-03-28 RX ADMIN — IOPAMIDOL 100 ML: 755 INJECTION, SOLUTION INTRAVENOUS at 00:04

## 2024-03-28 RX ADMIN — DOCUSATE SODIUM 100 MG: 100 CAPSULE, LIQUID FILLED ORAL at 01:32

## 2024-03-28 RX ADMIN — DICYCLOMINE HYDROCHLORIDE 20 MG: 10 INJECTION, SOLUTION INTRAMUSCULAR at 01:09

## 2024-03-28 NOTE — ED PROVIDER NOTES
Triage Chief Complaint:   Nausea (3) and Emesis (No bm x 3 days, )    [place]:  Kareem Rey is a 78 y.o. male that presents with nausea, vomiting, constipation for the past 3 days.  Patient was just released from our swing bed program here in the hospital after recovery from a syncopal episode.  Patient reports has been home for just a few days and after the first day home he began to get nauseous and had some vomiting.  Patient has not had a bowel movement in 3 days.  No fevers.  No abdominal pain at this time.  Patient been urinating okay.  Tonight patient vomited and reports that \"my wife got excited\" and called the squad.  EMS reports stable vital signs prehospital with no further vomiting.    ROS:  General:  No fevers, no chills, no weakness  Eyes:  No recent vison changes, no discharge  ENT:  No sore throat, no nasal congestion, no hearing changes  Cardiovascular:  No chest pain, no palpitations  Respiratory:  No shortness of breath, no cough, no wheezing  Gastrointestinal:  no subjective pain, + nausea, + vomiting, no diarrhea, + constipation  Musculoskeletal:  No muscle pain, no joint pain  Skin:  No rash, no pruritis, no easy bruising  Neurologic:  No speech problems, no headache, no extremity numbness, no extremity tingling, no extremity weakness  Psychiatric:  No anxiety  Genitourinary:  No dysuria, no hematuria  Endocrine:  No unexpected weight gain, no unexpected weight loss  Extremities:  no edema, no pain    Past Medical History:   Diagnosis Date    Abnormal nuclear stress test 08/23/2022    Inferior wall ischemia 8/2022    Arthritis     CAD s/p PCI to LAD+RCA 9/9/22 09/09/2022    1. PCI to Proximal LAD for 80 -90 % lesion reduced to 0 % with  TYSON III Flow reduced to 0 % stenosis with 3.5 X 12 BARTOLO ,  followed by post dilated with 3.75 X 8 NC balloon  2. Balloon angioplasty of distal LAD for 90 % lesion reduced to  0 % using 2.0 X 10 Balloon TYSON III flow  3. PCI to ostial 80 % RCA lesion and mid RCA

## 2024-03-28 NOTE — DISCHARGE INSTR - COC
Certification: I certify the above information and transfer of Kareem COTO Dill  is necessary for the continuing treatment of the diagnosis listed and that he requires {Admit to Appropriate Level of Care:98034} for {GREATER/LESS:769601266} 30 days.     Update Admission H&P: {CHP DME Changes in HandP:[id number]}    PHYSICIAN SIGNATURE:  {Esignature:[id number]}

## 2024-04-02 ENCOUNTER — OFFICE VISIT (OUTPATIENT)
Dept: NEUROLOGY | Age: 79
End: 2024-04-02
Payer: MEDICARE

## 2024-04-02 VITALS
WEIGHT: 250 LBS | BODY MASS INDEX: 35 KG/M2 | OXYGEN SATURATION: 95 % | DIASTOLIC BLOOD PRESSURE: 70 MMHG | HEART RATE: 67 BPM | SYSTOLIC BLOOD PRESSURE: 118 MMHG | HEIGHT: 71 IN

## 2024-04-02 DIAGNOSIS — R29.6 FALLS FREQUENTLY: ICD-10-CM

## 2024-04-02 DIAGNOSIS — R26.89 ABNORMALITY OF GAIT DUE TO IMPAIRMENT OF BALANCE: Primary | ICD-10-CM

## 2024-04-02 PROCEDURE — 3074F SYST BP LT 130 MM HG: CPT | Performed by: NURSE PRACTITIONER

## 2024-04-02 PROCEDURE — 1123F ACP DISCUSS/DSCN MKR DOCD: CPT | Performed by: NURSE PRACTITIONER

## 2024-04-02 PROCEDURE — 3078F DIAST BP <80 MM HG: CPT | Performed by: NURSE PRACTITIONER

## 2024-04-02 PROCEDURE — 99214 OFFICE O/P EST MOD 30 MIN: CPT | Performed by: NURSE PRACTITIONER

## 2024-04-02 PROCEDURE — G8417 CALC BMI ABV UP PARAM F/U: HCPCS | Performed by: NURSE PRACTITIONER

## 2024-04-02 PROCEDURE — 1036F TOBACCO NON-USER: CPT | Performed by: NURSE PRACTITIONER

## 2024-04-02 PROCEDURE — 1111F DSCHRG MED/CURRENT MED MERGE: CPT | Performed by: NURSE PRACTITIONER

## 2024-04-02 PROCEDURE — G8427 DOCREV CUR MEDS BY ELIG CLIN: HCPCS | Performed by: NURSE PRACTITIONER

## 2024-04-02 NOTE — PROGRESS NOTES
4/2/24    Kareem COTO Dill  1945    Chief Complaint   Patient presents with    Follow-Up from Hospital     Patient here for hospital follow-up for frequent falls.        History of Present Illness  Kareem is a 78 y.o. male presenting today for follow-up of: Repeated falls, suspect deconditioning and acute on chronic ataxia superimposed on known history of alcohol abuse, bilateral peripheral neuropathy.  MRI of the brain and cervical spine were ordered to rule out myelopathic and ischemic etiologies of ataxia.  Showed mild anterior subluxation of C2 on C3 and C3 on C4.  Disc space narrowing and osteophytes at C3-4, C4-5, C5-6, C6-7 with narrowing of the neuroforamina, no significant stenosis of the thecal sac in the cervical region.  MRI brain showed normal age-related findings- cerebral atrophy, chronic small vessel ischemic changes.  No acute ischemic event.    Today, he is accompanied by his neighbor and his spouse.  He is in a wheelchair.  He continues to have dizziness described as room spinning and he does lose consciousness before he falls he tells me.  He has a cardiology follow-up on 4/15/2024.  He does have physical therapy coming at home twice per week.    Current Outpatient Medications   Medication Sig Dispense Refill    dicyclomine (BENTYL) 20 MG tablet Take 1 tablet by mouth 4 times daily for 10 days 40 tablet 0    ondansetron (ZOFRAN-ODT) 4 MG disintegrating tablet Take 1 tablet by mouth 3 times daily as needed for Nausea or Vomiting 21 tablet 0    docusate sodium (COLACE) 100 MG capsule Take 1 capsule by mouth 2 times daily for 10 days 20 capsule 0    polyethylene glycol (GLYCOLAX) 17 GM/SCOOP powder Take 17 g by mouth daily 510 g 0    amLODIPine (NORVASC) 5 MG tablet Take 1 tablet by mouth daily 30 tablet 3    diclofenac sodium (VOLTAREN) 1 % GEL Apply 2 g topically 4 times daily as needed for Pain 50 g 0    naphazoline-pheniramine (NAPHCON-A) 0.025-0.3 % ophthalmic solution Place 1 drop into both eyes

## 2024-04-15 ENCOUNTER — OFFICE VISIT (OUTPATIENT)
Dept: CARDIOLOGY CLINIC | Age: 79
End: 2024-04-15
Payer: MEDICARE

## 2024-04-15 VITALS
BODY MASS INDEX: 34.56 KG/M2 | OXYGEN SATURATION: 98 % | DIASTOLIC BLOOD PRESSURE: 84 MMHG | HEART RATE: 80 BPM | SYSTOLIC BLOOD PRESSURE: 126 MMHG | RESPIRATION RATE: 20 BRPM | WEIGHT: 241.4 LBS | HEIGHT: 70 IN

## 2024-04-15 DIAGNOSIS — Z95.0 S/P PLACEMENT OF CARDIAC PACEMAKER: Primary | ICD-10-CM

## 2024-04-15 DIAGNOSIS — Z95.818 PRESENCE OF WATCHMAN LEFT ATRIAL APPENDAGE CLOSURE DEVICE: ICD-10-CM

## 2024-04-15 DIAGNOSIS — I48.19 PERSISTENT ATRIAL FIBRILLATION (HCC): ICD-10-CM

## 2024-04-15 DIAGNOSIS — I35.0 NONRHEUMATIC AORTIC VALVE STENOSIS: ICD-10-CM

## 2024-04-15 DIAGNOSIS — I25.10 CAD IN NATIVE ARTERY: ICD-10-CM

## 2024-04-15 DIAGNOSIS — N18.32 CHRONIC KIDNEY DISEASE (CKD) STAGE G3B/A1, MODERATELY DECREASED GLOMERULAR FILTRATION RATE (GFR) BETWEEN 30-44 ML/MIN/1.73 SQUARE METER AND ALBUMINURIA CREATININE RATIO LESS THAN 30 MG/G (HCC): ICD-10-CM

## 2024-04-15 DIAGNOSIS — G47.33 OSA ON CPAP: ICD-10-CM

## 2024-04-15 PROCEDURE — 1036F TOBACCO NON-USER: CPT | Performed by: INTERNAL MEDICINE

## 2024-04-15 PROCEDURE — 3074F SYST BP LT 130 MM HG: CPT | Performed by: INTERNAL MEDICINE

## 2024-04-15 PROCEDURE — G8417 CALC BMI ABV UP PARAM F/U: HCPCS | Performed by: INTERNAL MEDICINE

## 2024-04-15 PROCEDURE — 3079F DIAST BP 80-89 MM HG: CPT | Performed by: INTERNAL MEDICINE

## 2024-04-15 PROCEDURE — 99214 OFFICE O/P EST MOD 30 MIN: CPT | Performed by: INTERNAL MEDICINE

## 2024-04-15 PROCEDURE — 1123F ACP DISCUSS/DSCN MKR DOCD: CPT | Performed by: INTERNAL MEDICINE

## 2024-04-15 PROCEDURE — G8427 DOCREV CUR MEDS BY ELIG CLIN: HCPCS | Performed by: INTERNAL MEDICINE

## 2024-04-15 PROCEDURE — 1111F DSCHRG MED/CURRENT MED MERGE: CPT | Performed by: INTERNAL MEDICINE

## 2024-04-15 RX ORDER — ATORVASTATIN CALCIUM 20 MG/1
20 TABLET, FILM COATED ORAL NIGHTLY
COMMUNITY

## 2024-04-15 RX ORDER — CLOPIDOGREL BISULFATE 75 MG/1
75 TABLET ORAL DAILY
Qty: 90 TABLET | Refills: 3 | Status: SHIPPED | OUTPATIENT
Start: 2024-04-15

## 2024-04-15 RX ORDER — GABAPENTIN 400 MG/1
400 CAPSULE ORAL 3 TIMES DAILY
COMMUNITY
End: 2024-04-15 | Stop reason: ALTCHOICE

## 2024-04-15 NOTE — ASSESSMENT & PLAN NOTE
Has been off of dual antiplatelet therapy however he needs to be on Plavix 75 mg daily because he has known coronary artery disease and stent and for Watchman device also.  His bruising has improved off of Eliquis.

## 2024-04-15 NOTE — ASSESSMENT & PLAN NOTE
Rate is controlled and he had Watchman device implanted for stroke prevention and has been off of Eliquis as he was falling frequently and had increased bruising.

## 2024-04-15 NOTE — ASSESSMENT & PLAN NOTE
Recent echocardiogram was reviewed from February of this year.  Will continue to monitor clinically with.  Neck echocardiographic evaluation.

## 2024-04-15 NOTE — PATIENT INSTRUCTIONS
Continue current cardiovascular medications which have been reviewed and discussed individually with you. Counseled to elevate feet when resting and when resting in bed at night with pillows underneath and use thigh high compression stockings in the morning and remove them at night.  Patient verbalizes understanding. Questions answered.  Appropriate prescriptions if needed on this visit are addressed. After visit summery is provided.   Questions answered and patient verbalizes understanding. Follow up in 6 months,  sooner if needed.

## 2024-04-15 NOTE — ASSESSMENT & PLAN NOTE
Clinically stable.  Continue Plavix in addition to aggressive risk factor modification for secondary prevention.

## 2024-04-15 NOTE — ASSESSMENT & PLAN NOTE
Device is working well with remaining device longevity of 7.3 years.  Continue remote monitoring.

## 2024-05-18 PROCEDURE — 93296 REM INTERROG EVL PM/IDS: CPT | Performed by: INTERNAL MEDICINE

## 2024-05-18 PROCEDURE — 93294 REM INTERROG EVL PM/LDLS PM: CPT | Performed by: INTERNAL MEDICINE

## 2024-05-20 ENCOUNTER — PROCEDURE VISIT (OUTPATIENT)
Dept: CARDIOLOGY CLINIC | Age: 79
End: 2024-05-20
Payer: MEDICARE

## 2024-05-20 DIAGNOSIS — Z95.0 S/P PLACEMENT OF CARDIAC PACEMAKER: ICD-10-CM

## 2024-05-23 LAB — COMMENT: NORMAL

## 2024-07-24 ENCOUNTER — OFFICE VISIT (OUTPATIENT)
Dept: CARDIOLOGY CLINIC | Age: 79
End: 2024-07-24

## 2024-07-24 VITALS
DIASTOLIC BLOOD PRESSURE: 82 MMHG | WEIGHT: 244 LBS | HEIGHT: 71 IN | SYSTOLIC BLOOD PRESSURE: 138 MMHG | BODY MASS INDEX: 34.16 KG/M2

## 2024-07-24 DIAGNOSIS — I48.20 HYPERCOAGULABLE STATE DUE TO CHRONIC ATRIAL FIBRILLATION (HCC): ICD-10-CM

## 2024-07-24 DIAGNOSIS — Z95.0 S/P PLACEMENT OF CARDIAC PACEMAKER: ICD-10-CM

## 2024-07-24 DIAGNOSIS — D68.69 HYPERCOAGULABLE STATE DUE TO CHRONIC ATRIAL FIBRILLATION (HCC): ICD-10-CM

## 2024-07-24 DIAGNOSIS — I10 PRIMARY HYPERTENSION: ICD-10-CM

## 2024-07-24 DIAGNOSIS — I48.20 CHRONIC ATRIAL FIBRILLATION (HCC): ICD-10-CM

## 2024-07-24 DIAGNOSIS — D64.9 ANEMIA, UNSPECIFIED TYPE: ICD-10-CM

## 2024-07-24 DIAGNOSIS — Z95.818 PRESENCE OF WATCHMAN LEFT ATRIAL APPENDAGE CLOSURE DEVICE: Primary | ICD-10-CM

## 2024-07-24 RX ORDER — GABAPENTIN 400 MG/1
400 CAPSULE ORAL 3 TIMES DAILY
COMMUNITY

## 2024-07-24 RX ORDER — ASPIRIN 81 MG/1
81 TABLET ORAL DAILY
COMMUNITY
End: 2024-07-24 | Stop reason: ALTCHOICE

## 2024-07-24 ASSESSMENT — ENCOUNTER SYMPTOMS
ABDOMINAL DISTENTION: 0
BLOOD IN STOOL: 0
COUGH: 0
SINUS PAIN: 0
SHORTNESS OF BREATH: 0
ABDOMINAL PAIN: 0
PHOTOPHOBIA: 0
BACK PAIN: 0
NAUSEA: 0
SINUS PRESSURE: 0

## 2024-07-24 NOTE — PROGRESS NOTES
Pressure Brother            Social history :  reports that he quit smoking about 51 years ago. His smoking use included cigarettes. He started smoking about 58 years ago. He has a 7.0 pack-year smoking history. He has quit using smokeless tobacco.  His smokeless tobacco use included chew. He reports current alcohol use of about 14.0 standard drinks of alcohol per week. He reports that he does not use drugs.    Allergies   Allergen Reactions    Niacin And Related Hives    Ativan [Lorazepam] Other (See Comments)     DO NOT GIVE    Oxycontin [Oxycodone Hcl] Hives     Itching in his feet       Current Outpatient Medications on File Prior to Visit   Medication Sig Dispense Refill    gabapentin (NEURONTIN) 400 MG capsule Take 1 capsule by mouth 3 times daily.      atorvastatin (LIPITOR) 20 MG tablet Take 1 tablet by mouth nightly      clopidogrel (PLAVIX) 75 MG tablet Take 1 tablet by mouth daily 90 tablet 3    ondansetron (ZOFRAN-ODT) 4 MG disintegrating tablet Take 1 tablet by mouth 3 times daily as needed for Nausea or Vomiting 21 tablet 0    amLODIPine (NORVASC) 5 MG tablet Take 1 tablet by mouth daily 30 tablet 3    diclofenac sodium (VOLTAREN) 1 % GEL Apply 2 g topically 4 times daily as needed for Pain 50 g 0    naphazoline-pheniramine (NAPHCON-A) 0.025-0.3 % ophthalmic solution Place 1 drop into both eyes in the morning, at noon, in the evening, and at bedtime 5 mL 0    vitamin B-12 1000 MCG tablet Take 1 tablet by mouth daily 30 tablet 3    magnesium oxide (MAG-OX) 400 (240 Mg) MG tablet Take 1 tablet by mouth daily      metFORMIN (GLUCOPHAGE) 500 MG tablet Take 1 tablet by mouth 2 times daily (with meals)      montelukast (SINGULAIR) 10 MG tablet Take 1 tablet by mouth daily 90 tablet 3    allopurinol (ZYLOPRIM) 100 MG tablet Take 1 tablet by mouth 2 times daily      ferrous sulfate (IRON 325) 325 (65 Fe) MG tablet Take 1 tablet by mouth daily (with breakfast) 60 tablet 5    Ergocalciferol (VITAMIN D) 27691

## 2024-08-05 ENCOUNTER — HOSPITAL ENCOUNTER (OUTPATIENT)
Dept: GENERAL RADIOLOGY | Age: 79
Discharge: HOME | End: 2024-08-05
Attending: INTERNAL MEDICINE
Payer: MEDICARE

## 2024-08-05 ENCOUNTER — HOSPITAL ENCOUNTER (OUTPATIENT)
Age: 79
Discharge: HOME | End: 2024-08-05
Payer: MEDICARE

## 2024-08-05 DIAGNOSIS — J30.9 ALLERGIC RHINITIS, UNSPECIFIED SEASONALITY, UNSPECIFIED TRIGGER: ICD-10-CM

## 2024-08-05 DIAGNOSIS — G47.33 OSA ON CPAP: ICD-10-CM

## 2024-08-05 LAB
ALBUMIN SERPL-MCNC: 4 GM/DL (ref 3.4–5)
ALP BLD-CCNC: 72 IU/L (ref 40–129)
ALT SERPL-CCNC: 24 U/L (ref 10–40)
ANION GAP SERPL CALCULATED.3IONS-SCNC: 15 MMOL/L (ref 7–16)
AST SERPL-CCNC: 31 IU/L (ref 15–37)
BILIRUB SERPL-MCNC: 0.7 MG/DL (ref 0–1)
BUN SERPL-MCNC: 23 MG/DL (ref 6–23)
CALCIUM SERPL-MCNC: 9.3 MG/DL (ref 8.3–10.6)
CHLORIDE BLD-SCNC: 105 MMOL/L (ref 99–110)
CO2: 22 MMOL/L (ref 21–32)
CREAT SERPL-MCNC: 1.4 MG/DL (ref 0.9–1.3)
ESTIMATED AVERAGE GLUCOSE: 88 MG/DL
GFR, ESTIMATED: 51 ML/MIN/1.73M2
GLUCOSE P FAST SERPL-MCNC: 119 MG/DL (ref 70–99)
HBA1C MFR BLD: 4.7 % (ref 4.2–6.3)
HCT VFR BLD CALC: 36.8 % (ref 42–52)
HEMOGLOBIN: 12.2 GM/DL (ref 13.5–18)
MCH RBC QN AUTO: 34.9 PG (ref 27–31)
MCHC RBC AUTO-ENTMCNC: 33.2 % (ref 32–36)
MCV RBC AUTO: 105.1 FL (ref 78–100)
PDW BLD-RTO: 13.7 % (ref 11.7–14.9)
PLATELET # BLD: 100 K/CU MM (ref 140–440)
PMV BLD AUTO: 9.2 FL (ref 7.5–11.1)
POTASSIUM SERPL-SCNC: 4.3 MMOL/L (ref 3.5–5.1)
RBC # BLD: 3.5 M/CU MM (ref 4.6–6.2)
SODIUM BLD-SCNC: 142 MMOL/L (ref 135–145)
TOTAL PROTEIN: 6.9 GM/DL (ref 6.4–8.2)
WBC # BLD: 5.2 K/CU MM (ref 4–10.5)

## 2024-08-05 PROCEDURE — 83036 HEMOGLOBIN GLYCOSYLATED A1C: CPT

## 2024-08-05 PROCEDURE — 36415 COLL VENOUS BLD VENIPUNCTURE: CPT

## 2024-08-05 PROCEDURE — 85027 COMPLETE CBC AUTOMATED: CPT

## 2024-08-05 PROCEDURE — 71046 X-RAY EXAM CHEST 2 VIEWS: CPT

## 2024-08-05 PROCEDURE — 80053 COMPREHEN METABOLIC PANEL: CPT

## 2024-08-26 ENCOUNTER — PROCEDURE VISIT (OUTPATIENT)
Dept: CARDIOLOGY CLINIC | Age: 79
End: 2024-08-26
Payer: MEDICARE

## 2024-08-26 DIAGNOSIS — Z95.0 S/P PLACEMENT OF CARDIAC PACEMAKER: Primary | ICD-10-CM

## 2024-08-26 PROCEDURE — 93294 REM INTERROG EVL PM/LDLS PM: CPT | Performed by: INTERNAL MEDICINE

## 2024-08-26 PROCEDURE — 93296 REM INTERROG EVL PM/IDS: CPT | Performed by: INTERNAL MEDICINE

## 2024-11-19 ENCOUNTER — OFFICE VISIT (OUTPATIENT)
Dept: CARDIOLOGY CLINIC | Age: 79
End: 2024-11-19

## 2024-11-19 VITALS
DIASTOLIC BLOOD PRESSURE: 86 MMHG | HEIGHT: 71 IN | HEART RATE: 78 BPM | BODY MASS INDEX: 35.56 KG/M2 | WEIGHT: 254 LBS | SYSTOLIC BLOOD PRESSURE: 144 MMHG

## 2024-11-19 DIAGNOSIS — M79.89 LEG SWELLING: ICD-10-CM

## 2024-11-19 DIAGNOSIS — Z95.0 S/P PLACEMENT OF CARDIAC PACEMAKER: Primary | ICD-10-CM

## 2024-11-19 DIAGNOSIS — I25.10 CAD IN NATIVE ARTERY: ICD-10-CM

## 2024-11-19 DIAGNOSIS — I10 PRIMARY HYPERTENSION: ICD-10-CM

## 2024-11-19 DIAGNOSIS — I35.0 NONRHEUMATIC AORTIC VALVE STENOSIS: ICD-10-CM

## 2024-11-19 DIAGNOSIS — E11.9 TYPE 2 DIABETES MELLITUS WITHOUT COMPLICATION, WITHOUT LONG-TERM CURRENT USE OF INSULIN (HCC): ICD-10-CM

## 2024-11-19 DIAGNOSIS — E78.00 PURE HYPERCHOLESTEROLEMIA: ICD-10-CM

## 2024-11-19 DIAGNOSIS — I48.19 PERSISTENT ATRIAL FIBRILLATION (HCC): ICD-10-CM

## 2024-11-19 DIAGNOSIS — Z95.818 PRESENCE OF WATCHMAN LEFT ATRIAL APPENDAGE CLOSURE DEVICE: ICD-10-CM

## 2024-11-19 DIAGNOSIS — R01.1 HEART MURMUR: ICD-10-CM

## 2024-11-19 DIAGNOSIS — N18.32 CHRONIC KIDNEY DISEASE (CKD) STAGE G3B/A1, MODERATELY DECREASED GLOMERULAR FILTRATION RATE (GFR) BETWEEN 30-44 ML/MIN/1.73 SQUARE METER AND ALBUMINURIA CREATININE RATIO LESS THAN 30 MG/G (HCC): ICD-10-CM

## 2024-11-19 DIAGNOSIS — G47.33 OSA ON CPAP: ICD-10-CM

## 2024-11-19 RX ORDER — AMLODIPINE BESYLATE 10 MG/1
10 TABLET ORAL DAILY
Qty: 90 TABLET | Refills: 3 | Status: SHIPPED | OUTPATIENT
Start: 2024-11-19

## 2024-11-19 NOTE — ASSESSMENT & PLAN NOTE
Patient reports his blood pressure is 130 systolic at home.  He checked it 2-3 times a week.  He is off of Prinzide and he is only on amlodipine 10 mg daily.

## 2024-11-19 NOTE — ASSESSMENT & PLAN NOTE
Bilateral pitting edema has not changed much since last visit.  Counseled for elevating feet at rest and at night and using compression stockings in the daytime. Venous doppler of lower extremity.

## 2024-11-19 NOTE — PROGRESS NOTES
Kareem Rey  1945  Eduar Birmingham MD      Chief Complaint   Patient presents with    6 Month Follow-Up     Patient denies chest pain, palpitations, dizziness, and SOB.    Edema     Legs and ankles.     Chief complaint and HPI:  Kareem Rey  is a 79 y.o. male following up for chronic A-fib has a pacemaker, aortic valve stenosis and chronic obesity.  His leg swelling is about the same he is not able to use compression stockings.  Last venous Doppler was in 2020.  He had Watchman device more than a year ago taking Plavix 75 mg daily complaining of excessive easy bruising.  He is brought in by his neighbor who is a patient here also.  He is compliant to his CPAP therapy.  He is more bothered with increasing tremors in his both hands and he is being referred to neurologist and he has an appointment to see her.  Medications reviewed and reconciled.    Rest of the Cardiovascular system review is otherwise unchanged from prior encounter.  Past medical history:  has a past medical history of Abnormal nuclear stress test, Arthritis, CAD s/p PCI to LAD+RCA 9/9/22, Dizziness, Fall, FH: CAD (coronary artery disease), H/O 24 hour EKG monitoring, H/O atrial fibrillation without current medication, H/O cardiovascular stress test, H/O Doppler echocardiogram, H/O Doppler ultrasound (Venous Doppler Lower Extremities), H/O echocardiogram, H/O transesophageal echocardiography (SALAZAR) for monitoring, Heart murmur, History of nuclear stress test, History of nuclear stress test, Hx of Doppler echocardiogram, Hx of Venous Doppler ultrasound, Hyperlipidemia, Hypertension, Kidney stones, Leg swelling, Neuropathy, Nonrheumatic aortic valve stenosis, Obesity, JAYE on CPAP, Persistent atrial fibrillation (HCC), Syncope and collapse, Thoracic aortic aneurysm (HCC), Tremors of nervous system, and Type II or unspecified type diabetes mellitus without mention of complication, not stated as uncontrolled.  Past surgical history:  has a past

## 2024-11-19 NOTE — PATIENT INSTRUCTIONS
Counseled to elevate feet when resting and when resting in bed at night with pillows underneath and use thigh high compression stockings in the morning and remove them at night.  Patient verbalizes understanding. Questions answered.  Continue device check as per care link schedule.   Appropriate prescriptions if needed on this visit are addressed. After visit summery is provided.   Questions answered and patient verbalizes understanding. Follow up in 6 weeks with lower extremity venous doppler and repeat echocardiogram,  sooner if needed.

## 2024-11-19 NOTE — ASSESSMENT & PLAN NOTE
Last LDL was 58 November of last year on current dose of atorvastatin 20 mg daily.  Continue the same.

## 2024-12-05 ENCOUNTER — TELEPHONE (OUTPATIENT)
Dept: CARDIOLOGY CLINIC | Age: 79
End: 2024-12-05

## 2024-12-05 NOTE — TELEPHONE ENCOUNTER
Pt picks up phone but wont talk or picks up then hangs up again.... sent letter       Vascular US Duplex Lower Extremity Venous Bilateral     Deep venous insufficiency (>1.0 sec) noted in the right popliteal vein.    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    No evidence of deep vein and superficial thrombosis in the right lower extremity.    No evidence of deep vein thrombosis in the left lower extremity.     Advise thigh high pressure stockings, 20 to 30 mm of Hg.   Keep feet elevated.   Increase walking.     Arrange OV to review the results with the patient & make recommendations=she has one 12/10     Echo (TTE) complete     Left Ventricle: Low normal left ventricular systolic function with a visually estimated EF of 50 - 55%. Left ventricle size is normal. Increased wall thickness. Findings consistent with mild concentric hypertrophy. Normal wall motion. Diastolic function indeterminate in the setting of atrial fibrillation.    Right Ventricle: Right ventricle size is normal. Lead present in the right ventricle.    Aortic Valve: Moderately calcified cusps. Moderate regurgitation with an eccentrically directed jet and may underestimate severity. AV PHT is 378.0 ms. Moderate stenosis of the aortic valve. AV mean gradient is 30 mmHg. AV peak velocity is 3.6 m/s. LVOT:AV VTI Index is 0.37. AV area by continuity VTI is 1.3 cm2. AV Stroke Volume index is 37.4 mL/m2.    Tricuspid Valve: Mild to moderate regurgitation. Moderately elevated RVSP, consistent with moderate pulmonary hypertension. The estimated RVSP is 55 mmHg.    Left Atrium: Left atrium is moderately dilated.    Aorta: Dilated aortic root. Ao root diameter is 3.8 cm. Dilated ascending aorta diameter is 4.4 cm.    Pericardium: No pericardial effusion.    Mitral Valve: Moderate regurgitation.    Image quality is adequate.    Compared to 2/26/2024 study mean gradient across the aortic

## 2024-12-09 ENCOUNTER — TELEPHONE (OUTPATIENT)
Age: 79
End: 2024-12-09

## 2024-12-09 NOTE — TELEPHONE ENCOUNTER
----- Message from Princess BRANHAM sent at 12/9/2024 10:37 AM EST -----  Regarding: ECC Appointment Request  ECC Appointment Request    Patient needs appointment for ECC Appointment Type: New Patient.    Patient Requested Dates(s): Anytime before December 30, 2024   Patient Requested Time: 11:00 AM   Provider Name: John Steinberg MD    Reason for Appointment Request: New Patient - Available appointments did not meet patient need  --------------------------------------------------------------------------------------------------------------------------    Relationship to Patient: Spouse/Partner     Call Back Information: OK to leave message on voicemail  Preferred Call Back Number: [phone]

## 2025-01-07 ENCOUNTER — HOSPITAL ENCOUNTER (EMERGENCY)
Age: 80
Discharge: ANOTHER HOSPITAL | End: 2025-01-08
Payer: MEDICARE

## 2025-01-07 ENCOUNTER — APPOINTMENT (OUTPATIENT)
Dept: CT IMAGING | Age: 80
End: 2025-01-07
Payer: MEDICARE

## 2025-01-07 ENCOUNTER — APPOINTMENT (OUTPATIENT)
Dept: GENERAL RADIOLOGY | Age: 80
End: 2025-01-07
Payer: MEDICARE

## 2025-01-07 DIAGNOSIS — I35.0 AORTIC VALVE STENOSIS, ETIOLOGY OF CARDIAC VALVE DISEASE UNSPECIFIED: ICD-10-CM

## 2025-01-07 DIAGNOSIS — R55 SYNCOPE AND COLLAPSE: Primary | ICD-10-CM

## 2025-01-07 LAB
ALBUMIN SERPL-MCNC: 3.4 G/DL (ref 3.4–5)
ALBUMIN/GLOB SERPL: 1.2 {RATIO} (ref 1.1–2.2)
ALP SERPL-CCNC: 95 U/L (ref 40–129)
ALT SERPL-CCNC: 20 U/L (ref 10–40)
ANION GAP SERPL CALCULATED.3IONS-SCNC: 17 MMOL/L (ref 4–16)
AST SERPL-CCNC: 38 U/L (ref 15–37)
BILIRUB SERPL-MCNC: 1 MG/DL (ref 0–1)
BUN SERPL-MCNC: 20 MG/DL (ref 6–23)
CALCIUM SERPL-MCNC: 8.5 MG/DL (ref 8.3–10.6)
CHLORIDE SERPL-SCNC: 100 MMOL/L (ref 99–110)
CO2 SERPL-SCNC: 20 MMOL/L (ref 21–32)
CREAT SERPL-MCNC: 1.2 MG/DL (ref 0.9–1.3)
ERYTHROCYTE [DISTWIDTH] IN BLOOD BY AUTOMATED COUNT: 13.2 % (ref 11.7–14.9)
GFR, ESTIMATED: 61 ML/MIN/1.73M2
GLUCOSE SERPL-MCNC: 74 MG/DL (ref 70–99)
HCT VFR BLD AUTO: 32.6 % (ref 42–52)
HGB BLD-MCNC: 10.9 G/DL (ref 13.5–18)
MAGNESIUM SERPL-MCNC: 1.9 MG/DL (ref 1.8–2.4)
MCH RBC QN AUTO: 35.2 PG (ref 27–31)
MCHC RBC AUTO-ENTMCNC: 33.4 G/DL (ref 32–36)
MCV RBC AUTO: 105.2 FL (ref 78–100)
PLATELET # BLD AUTO: 153 K/UL (ref 140–440)
PMV BLD AUTO: 9.9 FL (ref 7.5–11.1)
POTASSIUM SERPL-SCNC: 4.3 MMOL/L (ref 3.5–5.1)
PROT SERPL-MCNC: 6.3 G/DL (ref 6.4–8.2)
RBC # BLD AUTO: 3.1 M/UL (ref 4.6–6.2)
SODIUM SERPL-SCNC: 137 MMOL/L (ref 135–145)
TROPONIN I SERPL HS-MCNC: 41 NG/L (ref 0–21)
TROPONIN I SERPL HS-MCNC: 41 NG/L (ref 0–21)
WBC OTHER # BLD: 5.6 K/UL (ref 4–10.5)

## 2025-01-07 PROCEDURE — 71250 CT THORAX DX C-: CPT

## 2025-01-07 PROCEDURE — 74177 CT ABD & PELVIS W/CONTRAST: CPT

## 2025-01-07 PROCEDURE — 84484 ASSAY OF TROPONIN QUANT: CPT

## 2025-01-07 PROCEDURE — 99285 EMERGENCY DEPT VISIT HI MDM: CPT

## 2025-01-07 PROCEDURE — 73502 X-RAY EXAM HIP UNI 2-3 VIEWS: CPT

## 2025-01-07 PROCEDURE — 70450 CT HEAD/BRAIN W/O DYE: CPT

## 2025-01-07 PROCEDURE — 80053 COMPREHEN METABOLIC PANEL: CPT

## 2025-01-07 PROCEDURE — 6360000004 HC RX CONTRAST MEDICATION

## 2025-01-07 PROCEDURE — 85027 COMPLETE CBC AUTOMATED: CPT

## 2025-01-07 PROCEDURE — 93005 ELECTROCARDIOGRAM TRACING: CPT

## 2025-01-07 PROCEDURE — 72125 CT NECK SPINE W/O DYE: CPT

## 2025-01-07 PROCEDURE — 83735 ASSAY OF MAGNESIUM: CPT

## 2025-01-07 RX ORDER — IOPAMIDOL 755 MG/ML
75 INJECTION, SOLUTION INTRAVASCULAR
Status: COMPLETED | OUTPATIENT
Start: 2025-01-07 | End: 2025-01-07

## 2025-01-07 RX ADMIN — IOPAMIDOL 75 ML: 755 INJECTION, SOLUTION INTRAVENOUS at 22:44

## 2025-01-07 ASSESSMENT — PAIN DESCRIPTION - DESCRIPTORS: DESCRIPTORS: DISCOMFORT

## 2025-01-07 ASSESSMENT — PAIN - FUNCTIONAL ASSESSMENT: PAIN_FUNCTIONAL_ASSESSMENT: 0-10

## 2025-01-07 ASSESSMENT — PAIN DESCRIPTION - ORIENTATION: ORIENTATION: LOWER

## 2025-01-07 ASSESSMENT — PAIN DESCRIPTION - LOCATION: LOCATION: BACK

## 2025-01-08 ENCOUNTER — HOSPITAL ENCOUNTER (INPATIENT)
Age: 80
LOS: 6 days | Discharge: NURSING, REHAB OR LONG-TERM CARE | DRG: 073 | End: 2025-01-14
Admitting: INTERNAL MEDICINE
Payer: MEDICARE

## 2025-01-08 VITALS
HEIGHT: 71 IN | DIASTOLIC BLOOD PRESSURE: 79 MMHG | SYSTOLIC BLOOD PRESSURE: 133 MMHG | HEART RATE: 78 BPM | WEIGHT: 254 LBS | OXYGEN SATURATION: 93 % | BODY MASS INDEX: 35.56 KG/M2 | TEMPERATURE: 98.2 F | RESPIRATION RATE: 15 BRPM

## 2025-01-08 DIAGNOSIS — R55 SYNCOPE AND COLLAPSE: Primary | ICD-10-CM

## 2025-01-08 DIAGNOSIS — R55 SYNCOPE, UNSPECIFIED SYNCOPE TYPE: ICD-10-CM

## 2025-01-08 PROBLEM — E43 SEVERE MALNUTRITION (HCC): Status: ACTIVE | Noted: 2025-01-08

## 2025-01-08 LAB
ANION GAP SERPL CALCULATED.3IONS-SCNC: 13 MMOL/L (ref 9–17)
BASOPHILS # BLD: 0.03 K/UL
BASOPHILS NFR BLD: 1 % (ref 0–1)
BUN SERPL-MCNC: 20 MG/DL (ref 7–20)
CALCIUM SERPL-MCNC: 9.1 MG/DL (ref 8.3–10.6)
CHLORIDE SERPL-SCNC: 103 MMOL/L (ref 99–110)
CO2 SERPL-SCNC: 22 MMOL/L (ref 21–32)
CREAT SERPL-MCNC: 1.3 MG/DL (ref 0.8–1.3)
EKG DIAGNOSIS: NORMAL
EKG Q-T INTERVAL: 438 MS
EKG QRS DURATION: 76 MS
EKG QTC CALCULATION (BAZETT): 475 MS
EKG R AXIS: 50 DEGREES
EKG T AXIS: -40 DEGREES
EKG VENTRICULAR RATE: 71 BPM
EOSINOPHIL # BLD: 0.12 K/UL
EOSINOPHILS RELATIVE PERCENT: 2 % (ref 0–3)
ERYTHROCYTE [DISTWIDTH] IN BLOOD BY AUTOMATED COUNT: 13.3 % (ref 11.7–14.9)
GFR, ESTIMATED: 51 ML/MIN/1.73M2
GLUCOSE BLD-MCNC: 112 MG/DL (ref 74–99)
GLUCOSE BLD-MCNC: 80 MG/DL (ref 74–99)
GLUCOSE BLD-MCNC: 86 MG/DL (ref 74–99)
GLUCOSE BLD-MCNC: 92 MG/DL (ref 74–99)
GLUCOSE SERPL-MCNC: 82 MG/DL (ref 74–99)
HCT VFR BLD AUTO: 33.4 % (ref 42–52)
HGB BLD-MCNC: 11.1 G/DL (ref 13.5–18)
IMM GRANULOCYTES # BLD AUTO: 0.03 K/UL
IMM GRANULOCYTES NFR BLD: 1 %
LYMPHOCYTES NFR BLD: 0.54 K/UL
LYMPHOCYTES RELATIVE PERCENT: 9 % (ref 24–44)
MCH RBC QN AUTO: 35.4 PG (ref 27–31)
MCHC RBC AUTO-ENTMCNC: 33.2 G/DL (ref 32–36)
MCV RBC AUTO: 106.4 FL (ref 78–100)
MONOCYTES NFR BLD: 0.65 K/UL
MONOCYTES NFR BLD: 11 % (ref 0–4)
NEUTROPHILS NFR BLD: 77 % (ref 36–66)
NEUTS SEG NFR BLD: 4.64 K/UL
PLATELET # BLD AUTO: 138 K/UL (ref 140–440)
PMV BLD AUTO: 9.8 FL (ref 7.5–11.1)
POTASSIUM SERPL-SCNC: 4.6 MMOL/L (ref 3.5–5.1)
RBC # BLD AUTO: 3.14 M/UL (ref 4.6–6.2)
SODIUM SERPL-SCNC: 138 MMOL/L (ref 136–145)
TROPONIN I SERPL HS-MCNC: 43 NG/L (ref 0–22)
WBC OTHER # BLD: 6 K/UL (ref 4–10.5)

## 2025-01-08 PROCEDURE — 6360000002 HC RX W HCPCS: Performed by: INTERNAL MEDICINE

## 2025-01-08 PROCEDURE — 94761 N-INVAS EAR/PLS OXIMETRY MLT: CPT

## 2025-01-08 PROCEDURE — 93010 ELECTROCARDIOGRAM REPORT: CPT | Performed by: INTERNAL MEDICINE

## 2025-01-08 PROCEDURE — 84484 ASSAY OF TROPONIN QUANT: CPT

## 2025-01-08 PROCEDURE — 2500000003 HC RX 250 WO HCPCS: Performed by: INTERNAL MEDICINE

## 2025-01-08 PROCEDURE — 85025 COMPLETE CBC W/AUTO DIFF WBC: CPT

## 2025-01-08 PROCEDURE — 36415 COLL VENOUS BLD VENIPUNCTURE: CPT

## 2025-01-08 PROCEDURE — 99223 1ST HOSP IP/OBS HIGH 75: CPT | Performed by: INTERNAL MEDICINE

## 2025-01-08 PROCEDURE — 6370000000 HC RX 637 (ALT 250 FOR IP): Performed by: INTERNAL MEDICINE

## 2025-01-08 PROCEDURE — 82962 GLUCOSE BLOOD TEST: CPT

## 2025-01-08 PROCEDURE — 1200000000 HC SEMI PRIVATE

## 2025-01-08 PROCEDURE — 80048 BASIC METABOLIC PNL TOTAL CA: CPT

## 2025-01-08 PROCEDURE — 6370000000 HC RX 637 (ALT 250 FOR IP): Performed by: STUDENT IN AN ORGANIZED HEALTH CARE EDUCATION/TRAINING PROGRAM

## 2025-01-08 RX ORDER — POLYETHYLENE GLYCOL 3350 17 G/17G
17 POWDER, FOR SOLUTION ORAL DAILY PRN
Status: DISCONTINUED | OUTPATIENT
Start: 2025-01-08 | End: 2025-01-14 | Stop reason: HOSPADM

## 2025-01-08 RX ORDER — SODIUM CHLORIDE 9 MG/ML
INJECTION, SOLUTION INTRAVENOUS PRN
Status: DISCONTINUED | OUTPATIENT
Start: 2025-01-08 | End: 2025-01-14 | Stop reason: HOSPADM

## 2025-01-08 RX ORDER — AMLODIPINE BESYLATE 10 MG/1
10 TABLET ORAL DAILY
Status: DISCONTINUED | OUTPATIENT
Start: 2025-01-08 | End: 2025-01-14 | Stop reason: HOSPADM

## 2025-01-08 RX ORDER — SODIUM CHLORIDE 0.9 % (FLUSH) 0.9 %
5-40 SYRINGE (ML) INJECTION EVERY 12 HOURS SCHEDULED
Status: DISCONTINUED | OUTPATIENT
Start: 2025-01-08 | End: 2025-01-14 | Stop reason: HOSPADM

## 2025-01-08 RX ORDER — ACETAMINOPHEN 325 MG/1
650 TABLET ORAL EVERY 6 HOURS PRN
Status: DISCONTINUED | OUTPATIENT
Start: 2025-01-08 | End: 2025-01-14 | Stop reason: HOSPADM

## 2025-01-08 RX ORDER — ONDANSETRON 2 MG/ML
4 INJECTION INTRAMUSCULAR; INTRAVENOUS EVERY 6 HOURS PRN
Status: DISCONTINUED | OUTPATIENT
Start: 2025-01-08 | End: 2025-01-14 | Stop reason: HOSPADM

## 2025-01-08 RX ORDER — METOPROLOL TARTRATE 25 MG/1
12.5 TABLET, FILM COATED ORAL 2 TIMES DAILY
Status: DISCONTINUED | OUTPATIENT
Start: 2025-01-08 | End: 2025-01-14 | Stop reason: HOSPADM

## 2025-01-08 RX ORDER — ENOXAPARIN SODIUM 100 MG/ML
30 INJECTION SUBCUTANEOUS 2 TIMES DAILY
Status: DISCONTINUED | OUTPATIENT
Start: 2025-01-08 | End: 2025-01-14 | Stop reason: HOSPADM

## 2025-01-08 RX ORDER — FERROUS SULFATE 325(65) MG
325 TABLET ORAL
Status: DISCONTINUED | OUTPATIENT
Start: 2025-01-08 | End: 2025-01-14 | Stop reason: HOSPADM

## 2025-01-08 RX ORDER — MAGNESIUM SULFATE IN WATER 40 MG/ML
2000 INJECTION, SOLUTION INTRAVENOUS PRN
Status: DISCONTINUED | OUTPATIENT
Start: 2025-01-08 | End: 2025-01-14 | Stop reason: HOSPADM

## 2025-01-08 RX ORDER — LANOLIN ALCOHOL/MO/W.PET/CERES
100 CREAM (GRAM) TOPICAL DAILY
Status: DISCONTINUED | OUTPATIENT
Start: 2025-01-08 | End: 2025-01-14 | Stop reason: HOSPADM

## 2025-01-08 RX ORDER — ONDANSETRON 4 MG/1
4 TABLET, ORALLY DISINTEGRATING ORAL EVERY 8 HOURS PRN
Status: DISCONTINUED | OUTPATIENT
Start: 2025-01-08 | End: 2025-01-14 | Stop reason: HOSPADM

## 2025-01-08 RX ORDER — ENOXAPARIN SODIUM 100 MG/ML
40 INJECTION SUBCUTANEOUS DAILY
Status: DISCONTINUED | OUTPATIENT
Start: 2025-01-08 | End: 2025-01-08

## 2025-01-08 RX ORDER — ACETAMINOPHEN 650 MG/1
650 SUPPOSITORY RECTAL EVERY 6 HOURS PRN
Status: DISCONTINUED | OUTPATIENT
Start: 2025-01-08 | End: 2025-01-14 | Stop reason: HOSPADM

## 2025-01-08 RX ORDER — CLOPIDOGREL BISULFATE 75 MG/1
75 TABLET ORAL DAILY
Status: DISCONTINUED | OUTPATIENT
Start: 2025-01-08 | End: 2025-01-14 | Stop reason: HOSPADM

## 2025-01-08 RX ORDER — DEXTROSE MONOHYDRATE 100 MG/ML
INJECTION, SOLUTION INTRAVENOUS CONTINUOUS PRN
Status: DISCONTINUED | OUTPATIENT
Start: 2025-01-08 | End: 2025-01-14 | Stop reason: HOSPADM

## 2025-01-08 RX ORDER — INSULIN LISPRO 100 [IU]/ML
0-4 INJECTION, SOLUTION INTRAVENOUS; SUBCUTANEOUS
Status: DISCONTINUED | OUTPATIENT
Start: 2025-01-08 | End: 2025-01-14 | Stop reason: HOSPADM

## 2025-01-08 RX ORDER — ALLOPURINOL 100 MG/1
100 TABLET ORAL DAILY
Status: DISCONTINUED | OUTPATIENT
Start: 2025-01-08 | End: 2025-01-14 | Stop reason: HOSPADM

## 2025-01-08 RX ORDER — ALLOPURINOL 100 MG/1
100 TABLET ORAL 2 TIMES DAILY
Status: DISCONTINUED | OUTPATIENT
Start: 2025-01-08 | End: 2025-01-08

## 2025-01-08 RX ORDER — POTASSIUM CHLORIDE 7.45 MG/ML
10 INJECTION INTRAVENOUS PRN
Status: ACTIVE | OUTPATIENT
Start: 2025-01-08 | End: 2025-01-11

## 2025-01-08 RX ORDER — SODIUM CHLORIDE 0.9 % (FLUSH) 0.9 %
5-40 SYRINGE (ML) INJECTION PRN
Status: DISCONTINUED | OUTPATIENT
Start: 2025-01-08 | End: 2025-01-14 | Stop reason: HOSPADM

## 2025-01-08 RX ORDER — GABAPENTIN 400 MG/1
400 CAPSULE ORAL 3 TIMES DAILY
Status: DISCONTINUED | OUTPATIENT
Start: 2025-01-08 | End: 2025-01-14 | Stop reason: HOSPADM

## 2025-01-08 RX ORDER — ATORVASTATIN CALCIUM 10 MG/1
20 TABLET, FILM COATED ORAL NIGHTLY
Status: DISCONTINUED | OUTPATIENT
Start: 2025-01-08 | End: 2025-01-14 | Stop reason: HOSPADM

## 2025-01-08 RX ORDER — GLUCAGON 1 MG/ML
1 KIT INJECTION PRN
Status: DISCONTINUED | OUTPATIENT
Start: 2025-01-08 | End: 2025-01-14 | Stop reason: HOSPADM

## 2025-01-08 RX ORDER — POTASSIUM CHLORIDE 1500 MG/1
40 TABLET, EXTENDED RELEASE ORAL PRN
Status: ACTIVE | OUTPATIENT
Start: 2025-01-08 | End: 2025-01-11

## 2025-01-08 RX ORDER — LANOLIN ALCOHOL/MO/W.PET/CERES
400 CREAM (GRAM) TOPICAL DAILY
Status: DISCONTINUED | OUTPATIENT
Start: 2025-01-08 | End: 2025-01-14 | Stop reason: HOSPADM

## 2025-01-08 RX ORDER — MONTELUKAST SODIUM 10 MG/1
10 TABLET ORAL DAILY
Status: DISCONTINUED | OUTPATIENT
Start: 2025-01-08 | End: 2025-01-14 | Stop reason: HOSPADM

## 2025-01-08 RX ADMIN — CLOPIDOGREL BISULFATE 75 MG: 75 TABLET ORAL at 11:23

## 2025-01-08 RX ADMIN — METOPROLOL TARTRATE 12.5 MG: 25 TABLET, FILM COATED ORAL at 11:22

## 2025-01-08 RX ADMIN — MONTELUKAST 10 MG: 10 TABLET, FILM COATED ORAL at 11:24

## 2025-01-08 RX ADMIN — GABAPENTIN 400 MG: 400 CAPSULE ORAL at 15:41

## 2025-01-08 RX ADMIN — SODIUM CHLORIDE, PRESERVATIVE FREE 10 ML: 5 INJECTION INTRAVENOUS at 11:39

## 2025-01-08 RX ADMIN — FERROUS SULFATE TAB 325 MG (65 MG ELEMENTAL FE) 325 MG: 325 (65 FE) TAB at 11:25

## 2025-01-08 RX ADMIN — GABAPENTIN 400 MG: 400 CAPSULE ORAL at 11:23

## 2025-01-08 RX ADMIN — ALLOPURINOL 100 MG: 100 TABLET ORAL at 11:23

## 2025-01-08 RX ADMIN — ATORVASTATIN CALCIUM 20 MG: 10 TABLET, FILM COATED ORAL at 21:53

## 2025-01-08 RX ADMIN — Medication 100 MG: at 11:24

## 2025-01-08 RX ADMIN — GABAPENTIN 400 MG: 400 CAPSULE ORAL at 21:53

## 2025-01-08 RX ADMIN — SODIUM CHLORIDE, PRESERVATIVE FREE 10 ML: 5 INJECTION INTRAVENOUS at 22:01

## 2025-01-08 RX ADMIN — Medication 400 MG: at 11:24

## 2025-01-08 RX ADMIN — AMLODIPINE BESYLATE 10 MG: 10 TABLET ORAL at 11:23

## 2025-01-08 RX ADMIN — METOPROLOL TARTRATE 12.5 MG: 25 TABLET, FILM COATED ORAL at 21:53

## 2025-01-08 RX ADMIN — ENOXAPARIN SODIUM 30 MG: 100 INJECTION SUBCUTANEOUS at 11:22

## 2025-01-08 RX ADMIN — ENOXAPARIN SODIUM 30 MG: 100 INJECTION SUBCUTANEOUS at 21:52

## 2025-01-08 ASSESSMENT — PAIN - FUNCTIONAL ASSESSMENT: PAIN_FUNCTIONAL_ASSESSMENT: ACTIVITIES ARE NOT PREVENTED

## 2025-01-08 ASSESSMENT — PAIN SCALES - GENERAL: PAINLEVEL_OUTOF10: 6

## 2025-01-08 ASSESSMENT — PAIN DESCRIPTION - PAIN TYPE: TYPE: ACUTE PAIN

## 2025-01-08 ASSESSMENT — PAIN DESCRIPTION - ONSET: ONSET: ON-GOING

## 2025-01-08 ASSESSMENT — PAIN DESCRIPTION - FREQUENCY: FREQUENCY: CONTINUOUS

## 2025-01-08 ASSESSMENT — PAIN DESCRIPTION - ORIENTATION: ORIENTATION: LOWER

## 2025-01-08 ASSESSMENT — PAIN DESCRIPTION - LOCATION: LOCATION: BACK

## 2025-01-08 ASSESSMENT — PAIN DESCRIPTION - DESCRIPTORS: DESCRIPTORS: DULL;ACHING

## 2025-01-08 NOTE — DISCHARGE INSTR - DIET
puddings, pancake batter, or Chinese toast wash/batter  Make a rich custard with egg yolks, double strength milk, and sugar  Add extra hard-cooked yolks to deviled egg filling and sandwich spreads   Hard or Semi-Soft Cheese (Cheddar, Carter, Brick) Melt on sandwiches, bread, muffins, tortillas, hamburgers, hot dogs, other meats or fish, vegetables, eggs, or desserts such as stewed figs or pies  Grate and add to soups, sauces, casseroles, vegetable dishes, potatoes, rice noodles, or meatloaf  Serve as a snack with crackers or bagels   Ice cream, Yogurt, and Frozen Yogurt Add to milk drinks such as milkshakes  Add to cereals, fruits, gelatin desserts, and pies  Blend or whip with soft or cooked fruits  [place] ice cream or frozen yogurt between enriched cake slices, cookies, or isaura crackers  Use seasoned yogurt as a dip for fruits, vegetables, or chips  Use yogurt in place of sour cream in casseroles   Meat and Fish Add chopped, cooked meat or fish to vegetables, salads, casseroles, soups, sauces, and biscuit dough  Use in omelets, soufflés, quiches, and sandwich fillings  Add chicken and turkey to stuffing  Wrap in pie crust or biscuit dough as turnovers  Add to stuffed baked potatoes  Add pureed meat to soups   Milk Use in beverages and in cooking  Use in preparing foods, such as hot cereal, soups, cocoa, or pudding  Add cream sauces to vegetable and other dishes  Use evaporated milk, evaporated skim milk, or sweetened condensed milk instead of milk or water in recipes.   Nonfat Dry Milk Add 1/3 cup of nonfat dry milk powdered milk to each cup of regular milk for “double strength” milk  Add to yogurt and milk drinks, such as pasteurized eggnog and milkshakes  Add to scrambled eggs and mashed potatoes  Use in casseroles, meatloaf, hot cereal, breads, muffins, sauces, cream soups, puddings and custards, and other milk-based desserts   Nuts, Seeds, and Wheat Germ Add to casseroles, breads, muffins, pancakes, cookies,

## 2025-01-08 NOTE — H&P
History and Physical      Name:  Kareem Rey /Age/Sex: 1945  (79 y.o. male)   MRN & CSN:  [id number] & [id number] Encounter Date/Time: 2025 2:35 AM EST   Location:  [address] [place] [address]-A PCP: Eduar Birmingham MD         Assessment and Plan:     #.  Recurrent syncope  -CT head-no acute process  -Troponin x 2- 41, EKG-paced rhythm  -Echo-2024-EF 50-55%, moderate aortic regurgitation, moderate to severe aortic valve stenosis, moderate pulmonary hypertension.  Recommended SALAZAR.  -Check orthostatic vitals  -Consult cardiology  -Keep n.p.o. until evaluated by cardiology.    #.  Fall  -CT head, CT C-spine, CT chest/abdomen/pelvis-no acute process  -X-ray of the right hip-no acute fracture or dislocation.  -PT/OT evaluation when appropriate.    #.  Coronary artery disease  -PCI of proximal LAD, ostial RCA-2022  -On Plavix    #.  Hypertension-on amlodipine    #.  Diabetes mellitus type 2  -On metformin-hold  -A1c-4.7-2024  -Continue insulin sliding scale with hypoglycemia protocol    #.  Dilated aortic root  -Echo-2024-aortic root diameter 3.8 cm, dilated ascending aorta-4.4 cm.    #.  Persistent atrial fibrillation  -History of cardioversion-2019  -Status post Watchman-2023    #.  Status post pacemaker for SSS in 2019    #.  Chronic anemia  -Admission 2023 with acute anemia (hemoglobin 5.9) requiring blood transfusion  -Status post EGD and colonoscopy with no source of bleeding.  -On ferrous sulfate    #.  Peripheral neuropathy-on gabapentin    #.  History of syncope   -had intermittent episodes since   -Admission 2024-was evaluated by cardiology, neurology-syncope possibly related to alcohol intake, pacemaker with no significant arrhythmias noted. Had SALAZAR-showing moderate aortic stenosis with a fused known and left coronary sclerotic aortic valve.    #.  CKD stage III    #.  History of colonic resection for large colon polyp-    #.  Obstructive sleep apnea-on CPAP    #.  Chronic

## 2025-01-08 NOTE — ED NOTES
RN called wife on phone to inform her that he is going to room 4129 at [facility].

## 2025-01-08 NOTE — ED PROVIDER NOTES
Calm VIEWS)   Final Result            CC/HPI Summary, DDx, ED Course, and Reassessment:   Patient is in after multiple falls and syncope as described above.    ?  Severe aortic stenosis with syncope, intercranial hemorrhage, cervical fracture, rib fracture, intraperitoneal/retroperitoneal bleed from traumatic injury, spine compression fracture, anemia, electrolyte abnormality, ACS    EKG as below, paced rate without evidence of ACS.  Troponin was 41 at then 41.  ACS particularly less likely.     CT chest abdomen pelvis, C-spine, head negative for acute injury.    CBC did show anemia at 10.9, which is chronic for patient.  Metabolic panel without any clinically significant electrolyte abnormalities, lela, or metabolic derangements.  Does show mild elevation in AST.    On reevaluation, patient is family is in room.  I went over her presentation and concern for issue with his aortic stenosis given his repeat episodes of syncope without known cause.   With him need for transfer to discussed [facility].  They are agreeable.    Discussed with hospitalist at [facility], will accept patient as transfer.    ED Course as of 01/09/25 0737   Tue Jan 07, 2025 2028 EKG as interpreted by me  Ventricular paced rhythm at 71 bpm  Fine baseline artifact  No Sgarbossa criteria  No STEMI [CC]   2118 XR HIP RIGHT (2-3 VIEWS)  Negative hip [CC]   2159 Creatinine: 1.2  Will go to CT for CTs [CC]   2337 Messaged hospitalist at [facility]   [CC]      ED Course User Index  [CC] Kareem Robledo DO         History from : Patient    Limitations to history : None    Patient was given the following medications:  Medications   iopamidol (ISOVUE-370) 76 % injection 75 mL (75 mLs IntraVENous Given 1/7/25 3213)       Independent Imaging Interpretation by me:   Right hip - radiograph of right hip w/o acute fracture or dislocation    EKG (if obtained): (All EKG's are interpreted by myself in the absence of a cardiologist)   2028 EKG as interpreted by me  Ventricular paced

## 2025-01-08 NOTE — ED NOTES
Administrations This Visit     methylPREDNISolone acetate (DEPO-MEDROL) injection 80 mg     Admin Date  09/18/2020  10:12 Action  Given Dose  80 mg Route  Intra-articular Site  Knee Left Administered By  Arnulfo Bower MA    Ordering Provider:  JESSICA Garg CNP    NDC:  3546-4489-53    Lot#:  [id number]    :  Amina Mueller.     Patient Supplied?:  No    Comments:  SITE:  LEFT KNEENDC#  [id number]#  [id number] DATE:  08/2021VERIFIED BY: [address] [place] [address] RN and patient recently returned from radiology. Family present at bedside.

## 2025-01-09 LAB
ANION GAP SERPL CALCULATED.3IONS-SCNC: 12 MMOL/L (ref 9–17)
BUN SERPL-MCNC: 20 MG/DL (ref 7–20)
C DIFF GDH + TOXINS A+B STL QL IA.RAPID: ABNORMAL
CALCIUM SERPL-MCNC: 9.3 MG/DL (ref 8.3–10.6)
CHLORIDE SERPL-SCNC: 102 MMOL/L (ref 99–110)
CO2 SERPL-SCNC: 23 MMOL/L (ref 21–32)
CREAT SERPL-MCNC: 1.4 MG/DL (ref 0.8–1.3)
ERYTHROCYTE [DISTWIDTH] IN BLOOD BY AUTOMATED COUNT: 13.6 % (ref 11.7–14.9)
GFR, ESTIMATED: 50 ML/MIN/1.73M2
GLUCOSE BLD-MCNC: 115 MG/DL (ref 74–99)
GLUCOSE BLD-MCNC: 131 MG/DL (ref 74–99)
GLUCOSE SERPL-MCNC: 139 MG/DL (ref 74–99)
HCT VFR BLD AUTO: 36.4 % (ref 42–52)
HGB BLD-MCNC: 11.9 G/DL (ref 13.5–18)
MAGNESIUM SERPL-MCNC: 2.1 MG/DL (ref 1.8–2.4)
MCH RBC QN AUTO: 35 PG (ref 27–31)
MCHC RBC AUTO-ENTMCNC: 32.7 G/DL (ref 32–36)
MCV RBC AUTO: 107.1 FL (ref 78–100)
PHOSPHATE SERPL-MCNC: 2.8 MG/DL (ref 2.5–4.9)
PLATELET # BLD AUTO: 172 K/UL (ref 140–440)
PMV BLD AUTO: 9.5 FL (ref 7.5–11.1)
POTASSIUM SERPL-SCNC: 4.3 MMOL/L (ref 3.5–5.1)
RBC # BLD AUTO: 3.4 M/UL (ref 4.6–6.2)
SODIUM SERPL-SCNC: 137 MMOL/L (ref 136–145)
SPECIMEN DESCRIPTION: ABNORMAL
WBC OTHER # BLD: 9.1 K/UL (ref 4–10.5)

## 2025-01-09 PROCEDURE — 97530 THERAPEUTIC ACTIVITIES: CPT

## 2025-01-09 PROCEDURE — APPNB15 APP NON BILLABLE TIME 0-15 MINS

## 2025-01-09 PROCEDURE — 6370000000 HC RX 637 (ALT 250 FOR IP): Performed by: INTERNAL MEDICINE

## 2025-01-09 PROCEDURE — 87493 C DIFF AMPLIFIED PROBE: CPT

## 2025-01-09 PROCEDURE — 97162 PT EVAL MOD COMPLEX 30 MIN: CPT

## 2025-01-09 PROCEDURE — 97166 OT EVAL MOD COMPLEX 45 MIN: CPT

## 2025-01-09 PROCEDURE — 87324 CLOSTRIDIUM AG IA: CPT

## 2025-01-09 PROCEDURE — 99232 SBSQ HOSP IP/OBS MODERATE 35: CPT | Performed by: INTERNAL MEDICINE

## 2025-01-09 PROCEDURE — 85027 COMPLETE CBC AUTOMATED: CPT

## 2025-01-09 PROCEDURE — 6360000002 HC RX W HCPCS: Performed by: INTERNAL MEDICINE

## 2025-01-09 PROCEDURE — 87449 NOS EACH ORGANISM AG IA: CPT

## 2025-01-09 PROCEDURE — 6370000000 HC RX 637 (ALT 250 FOR IP): Performed by: STUDENT IN AN ORGANIZED HEALTH CARE EDUCATION/TRAINING PROGRAM

## 2025-01-09 PROCEDURE — 84100 ASSAY OF PHOSPHORUS: CPT

## 2025-01-09 PROCEDURE — 80048 BASIC METABOLIC PNL TOTAL CA: CPT

## 2025-01-09 PROCEDURE — 87899 AGENT NOS ASSAY W/OPTIC: CPT

## 2025-01-09 PROCEDURE — 82962 GLUCOSE BLOOD TEST: CPT

## 2025-01-09 PROCEDURE — 94761 N-INVAS EAR/PLS OXIMETRY MLT: CPT

## 2025-01-09 PROCEDURE — 36415 COLL VENOUS BLD VENIPUNCTURE: CPT

## 2025-01-09 PROCEDURE — 83735 ASSAY OF MAGNESIUM: CPT

## 2025-01-09 PROCEDURE — 2500000003 HC RX 250 WO HCPCS: Performed by: INTERNAL MEDICINE

## 2025-01-09 PROCEDURE — 1200000000 HC SEMI PRIVATE

## 2025-01-09 RX ADMIN — MONTELUKAST 10 MG: 10 TABLET, FILM COATED ORAL at 08:42

## 2025-01-09 RX ADMIN — METOPROLOL TARTRATE 12.5 MG: 25 TABLET, FILM COATED ORAL at 08:42

## 2025-01-09 RX ADMIN — ENOXAPARIN SODIUM 30 MG: 100 INJECTION SUBCUTANEOUS at 22:07

## 2025-01-09 RX ADMIN — GABAPENTIN 400 MG: 400 CAPSULE ORAL at 08:42

## 2025-01-09 RX ADMIN — CLOPIDOGREL BISULFATE 75 MG: 75 TABLET ORAL at 08:42

## 2025-01-09 RX ADMIN — ATORVASTATIN CALCIUM 20 MG: 10 TABLET, FILM COATED ORAL at 22:06

## 2025-01-09 RX ADMIN — Medication 100 MG: at 08:42

## 2025-01-09 RX ADMIN — Medication 400 MG: at 08:42

## 2025-01-09 RX ADMIN — ALLOPURINOL 100 MG: 100 TABLET ORAL at 08:42

## 2025-01-09 RX ADMIN — AMLODIPINE BESYLATE 10 MG: 10 TABLET ORAL at 08:42

## 2025-01-09 RX ADMIN — FERROUS SULFATE TAB 325 MG (65 MG ELEMENTAL FE) 325 MG: 325 (65 FE) TAB at 08:42

## 2025-01-09 RX ADMIN — GABAPENTIN 400 MG: 400 CAPSULE ORAL at 22:06

## 2025-01-09 RX ADMIN — SODIUM CHLORIDE, PRESERVATIVE FREE 10 ML: 5 INJECTION INTRAVENOUS at 22:06

## 2025-01-09 RX ADMIN — ENOXAPARIN SODIUM 30 MG: 100 INJECTION SUBCUTANEOUS at 08:42

## 2025-01-09 RX ADMIN — SODIUM CHLORIDE, PRESERVATIVE FREE 10 ML: 5 INJECTION INTRAVENOUS at 08:43

## 2025-01-09 RX ADMIN — GABAPENTIN 400 MG: 400 CAPSULE ORAL at 14:52

## 2025-01-09 RX ADMIN — METOPROLOL TARTRATE 12.5 MG: 25 TABLET, FILM COATED ORAL at 22:06

## 2025-01-09 ASSESSMENT — PAIN SCALES - GENERAL: PAINLEVEL_OUTOF10: 0

## 2025-01-09 NOTE — CARE COORDINATION
01/09/25 1229   Service Assessment   Patient Orientation Alert and Oriented;Person;Place;Situation   Cognition Alert   History Provided By Patient   Primary Caregiver Self   Support Systems Spouse/Significant Other;Children;Family Members;Friends/Neighbors   Patient's Healthcare Decision Maker is: Named in Scanned ACP Document   PCP Verified by CM Yes   Last Visit to PCP Within last 6 months   Prior Functional Level Assistance with the following:;Bathing;Cooking;Housework;Shopping;Mobility   Current Functional Level Assistance with the following:;Mobility;Shopping;Bathing;Cooking;Housework   Can patient return to prior living arrangement Unknown at present   Ability to make needs known: Good   Family able to assist with home care needs: Yes   Would you like for me to discuss the discharge plan with any other family members/significant others, and if so, who? Yes  (permission to speak with his wife.)   Condition of Participation: Discharge Planning   The Patient and/or Patient Representative was provided with a Choice of Provider? Patient   The Patient and/Or Patient Representative agree with the Discharge Plan? Yes     CM into see pt to initiate a safe discharge plan. Cm introduced self and explained role of CM. Pt is kind, alert and oriented. Pt lives with his wife in a one floor with ramped entrance. Home has basement but pt does not go down. Pts wife is able to drive.  Pt has a walk in [facility]. DME includes a rollator and cane. Pt wears a Cpap at night. Pt has a PCP and insurance and able to afford his prescriptions. Pt informed CM that he has had several falls and they are trying to figure out why. Pt is well supported by wife, neighbors and his adult children when able. CM discussed the discharge planning. CM discussed the recs that are pending from PT/OT. Per notes pt has been to the Swing bed and he does not want Swing or SNF. Pt wants to return home. Pt shared that [facility] and would like [facility] cont.

## 2025-01-09 NOTE — PLAN OF CARE
Called wife and updated her regarding patient's status. Wife states pt. Has been incontinent of stool for a while.     Electronically signed by Layo Horowitz MD on 1/9/2025 at 2:07 PM

## 2025-01-09 NOTE — CARE COORDINATION
CM called pts wife and discussed discharge planning with her regarding pt. Wife would  like CM to call her back after she speaks with pt. Pt has been to Swing and has had [facility] in the past. Wife did decline ARU due to distance from home.   Determination pending call to wife.     1316 wife would like to speak to physician. RUBEN PS to Dr Horowitz

## 2025-01-09 NOTE — CARE COORDINATION
Received referral from dietitian for possible nutritional supplements at home.  Will review to see if he meets criteria for program.

## 2025-01-10 ENCOUNTER — APPOINTMENT (OUTPATIENT)
Dept: GENERAL RADIOLOGY | Age: 80
DRG: 073 | End: 2025-01-10
Payer: MEDICARE

## 2025-01-10 LAB
ANION GAP SERPL CALCULATED.3IONS-SCNC: 10 MMOL/L (ref 9–17)
BUN SERPL-MCNC: 22 MG/DL (ref 7–20)
CALCIUM SERPL-MCNC: 8.8 MG/DL (ref 8.3–10.6)
CHLORIDE SERPL-SCNC: 102 MMOL/L (ref 99–110)
CO2 SERPL-SCNC: 23 MMOL/L (ref 21–32)
CREAT SERPL-MCNC: 1.4 MG/DL (ref 0.8–1.3)
ERYTHROCYTE [DISTWIDTH] IN BLOOD BY AUTOMATED COUNT: 13.7 % (ref 11.7–14.9)
GFR, ESTIMATED: 47 ML/MIN/1.73M2
GLUCOSE BLD-MCNC: 106 MG/DL (ref 74–99)
GLUCOSE BLD-MCNC: 118 MG/DL (ref 74–99)
GLUCOSE BLD-MCNC: 121 MG/DL (ref 74–99)
GLUCOSE BLD-MCNC: 121 MG/DL (ref 74–99)
GLUCOSE BLD-MCNC: 156 MG/DL (ref 74–99)
GLUCOSE BLD-MCNC: 162 MG/DL (ref 74–99)
GLUCOSE SERPL-MCNC: 116 MG/DL (ref 74–99)
HCT VFR BLD AUTO: 32 % (ref 42–52)
HGB BLD-MCNC: 10.6 G/DL (ref 13.5–18)
MAGNESIUM SERPL-MCNC: 2 MG/DL (ref 1.8–2.4)
MCH RBC QN AUTO: 36.1 PG (ref 27–31)
MCHC RBC AUTO-ENTMCNC: 33.1 G/DL (ref 32–36)
MCV RBC AUTO: 108.8 FL (ref 78–100)
PHOSPHATE SERPL-MCNC: 2.9 MG/DL (ref 2.5–4.9)
PLATELET # BLD AUTO: 135 K/UL (ref 140–440)
PMV BLD AUTO: 9.3 FL (ref 7.5–11.1)
POTASSIUM SERPL-SCNC: 3.9 MMOL/L (ref 3.5–5.1)
RBC # BLD AUTO: 2.94 M/UL (ref 4.6–6.2)
SODIUM SERPL-SCNC: 134 MMOL/L (ref 136–145)
WBC OTHER # BLD: 6.9 K/UL (ref 4–10.5)

## 2025-01-10 PROCEDURE — 6370000000 HC RX 637 (ALT 250 FOR IP): Performed by: STUDENT IN AN ORGANIZED HEALTH CARE EDUCATION/TRAINING PROGRAM

## 2025-01-10 PROCEDURE — 6370000000 HC RX 637 (ALT 250 FOR IP): Performed by: INTERNAL MEDICINE

## 2025-01-10 PROCEDURE — 84100 ASSAY OF PHOSPHORUS: CPT

## 2025-01-10 PROCEDURE — 6360000002 HC RX W HCPCS: Performed by: INTERNAL MEDICINE

## 2025-01-10 PROCEDURE — 71045 X-RAY EXAM CHEST 1 VIEW: CPT

## 2025-01-10 PROCEDURE — 83735 ASSAY OF MAGNESIUM: CPT

## 2025-01-10 PROCEDURE — 2500000003 HC RX 250 WO HCPCS: Performed by: INTERNAL MEDICINE

## 2025-01-10 PROCEDURE — 80048 BASIC METABOLIC PNL TOTAL CA: CPT

## 2025-01-10 PROCEDURE — 82962 GLUCOSE BLOOD TEST: CPT

## 2025-01-10 PROCEDURE — 94761 N-INVAS EAR/PLS OXIMETRY MLT: CPT

## 2025-01-10 PROCEDURE — 36415 COLL VENOUS BLD VENIPUNCTURE: CPT

## 2025-01-10 PROCEDURE — 85027 COMPLETE CBC AUTOMATED: CPT

## 2025-01-10 PROCEDURE — 2700000000 HC OXYGEN THERAPY PER DAY

## 2025-01-10 PROCEDURE — 1200000000 HC SEMI PRIVATE

## 2025-01-10 RX ADMIN — MONTELUKAST 10 MG: 10 TABLET, FILM COATED ORAL at 08:51

## 2025-01-10 RX ADMIN — FERROUS SULFATE TAB 325 MG (65 MG ELEMENTAL FE) 325 MG: 325 (65 FE) TAB at 08:51

## 2025-01-10 RX ADMIN — METOPROLOL TARTRATE 12.5 MG: 25 TABLET, FILM COATED ORAL at 22:01

## 2025-01-10 RX ADMIN — Medication 100 MG: at 08:51

## 2025-01-10 RX ADMIN — Medication 400 MG: at 08:51

## 2025-01-10 RX ADMIN — ENOXAPARIN SODIUM 30 MG: 100 INJECTION SUBCUTANEOUS at 08:50

## 2025-01-10 RX ADMIN — METOPROLOL TARTRATE 12.5 MG: 25 TABLET, FILM COATED ORAL at 08:50

## 2025-01-10 RX ADMIN — AMLODIPINE BESYLATE 10 MG: 10 TABLET ORAL at 08:51

## 2025-01-10 RX ADMIN — GABAPENTIN 400 MG: 400 CAPSULE ORAL at 22:02

## 2025-01-10 RX ADMIN — GABAPENTIN 400 MG: 400 CAPSULE ORAL at 18:07

## 2025-01-10 RX ADMIN — ATORVASTATIN CALCIUM 20 MG: 10 TABLET, FILM COATED ORAL at 22:02

## 2025-01-10 RX ADMIN — GABAPENTIN 400 MG: 400 CAPSULE ORAL at 08:51

## 2025-01-10 RX ADMIN — CLOPIDOGREL BISULFATE 75 MG: 75 TABLET ORAL at 08:51

## 2025-01-10 RX ADMIN — ENOXAPARIN SODIUM 30 MG: 100 INJECTION SUBCUTANEOUS at 22:01

## 2025-01-10 RX ADMIN — ALLOPURINOL 100 MG: 100 TABLET ORAL at 08:51

## 2025-01-10 RX ADMIN — SODIUM CHLORIDE, PRESERVATIVE FREE 10 ML: 5 INJECTION INTRAVENOUS at 21:57

## 2025-01-10 RX ADMIN — SODIUM CHLORIDE, PRESERVATIVE FREE 10 ML: 5 INJECTION INTRAVENOUS at 08:52

## 2025-01-10 NOTE — CARE COORDINATION
Chart reviewed and case discussed during IDR. Per. Dr. Horowitz, the patients wife is in agreement for continued therapy at discharge. Call to pt wife, Eusebia, and voicemail left with request for return call to continue discharge planning.

## 2025-01-11 LAB
ANION GAP SERPL CALCULATED.3IONS-SCNC: 11 MMOL/L (ref 9–17)
BUN SERPL-MCNC: 26 MG/DL (ref 7–20)
CALCIUM SERPL-MCNC: 9 MG/DL (ref 8.3–10.6)
CHLORIDE SERPL-SCNC: 104 MMOL/L (ref 99–110)
CO2 SERPL-SCNC: 25 MMOL/L (ref 21–32)
CREAT SERPL-MCNC: 1.3 MG/DL (ref 0.8–1.3)
ERYTHROCYTE [DISTWIDTH] IN BLOOD BY AUTOMATED COUNT: 13.8 % (ref 11.7–14.9)
GFR, ESTIMATED: 51 ML/MIN/1.73M2
GLUCOSE BLD-MCNC: 123 MG/DL (ref 74–99)
GLUCOSE BLD-MCNC: 129 MG/DL (ref 74–99)
GLUCOSE BLD-MCNC: 129 MG/DL (ref 74–99)
GLUCOSE BLD-MCNC: 148 MG/DL (ref 74–99)
GLUCOSE SERPL-MCNC: 113 MG/DL (ref 74–99)
HCT VFR BLD AUTO: 31.3 % (ref 42–52)
HGB BLD-MCNC: 10.3 G/DL (ref 13.5–18)
MAGNESIUM SERPL-MCNC: 2.5 MG/DL (ref 1.8–2.4)
MCH RBC QN AUTO: 35.6 PG (ref 27–31)
MCHC RBC AUTO-ENTMCNC: 32.9 G/DL (ref 32–36)
MCV RBC AUTO: 108.3 FL (ref 78–100)
PHOSPHATE SERPL-MCNC: 4.2 MG/DL (ref 2.5–4.9)
PLATELET # BLD AUTO: 136 K/UL (ref 140–440)
PMV BLD AUTO: 10.3 FL (ref 7.5–11.1)
POTASSIUM SERPL-SCNC: 4 MMOL/L (ref 3.5–5.1)
PROCALCITONIN SERPL-MCNC: 0.22 NG/ML
RBC # BLD AUTO: 2.89 M/UL (ref 4.6–6.2)
SODIUM SERPL-SCNC: 139 MMOL/L (ref 136–145)
WBC OTHER # BLD: 6.9 K/UL (ref 4–10.5)

## 2025-01-11 PROCEDURE — 6370000000 HC RX 637 (ALT 250 FOR IP): Performed by: INTERNAL MEDICINE

## 2025-01-11 PROCEDURE — 94640 AIRWAY INHALATION TREATMENT: CPT

## 2025-01-11 PROCEDURE — 1200000000 HC SEMI PRIVATE

## 2025-01-11 PROCEDURE — 6360000002 HC RX W HCPCS

## 2025-01-11 PROCEDURE — 84145 PROCALCITONIN (PCT): CPT

## 2025-01-11 PROCEDURE — 2500000003 HC RX 250 WO HCPCS

## 2025-01-11 PROCEDURE — 84100 ASSAY OF PHOSPHORUS: CPT

## 2025-01-11 PROCEDURE — 92610 EVALUATE SWALLOWING FUNCTION: CPT

## 2025-01-11 PROCEDURE — 94761 N-INVAS EAR/PLS OXIMETRY MLT: CPT

## 2025-01-11 PROCEDURE — 2500000003 HC RX 250 WO HCPCS: Performed by: INTERNAL MEDICINE

## 2025-01-11 PROCEDURE — 85027 COMPLETE CBC AUTOMATED: CPT

## 2025-01-11 PROCEDURE — 82962 GLUCOSE BLOOD TEST: CPT

## 2025-01-11 PROCEDURE — 2700000000 HC OXYGEN THERAPY PER DAY

## 2025-01-11 PROCEDURE — 83735 ASSAY OF MAGNESIUM: CPT

## 2025-01-11 PROCEDURE — 6360000002 HC RX W HCPCS: Performed by: INTERNAL MEDICINE

## 2025-01-11 PROCEDURE — 87507 IADNA-DNA/RNA PROBE TQ 12-25: CPT

## 2025-01-11 PROCEDURE — APPSS60 APP SPLIT SHARED TIME 46-60 MINUTES: Performed by: NURSE PRACTITIONER

## 2025-01-11 PROCEDURE — 36415 COLL VENOUS BLD VENIPUNCTURE: CPT

## 2025-01-11 PROCEDURE — 80048 BASIC METABOLIC PNL TOTAL CA: CPT

## 2025-01-11 PROCEDURE — 6370000000 HC RX 637 (ALT 250 FOR IP): Performed by: STUDENT IN AN ORGANIZED HEALTH CARE EDUCATION/TRAINING PROGRAM

## 2025-01-11 RX ORDER — IPRATROPIUM BROMIDE AND ALBUTEROL SULFATE 2.5; .5 MG/3ML; MG/3ML
1 SOLUTION RESPIRATORY (INHALATION)
Status: DISCONTINUED | OUTPATIENT
Start: 2025-01-11 | End: 2025-01-14 | Stop reason: HOSPADM

## 2025-01-11 RX ORDER — DOXYCYCLINE HYCLATE 100 MG
100 TABLET ORAL EVERY 12 HOURS SCHEDULED
Status: DISCONTINUED | OUTPATIENT
Start: 2025-01-11 | End: 2025-01-14 | Stop reason: HOSPADM

## 2025-01-11 RX ORDER — METHYLPREDNISOLONE SODIUM SUCCINATE 40 MG/ML
INJECTION INTRAMUSCULAR; INTRAVENOUS
Status: COMPLETED
Start: 2025-01-11 | End: 2025-01-11

## 2025-01-11 RX ADMIN — METHYLPREDNISOLONE SODIUM SUCCINATE 40 MG: 40 INJECTION INTRAMUSCULAR; INTRAVENOUS at 20:58

## 2025-01-11 RX ADMIN — DOXYCYCLINE HYCLATE 100 MG: 100 TABLET, COATED ORAL at 20:30

## 2025-01-11 RX ADMIN — IPRATROPIUM BROMIDE AND ALBUTEROL SULFATE 1 DOSE: .5; 2.5 SOLUTION RESPIRATORY (INHALATION) at 20:07

## 2025-01-11 RX ADMIN — ACETAMINOPHEN 650 MG: 325 TABLET ORAL at 20:30

## 2025-01-11 RX ADMIN — ENOXAPARIN SODIUM 30 MG: 100 INJECTION SUBCUTANEOUS at 20:30

## 2025-01-11 RX ADMIN — SODIUM CHLORIDE, PRESERVATIVE FREE 10 ML: 5 INJECTION INTRAVENOUS at 20:37

## 2025-01-11 RX ADMIN — Medication 400 MG: at 08:39

## 2025-01-11 RX ADMIN — SODIUM CHLORIDE, PRESERVATIVE FREE 10 ML: 5 INJECTION INTRAVENOUS at 08:39

## 2025-01-11 RX ADMIN — MONTELUKAST 10 MG: 10 TABLET, FILM COATED ORAL at 08:39

## 2025-01-11 RX ADMIN — Medication 100 MG: at 08:39

## 2025-01-11 RX ADMIN — ENOXAPARIN SODIUM 30 MG: 100 INJECTION SUBCUTANEOUS at 08:39

## 2025-01-11 RX ADMIN — WATER 1 ML: 1 INJECTION INTRAMUSCULAR; INTRAVENOUS; SUBCUTANEOUS at 20:58

## 2025-01-11 RX ADMIN — METOPROLOL TARTRATE 12.5 MG: 25 TABLET, FILM COATED ORAL at 20:30

## 2025-01-11 RX ADMIN — ATORVASTATIN CALCIUM 20 MG: 10 TABLET, FILM COATED ORAL at 20:30

## 2025-01-11 RX ADMIN — CLOPIDOGREL BISULFATE 75 MG: 75 TABLET ORAL at 08:39

## 2025-01-11 RX ADMIN — WATER 40 MG: 1 INJECTION INTRAMUSCULAR; INTRAVENOUS; SUBCUTANEOUS at 20:36

## 2025-01-11 RX ADMIN — GABAPENTIN 400 MG: 400 CAPSULE ORAL at 08:39

## 2025-01-11 RX ADMIN — METOPROLOL TARTRATE 12.5 MG: 25 TABLET, FILM COATED ORAL at 08:39

## 2025-01-11 RX ADMIN — AMOXICILLIN AND CLAVULANATE POTASSIUM 1 TABLET: 875; 125 TABLET, FILM COATED ORAL at 20:30

## 2025-01-11 RX ADMIN — GABAPENTIN 400 MG: 400 CAPSULE ORAL at 12:40

## 2025-01-11 RX ADMIN — FERROUS SULFATE TAB 325 MG (65 MG ELEMENTAL FE) 325 MG: 325 (65 FE) TAB at 08:39

## 2025-01-11 RX ADMIN — AMOXICILLIN AND CLAVULANATE POTASSIUM 1 TABLET: 875; 125 TABLET, FILM COATED ORAL at 10:27

## 2025-01-11 RX ADMIN — AMLODIPINE BESYLATE 10 MG: 10 TABLET ORAL at 08:39

## 2025-01-11 RX ADMIN — DOXYCYCLINE HYCLATE 100 MG: 100 TABLET, COATED ORAL at 10:27

## 2025-01-11 RX ADMIN — GABAPENTIN 400 MG: 400 CAPSULE ORAL at 20:30

## 2025-01-11 RX ADMIN — ALLOPURINOL 100 MG: 100 TABLET ORAL at 08:39

## 2025-01-11 ASSESSMENT — PAIN DESCRIPTION - DESCRIPTORS
DESCRIPTORS: ACHING;BURNING;DULL
DESCRIPTORS: BURNING

## 2025-01-11 ASSESSMENT — PAIN DESCRIPTION - ORIENTATION
ORIENTATION: RIGHT;LEFT;MID
ORIENTATION: LOWER

## 2025-01-11 ASSESSMENT — PAIN DESCRIPTION - LOCATION
LOCATION: ABDOMEN;HAND
LOCATION: ABDOMEN;HAND

## 2025-01-11 ASSESSMENT — PAIN - FUNCTIONAL ASSESSMENT
PAIN_FUNCTIONAL_ASSESSMENT: ACTIVITIES ARE NOT PREVENTED
PAIN_FUNCTIONAL_ASSESSMENT: ACTIVITIES ARE NOT PREVENTED

## 2025-01-11 ASSESSMENT — PAIN SCALES - GENERAL
PAINLEVEL_OUTOF10: 8
PAINLEVEL_OUTOF10: 8
PAINLEVEL_OUTOF10: 4

## 2025-01-11 ASSESSMENT — PAIN DESCRIPTION - PAIN TYPE: TYPE: ACUTE PAIN

## 2025-01-11 NOTE — PLAN OF CARE
Problem: Chronic Conditions and Co-morbidities  Goal: Patient's chronic conditions and co-morbidity symptoms are monitored and maintained or improved  Outcome: Progressing     Problem: Discharge Planning  Goal: Discharge to home or other facility with appropriate resources  Outcome: Progressing     Problem: Safety - Adult  Goal: Free from fall injury  Outcome: Progressing     Problem: Pain  Goal: Verbalizes/displays adequate comfort level or baseline comfort level  Outcome: Progressing     Problem: Nutrition Deficit:  Goal: Optimize nutritional status  Outcome: Progressing  Flowsheets (Taken 1/10/2025 1651 by Missy Fleming, RD, LD)  Nutrient intake appropriate for improving, restoring, or maintaining nutritional needs:   Assess nutritional status and recommend course of action   Monitor oral intake, labs, and treatment plans   Recommend appropriate diets, oral nutritional supplements, and vitamin/mineral supplements

## 2025-01-12 LAB
ADV 40+41 DNA STL QL NAA+NON-PROBE: NOT DETECTED
ANION GAP SERPL CALCULATED.3IONS-SCNC: 12 MMOL/L (ref 9–17)
BUN SERPL-MCNC: 32 MG/DL (ref 7–20)
C CAYETANENSIS DNA STL QL NAA+NON-PROBE: NOT DETECTED
C COLI+JEJ+UPSA DNA STL QL NAA+NON-PROBE: NOT DETECTED
CALCIUM SERPL-MCNC: 8.8 MG/DL (ref 8.3–10.6)
CHLORIDE SERPL-SCNC: 106 MMOL/L (ref 99–110)
CO2 SERPL-SCNC: 19 MMOL/L (ref 21–32)
CREAT SERPL-MCNC: 1.4 MG/DL (ref 0.8–1.3)
CRYPTOSP DNA STL QL NAA+NON-PROBE: NOT DETECTED
E COLI O157 DNA STL QL NAA+NON-PROBE: NOT DETECTED
E HISTOLYT DNA STL QL NAA+NON-PROBE: NOT DETECTED
EAEC PAA PLAS AGGR+AATA ST NAA+NON-PRB: NOT DETECTED
EC STX1+STX2 GENES STL QL NAA+NON-PROBE: NOT DETECTED
EPEC EAE GENE STL QL NAA+NON-PROBE: DETECTED
ERYTHROCYTE [DISTWIDTH] IN BLOOD BY AUTOMATED COUNT: 13.7 % (ref 11.7–14.9)
ETEC LTA+ST1A+ST1B TOX ST NAA+NON-PROBE: NOT DETECTED
G LAMBLIA DNA STL QL NAA+NON-PROBE: NOT DETECTED
GFR, ESTIMATED: 50 ML/MIN/1.73M2
GI PATH DNA+RNA PNL STL NAA+NON-PROBE: NOT DETECTED
GLUCOSE BLD-MCNC: 125 MG/DL (ref 74–99)
GLUCOSE BLD-MCNC: 140 MG/DL (ref 74–99)
GLUCOSE BLD-MCNC: 152 MG/DL (ref 74–99)
GLUCOSE BLD-MCNC: 200 MG/DL (ref 74–99)
GLUCOSE SERPL-MCNC: 138 MG/DL (ref 74–99)
HCT VFR BLD AUTO: 32.8 % (ref 42–52)
HGB BLD-MCNC: 10.7 G/DL (ref 13.5–18)
MAGNESIUM SERPL-MCNC: 2.2 MG/DL (ref 1.8–2.4)
MCH RBC QN AUTO: 35.2 PG (ref 27–31)
MCHC RBC AUTO-ENTMCNC: 32.6 G/DL (ref 32–36)
MCV RBC AUTO: 107.9 FL (ref 78–100)
MISCELLANEOUS LAB TEST RESULT: ABNORMAL
NOROVIRUS GI+II RNA STL QL NAA+NON-PROBE: NOT DETECTED
P SHIGELLOIDES DNA STL QL NAA+NON-PROBE: NOT DETECTED
PHOSPHATE SERPL-MCNC: 3.4 MG/DL (ref 2.5–4.9)
PLATELET # BLD AUTO: 151 K/UL (ref 140–440)
PMV BLD AUTO: 10.3 FL (ref 7.5–11.1)
POTASSIUM SERPL-SCNC: 4.7 MMOL/L (ref 3.5–5.1)
RBC # BLD AUTO: 3.04 M/UL (ref 4.6–6.2)
RVA RNA STL QL NAA+NON-PROBE: NOT DETECTED
S ENT+BONG DNA STL QL NAA+NON-PROBE: NOT DETECTED
SAPO I+II+IV+V RNA STL QL NAA+NON-PROBE: NOT DETECTED
SODIUM SERPL-SCNC: 137 MMOL/L (ref 136–145)
SOURCE: ABNORMAL
TEST NAME: ABNORMAL
V CHOL+PARA+VUL DNA STL QL NAA+NON-PROBE: NOT DETECTED
V CHOLERAE DNA STL QL NAA+NON-PROBE: NOT DETECTED
WBC OTHER # BLD: 7 K/UL (ref 4–10.5)
Y ENTEROCOL DNA STL QL NAA+NON-PROBE: NOT DETECTED

## 2025-01-12 PROCEDURE — 6370000000 HC RX 637 (ALT 250 FOR IP): Performed by: STUDENT IN AN ORGANIZED HEALTH CARE EDUCATION/TRAINING PROGRAM

## 2025-01-12 PROCEDURE — 6370000000 HC RX 637 (ALT 250 FOR IP): Performed by: INTERNAL MEDICINE

## 2025-01-12 PROCEDURE — 82962 GLUCOSE BLOOD TEST: CPT

## 2025-01-12 PROCEDURE — 2700000000 HC OXYGEN THERAPY PER DAY

## 2025-01-12 PROCEDURE — 85027 COMPLETE CBC AUTOMATED: CPT

## 2025-01-12 PROCEDURE — 84100 ASSAY OF PHOSPHORUS: CPT

## 2025-01-12 PROCEDURE — 6360000002 HC RX W HCPCS: Performed by: INTERNAL MEDICINE

## 2025-01-12 PROCEDURE — 1200000000 HC SEMI PRIVATE

## 2025-01-12 PROCEDURE — 2500000003 HC RX 250 WO HCPCS: Performed by: INTERNAL MEDICINE

## 2025-01-12 PROCEDURE — 94640 AIRWAY INHALATION TREATMENT: CPT

## 2025-01-12 PROCEDURE — 97530 THERAPEUTIC ACTIVITIES: CPT

## 2025-01-12 PROCEDURE — 80048 BASIC METABOLIC PNL TOTAL CA: CPT

## 2025-01-12 PROCEDURE — 87633 RESP VIRUS 12-25 TARGETS: CPT

## 2025-01-12 PROCEDURE — 36415 COLL VENOUS BLD VENIPUNCTURE: CPT

## 2025-01-12 PROCEDURE — 94761 N-INVAS EAR/PLS OXIMETRY MLT: CPT

## 2025-01-12 PROCEDURE — 83735 ASSAY OF MAGNESIUM: CPT

## 2025-01-12 PROCEDURE — 97535 SELF CARE MNGMENT TRAINING: CPT

## 2025-01-12 RX ADMIN — DOXYCYCLINE HYCLATE 100 MG: 100 TABLET, COATED ORAL at 21:47

## 2025-01-12 RX ADMIN — METOPROLOL TARTRATE 12.5 MG: 25 TABLET, FILM COATED ORAL at 10:11

## 2025-01-12 RX ADMIN — INSULIN LISPRO 1 UNITS: 100 INJECTION, SOLUTION INTRAVENOUS; SUBCUTANEOUS at 12:06

## 2025-01-12 RX ADMIN — ENOXAPARIN SODIUM 30 MG: 100 INJECTION SUBCUTANEOUS at 10:10

## 2025-01-12 RX ADMIN — SODIUM CHLORIDE, PRESERVATIVE FREE 10 ML: 5 INJECTION INTRAVENOUS at 10:09

## 2025-01-12 RX ADMIN — Medication 100 MG: at 10:11

## 2025-01-12 RX ADMIN — IPRATROPIUM BROMIDE AND ALBUTEROL SULFATE 1 DOSE: .5; 2.5 SOLUTION RESPIRATORY (INHALATION) at 08:37

## 2025-01-12 RX ADMIN — FERROUS SULFATE TAB 325 MG (65 MG ELEMENTAL FE) 325 MG: 325 (65 FE) TAB at 10:12

## 2025-01-12 RX ADMIN — ENOXAPARIN SODIUM 30 MG: 100 INJECTION SUBCUTANEOUS at 21:48

## 2025-01-12 RX ADMIN — AMLODIPINE BESYLATE 10 MG: 10 TABLET ORAL at 10:12

## 2025-01-12 RX ADMIN — GABAPENTIN 400 MG: 400 CAPSULE ORAL at 10:10

## 2025-01-12 RX ADMIN — GABAPENTIN 400 MG: 400 CAPSULE ORAL at 21:47

## 2025-01-12 RX ADMIN — METOPROLOL TARTRATE 12.5 MG: 25 TABLET, FILM COATED ORAL at 21:48

## 2025-01-12 RX ADMIN — MONTELUKAST 10 MG: 10 TABLET, FILM COATED ORAL at 10:13

## 2025-01-12 RX ADMIN — CLOPIDOGREL BISULFATE 75 MG: 75 TABLET ORAL at 10:12

## 2025-01-12 RX ADMIN — Medication 400 MG: at 10:13

## 2025-01-12 RX ADMIN — SODIUM CHLORIDE, PRESERVATIVE FREE 10 ML: 5 INJECTION INTRAVENOUS at 21:59

## 2025-01-12 RX ADMIN — ALLOPURINOL 100 MG: 100 TABLET ORAL at 10:13

## 2025-01-12 RX ADMIN — GABAPENTIN 400 MG: 400 CAPSULE ORAL at 14:00

## 2025-01-12 RX ADMIN — IPRATROPIUM BROMIDE AND ALBUTEROL SULFATE 1 DOSE: .5; 2.5 SOLUTION RESPIRATORY (INHALATION) at 11:39

## 2025-01-12 RX ADMIN — DOXYCYCLINE HYCLATE 100 MG: 100 TABLET, COATED ORAL at 10:12

## 2025-01-12 RX ADMIN — ATORVASTATIN CALCIUM 20 MG: 10 TABLET, FILM COATED ORAL at 21:48

## 2025-01-12 RX ADMIN — AMOXICILLIN AND CLAVULANATE POTASSIUM 1 TABLET: 875; 125 TABLET, FILM COATED ORAL at 21:47

## 2025-01-12 RX ADMIN — AMOXICILLIN AND CLAVULANATE POTASSIUM 1 TABLET: 875; 125 TABLET, FILM COATED ORAL at 10:11

## 2025-01-12 ASSESSMENT — PAIN SCALES - WONG BAKER
WONGBAKER_NUMERICALRESPONSE: NO HURT

## 2025-01-12 ASSESSMENT — PAIN SCALES - GENERAL
PAINLEVEL_OUTOF10: 0

## 2025-01-12 NOTE — PLAN OF CARE
Problem: Chronic Conditions and Co-morbidities  Goal: Patient's chronic conditions and co-morbidity symptoms are monitored and maintained or improved  1/12/2025 0752 by Connie Campos LPN  Outcome: Progressing  1/11/2025 2248 by Riri Narayan, RN  Outcome: Progressing     Problem: Discharge Planning  Goal: Discharge to home or other facility with appropriate resources  1/12/2025 0752 by Connie Campos LPN  Outcome: Progressing  1/11/2025 2248 by Riri Narayan RN  Outcome: Progressing     Problem: Safety - Adult  Goal: Free from fall injury  1/12/2025 0752 by Connie Campos LPN  Outcome: Progressing  1/11/2025 2248 by Riri Narayan RN  Outcome: Progressing     Problem: Pain  Goal: Verbalizes/displays adequate comfort level or baseline comfort level  1/12/2025 0752 by Connie Campos LPN  Outcome: Progressing  1/11/2025 2248 by Riri Narayan, RN  Outcome: Progressing     Problem: Nutrition Deficit:  Goal: Optimize nutritional status  1/12/2025 0752 by Connie Campos LPN  Outcome: Progressing  1/11/2025 2248 by Riri Narayan, RN  Outcome: Progressing

## 2025-01-12 NOTE — CONSULTS
CARDIOLOGY CONSULT NOTE   Reason for consultation:  recurrent syncope    Referring physician:  Alyssa Nolan MD     Primary care physician: Eduar Birmingham MD      Dear   Thanks for the consult.    History of present illness:Kareem is a 79 y.o.year old who  presents with recurrent syncope, patient follows with Dr. Simmons in the cardiology [facility], has a pacemaker and has been having recurrent syncope 4-6 times in last 6 months, this time few days ago while at his house in the kitchen he suddenly passed out without any warning sign, patient denies any chest pain shortness of breath patient history of CAD A-fib is poor historian all information obtained after review medical record, he has a Watchman device has moderate AS, JACK 1.3 AND MEAN gradient is 30mmHg.  Blood pressure, cholesterol, blood glucose and weight are well controlled.    Past medical history:    has a past medical history of Abnormal nuclear stress test, Arthritis, CAD s/p PCI to LAD+RCA 9/9/22, Dizziness, Fall, FH: CAD (coronary artery disease), H/O 24 hour EKG monitoring, H/O atrial fibrillation without current medication, H/O cardiovascular stress test, H/O Doppler echocardiogram, H/O Doppler ultrasound (Venous Doppler Lower Extremities), H/O echocardiogram, H/O echocardiogram, H/O transesophageal echocardiography (SALAZAR) for monitoring, Heart murmur, History of nuclear stress test, History of nuclear stress test, Hx of Doppler echocardiogram, Hx of Venous Doppler ultrasound, Hyperlipidemia, Hypertension, Kidney stones, Leg swelling, Neuropathy, Nonrheumatic aortic valve stenosis, Obesity, JAYE on CPAP, Persistent atrial fibrillation (HCC), Syncope and collapse, Thoracic aortic aneurysm (HCC), Tremors of nervous system, Type II or unspecified type diabetes mellitus without mention of complication, not stated as uncontrolled, and Vascular US Duplex Lower Extremity Venous Bilateral.  Past surgical history:   has a past surgical history that 
Consult completed. Nexiva 20g 1.75\" peripheral IV initiated into right forearm x 1 attempt using ultrasound guided technique. Brisk blood return, flushes without resistance. Patient tolerated well. Primary nurse ZACARIAS Menezes notified.     Consult the Vascular Access Team for questions, concerns, or change in patient's condition.    
CREATININE 1.3 01/11/2025 08:28 AM    GLUCOSE 113 01/11/2025 08:28 AM    CALCIUM 9.0 01/11/2025 08:28 AM      Lab Results   Component Value Date    APTT 28.2 08/21/2023     Lab Results   Component Value Date    INR 1.1 02/24/2024    PROTIME 13.9 02/24/2024       Imaging:  CT chest abdomen pelvis, spinal images reviewed  No acute abnormality in lumbar spine  CT cervical spine reviewed, no acute abnormality in cervical spine    Impression:  Chronic diarrhea, syncopal falls    Recommendations:  No role for acute neurosurgical invention  Patient does not have long track signs paresthesias, pain, or weakness consistent with spinal cord compression/dysfunction  Recommend continued medical workup for other sources of bowel dysfunction  No further spinal imaging indicated at this time    Lupillo Valerio MD  Neurosurgery    
Verbal cues for sequencing and UE placement  Stand to sit: from standing at RW to seated EOB (2x), maxA for controlled descent to surface. Verbal cues for sequencing and UE placement  Step pivot transfer: from EOB > recliner, modA x2 for lift assist and stabilization. Pt able to take short steps between surfaces with fair tolerance. Verbal cues for sequencing, stepping direction, and RW management  Sitting balance:  Fair+ static seated EOB with BUE support, CGA to close SBA.    Standing balance:  Fair- static with BUE support on RW, maxA for lift assist and stabilization. Pt able to tolerate 2 brief periods of standing x1 minute each trial. Requested seated break between bouts due to fatigue..    Gait: NT due to fatigue/safety concerns  Educated pt on PT POC, role of PT, importance of OOB mobility    [facility] 6 Clicks Inpatient Mobility:  AM-PAC Inpatient Mobility Raw Score : 12    Safety: patient left in chair with alarm (Kim pad under pt), call light within reach, RN notified, gait belt used.    Assessment:  Patient is an 79 year old male who presents with syncope. Upon discharge, recommend Facility for moderate post-acute rehabilitation, anticipate 1-2 hours per day and 5 days per week. At baseline, patient is independent with ADLs, Jes w/ RW for gross mobility, and receives assistance for IADLs. They performed below their baseline with impaired gait, strength, balance, and activity tolerance. They would benefit from continued therapy to address their deficits, reduce fall risk, decrease burden of care, and restore PLOF.    Complexity: Moderate  Prognosis: Good, no significant barriers to participation at this time.    General Plan: 3-5 times per week  Equipment: continue to assess    Goals:  Short Term Goals  Time Frame for Short Term Goals: 2 weeks  Short Term Goal 1: Pt will complete supine <> sit Xin  Short Term Goal 2: Pt will complete sit <> stand Xin  Short Term Goal 3: Pt will complete SPT between level

## 2025-01-13 LAB
ACINETOBACTER CALCOACETICUS-BAUMANNII BY PCR: NOT DETECTED
ADENOVIRUS: NOT DETECTED
ANION GAP SERPL CALCULATED.3IONS-SCNC: 11 MMOL/L (ref 9–17)
BUN SERPL-MCNC: 39 MG/DL (ref 7–20)
CALCIUM SERPL-MCNC: 8.8 MG/DL (ref 8.3–10.6)
CHLAMYDIA PNEUMONIAE BY PCR: NOT DETECTED
CHLORIDE SERPL-SCNC: 108 MMOL/L (ref 99–110)
CO2 SERPL-SCNC: 20 MMOL/L (ref 21–32)
CORONAVIRUS PCR: NOT DETECTED
CREAT SERPL-MCNC: 1.4 MG/DL (ref 0.8–1.3)
ENTEROBACTER CLOACAE COMPLEX BY PCR: NOT DETECTED
ERYTHROCYTE [DISTWIDTH] IN BLOOD BY AUTOMATED COUNT: 13.6 % (ref 11.7–14.9)
ESCHERICHIA COLI BY PCR: NOT DETECTED
GFR, ESTIMATED: 48 ML/MIN/1.73M2
GLUCOSE BLD-MCNC: 115 MG/DL (ref 74–99)
GLUCOSE BLD-MCNC: 125 MG/DL (ref 74–99)
GLUCOSE BLD-MCNC: 154 MG/DL (ref 74–99)
GLUCOSE BLD-MCNC: 157 MG/DL (ref 74–99)
GLUCOSE SERPL-MCNC: 130 MG/DL (ref 74–99)
GP B STREP DNA SPT NAA+NON-PRB-NCNCRNG: NOT DETECTED
HAEMOPHILUS INFLUENZAE BY PCR: NOT DETECTED
HCT VFR BLD AUTO: 33 % (ref 42–52)
HGB BLD-MCNC: 10.8 G/DL (ref 13.5–18)
HUMAN RHINOVIRUS/ENTEROVIRUS BY PCR: NOT DETECTED
INFLUENZA A BY PCR: NOT DETECTED
INFLUENZA B BY PCR: NOT DETECTED
K AEROGENES DNA BAL NAA+NON-PRB-NCNCRNG: NOT DETECTED
K OXYTOCA DNA SPT NAA+NON-PRB-NCNCRNG: NOT DETECTED
K PNEU GRP DNA SPT NAA+NON-PRB-NCNCRNG: NOT DETECTED
LEGIONELLA PNEUMOPHILIA BY PCR: NOT DETECTED
M CATARRHALIS DNA BAL NAA+NON-PRB-NCNCRNG: NOT DETECTED
MAGNESIUM SERPL-MCNC: 2.4 MG/DL (ref 1.8–2.4)
MCH RBC QN AUTO: 35.6 PG (ref 27–31)
MCHC RBC AUTO-ENTMCNC: 32.7 G/DL (ref 32–36)
MCV RBC AUTO: 108.9 FL (ref 78–100)
METAPNEUMOVIRUS BY PCR: NOT DETECTED
MYCOPLASMA PNEUMONIAE PCR: NOT DETECTED
P AERUGINOSA DNA SPT NAA+NON-PRB-NCNCRNG: NOT DETECTED
PARAINFLUENZA VIRUS BY PCR: NOT DETECTED
PHOSPHATE SERPL-MCNC: 3.6 MG/DL (ref 2.5–4.9)
PLATELET # BLD AUTO: 179 K/UL (ref 140–440)
PMV BLD AUTO: 10.4 FL (ref 7.5–11.1)
POTASSIUM SERPL-SCNC: 4.2 MMOL/L (ref 3.5–5.1)
PROTEUS SP DNA BAL NAA+NON-PRB-NCNCRNG: NOT DETECTED
RBC # BLD AUTO: 3.03 M/UL (ref 4.6–6.2)
RSV BY PCR: NOT DETECTED
S AUREUS DNA SPT NAA+NON-PRB-NCNCRNG: NOT DETECTED
S MARCESCENS DNA SPT NAA+NON-PRB-NCNCRNG: NOT DETECTED
S PNEUM DNA BAL NAA+NON-PRB-NCNCRNG: NOT DETECTED
S PYO DNA SPT NAA+NON-PRB-NCNCRNG: NOT DETECTED
SODIUM SERPL-SCNC: 139 MMOL/L (ref 136–145)
SOURCE: NORMAL
WBC OTHER # BLD: 5.7 K/UL (ref 4–10.5)

## 2025-01-13 PROCEDURE — 6370000000 HC RX 637 (ALT 250 FOR IP): Performed by: INTERNAL MEDICINE

## 2025-01-13 PROCEDURE — APPNB15 APP NON BILLABLE TIME 0-15 MINS

## 2025-01-13 PROCEDURE — 92526 ORAL FUNCTION THERAPY: CPT

## 2025-01-13 PROCEDURE — 36415 COLL VENOUS BLD VENIPUNCTURE: CPT

## 2025-01-13 PROCEDURE — 2580000003 HC RX 258: Performed by: INTERNAL MEDICINE

## 2025-01-13 PROCEDURE — 6370000000 HC RX 637 (ALT 250 FOR IP): Performed by: STUDENT IN AN ORGANIZED HEALTH CARE EDUCATION/TRAINING PROGRAM

## 2025-01-13 PROCEDURE — 1200000000 HC SEMI PRIVATE

## 2025-01-13 PROCEDURE — 2500000003 HC RX 250 WO HCPCS: Performed by: INTERNAL MEDICINE

## 2025-01-13 PROCEDURE — 6360000002 HC RX W HCPCS: Performed by: INTERNAL MEDICINE

## 2025-01-13 PROCEDURE — 83735 ASSAY OF MAGNESIUM: CPT

## 2025-01-13 PROCEDURE — 85027 COMPLETE CBC AUTOMATED: CPT

## 2025-01-13 PROCEDURE — 97110 THERAPEUTIC EXERCISES: CPT

## 2025-01-13 PROCEDURE — 82962 GLUCOSE BLOOD TEST: CPT

## 2025-01-13 PROCEDURE — 94150 VITAL CAPACITY TEST: CPT

## 2025-01-13 PROCEDURE — 2700000000 HC OXYGEN THERAPY PER DAY

## 2025-01-13 PROCEDURE — 84100 ASSAY OF PHOSPHORUS: CPT

## 2025-01-13 PROCEDURE — 94761 N-INVAS EAR/PLS OXIMETRY MLT: CPT

## 2025-01-13 PROCEDURE — 80048 BASIC METABOLIC PNL TOTAL CA: CPT

## 2025-01-13 PROCEDURE — 97530 THERAPEUTIC ACTIVITIES: CPT

## 2025-01-13 PROCEDURE — 94640 AIRWAY INHALATION TREATMENT: CPT

## 2025-01-13 RX ORDER — SODIUM CHLORIDE, SODIUM LACTATE, POTASSIUM CHLORIDE, CALCIUM CHLORIDE 600; 310; 30; 20 MG/100ML; MG/100ML; MG/100ML; MG/100ML
INJECTION, SOLUTION INTRAVENOUS CONTINUOUS
Status: DISPENSED | OUTPATIENT
Start: 2025-01-13 | End: 2025-01-13

## 2025-01-13 RX ORDER — LACTOBACILLUS RHAMNOSUS GG 10B CELL
1 CAPSULE ORAL
Status: DISCONTINUED | OUTPATIENT
Start: 2025-01-13 | End: 2025-01-14 | Stop reason: HOSPADM

## 2025-01-13 RX ADMIN — FIDAXOMICIN 200 MG: 200 TABLET, FILM COATED ORAL at 10:21

## 2025-01-13 RX ADMIN — GABAPENTIN 400 MG: 400 CAPSULE ORAL at 14:52

## 2025-01-13 RX ADMIN — ENOXAPARIN SODIUM 30 MG: 100 INJECTION SUBCUTANEOUS at 09:59

## 2025-01-13 RX ADMIN — FIDAXOMICIN 200 MG: 200 TABLET, FILM COATED ORAL at 22:32

## 2025-01-13 RX ADMIN — IPRATROPIUM BROMIDE AND ALBUTEROL SULFATE 1 DOSE: .5; 2.5 SOLUTION RESPIRATORY (INHALATION) at 19:53

## 2025-01-13 RX ADMIN — Medication 1 CAPSULE: at 11:24

## 2025-01-13 RX ADMIN — SODIUM CHLORIDE, PRESERVATIVE FREE 10 ML: 5 INJECTION INTRAVENOUS at 09:59

## 2025-01-13 RX ADMIN — Medication 100 MG: at 09:59

## 2025-01-13 RX ADMIN — GABAPENTIN 400 MG: 400 CAPSULE ORAL at 22:03

## 2025-01-13 RX ADMIN — IPRATROPIUM BROMIDE AND ALBUTEROL SULFATE 1 DOSE: .5; 2.5 SOLUTION RESPIRATORY (INHALATION) at 07:48

## 2025-01-13 RX ADMIN — Medication 400 MG: at 09:58

## 2025-01-13 RX ADMIN — FERROUS SULFATE TAB 325 MG (65 MG ELEMENTAL FE) 325 MG: 325 (65 FE) TAB at 09:59

## 2025-01-13 RX ADMIN — DOXYCYCLINE HYCLATE 100 MG: 100 TABLET, COATED ORAL at 22:03

## 2025-01-13 RX ADMIN — ALLOPURINOL 100 MG: 100 TABLET ORAL at 09:58

## 2025-01-13 RX ADMIN — ATORVASTATIN CALCIUM 20 MG: 10 TABLET, FILM COATED ORAL at 22:03

## 2025-01-13 RX ADMIN — SODIUM CHLORIDE, POTASSIUM CHLORIDE, SODIUM LACTATE AND CALCIUM CHLORIDE: 600; 310; 30; 20 INJECTION, SOLUTION INTRAVENOUS at 11:20

## 2025-01-13 RX ADMIN — METOPROLOL TARTRATE 12.5 MG: 25 TABLET, FILM COATED ORAL at 09:59

## 2025-01-13 RX ADMIN — IPRATROPIUM BROMIDE AND ALBUTEROL SULFATE 1 DOSE: .5; 2.5 SOLUTION RESPIRATORY (INHALATION) at 10:45

## 2025-01-13 RX ADMIN — IPRATROPIUM BROMIDE AND ALBUTEROL SULFATE 1 DOSE: .5; 2.5 SOLUTION RESPIRATORY (INHALATION) at 14:56

## 2025-01-13 RX ADMIN — AMLODIPINE BESYLATE 10 MG: 10 TABLET ORAL at 09:59

## 2025-01-13 RX ADMIN — METOPROLOL TARTRATE 12.5 MG: 25 TABLET, FILM COATED ORAL at 22:03

## 2025-01-13 RX ADMIN — ENOXAPARIN SODIUM 30 MG: 100 INJECTION SUBCUTANEOUS at 22:03

## 2025-01-13 RX ADMIN — AMOXICILLIN AND CLAVULANATE POTASSIUM 1 TABLET: 875; 125 TABLET, FILM COATED ORAL at 09:59

## 2025-01-13 RX ADMIN — AMOXICILLIN AND CLAVULANATE POTASSIUM 1 TABLET: 875; 125 TABLET, FILM COATED ORAL at 22:03

## 2025-01-13 RX ADMIN — MONTELUKAST 10 MG: 10 TABLET, FILM COATED ORAL at 09:58

## 2025-01-13 RX ADMIN — CLOPIDOGREL BISULFATE 75 MG: 75 TABLET ORAL at 09:59

## 2025-01-13 RX ADMIN — DOXYCYCLINE HYCLATE 100 MG: 100 TABLET, COATED ORAL at 09:59

## 2025-01-13 RX ADMIN — GABAPENTIN 400 MG: 400 CAPSULE ORAL at 09:58

## 2025-01-13 ASSESSMENT — PAIN SCALES - GENERAL
PAINLEVEL_OUTOF10: 0

## 2025-01-13 ASSESSMENT — PAIN SCALES - WONG BAKER
WONGBAKER_NUMERICALRESPONSE: NO HURT

## 2025-01-13 NOTE — CARE COORDINATION
Submitted precert via [employer] portal/ pending at this time  Certificate Information  Reference Number  [id number]  Status  PENDED    Review Reason 1  Requires Medical Review

## 2025-01-13 NOTE — CARE COORDINATION
Chart reviewed and noted that weekend CM called new referral to swing bed. CM spoke with Christa North Supervisor, as patient had previous referral and was denied for swing bed. Can confirmed that patient has been denied for sing bed. Call to patients wife, Eusebia, to discuss SNF choice. Wife stated that either herself or the patient have to SNF in network with her insurance and they have not received ideal care there. Wife initially stated that she would take patient home. CM explained that patient has not ambulated with therapy and is requiring maximum assistance. Eusebia stated she would like time to think about SNF choice and will call this CM back once she has decided.     11:53 AM Received return call from patients wife, Eusebia, stating that she is okay with referral to [facility]. Eusebia also gave permission for CM to share information with patients neighbor Sarika Mchugh and asked that she be added to patients emergency contact.     1:42 PM Referral called to [facility].     2:21 PM Received update from Paul that patient has been clinically accepted to [facility]. CM called pt spouse Eusebia and updated her with information. Eusebia would like to be updated once patient precert has been start with insurance.     4:06 PM Received update from Dr. Horowitz that okay to start patient precert. CM updated Leni LUQUE with information. Call to patients wife and updated her with information also.

## 2025-01-14 VITALS
TEMPERATURE: 98 F | SYSTOLIC BLOOD PRESSURE: 129 MMHG | OXYGEN SATURATION: 100 % | HEIGHT: 71 IN | DIASTOLIC BLOOD PRESSURE: 71 MMHG | RESPIRATION RATE: 18 BRPM | WEIGHT: 277.78 LBS | BODY MASS INDEX: 38.89 KG/M2 | HEART RATE: 63 BPM

## 2025-01-14 LAB
ANION GAP SERPL CALCULATED.3IONS-SCNC: 10 MMOL/L (ref 9–17)
BUN SERPL-MCNC: 38 MG/DL (ref 7–20)
CALCIUM SERPL-MCNC: 8.7 MG/DL (ref 8.3–10.6)
CHLORIDE SERPL-SCNC: 106 MMOL/L (ref 99–110)
CO2 SERPL-SCNC: 20 MMOL/L (ref 21–32)
CREAT SERPL-MCNC: 1.4 MG/DL (ref 0.8–1.3)
ERYTHROCYTE [DISTWIDTH] IN BLOOD BY AUTOMATED COUNT: 13.6 % (ref 11.7–14.9)
GFR, ESTIMATED: 47 ML/MIN/1.73M2
GLUCOSE BLD-MCNC: 127 MG/DL (ref 74–99)
GLUCOSE SERPL-MCNC: 119 MG/DL (ref 74–99)
HCT VFR BLD AUTO: 33.5 % (ref 42–52)
HGB BLD-MCNC: 10.5 G/DL (ref 13.5–18)
MAGNESIUM SERPL-MCNC: 2.2 MG/DL (ref 1.8–2.4)
MCH RBC QN AUTO: 35 PG (ref 27–31)
MCHC RBC AUTO-ENTMCNC: 31.3 G/DL (ref 32–36)
MCV RBC AUTO: 111.7 FL (ref 78–100)
PHOSPHATE SERPL-MCNC: 3.5 MG/DL (ref 2.5–4.9)
PLATELET # BLD AUTO: 180 K/UL (ref 140–440)
PMV BLD AUTO: 10.2 FL (ref 7.5–11.1)
POTASSIUM SERPL-SCNC: 4.5 MMOL/L (ref 3.5–5.1)
RBC # BLD AUTO: 3 M/UL (ref 4.6–6.2)
SODIUM SERPL-SCNC: 136 MMOL/L (ref 136–145)
WBC OTHER # BLD: 6.1 K/UL (ref 4–10.5)

## 2025-01-14 PROCEDURE — 2700000000 HC OXYGEN THERAPY PER DAY

## 2025-01-14 PROCEDURE — 6360000002 HC RX W HCPCS: Performed by: INTERNAL MEDICINE

## 2025-01-14 PROCEDURE — 36415 COLL VENOUS BLD VENIPUNCTURE: CPT

## 2025-01-14 PROCEDURE — 94640 AIRWAY INHALATION TREATMENT: CPT

## 2025-01-14 PROCEDURE — 6370000000 HC RX 637 (ALT 250 FOR IP): Performed by: STUDENT IN AN ORGANIZED HEALTH CARE EDUCATION/TRAINING PROGRAM

## 2025-01-14 PROCEDURE — 85027 COMPLETE CBC AUTOMATED: CPT

## 2025-01-14 PROCEDURE — 83735 ASSAY OF MAGNESIUM: CPT

## 2025-01-14 PROCEDURE — 84100 ASSAY OF PHOSPHORUS: CPT

## 2025-01-14 PROCEDURE — 6370000000 HC RX 637 (ALT 250 FOR IP): Performed by: INTERNAL MEDICINE

## 2025-01-14 PROCEDURE — 2500000003 HC RX 250 WO HCPCS: Performed by: INTERNAL MEDICINE

## 2025-01-14 PROCEDURE — 82962 GLUCOSE BLOOD TEST: CPT

## 2025-01-14 PROCEDURE — 94761 N-INVAS EAR/PLS OXIMETRY MLT: CPT

## 2025-01-14 PROCEDURE — 80048 BASIC METABOLIC PNL TOTAL CA: CPT

## 2025-01-14 RX ORDER — PREDNISONE 20 MG/1
40 TABLET ORAL DAILY
Status: DISCONTINUED | OUTPATIENT
Start: 2025-01-14 | End: 2025-01-14 | Stop reason: HOSPADM

## 2025-01-14 RX ORDER — METOPROLOL TARTRATE 25 MG/1
12.5 TABLET, FILM COATED ORAL 2 TIMES DAILY
Qty: 30 TABLET | Refills: 0
Start: 2025-01-14 | End: 2025-02-13

## 2025-01-14 RX ORDER — IPRATROPIUM BROMIDE AND ALBUTEROL SULFATE 2.5; .5 MG/3ML; MG/3ML
3 SOLUTION RESPIRATORY (INHALATION) EVERY 4 HOURS PRN
Qty: 360 ML | Refills: 0
Start: 2025-01-14 | End: 2025-02-13

## 2025-01-14 RX ORDER — DOXYCYCLINE HYCLATE 100 MG
100 TABLET ORAL EVERY 12 HOURS SCHEDULED
Qty: 7 TABLET | Refills: 0
Start: 2025-01-14 | End: 2025-01-18

## 2025-01-14 RX ORDER — LANOLIN ALCOHOL/MO/W.PET/CERES
100 CREAM (GRAM) TOPICAL DAILY
Qty: 30 TABLET | Refills: 3
Start: 2025-01-15

## 2025-01-14 RX ORDER — LACTOBACILLUS RHAMNOSUS GG 10B CELL
1 CAPSULE ORAL
Qty: 30 CAPSULE | Refills: 0
Start: 2025-01-15 | End: 2025-02-14

## 2025-01-14 RX ORDER — ALLOPURINOL 100 MG/1
100 TABLET ORAL DAILY
Qty: 30 TABLET | Refills: 0
Start: 2025-01-15

## 2025-01-14 RX ORDER — PREDNISONE 20 MG/1
40 TABLET ORAL DAILY
Qty: 10 TABLET | Refills: 0
Start: 2025-01-14 | End: 2025-01-19

## 2025-01-14 RX ADMIN — GABAPENTIN 400 MG: 400 CAPSULE ORAL at 09:49

## 2025-01-14 RX ADMIN — Medication 400 MG: at 09:49

## 2025-01-14 RX ADMIN — CLOPIDOGREL BISULFATE 75 MG: 75 TABLET ORAL at 09:49

## 2025-01-14 RX ADMIN — DOXYCYCLINE HYCLATE 100 MG: 100 TABLET, COATED ORAL at 09:49

## 2025-01-14 RX ADMIN — MONTELUKAST 10 MG: 10 TABLET, FILM COATED ORAL at 09:49

## 2025-01-14 RX ADMIN — ENOXAPARIN SODIUM 30 MG: 100 INJECTION SUBCUTANEOUS at 09:49

## 2025-01-14 RX ADMIN — FIDAXOMICIN 200 MG: 200 TABLET, FILM COATED ORAL at 09:49

## 2025-01-14 RX ADMIN — METOPROLOL TARTRATE 12.5 MG: 25 TABLET, FILM COATED ORAL at 09:49

## 2025-01-14 RX ADMIN — Medication 1 CAPSULE: at 09:49

## 2025-01-14 RX ADMIN — AMLODIPINE BESYLATE 10 MG: 10 TABLET ORAL at 09:49

## 2025-01-14 RX ADMIN — AMOXICILLIN AND CLAVULANATE POTASSIUM 1 TABLET: 875; 125 TABLET, FILM COATED ORAL at 09:49

## 2025-01-14 RX ADMIN — FERROUS SULFATE TAB 325 MG (65 MG ELEMENTAL FE) 325 MG: 325 (65 FE) TAB at 09:49

## 2025-01-14 RX ADMIN — Medication 100 MG: at 09:49

## 2025-01-14 RX ADMIN — SODIUM CHLORIDE, PRESERVATIVE FREE 10 ML: 5 INJECTION INTRAVENOUS at 09:50

## 2025-01-14 RX ADMIN — ALLOPURINOL 100 MG: 100 TABLET ORAL at 09:49

## 2025-01-14 RX ADMIN — IPRATROPIUM BROMIDE AND ALBUTEROL SULFATE 1 DOSE: .5; 2.5 SOLUTION RESPIRATORY (INHALATION) at 08:52

## 2025-01-14 ASSESSMENT — PAIN SCALES - GENERAL: PAINLEVEL_OUTOF10: 0

## 2025-01-14 NOTE — PLAN OF CARE
Called patient's wife and updated her about condition. Pt ok to d/c to SNF.     Electronically signed by Layo Horowitz MD on 1/14/2025 at 1:10 PM

## 2025-01-14 NOTE — DISCHARGE INSTR - COC
Continuity of Care Form    Patient Name: Kareem Rey   :  1945  MRN:  [id number]    Admit date:  2025  Discharge date:  ***    Code Status Order: Full Code   Advance Directives:   Advance Care Flowsheet Documentation             Admitting Physician:  Alyssa Nolan MD  PCP: Eduar Birmingham MD    Discharging Nurse: ***  Discharging Hospital Unit/Room#: 4129/4129-A  Discharging Unit Phone Number: ***    Emergency Contact:   Extended Emergency Contact Information  Primary Emergency Contact: Eusebia Rey Medical POA  Address: [address] [address] [facility]  Home Phone: [phone]  Mobile Phone: [phone]  Relation: Spouse  Secondary Emergency Contact: LindaArminda [address] [place] [address] Medical POA  Address: [address] [place] [address] [facility]  Home Phone: [phone]  Mobile Phone: [phone]  Relation: Child    Past Surgical History:  Past Surgical History:   Procedure Laterality Date    CARDIAC SURGERY N/A 2023    watchman device implant    CARDIOVERSION  2019    CATARACT REMOVAL WITH IMPLANT Right 2019    CATARACT REMOVAL WITH IMPLANT Left 05/10/2019    COLON SURGERY  2007    COLONOSCOPY  ;    colon polyps removed    COLONOSCOPY N/A 2023    COLONOSCOPY POLYPECTOMY ABLATION WITH CLIP PLACEMENT X 3 performed by Matty Caruso MD at [facility] ENDOSCOPY    HERNIA REPAIR  2008    HERNIA REPAIR      INTRACAPSULAR CATARACT EXTRACTION Right 2019    EYE CATARACT EMULSIFICATION IOL IMPLANT performed by Jamil Younger MD at [facility]    INTRACAPSULAR CATARACT EXTRACTION Left 05/10/2019    EYE CATARACT EMULSIFICATION IOL IMPLANT performed by Jamil Younger MD at [facility]    PACEMAKER INSERTION Left 2019    Medtronic dual permanent pacer-[place] XT DR MONIQUE [employer]     TOTAL KNEE ARTHROPLASTY Right 2022    RIGHT KNEE TOTAL ARTHROPLASTY performed by Bennie Dennis DO at [facility]

## 2025-01-14 NOTE — CARE COORDINATION
Referral for nutritional supplements closed as patient is discharging to [facility].  Does not meet criteria.

## 2025-01-14 NOTE — PROGRESS NOTES
[place] [address]  Phone: [phone]    Fax [phone]                  Kassidy Avila MD, [facility]       Jonah Mathur MD, [facility]  Kevan Johnson MD, [facility]    MD Binu Willis MD Tariq Rizvi, MD Bilal Alam, MD Dr. Waseem Sajjad MD Melissa Kellis, BENSON Rodriguez, BENSON Muller, BENSON Orozco, BENSON Ellsworth PAWaleC    CARDIOLOGY  NOTE      Name:  Kareem Rey /Age/Sex: 1945  (79 y.o. male)   MRN & CSN:  [id number] & [id number] Admission Date/Time: 2025  2:29 AM   Location:  [address] [place] [address] PCP: Eduar Birmingham MD       Hospital Day: 6    - Cardiology consult is for: Syncope      ASSESSMENT/ PLAN:  Recurrent syncope  Deconditioning  Moderate aortic stenosis  Persistent atrial fibrillation s/p Watchman  Left atrial dilation  -No prodromal symptoms prior  -orthostatic vitals negative  -Permanent pacemaker revealing persistent A-fib however rate has been very well-controlled and no other arrhythmia noted  -Echo in December showing JACK of 1.3 cm² and mean gradient of 30 mmHg  -Patient too weak to undergo tilt table.     Coronary artery disease s/p PCI LAD + RCA   -No chest pain or dyspnea  -Echo in December revealed low normal EF 50-55%, no significant wall motion abnormalities  -Stress test in  nonischemic  Hypertension  -Blood pressure stable  -On Norvasc 10 mg daily  Well-controlled type 2 diabetes mellitus  -Glucose stable    Cardiology will sign off, please call with any questions.  Patient to follow-up in clinic      Subjective:  Kareem is a 79 y.o.year old     No acute cardiac complaints at this time    Objective: Temperature:  Current - Temp: 98.3 °F (36.8 °C); Max - Temp  Av.1 °F (36.7 °C)  Min: 98 °F (36.7 °C)  Max: 98.3 °F (36.8 °C)    Respiratory Rate : Resp  Av  Min: 16  Max: 16    Pulse Range: Pulse  Av.6  Min: 60  Max: 
    In-Patient Progress Note    Patient:  Kareem Rey 79 y.o. male MRN: [id number]     Date of Service: 1/11/2025    Hospital Day: 4      Chief complaint: syncopal episodes       Subjective   Patient seen and examined in the morning. Patient states he feels a little SOB. Coughing a little as well       See ROS    I have reviewed all pertinent PMHx, PSHx, FamHx, SocialHx, medications, and allergies and updated history as appropriate. I have reviewed images as appropriate.     Assessment and Plan   Kareem Rey, a 79 y.o. male, with a history of coronary artery disease, persistent atrial fibrillation status post Watchman procedure, status post pacemaker placement, diabetes mellitus type 2, CKD stage III, JAYE on CPAP, chronic venous insufficiency  was admitted on 1/8/2025 with complaints of had no chief complaint listed for this encounter.    Assessment and Plan:  #.  Recurrent syncope 2/2 ?Possibly Orthostatic Hypotension. Arrhythmia ruled out   -CT head-no acute process  -Troponin x 2- 41, EKG-paced rhythm  -Echo-12/4/2024-EF 50-55%, moderate aortic regurgitation, moderate to severe aortic valve stenosis, moderate pulmonary hypertension.  -Orthostatic vitals done but with zayra hose stockings   - Cardio on board. Unable to do tilt table test   - Pacer interrogation without any significant arrhythmia.      #.  Recurrent Fall  -CT head, CT C-spine, CT chest/abdomen/pelvis-no acute process  -X-ray of the right hip-no acute fracture or dislocation.  -PT/OT on board - plan for SNF     #. Diarrhea, Stool incontinence - appears to be chronic   #. LE Weakness   - Send C Diff (initial test indeterminate) and GI PCR panel if able.  - Has had incontinence for >5 months per wife   - Obtain thoracic and Lumbar MRI - likely Monday   - Discussed with NSISH      #. Aspiration Pneumonia - Developed during stay   #. Acute Resp Failure with Hypoxia   - SLP eval   - Wean O2 as able - SpO2 had dropped to mid 80% on 1/10/2025  - Start on PO 
    In-Patient Progress Note    Patient:  Kareem Rey 79 y.o. male MRN: [id number]     Date of Service: 1/12/2025    Hospital Day: 5      Chief complaint: syncopal episodes       Subjective   Patient seen and examined in the morning. Patient states he had an episode of loose tool overnight. Currently feeling ok. Cough is a little better. On O2 still.       See ROS    I have reviewed all pertinent PMHx, PSHx, FamHx, SocialHx, medications, and allergies and updated history as appropriate. I have reviewed images as appropriate.     Assessment and Plan   Kareem Rey, a 79 y.o. male, with a history of coronary artery disease, persistent atrial fibrillation status post Watchman procedure, status post pacemaker placement, diabetes mellitus type 2, CKD stage III, JAYE on CPAP, chronic venous insufficiency  was admitted on 1/8/2025 with complaints of had no chief complaint listed for this encounter.    Assessment and Plan:  #.  Recurrent syncope 2/2 ?Possibly Orthostatic Hypotension. Arrhythmia ruled out   -CT head-no acute process  -Troponin x 2- 41, EKG-paced rhythm  -Echo-12/4/2024-EF 50-55%, moderate aortic regurgitation, moderate to severe aortic valve stenosis, moderate pulmonary hypertension.  -Orthostatic vitals done but with zayra hose stockings   - Cardio on board. Unable to do tilt table test but plan to repeat tomorrow   - Pacer interrogation without any significant arrhythmia.      #.  Recurrent Fall  -CT head, CT C-spine, CT chest/abdomen/pelvis-no acute process  -X-ray of the right hip-no acute fracture or dislocation.  -PT/OT on board - plan for SNF     #. 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 LE Weakness   - Has had incontinence for >5 months per wife   - Discussed with NSY  - d/c MRI as less likely neurological.   - GI PCR +ve for Enteropathogenic E Coli     #. Aspiration Pneumonia - Developed during stay   #. Acute Resp Failure with Hypoxia   - SLP eval   - SpO2 had dropped to 
    In-Patient Progress Note    Patient:  Kareem Rey 79 y.o. male MRN: [id number]     Date of Service: 1/13/2025    Hospital Day: 6      Chief complaint: syncopal episodes       Subjective   Patient seen and examined in the morning. Patient states he feels a little better. SOB is better but wheezing.      See ROS    I have reviewed all pertinent PMHx, PSHx, FamHx, SocialHx, medications, and allergies and updated history as appropriate. I have reviewed images as appropriate.     Assessment and Plan   Kareem Rey, a 79 y.o. male, with a history of coronary artery disease, persistent atrial fibrillation status post Watchman procedure, status post pacemaker placement, diabetes mellitus type 2, CKD stage III, JAYE on CPAP, chronic venous insufficiency  was admitted on 1/8/2025 with complaints of had no chief complaint listed for this encounter.    Assessment and Plan:  #.  Recurrent syncope 2/2 ?Possibly Orthostatic Hypotension. Arrhythmia ruled out   -CT head-no acute process  -Troponin x 2- 41, EKG-paced rhythm  -Echo-12/4/2024-EF 50-55%, moderate aortic regurgitation, moderate to severe aortic valve stenosis, moderate pulmonary hypertension.  -Orthostatic vitals done but with zayra hose stockings   - Cardio on board. Unable to do tilt table test  - Pacer interrogation without any significant arrhythmia.      #.  Recurrent Fall  -CT head, CT C-spine, CT chest/abdomen/pelvis-no acute process  -X-ray of the right hip-no acute fracture or dislocation.  -PT/OT on board - plan for SNF     #. Diarrhea 2/2 Enteropathogenic E Coli and C Diff   # Chronic Stool incontinence  #. LE Weakness   - Has had incontinence for >5 months per wife   - Discussed with NSY  - d/c MRI as less likely neurological.   - GI PCR +ve for Enteropathogenic E Coli   - C Diff +ve   - Start on fidaxomicin 200mg BID X 10 days   - Probiotics     #. Aspiration Pneumonia - Developed during stay   #. Acute Resp Failure with Hypoxia   - SLP eval pending   - SpO2 
  Physician Progress Note      PATIENT:               MINERVA LEON  CSN #:                  [id number]  :                       1945  ADMIT DATE:       2025 2:29 AM  DISCH DATE:  RESPONDING  PROVIDER #:        Layo Horowitz MD          QUERY TEXT:    Internal Medicine,    Patient admitted with syncope documented as due to orthostatic hypotension.   Noted to have SM and PMH neuropathy is documented. If possible, please   document in progress notes and discharge summary after study the etiology of   the syncope:    The medical record reflects the following:  Risk Factors: DM  Clinical Indicators: repeated falls, syncope due to orthostatic hypotension,   PMH neuropathy documented, takes Gabapentin  Treatment: labs, imaging, Neuro checks, medical management    Thank you  Options provided:  -- Syncope due to orthostatic hypotension secondary to diabetic autonomic   neuropathy  -- Syncope due to other hypotension  -- Other - I will add my own diagnosis  -- Disagree - Not applicable / Not valid  -- Disagree - Clinically unable to determine / Unknown  -- Refer to Clinical Documentation Reviewer    PROVIDER RESPONSE TEXT:    The syncope is due to orthostatic hypotension secondary to diabetic autonomic   neuropathy.    Query created by: Ivonne Nelson on 2025 2:49 PM      QUERY TEXT:    Internal Medicine,    Patient admitted with syncope due to orthostatic hypotension and has severe   malnutrition documented by the Dietitian.. If possible, please document in   progress notes and discharge summary if you are evaluating and /or treating   any of the following:    The medical record reflects the following:  Risk Factors: acute illness  Clinical Indicators: Malnutrition Assessment: Malnutrition Status:  Severe   malnutrition (25 1542)  Context:  Acute Illness,  50% or less of   estimated energy requirements for 5 or more days, Weight Loss:  Unable to   assess , Body Fat Loss:  Mild body fat loss Triceps, 
  Physician Progress Note      PATIENT:               MINERVA LEON  CSN #:                  [id number]  :                       1945  ADMIT DATE:       2025 2:29 AM  DISCH DATE:  RESPONDING  PROVIDER #:        Layo Horowitz MD          QUERY TEXT:    Internal Medicine,    Patient admitted with syncope and noted documentation of acute respiratory   failure in the 25 progress note. In order to support the diagnosis of   acute respiratory failure, please include additional clinical indicators in   your documentation.  Or please document if the diagnosis of acute respiratory   failure has been ruled out after further study.    The medical record reflects the following:  Risk Factors: aspiration PNA  Clinical Indicators: Per documentation-Acute Resp Failure with Hypoxia-- SpO2   had dropped to mid 80% on 1/10/2025, no respiratory distress and unlabored   respirations documented, RR less than 20  Treatment: labs, imaging, -SLP eval, po augmentin & doxy, O2 at 2L, medical   management    Thank you  Options provided:  -- Acute Respiratory Failure as evidenced by, Please document evidence.  -- Acute Respiratory Failure ruled out after study  -- Other - I will add my own diagnosis  -- Disagree - Not applicable / Not valid  -- Disagree - Clinically unable to determine / Unknown  -- Refer to Clinical Documentation Reviewer    PROVIDER RESPONSE TEXT:    This patient is in acute respiratory failure as evidenced by drop in SPO2 to   85% on RA    Query created by: Ivonne Nelson on 2025 1:27 PM      Electronically signed by:  Layo Horowitz MD 2025 9:12 AM          
4 Eyes Skin Assessment     NAME:  Kareem Rey  YOB: 1945  MEDICAL RECORD NUMBER:  [id number]    The patient is being assessed for  Admission    I agree that at least one RN has performed a thorough Head to Toe Skin Assessment on the patient. ALL assessment sites listed below have been assessed.      Areas assessed by both nurses:    Head, Face, Ears, Shoulders, Back, Chest, Arms, Elbows, Hands, Sacrum. Buttock, Coccyx, Ischium, and Legs. Feet and Heels        Does the Patient have a Wound? Yes wound(s) were present on assessment. LDA wound assessment was Initiated and completed by RN       Carlos Prevention initiated by RN: No Scrape and bruise on left lower back  Wound Care Orders initiated by RN: No    Pressure Injury (Stage 3,4, Unstageable, DTI, NWPT, and Complex wounds) if present, place Wound referral order by RN under : No    New Ostomies, if present place, Ostomy referral order under : No     Nurse 1 eSignature: Electronically signed by Nicola Mckeon LPN on 1/8/25 at 6:57 AM EST    **SHARE this note so that the co-signing nurse can place an eSignature**    Nurse 2 eSignature: Electronically signed by Ptaricia Luna RN on 1/8/25 at 7:32 AM EST   
CM contacted the pt's wife, Eusebia, via phone, [phone] to discuss SNF recs.  CM introduced self, role, and reasoning for telephone call.  The pt's wife vocalized knowledge of SNF recs and requested referral to [facility].  CM send PS to DR. Don.  Dr. Don acknowledged receipt of PS/referral.       CM placed [facility] for PT/OT to f/u with the pt.  Noted pt was last seen by PT/OT on 1/9/2025.     Noted the pt's wife requested to be contacted via cell phone, [phone] with updates.     CM remains available as needed.     Noemi Ku, MSSW, LSW    
Cardiology Progress Note          Admit Date:  1/8/2025    Consult reason/ Seen today for: syncope    Subjective and  Overnight Events:  SOB improving, weakness present.     Assessment / Plan / Recommendation:     Syncope: not orthostatic, generalized weakness and deconditioning contributing. Moderate AS. Will re-evaluate need for tilt table on Monday. Recommend PT/OT and compression stockings.   CAD: PCI to LAD and RCA in 2022.   Aortic Stenosis: recent echo showed moderate degree.   Permanent afib: S/P watchman only on Plavix and ASA.   PPM: patient is in permeant afib.   HTN:controlled, continue  current medications , can titrate accordingly   Dyslipidemia: continue statins      Review of Systems:  Review of Systems   Respiratory:  Positive for shortness of breath.    Cardiovascular:  Negative for leg swelling.   Neurological:  Positive for dizziness.        /78   Pulse 63   Temp 97.5 °F (36.4 °C)   Resp 18   Ht 1.803 m (5' 11\")   Wt 112.9 kg (248 lb 14.4 oz)   SpO2 91%   BMI 34.71 kg/m²   No intake or output data in the 24 hours ending 01/11/25 1347    Physical Exam:  Physical Exam  Constitutional:       Appearance: He is well-developed.   Cardiovascular:      Rate and Rhythm: Normal rate and regular rhythm.      Pulses: Intact distal pulses.           Dorsalis pedis pulses are 2+ on the right side and 2+ on the left side.        Posterior tibial pulses are 2+ on the right side and 2+ on the left side.      Heart sounds: Normal heart sounds, S1 normal and S2 normal.   Pulmonary:      Effort: Pulmonary effort is normal.      Breath sounds: Normal breath sounds.   Musculoskeletal:         General: Normal range of motion.      Right lower leg: Edema present.      Left lower leg: Edema present.   Skin:     General: Skin is warm and dry.   Neurological:      Mental Status: He is alert and oriented to person, place, and time.          Medications:    amoxicillin-clavulanate  1 tablet Oral 2 times per day 
Cardiology Progress Note    Subjective/Overnight Events:  Patient stated that he experienced diarrhea while in bed.  Still feels weak.  No prodromal symptoms at this time    Assessment/Plan:  Recurrent syncope  Deconditioning  Moderate aortic stenosis  Persistent atrial fibrillation s/p Watchman  Left atrial dilation  -No prodromal symptoms prior  -orthostatic vitals negative  -Permanent pacemaker revealing persistent A-fib however rate has been very well-controlled and no other arrhythmia noted  -Echo in December showing JACK of 1.3 cm² and mean gradient of 30 mmHg  Coronary artery disease s/p PCI LAD + RCA 2022  -No chest pain or dyspnea  -Echo in December revealed low normal EF 50-55%, no significant wall motion abnormalities  -Stress test in 2023 nonischemic  Hypertension  -Blood pressure stable  -On Norvasc 10 mg daily  Well-controlled type 2 diabetes mellitus  -Glucose stable        Kareem Ellsworth PA-C 01/09/25 11:13 AM         
Comprehensive Nutrition Assessment    Type and Reason for Visit:  Reassess    Nutrition Recommendations/Plan:   Continue current diet and supplements as ordered  Monitor GI/BM status, consider adequate fiber intake and hydration   Monitor PO intakes, GI status, weights, fluids, labs, lytes, glucose, and plan of care      Malnutrition Assessment:  Malnutrition Status:  Severe malnutrition (01/08/25 7802)    Context:  Acute Illness     Findings of the 6 clinical characteristics of malnutrition:  Energy Intake:  50% or less of estimated energy requirements for 5 or more days  Weight Loss:  Unable to assess     Body Fat Loss:  Mild body fat loss Triceps   Muscle Mass Loss:  Moderate muscle mass loss Thigh (quadriceps) (mild clavicles, scapula)  Fluid Accumulation:  Moderate to Severe Extremities, Generalized (+3 edema in BLE)   Strength:  Not Performed    Nutrition Assessment:    Pt consumed 100% of breakfast and barely touched his lunch to short period between meal timing/delivery. Otherwise, appetite is slightly improving within stay. Pt is drinking nutrition supplements. Has been having looser stool. Discussed ways to firm stool with nutrition. C/o hip pain at bedside. No N/V at this time. Follow as moderate nutrition risk.    Nutrition Related Findings:    Remains weak +fluid retention Wound Type: None       Current Nutrition Intake & Therapies:    Average Meal Intake: 26-50%, 51-75%, %  Average Supplements Intake: %  ADULT DIET; Regular; 5 carb choices (75 gm/meal); Low Sodium (2 gm); chocolate/strawberry  ADULT ORAL NUTRITION SUPPLEMENT; Breakfast, Dinner; Diabetic Oral Supplement    Anthropometric Measures:  Height: 180.3 cm (5' 11\")  Ideal Body Weight (IBW): 172 lbs (78 kg)    Admission Body Weight: 111.6 kg (246 lb 0.5 oz)  Current Body Weight: 111.6 kg (246 lb 0.5 oz), 143 % IBW. Weight Source: Bed scale  Current BMI (kg/m2): 34.3  Usual Body Weight: 115.2 kg (254 lb) (244-254 lb wihtin the 
Facility/Department: [address]   CLINICAL BEDSIDE SWALLOW EVALUATION    NAME: Kareem Rey  : 1945  MRN: [id number]    ADMISSION DATE: 2025  ADMITTING DIAGNOSIS: has Anemia; Colon polyps; Type 2 diabetes mellitus without complication (HCC); Hypertension; Hyperlipidemia; Obesity; Vertigo; FH: CAD (coronary artery disease); Heart murmur; Bradycardia; Coronary artery disease due to calcified coronary lesion; [place] (hard of hearing); History of gout; Adenomatous polyp of transverse colon; JAYE on CPAP; Acute kidney failure (HCC); Persistent atrial fibrillation (HCC); Chronic kidney disease (CKD) stage G3b/A1, moderately decreased glomerular filtration rate (GFR) between 30-44 mL/min/1.73 square meter and albuminuria creatinine ratio less than 30 mg/g (HCC); Hypomagnesemia; Age-related nuclear cataract of right eye; Age-related nuclear cataract of left eye; S/P placement of cardiac pacemaker 2019; Tachycardia-bradycardia (HCC); Leg swelling; Varicose veins of both legs with edema; Nonrheumatic aortic valve stenosis; Physical debility; Chest pain; SOB (shortness of breath); Abnormal stress test; CAD s/p PCI to LAD+RCA 22; ASCVD (arteriosclerotic cardiovascular disease); Post PTCA; Status post left heart catheterization (LHC) by percutaneous approach; Chronic atrial fibrillation (HCC); Presence of Watchman left atrial appendage closure device; Hypercoagulable state due to chronic atrial fibrillation (HCC); Gastrointestinal hemorrhage; Tremors of nervous system; Syncope, unspecified syncope type; Frequent falls; Syncope and collapse; Moderate malnutrition (HCC); Debility; and Severe malnutrition (HCC) on their problem list.      Impressions: Kareem Rey was seen for a bedside swallowing evaluation after being admitted to [facility] with syncope and a fall.  Pt was alert and cooperative throughout assessment.  Relevant medical hx includes CAD, dizziness and hypertension.  Pt reports occasional difficulty swallowing 
LOVENOX PROPHYLAXIS EVALUATION  (Populations not addressed in this protocol: trauma, obstetrics, or COVID-19)    Wt Readings from Last 3 Encounters:   01/08/25 117.2 kg (258 lb 6.1 oz)   01/07/25 115.2 kg (254 lb)   12/04/24 115.2 kg (254 lb)       Estimated Creatinine Clearance: 58 mL/min (based on SCr of 1.3 mg/dL).  Recent Labs     01/07/25  2104 01/08/25  0245   BUN 20 20   CREATININE 1.2 1.3     --    HGB 10.9*  --    HCT 32.6*  --        Weight Range: 101-150.9 kg    CRCL = 30 or greater    50.9 kg   and below     .9  kg   101-150.9 kg   151-174.9  kg   175 kg  or greater     30mg subq  daily     40mg subq daily    (or 30mg subq BID orthopedic)     30mg subq  BID   40mg subq  BID   60mg subq BID       Per P/T protocol for appropriate subq anticoagulation by weight and CRCL change to:    Enoxaparin 30mg subq BID      Pino Herrera [facility]  3:42 AM  01/08/25        
Occupational Therapy    Occupational Therapy Treatment Note    Name: Kareem Rey MRN: [id number] :   1945   Date:  2025   Admission Date: 2025 Room:  [address] [place] [address]-A     Primary Problem:  Syncope    Restrictions/Precautions:          General Precautions, Fall Risk, Contact Precautions    Communication with other providers: nursing handoff    Subjective:  Patient states:  \"It feels good to sit up\"  Pain:   Denies    Objective:    Observation: Pt received supine in bed, family in room, bruising on Lower back   Objective Measures:  2L of 02; some SOB sitting EOB decreasing w/ pursed lip breathing and increased time    Treatment, including education:  Therapeutic Activity Training:   Therapeutic activity training was instructed today.  Cues were given for safety, sequence, UE/LE placement, awareness, and balance.    Activities performed today included bed mobility training, sup-sit, sitting balance/tolerance, sit to supine, scooting to HOB    Self Care Training:   Cues were given for safety, sequence, UE/LE placement, visual cues, and balance.    Activities performed today included grooming    Grooming washing face/hands w/ warm washcloth SBA. Sit to supine maxA, sitting EOB Xin moving to SBA w/ proper hand placement and positioning. Some SOB, instructed pt in pursed lip breathing to decrease SOB, SOB decreased. Sit to supine maxA, scooting to HOB maxA    Pt supine in bed, bed alarm on, call light at side, nursing notified       Assessment / Impression:    Patient's tolerance of treatment: Well  Adverse Reaction: None  Significant change in status and impact: Improved from initial evaluation  Barriers to improvement: None noted      Plan for Next Session:    Continue OT POC    Time in:  2140  Time out:  1303  Timed treatment minutes:  23  Total treatment time:  23 minutes      Electronically signed by:    Patricia Portillo/L [id number]  1:32 PM,2025        
Occupational Therapy    Occupational Therapy Treatment Note  Name: Kareem Rey MRN: [id number] :   1945   Date:  2025   Admission Date: 2025 Room:  [address] [place] [address]-A     Primary Problem:  encounter diagnosis was Syncope and collapse     Restrictions/Precautions:    General Precautions, Fall Risk, C diff rule out     Communication with other providers:   Cleared for treatment by ZACARIAS Courtney.    Subjective:  Patient states:  \"I need to get back home\"  Pain:   Location, Type, Intensity (0/10 to 10/10):  no pain noted    Objective:    Observation:  pt alert and oriented to self up in recliner      Treatment, including education:      Therapeutic Exercise:  Pt completed UE exercises to increase strength and ROM to facilitate increase for ADLs and functional mobility.  Pt completed red theraband pulls horizontally,forward punches, side punches, pull backs and upwards x 10 reps x 2 sets with pt requiring mod rest breaks and verbal cues to initiate and follow through with exercises.            Safety Measures: Gait belt used,up in the recliner, Pull/Bed Alarm activated and call light left in reach        Assessment / Impression:        Patient's tolerance of treatment: Good   Adverse Reaction: None  Significant change in status and impact:  None  Barriers to improvement:  Dec strength and endurance    Plan for Next Session:    Continue with POC.    Time in:  1350  Time out:  1415  Total treatment time:  25  Billed Units: 2 TE  Electronically signed by:    PABLO Ramesh/L NELI 2025, 2:18 PM    Previously filed values:                
Occupational Therapy  [facility] ACUTE CARE OCCUPATIONAL THERAPY EVALUATION  Ray CHIAC Rey, 1945, [id number]/[id number]-A, 1/9/2025    Discharge Recommendation: Facility for intensive rehabilitation, anticipate 3 hours per day and 5 days per week vs facility for moderate post-acute rehabilitation, anticipate 1-2 hours per day and 5 days per week.      History  [place]:  The encounter diagnosis was Syncope and collapse.  Patient  has a past medical history of Abnormal nuclear stress test, Arthritis, CAD s/p PCI to LAD+RCA 9/9/22, Dizziness, Fall, FH: CAD (coronary artery disease), H/O 24 hour EKG monitoring, H/O atrial fibrillation without current medication, H/O cardiovascular stress test, H/O Doppler echocardiogram, H/O Doppler ultrasound (Venous Doppler Lower Extremities), H/O echocardiogram, H/O echocardiogram, H/O transesophageal echocardiography (SALAZAR) for monitoring, Heart murmur, History of nuclear stress test, History of nuclear stress test, Hx of Doppler echocardiogram, Hx of Venous Doppler ultrasound, Hyperlipidemia, Hypertension, Kidney stones, Leg swelling, Neuropathy, Nonrheumatic aortic valve stenosis, Obesity, JAYE on CPAP, Persistent atrial fibrillation (HCC), Syncope and collapse, Thoracic aortic aneurysm (HCC), Tremors of nervous system, Type II or unspecified type diabetes mellitus without mention of complication, not stated as uncontrolled, and Vascular US Duplex Lower Extremity Venous Bilateral.  Patient  has a past surgical history that includes hernia repair (2008); hernia repair (2009); Colonoscopy (2007;2017); Colon surgery (2007); Cataract removal with implant (Right, 04/12/2019); Intracapsular cataract extraction (Right, 04/12/2019); Cataract removal with implant (Left, 05/10/2019); Intracapsular cataract extraction (Left, 05/10/2019); Pacemaker insertion (Left, 06/25/2019); Cardioversion (07/08/2019); Total knee arthroplasty (Right, 02/16/2022); Colonoscopy (N/A, 04/29/2023); Upper 
PT/OT notes reviewed and swing referral received.  Patient has been in the swing bed multiple times and has had limited participation with PT while in the program.  Would recommend SNF.  
Pacemaker interrogated.    Patient has been experiencing persistent atrial fibrillation however rate has been very well-controlled.  No other significant arrhythmia noted.    Patient has been very inactive with less than 1 hour of activity for quite some time.    Patient likely has some element of deconditioning.    Patient also drinks fairly minimal amounts of water and consumes diet soda.    Advised compression stockings and orthostatic vital signs    Kareem Ellsworth PA-C 01/08/25 1:16 PM      
Patient seen and examined in the AM. Patient admitted in the AM today by my colleague and I agree with their assessment and plan with the addition to the following:     Patient states he hasn't been moving around too much for aboute a month to month and a half. Denies any symptoms on my assessment today.     Noted to have +++/+++ LE edema. States it used to go down once he propped his feet up in a recliner but has not really gone down in the past month to month and a half.         /74   Pulse 64   Temp 98.6 °F (37 °C) (Oral)   Resp 16   Ht 1.803 m (5' 11\")   Wt 117.2 kg (258 lb 6.1 oz)   SpO2 93%   BMI 36.04 kg/m²       Recurrent syncope 2/2 ?Orthostasis. Obtain orthostatic vitals. Pacer interrogated - no arrhythmia. Unable to do tilt table test due to inability to stand for 30 mins.   Recurrent Falls. Imaging negative. PT/OT    Electronically signed by Layo Horowitz MD on 1/8/2025 at 3:19 PM    
Pt has MRI's ordered unfortunately pt has a pacemaker & due to staffing needed to safely scan pt, his exam s will not be able to be done until Monday. We will work to make sure everything is lined up to get pt taken care of Monday am.   
Pt needs to have MRI Questionnaire completed in order for pt to have MRI performed.  
Report called to [facility]. All questions and concerns answered.  
Sarika Mchugh [phone] neighbor, please call per wife and patient if trying to get a hold of family as wife has medical issues, CPAP at bedside  
Sputum culture obtained and sent to lab  
[facility]  DEPARTMENT OF SPEECH/LANGUAGE PATHOLOGY  DAILY PROGRESS NOTE  Kareem Rey  1/13/2025  [id number]  Syncope, unspecified syncope type [R55]  Allergies   Allergen Reactions    Niacin And Related Hives    Ativan [Lorazepam] Other (See Comments)     DO NOT GIVE    Oxycontin [Oxycodone Hcl] Hives     Itching in his feet         Pt was seen this date for dysphagia treatment.       IMPRESSION AND RECOMMENDATIONS: Kareem Rey was seen for dysphagia treatment and diet tolerance monitoring. Pt sitting up right in chair upon entry, reporting he just finished his lunch. Pt denies any difficulties with current diet. Pt consumed trials of thin liquids via straw and soft solids. Prolonged mastication with mild oral residue post swallow that clears with patient initiated liquid rinse. No overt s/s of aspiration with any trials and no change in vocal quality appreciated. Pt did decline further isaura crackers stating, \"Im afraid I might choke on the dry crackers if I keep eating them\". Pt reports that he estimates he gets \"chocked\" once every two months. Pt may benefit from a modified barium swallow study if symptoms persist/get worse.     Recommend continuation of soft and bite sized solids/thin liquids by small cup/straw sips with strict aspiration precautions. SLP will continue to follow.     GOALS (current status in bold):  Short-term Goals  Timeframe for Short-term Goals: LOS or until goals are met  Goal 1: Pt will tolerate soft and bite sized solids/thin liquids wtihout clinical evidence of aspiration 100% - Progressing, Continue  Goal 2: Pt/caregivers will demonstrate comprehension of recommendations/POC - Progressing, pt verbalized understanding of recommendations; Continue      EDUCATION: recommendations with patient    PAIN RATING (0-10 Scale): 0  Time in/Time out: SLP Individual Minutes  Time In: 1330  Time Out: 1350  Minutes: 20    Visit number: 2    Gala Sharma 
(36.7 °C) (Oral)   Resp 20   Ht 1.803 m (5' 11\")   Wt 118 kg (260 lb 2.3 oz)   SpO2 94%   BMI 36.28 kg/m²           Review of Systems:    No complaints    TELEMETRY: Paced   has a past medical history of Abnormal nuclear stress test, Arthritis, CAD s/p PCI to LAD+RCA 9/9/22, Dizziness, Fall, FH: CAD (coronary artery disease), H/O 24 hour EKG monitoring, H/O atrial fibrillation without current medication, H/O cardiovascular stress test, H/O Doppler echocardiogram, H/O Doppler ultrasound (Venous Doppler Lower Extremities), H/O echocardiogram, H/O echocardiogram, H/O transesophageal echocardiography (SALAZAR) for monitoring, Heart murmur, History of nuclear stress test, History of nuclear stress test, Hx of Doppler echocardiogram, Hx of Venous Doppler ultrasound, Hyperlipidemia, Hypertension, Kidney stones, Leg swelling, Neuropathy, Nonrheumatic aortic valve stenosis, Obesity, JAYE on CPAP, Persistent atrial fibrillation (HCC), Syncope and collapse, Thoracic aortic aneurysm (HCC), Tremors of nervous system, Type II or unspecified type diabetes mellitus without mention of complication, not stated as uncontrolled, and Vascular US Duplex Lower Extremity Venous Bilateral.   has a past surgical history that includes hernia repair (2008); hernia repair (2009); Colonoscopy (2007;2017); Colon surgery (2007); Cataract removal with implant (Right, 04/12/2019); Intracapsular cataract extraction (Right, 04/12/2019); Cataract removal with implant (Left, 05/10/2019); Intracapsular cataract extraction (Left, 05/10/2019); Pacemaker insertion (Left, 06/25/2019); Cardioversion (07/08/2019); Total knee arthroplasty (Right, 02/16/2022); Colonoscopy (N/A, 04/29/2023); Upper gastrointestinal endoscopy (N/A, 04/29/2023); and Cardiac surgery (N/A, 07/06/2023).  Physical Exam:  General:  Awake, alert, NAD  Head:normal  Eye: Pupils equal and round  Neck:  No JVD, no carotid bruit noted   Chest:  Clear to auscultation, no signs of respiratory 
aorta-4.4 cm.     #.  Persistent atrial fibrillation  -History of cardioversion-7/2019  -Status post Watchman-7/2023     #.  Status post pacemaker for SSS in 6/2019     #.  Chronic anemia  -Admission 5/2023 with acute anemia (hemoglobin 5.9) requiring blood transfusion  -Status post EGD and colonoscopy with no source of bleeding.  -On ferrous sulfate     #.  Peripheral neuropathy-on gabapentin     #.  History of syncope   -had intermittent episodes since 2019  -Admission 2/2024-was evaluated by cardiology, neurology-syncope possibly related to alcohol intake, pacemaker with no significant arrhythmias noted. Had SALAZAR-showing moderate aortic stenosis with a fused known and left coronary sclerotic aortic valve.     #.  CKD stage III     #.  History of colonic resection for large colon polyp-2007     #.  Obstructive sleep apnea-on CPAP     #.  Chronic venous insufficiency-status post ablation    #. Class II Obesity       # Peptic ulcer prophylaxis: -   # DVT Prophylaxis: Lovenox  Eusebia Rey, spouse, to help with decision making.       Expected Disposition: TBD. PT/OT assessment pending.   Estimated discharge date: 1-2 days.     Personally reviewed Lab Studies and Imaging     Discussed management of the case with IDR team - diarrhea. C Diff pending. Orthostatic blood pressure and pulse pending.   Discussed management of the case with cardio who recommended obtaining orthostatics and placing MAGNOLIA hose stockings.     Drugs that require monitoring for toxicity include Plavix and the method of monitoring was CBC   Drugs that require monitoring for toxicity include lovenox and the method of monitoring was CBC   Drugs that require monitoring for toxicity include humalog and the method of monitoring was POCT glucose. Watch for hypoglycemia.   Drugs that require monitoring for toxicity include MagOx and the method of monitoring was Mg level          Review of System     Review of Systems  See subjection section     I have 
c/o issues with balance. Drinks diet cola and water often, refer to cardio/nephro for fluid recs. GFR 61, thiamine, mag ox, iron Wound Type: None       Current Nutrition Intake & Therapies:    Average Meal Intake: 26-50%  Average Supplements Intake: Unable to assess  ADULT DIET; Regular; 4 carb choices (60 gm/meal); Low Fat/Low Chol/High Fiber/TYRA; No Added Salt (3-4 gm)  ADULT ORAL NUTRITION SUPPLEMENT; Lunch; Diabetic Oral Supplement    Anthropometric Measures:  Height: 180.3 cm (5' 11\")  Ideal Body Weight (IBW): 172 lbs (78 kg)       Current Body Weight: 117.2 kg (258 lb 6.1 oz), 150.2 % IBW. Weight Source: Not specified  Current BMI (kg/m2): 36.1  Usual Body Weight: 115.2 kg (254 lb) (244-254 lb wihtin the past 6 month per chart review)     % Weight Change (Calculated): 1.7  Weight Adjustment For: No Adjustment                 BMI Categories: Obese Class 2 (BMI 35.0 -39.9)    Estimated Daily Nutrient Needs:  Energy Requirements Based On: Formula  Weight Used for Energy Requirements: Admission  Energy (kcal/day): 9558-1769 (MSJ)  Weight Used for Protein Requirements: Ideal  Protein (g/day):  (1.2-1.4)  Method Used for Fluid Requirements: Standard renal  Fluid (ml/day): per nephrology/cardiology    Nutrition Diagnosis:   in context of acute illness or injury, Severe malnutrition related to decreased appetite as evidenced by poor intake prior to admission, localized or generalized fluid accumulation, loss of subcutaneous fat, muscle loss    Nutrition Interventions:   Food and/or Nutrient Delivery: Modify Current Diet, Modify Oral Nutrition Supplement, Snacks  Nutrition Education/Counseling: Education/Counseling completed  Coordination of Nutrition Care: Continue to monitor while inpatient  Plan of Care discussed with: pt, MD    Goals:  Goals: PO intake 50% or greater  Type of Goal: New goal       Nutrition Monitoring and Evaluation:   Behavioral-Environmental Outcomes: None Identified  Food/Nutrient Intake 
unit  Bathroom Toilet: Handicap height  Bathroom Equipment: Tub transfer bench  Home Equipment: Cane, Walker - Rolling, Wheelchair - Manual, Alert button  Has the patient had two or more falls in the past year or any fall with injury in the past year?: Yes (5+ Blacking out/getting dizzy)  Receives Help From: Family  Prior Level of Assist for ADLs: Independent  Prior Level of Assist for Homemaking: Needs assistance (shared with wife)  Prior Level of Assist for Ambulation: Independent household ambulator, with or without device (eJs w/ RW)  Prior Level of Assist for Transfers: Independent  Active : No  Patient's  Info: Wife drives        Short Term Goals  Time Frame for Short Term Goals: 2 weeks  Short Term Goal 1: Pt will complete supine <> sit Xin  Short Term Goal 2: Pt will complete sit <> stand Xin  Short Term Goal 3: Pt will complete SPT between level surfaces iXn  Short Term Goal 4: Pt will ambulate 30ft with LRAD Xin  Short Term Goal 5: Pt will complete light dynamic standing activity x3 minutes with single UE support, Xin    Electronically signed by:    Adrian Mayers PTA  1/13/2025, 12:36 PM      
use included cigarettes. He started smoking about 58 years ago. He has a 7 pack-year smoking history. He has quit using smokeless tobacco.  His smokeless tobacco use included chew. He reports current alcohol use of about 14.0 standard drinks of alcohol per week. He reports that he does not use drugs.  Family history:  family history includes Diabetes in his mother; Heart Disease in his brother and father; High Blood Pressure in his brother.    Allergies   Allergen Reactions    Niacin And Related Hives    Ativan [Lorazepam] Other (See Comments)     DO NOT GIVE    Oxycontin [Oxycodone Hcl] Hives     Itching in his feet         Objective:   /79   Pulse 67   Temp 97.9 °F (36.6 °C)   Resp 18   Ht 1.803 m (5' 11\")   Wt 117.2 kg (258 lb 6.1 oz)   SpO2 90%   BMI 36.04 kg/m²     Intake/Output Summary (Last 24 hours) at 1/9/2025 2003  Last data filed at 1/9/2025 0842  Gross per 24 hour   Intake 20 ml   Output --   Net 20 ml       TELEMETRY:      Physical Exam:  Constitutional:  Well developed, Well nourished, No acute distress, Non-toxic appearance.   HENT:  Normocephalic, Atraumatic, Bilateral external ears normal, Oropharynx moist, No oral exudates, Nose normal. Neck- Normal range of motion, No tenderness, Supple, No stridor.   Eyes:  PERRL, EOMI, Conjunctiva normal, No discharge.   Respiratory:  Normal breath sounds, No respiratory distress, No wheezing, No chest tenderness.,no use of accessory muscles, diaphragm movement is normal  Cardiovascular: (PMI) apex non displaced,no lifts no thrills, no s3,no s4, Normal heart rate, Normal rhythm, No murmurs, No rubs, No gallops. Carotid arteries pulse and amplitude are normal no bruit, no abdominal bruit noted ( normal abdominal aorta ausculation), femoral arteries pulse and amplitude are normal no bruit, pedal pulses are normal  GI:  Bowel sounds normal, Soft, No tenderness, No masses, No pulsatile masses.   : External genitalia appear normal, No masses or 
cm. GASTROINTESTINAL: Small hiatal hernia. Normal caliber small bowel. Colonic diverticulosis without evidence of diverticulitis. The appendix is normal. VASCULATURE: Atherosclerotic calcifications are present within the aortoiliac arteries. LYMPHATIC: No pathologically enlarged lymph nodes detected. PERITONEUM: Unremarkable. MUSCULOSKELETAL: Degenerative changes within the spine. IMPRESSION: CT CHEST: 1. No acute traumatic process in the chest. CT ABDOMEN AND PELVIS: 1. No acute traumatic process in the abdomen and pelvis. 2. Bilateral adrenal nodules. These are possibly adenomas. 3. Colonic diverticulosis without evidence of diverticulitis.  Dictated and Electronically Signed By: Todd Boucher 1/7/2025 23:03        CT CERVICAL SPINE WO CONTRAST    Result Date: 1/7/2025  EXAM:  CT CERVICAL SPINE WO CONTRAST DATE OF EXAM:  1/7/2025 23:42 DEMOGRAPHICS: 79 years old Male CLINICAL STATEMENT: fall/trauma COMPARISON: None available. DOSE OPTIMIZATION: CT radiation dose optimization techniques (automated exposure  control, and use of iterative reconstruction techniques, or adjustment of the mA and/or kV according to patient size) were used to limit patient radiation dose. FINDINGS: BONES: Normal vertebral body heights. No acute fracture. ALIGNMENT: Reversal of the normal cervical lordosis, which may be secondary to patient positioning or muscular spasm. Mild anterolisthesis of C7-T1 appears related to facet arthropathy. DEGENERATIVE CHANGES: - Arthritic changes at the atlantoaxial joint. - Moderate multilevel disc space narrowing, endplate spurring, and facet and uncovertebral arthropathy. SOFT TISSUE: The prevertebral soft tissue is unremarkable. IMPRESSION: 1. No acute fracture. 2. Moderate multilevel degenerative changes.   Dictated and Electronically Signed By: Todd Boucher 1/7/2025 22:57        XR HIP RIGHT (2-3 VIEWS)    Result Date: 1/7/2025  EXAM:  XR HIP RIGHT (2-3 VIEWS) DATE OF EXAM:  1/7/2025 22:01

## 2025-01-14 NOTE — DISCHARGE SUMMARY
Please use this note as a progress note for the day if the patient does not discharge today      Discharge Summary    Name:  Kareem Rey /Age/Sex: 1945  (79 y.o. male)   MRN & CSN:  [id number] & [id number] Admission Date/Time: 2025  2:29 AM   Attending:  Layo Horowitz MD Discharging Physician: Layo Horowitz MD       Admission Diagnosis:   Recurrent syncope   Fall   H/O CAD, HTN   Type II DM   Dilated Aortic Aleida   Persistent A Fib s/p watchman   H/O SSS s/p Pacer   Chronic Anemia   Peripheral Neuropathy   H/O Syncope   CKD III   H/O Colon resection in    JAYE on CPAP   Chronic Venous Insufficiency  Class II Obesity     Discharge Diagnosis, hospital course, assessment and plan:  Kareem Rey is a 79 y.o.  male  who presents with Syncope, unspecified syncope type    Patient admitted on 2025  2:29 AM    Per H+P:    Kareem Rey is a 79 y.o. male with coronary artery disease, persistent atrial fibrillation status post Watchman procedure, status post pacemaker placement, diabetes mellitus type 2, CKD stage III, JAYE on CPAP, chronic venous insufficiency, presented to [place] ED after having syncopal episodes at home.  Patient reports having 5-6 episodes in the last 6 months.  Had 2 episodes in the last 1 week.  Stated that he was standing in the kitchen today, when he had another episode.  Patient reports that he did not have any warning symptoms prior to passing out-no headache, dizziness, chest pain, shortness of breath, palpitations, diaphoresis.  Patient usually ambulates with a walker.  Vitals on arrival-/78, HR 78, RR 20, temp 98.2, saturating 90% on room air.  Labs significant for CO2 20, BUN 20, creatinine 1.2, troponin 41, repeat 41, LFTs within normal range except AST 38, hemoglobin 10.9.  CT head, C-spine, chest/abdomen/pelvis done in ED-with no acute process.  X-ray of the right hip with no acute fractures.  EKG with paced rhythm.  Patient did not receive any medications in ER.

## 2025-01-14 NOTE — PLAN OF CARE
Problem: Chronic Conditions and Co-morbidities  Goal: Patient's chronic conditions and co-morbidity symptoms are monitored and maintained or improved  1/14/2025 1140 by Roz Martinez RN  Outcome: Progressing  1/14/2025 0100 by Nicola Mckeon LPN  Outcome: Progressing     Problem: Discharge Planning  Goal: Discharge to home or other facility with appropriate resources  1/14/2025 1140 by Roz Martinez RN  Outcome: Progressing  1/14/2025 0100 by Nicola Mckeon LPN  Outcome: Progressing     Problem: Safety - Adult  Goal: Free from fall injury  1/14/2025 1140 by Roz Martinez RN  Outcome: Progressing  1/14/2025 0100 by Nicola Mckeon LPN  Outcome: Progressing     Problem: Pain  Goal: Verbalizes/displays adequate comfort level or baseline comfort level  1/14/2025 1140 by Roz Martinez RN  Outcome: Progressing  1/14/2025 0100 by Nicola Mckeon LPN  Outcome: Progressing     Problem: Nutrition Deficit:  Goal: Optimize nutritional status  1/14/2025 1140 by Roz Martinez RN  Outcome: Progressing  1/14/2025 0100 by Nicola Mckeon LPN  Outcome: Progressing

## 2025-01-14 NOTE — CARE COORDINATION
Discharge order noted. Transport arranged with [facility] for 1:30pm. RUBEN called pt and wife and updated her, she asked that Dr. Horowitz call her to discuss patients care prior to him leaving the hospital. Perfect serve sent to Dr. Horowitz with request. RUBEN also updated primary RN Roz and Paul at [facility] with transport time.

## 2025-01-15 ENCOUNTER — TELEPHONE (OUTPATIENT)
Dept: CARDIOLOGY CLINIC | Age: 80
End: 2025-01-15

## 2025-01-15 LAB — GLUCOSE BLD-MCNC: 175 MG/DL (ref 74–99)

## 2025-02-04 ENCOUNTER — OFFICE VISIT (OUTPATIENT)
Dept: CARDIOLOGY CLINIC | Age: 80
End: 2025-02-04
Payer: MEDICARE

## 2025-02-04 VITALS
HEART RATE: 76 BPM | SYSTOLIC BLOOD PRESSURE: 132 MMHG | DIASTOLIC BLOOD PRESSURE: 70 MMHG | HEIGHT: 71 IN | BODY MASS INDEX: 33.6 KG/M2 | WEIGHT: 240 LBS

## 2025-02-04 DIAGNOSIS — I10 PRIMARY HYPERTENSION: ICD-10-CM

## 2025-02-04 DIAGNOSIS — R55 RECURRENT SYNCOPE: Primary | ICD-10-CM

## 2025-02-04 DIAGNOSIS — Z95.818 PRESENCE OF WATCHMAN LEFT ATRIAL APPENDAGE CLOSURE DEVICE: ICD-10-CM

## 2025-02-04 DIAGNOSIS — E78.00 PURE HYPERCHOLESTEROLEMIA: ICD-10-CM

## 2025-02-04 DIAGNOSIS — G47.33 OSA ON CPAP: ICD-10-CM

## 2025-02-04 DIAGNOSIS — I48.19 PERSISTENT ATRIAL FIBRILLATION (HCC): ICD-10-CM

## 2025-02-04 DIAGNOSIS — Z95.0 S/P PLACEMENT OF CARDIAC PACEMAKER: ICD-10-CM

## 2025-02-04 DIAGNOSIS — I25.10 CAD IN NATIVE ARTERY: ICD-10-CM

## 2025-02-04 PROCEDURE — 1036F TOBACCO NON-USER: CPT | Performed by: INTERNAL MEDICINE

## 2025-02-04 PROCEDURE — 3075F SYST BP GE 130 - 139MM HG: CPT | Performed by: INTERNAL MEDICINE

## 2025-02-04 PROCEDURE — 3078F DIAST BP <80 MM HG: CPT | Performed by: INTERNAL MEDICINE

## 2025-02-04 PROCEDURE — 1159F MED LIST DOCD IN RCRD: CPT | Performed by: INTERNAL MEDICINE

## 2025-02-04 PROCEDURE — 99214 OFFICE O/P EST MOD 30 MIN: CPT | Performed by: INTERNAL MEDICINE

## 2025-02-04 PROCEDURE — G8417 CALC BMI ABV UP PARAM F/U: HCPCS | Performed by: INTERNAL MEDICINE

## 2025-02-04 PROCEDURE — 1123F ACP DISCUSS/DSCN MKR DOCD: CPT | Performed by: INTERNAL MEDICINE

## 2025-02-04 PROCEDURE — 1111F DSCHRG MED/CURRENT MED MERGE: CPT | Performed by: INTERNAL MEDICINE

## 2025-02-04 PROCEDURE — G8427 DOCREV CUR MEDS BY ELIG CLIN: HCPCS | Performed by: INTERNAL MEDICINE

## 2025-02-04 RX ORDER — NITROGLYCERIN 0.4 MG/1
0.4 TABLET SUBLINGUAL
COMMUNITY
Start: 2025-01-17

## 2025-02-04 NOTE — ASSESSMENT & PLAN NOTE
History is suspicious for cardiac syncope.  We will obtain a 30-day cardiac event monitor as he is going home today from rehab facility.  If he had recurrent syncope on the monitor we should know whether he is having any tachyarrhythmias or is he having intermittent loss of ventricular capture.  Details are discussed.

## 2025-02-04 NOTE — ASSESSMENT & PLAN NOTE
Device is working well with remaining battery longevity of 6.5 years we will continue monitoring remotely.

## 2025-02-04 NOTE — ASSESSMENT & PLAN NOTE
Blood pressure is well-controlled and he was not orthostatic in the hospital when he presented with syncope.

## 2025-02-04 NOTE — PROGRESS NOTES
Kareem Rey  1945  Eduar Birmingham MD      Chief Complaint   Patient presents with    Hyperlipidemia    Hypertension    Follow-up     Follow up from hospital  No chest pain, SOB dizziness, or palpitations  Edema of both legs, red, burning , no drainage     Chief complaint and HPI:  Kareem Rey  is a 79 y.o. male following up for recurrent syncope.  Patient was in the hospital from January 7 to January 14 and I have reviewed the workup in details.  Had aspiration pneumonia and was discharged on antibiotics and now he is in outpatient cardiac rehab facility hoping to go home today.  He is accompanied by facility staff as well as his wife.  Wife describes that he passes out without any warning he was standing in the kitchen and suddenly fell backward and when she approached him he was not responding for approximately 1 minute and she did not take his vitals but called the squad and when the squad came his vital signs were normal in fact blood pressure was high heart rate was 73.  I have reviewed the EMS notes.  By the time they came he was completely awake alert oriented and did not have any seizure-like activity or bowel or bladder incontinence.  He reports he had 4 episodes within a month.  He had not had any episodes since his discharge.  Medications reviewed and reconciled.    Rest of the Cardiovascular system review is otherwise unchanged from prior encounter.  Past medical history:  has a past medical history of Abnormal nuclear stress test, Arthritis, CAD s/p PCI to LAD+RCA 9/9/22, Dizziness, Fall, FH: CAD (coronary artery disease), H/O 24 hour EKG monitoring, H/O atrial fibrillation without current medication, H/O cardiovascular stress test, H/O Doppler echocardiogram, H/O Doppler ultrasound (Venous Doppler Lower Extremities), H/O echocardiogram, H/O echocardiogram, H/O transesophageal echocardiography (SALAZAR) for monitoring, Heart murmur, History of nuclear stress test, History of nuclear stress test, Hx

## 2025-02-04 NOTE — PATIENT INSTRUCTIONS
30 days of cardiac event monitoring and follow up. Continue current cardiovascular medications which have been reviewed and discussed individually with you. Appropriate prescriptions if needed on this visit are addressed. After visit summery is provided.   Questions answered and patient verbalizes understanding. Follow up in 6 weeks,  sooner if needed.

## 2025-02-04 NOTE — ASSESSMENT & PLAN NOTE
Had not had lipids checked since November 23 needs to have it repeated.  Continue atorvastatin 20 mg daily.  LDL was 58 on last check.

## 2025-02-11 ENCOUNTER — OFFICE VISIT (OUTPATIENT)
Dept: CARDIOLOGY CLINIC | Age: 80
End: 2025-02-11
Payer: MEDICARE

## 2025-02-11 ENCOUNTER — TELEPHONE (OUTPATIENT)
Dept: CARDIOLOGY CLINIC | Age: 80
End: 2025-02-11

## 2025-02-11 DIAGNOSIS — I48.19 PERSISTENT ATRIAL FIBRILLATION (HCC): Primary | ICD-10-CM

## 2025-02-11 DIAGNOSIS — R55 RECURRENT SYNCOPE: ICD-10-CM

## 2025-02-11 PROCEDURE — G8428 CUR MEDS NOT DOCUMENT: HCPCS | Performed by: INTERNAL MEDICINE

## 2025-02-11 PROCEDURE — 99213 OFFICE O/P EST LOW 20 MIN: CPT | Performed by: INTERNAL MEDICINE

## 2025-02-11 PROCEDURE — 1111F DSCHRG MED/CURRENT MED MERGE: CPT | Performed by: INTERNAL MEDICINE

## 2025-02-11 PROCEDURE — 1036F TOBACCO NON-USER: CPT | Performed by: INTERNAL MEDICINE

## 2025-02-11 PROCEDURE — 1123F ACP DISCUSS/DSCN MKR DOCD: CPT | Performed by: INTERNAL MEDICINE

## 2025-02-11 PROCEDURE — G8417 CALC BMI ABV UP PARAM F/U: HCPCS | Performed by: INTERNAL MEDICINE

## 2025-02-11 NOTE — TELEPHONE ENCOUNTER
Patient wife was in the office and wanted to let us know that Ray fell. His legs gave out and he landed on his back no injuries. It occurred at 10:05 PM. She stated he did not pass out just fell.

## 2025-02-11 NOTE — PROGRESS NOTES
Critical events noted on cardiac monitor multiple times this day concern for oversensing by the ventricular lead resulting in flutter waves with ventricular pauses.  Patient reported he has been having multiple episodes of falling and he did 1 last night also.  We brought the patient into the office and his falls do not correlate with what we see on the monitor.  I reviewed the monitor strips with Dr. Dow and he believes that there are paced complexes which have low amplitude reason to appear as if there is no ventricular activity during.  Soft atrial flutter waves.  Patient is 62% pacing in the ventricle.  We brought the patient in interrogated him and turned off the capture management and reprogram the pacemaker to VVI R mode lower rate of 60 bpm and RV amplitude is increased from 2 V to 3.5 V and we will follow-up on his cardiac monitor and his symptoms with these changes to see if he is having less of those episodes of falls.  Questions answered to patient and his wife and they verbalized understanding.

## 2025-02-12 ENCOUNTER — TELEPHONE (OUTPATIENT)
Dept: CARDIOLOGY CLINIC | Age: 80
End: 2025-02-12

## 2025-02-12 NOTE — TELEPHONE ENCOUNTER
----- Message from Dr. Kevan Johnson MD sent at 2/12/2025 12:52 PM EST -----  Critical rhythm strips were reviewed.  Patient is in persistent atrial flutter with controlled ventricular rate response.  Long.'s of flutter waves without QRS complexes are noted but in fact paced QRS complexes are of low amplitude and they are difficult to see through the flutter waves and not as visible otherwise it appears appropriate ventricular sensing and pacing is present.  Patient has been seen in the office with this regard yesterday and he had not had any syncopal events.  ----- Message -----  From: Yesi Urbina MA  Sent: 2/12/2025   9:18 AM EST  To: Kevan Johnson MD    Serious event on monitor

## 2025-02-13 ENCOUNTER — OFFICE VISIT (OUTPATIENT)
Age: 80
End: 2025-02-13

## 2025-02-13 VITALS
OXYGEN SATURATION: 100 % | RESPIRATION RATE: 18 BRPM | HEART RATE: 82 BPM | WEIGHT: 242 LBS | HEIGHT: 71 IN | SYSTOLIC BLOOD PRESSURE: 100 MMHG | BODY MASS INDEX: 33.88 KG/M2 | DIASTOLIC BLOOD PRESSURE: 70 MMHG

## 2025-02-13 DIAGNOSIS — E83.42 HYPOMAGNESEMIA: ICD-10-CM

## 2025-02-13 DIAGNOSIS — H91.90 HOH (HARD OF HEARING): ICD-10-CM

## 2025-02-13 DIAGNOSIS — I25.10 ASCVD (ARTERIOSCLEROTIC CARDIOVASCULAR DISEASE): ICD-10-CM

## 2025-02-13 DIAGNOSIS — I25.10 CAD IN NATIVE ARTERY: ICD-10-CM

## 2025-02-13 DIAGNOSIS — Z87.39 HISTORY OF GOUT: ICD-10-CM

## 2025-02-13 DIAGNOSIS — D53.9 MACROCYTIC ANEMIA: ICD-10-CM

## 2025-02-13 DIAGNOSIS — I48.19 PERSISTENT ATRIAL FIBRILLATION (HCC): ICD-10-CM

## 2025-02-13 DIAGNOSIS — N18.32 CHRONIC KIDNEY DISEASE (CKD) STAGE G3B/A1, MODERATELY DECREASED GLOMERULAR FILTRATION RATE (GFR) BETWEEN 30-44 ML/MIN/1.73 SQUARE METER AND ALBUMINURIA CREATININE RATIO LESS THAN 30 MG/G (HCC): ICD-10-CM

## 2025-02-13 DIAGNOSIS — Z23 NEEDS FLU SHOT: ICD-10-CM

## 2025-02-13 DIAGNOSIS — E11.9 TYPE 2 DIABETES MELLITUS WITHOUT COMPLICATION, WITHOUT LONG-TERM CURRENT USE OF INSULIN (HCC): ICD-10-CM

## 2025-02-13 DIAGNOSIS — E44.0 MODERATE MALNUTRITION (HCC): Chronic | ICD-10-CM

## 2025-02-13 DIAGNOSIS — I35.0 NONRHEUMATIC AORTIC VALVE STENOSIS: ICD-10-CM

## 2025-02-13 DIAGNOSIS — R60.0 BILATERAL LEG EDEMA: ICD-10-CM

## 2025-02-13 DIAGNOSIS — Z95.818 PRESENCE OF WATCHMAN LEFT ATRIAL APPENDAGE CLOSURE DEVICE: ICD-10-CM

## 2025-02-13 DIAGNOSIS — R55 SYNCOPE, UNSPECIFIED SYNCOPE TYPE: ICD-10-CM

## 2025-02-13 DIAGNOSIS — R53.81 PHYSICAL DEBILITY: ICD-10-CM

## 2025-02-13 DIAGNOSIS — F32.1 CURRENT MODERATE EPISODE OF MAJOR DEPRESSIVE DISORDER WITHOUT PRIOR EPISODE (HCC): ICD-10-CM

## 2025-02-13 DIAGNOSIS — D68.69 HYPERCOAGULABLE STATE DUE TO CHRONIC ATRIAL FIBRILLATION (HCC): ICD-10-CM

## 2025-02-13 DIAGNOSIS — I10 PRIMARY HYPERTENSION: ICD-10-CM

## 2025-02-13 DIAGNOSIS — G47.33 OSA ON CPAP: ICD-10-CM

## 2025-02-13 DIAGNOSIS — I48.20 HYPERCOAGULABLE STATE DUE TO CHRONIC ATRIAL FIBRILLATION (HCC): ICD-10-CM

## 2025-02-13 DIAGNOSIS — Z00.00 ANNUAL PHYSICAL EXAM: Primary | ICD-10-CM

## 2025-02-13 RX ORDER — MULTIVITAMIN WITH IRON
500 TABLET ORAL DAILY
COMMUNITY

## 2025-02-13 RX ORDER — ESCITALOPRAM OXALATE 5 MG/1
5 TABLET ORAL DAILY
Qty: 90 TABLET | Refills: 1 | Status: SHIPPED | OUTPATIENT
Start: 2025-02-13

## 2025-02-13 RX ORDER — BISACODYL 10 MG
10 SUPPOSITORY, RECTAL RECTAL DAILY
COMMUNITY

## 2025-02-13 ASSESSMENT — PATIENT HEALTH QUESTIONNAIRE - PHQ9
1. LITTLE INTEREST OR PLEASURE IN DOING THINGS: NOT AT ALL
SUM OF ALL RESPONSES TO PHQ QUESTIONS 1-9: 1
2. FEELING DOWN, DEPRESSED OR HOPELESS: SEVERAL DAYS
SUM OF ALL RESPONSES TO PHQ QUESTIONS 1-9: 1
SUM OF ALL RESPONSES TO PHQ QUESTIONS 1-9: 1
SUM OF ALL RESPONSES TO PHQ9 QUESTIONS 1 & 2: 1
SUM OF ALL RESPONSES TO PHQ QUESTIONS 1-9: 1

## 2025-02-13 NOTE — PROGRESS NOTES
Vaccine Information Sheet, \"Influenza - Inactivated\"  given to Kareem Rey, or parent/legal guardian of  Kareem Rey and verbalized understanding.    Patient responses:    Have you ever had a reaction to a flu vaccine? No  Are you able to eat eggs without adverse effects?  Yes  Do you have any current illness?  No  Have you ever had [employer] [place] Syndrome?  No    Flu vaccine given per order. Please see immunization tab.      Patient recently discharged from [facility] and rehab.              
y+), IM, Preservative Free, 0.5mL    20. Current moderate episode of major depressive disorder without prior episode (HCC)  Start low dose for mild depression.  - escitalopram (LEXAPRO) 5 MG tablet; Take 1 tablet by mouth daily  Dispense: 90 tablet; Refill: 1    21. Bilateral leg edema  I recommend ace wraps, as compression stockings too difficult to put on.    Return in about 8 weeks (around 4/10/2025) for Interval follow-up, Labs 1 week prior to appointment.       Subjective   SUBJECTIVE/OBJECTIVE:  HPI  Mr. Rey is an 80y/o M, former patient of Dr. Eduar Birmingham (), who presents to establish care.    PMHx  HL on lipitor 20mg  HTN on amlodipine 10  DM2 (A1c 4.7% on 8/2024) on metformin 500mg BID  CKD 3A Cr base (1.3-1.4)  CAD s/p PCI to LAD+RCA 9/2022 on lipitor, plavix, and SLNG  pAfib s/p watchman  on metoprolol 12.5mg BID,  plavix 75mg  Thoracic Aortic Aneurysm 4.5cm 2011  AVS  Vit D deficiency  Gout on Allopurinol  Syncope (on 2/4/25 started on 30d Holter)-Dr. Johnson  OA  JAYE on CPAP  ARIEL on singulair 10mg  Nephroliths  Hypomag on 400mg qd  Neuropathy on gabapentin 400 TID  Iron Defic on iron 325mg  Macrocytic anemia (10.7, .7)  S/p Watchman 7/2023  S/p Cardioversion 2019  S/ caratarct bilat 2019  X/p C-scope 2007,2017  S/p hernia repair 2008/2009  S/p PPM placement 2019  S/p TKA right 2022  S/p EGD 2023    History of Present Illness  The patient is an 79-year-old male who presents for evaluation of muscle soreness, leg swelling, foot issues, neuropathy, and syncope. He is accompanied by his wife.    He reports experiencing muscle soreness in his lower extremities, which he attributes to overexertion during a recent exercise session. He does not engage in stretching exercises. He utilizes a rollator for mobility due to impaired ambulation and a history of falls. He has been diagnosed with aortic valve stenosis. He experiences leg weakness when standing for prolonged periods, leading to falls. He

## 2025-02-18 ENCOUNTER — TELEPHONE (OUTPATIENT)
Age: 80
End: 2025-02-18

## 2025-02-18 DIAGNOSIS — R06.02 SOB (SHORTNESS OF BREATH): Primary | ICD-10-CM

## 2025-02-18 RX ORDER — LANOLIN ALCOHOL/MO/W.PET/CERES
100 CREAM (GRAM) TOPICAL DAILY
Qty: 30 TABLET | Refills: 3 | Status: SHIPPED | OUTPATIENT
Start: 2025-02-18

## 2025-02-18 RX ORDER — NEBULIZER ACCESSORIES
1 KIT MISCELLANEOUS DAILY PRN
Qty: 1 KIT | Refills: 5 | Status: SHIPPED | OUTPATIENT
Start: 2025-02-18

## 2025-02-18 RX ORDER — NITROGLYCERIN 0.4 MG/1
0.4 TABLET SUBLINGUAL EVERY 5 MIN PRN
Qty: 25 TABLET | Refills: 1 | Status: SHIPPED | OUTPATIENT
Start: 2025-02-18

## 2025-02-18 RX ORDER — IPRATROPIUM BROMIDE AND ALBUTEROL SULFATE 2.5; .5 MG/3ML; MG/3ML
3 SOLUTION RESPIRATORY (INHALATION) EVERY 4 HOURS PRN
Qty: 360 ML | Refills: 0 | Status: SHIPPED | OUTPATIENT
Start: 2025-02-18 | End: 2025-03-20

## 2025-02-19 RX ORDER — CLOPIDOGREL BISULFATE 75 MG/1
75 TABLET ORAL DAILY
Qty: 90 TABLET | Refills: 4 | Status: SHIPPED | OUTPATIENT
Start: 2025-02-19

## 2025-02-27 RX ORDER — LANOLIN ALCOHOL/MO/W.PET/CERES
400 CREAM (GRAM) TOPICAL DAILY
Qty: 90 TABLET | Refills: 3 | Status: SHIPPED | OUTPATIENT
Start: 2025-02-27

## 2025-02-27 RX ORDER — LACTOBACILLUS RHAMNOSUS GG 10B CELL
1 CAPSULE ORAL
Qty: 90 CAPSULE | Refills: 3 | Status: SHIPPED | OUTPATIENT
Start: 2025-02-27 | End: 2025-03-29

## 2025-02-27 NOTE — TELEPHONE ENCOUNTER
Received call from home care stating pt has these  medications on his med list but does not have these at home please send new RX's to pharmacy. Thank you!

## 2025-03-04 ENCOUNTER — TELEPHONE (OUTPATIENT)
Age: 80
End: 2025-03-04

## 2025-03-04 NOTE — TELEPHONE ENCOUNTER
Mariel Onofre (physical therapist) called to report a fall that happened on Sunday March 2nd. She stated patient spent the last couple of days in bed and she stressed to him the importance of getting up and moving.

## 2025-03-10 ENCOUNTER — TELEPHONE (OUTPATIENT)
Dept: CARDIOLOGY CLINIC | Age: 80
End: 2025-03-10

## 2025-03-10 NOTE — TELEPHONE ENCOUNTER
Cardiac event monitoring from 2/4/2025 to 3/5/2025 done to evaluate syncope.  Underlying rhythm is atrial flutter with complete heart block with ventricular pacing competing with intrinsic AV conduction.  Average rate is 66 bpm and the minimum rate was 60 and the maximum rate was 105 bpm.  Paced complexes are of low QRS amplitude compared to intrinsically conducted QRS complexes.  Due to low QRS paced complexes multiple episodes of pauses are reported which are verified to be due to low voltage QRS paced complexes rather than loss of capture.  Appropriate ventricular sensing and capture is noted throughout the monitor.  Ventricular bigeminy was noted on 2/11/2025 at 9:15 PM and 6 beat run of wide-complex tachycardia noted on 2/16/2025 at 1:23 PM rate was hide and 37 bpm.  A different morphology of ventricular paced complexes versus  idioventricular rhythm noted on 2/18/2025 at 3:40 PM rate was 69 bpm.  Multiple serious events reported during paced QRS complexes resulting in under sensing however patient did not report any syncope or near syncop

## 2025-03-18 ENCOUNTER — OFFICE VISIT (OUTPATIENT)
Dept: CARDIOLOGY CLINIC | Age: 80
End: 2025-03-18
Payer: MEDICARE

## 2025-03-18 VITALS
WEIGHT: 228 LBS | HEIGHT: 71 IN | DIASTOLIC BLOOD PRESSURE: 66 MMHG | BODY MASS INDEX: 31.92 KG/M2 | SYSTOLIC BLOOD PRESSURE: 124 MMHG | HEART RATE: 60 BPM

## 2025-03-18 DIAGNOSIS — I10 PRIMARY HYPERTENSION: ICD-10-CM

## 2025-03-18 DIAGNOSIS — Z95.0 S/P PLACEMENT OF CARDIAC PACEMAKER: ICD-10-CM

## 2025-03-18 DIAGNOSIS — I25.10 CAD IN NATIVE ARTERY: ICD-10-CM

## 2025-03-18 DIAGNOSIS — G47.33 OSA ON CPAP: ICD-10-CM

## 2025-03-18 DIAGNOSIS — E11.9 TYPE 2 DIABETES MELLITUS WITHOUT COMPLICATION, WITHOUT LONG-TERM CURRENT USE OF INSULIN: ICD-10-CM

## 2025-03-18 DIAGNOSIS — E78.00 PURE HYPERCHOLESTEROLEMIA: ICD-10-CM

## 2025-03-18 DIAGNOSIS — I48.19 PERSISTENT ATRIAL FIBRILLATION (HCC): Primary | ICD-10-CM

## 2025-03-18 DIAGNOSIS — Z95.818 PRESENCE OF WATCHMAN LEFT ATRIAL APPENDAGE CLOSURE DEVICE: ICD-10-CM

## 2025-03-18 PROCEDURE — G8427 DOCREV CUR MEDS BY ELIG CLIN: HCPCS | Performed by: INTERNAL MEDICINE

## 2025-03-18 PROCEDURE — G8417 CALC BMI ABV UP PARAM F/U: HCPCS | Performed by: INTERNAL MEDICINE

## 2025-03-18 PROCEDURE — 1123F ACP DISCUSS/DSCN MKR DOCD: CPT | Performed by: INTERNAL MEDICINE

## 2025-03-18 PROCEDURE — 99214 OFFICE O/P EST MOD 30 MIN: CPT | Performed by: INTERNAL MEDICINE

## 2025-03-18 PROCEDURE — 1036F TOBACCO NON-USER: CPT | Performed by: INTERNAL MEDICINE

## 2025-03-18 PROCEDURE — 3074F SYST BP LT 130 MM HG: CPT | Performed by: INTERNAL MEDICINE

## 2025-03-18 PROCEDURE — 1159F MED LIST DOCD IN RCRD: CPT | Performed by: INTERNAL MEDICINE

## 2025-03-18 PROCEDURE — 3078F DIAST BP <80 MM HG: CPT | Performed by: INTERNAL MEDICINE

## 2025-03-18 RX ORDER — LISINOPRIL 5 MG/1
5 TABLET ORAL DAILY
Qty: 30 TABLET | Refills: 4 | Status: SHIPPED | OUTPATIENT
Start: 2025-03-18

## 2025-03-18 RX ORDER — AMLODIPINE BESYLATE 5 MG/1
5 TABLET ORAL DAILY
Qty: 30 TABLET | Refills: 4 | Status: SHIPPED | OUTPATIENT
Start: 2025-03-18

## 2025-03-18 NOTE — ASSESSMENT & PLAN NOTE
Had not had his lipids checked for more than a year we will check his lipids with next blood test.

## 2025-03-18 NOTE — ASSESSMENT & PLAN NOTE
Blood pressure is doing well however we will make some adjustment in his blood pressure medication today.  We will decrease amlodipine to 5 mg daily so we can introduce lisinopril 5 mg daily.  With known coronary artery disease and diabetes and mild renal sufficiency he will benefit from ACE inhibitor's if tolerated.  We will check his basic metabolic panel in a month and follow-up.  He is asked to start monitoring blood pressure closely.

## 2025-03-18 NOTE — PROGRESS NOTES
Kareem Rey  1945  John Steinberg MD      Chief Complaint   Patient presents with    Follow-up    Results    Atrial Fibrillation    Hypertension    Hyperlipidemia     OV for results of moniter. Pt denies any chest pain,SOB,dizziness, or palpitations. He does have swelling to ankles.     Chief complaint and HPI:  Kareem Rey  is a 79 y.o. male following up for known coronary artery disease hypertension hyperlipidemia obstructive sleep apnea persistent atrial fibrillation and type 2 diabetes.  He had only 1 fall since he was seen last.  He says he get dizzy and hour and half after he takes morning medications which includes atenolol.  We reviewed his medications are reconciled.  His wife helps him with his medications and she is pretty much watching him for everything.  He continues to drink whiskey twice a day.  He has an appointment with neurology today for tremors.    Rest of the Cardiovascular system review is otherwise unchanged from prior encounter.  Past medical history:  has a past medical history of Abnormal nuclear stress test, Arthritis, CAD s/p PCI to LAD+RCA 9/9/22, Dizziness, Fall, FH: CAD (coronary artery disease), H/O 24 hour EKG monitoring, H/O atrial fibrillation without current medication, H/O cardiovascular stress test, H/O Doppler echocardiogram, H/O Doppler ultrasound (Venous Doppler Lower Extremities), H/O echocardiogram, H/O echocardiogram, H/O transesophageal echocardiography (SALAZAR) for monitoring, Heart murmur, History of cardiac monitoring, History of nuclear stress test, Hx of Doppler echocardiogram, Hx of Venous Doppler ultrasound, Hyperlipidemia, Hypertension, Kidney stones, Leg swelling, Neuropathy, Nonrheumatic aortic valve stenosis, Obesity, JAYE on CPAP, Persistent atrial fibrillation (HCC), Presence of Watchman left atrial appendage closure device, Presence of Watchman left atrial appendage closure device, Syncope and collapse, Thoracic aortic aneurysm, Tremors of nervous system,

## 2025-03-18 NOTE — ASSESSMENT & PLAN NOTE
Is in persistent atrial fibrillation and flutter.  Has history of Watchman implantation July last year for stroke prevention.  Rate is well-controlled.

## 2025-03-21 ENCOUNTER — TELEPHONE (OUTPATIENT)
Age: 80
End: 2025-03-21

## 2025-03-21 NOTE — TELEPHONE ENCOUNTER
Denae with [facility] called to report that at the visit 03/19 with the patient, the patient has a callus like spot on the left plantar. Pt sees podiatry 03/26. As of 03/19 the area was not open or draining but it was very swollen and was causing the patient pain with walking.     Delayed report due to 03/19 visit being after 5 pm and Nurse being off 03/20.

## 2025-03-25 RX ORDER — LANOLIN ALCOHOL/MO/W.PET/CERES
100 CREAM (GRAM) TOPICAL DAILY
Qty: 30 TABLET | Refills: 4 | OUTPATIENT
Start: 2025-03-25

## 2025-03-25 NOTE — TELEPHONE ENCOUNTER
Medication:   Requested Prescriptions     Pending Prescriptions Disp Refills    thiamine 100 MG tablet [Pharmacy Med Name: VITAMIN B-1 100 MG TABS 100 Tablet] 30 tablet 4     Sig: TAKE 1 TABLET BY MOUTH DAILY       Last Filled:      Patient Phone Number: [phone] (home) [phone] (work)    Last appt: 2/13/2025   Next appt: 4/10/2025    Last Labs DM:   Lab Results   Component Value Date/Time    LABA1C 4.7 08/05/2024 11:05 AM     Last Lipid:   Lab Results   Component Value Date/Time    CHOL 118 12/12/2016 01:00 PM    TRIG 103 12/12/2016 01:00 PM    HDL 52 11/20/2023 01:20 PM     Last PSA:   Lab Results   Component Value Date/Time    PSA 1.91 04/13/2022 12:58 PM     Last Thyroid:   Lab Results   Component Value Date/Time    T4FREE 1.04 02/25/2024 03:23 PM

## 2025-03-27 ENCOUNTER — TELEPHONE (OUTPATIENT)
Age: 80
End: 2025-03-27

## 2025-03-27 NOTE — TELEPHONE ENCOUNTER
Denae with [facility] called reporting a patient fall with a skin tear of the left forearm. She called to get orders to get supplies for wound care. Antibiotic ointment, non stick pads, gauze, etc to provide wound care for th patient    Verbal given

## 2025-04-07 ENCOUNTER — HOSPITAL ENCOUNTER (OUTPATIENT)
Age: 80
Discharge: HOME | End: 2025-04-07
Payer: MEDICARE

## 2025-04-07 ENCOUNTER — TELEPHONE (OUTPATIENT)
Age: 80
End: 2025-04-07

## 2025-04-07 DIAGNOSIS — D53.9 MACROCYTIC ANEMIA: ICD-10-CM

## 2025-04-07 DIAGNOSIS — Z87.39 HISTORY OF GOUT: ICD-10-CM

## 2025-04-07 DIAGNOSIS — I25.10 CAD IN NATIVE ARTERY: ICD-10-CM

## 2025-04-07 DIAGNOSIS — E11.9 TYPE 2 DIABETES MELLITUS WITHOUT COMPLICATION, WITHOUT LONG-TERM CURRENT USE OF INSULIN: ICD-10-CM

## 2025-04-07 LAB
ALBUMIN SERPL-MCNC: 3.9 G/DL (ref 3.4–5)
ALBUMIN/GLOB SERPL: 1.6 {RATIO}
ALP SERPL-CCNC: 127 U/L (ref 40–129)
ALT SERPL-CCNC: 32 U/L (ref 10–40)
AST SERPL-CCNC: 45 U/L (ref 15–37)
BILIRUB DIRECT SERPL-MCNC: 0.4 MG/DL (ref 0–0.3)
BILIRUB INDIRECT SERPL-MCNC: 0.4 MG/DL (ref 0–0.7)
BILIRUB SERPL-MCNC: 0.9 MG/DL (ref 0–1)
CHOLEST SERPL-MCNC: 104 MG/DL (ref 125–199)
CREAT UR-MCNC: 138 MG/DL (ref 39–259)
EST. AVERAGE GLUCOSE BLD GHB EST-MCNC: 95 MG/DL
FERRITIN SERPL-MCNC: 263 NG/ML (ref 30–400)
FOLATE SERPL-MCNC: 5.4 NG/ML (ref 4.8–24.2)
GLOBULIN SER CALC-MCNC: 2.5 G/DL
HBA1C MFR BLD: 4.9 % (ref 4.2–6.3)
HDLC SERPL-MCNC: 48 MG/DL
IRON SATN MFR SERPL: 42 % (ref 15–50)
IRON SERPL-MCNC: 96 UG/DL (ref 59–158)
LDLC SERPL CALC-MCNC: 40 MG/DL
MICROALBUMIN UR-MCNC: <1 MG/L
MICROALBUMIN/CREAT UR-RTO: NORMAL MCG/MG CREAT (ref 0–2)
PROT SERPL-MCNC: 6.3 G/DL (ref 6.4–8.2)
TIBC SERPL-MCNC: 228 UG/DL (ref 260–445)
TRIGL SERPL-MCNC: 77 MG/DL
TSH SERPL DL<=0.05 MIU/L-ACNC: 1.55 UIU/ML (ref 0.27–4.2)
UNSATURATED IRON BINDING CAPACITY: 132 UG/DL (ref 110–370)
URATE SERPL-MCNC: 5.5 MG/DL (ref 3.5–7.2)
VIT B12 SERPL-MCNC: 910 PG/ML (ref 211–911)

## 2025-04-07 PROCEDURE — 82525 ASSAY OF COPPER: CPT

## 2025-04-07 PROCEDURE — 84550 ASSAY OF BLOOD/URIC ACID: CPT

## 2025-04-07 PROCEDURE — 82746 ASSAY OF FOLIC ACID SERUM: CPT

## 2025-04-07 PROCEDURE — 82607 VITAMIN B-12: CPT

## 2025-04-07 PROCEDURE — 84443 ASSAY THYROID STIM HORMONE: CPT

## 2025-04-07 PROCEDURE — 36415 COLL VENOUS BLD VENIPUNCTURE: CPT

## 2025-04-07 PROCEDURE — 82570 ASSAY OF URINE CREATININE: CPT

## 2025-04-07 PROCEDURE — 83036 HEMOGLOBIN GLYCOSYLATED A1C: CPT

## 2025-04-07 PROCEDURE — 80076 HEPATIC FUNCTION PANEL: CPT

## 2025-04-07 PROCEDURE — 82728 ASSAY OF FERRITIN: CPT

## 2025-04-07 PROCEDURE — 80061 LIPID PANEL: CPT

## 2025-04-07 PROCEDURE — 82043 UR ALBUMIN QUANTITATIVE: CPT

## 2025-04-07 PROCEDURE — 83540 ASSAY OF IRON: CPT

## 2025-04-07 PROCEDURE — 83550 IRON BINDING TEST: CPT

## 2025-04-07 NOTE — TELEPHONE ENCOUNTER
Arminda with [facility] called to report that the patient has been having excessive falls in the last few weeks. Roughly 4 falls two weeks ago, a fall last week, and a fall 04/04. The fall 04/04 the patient did hit his face so he has a carpet burn on his face but no headaches or further cognitive impairment. Arminda wanted to know if PCP recommended ER due to facial injury to do imaging on the head, per PCP patient can hold off on going to the ER as long as he is asymptomatic. Arminda informed, voiced understanding

## 2025-04-10 ENCOUNTER — OFFICE VISIT (OUTPATIENT)
Age: 80
End: 2025-04-10

## 2025-04-10 VITALS
BODY MASS INDEX: 31.27 KG/M2 | DIASTOLIC BLOOD PRESSURE: 64 MMHG | SYSTOLIC BLOOD PRESSURE: 100 MMHG | HEART RATE: 68 BPM | OXYGEN SATURATION: 100 % | WEIGHT: 223.4 LBS | RESPIRATION RATE: 20 BRPM | HEIGHT: 71 IN

## 2025-04-10 DIAGNOSIS — D53.9 MACROCYTIC ANEMIA: Primary | ICD-10-CM

## 2025-04-10 DIAGNOSIS — F32.1 CURRENT MODERATE EPISODE OF MAJOR DEPRESSIVE DISORDER WITHOUT PRIOR EPISODE (HCC): ICD-10-CM

## 2025-04-10 DIAGNOSIS — M62.89 MUSCLE TIGHTNESS: ICD-10-CM

## 2025-04-10 DIAGNOSIS — R41.3 MEMORY DEFICITS: ICD-10-CM

## 2025-04-10 LAB — COPPER SERPL-MCNC: 102.6 UG/DL (ref 70–140)

## 2025-04-10 RX ORDER — PRIMIDONE 50 MG/1
TABLET ORAL
COMMUNITY
Start: 2025-03-18

## 2025-04-10 NOTE — PROGRESS NOTES
Kareem Rey (:  1945) is a 79 y.o. male,Established patient, here for evaluation of the following chief complaint(s):  Fall       Assessment & Plan   ASSESSMENT/PLAN:  1. Macrocytic anemia  Check occult blood, repeat cbc  - BLOOD OCCULT STOOL #1; Future  - CBC; Future    2. Current moderate episode of major depressive disorder without prior episode (HCC)  I recommend starting lexapro    3. Memory deficits  F/u in 4 weeks for MOCA/SLUMS    4. Muscle tightness  I recommend exercises once or twice daily (previously given packets), has not been performing.      Return in about 4 weeks (around 2025) for Interval follow-up.       Subjective   SUBJECTIVE/OBJECTIVE:  HPI  Mr. Rey is an 80y/o M, former patient of Dr. Eduar Birmingham (), who presents for Anemia, Memory.    PMHx  HL on lipitor 20mg  HTN on amlodipine 10  DM2 (A1c 4.7% on 2024) on metformin 500mg daily  CKD 3A Cr base (1.3-1.4)  CAD s/p PCI to LAD+RCA 2022 on lipitor, plavix, and SLNG  pAfib s/p watchman  on metoprolol 12.5mg BID,  plavix 75mg  Thoracic Aortic Aneurysm 4.5cm   AVS  Vit D deficiency  Gout on Allopurinol  Syncope (on 25 started on 30d Holter)-Dr. Johnson  OA  JAYE on CPAP  ARIEL on singulair 10mg  Nephroliths  Hypomag on 400mg qd  Neuropathy on gabapentin 400 TID  Iron Defic on iron 325mg  Macrocytic anemia (10.7, .7)  S/p Watchman 2023  S/p Cardioversion   S/ caratarct bilat   X/p C-scope ,  S/p hernia repair   S/p PPM placement   S/p TKA right   S/p EGD     History of Present Illness  The patient presents for evaluation of memory issues, anemia, tremors, neuropathy, and muscle tightness. He is accompanied by his wife.    Memory issues have been attributed to aging, and Lexapro has been discontinued. His wife manages his medication regimen. Previous attempts to manage memory issues with Prevagen were ineffective.    A change in stool color to reddish-brown is reported, without

## 2025-04-11 ENCOUNTER — APPOINTMENT (OUTPATIENT)
Dept: CT IMAGING | Age: 80
End: 2025-04-11
Payer: MEDICARE

## 2025-04-11 ENCOUNTER — APPOINTMENT (OUTPATIENT)
Dept: GENERAL RADIOLOGY | Age: 80
End: 2025-04-11
Payer: MEDICARE

## 2025-04-11 ENCOUNTER — HOSPITAL ENCOUNTER (EMERGENCY)
Age: 80
Discharge: HOME | End: 2025-04-11
Attending: EMERGENCY MEDICINE
Payer: MEDICARE

## 2025-04-11 VITALS
OXYGEN SATURATION: 96 % | SYSTOLIC BLOOD PRESSURE: 116 MMHG | DIASTOLIC BLOOD PRESSURE: 72 MMHG | HEART RATE: 62 BPM | RESPIRATION RATE: 16 BRPM | BODY MASS INDEX: 31.1 KG/M2 | WEIGHT: 223 LBS | TEMPERATURE: 97.4 F

## 2025-04-11 DIAGNOSIS — S09.90XA INJURY OF HEAD, INITIAL ENCOUNTER: Primary | ICD-10-CM

## 2025-04-11 DIAGNOSIS — S00.83XA CONTUSION OF FACE, INITIAL ENCOUNTER: ICD-10-CM

## 2025-04-11 PROCEDURE — 70486 CT MAXILLOFACIAL W/O DYE: CPT

## 2025-04-11 PROCEDURE — 73562 X-RAY EXAM OF KNEE 3: CPT

## 2025-04-11 PROCEDURE — 6370000000 HC RX 637 (ALT 250 FOR IP): Performed by: EMERGENCY MEDICINE

## 2025-04-11 PROCEDURE — 70450 CT HEAD/BRAIN W/O DYE: CPT

## 2025-04-11 PROCEDURE — 99284 EMERGENCY DEPT VISIT MOD MDM: CPT

## 2025-04-11 RX ORDER — BACITRACIN ZINC 500 [USP'U]/G
OINTMENT TOPICAL ONCE
Status: COMPLETED | OUTPATIENT
Start: 2025-04-11 | End: 2025-04-11

## 2025-04-11 RX ADMIN — BACITRACIN ZINC: 500 OINTMENT TOPICAL at 02:10

## 2025-04-11 ASSESSMENT — ENCOUNTER SYMPTOMS
BOWEL INCONTINENCE: 0
EYES NEGATIVE: 1
GASTROINTESTINAL NEGATIVE: 1
VOMITING: 0
RESPIRATORY NEGATIVE: 1
VISUAL CHANGE: 0
ABDOMINAL PAIN: 0
NAUSEA: 0

## 2025-04-11 ASSESSMENT — PAIN - FUNCTIONAL ASSESSMENT
PAIN_FUNCTIONAL_ASSESSMENT: NONE - DENIES PAIN
PAIN_FUNCTIONAL_ASSESSMENT: NONE - DENIES PAIN

## 2025-04-11 NOTE — ED PROVIDER NOTES
The history is provided by the patient.   Fall  The accident occurred Less than 1 hour ago. The fall occurred while walking. He fell from a height of 1 to 2 ft. He landed on A hard floor. The point of impact was the head (Tripped and fell into a night stand). The pain is present in the head. The pain is at a severity of 3/10. The pain is mild. He was Ambulatory at the scene. There was No entrapment after the fall. There was No drug use involved in the accident. There was No alcohol use involved in the accident. Pertinent negatives include no visual change, no fever, no numbness, no abdominal pain, no bowel incontinence, no nausea, no vomiting, no hematuria, no headaches, no hearing loss, no loss of consciousness and no tingling.       Review of Systems   Constitutional: Negative.  Negative for fever.   HENT: Negative.     Eyes: Negative.    Respiratory: Negative.     Cardiovascular: Negative.    Gastrointestinal: Negative.  Negative for abdominal pain, bowel incontinence, nausea and vomiting.   Genitourinary: Negative.  Negative for hematuria.   Musculoskeletal: Negative.    Skin: Negative.    Neurological: Negative.  Negative for tingling, loss of consciousness, numbness and headaches.   All other systems reviewed and are negative.      Family History   Problem Relation Age of Onset    Diabetes Mother     Heart Disease Father     Heart Disease Brother     High Blood Pressure Brother      Social History     Socioeconomic History    Marital status:      Spouse name: Not on file    Number of children: 2    Years of education: Not on file    Highest education level: Not on file   Occupational History    Occupation: factory     Employer: [employer]     Comment: retired    Occupation: Farm   Tobacco Use    Smoking status: Former     Current packs/day: 0.00     Average packs/day: 1 pack/day for 7.0 years (7.0 ttl pk-yrs)     Types: Cigarettes     Start date: 3/26/1966     Quit date: 3/26/1973     Years since  sections were obtained through the face without contrast administration. Sagittal, and coronal reconstructed images were obtained. This exam was performed according to our departmental dose-optimization program, which includes automated exposure control, adjustment of the mA and/or kV according to patient size and/or use of iterative reconstruction technique. FINDINGS: FACIAL BONES: No acute fracture. NASAL BONE:No acute fracture. ORBITS: Symmetric and within normal limits. Paranasal sinus mucosal thickening MASTOID: Mastoid air cells are patent. SOFT TISSUES: Small left frontal scalp hematoma and right periorbital scalp hematoma IMPRESSION: 1. No acute facial fracture. Small frontal scalp hematoma  Dictated and Electronically Signed By: Nay Barajas MD 4/11/2025 1:22             EKG (if obtained): (All EKG's are interpreted by myself in the absence of a cardiologist)  None     Chronic conditions affecting care:none     Discussion with Other Profesionals : None    Social Determinants : None    Records Reviewed: Epic, Care Everywhere, PCP outpatient, Inpatient, EMS run sheets and External ER notes if available were reviewed           Disposition   Appropriate for outpatient management      Overall, the Condition and plan was discussed in detail. Patient permission to discuss condition and plan in front of any other individuals in the room was obtained if applicable. Agreement with plan and no questions or concerns were verbalized.  I discussed at length the patient's presentation and possible differential diagnoses that could be associated with this presentation.  I also discussed at length concerning worsening findings, new signs and symptoms that may warrant repeat examination as well as the patient just returning if they are not feeling any better or having problems with follow-up   In addition, risk, benefits, and side effects of medicines were discussed ,  and agreement with plan was verbalized.  For any new

## 2025-04-16 ENCOUNTER — TELEPHONE (OUTPATIENT)
Age: 80
End: 2025-04-16

## 2025-04-16 NOTE — TELEPHONE ENCOUNTER
Received a call from [facility], Kiersten to ask if provider would agree to continue follow patient and re-certify for nursing orders. Kiersten states that therapy was stopped because patient was not advancing, but he still needs home health care. Patient recently fell and scraped his knee and they are watching it to make sure that infection does not develop.Seen in the ED on 4/11/25 after falling. Also, Kiersten wanted to know if patient should continue taking his Metoprolol 25 mg tablets which appears to have been started in the hospital but has since ended after 60 doses. BP has been running low with last BP recorded at 100/60. Please advise.

## 2025-04-18 ENCOUNTER — APPOINTMENT (OUTPATIENT)
Dept: GENERAL RADIOLOGY | Age: 80
End: 2025-04-18
Payer: MEDICARE

## 2025-04-18 ENCOUNTER — APPOINTMENT (OUTPATIENT)
Dept: CT IMAGING | Age: 80
End: 2025-04-18
Payer: MEDICARE

## 2025-04-18 ENCOUNTER — HOSPITAL ENCOUNTER (EMERGENCY)
Age: 80
Discharge: HOME | End: 2025-04-18
Attending: EMERGENCY MEDICINE
Payer: MEDICARE

## 2025-04-18 VITALS
SYSTOLIC BLOOD PRESSURE: 146 MMHG | DIASTOLIC BLOOD PRESSURE: 80 MMHG | HEIGHT: 71 IN | HEART RATE: 72 BPM | WEIGHT: 223 LBS | BODY MASS INDEX: 31.22 KG/M2 | OXYGEN SATURATION: 98 % | RESPIRATION RATE: 14 BRPM | TEMPERATURE: 97.5 F

## 2025-04-18 DIAGNOSIS — S80.01XA CONTUSION OF RIGHT KNEE, INITIAL ENCOUNTER: ICD-10-CM

## 2025-04-18 DIAGNOSIS — R55 SYNCOPE AND COLLAPSE: Primary | ICD-10-CM

## 2025-04-18 DIAGNOSIS — S40.011A CONTUSION OF RIGHT SHOULDER, INITIAL ENCOUNTER: ICD-10-CM

## 2025-04-18 LAB
ALBUMIN SERPL-MCNC: 3.8 G/DL (ref 3.4–5)
ALBUMIN/GLOB SERPL: 1.4 {RATIO}
ALP SERPL-CCNC: 129 U/L (ref 40–129)
ALT SERPL-CCNC: 35 U/L (ref 10–40)
ANION GAP SERPL CALCULATED.3IONS-SCNC: 17 MMOL/L (ref 9–17)
AST SERPL-CCNC: 40 U/L (ref 15–37)
BASOPHILS # BLD: 0 K/UL
BASOPHILS NFR BLD: 0 % (ref 0–1)
BILIRUB SERPL-MCNC: 0.7 MG/DL (ref 0–1)
BUN SERPL-MCNC: 18 MG/DL (ref 7–20)
CALCIUM SERPL-MCNC: 9.5 MG/DL (ref 8.3–10.6)
CHLORIDE SERPL-SCNC: 101 MMOL/L (ref 99–110)
CO2 SERPL-SCNC: 18 MMOL/L (ref 21–32)
CREAT SERPL-MCNC: 1 MG/DL (ref 0.8–1.3)
EKG ATRIAL RATE: 46 BPM
EKG DIAGNOSIS: NORMAL
EKG Q-T INTERVAL: 524 MS
EKG QRS DURATION: 206 MS
EKG QTC CALCULATION (BAZETT): 524 MS
EKG R AXIS: -71 DEGREES
EKG T AXIS: 93 DEGREES
EKG VENTRICULAR RATE: 60 BPM
EOSINOPHIL # BLD: 0.07 K/UL
EOSINOPHILS RELATIVE PERCENT: 1 % (ref 0–3)
ERYTHROCYTE [DISTWIDTH] IN BLOOD BY AUTOMATED COUNT: 14.7 % (ref 11.7–14.9)
GFR, ESTIMATED: 73 ML/MIN/1.73M2
GLUCOSE SERPL-MCNC: 68 MG/DL (ref 74–99)
HCT VFR BLD AUTO: 37.3 % (ref 42–52)
HGB BLD-MCNC: 12.3 G/DL (ref 13.5–18)
LYMPHOCYTES NFR BLD: 0.58 K/UL
LYMPHOCYTES RELATIVE PERCENT: 8 % (ref 24–44)
MCH RBC QN AUTO: 33.9 PG (ref 27–31)
MCHC RBC AUTO-ENTMCNC: 33 G/DL (ref 32–36)
MCV RBC AUTO: 102.8 FL (ref 78–100)
MONOCYTES NFR BLD: 0.51 K/UL
MONOCYTES NFR BLD: 7 % (ref 0–5)
NEUTROPHILS NFR BLD: 84 % (ref 36–66)
NEUTS SEG NFR BLD: 6.13 K/UL
PLATELET # BLD AUTO: 139 K/UL (ref 140–440)
PLATELET ESTIMATE: ABNORMAL
PMV BLD AUTO: 9.1 FL (ref 7.5–11.1)
POTASSIUM SERPL-SCNC: 4.3 MMOL/L (ref 3.5–5.1)
PROT SERPL-MCNC: 6.5 G/DL (ref 6.4–8.2)
RBC # BLD AUTO: 3.63 M/UL (ref 4.6–6.2)
RBC # BLD: ABNORMAL 10*6/UL
SODIUM SERPL-SCNC: 136 MMOL/L (ref 136–145)
WBC # BLD: NORMAL 10*3/UL
WBC OTHER # BLD: 7.3 K/UL (ref 4–10.5)

## 2025-04-18 PROCEDURE — 72125 CT NECK SPINE W/O DYE: CPT

## 2025-04-18 PROCEDURE — 93010 ELECTROCARDIOGRAM REPORT: CPT | Performed by: INTERNAL MEDICINE

## 2025-04-18 PROCEDURE — 73030 X-RAY EXAM OF SHOULDER: CPT

## 2025-04-18 PROCEDURE — 85025 COMPLETE CBC W/AUTO DIFF WBC: CPT

## 2025-04-18 PROCEDURE — 6370000000 HC RX 637 (ALT 250 FOR IP): Performed by: EMERGENCY MEDICINE

## 2025-04-18 PROCEDURE — 80053 COMPREHEN METABOLIC PANEL: CPT

## 2025-04-18 PROCEDURE — 93005 ELECTROCARDIOGRAM TRACING: CPT | Performed by: EMERGENCY MEDICINE

## 2025-04-18 PROCEDURE — 99285 EMERGENCY DEPT VISIT HI MDM: CPT

## 2025-04-18 PROCEDURE — 73562 X-RAY EXAM OF KNEE 3: CPT

## 2025-04-18 RX ORDER — LIDOCAINE 50 MG/G
1 PATCH TOPICAL DAILY
Qty: 30 PATCH | Refills: 0 | Status: SHIPPED | OUTPATIENT
Start: 2025-04-18 | End: 2025-05-18

## 2025-04-18 RX ORDER — ACETAMINOPHEN 500 MG
1000 TABLET ORAL ONCE
Status: COMPLETED | OUTPATIENT
Start: 2025-04-18 | End: 2025-04-18

## 2025-04-18 RX ORDER — LIDOCAINE 4 G/G
1 PATCH TOPICAL ONCE
Status: DISCONTINUED | OUTPATIENT
Start: 2025-04-18 | End: 2025-04-18 | Stop reason: HOSPADM

## 2025-04-18 RX ORDER — BACITRACIN ZINC 500 [USP'U]/G
OINTMENT TOPICAL ONCE
Status: COMPLETED | OUTPATIENT
Start: 2025-04-18 | End: 2025-04-18

## 2025-04-18 RX ADMIN — BACITRACIN ZINC: 500 OINTMENT TOPICAL at 13:39

## 2025-04-18 RX ADMIN — ACETAMINOPHEN 1000 MG: 500 TABLET ORAL at 13:40

## 2025-04-18 ASSESSMENT — PAIN DESCRIPTION - LOCATION
LOCATION: SHOULDER
LOCATION: SHOULDER

## 2025-04-18 ASSESSMENT — PAIN SCALES - GENERAL
PAINLEVEL_OUTOF10: 5
PAINLEVEL_OUTOF10: 7

## 2025-04-18 ASSESSMENT — LIFESTYLE VARIABLES
HOW OFTEN DO YOU HAVE A DRINK CONTAINING ALCOHOL: NEVER
HOW MANY STANDARD DRINKS CONTAINING ALCOHOL DO YOU HAVE ON A TYPICAL DAY: PATIENT DOES NOT DRINK

## 2025-04-18 ASSESSMENT — PAIN DESCRIPTION - ORIENTATION
ORIENTATION: RIGHT
ORIENTATION: RIGHT

## 2025-04-18 ASSESSMENT — PAIN DESCRIPTION - PAIN TYPE: TYPE: ACUTE PAIN

## 2025-04-18 ASSESSMENT — PAIN - FUNCTIONAL ASSESSMENT: PAIN_FUNCTIONAL_ASSESSMENT: PREVENTS OR INTERFERES SOME ACTIVE ACTIVITIES AND ADLS

## 2025-04-18 ASSESSMENT — PAIN DESCRIPTION - DESCRIPTORS
DESCRIPTORS: SHOOTING;SHARP
DESCRIPTORS: DISCOMFORT

## 2025-04-18 NOTE — DISCHARGE INSTRUCTIONS
Return to the ER for any worsening signs and symptoms.  Follow-up with your primary care provider soon as possible for recheck.  Follow-up with your cardiologist for recheck as well.

## 2025-04-18 NOTE — ED PROVIDER NOTES
JAYE on CPAP 06/26/2018    On CPAP 5/2018    Persistent atrial fibrillation (HCC) 01/29/2019    New onset 1/2019    Presence of Watchman left atrial appendage closure device     Presence of Watchman left atrial appendage closure device 07/06/2023    Syncope and collapse 12/09/2019    Thoracic aortic aneurysm 02/2011    4.5    Tremors of nervous system 10/16/2023    Type II or unspecified type diabetes mellitus without mention of complication, not stated as uncontrolled     Vascular US Duplex Lower Extremity Venous Bilateral 12/04/2024    Deep venous insufficiency in the right popliteal vein. 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     Past Surgical History:   Procedure Laterality Date    CARDIAC SURGERY N/A 07/06/2023    watchman device implant    CARDIOVERSION  07/08/2019    CATARACT REMOVAL WITH IMPLANT Right 04/12/2019    CATARACT REMOVAL WITH IMPLANT Left 05/10/2019    COLON SURGERY  2007    COLONOSCOPY  2007;2017    colon polyps removed    COLONOSCOPY N/A 04/29/2023    COLONOSCOPY POLYPECTOMY ABLATION WITH CLIP PLACEMENT X 3 performed by Matty Caruso MD at [facility] ENDOSCOPY    HERNIA REPAIR  2008    HERNIA REPAIR  2009    INTRACAPSULAR CATARACT EXTRACTION Right 04/12/2019    EYE CATARACT EMULSIFICATION IOL IMPLANT performed by Jamil Younger MD [place] [facility] [place]    INTRACAPSULAR CATARACT EXTRACTION Left 05/10/2019    EYE CATARACT EMULSIFICATION IOL IMPLANT performed by Jamil Younger MD at [facility] [place]    PACEMAKER INSERTION Left 06/25/2019    [employer] dual permanent pacer-Val XT DR ENEIDA SureScan     TOTAL KNEE ARTHROPLASTY Right 02/16/2022    RIGHT KNEE TOTAL ARTHROPLASTY performed by Bennie Dennis DO at [facility] OR    UPPER GASTROINTESTINAL ENDOSCOPY N/A 04/29/2023    EGD BIOPSY performed by Matty Caruso MD at [facility] ENDOSCOPY     Family History   Problem Relation Age of Onset    Diabetes Mother     Heart Disease Father     Heart Disease Brother      findings.  He did not hit his head.  Is not clear as to the exact etiology of his falling.  It sounds like he is having syncopal episodes where he does not feel them coming on.  He has a pacemaker and he has worn a cardiac monitor and his pacemaker has been interrogated previously.  He will be discharged in stable condition to follow-up with his primary caregiver and his cardiologist for recheck.  He is to return for any problems or concerns.    History from : Patient and Family wife    Limitations to history : None    Patient was given the following medications:  Medications   acetaminophen (TYLENOL) tablet 1,000 mg (has no administration in time range)   bacitracin zinc ointment (has no administration in time range)   lidocaine 4 % external patch 1 patch (has no administration in time range)       Independent Imaging Interpretation by me: None    EKG (if obtained): (All EKG's are interpreted by myself in the absence of a cardiologist) interpreted as above    Chronic conditions affecting care: History of syncope with collapse    Discussion with Other Profesionals : None    Social Determinants : None    Records Reviewed : None    Disposition Considerations (tests considered but not done, Shared Decision Making, Pt Expectation of Test or Tx.): Patient will be discharged stable condition to follow-up with his primary caregiver.  He is instructed return for any worsening signs and symptoms.  Lidoderm patches are ordered for his  right shoulder.  Appropriate for outpatient management      I am the Primary Clinician of Record.        Final Impression:  1. Syncope and collapse    2. Contusion of right shoulder, initial encounter    3. Contusion of right knee, initial encounter      DISPOSITION Discharge - Pending Orders Complete    Patient referred to:  John Tyson MD  [address] [place] [id number]  [phone]    Schedule an appointment as soon as possible for a visit in 1 week  For follow

## 2025-04-21 ENCOUNTER — RESULTS FOLLOW-UP (OUTPATIENT)
Age: 80
End: 2025-04-21

## 2025-04-21 ENCOUNTER — HOSPITAL ENCOUNTER (OUTPATIENT)
Age: 80
Discharge: HOME | End: 2025-04-21
Payer: MEDICARE

## 2025-04-21 DIAGNOSIS — R19.5 OCCULT BLOOD IN STOOLS: Primary | ICD-10-CM

## 2025-04-21 DIAGNOSIS — D53.9 MACROCYTIC ANEMIA: ICD-10-CM

## 2025-04-21 LAB
DATE, STOOL #1: ABNORMAL
HEMOCCULT SP1 STL QL: POSITIVE
TIME, STOOL #1: ABNORMAL

## 2025-04-21 PROCEDURE — 36415 COLL VENOUS BLD VENIPUNCTURE: CPT

## 2025-04-21 PROCEDURE — 82272 OCCULT BLD FECES 1-3 TESTS: CPT

## 2025-04-22 ENCOUNTER — OFFICE VISIT (OUTPATIENT)
Dept: CARDIOLOGY CLINIC | Age: 80
End: 2025-04-22
Payer: MEDICARE

## 2025-04-22 VITALS
HEIGHT: 71 IN | SYSTOLIC BLOOD PRESSURE: 118 MMHG | HEART RATE: 62 BPM | WEIGHT: 216.8 LBS | BODY MASS INDEX: 30.35 KG/M2 | DIASTOLIC BLOOD PRESSURE: 68 MMHG

## 2025-04-22 DIAGNOSIS — R29.6 FREQUENT FALLS: Primary | ICD-10-CM

## 2025-04-22 DIAGNOSIS — Z98.61 POST PTCA: ICD-10-CM

## 2025-04-22 DIAGNOSIS — I25.10 CAD IN NATIVE ARTERY: ICD-10-CM

## 2025-04-22 DIAGNOSIS — E11.9 TYPE 2 DIABETES MELLITUS WITHOUT COMPLICATION, WITHOUT LONG-TERM CURRENT USE OF INSULIN (HCC): ICD-10-CM

## 2025-04-22 DIAGNOSIS — I10 PRIMARY HYPERTENSION: ICD-10-CM

## 2025-04-22 DIAGNOSIS — Z95.0 S/P PLACEMENT OF CARDIAC PACEMAKER: ICD-10-CM

## 2025-04-22 DIAGNOSIS — E78.00 PURE HYPERCHOLESTEROLEMIA: ICD-10-CM

## 2025-04-22 DIAGNOSIS — I48.19 PERSISTENT ATRIAL FIBRILLATION (HCC): ICD-10-CM

## 2025-04-22 DIAGNOSIS — M79.89 LEG SWELLING: ICD-10-CM

## 2025-04-22 DIAGNOSIS — Z95.818 PRESENCE OF WATCHMAN LEFT ATRIAL APPENDAGE CLOSURE DEVICE: ICD-10-CM

## 2025-04-22 PROBLEM — I35.0 NONRHEUMATIC AORTIC VALVE STENOSIS: Status: RESOLVED | Noted: 2022-02-15 | Resolved: 2025-04-22

## 2025-04-22 PROBLEM — N18.32 CHRONIC KIDNEY DISEASE (CKD) STAGE G3B/A1, MODERATELY DECREASED GLOMERULAR FILTRATION RATE (GFR) BETWEEN 30-44 ML/MIN/1.73 SQUARE METER AND ALBUMINURIA CREATININE RATIO LESS THAN 30 MG/G (HCC): Status: RESOLVED | Noted: 2019-04-03 | Resolved: 2025-04-22

## 2025-04-22 PROCEDURE — 3074F SYST BP LT 130 MM HG: CPT | Performed by: INTERNAL MEDICINE

## 2025-04-22 PROCEDURE — 3044F HG A1C LEVEL LT 7.0%: CPT | Performed by: INTERNAL MEDICINE

## 2025-04-22 PROCEDURE — 1159F MED LIST DOCD IN RCRD: CPT | Performed by: INTERNAL MEDICINE

## 2025-04-22 PROCEDURE — 3078F DIAST BP <80 MM HG: CPT | Performed by: INTERNAL MEDICINE

## 2025-04-22 PROCEDURE — 1036F TOBACCO NON-USER: CPT | Performed by: INTERNAL MEDICINE

## 2025-04-22 PROCEDURE — 1123F ACP DISCUSS/DSCN MKR DOCD: CPT | Performed by: INTERNAL MEDICINE

## 2025-04-22 PROCEDURE — G8427 DOCREV CUR MEDS BY ELIG CLIN: HCPCS | Performed by: INTERNAL MEDICINE

## 2025-04-22 PROCEDURE — G8417 CALC BMI ABV UP PARAM F/U: HCPCS | Performed by: INTERNAL MEDICINE

## 2025-04-22 PROCEDURE — 99214 OFFICE O/P EST MOD 30 MIN: CPT | Performed by: INTERNAL MEDICINE

## 2025-04-22 NOTE — PATIENT INSTRUCTIONS
Patient is educated about being high risk for fall.  Maximum fall precautions counseled.  Questions answered.  Patient verbalized understanding. Continue device check as per care link schedule. After visit summery is provided.   Questions answered and patient verbalizes understanding. Follow up in 6 months,  sooner if needed.

## 2025-04-22 NOTE — PROGRESS NOTES
Kareem Rey  1945  John Tyson MD      Chief Complaint   Patient presents with    Coronary Artery Disease    Hypertension    Hyperlipidemia    Follow-up     Follow up  No chest pains, SOB or dizziness  Complains of falling , passed out briefly stated its happened a few times, swelling but stated its better than it has been,      Chief complaint and HPI:  Kareem Rey  is a 79 y.o. male following up for recurrent falls with which he has been to emergency room twice since last visit.  Wife describes he just goes straight down to the floor and she observed that he loses his color and once he is on the floor he will respond to her questions.  Patient feels he is blacking out however he had a recent cardiac event monitor and during the episodes we did not see any evidence of loss of pacemaker function.  His blood pressure has always been good when he is checked in the emergency room as well as at home after these episodes and heart rate has always been good.  Wife has also checked sugar and sugar has been stable.  Multiple encounters are reviewed in the hospital patient.  Recent blood test results reviewed and he is positive for blood in his stools.    Rest of the Cardiovascular system review is otherwise unchanged from prior encounter.  Past medical history:  has a past medical history of Abnormal nuclear stress test, Arthritis, CAD s/p PCI to LAD+RCA 9/9/22, Dizziness, Fall, FH: CAD (coronary artery disease), H/O 24 hour EKG monitoring, H/O atrial fibrillation without current medication, H/O cardiovascular stress test, H/O Doppler echocardiogram, H/O Doppler ultrasound (Venous Doppler Lower Extremities), H/O echocardiogram, H/O echocardiogram, H/O transesophageal echocardiography (SALAZAR) for monitoring, Heart murmur, History of cardiac monitoring, History of nuclear stress test, Hx of Doppler echocardiogram, Hx of Venous Doppler ultrasound, Hyperlipidemia, Hypertension, Kidney stones, Leg swelling,

## 2025-05-02 ENCOUNTER — TELEPHONE (OUTPATIENT)
Age: 80
End: 2025-05-02

## 2025-05-07 ENCOUNTER — TELEPHONE (OUTPATIENT)
Age: 80
End: 2025-05-07

## 2025-05-07 NOTE — TELEPHONE ENCOUNTER
----- Message from Julianna COTO sent at 5/7/2025  9:26 AM EDT -----  Regarding: ECC Message to Provider  ECC Message to Provider    Relationship to Patient: Self     Additional Information : Patient want to tell John Tyson MD that he already have an appointment with Matty Caruso MD  --------------------------------------------------------------------------------------------------------------------------    Call Back Information: OK to leave message on voicemail  Preferred Call Back Number: Phone [phone]

## 2025-05-13 DIAGNOSIS — F32.1 CURRENT MODERATE EPISODE OF MAJOR DEPRESSIVE DISORDER WITHOUT PRIOR EPISODE (HCC): ICD-10-CM

## 2025-05-13 RX ORDER — ESCITALOPRAM OXALATE 5 MG/1
5 TABLET ORAL DAILY
Qty: 90 TABLET | Refills: 1 | Status: SHIPPED | OUTPATIENT
Start: 2025-05-13

## 2025-06-04 ENCOUNTER — TELEPHONE (OUTPATIENT)
Dept: FAMILY MEDICINE CLINIC | Age: 80
End: 2025-06-04
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
## 2025-06-04 NOTE — TELEPHONE ENCOUNTER
----- Message from Kalee BROWN sent at 6/4/2025  3:15 PM EDT -----  Regarding: ECC Appointment Request  ECC Message to Provider    Relationship to Patient: Spouse/Partner (Katie)     Additional Information: Patient is requesting to be added in the cancellation wait list. He has an upcoming appointment on 11/3/2025 at 10:00 AM for 4 weeks follow-up and patient wants a sooner date. The caller also wants to know if does the patient still need to have a blood work prior to this appointment, he just had his blood work this month.     --------------------------------------------------------------------------------------------------------------------------    Call Back Information: OK to leave message on voicemail  Preferred Call Back Number: Phone  [phone] or [phone]

## 2025-06-09 ENCOUNTER — TELEPHONE (OUTPATIENT)
Dept: CARDIOLOGY CLINIC | Age: 80
End: 2025-06-09
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
## 2025-06-12 ENCOUNTER — OFFICE VISIT (OUTPATIENT)
Age: 80
End: 2025-06-12

## 2025-06-12 VITALS
SYSTOLIC BLOOD PRESSURE: 130 MMHG | BODY MASS INDEX: 28.84 KG/M2 | HEART RATE: 80 BPM | OXYGEN SATURATION: 98 % | BODY MASS INDEX: 28.73 KG/M2 | RESPIRATION RATE: 18 BRPM | HEIGHT: 71 IN | WEIGHT: 206 LBS | DIASTOLIC BLOOD PRESSURE: 80 MMHG | WEIGHT: 206 LBS

## 2025-06-12 DIAGNOSIS — K52.9 CHRONIC DIARRHEA: ICD-10-CM

## 2025-06-12 DIAGNOSIS — R53.81 PHYSICAL DEBILITY: ICD-10-CM

## 2025-06-12 DIAGNOSIS — F32.1 CURRENT MODERATE EPISODE OF MAJOR DEPRESSIVE DISORDER WITHOUT PRIOR EPISODE (HCC): ICD-10-CM

## 2025-06-12 DIAGNOSIS — R41.3 MEMORY DEFICITS: ICD-10-CM

## 2025-06-12 DIAGNOSIS — R25.1 TREMOR: ICD-10-CM

## 2025-06-12 DIAGNOSIS — R68.81 EARLY SATIETY: ICD-10-CM

## 2025-06-12 DIAGNOSIS — R19.5 OCCULT BLOOD IN STOOLS: Primary | ICD-10-CM

## 2025-06-12 DIAGNOSIS — D64.9 ANEMIA, UNSPECIFIED TYPE: ICD-10-CM

## 2025-06-12 DIAGNOSIS — Z00.00 INITIAL MEDICARE ANNUAL WELLNESS VISIT: Primary | ICD-10-CM

## 2025-06-12 DIAGNOSIS — R10.10 UPPER ABDOMINAL PAIN: ICD-10-CM

## 2025-06-12 DIAGNOSIS — R19.5 OCCULT BLOOD POSITIVE STOOL: ICD-10-CM

## 2025-06-12 RX ORDER — ESCITALOPRAM OXALATE 10 MG/1
10 TABLET ORAL DAILY
Qty: 90 TABLET | Refills: 0 | Status: SHIPPED | OUTPATIENT
Start: 2025-06-12

## 2025-06-12 RX ORDER — FAMOTIDINE 20 MG/1
20 TABLET, FILM COATED ORAL 2 TIMES DAILY
Qty: 60 TABLET | Refills: 1 | Status: SHIPPED | OUTPATIENT
Start: 2025-06-12

## 2025-06-12 ASSESSMENT — LIFESTYLE VARIABLES
HOW OFTEN DURING THE LAST YEAR HAVE YOU FAILED TO DO WHAT WAS NORMALLY EXPECTED FROM YOU BECAUSE OF DRINKING: NEVER
HOW OFTEN DO YOU HAVE A DRINK CONTAINING ALCOHOL: 4 OR MORE TIMES A WEEK
HOW OFTEN DURING THE LAST YEAR HAVE YOU NEEDED AN ALCOHOLIC DRINK FIRST THING IN THE MORNING TO GET YOURSELF GOING AFTER A NIGHT OF HEAVY DRINKING: NEVER
HOW OFTEN DURING THE LAST YEAR HAVE YOU BEEN UNABLE TO REMEMBER WHAT HAPPENED THE NIGHT BEFORE BECAUSE YOU HAD BEEN DRINKING: NEVER
HOW OFTEN DURING THE LAST YEAR HAVE YOU FOUND THAT YOU WERE NOT ABLE TO STOP DRINKING ONCE YOU HAD STARTED: NEVER
HOW MANY STANDARD DRINKS CONTAINING ALCOHOL DO YOU HAVE ON A TYPICAL DAY: 1 OR 2
HAVE YOU OR SOMEONE ELSE BEEN INJURED AS A RESULT OF YOUR DRINKING: NO
HAS A RELATIVE, FRIEND, DOCTOR, OR ANOTHER HEALTH PROFESSIONAL EXPRESSED CONCERN ABOUT YOUR DRINKING OR SUGGESTED YOU CUT DOWN: NO
HOW OFTEN DURING THE LAST YEAR HAVE YOU HAD A FEELING OF GUILT OR REMORSE AFTER DRINKING: NEVER

## 2025-06-12 ASSESSMENT — PATIENT HEALTH QUESTIONNAIRE - PHQ9
1. LITTLE INTEREST OR PLEASURE IN DOING THINGS: NOT AT ALL
8. MOVING OR SPEAKING SO SLOWLY THAT OTHER PEOPLE COULD HAVE NOTICED. OR THE OPPOSITE, BEING SO FIGETY OR RESTLESS THAT YOU HAVE BEEN MOVING AROUND A LOT MORE THAN USUAL: NOT AT ALL
3. TROUBLE FALLING OR STAYING ASLEEP: NOT AT ALL
7. TROUBLE CONCENTRATING ON THINGS, SUCH AS READING THE NEWSPAPER OR WATCHING TELEVISION: SEVERAL DAYS
SUM OF ALL RESPONSES TO PHQ QUESTIONS 1-9: 8
2. FEELING DOWN, DEPRESSED OR HOPELESS: MORE THAN HALF THE DAYS
4. FEELING TIRED OR HAVING LITTLE ENERGY: MORE THAN HALF THE DAYS
10. IF YOU CHECKED OFF ANY PROBLEMS, HOW DIFFICULT HAVE THESE PROBLEMS MADE IT FOR YOU TO DO YOUR WORK, TAKE CARE OF THINGS AT HOME, OR GET ALONG WITH OTHER PEOPLE: NOT DIFFICULT AT ALL
SUM OF ALL RESPONSES TO PHQ QUESTIONS 1-9: 8
5. POOR APPETITE OR OVEREATING: MORE THAN HALF THE DAYS
9. THOUGHTS THAT YOU WOULD BE BETTER OFF DEAD, OR OF HURTING YOURSELF: NOT AT ALL
6. FEELING BAD ABOUT YOURSELF - OR THAT YOU ARE A FAILURE OR HAVE LET YOURSELF OR YOUR FAMILY DOWN: SEVERAL DAYS
SUM OF ALL RESPONSES TO PHQ QUESTIONS 1-9: 8
SUM OF ALL RESPONSES TO PHQ QUESTIONS 1-9: 8

## 2025-06-12 ASSESSMENT — ENCOUNTER SYMPTOMS
EYE PAIN: 0
ABDOMINAL PAIN: 0
RHINORRHEA: 0
NAUSEA: 0
EYE DISCHARGE: 0
BLOOD IN STOOL: 0
SORE THROAT: 0
COUGH: 0
SHORTNESS OF BREATH: 0
PHOTOPHOBIA: 0
VOMITING: 0
CHEST TIGHTNESS: 0
WHEEZING: 0
CONSTIPATION: 0
EYE REDNESS: 0
BACK PAIN: 0
COLOR CHANGE: 0
DIARRHEA: 0

## 2025-06-12 NOTE — PROGRESS NOTES
Patients wife recently hospitalized  
trouble with your hearing that hasn't been managed with hearing aids?: (!) Yes    Interventions:  Patient declines any further evaluation or treatment    Vision Screen:  Do you have difficulty driving, watching TV, or doing any of your daily activities because of your eyesight?: No  Have you had an eye exam within the past year?: (!) No  Interventions:   Patient encouraged to make appointment with their eye specialist     ADL's:   Patient reports needing help with:  Select all that apply: (!) Telephone Use, Housekeeping, Laundry, Shopping, Transportation  Interventions:  Due to tremor; discussed meds; referred to neuro                  Objective   There were no vitals filed for this visit.   There is no height or weight on file to calculate BMI.                    Allergies   Allergen Reactions    Niacin And Related Hives    Ativan [Lorazepam] Other (See Comments)     DO NOT GIVE    Oxycontin [Oxycodone Hcl] Hives     Itching in his feet     Prior to Visit Medications    Medication Sig Taking? Authorizing Provider   escitalopram (LEXAPRO) 10 MG tablet Take 1 tablet by mouth daily  Carlos Cooper PA   famotidine (PEPCID) 20 MG tablet Take 1 tablet by mouth 2 times daily  Carlos Cooper PA   primidone (MYSOLINE) 50 MG tablet   Andrew Guzman MD   amLODIPine (NORVASC) 5 MG tablet Take 1 tablet by mouth daily  Kevan Johnson MD   lisinopril (PRINIVIL;ZESTRIL) 5 MG tablet Take 1 tablet by mouth daily  Kevan Johnson MD   magnesium oxide (MAG-OX) 400 (240 Mg) MG tablet Take 1 tablet by mouth daily  John Tyson MD   clopidogrel (PLAVIX) 75 MG tablet TAKE 1 TABLET BY MOUTH DAILY  Kevan Johnson MD   nitroGLYCERIN (NITROSTAT) 0.4 MG SL tablet Place 1 tablet under the tongue every 5 minutes as needed for Chest pain  John Tyson MD   vitamin B-12 (CYANOCOBALAMIN) 500 MCG tablet Take 1 tablet by mouth daily  Andrew Guzman MD   metoprolol tartrate (LOPRESSOR) 25 MG

## 2025-06-12 NOTE — PATIENT INSTRUCTIONS
go to all appointments, and call your doctor if you are having problems. It's also a good idea to know your test results and keep a list of the medicines you take.  Where can you learn more?  Go to https://www.[employer].net/patientEd and enter G551 to learn more about \"Learning About Vision Tests.\"  Current as of: July 31, 2024  Content Version: 14.5  © 0574-3498 [employer].   Care instructions adapted under license by [employer]. If you have questions about a medical condition or this instruction, always ask your healthcare professional. [employer] disclaims any warranty or liability for your use of this information.         Learning About Activities of Daily Living  What are activities of daily living?     Activities of daily living (ADLs) are the basic self-care tasks you do every day. These include eating, bathing, dressing, and moving around.  As you age, and if you have health problems, you may find that it's harder to do some of these tasks. If so, your doctor can suggest ideas that may help.  To measure what kind of help you may need, your doctor will ask how well you are able to do ADLs. Let your doctor know if there are any tasks that you are having trouble doing. This is an important first step to getting help. And when you have the help you need, you can stay as independent as possible.  How will a doctor assess your ADLs?  Asking about ADLs is part of a routine health checkup your doctor will likely do as you age. Your health check might be done in a doctor's office, in your home, or at a hospital. The goal is to find out if you are having any problems that could make it hard to care for yourself or that make it unsafe for you to be on your own.  To measure your ADLs, your doctor will ask how hard it is for you to do routine tasks. Your doctor may also want to know if you have changed the way you do a task because of a health problem. Your doctor may watch how you:  Walk back

## 2025-06-12 NOTE — PROGRESS NOTES
Kareem Rey (:  1945) is a 79 y.o. male,Established patient, here for evaluation of the following chief complaint(s):    Diabetes      ASSESSMENT/PLAN:  Assessment & Plan  1. Essential Tremor.  - Persistent tremor suggests benign essential tremor. Improves with nightly whiskey.  - Currently on metoprolol   - Referral to neurologist for further evaluation and management.  - Discussed potential side effects of tremor medications such as primodone.    2. Weight Loss.  - Possible gastritis or chronic stomach inflammation. Recent C-scope without source of bleeding, may need EGD, F/U with GI.  - Initiated Pepcid 20 mg BID.  - Order abdominal ultrasound to rule out gallbladder issues.  - Recheck blood counts, iron levels, kidney, and liver function.    3. Diarrhea.  - Possible exacerbation by Ensure.  - Continue Imodium as needed.  - Order abdominal ultrasound to rule out gallbladder issues.  - Recheck blood counts, iron levels, kidney, and liver function.    4. Cognitive Decline.  - Possible anxiety or depression manifestation.  - Increase Lexapro to 10 mg.  -Referred to neuro.  - Advised to report any discomfort or worsened bowel issues.    5. Recurrent Falls.  - Experienced falls while using a walker, no recent increase.  - Receives home health care, feels supported.  - Previously underwent physical therapy, performed exercises without difficulty.  - Advised to report if more assistance is needed.    Follow-up  Scheduled follow-up with Dr. Parker on 2025.  1. Occult blood in stools  -     famotidine (PEPCID) 20 MG tablet; Take 1 tablet by mouth 2 times daily, Disp-60 tablet, R-1Normal  -     Iron and TIBC; Future  -     Ferritin; Future  2. Current moderate episode of major depressive disorder without prior episode (HCC)  -     escitalopram (LEXAPRO) 10 MG tablet; Take 1 tablet by mouth daily, Disp-90 tablet, R-0Normal  3. Physical debility  4. Memory deficits  -     [facility]  5. Early

## 2025-06-16 ENCOUNTER — APPOINTMENT (OUTPATIENT)
Dept: CT IMAGING | Age: 80
End: 2025-06-16
Payer: MEDICARE

## 2025-06-16 ENCOUNTER — HOSPITAL ENCOUNTER (EMERGENCY)
Age: 80
Discharge: HOME | End: 2025-06-17
Attending: EMERGENCY MEDICINE
Payer: MEDICARE

## 2025-06-16 DIAGNOSIS — S00.03XA CONTUSION OF SCALP, INITIAL ENCOUNTER: ICD-10-CM
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
## 2025-06-16 DIAGNOSIS — S09.90XA CLOSED HEAD INJURY, INITIAL ENCOUNTER: Primary | ICD-10-CM

## 2025-06-16 PROCEDURE — 70450 CT HEAD/BRAIN W/O DYE: CPT

## 2025-06-16 PROCEDURE — 72125 CT NECK SPINE W/O DYE: CPT

## 2025-06-16 PROCEDURE — 99284 EMERGENCY DEPT VISIT MOD MDM: CPT

## 2025-06-16 ASSESSMENT — PAIN DESCRIPTION - ONSET: ONSET: ON-GOING

## 2025-06-16 ASSESSMENT — PAIN - FUNCTIONAL ASSESSMENT
PAIN_FUNCTIONAL_ASSESSMENT: PREVENTS OR INTERFERES SOME ACTIVE ACTIVITIES AND ADLS
PAIN_FUNCTIONAL_ASSESSMENT: 0-10

## 2025-06-16 ASSESSMENT — PAIN DESCRIPTION - DESCRIPTORS: DESCRIPTORS: ACHING

## 2025-06-16 ASSESSMENT — LIFESTYLE VARIABLES
HOW MANY STANDARD DRINKS CONTAINING ALCOHOL DO YOU HAVE ON A TYPICAL DAY: 1 OR 2
HOW OFTEN DO YOU HAVE A DRINK CONTAINING ALCOHOL: 4 OR MORE TIMES A WEEK

## 2025-06-16 ASSESSMENT — PAIN SCALES - GENERAL: PAINLEVEL_OUTOF10: 6

## 2025-06-16 ASSESSMENT — PAIN DESCRIPTION - ORIENTATION: ORIENTATION: OTHER (COMMENT)

## 2025-06-16 ASSESSMENT — PAIN DESCRIPTION - FREQUENCY: FREQUENCY: CONTINUOUS

## 2025-06-16 ASSESSMENT — PAIN DESCRIPTION - PAIN TYPE: TYPE: ACUTE PAIN

## 2025-06-16 ASSESSMENT — PAIN DESCRIPTION - LOCATION: LOCATION: HEAD

## 2025-06-17 VITALS
SYSTOLIC BLOOD PRESSURE: 124 MMHG | TEMPERATURE: 98.1 F | HEIGHT: 71 IN | HEART RATE: 64 BPM | BODY MASS INDEX: 25.2 KG/M2 | WEIGHT: 180 LBS | DIASTOLIC BLOOD PRESSURE: 73 MMHG | OXYGEN SATURATION: 99 % | RESPIRATION RATE: 16 BRPM

## 2025-06-17 ASSESSMENT — ENCOUNTER SYMPTOMS
EYES NEGATIVE: 1
RESPIRATORY NEGATIVE: 1
GASTROINTESTINAL NEGATIVE: 1

## 2025-06-17 ASSESSMENT — PAIN SCALES - GENERAL: PAINLEVEL_OUTOF10: 4

## 2025-06-17 NOTE — ED PROVIDER NOTES
test 11/ COMPARISON: 4/11/2025 TECHNIQUE: Contiguous axial slices of the head and cervical spine were submitted  without IV contrast.   DOSE OPTIMIZATION: CT radiation dose optimization techniques (automated exposure  control, and use of iterative reconstruction techniques, or adjustment of the mA and/or kV according to patient size) were used to limit patient radiation dose. FINDINGS: CT HEAD: Atrophy: Moderate atrophy Ventricles:  Prominence of the ventricles, likely accentuated by atrophy. Mass effect/midline shift:  None Hemorrhage:  No intra- or extra-axial hemorrhage Parenchyma:  Hypoattenuation involving the periventricular and subcortical white  matter, consistent with chronic microvascular ischemic changes. Vasculature:  Grossly normal.  Technique not optimized for evaluation. Osseous Structures:  No focal osseous abnormality Paranasal Sinuses/Mastoid Air cells:  Clear Orbits:  Visualized portions have a normal noncontrasted appearance. Scalp/Soft tissues:  No focal abnormality CT cervical spine: Multilevel degenerative changes of the cervical spine. No acute fracture. No listhesis. Multilevel facet degenerative changes. No prevertebral soft tissue swelling. Atherosclerosis of the carotid arteries. Partial visualization of a thoracic aortic aneurysm. Trace left pleural effusion partially visualized. IMPRESSION: 1.   No acute intracranial findings.  There is no evidence for hemorrhage, mass effect, or acute large territory infarct. 2.  Intracranial atrophy with chronic microvascular ischemic changes. 3.  Multilevel degenerative changes of the cervical spine. No acute fracture or listhesis.  Dictated and Electronically Signed By: Adryan Jane MD [facility] 6/16/2025 23:34             EKG (if obtained): (All EKG's are interpreted by myself in the absence of a cardiologist)  None     Chronic conditions affecting care:none     Discussion with Other Profesionals : None    Social Determinants :

## 2025-06-24 ENCOUNTER — TELEPHONE (OUTPATIENT)
Dept: CARDIOLOGY CLINIC | Age: 80
End: 2025-06-24

## 2025-06-25 RX ORDER — NITROGLYCERIN 0.4 MG/1
0.4 TABLET SUBLINGUAL EVERY 5 MIN PRN
Qty: 25 TABLET | Refills: 1 | Status: SHIPPED | OUTPATIENT
Start: 2025-06-25

## 2025-07-07 RX ORDER — CLOPIDOGREL BISULFATE 75 MG/1
75 TABLET ORAL DAILY
Qty: 90 TABLET | Refills: 4 | Status: SHIPPED | OUTPATIENT
Start: 2025-07-07

## 2025-08-11 DIAGNOSIS — F32.1 CURRENT MODERATE EPISODE OF MAJOR DEPRESSIVE DISORDER WITHOUT PRIOR EPISODE (HCC): ICD-10-CM

## 2025-08-11 RX ORDER — ESCITALOPRAM OXALATE 5 MG/1
5 TABLET ORAL DAILY
Qty: 90 TABLET | Refills: 1 | OUTPATIENT
Start: 2025-08-11

## 2025-08-11 RX ORDER — AMLODIPINE BESYLATE 5 MG/1
5 TABLET ORAL DAILY
Qty: 90 TABLET | Refills: 3 | Status: SHIPPED | OUTPATIENT
Start: 2025-08-11

## (undated) DEVICE — SURGICAL PROCEDURE PACK TOT KNEE LF

## (undated) DEVICE — GLOVE SURG SZ 7 CRM LTX FREE POLYISOPRENE POLYMER BEAD ANTI

## (undated) DEVICE — SUTURE ABSORBABLE 3-0 PS-1 18 IN UD MONOCRYL + STRATAFIX SXMP1B102

## (undated) DEVICE — ENDOSCOPY KIT: Brand: MEDLINE INDUSTRIES, INC.

## (undated) DEVICE — NEEDLE HYPO 23GA L1.5IN TURQ POLYPR HUB S STL THN WALL IM

## (undated) DEVICE — SET ADMIN L105IN 10GTT 3 NDL FREE CK VLV 2 PC M LUERLOCK

## (undated) DEVICE — HOOD, PEEL-AWAY: Brand: FLYTE

## (undated) DEVICE — BLADE OPHTH 180DEG CUT SURF BLU STR SHRP DBL BVL GRINDLESS

## (undated) DEVICE — SNARE ENDOSCP L240CM OD2.4MM LOOP W15MM RND INSUL STIFF

## (undated) DEVICE — Device

## (undated) DEVICE — SNARE VASC L240CM LOOP W10MM SHTH DIA2.4MM RND STIFF CLD

## (undated) DEVICE — GLOVE ORANGE PI 7 1/2   MSG9075

## (undated) DEVICE — GLOVE SURG SZ 65 THK91MIL LTX FREE SYN POLYISOPRENE

## (undated) DEVICE — ELECTRODE,RADIOTRANSLUCENT,FOAM,5PK: Brand: MEDLINE

## (undated) DEVICE — TOWEL,OR,DSP,ST,BLUE,STD,6/PK,12PK/CS: Brand: MEDLINE

## (undated) DEVICE — FORCEPS BX L240CM JAW DIA2.8MM L CAP W/ NDL MIC MESH TOOTH

## (undated) DEVICE — GLOVE SURG SZ 75 L12IN THK75MIL DK GRN LTX FREE

## (undated) DEVICE — HYPODERMIC SAFETY NEEDLE: Brand: MAGELLAN

## (undated) DEVICE — GLOVE SURG SZ 8 L12IN THK75MIL DK GRN LTX FREE

## (undated) DEVICE — STERILE LATEX POWDER-FREE SURGICAL GLOVESWITH NITRILE COATING: Brand: PROTEXIS

## (undated) DEVICE — MICROSURGICAL INSTRUMENT RETROBULAR NEEDLE 25GA X 38MM (1 1/2 IN): Brand: ALCON

## (undated) DEVICE — GOWN,SIRUS,POLYRNF,BRTHSLV,XLN/XL,20/CS: Brand: MEDLINE

## (undated) DEVICE — FAN SPRAY KIT: Brand: PULSAVAC®

## (undated) DEVICE — SPONGE LAP W18XL18IN WHT COT 4 PLY FLD STRUNG RADPQ DISP ST

## (undated) DEVICE — SET EXTN L41IN 2 NDL FREE VALVES FREE INJ PRT

## (undated) DEVICE — COVER,TABLE,44X90,STERILE: Brand: MEDLINE

## (undated) DEVICE — 2108 SERIES SAGITTAL FAN BLADE (33.0 X 0.88 X 64.0MM)

## (undated) DEVICE — GLOVE SURG SZ 8 CRM LTX FREE POLYISOPRENE POLYMER BEAD ANTI

## (undated) DEVICE — BANDAGE,ELASTIC,ESMARK,STERILE,6"X9',LF: Brand: MEDLINE

## (undated) DEVICE — SOLUTION IRRIG 250ML STRL H2O PLAS POUR BTL USP

## (undated) DEVICE — GLOVE ORANGE PI 7   MSG9070

## (undated) DEVICE — ZIMMER® STERILE DISPOSABLE TOURNIQUET CUFF WITH PLC, DUAL PORT, SINGLE BLADDER, 30 IN. (76 CM)

## (undated) DEVICE — APPLICATOR MEDICATED 26 CC SOLUTION HI LT ORNG CHLORAPREP

## (undated) DEVICE — 2108 SERIES SAGITTAL BLADE (19.5 X 1.27 X 90.0MM)

## (undated) DEVICE — DRAPE SHEET ULTRAGARD: Brand: MEDLINE

## (undated) DEVICE — LINE SAMP O2 6.5FT W/FEMALE CONN F/ADULT CAPNOLINE PLUS

## (undated) DEVICE — ELECTRODE ES AD CRDLSS PT RET REM POLYHESIVE

## (undated) DEVICE — SYSTEM SKIN CLSR 22CM DERMBND PRINEO

## (undated) DEVICE — SUTURE VCRL SZ 2-0 L18IN ABSRB UD CT-1 L36MM 1/2 CIR J839D

## (undated) DEVICE — SUTURE FIBERWIRE SZ 5 L38IN NONABSORBABLE BLU L48MM 1/2 AR7211

## (undated) DEVICE — CLIP LIG L235CM RESOL 360 BX/20

## (undated) DEVICE — GLOVE ORANGE PI 8   MSG9080

## (undated) DEVICE — BLADE SAGITAL 18X90X1.27MM

## (undated) DEVICE — SUTURE VCRL SZ 1 L27IN ABSRB UD L36MM CT-1 1/2 CIR JJ40G

## (undated) DEVICE — GOWN,SIRUS,FABRNF,RAGLAN,XL,ST,28/CS: Brand: MEDLINE